# Patient Record
Sex: FEMALE | Race: BLACK OR AFRICAN AMERICAN | Employment: UNEMPLOYED | URBAN - METROPOLITAN AREA
[De-identification: names, ages, dates, MRNs, and addresses within clinical notes are randomized per-mention and may not be internally consistent; named-entity substitution may affect disease eponyms.]

---

## 2017-04-07 ENCOUNTER — APPOINTMENT (OUTPATIENT)
Dept: GENERAL RADIOLOGY | Age: 42
End: 2017-04-07
Attending: PHYSICIAN ASSISTANT
Payer: SELF-PAY

## 2017-04-07 ENCOUNTER — HOSPITAL ENCOUNTER (EMERGENCY)
Age: 42
Discharge: HOME | End: 2017-04-08
Attending: EMERGENCY MEDICINE | Admitting: EMERGENCY MEDICINE
Payer: SELF-PAY

## 2017-04-07 DIAGNOSIS — J20.9 ACUTE BRONCHITIS, UNSPECIFIED ORGANISM: ICD-10-CM

## 2017-04-07 DIAGNOSIS — R73.9 HYPERGLYCEMIA: Primary | ICD-10-CM

## 2017-04-07 LAB
ADMINISTERED INITIALS, ADMINIT: NORMAL
ALBUMIN SERPL BCP-MCNC: 3.4 G/DL (ref 3.4–5)
ALBUMIN/GLOB SERPL: 0.8 {RATIO} (ref 0.8–1.7)
ALP SERPL-CCNC: 179 U/L (ref 45–117)
ALT SERPL-CCNC: 24 U/L (ref 13–56)
ANION GAP BLD CALC-SCNC: 11 MMOL/L (ref 3–18)
APPEARANCE UR: CLEAR
AST SERPL W P-5'-P-CCNC: 18 U/L (ref 15–37)
BASOPHILS # BLD AUTO: 0 K/UL (ref 0–0.1)
BASOPHILS # BLD: 0 % (ref 0–2)
BILIRUB SERPL-MCNC: 0.4 MG/DL (ref 0.2–1)
BILIRUB UR QL: NEGATIVE
BUN SERPL-MCNC: 5 MG/DL (ref 7–18)
BUN/CREAT SERPL: 5 (ref 12–20)
CALCIUM SERPL-MCNC: 8.9 MG/DL (ref 8.5–10.1)
CHLORIDE SERPL-SCNC: 99 MMOL/L (ref 100–108)
CK MB CFR SERPL CALC: NORMAL % (ref 0–4)
CK MB CFR SERPL CALC: NORMAL % (ref 0–4)
CK MB SERPL-MCNC: <1 NG/ML (ref 5–25)
CK MB SERPL-MCNC: <1 NG/ML (ref 5–25)
CK SERPL-CCNC: 61 U/L (ref 26–192)
CK SERPL-CCNC: 87 U/L (ref 26–192)
CO2 SERPL-SCNC: 24 MMOL/L (ref 21–32)
COLOR UR: YELLOW
CREAT SERPL-MCNC: 0.98 MG/DL (ref 0.6–1.3)
D50 ADMINISTERED, D50ADM: 0 ML
D50 ORDER, D50ORD: 0 ML
DIFFERENTIAL METHOD BLD: ABNORMAL
EOSINOPHIL # BLD: 0.1 K/UL (ref 0–0.4)
EOSINOPHIL NFR BLD: 1 % (ref 0–5)
ERYTHROCYTE [DISTWIDTH] IN BLOOD BY AUTOMATED COUNT: 14.5 % (ref 11.6–14.5)
GLOBULIN SER CALC-MCNC: 4.5 G/DL (ref 2–4)
GLUCOSE BLD STRIP.AUTO-MCNC: 228 MG/DL (ref 70–110)
GLUCOSE BLD STRIP.AUTO-MCNC: 241 MG/DL (ref 70–110)
GLUCOSE SERPL-MCNC: 449 MG/DL (ref 74–99)
GLUCOSE UR STRIP.AUTO-MCNC: >1000 MG/DL
GLUCOSE, GLC: 228 MG/DL
GLUCOSE, GLC: 241 MG/DL
GLUCOSE, GLC: 449 MG/DL
HCG UR QL: NEGATIVE
HCT VFR BLD AUTO: 40.7 % (ref 35–45)
HGB BLD-MCNC: 13 G/DL (ref 12–16)
HGB UR QL STRIP: NEGATIVE
HIGH TARGET, HITG: 180 MG/DL
INSULIN ADMINSTERED, INSADM: 1.8 UNITS/HOUR
INSULIN ADMINSTERED, INSADM: 3.4 UNITS/HOUR
INSULIN ADMINSTERED, INSADM: 7.8 UNITS/HOUR
INSULIN ORDER, INSORD: 1.8 UNITS/HOUR
INSULIN ORDER, INSORD: 3.4 UNITS/HOUR
INSULIN ORDER, INSORD: 7.8 UNITS/HOUR
KETONES UR QL STRIP.AUTO: NEGATIVE MG/DL
LEUKOCYTE ESTERASE UR QL STRIP.AUTO: NEGATIVE
LOW TARGET, LOT: 140 MG/DL
LYMPHOCYTES # BLD AUTO: 33 % (ref 21–52)
LYMPHOCYTES # BLD: 2.3 K/UL (ref 0.9–3.6)
MAGNESIUM SERPL-MCNC: 1.8 MG/DL (ref 1.6–2.6)
MCH RBC QN AUTO: 22.3 PG (ref 24–34)
MCHC RBC AUTO-ENTMCNC: 31.9 G/DL (ref 31–37)
MCV RBC AUTO: 69.8 FL (ref 74–97)
MINUTES UNTIL NEXT BG, NBG: 60 MIN
MONOCYTES # BLD: 0.4 K/UL (ref 0.05–1.2)
MONOCYTES NFR BLD AUTO: 6 % (ref 3–10)
MULTIPLIER, MUL: 0.01
MULTIPLIER, MUL: 0.02
MULTIPLIER, MUL: 0.02
NEUTS SEG # BLD: 4.1 K/UL (ref 1.8–8)
NEUTS SEG NFR BLD AUTO: 60 % (ref 40–73)
NITRITE UR QL STRIP.AUTO: NEGATIVE
ORDER INITIALS, ORDINIT: NORMAL
PH UR STRIP: 7.5 [PH] (ref 5–8)
PLATELET # BLD AUTO: 232 K/UL (ref 135–420)
PMV BLD AUTO: 11.1 FL (ref 9.2–11.8)
POTASSIUM SERPL-SCNC: 4 MMOL/L (ref 3.5–5.5)
PROT SERPL-MCNC: 7.9 G/DL (ref 6.4–8.2)
PROT UR STRIP-MCNC: NEGATIVE MG/DL
RBC # BLD AUTO: 5.83 M/UL (ref 4.2–5.3)
SODIUM SERPL-SCNC: 134 MMOL/L (ref 136–145)
SP GR UR REFRACTOMETRY: >1.03 (ref 1–1.03)
TROPONIN I SERPL-MCNC: <0.02 NG/ML (ref 0–0.04)
TROPONIN I SERPL-MCNC: <0.02 NG/ML (ref 0–0.04)
UROBILINOGEN UR QL STRIP.AUTO: 0.2 EU/DL (ref 0.2–1)
WBC # BLD AUTO: 6.9 K/UL (ref 4.6–13.2)

## 2017-04-07 PROCEDURE — 85025 COMPLETE CBC W/AUTO DIFF WBC: CPT | Performed by: PHYSICIAN ASSISTANT

## 2017-04-07 PROCEDURE — 82962 GLUCOSE BLOOD TEST: CPT

## 2017-04-07 PROCEDURE — 74011000258 HC RX REV CODE- 258: Performed by: EMERGENCY MEDICINE

## 2017-04-07 PROCEDURE — 81025 URINE PREGNANCY TEST: CPT | Performed by: PHYSICIAN ASSISTANT

## 2017-04-07 PROCEDURE — 81003 URINALYSIS AUTO W/O SCOPE: CPT | Performed by: PHYSICIAN ASSISTANT

## 2017-04-07 PROCEDURE — 82550 ASSAY OF CK (CPK): CPT | Performed by: PHYSICIAN ASSISTANT

## 2017-04-07 PROCEDURE — 80053 COMPREHEN METABOLIC PANEL: CPT | Performed by: PHYSICIAN ASSISTANT

## 2017-04-07 PROCEDURE — 71020 XR CHEST PA LAT: CPT

## 2017-04-07 PROCEDURE — 74011636637 HC RX REV CODE- 636/637: Performed by: EMERGENCY MEDICINE

## 2017-04-07 PROCEDURE — 96365 THER/PROPH/DIAG IV INF INIT: CPT

## 2017-04-07 PROCEDURE — 93005 ELECTROCARDIOGRAM TRACING: CPT

## 2017-04-07 PROCEDURE — 99284 EMERGENCY DEPT VISIT MOD MDM: CPT

## 2017-04-07 PROCEDURE — 96366 THER/PROPH/DIAG IV INF ADDON: CPT

## 2017-04-07 PROCEDURE — 83735 ASSAY OF MAGNESIUM: CPT | Performed by: PHYSICIAN ASSISTANT

## 2017-04-07 PROCEDURE — 74011250636 HC RX REV CODE- 250/636: Performed by: EMERGENCY MEDICINE

## 2017-04-07 RX ORDER — METFORMIN HYDROCHLORIDE 500 MG/1
500 TABLET ORAL 2 TIMES DAILY WITH MEALS
Qty: 30 TAB | Refills: 0 | Status: SHIPPED | OUTPATIENT
Start: 2017-04-07 | End: 2018-11-25

## 2017-04-07 RX ORDER — AMLODIPINE BESYLATE 10 MG/1
10 TABLET ORAL DAILY
Qty: 30 TAB | Refills: 0 | Status: SHIPPED | OUTPATIENT
Start: 2017-04-07 | End: 2019-01-02

## 2017-04-07 RX ORDER — DOXYCYCLINE HYCLATE 100 MG
100 TABLET ORAL 2 TIMES DAILY
Qty: 14 TAB | Refills: 0 | Status: SHIPPED | OUTPATIENT
Start: 2017-04-07 | End: 2017-04-14

## 2017-04-07 RX ADMIN — SODIUM CHLORIDE 1000 ML: 900 INJECTION, SOLUTION INTRAVENOUS at 22:14

## 2017-04-07 RX ADMIN — SODIUM CHLORIDE 1000 ML: 900 INJECTION, SOLUTION INTRAVENOUS at 20:43

## 2017-04-07 RX ADMIN — SODIUM CHLORIDE 7.8 UNITS/HR: 900 INJECTION, SOLUTION INTRAVENOUS at 20:59

## 2017-04-07 NOTE — ED TRIAGE NOTES
Pt,  C/o cough, for over  A month,  Chest  Pain  With shortness of breath started  About 3;30 this PM

## 2017-04-07 NOTE — ED PROVIDER NOTES
HPI Comments: 7:10 PM Susannah Gamez is a 40 y.o. female with a hx of DM and HTN who presents to the ED c/o cough X 1 month. She also c/o chills, SOB, wheezing, and NVD. She denies fever. Pt also mention that she needs a medication refill (Metformin). No other associated symptoms or modifying factors at this time. The history is provided by the patient. Past Medical History:   Diagnosis Date    Diabetes ([address]     HTN (hypertension)        Past Surgical History:   Procedure Laterality Date    HX GYN               No family history on file. Social History     Social History    Marital status:      Spouse name: N/A    Number of children: N/A    Years of education: N/A     Occupational History    Not on file. Social History Main Topics    Smoking status: Never Smoker    Smokeless tobacco: Not on file    Alcohol use No    Drug use: No    Sexual activity: Yes     Partners: Male     Other Topics Concern    Not on file     Social History Narrative         ALLERGIES: Pcn [penicillins]    Review of Systems   Constitutional: Positive for chills. Negative for fever. HENT: Negative for congestion and rhinorrhea. Respiratory: Positive for cough, shortness of breath and wheezing. Cardiovascular: Negative for chest pain and leg swelling. Gastrointestinal: Positive for diarrhea, nausea and vomiting. Negative for abdominal pain. Genitourinary: Negative for dysuria and hematuria. Musculoskeletal: Negative for arthralgias and myalgias. Skin: Negative for rash and wound. Neurological: Negative for light-headedness and headaches. Psychiatric/Behavioral: Negative for confusion and hallucinations. All other systems reviewed and are negative.       Vitals:    17 1745 17 2300   BP: (!) 178/119 139/90   Pulse: (!) 103 90   Resp: 18    Temp: 98.3 °F (36.8 °C)    SpO2: 100% 100%   Weight: 89.4 kg (197 lb)    Height: 5' 8\" (1.727 m)             Physical Exam Constitutional: She is oriented to person, place, and time. No distress. HENT:   Head: Atraumatic. Eyes: Conjunctivae are normal.   Neck: Normal range of motion. Neck supple. Cardiovascular: Normal rate, regular rhythm and normal heart sounds. Pulmonary/Chest: Effort normal and breath sounds normal. She exhibits no tenderness. Abdominal: Soft. Bowel sounds are normal. She exhibits no distension. There is no tenderness. There is no rebound and no guarding. Musculoskeletal: Normal range of motion. She exhibits no edema or tenderness. Neurological: She is alert and oriented to person, place, and time. No cranial nerve deficit. Skin: Skin is warm and dry. Psychiatric: She has a normal mood and affect. Nursing note and vitals reviewed.        [facility]  ED Course       Procedures  Vitals:  Patient Vitals for the past 12 hrs:   Temp Pulse Resp BP SpO2   04/07/17 2300 - 90 - 139/90 100 %   04/07/17 1745 98.3 °F (36.8 °C) (!) 103 18 (!) 178/119 100 %     Medications ordered:   Medications   insulin regular (NOVOLIN R, HUMULIN R) 100 Units in 0.9% sodium chloride 100 mL infusion (3.4 Units/hr IntraVENous Rate Change 4/7/17 9685)   sodium chloride 0.9 % bolus infusion 1,000 mL (1,000 mL IntraVENous New Bag 4/7/17 1243)   sodium chloride 0.9 % bolus infusion 1,000 mL (0 mL IntraVENous IV Completed 4/7/17 3132)       Lab findings:  Recent Results (from the past 12 hour(s))   CARDIAC PANEL,(CK, CKMB & TROPONIN)    Collection Time: 04/07/17  6:00 PM   Result Value Ref Range    CK 87 26 - 192 U/L    CK - MB <1.0 <3.6 ng/ml    CK-MB Index Cannot be calulated 0.0 - 4.0 %    Troponin-I, Qt. <0.02 0.0 - 0.045 NG/ML   CBC WITH AUTOMATED DIFF    Collection Time: 04/07/17  6:00 PM   Result Value Ref Range    WBC 6.9 4.6 - 13.2 K/uL    RBC 5.83 (H) 4.20 - 5.30 M/uL    HGB 13.0 12.0 - 16.0 g/dL    HCT 40.7 35.0 - 45.0 %    MCV 69.8 (L) 74.0 - 97.0 FL    MCH 22.3 (L) 24.0 - 34.0 PG    MCHC 31.9 31.0 - 37.0 g/dL    RDW 14.5 11.6 - 14.5 %    PLATELET 681 263 - 664 K/uL    MPV 11.1 9.2 - 11.8 FL    NEUTROPHILS 60 40 - 73 %    LYMPHOCYTES 33 21 - 52 %    MONOCYTES 6 3 - 10 %    EOSINOPHILS 1 0 - 5 %    BASOPHILS 0 0 - 2 %    ABS. NEUTROPHILS 4.1 1.8 - 8.0 K/UL    ABS. LYMPHOCYTES 2.3 0.9 - 3.6 K/UL    ABS. MONOCYTES 0.4 0.05 - 1.2 K/UL    ABS. EOSINOPHILS 0.1 0.0 - 0.4 K/UL    ABS. BASOPHILS 0.0 0.0 - 0.1 K/UL    DF AUTOMATED     METABOLIC PANEL, COMPREHENSIVE    Collection Time: 04/07/17  6:00 PM   Result Value Ref Range    Sodium 134 (L) 136 - 145 mmol/L    Potassium 4.0 3.5 - 5.5 mmol/L    Chloride 99 (L) 100 - 108 mmol/L    CO2 24 21 - 32 mmol/L    Anion gap 11 3.0 - 18 mmol/L    Glucose 449 (HH) 74 - 99 mg/dL    BUN 5 (L) 7.0 - 18 MG/DL    Creatinine 0.98 0.6 - 1.3 MG/DL    BUN/Creatinine ratio 5 (L) 12 - 20      GFR est AA >60 >60 ml/min/1.73m2    GFR est non-AA >60 >60 ml/min/1.73m2    Calcium 8.9 8.5 - 10.1 MG/DL    Bilirubin, total 0.4 0.2 - 1.0 MG/DL    ALT (SGPT) 24 13 - 56 U/L    AST (SGOT) 18 15 - 37 U/L    Alk.  phosphatase 179 (H) 45 - 117 U/L    Protein, total 7.9 6.4 - 8.2 g/dL    Albumin 3.4 3.4 - 5.0 g/dL    Globulin 4.5 (H) 2.0 - 4.0 g/dL    A-G Ratio 0.8 0.8 - 1.7     MAGNESIUM    Collection Time: 04/07/17  6:00 PM   Result Value Ref Range    Magnesium 1.8 1.6 - 2.6 mg/dL   GLUCOSTABILIZER    Collection Time: 04/07/17  9:02 PM   Result Value Ref Range    Glucose 449 mg/dL    Insulin order 7.8 units/hour    Insulin adminstered 7.8 units/hour    Multiplier 0.020     Low target 140 mg/dL    High target 180 mg/dL    D50 order 0.0 ml    D50 administered 0.00 ml    Minutes until next BG 60 min    Order initials csa     Administered initials dhs    GLUCOSE, POC    Collection Time: 04/07/17 10:04 PM   Result Value Ref Range    Glucose (POC) 241 (H) 70 - 110 mg/dL   GLUCOSTABILIZER    Collection Time: 04/07/17 10:11 PM   Result Value Ref Range    Glucose 241 mg/dL    Insulin order 1.8 units/hour    Insulin adminstered 1.8 units/hour    Multiplier 0.010     Low target 140 mg/dL    High target 180 mg/dL    D50 order 0.0 ml    D50 administered 0.00 ml    Minutes until next BG 60 min    Order initials jsa     Administered initials [facility]    URINALYSIS W/ RFLX MICROSCOPIC    Collection Time: 04/07/17 10:20 PM   Result Value Ref Range    Color YELLOW      Appearance CLEAR      Specific gravity >1.030 (H) 1.005 - 1.030    pH (UA) 7.5 5.0 - 8.0      Protein NEGATIVE  NEG mg/dL    Glucose >1000 (A) NEG mg/dL    Ketone NEGATIVE  NEG mg/dL    Bilirubin NEGATIVE  NEG      Blood NEGATIVE  NEG      Urobilinogen 0.2 0.2 - 1.0 EU/dL    Nitrites NEGATIVE  NEG      Leukocyte Esterase NEGATIVE  NEG     HCG URINE, QL    Collection Time: 04/07/17 10:20 PM   Result Value Ref Range    HCG urine, Ql. NEGATIVE  NEG     CARDIAC PANEL,(CK, CKMB & TROPONIN)    Collection Time: 04/07/17 10:20 PM   Result Value Ref Range    CK 61 26 - 192 U/L    CK - MB <1.0 <3.6 ng/ml    CK-MB Index Cannot be calulated 0.0 - 4.0 %    Troponin-I, Qt. <0.02 0.0 - 0.045 NG/ML   GLUCOSE, POC    Collection Time: 04/07/17 11:01 PM   Result Value Ref Range    Glucose (POC) 228 (H) 70 - 110 mg/dL   GLUCOSTABILIZER    Collection Time: 04/07/17 11:19 PM   Result Value Ref Range    Glucose 228 mg/dL    Insulin order 3.4 units/hour    Insulin adminstered 3.4 units/hour    Multiplier 0.020     Low target 140 mg/dL    High target 180 mg/dL    D50 order 0.0 ml    D50 administered 0.00 ml    Minutes until next BG 60 min    Order initials dhs     Administered initials dhs        X-Ray, CT or other radiology findings or impressions:  XR CHEST PA LAT    IMPRESSION:    No acute process. Per Mily Ruth DO 7:29 PM       Reevaluation of patient:   9:02 PM I have reassessed the patient and discussed their results and diagnosis. Pt will be discharged in stable condition. Patient is to return to emergency department if any new or worsening condition.  Patient understands and verbalizes agreement with plan. Disposition:  Diagnosis:   1. Hyperglycemia    2. Acute bronchitis, unspecified organism        Disposition: Discharged    Follow-up Information     Follow up With Details Comments Emilia Rush Schedule an appointment as soon as possible for a visit follow up [address] [facility] EMERGENCY DEPT Go to As needed, If symptoms worsen [address] [facility] [id number]  [phone]            Patient's Medications   Start Taking    DOXYCYCLINE (VIBRA-TABS) 100 MG TABLET    Take 1 Tab by mouth two (2) times a day for 7 days. Continue Taking    GENTAMICIN (GARAMYCIN) 0.3 % OPHTHALMIC SOLUTION    Administer 1 Drop to right eye every four (4) hours. These Medications have changed    Modified Medication Previous Medication    AMLODIPINE (NORVASC) 10 MG TABLET amLODIPine (NORVASC) 10 mg tablet       Take 1 Tab by mouth daily. Take 10 mg by mouth daily. METFORMIN (GLUCOPHAGE) 500 MG TABLET metFORMIN (GLUCOPHAGE) 500 mg tablet       Take 1 Tab by mouth two (2) times daily (with meals). Take 1 Tab by mouth two (2) times daily (with meals). Stop Taking    No medications on file     SCRIBE ATTESTATION STATEMENT  Documented by: Zena Kussmaul for, and in the presence of, Carrie Mitchell DO 7:12 PM    Signed by: Sussy Smith, 04/07/17 7:12 PM    PROVIDER ATTESTATION STATEMENT  I personally performed the services described in the documentation, reviewed the documentation, as recorded by the scribe in my presence, and it accurately and completely records my words and actions. Carrie Mitchell DO      Note:  Assuming care of patient   from leaving provider    9:21 PM  Justine Thompson MD, assumed care of patient from another provider who is ending their shift in the emergency department .     I introduced myself to the patient, explained that I was the physician who would be followed the remaining emergency department course. I subsequently reaffirmed the history by the preceding provider, Dr. Diogo España, and reexamined the patient. Current Facility-Administered Medications   Medication Dose Route Frequency    insulin regular (NOVOLIN R, HUMULIN R) 100 Units in 0.9% sodium chloride 100 mL infusion  1-10 Units/hr IntraVENous TITRATE     Current Outpatient Prescriptions   Medication Sig    metFORMIN (GLUCOPHAGE) 500 mg tablet Take 1 Tab by mouth two (2) times daily (with meals).  doxycycline (VIBRA-TABS) 100 mg tablet Take 1 Tab by mouth two (2) times a day for 7 days.  amLODIPine (NORVASC) 10 mg tablet Take 1 Tab by mouth daily.  gentamicin (GARAMYCIN) 0.3 % ophthalmic solution Administer 1 Drop to right eye every four (4) hours. Past Medical History:   Diagnosis Date    Diabetes ([address]     HTN (hypertension)        Past Surgical History:   Procedure Laterality Date    HX GYN             No family history on file. Social History     Social History    Marital status:      Spouse name: N/A    Number of children: N/A    Years of education: N/A     Occupational History    Not on file.      Social History Main Topics    Smoking status: Never Smoker    Smokeless tobacco: Not on file    Alcohol use No    Drug use: No    Sexual activity: Yes     Partners: Male     Other Topics Concern    Not on file     Social History Narrative       Allergies   Allergen Reactions    Pcn [Penicillins] Hives       Patient's primary care provider (as noted in EPIC):  None    Abnormal lab results from this emergency department encounter:  Labs Reviewed   CBC WITH AUTOMATED DIFF - Abnormal; Notable for the following:        Result Value    RBC 5.83 (*)     MCV 69.8 (*)     MCH 22.3 (*)     All other components within normal limits   METABOLIC PANEL, COMPREHENSIVE - Abnormal; Notable for the following:     Sodium 134 (*)     Chloride 99 (*)     Glucose 449 (*)     BUN 5 (*) BUN/Creatinine ratio 5 (*)     Alk. phosphatase 179 (*)     Globulin 4.5 (*)     All other components within normal limits   URINALYSIS W/ RFLX MICROSCOPIC - Abnormal; Notable for the following:     Specific gravity >1.030 (*)     Glucose >1000 (*)     All other components within normal limits   GLUCOSE, POC - Abnormal; Notable for the following:     Glucose (POC) 241 (*)     All other components within normal limits   GLUCOSE, POC - Abnormal; Notable for the following:     Glucose (POC) 228 (*)     All other components within normal limits   CARDIAC PANEL,(CK, CKMB & TROPONIN)   HCG URINE, QL   MAGNESIUM   GLUCOSTABILIZER   CARDIAC PANEL,(CK, CKMB & TROPONIN)   GLUCOSTABILIZER   GLUCOSTABILIZER   CARDIAC PANEL,(CK, CKMB & TROPONIN)       Lab values for this patient within approximately the last 12 hours:  Recent Results (from the past 12 hour(s))   CARDIAC PANEL,(CK, CKMB & TROPONIN)    Collection Time: 04/07/17  6:00 PM   Result Value Ref Range    CK 87 26 - 192 U/L    CK - MB <1.0 <3.6 ng/ml    CK-MB Index Cannot be calulated 0.0 - 4.0 %    Troponin-I, Qt. <0.02 0.0 - 0.045 NG/ML   CBC WITH AUTOMATED DIFF    Collection Time: 04/07/17  6:00 PM   Result Value Ref Range    WBC 6.9 4.6 - 13.2 K/uL    RBC 5.83 (H) 4.20 - 5.30 M/uL    HGB 13.0 12.0 - 16.0 g/dL    HCT 40.7 35.0 - 45.0 %    MCV 69.8 (L) 74.0 - 97.0 FL    MCH 22.3 (L) 24.0 - 34.0 PG    MCHC 31.9 31.0 - 37.0 g/dL    RDW 14.5 11.6 - 14.5 %    PLATELET 809 998 - 451 K/uL    MPV 11.1 9.2 - 11.8 FL    NEUTROPHILS 60 40 - 73 %    LYMPHOCYTES 33 21 - 52 %    MONOCYTES 6 3 - 10 %    EOSINOPHILS 1 0 - 5 %    BASOPHILS 0 0 - 2 %    ABS. NEUTROPHILS 4.1 1.8 - 8.0 K/UL    ABS. LYMPHOCYTES 2.3 0.9 - 3.6 K/UL    ABS. MONOCYTES 0.4 0.05 - 1.2 K/UL    ABS. EOSINOPHILS 0.1 0.0 - 0.4 K/UL    ABS.  BASOPHILS 0.0 0.0 - 0.1 K/UL    DF AUTOMATED     METABOLIC PANEL, COMPREHENSIVE    Collection Time: 04/07/17  6:00 PM   Result Value Ref Range    Sodium 134 (L) 136 - 145 mmol/L Potassium 4.0 3.5 - 5.5 mmol/L    Chloride 99 (L) 100 - 108 mmol/L    CO2 24 21 - 32 mmol/L    Anion gap 11 3.0 - 18 mmol/L    Glucose 449 (HH) 74 - 99 mg/dL    BUN 5 (L) 7.0 - 18 MG/DL    Creatinine 0.98 0.6 - 1.3 MG/DL    BUN/Creatinine ratio 5 (L) 12 - 20      GFR est AA >60 >60 ml/min/1.73m2    GFR est non-AA >60 >60 ml/min/1.73m2    Calcium 8.9 8.5 - 10.1 MG/DL    Bilirubin, total 0.4 0.2 - 1.0 MG/DL    ALT (SGPT) 24 13 - 56 U/L    AST (SGOT) 18 15 - 37 U/L    Alk.  phosphatase 179 (H) 45 - 117 U/L    Protein, total 7.9 6.4 - 8.2 g/dL    Albumin 3.4 3.4 - 5.0 g/dL    Globulin 4.5 (H) 2.0 - 4.0 g/dL    A-G Ratio 0.8 0.8 - 1.7     MAGNESIUM    Collection Time: 04/07/17  6:00 PM   Result Value Ref Range    Magnesium 1.8 1.6 - 2.6 mg/dL   GLUCOSTABILIZER    Collection Time: 04/07/17  9:02 PM   Result Value Ref Range    Glucose 449 mg/dL    Insulin order 7.8 units/hour    Insulin adminstered 7.8 units/hour    Multiplier 0.020     Low target 140 mg/dL    High target 180 mg/dL    D50 order 0.0 ml    D50 administered 0.00 ml    Minutes until next BG 60 min    Order initials csa     Administered initials dhs    GLUCOSE, POC    Collection Time: 04/07/17 10:04 PM   Result Value Ref Range    Glucose (POC) 241 (H) 70 - 110 mg/dL   GLUCOSTABILIZER    Collection Time: 04/07/17 10:11 PM   Result Value Ref Range    Glucose 241 mg/dL    Insulin order 1.8 units/hour    Insulin adminstered 1.8 units/hour    Multiplier 0.010     Low target 140 mg/dL    High target 180 mg/dL    D50 order 0.0 ml    D50 administered 0.00 ml    Minutes until next BG 60 min    Order initials jsa     Administered initials [facility]    URINALYSIS W/ RFLX MICROSCOPIC    Collection Time: 04/07/17 10:20 PM   Result Value Ref Range    Color YELLOW      Appearance CLEAR      Specific gravity >1.030 (H) 1.005 - 1.030    pH (UA) 7.5 5.0 - 8.0      Protein NEGATIVE  NEG mg/dL    Glucose >1000 (A) NEG mg/dL    Ketone NEGATIVE  NEG mg/dL    Bilirubin NEGATIVE  NEG Blood NEGATIVE  NEG      Urobilinogen 0.2 0.2 - 1.0 EU/dL    Nitrites NEGATIVE  NEG      Leukocyte Esterase NEGATIVE  NEG     HCG URINE, QL    Collection Time: 04/07/17 10:20 PM   Result Value Ref Range    HCG urine, Ql. NEGATIVE  NEG     CARDIAC PANEL,(CK, CKMB & TROPONIN)    Collection Time: 04/07/17 10:20 PM   Result Value Ref Range    CK 61 26 - 192 U/L    CK - MB <1.0 <3.6 ng/ml    CK-MB Index Cannot be calulated 0.0 - 4.0 %    Troponin-I, Qt. <0.02 0.0 - 0.045 NG/ML   GLUCOSE, POC    Collection Time: 04/07/17 11:01 PM   Result Value Ref Range    Glucose (POC) 228 (H) 70 - 110 mg/dL   GLUCOSTABILIZER    Collection Time: 04/07/17 11:19 PM   Result Value Ref Range    Glucose 228 mg/dL    Insulin order 3.4 units/hour    Insulin adminstered 3.4 units/hour    Multiplier 0.020     Low target 140 mg/dL    High target 180 mg/dL    D50 order 0.0 ml    D50 administered 0.00 ml    Minutes until next BG 60 min    Order initials dhs     Administered initials dhs        Radiologist and cardiologist interpretations if available at time of this note:  XR CHEST PA LAT    (Results Pending)       Medication(s) ordered for patient during this emergency visit encounter:  Medications   insulin regular (NOVOLIN R, HUMULIN R) 100 Units in 0.9% sodium chloride 100 mL infusion (3.4 Units/hr IntraVENous Rate Change 4/7/17 2753)   sodium chloride 0.9 % bolus infusion 1,000 mL (1,000 mL IntraVENous New Bag 4/7/17 5624)   sodium chloride 0.9 % bolus infusion 1,000 mL (0 mL IntraVENous IV Completed 4/7/17 5295)       Improvement of FBS < 250 and 2nd trop at 2300 hours are pending at time of assuming care. Diagnoses:  1. Hyperglycemia in diabetic  2. Noncompliant with medication (metformin). SPECIFIC PATIENT INSTRUCTIONS FROM THE PHYSICIAN WHO TREATED YOU IN THE ER TODAY:  1. Return if worse. 2. Take your diabetes medications as prescribed and do not miss doses  3. You indicated that you currently do not have a primary physician. Based on what city you live in, you were given contact information about a free clinic for residents of that city/county. It is important that you make an appointment with them. It is important to have the same provider(s)/office care for you so that can follow trends in your health, notice changes, and do outpatient work ups that are not available in the emergency department. Vianca Chavarria M.D.

## 2017-04-08 VITALS
SYSTOLIC BLOOD PRESSURE: 161 MMHG | TEMPERATURE: 98.3 F | BODY MASS INDEX: 29.86 KG/M2 | HEIGHT: 68 IN | DIASTOLIC BLOOD PRESSURE: 99 MMHG | OXYGEN SATURATION: 100 % | HEART RATE: 86 BPM | WEIGHT: 197 LBS | RESPIRATION RATE: 20 BRPM

## 2017-04-08 LAB
ATRIAL RATE: 96 BPM
CALCULATED P AXIS, ECG09: 58 DEGREES
CALCULATED R AXIS, ECG10: 50 DEGREES
CALCULATED T AXIS, ECG11: 46 DEGREES
DIAGNOSIS, 93000: NORMAL
GLUCOSE BLD STRIP.AUTO-MCNC: 184 MG/DL (ref 70–110)
P-R INTERVAL, ECG05: 128 MS
Q-T INTERVAL, ECG07: 342 MS
QRS DURATION, ECG06: 80 MS
QTC CALCULATION (BEZET), ECG08: 432 MS
VENTRICULAR RATE, ECG03: 96 BPM

## 2017-04-08 PROCEDURE — 82962 GLUCOSE BLOOD TEST: CPT

## 2017-04-08 NOTE — DISCHARGE INSTRUCTIONS
Learning About High Blood Sugar  What is high blood sugar? Your body turns the food you eat into glucose (sugar), which it uses for energy. But if your body isn't able to use the sugar right away, it can build up in your blood and lead to high blood sugar. When the amount of sugar in your blood stays too high for too much of the time, you may have diabetes. Diabetes is a disease that can cause serious health problems. The good news is that lifestyle changes may help you get your blood sugar back to normal and avoid or delay diabetes. What causes high blood sugar? Sugar (glucose) can build up in your blood if you:  · Are overweight. · Have a family history of diabetes. · Take certain medicines, such as steroids. What are the symptoms? Having high blood sugar may not cause any symptoms at all. Or it may make you feel very thirsty or very hungry. You may also urinate more often than usual, have blurry vision, or lose weight without trying. How is high blood sugar treated? You can take steps to lower your blood sugar level if you understand what makes it get higher. Your doctor may want you to learn how to test your blood sugar level at home. Then you can see how illness, stress, or different kinds of food or medicine raise or lower your blood sugar level. Other tests may be needed to see if you have diabetes. How can you prevent high blood sugar? · Watch your weight. If you're overweight, losing just a small amount of weight may help. Reducing fat around your waist is most important. · Limit the amount of calories, sweets, and unhealthy fat you eat. Ask your doctor if a dietitian can help you. A registered dietitian can help you create meal plans that fit your lifestyle. · Get at least 30 minutes of exercise on most days of the week. Exercise helps control your blood sugar. It also helps you maintain a healthy weight. Walking is a good choice.  You also may want to do other activities, such as running, swimming, cycling, or playing tennis or team sports. · If your doctor prescribed medicines, take them exactly as prescribed. Call your doctor if you think you are having a problem with your medicine. You will get more details on the specific medicines your doctor prescribes. Follow-up care is a key part of your treatment and safety. Be sure to make and go to all appointments, and call your doctor if you are having problems. It's also a good idea to know your test results and keep a list of the medicines you take. Where can you learn more? Go to http://taye-triny.info/. Enter O108 in the search box to learn more about \"Learning About High Blood Sugar. \"  Current as of: May 23, 2016  Content Version: 11.2  © 20066556-5276 [employer]. Care instructions adapted under license by [employer] (which disclaims liability or warranty for this information). If you have questions about a medical condition or this instruction, always ask your healthcare professional. [address] any warranty or liability for your use of this information. Bronchitis: Care Instructions  Your Care Instructions    Bronchitis is inflammation of the bronchial tubes, which carry air to the lungs. The tubes swell and produce mucus, or phlegm. The mucus and inflamed bronchial tubes make you cough. You may have trouble breathing. Most cases of bronchitis are caused by viruses like those that cause colds. Antibiotics usually do not help and they may be harmful. Bronchitis usually develops rapidly and lasts about 2 to 3 weeks in otherwise healthy people. Follow-up care is a key part of your treatment and safety. Be sure to make and go to all appointments, and call your doctor if you are having problems. It's also a good idea to know your test results and keep a list of the medicines you take. How can you care for yourself at home?   · Take all medicines exactly as prescribed. Call your doctor if you think you are having a problem with your medicine. · Get some extra rest.  · Take an over-the-counter pain medicine, such as acetaminophen (Tylenol), ibuprofen (Advil, Motrin), or naproxen (Aleve) to reduce fever and relieve body aches. Read and follow all instructions on the label. · Do not take two or more pain medicines at the same time unless the doctor told you to. Many pain medicines have acetaminophen, which is Tylenol. Too much acetaminophen (Tylenol) can be harmful. · Take an over-the-counter cough medicine that contains dextromethorphan to help quiet a dry, hacking cough so that you can sleep. Avoid cough medicines that have more than one active ingredient. Read and follow all instructions on the label. · Breathe moist air from a humidifier, hot shower, or sink filled with hot water. The heat and moisture will thin mucus so you can cough it out. · Do not smoke. Smoking can make bronchitis worse. If you need help quitting, talk to your doctor about stop-smoking programs and medicines. These can increase your chances of quitting for good. When should you call for help? Call 911 anytime you think you may need emergency care. For example, call if:  · You have severe trouble breathing. Call your doctor now or seek immediate medical care if:  · You have new or worse trouble breathing. · You cough up dark brown or bloody mucus (sputum). · You have a new or higher fever. · You have a new rash. Watch closely for changes in your health, and be sure to contact your doctor if:  · You cough more deeply or more often, especially if you notice more mucus or a change in the color of your mucus. · You are not getting better as expected. Where can you learn more? Go to http://taye-triny.info/. Enter H333 in the search box to learn more about \"Bronchitis: Care Instructions. \"  Current as of: May 23, 2016  Content Version: 11.2  © 2228-1212 [employer]. Care instructions adapted under license by [employer] (which disclaims liability or warranty for this information). If you have questions about a medical condition or this instruction, always ask your healthcare professional. [address] any warranty or liability for your use of this information.

## 2017-04-08 NOTE — ED NOTES
I have reviewed discharge instructions with the patient. The patient verbalized understanding. Patient is BAUER x 4 with her  at the bedside. Patient reports feeling \"much better\" and walks with a steady gait. Patient demonstrated knowledge of how to prevent hyperglycemia in the future.

## 2017-04-08 NOTE — ED NOTES
Pt resting    [ x ] On stretcher with side rails up and bed in locked position, call bell within reach  [ ] Sitting in chair with casters locked, call bell within reach     Toileting  [ ] pt denies need to use bathroom  [ ] pt assisted to bathroom  [ ] pt assisted with bedpan  [ x ] pt independent to bathroom as needed     Ongoing Plan of Care  Plan of care and expected time for test and results reviewed with pt.     Pain Management / Comfort  [ x ] dimmed lights  [ ] warm blanket provided  [ ] pain assessed  [ ] monitor alarms reviewed

## 2017-04-17 NOTE — ED NOTES
Patient will be mailed information to contact [facility]. Patient need to have proof of income,proof of address, and picture ID. Patient will also, be mailed a schedule [facility]. Patient will be given information to contact [employer] to see if she qualifies for the [facility], [facility], [facility], or [employer]. Patient will be contacted in 1-2 weeks to ensure that she has establish care with a primary doctor.

## 2017-06-28 ENCOUNTER — HOSPITAL ENCOUNTER (EMERGENCY)
Age: 42
Discharge: HOME | End: 2017-06-29
Attending: EMERGENCY MEDICINE
Payer: SELF-PAY

## 2017-06-28 ENCOUNTER — APPOINTMENT (OUTPATIENT)
Dept: CT IMAGING | Age: 42
End: 2017-06-28
Attending: PHYSICIAN ASSISTANT
Payer: SELF-PAY

## 2017-06-28 VITALS
BODY MASS INDEX: 27.17 KG/M2 | HEART RATE: 92 BPM | WEIGHT: 178.7 LBS | SYSTOLIC BLOOD PRESSURE: 170 MMHG | RESPIRATION RATE: 17 BRPM | OXYGEN SATURATION: 100 % | DIASTOLIC BLOOD PRESSURE: 107 MMHG | TEMPERATURE: 98.8 F

## 2017-06-28 DIAGNOSIS — R51.9 ACUTE NONINTRACTABLE HEADACHE, UNSPECIFIED HEADACHE TYPE: Primary | ICD-10-CM

## 2017-06-28 DIAGNOSIS — R03.0 ELEVATED BLOOD PRESSURE READING: ICD-10-CM

## 2017-06-28 LAB
ANION GAP BLD CALC-SCNC: 7 MMOL/L (ref 3–18)
BASOPHILS # BLD AUTO: 0 K/UL (ref 0–0.1)
BASOPHILS # BLD: 0 % (ref 0–2)
BUN SERPL-MCNC: 3 MG/DL (ref 7–18)
BUN/CREAT SERPL: 5 (ref 12–20)
CALCIUM SERPL-MCNC: 8.2 MG/DL (ref 8.5–10.1)
CHLORIDE SERPL-SCNC: 104 MMOL/L (ref 100–108)
CO2 SERPL-SCNC: 26 MMOL/L (ref 21–32)
CREAT SERPL-MCNC: 0.63 MG/DL (ref 0.6–1.3)
DIFFERENTIAL METHOD BLD: ABNORMAL
EOSINOPHIL # BLD: 0.1 K/UL (ref 0–0.4)
EOSINOPHIL NFR BLD: 1 % (ref 0–5)
ERYTHROCYTE [DISTWIDTH] IN BLOOD BY AUTOMATED COUNT: 14.1 % (ref 11.6–14.5)
GLUCOSE SERPL-MCNC: 269 MG/DL (ref 74–99)
HCT VFR BLD AUTO: 35.5 % (ref 35–45)
HGB BLD-MCNC: 11.5 G/DL (ref 12–16)
LYMPHOCYTES # BLD AUTO: 45 % (ref 21–52)
LYMPHOCYTES # BLD: 2.8 K/UL (ref 0.9–3.6)
MCH RBC QN AUTO: 22.2 PG (ref 24–34)
MCHC RBC AUTO-ENTMCNC: 32.4 G/DL (ref 31–37)
MCV RBC AUTO: 68.5 FL (ref 74–97)
MONOCYTES # BLD: 0.4 K/UL (ref 0.05–1.2)
MONOCYTES NFR BLD AUTO: 6 % (ref 3–10)
NEUTS SEG # BLD: 3 K/UL (ref 1.8–8)
NEUTS SEG NFR BLD AUTO: 48 % (ref 40–73)
PLATELET # BLD AUTO: 258 K/UL (ref 135–420)
PMV BLD AUTO: 10.8 FL (ref 9.2–11.8)
POTASSIUM SERPL-SCNC: 3.3 MMOL/L (ref 3.5–5.5)
RBC # BLD AUTO: 5.18 M/UL (ref 4.2–5.3)
SODIUM SERPL-SCNC: 137 MMOL/L (ref 136–145)
WBC # BLD AUTO: 6.2 K/UL (ref 4.6–13.2)

## 2017-06-28 PROCEDURE — 96374 THER/PROPH/DIAG INJ IV PUSH: CPT

## 2017-06-28 PROCEDURE — 99283 EMERGENCY DEPT VISIT LOW MDM: CPT

## 2017-06-28 PROCEDURE — 96375 TX/PRO/DX INJ NEW DRUG ADDON: CPT

## 2017-06-28 PROCEDURE — 80048 BASIC METABOLIC PNL TOTAL CA: CPT | Performed by: PHYSICIAN ASSISTANT

## 2017-06-28 PROCEDURE — 85025 COMPLETE CBC W/AUTO DIFF WBC: CPT | Performed by: PHYSICIAN ASSISTANT

## 2017-06-28 PROCEDURE — 74011250636 HC RX REV CODE- 250/636: Performed by: PHYSICIAN ASSISTANT

## 2017-06-28 PROCEDURE — 99282 EMERGENCY DEPT VISIT SF MDM: CPT

## 2017-06-28 PROCEDURE — 70450 CT HEAD/BRAIN W/O DYE: CPT

## 2017-06-28 RX ORDER — DIPHENHYDRAMINE HYDROCHLORIDE 50 MG/ML
25 INJECTION, SOLUTION INTRAMUSCULAR; INTRAVENOUS ONCE
Status: COMPLETED | OUTPATIENT
Start: 2017-06-28 | End: 2017-06-28

## 2017-06-28 RX ORDER — METOCLOPRAMIDE HYDROCHLORIDE 5 MG/ML
10 INJECTION INTRAMUSCULAR; INTRAVENOUS
Status: COMPLETED | OUTPATIENT
Start: 2017-06-28 | End: 2017-06-28

## 2017-06-28 RX ADMIN — METOCLOPRAMIDE 10 MG: 5 INJECTION, SOLUTION INTRAMUSCULAR; INTRAVENOUS at 22:47

## 2017-06-28 RX ADMIN — DIPHENHYDRAMINE HYDROCHLORIDE 25 MG: 50 INJECTION, SOLUTION INTRAMUSCULAR; INTRAVENOUS at 22:47

## 2017-06-29 RX ORDER — BUTALBITAL, ACETAMINOPHEN AND CAFFEINE 300; 40; 50 MG/1; MG/1; MG/1
1 CAPSULE ORAL
Qty: 12 CAP | Refills: 0 | Status: SHIPPED | OUTPATIENT
Start: 2017-06-29 | End: 2018-11-25

## 2017-06-29 NOTE — DISCHARGE INSTRUCTIONS

## 2017-06-29 NOTE — ED PROVIDER NOTES
HPI Comments: 39 yo F c/o headache which started yesterday afternoon. Pain is located in posterior head. States she has had migraine HAs in the past but pain today is different. Tried OTC meds without improvement in sx. Denies fever, photophobia, n/v. No other complaints. Patient is a 40 y.o. female presenting with headaches. Headache   Associated symptoms include headaches. Past Medical History:   Diagnosis Date    Diabetes ([address]     HTN (hypertension)        Past Surgical History:   Procedure Laterality Date    HX GYN               No family history on file. Social History     Social History    Marital status:      Spouse name: N/A    Number of children: N/A    Years of education: N/A     Occupational History    Not on file. Social History Main Topics    Smoking status: Never Smoker    Smokeless tobacco: Not on file    Alcohol use No    Drug use: No    Sexual activity: Yes     Partners: Male     Other Topics Concern    Not on file     Social History Narrative         ALLERGIES: Pcn [penicillins]    Review of Systems   Eyes: Negative for photophobia and visual disturbance. Gastrointestinal: Negative for nausea and vomiting. Neurological: Positive for headaches. All other systems reviewed and are negative. Vitals:    17   BP: (!) 170/107   Pulse: 92   Resp: 17   Temp: 98.8 °F (37.1 °C)   SpO2: 100%   Weight: 81.1 kg (178 lb 11.2 oz)            Physical Exam   Constitutional: She is oriented to person, place, and time. She appears well-developed and well-nourished. No distress. HENT:   Head: Normocephalic and atraumatic. Eyes: Conjunctivae and EOM are normal. Pupils are equal, round, and reactive to light. Neck: Normal range of motion. Neck supple. Cardiovascular: Normal rate, regular rhythm and normal heart sounds. Pulmonary/Chest: Effort normal and breath sounds normal. No respiratory distress. She has no wheezes. She has no rales. Musculoskeletal: Normal range of motion. Neurological: She is alert and oriented to person, place, and time. No cranial nerve deficit. She exhibits normal muscle tone. Coordination normal.   Skin: Skin is warm and dry. Psychiatric: She has a normal mood and affect. Her behavior is normal. Judgment and thought content normal.   Nursing note and vitals reviewed. MDM  Number of Diagnoses or Management Options  Acute nonintractable headache, unspecified headache type:   Elevated blood pressure reading:     ED Course       Procedures    -------------------------------------------------------------------------------------------------------------------     EKG INTERPRETATIONS:      RADIOLOGY RESULTS:   CT HEAD WO CONT   Final Result          LABORATORY RESULTS:  Recent Results (from the past 12 hour(s))   CBC WITH AUTOMATED DIFF    Collection Time: 06/28/17  9:09 PM   Result Value Ref Range    WBC 6.2 4.6 - 13.2 K/uL    RBC 5.18 4.20 - 5.30 M/uL    HGB 11.5 (L) 12.0 - 16.0 g/dL    HCT 35.5 35.0 - 45.0 %    MCV 68.5 (L) 74.0 - 97.0 FL    MCH 22.2 (L) 24.0 - 34.0 PG    MCHC 32.4 31.0 - 37.0 g/dL    RDW 14.1 11.6 - 14.5 %    PLATELET 164 161 - 911 K/uL    MPV 10.8 9.2 - 11.8 FL    NEUTROPHILS 48 40 - 73 %    LYMPHOCYTES 45 21 - 52 %    MONOCYTES 6 3 - 10 %    EOSINOPHILS 1 0 - 5 %    BASOPHILS 0 0 - 2 %    ABS. NEUTROPHILS 3.0 1.8 - 8.0 K/UL    ABS. LYMPHOCYTES 2.8 0.9 - 3.6 K/UL    ABS. MONOCYTES 0.4 0.05 - 1.2 K/UL    ABS. EOSINOPHILS 0.1 0.0 - 0.4 K/UL    ABS.  BASOPHILS 0.0 0.0 - 0.1 K/UL    DF AUTOMATED     METABOLIC PANEL, BASIC    Collection Time: 06/28/17  9:09 PM   Result Value Ref Range    Sodium 137 136 - 145 mmol/L    Potassium 3.3 (L) 3.5 - 5.5 mmol/L    Chloride 104 100 - 108 mmol/L    CO2 26 21 - 32 mmol/L    Anion gap 7 3.0 - 18 mmol/L    Glucose 269 (H) 74 - 99 mg/dL    BUN 3 (L) 7.0 - 18 MG/DL    Creatinine 0.63 0.6 - 1.3 MG/DL    BUN/Creatinine ratio 5 (L) 12 - 20      GFR est AA >60 >60 ml/min/1.73m2    GFR est non-AA >60 >60 ml/min/1.73m2    Calcium 8.2 (L) 8.5 - 10.1 MG/DL           CONSULTATIONS:        PROGRESS NOTES:    12:26 AM Pt well appearing and in NAD. Feeling better with meds here. No neuro deficits. Labs unremarkable. CT negative for acute process. Will d/h to f/u with PCP for further eval.     Lengthy D/W pt regarding possible worsening of pt's condition, need for follow up and strict ED return instructions for any worsening symptoms. DISPOSITION:  ED DIAGNOSIS & DISPOSITION INFORMATION  Diagnosis:   1. Acute nonintractable headache, unspecified headache type    2. Elevated blood pressure reading          Disposition: home    Follow-up Information     Follow up With Details Comments [address] [facility] Call To make a follow up appointment Matt Quevedo 3  [address] [facility] EMERGENCY DEPT  Immediately if symptoms worsen [address] [facility] [id number]  [phone]          Patient's Medications   Start Taking    BUTALBITAL-ACETAMINOPHEN-CAFF (FIORICET) -40 MG PER CAPSULE    Take 1 Cap by mouth every four (4) hours as needed for Pain. Max Daily Amount: 6 Caps. Continue Taking    AMLODIPINE (NORVASC) 10 MG TABLET    Take 1 Tab by mouth daily. METFORMIN (GLUCOPHAGE) 500 MG TABLET    Take 1 Tab by mouth two (2) times daily (with meals). These Medications have changed    No medications on file   Stop Taking    GENTAMICIN (GARAMYCIN) 0.3 % OPHTHALMIC SOLUTION    Administer 1 Drop to right eye every four (4) hours.

## 2018-06-06 ENCOUNTER — HOSPITAL ENCOUNTER (EMERGENCY)
Age: 43
Discharge: HOME | End: 2018-06-06
Attending: EMERGENCY MEDICINE
Payer: SELF-PAY

## 2018-06-06 VITALS
TEMPERATURE: 98.4 F | HEART RATE: 104 BPM | RESPIRATION RATE: 18 BRPM | SYSTOLIC BLOOD PRESSURE: 155 MMHG | DIASTOLIC BLOOD PRESSURE: 104 MMHG | OXYGEN SATURATION: 100 %

## 2018-06-06 DIAGNOSIS — J06.9 UPPER RESPIRATORY TRACT INFECTION, UNSPECIFIED TYPE: Primary | ICD-10-CM

## 2018-06-06 LAB
ATRIAL RATE: 103 BPM
CALCULATED P AXIS, ECG09: 63 DEGREES
CALCULATED R AXIS, ECG10: 57 DEGREES
CALCULATED T AXIS, ECG11: 48 DEGREES
DIAGNOSIS, 93000: NORMAL
P-R INTERVAL, ECG05: 126 MS
Q-T INTERVAL, ECG07: 332 MS
QRS DURATION, ECG06: 80 MS
QTC CALCULATION (BEZET), ECG08: 434 MS
VENTRICULAR RATE, ECG03: 103 BPM

## 2018-06-06 PROCEDURE — 99283 EMERGENCY DEPT VISIT LOW MDM: CPT

## 2018-06-06 PROCEDURE — 93005 ELECTROCARDIOGRAM TRACING: CPT

## 2018-06-06 NOTE — ED PROVIDER NOTES
EMERGENCY DEPARTMENT HISTORY AND PHYSICAL EXAM    11:26 AM      Date: 2018  Patient Name: Brissa Farooq    History of Presenting Illness     Chief Complaint   Patient presents with    Chest Pain    Cough         History Provided By: Patient    Chief Complaint: Cough   Duration: 3 Days  Timing:  Constant and Worsening  Location: Chest   Quality: Aching  Severity: Mild  Modifying Factors: None   Associated Symptoms: CP, SOB, headache, generalized body aches and nasal congestion      Additional History (Context): Brissa Farooq is a 44 y.o. female with hx of HTN and DM presenting to the ED with c/o constant, worsening cough that began 3 days ago. Pt reports her daughter was sick with similar sx last week. Denies any fever, chills, abdominal pain, nausea, vomiting, diarrhea or urinary sx. Associated sx include CP, SOB, headache, generalized body aches and nasal congestion. Severity is mild. No other sx or complaints given at this time. PCP: None    Current Outpatient Prescriptions   Medication Sig Dispense Refill    butalbital-acetaminophen-caff (FIORICET) -40 mg per capsule Take 1 Cap by mouth every four (4) hours as needed for Pain. Max Daily Amount: 6 Caps. 12 Cap 0    metFORMIN (GLUCOPHAGE) 500 mg tablet Take 1 Tab by mouth two (2) times daily (with meals). 30 Tab 0    amLODIPine (NORVASC) 10 mg tablet Take 1 Tab by mouth daily. 30 Tab 0       Past History     Past Medical History:  Past Medical History:   Diagnosis Date    Diabetes ([address]     HTN (hypertension)        Past Surgical History:  Past Surgical History:   Procedure Laterality Date    HX GYN             Family History:  History reviewed. No pertinent family history. Social History:  Social History   Substance Use Topics    Smoking status: Never Smoker    Smokeless tobacco: None    Alcohol use No       Allergies:   Allergies   Allergen Reactions    Pcn [Penicillins] Hives         Review of Systems       Review of Systems   Constitutional: Negative for activity change and appetite change. Generalized body aches   HENT: Positive for congestion (Nasal congestion). Eyes: Negative for visual disturbance. Respiratory: Positive for cough and shortness of breath. Cardiovascular: Positive for chest pain. Gastrointestinal: Negative for abdominal pain, diarrhea, nausea and vomiting. Genitourinary: Negative for dysuria. Musculoskeletal: Negative for arthralgias and myalgias. Skin: Negative for rash. Neurological: Positive for headaches. Negative for weakness and numbness. All other systems reviewed and are negative. Physical Exam     Visit Vitals    BP (!) 155/104    Pulse (!) 104    Temp 98.4 °F (36.9 °C)    Resp 18    SpO2 100%         Physical Exam   Constitutional: She is oriented to person, place, and time. She appears well-developed and well-nourished. HENT:   Head: Normocephalic and atraumatic. Nose: Rhinorrhea present. Mouth/Throat: Oropharynx is clear and moist.   Eyes: Conjunctivae are normal.   Neck: Normal range of motion. Neck supple. No JVD present. Cardiovascular: Normal rate, regular rhythm, normal heart sounds and intact distal pulses. No murmur heard. Pulmonary/Chest: Effort normal and breath sounds normal.   Abdominal: Soft. Bowel sounds are normal. She exhibits no distension. There is no tenderness. Musculoskeletal: Normal range of motion. She exhibits no deformity. Lymphadenopathy:     She has no cervical adenopathy. Neurological: She is alert and oriented to person, place, and time. Coordination normal.   Skin: Skin is warm and dry. No rash noted. Psychiatric: She has a normal mood and affect. Nursing note and vitals reviewed.         Diagnostic Study Results     Labs -  Recent Results (from the past 12 hour(s))   EKG, 12 LEAD, INITIAL    Collection Time: 06/06/18 10:48 AM   Result Value Ref Range    Ventricular Rate 103 BPM    Atrial Rate 103 BPM P-R Interval 126 ms    QRS Duration 80 ms    Q-T Interval 332 ms    QTC Calculation (Bezet) 434 ms    Calculated P Axis 63 degrees    Calculated R Axis 57 degrees    Calculated T Axis 48 degrees    Diagnosis       Sinus tachycardia  Possible Left atrial enlargement  Borderline ECG  When compared with ECG of 07-APR-2017 17:55,  No significant change was found         Radiologic Studies -   No orders to display         Medical Decision Making   I am the first provider for this patient. I reviewed the vital signs, available nursing notes, past medical history, past surgical history, family history and social history. Hugh Murdock is a 44 y.o. female with hx of HTN and DM presenting to the ED with c/o constant, worsening cough that began 3 days ago. Pt reports her daughter was sick with similar sx last week. Denies any fever, chills, abdominal pain, nausea, vomiting, diarrhea or urinary sx. Associated sx include CP, SOB, headache, generalized body aches and nasal congestion. She is mildly tachycardic but well appearing. Differential Diagnosis: symptoms consistent with URI. Do not suspect bacterial pneumonia. Testing: none indicated  Treatments: conservative management at home. Given return precautions. Vital Signs-Reviewed the patient's vital signs. Pulse Oximetry Analysis -  100% on room air, stable     Cardiac Monitor:  Rate: 104  Rhythm:  Sinus Tachycardia     EKG: Interpreted by the EP. Time Interpreted: 1049   Rate: 103   Rhythm: Sinus Tachycardia    Interpretation: QTc duration of 434 ms. No STEMI. Records Reviewed: Nursing Notes and Old Medical Records (Time of Review: 11:26 AM)    ED Course: Progress Notes, Reevaluation, and Consults: The patient will be discharged home. Findings were discussed at length and questions were answered. Information on all newly prescribed medications was given.  the patient was instructed to follow-up with her PCP, or return to the Emergency Department with any worsened symptoms or concerns. Return precautions were given. Diagnosis     Clinical Impression:   1. Upper respiratory tract infection, unspecified type        Disposition: Discharge     Follow-up Information     Follow up With Details Comments Contact Info    [facility] EMERGENCY DEPT  If symptoms worsen [address] [address]  [phone]           Patient's Medications   Start Taking    No medications on file   Continue Taking    AMLODIPINE (NORVASC) 10 MG TABLET    Take 1 Tab by mouth daily. BUTALBITAL-ACETAMINOPHEN-CAFF (FIORICET) -40 MG PER CAPSULE    Take 1 Cap by mouth every four (4) hours as needed for Pain. Max Daily Amount: 6 Caps. METFORMIN (GLUCOPHAGE) 500 MG TABLET    Take 1 Tab by mouth two (2) times daily (with meals). These Medications have changed    No medications on file   Stop Taking    No medications on file     _______________________________    Attestations:  Scribe Attestation     Pj Harrell acting as a scribe for and in the presence of Maryam Taylor MD      June 06, 2018 at 11:26 AM       Provider Attestation:      I personally performed the services described in the documentation, reviewed the documentation, as recorded by the scribe in my presence, and it accurately and completely records my words and actions.  June 06, 2018 at 11:26 AM - Maryam Taylor MD    _______________________________

## 2018-06-06 NOTE — DISCHARGE INSTRUCTIONS
Upper Respiratory Infection (Cold): Care Instructions  Your Care Instructions    An upper respiratory infection, or URI, is an infection of the nose, sinuses, or throat. URIs are spread by coughs, sneezes, and direct contact. The common cold is the most frequent kind of URI. The flu and sinus infections are other kinds of URIs. Almost all URIs are caused by viruses. Antibiotics won't cure them. But you can treat most infections with home care. This may include drinking lots of fluids and taking over-the-counter pain medicine. You will probably feel better in 4 to 10 days. The doctor has checked you carefully, but problems can develop later. If you notice any problems or new symptoms, get medical treatment right away. Follow-up care is a key part of your treatment and safety. Be sure to make and go to all appointments, and call your doctor if you are having problems. It's also a good idea to know your test results and keep a list of the medicines you take. How can you care for yourself at home? · To prevent dehydration, drink plenty of fluids, enough so that your urine is light yellow or clear like water. Choose water and other caffeine-free clear liquids until you feel better. If you have kidney, heart, or liver disease and have to limit fluids, talk with your doctor before you increase the amount of fluids you drink. · Take an over-the-counter pain medicine, such as acetaminophen (Tylenol), ibuprofen (Advil, Motrin), or naproxen (Aleve). Read and follow all instructions on the label. · Before you use cough and cold medicines, check the label. These medicines may not be safe for young children or for people with certain health problems. · Be careful when taking over-the-counter cold or flu medicines and Tylenol at the same time. Many of these medicines have acetaminophen, which is Tylenol. Read the labels to make sure that you are not taking more than the recommended dose.  Too much acetaminophen (Tylenol) can be harmful. · Get plenty of rest.  · Do not smoke or allow others to smoke around you. If you need help quitting, talk to your doctor about stop-smoking programs and medicines. These can increase your chances of quitting for good. When should you call for help? Call 911 anytime you think you may need emergency care. For example, call if:  ? · You have severe trouble breathing. ?Call your doctor now or seek immediate medical care if:  ? · You seem to be getting much sicker. ? · You have new or worse trouble breathing. ? · You have a new or higher fever. ? · You have a new rash. ? Watch closely for changes in your health, and be sure to contact your doctor if:  ? · You have a new symptom, such as a sore throat, an earache, or sinus pain. ? · You cough more deeply or more often, especially if you notice more mucus or a change in the color of your mucus. ? · You do not get better as expected. Where can you learn more? Go to http://taye-triny.info/. Enter Z454 in the search box to learn more about \"Upper Respiratory Infection (Cold): Care Instructions. \"  Current as of: May 12, 2017  Content Version: 11.4  © 4681-7332 [employer]. Care instructions adapted under license by [employer] (which disclaims liability or warranty for this information). If you have questions about a medical condition or this instruction, always ask your healthcare professional. [place] [address] any warranty or liability for your use of this information.

## 2018-06-13 ENCOUNTER — APPOINTMENT (OUTPATIENT)
Dept: GENERAL RADIOLOGY | Age: 43
End: 2018-06-13
Attending: EMERGENCY MEDICINE
Payer: SELF-PAY

## 2018-06-13 ENCOUNTER — HOSPITAL ENCOUNTER (EMERGENCY)
Age: 43
Discharge: HOME | End: 2018-06-13
Attending: EMERGENCY MEDICINE
Payer: SELF-PAY

## 2018-06-13 VITALS
DIASTOLIC BLOOD PRESSURE: 92 MMHG | TEMPERATURE: 99 F | WEIGHT: 209 LBS | SYSTOLIC BLOOD PRESSURE: 154 MMHG | HEART RATE: 98 BPM | OXYGEN SATURATION: 100 % | RESPIRATION RATE: 20 BRPM | BODY MASS INDEX: 31.78 KG/M2

## 2018-06-13 DIAGNOSIS — I10 ESSENTIAL HYPERTENSION: ICD-10-CM

## 2018-06-13 DIAGNOSIS — J06.9 ACUTE URI: Primary | ICD-10-CM

## 2018-06-13 PROCEDURE — 71046 X-RAY EXAM CHEST 2 VIEWS: CPT

## 2018-06-13 PROCEDURE — 99282 EMERGENCY DEPT VISIT SF MDM: CPT

## 2018-06-13 RX ORDER — HYDROCODONE POLISTIREX AND CHLORPHENIRAMINE POLISTIREX 10; 8 MG/5ML; MG/5ML
5 SUSPENSION, EXTENDED RELEASE ORAL
Qty: 60 ML | Refills: 0 | Status: SHIPPED | OUTPATIENT
Start: 2018-06-13 | End: 2018-11-25

## 2018-06-13 NOTE — ED PROVIDER NOTES
EMERGENCY DEPARTMENT HISTORY AND PHYSICAL EXAM    Date: 2018  Patient Name: Kirk Billings    History of Presenting Illness     Chief Complaint   Patient presents with    Cough    Nasal Congestion         History Provided By: Patient and Patient's     Chief Complaint: cough and nasal congestion  Duration: 6 Days  Timing:  Acute and Worsening  Location: chest  Quality: Aching  Severity: Moderate  Modifying Factors: none  Associated Symptoms: denies any other associated signs or symptoms      Additional History (Context): Kirk Billings is a [address] y.o. female with diabetes and hypertension who presents with cough, chest and nasal congestion x 6d. Denies fever. +post-tussive emesis once. LNMP end of May. PCP: None    Current Outpatient Prescriptions   Medication Sig Dispense Refill    dextromethorphan-guaiFENesin (CORICIDIN HBP)  mg cap Take 2 Tabs by mouth two (2) times daily as needed. 20 Cap 0    chlorpheniramine-HYDROcodone (TUSSIONEX PENNKINETIC ER) 10-8 mg/5 mL suspension Take 5 mL by mouth every twelve (12) hours as needed for Cough. Max Daily Amount: 10 mL. 60 mL 0    dextromethorphan-guaiFENesin (ROBITUSSIN-DM)  mg/5 mL syrup Take 10 mL by mouth every six (6) hours as needed for Cough. 240 mL 0    metFORMIN (GLUCOPHAGE) 500 mg tablet Take 1 Tab by mouth two (2) times daily (with meals). 30 Tab 0    amLODIPine (NORVASC) 10 mg tablet Take 1 Tab by mouth daily. 30 Tab 0    butalbital-acetaminophen-caff (FIORICET) -40 mg per capsule Take 1 Cap by mouth every four (4) hours as needed for Pain. Max Daily Amount: 6 Caps. 12 Cap 0       Past History     Past Medical History:  Past Medical History:   Diagnosis Date    Diabetes ([address]     HTN (hypertension)        Past Surgical History:  Past Surgical History:   Procedure Laterality Date    HX GYN             Family History:  History reviewed. No pertinent family history.     Social History:  Social History Substance Use Topics    Smoking status: Never Smoker    Smokeless tobacco: Never Used    Alcohol use No       Allergies: Allergies   Allergen Reactions    Pcn [Penicillins] Hives         Review of Systems   Review of Systems   Constitutional: Negative for fever. Respiratory: Positive for cough. Negative for shortness of breath and wheezing. Cardiovascular: Negative for chest pain. All other systems reviewed and are negative. All Other Systems Negative  Physical Exam     Vitals:    06/13/18 0203   BP: (!) 154/92   Pulse: 98   Resp: 20   Temp: 99 °F (37.2 °C)   SpO2: 100%   Weight: 94.8 kg (209 lb)     Physical Exam   Constitutional: She is oriented to person, place, and time. She appears well-developed and well-nourished. No distress. HENT:   Head: Normocephalic and atraumatic. Eyes: Pupils are equal, round, and reactive to light. Neck: No JVD present. No tracheal deviation present. No thyromegaly present. Cardiovascular: Normal rate, regular rhythm and normal heart sounds. Exam reveals no gallop and no friction rub. No murmur heard. Pulmonary/Chest: Effort normal and breath sounds normal. No stridor. No respiratory distress. She has no wheezes. She has no rales. She exhibits no tenderness. Abdominal: Soft. She exhibits no distension and no mass. There is no tenderness. There is no rebound and no guarding. Musculoskeletal: She exhibits no edema or tenderness. Lymphadenopathy:     She has no cervical adenopathy. Neurological: She is alert and oriented to person, place, and time. Skin: Skin is warm and dry. No rash noted. She is not diaphoretic. No erythema. No pallor. Psychiatric: She has a normal mood and affect. Her behavior is normal. Thought content normal.   Nursing note and vitals reviewed. Diagnostic Study Results     Labs -   No results found for this or any previous visit (from the past 12 hour(s)).     Radiologic Studies -   XR CHEST PA LAT    (Results Pending)     CT Results  (Last 48 hours)    None        CXR Results  (Last 48 hours)    None            Medical Decision Making   I am the first provider for this patient. I reviewed the vital signs, available nursing notes, past medical history, past surgical history, family history and social history. Vital Signs-Reviewed the patient's vital signs. Records Reviewed: Nursing Notes    Procedures:  Procedures    Provider Notes (Medical Decision Making): URI; bronchitis; PNA; effusion    Nothing acute on CXR. MED RECONCILIATION:  No current facility-administered medications for this encounter. Current Outpatient Prescriptions   Medication Sig    dextromethorphan-guaiFENesin (CORICIDIN HBP)  mg cap Take 2 Tabs by mouth two (2) times daily as needed.  chlorpheniramine-HYDROcodone (TUSSIONEX PENNKINETIC ER) 10-8 mg/5 mL suspension Take 5 mL by mouth every twelve (12) hours as needed for Cough. Max Daily Amount: 10 mL.  dextromethorphan-guaiFENesin (ROBITUSSIN-DM)  mg/5 mL syrup Take 10 mL by mouth every six (6) hours as needed for Cough.  metFORMIN (GLUCOPHAGE) 500 mg tablet Take 1 Tab by mouth two (2) times daily (with meals).  amLODIPine (NORVASC) 10 mg tablet Take 1 Tab by mouth daily.  butalbital-acetaminophen-caff (FIORICET) -40 mg per capsule Take 1 Cap by mouth every four (4) hours as needed for Pain. Max Daily Amount: 6 Caps. Disposition:  home    DISCHARGE NOTE:   3:41 AM    Pt has been reexamined. Patient has no new complaints, changes, or physical findings. Care plan outlined and precautions discussed. Results of CXR were reviewed with the patient. All medications were reviewed with the patient; will d/c home with robitussin, tussionex, decongestant. All of pt's questions and concerns were addressed. Patient was instructed and agrees to follow up with PCP, as well as to return to the ED upon further deterioration.  Patient is ready to go home.    Follow-up Information     Follow up With Details Comments Karen Majano Schedule an appointment as soon as possible for a visit in 1 day  [place] [facility] EMERGENCY DEPT  If symptoms worsen return immediately [address]  [phone]          Current Discharge Medication List      START taking these medications    Details   dextromethorphan-guaiFENesin (CORICIDIN HBP)  mg cap Take 2 Tabs by mouth two (2) times daily as needed. Qty: 20 Cap, Refills: 0      chlorpheniramine-HYDROcodone (TUSSIONEX PENNKINETIC ER) 10-8 mg/5 mL suspension Take 5 mL by mouth every twelve (12) hours as needed for Cough. Max Daily Amount: 10 mL. Qty: 60 mL, Refills: 0    Associated Diagnoses: Acute URI      dextromethorphan-guaiFENesin (ROBITUSSIN-DM)  mg/5 mL syrup Take 10 mL by mouth every six (6) hours as needed for Cough. Qty: 240 mL, Refills: 0               Diagnosis     Clinical Impression:   1. Acute URI    2.  Essential hypertension

## 2018-06-13 NOTE — DISCHARGE INSTRUCTIONS
Learning About High Blood Pressure  What is high blood pressure? Blood pressure is a measure of how hard the blood pushes against the walls of your arteries. It's normal for blood pressure to go up and down throughout the day, but if it stays up, you have high blood pressure. Another name for high blood pressure is hypertension. Two numbers tell you your blood pressure. The first number is the systolic pressure. It shows how hard the blood pushes when your heart is pumping. The second number is the diastolic pressure. It shows how hard the blood pushes between heartbeats, when your heart is relaxed and filling with blood. A blood pressure of less than 120/80 (say \"120 over 80\") is ideal for an adult. High blood pressure is 140/90 or higher. You have high blood pressure if your top number is 140 or higher or your bottom number is 90 or higher, or both. Many people fall into the category in between, called prehypertension. People with prehypertension need to make lifestyle changes to bring their blood pressure down and help prevent or delay high blood pressure. What happens when you have high blood pressure? · Blood flows through your arteries with too much force. Over time, this damages the walls of your arteries. But you can't feel it. High blood pressure usually doesn't cause symptoms. · Fat and calcium start to build up in your arteries. This buildup is called plaque. Plaque makes your arteries narrower and stiffer. Blood can't flow through them as easily. · This lack of good blood flow starts to damage some of the organs in your body. This can lead to problems such as coronary artery disease and heart attack, heart failure, stroke, kidney failure, and eye damage. How can you prevent high blood pressure? · Stay at a healthy weight. · Try to limit how much sodium you eat to less than 2,300 milligrams (mg) a day.  If you limit your sodium to 1,500 mg a day, you can lower your blood pressure even more.  ¨ Buy foods that are labeled \"unsalted,\" \"sodium-free,\" or \"low-sodium. \" Foods labeled \"reduced-sodium\" and \"light sodium\" may still have too much sodium. ¨ Flavor your food with garlic, lemon juice, onion, vinegar, herbs, and spices instead of salt. Do not use soy sauce, steak sauce, onion salt, garlic salt, mustard, or ketchup on your food. ¨ Use less salt (or none) when recipes call for it. You can often use half the salt a recipe calls for without losing flavor. · Be physically active. Get at least 30 minutes of exercise on most days of the week. Walking is a good choice. You also may want to do other activities, such as running, swimming, cycling, or playing tennis or team sports. · Limit alcohol to 2 drinks a day for men and 1 drink a day for women. · Eat plenty of fruits, vegetables, and low-fat dairy products. Eat less saturated and total fats. How is high blood pressure treated? · Your doctor will suggest making lifestyle changes. For example, your doctor may ask you to eat healthy foods, quit smoking, lose extra weight, and be more active. · If lifestyle changes don't help enough or your blood pressure is very high, you will have to take medicine every day. Follow-up care is a key part of your treatment and safety. Be sure to make and go to all appointments, and call your doctor if you are having problems. It's also a good idea to know your test results and keep a list of the medicines you take. Where can you learn more? Go to http://taye-triny.info/. Enter P501 in the search box to learn more about \"Learning About High Blood Pressure. \"  Current as of: September 21, 2016  Content Version: 11.4  © 2318-9584 [employer]. Care instructions adapted under license by [employer] (which disclaims liability or warranty for this information).  If you have questions about a medical condition or this instruction, always ask your healthcare professional. [employer], [facility] disclaims any warranty or liability for your use of this information. Upper Respiratory Infection (Cold): Care Instructions  Your Care Instructions    An upper respiratory infection, or URI, is an infection of the nose, sinuses, or throat. URIs are spread by coughs, sneezes, and direct contact. The common cold is the most frequent kind of URI. The flu and sinus infections are other kinds of URIs. Almost all URIs are caused by viruses. Antibiotics won't cure them. But you can treat most infections with home care. This may include drinking lots of fluids and taking over-the-counter pain medicine. You will probably feel better in 4 to 10 days. The doctor has checked you carefully, but problems can develop later. If you notice any problems or new symptoms, get medical treatment right away. Follow-up care is a key part of your treatment and safety. Be sure to make and go to all appointments, and call your doctor if you are having problems. It's also a good idea to know your test results and keep a list of the medicines you take. How can you care for yourself at home? · To prevent dehydration, drink plenty of fluids, enough so that your urine is light yellow or clear like water. Choose water and other caffeine-free clear liquids until you feel better. If you have kidney, heart, or liver disease and have to limit fluids, talk with your doctor before you increase the amount of fluids you drink. · Take an over-the-counter pain medicine, such as acetaminophen (Tylenol), ibuprofen (Advil, Motrin), or naproxen (Aleve). Read and follow all instructions on the label. · Before you use cough and cold medicines, check the label. These medicines may not be safe for young children or for people with certain health problems. · Be careful when taking over-the-counter cold or flu medicines and Tylenol at the same time. Many of these medicines have acetaminophen, which is Tylenol.  Read the labels to make sure that you are not taking more than the recommended dose. Too much acetaminophen (Tylenol) can be harmful. · Get plenty of rest.  · Do not smoke or allow others to smoke around you. If you need help quitting, talk to your doctor about stop-smoking programs and medicines. These can increase your chances of quitting for good. When should you call for help? Call 911 anytime you think you may need emergency care. For example, call if:  ? · You have severe trouble breathing. ?Call your doctor now or seek immediate medical care if:  ? · You seem to be getting much sicker. ? · You have new or worse trouble breathing. ? · You have a new or higher fever. ? · You have a new rash. ? Watch closely for changes in your health, and be sure to contact your doctor if:  ? · You have a new symptom, such as a sore throat, an earache, or sinus pain. ? · You cough more deeply or more often, especially if you notice more mucus or a change in the color of your mucus. ? · You do not get better as expected. Where can you learn more? Go to http://taye-triny.info/. Enter V463 in the search box to learn more about \"Upper Respiratory Infection (Cold): Care Instructions. \"  Current as of: May 12, 2017  Content Version: 11.4  © 2456-9632 [employer]. Care instructions adapted under license by [employer] (which disclaims liability or warranty for this information). If you have questions about a medical condition or this instruction, always ask your healthcare professional. [place] [address] any warranty or liability for your use of this information.

## 2018-06-13 NOTE — ED TRIAGE NOTES
Pt states she has been having a cough since last week and was dx with an URI. Pt states she coughs periodically during the day and the cough gets worst when she lays down. Pt also c/o chest tightness and SOB from coughing. Pt A & O X 3, follows commands, no distress.

## 2018-11-24 PROCEDURE — 99283 EMERGENCY DEPT VISIT LOW MDM: CPT

## 2018-11-25 ENCOUNTER — APPOINTMENT (OUTPATIENT)
Dept: GENERAL RADIOLOGY | Age: 43
End: 2018-11-25
Attending: EMERGENCY MEDICINE
Payer: SELF-PAY

## 2018-11-25 ENCOUNTER — HOSPITAL ENCOUNTER (EMERGENCY)
Age: 43
Discharge: HOME | End: 2018-11-25
Attending: EMERGENCY MEDICINE
Payer: SELF-PAY

## 2018-11-25 VITALS
WEIGHT: 198 LBS | BODY MASS INDEX: 30.11 KG/M2 | RESPIRATION RATE: 18 BRPM | HEART RATE: 102 BPM | OXYGEN SATURATION: 100 % | DIASTOLIC BLOOD PRESSURE: 104 MMHG | SYSTOLIC BLOOD PRESSURE: 174 MMHG | TEMPERATURE: 97.9 F

## 2018-11-25 DIAGNOSIS — R51.9 ACUTE NONINTRACTABLE HEADACHE, UNSPECIFIED HEADACHE TYPE: Primary | ICD-10-CM

## 2018-11-25 DIAGNOSIS — R03.0 ELEVATED BLOOD PRESSURE READING: ICD-10-CM

## 2018-11-25 DIAGNOSIS — R73.9 HYPERGLYCEMIA: ICD-10-CM

## 2018-11-25 LAB
ALBUMIN SERPL-MCNC: 3 G/DL (ref 3.4–5)
ALBUMIN/GLOB SERPL: 0.7 {RATIO} (ref 0.8–1.7)
ALP SERPL-CCNC: 183 U/L (ref 45–117)
ALT SERPL-CCNC: 31 U/L (ref 13–56)
ANION GAP SERPL CALC-SCNC: 8 MMOL/L (ref 3–18)
AST SERPL-CCNC: 18 U/L (ref 15–37)
ATRIAL RATE: 98 BPM
BASOPHILS # BLD: 0 K/UL (ref 0–0.1)
BASOPHILS NFR BLD: 0 % (ref 0–2)
BILIRUB SERPL-MCNC: 0.2 MG/DL (ref 0.2–1)
BUN SERPL-MCNC: 9 MG/DL (ref 7–18)
BUN/CREAT SERPL: 9 (ref 12–20)
CALCIUM SERPL-MCNC: 9 MG/DL (ref 8.5–10.1)
CALCULATED P AXIS, ECG09: 69 DEGREES
CALCULATED R AXIS, ECG10: 56 DEGREES
CALCULATED T AXIS, ECG11: 53 DEGREES
CHLORIDE SERPL-SCNC: 104 MMOL/L (ref 100–108)
CK MB CFR SERPL CALC: NORMAL % (ref 0–4)
CK MB SERPL-MCNC: <1 NG/ML (ref 5–25)
CK SERPL-CCNC: 91 U/L (ref 26–192)
CO2 SERPL-SCNC: 25 MMOL/L (ref 21–32)
CREAT SERPL-MCNC: 0.96 MG/DL (ref 0.6–1.3)
DIAGNOSIS, 93000: NORMAL
DIFFERENTIAL METHOD BLD: ABNORMAL
EOSINOPHIL # BLD: 0.1 K/UL (ref 0–0.4)
EOSINOPHIL NFR BLD: 1 % (ref 0–5)
ERYTHROCYTE [DISTWIDTH] IN BLOOD BY AUTOMATED COUNT: 14.4 % (ref 11.6–14.5)
GLOBULIN SER CALC-MCNC: 4.1 G/DL (ref 2–4)
GLUCOSE SERPL-MCNC: 361 MG/DL (ref 74–99)
HCT VFR BLD AUTO: 35 % (ref 35–45)
HGB BLD-MCNC: 11.2 G/DL (ref 12–16)
LYMPHOCYTES # BLD: 3.2 K/UL (ref 0.9–3.6)
LYMPHOCYTES NFR BLD: 38 % (ref 21–52)
MCH RBC QN AUTO: 21.7 PG (ref 24–34)
MCHC RBC AUTO-ENTMCNC: 32 G/DL (ref 31–37)
MCV RBC AUTO: 67.7 FL (ref 74–97)
MONOCYTES # BLD: 0.5 K/UL (ref 0.05–1.2)
MONOCYTES NFR BLD: 6 % (ref 3–10)
NEUTS SEG # BLD: 4.5 K/UL (ref 1.8–8)
NEUTS SEG NFR BLD: 55 % (ref 40–73)
P-R INTERVAL, ECG05: 150 MS
PLATELET # BLD AUTO: 220 K/UL (ref 135–420)
PMV BLD AUTO: 9.7 FL (ref 9.2–11.8)
POTASSIUM SERPL-SCNC: 3.3 MMOL/L (ref 3.5–5.5)
PROT SERPL-MCNC: 7.1 G/DL (ref 6.4–8.2)
Q-T INTERVAL, ECG07: 350 MS
QRS DURATION, ECG06: 86 MS
QTC CALCULATION (BEZET), ECG08: 446 MS
RBC # BLD AUTO: 5.17 M/UL (ref 4.2–5.3)
SODIUM SERPL-SCNC: 137 MMOL/L (ref 136–145)
TROPONIN I SERPL-MCNC: <0.02 NG/ML (ref 0–0.04)
VENTRICULAR RATE, ECG03: 98 BPM
WBC # BLD AUTO: 8.4 K/UL (ref 4.6–13.2)

## 2018-11-25 PROCEDURE — 80053 COMPREHEN METABOLIC PANEL: CPT

## 2018-11-25 PROCEDURE — 82553 CREATINE MB FRACTION: CPT

## 2018-11-25 PROCEDURE — 71045 X-RAY EXAM CHEST 1 VIEW: CPT

## 2018-11-25 PROCEDURE — 85025 COMPLETE CBC W/AUTO DIFF WBC: CPT

## 2018-11-25 PROCEDURE — 93005 ELECTROCARDIOGRAM TRACING: CPT

## 2018-11-25 RX ORDER — METFORMIN HYDROCHLORIDE 500 MG/1
500 TABLET ORAL 2 TIMES DAILY WITH MEALS
Qty: 60 TAB | Refills: 0 | Status: ON HOLD | OUTPATIENT
Start: 2018-11-25 | End: 2019-01-02 | Stop reason: SDUPTHER

## 2018-11-25 RX ORDER — LISINOPRIL 20 MG/1
20 TABLET ORAL DAILY
Qty: 30 TAB | Refills: 0 | Status: SHIPPED | OUTPATIENT
Start: 2018-11-25 | End: 2018-12-28

## 2018-11-25 RX ORDER — BUTALBITAL, ACETAMINOPHEN AND CAFFEINE 300; 40; 50 MG/1; MG/1; MG/1
1 CAPSULE ORAL
Qty: 12 CAP | Refills: 0 | Status: SHIPPED | OUTPATIENT
Start: 2018-11-25 | End: 2019-01-02

## 2018-11-25 NOTE — ED PROVIDER NOTES
EMERGENCY DEPARTMENT HISTORY AND PHYSICAL EXAM    1:02 AM      Date: 2018  Patient Name: Saman Rangel    History of Presenting Illness     Chief Complaint   Patient presents with    Headache         History Provided By: Patient    Chief Complaint: HA   Duration:  Days  Timing:  Acute and Constant  Location: head  Quality: Aching  Severity: Moderate  Modifying Factors: none   Associated Symptoms: dizziness and blurry vision      Additional History (Context): Saman Rangel is a 44 y.o. female with h/o HTN and DM who presents with family c/o constant HA since yesterday. Pt states her pain is more so in the back of her head. Also notes she feels mildly dizzy and has some blurry vision. Reports it feels like migraines she has had in the past. No recent falls, injuries, or trauma. Nothing seems to make her sx better or worse. Has not taken anything for pain at home. Hx of HTN; says she is out of her BP medications and has not taken the medication on a long time. Denies CP, SOB, fever, chills, n/v, back pain, neck pain, neck stiffness, weakness, numbness, or any other associated sx. No other complaints or concerns. PCP: None    Current Outpatient Medications   Medication Sig Dispense Refill    butalbital-acetaminophen-caff (FIORICET) -40 mg per capsule Take 1 Cap by mouth every four (4) hours as needed for Pain. 12 Cap 0    metFORMIN (GLUCOPHAGE) 500 mg tablet Take 1 Tab by mouth two (2) times daily (with meals). 60 Tab 0    lisinopril (PRINIVIL, ZESTRIL) 20 mg tablet Take 1 Tab by mouth daily. 30 Tab 0    amLODIPine (NORVASC) 10 mg tablet Take 1 Tab by mouth daily. 30 Tab 0       Past History     Past Medical History:  Past Medical History:   Diagnosis Date    Diabetes ([address]     HTN (hypertension)        Past Surgical History:  Past Surgical History:   Procedure Laterality Date    HX GYN             Family History:  No family history on file.     Social History:  Social History Tobacco Use    Smoking status: Never Smoker    Smokeless tobacco: Never Used   Substance Use Topics    Alcohol use: No    Drug use: No       Allergies: Allergies   Allergen Reactions    Pcn [Penicillins] Hives         Review of Systems     Review of Systems   Constitutional: Negative for chills and fever. Eyes: Positive for visual disturbance (blurry vision'). Respiratory: Negative for shortness of breath. Cardiovascular: Negative for chest pain. Gastrointestinal: Negative for abdominal pain, nausea and vomiting. Musculoskeletal: Negative for back pain, neck pain and neck stiffness. Neurological: Positive for dizziness and headaches. Negative for speech difficulty, weakness, light-headedness and numbness. All other systems reviewed and are negative. Visit Vitals  BP (!) 174/104 (BP Patient Position: At rest)   Pulse (!) 102   Temp 97.9 °F (36.6 °C)   Resp 18   Wt 89.8 kg (198 lb)   SpO2 100%   BMI 30.11 kg/m²           Physical Exam / Medical Decision Making   I am the first provider for this patient. I reviewed the vital signs, available nursing notes, past medical history, past surgical history, family history and social history. Vital Signs-Reviewed the patient's vital signs. Physical exam:  General:  Well-developed, well-nourished, no apparent distress. Head:  Normocephalic atraumatic. Eyes:  Pupils midrange extraocular movements intact. No pallor or conjunctival injection. Nose:  No rhinorrhea, inspection grossly normal.    Ears:  Grossly normal to inspection, no discharge. Mouth:  Mucous membranes moist, no appreciable intraoral lesion. Neck/Back:  Trachea midline, no asymmetry. No pain on palpation down cervical, thoracic, or lumbar spine step-off or deformity. Chest:  Grossly normal inspection, symmetric chest rise. Pulmonary:  Clear to auscultation bilaterally no wheezes rhonchi or rales. Cardiovascular:  S1-S2 no murmurs rubs or gallops. Abdomen: Soft, nontender, nondistended no guarding rebound or peritoneal signs. Extremities:  Grossly normal to inspection, peripheral pulses intact    Neurologic:  Alert and oriented smile symmetric, arms and legs move equally  Skin:  Warm and dry  Psychiatric:  Grossly normal mood and affect  Nursing note reviewed, vital signs reviewed. ED course:  Patient presents with headache, typical of her normal migraine headache, elevated blood pressure noncompliant with blood pressure medication. She was offered intravenous therapy and declined would rather be discharged with oral medication return if no relief    Neurologically intact, headache is the same as prior, no need for CT at this time, labs are sent and significant for hyperglycemia, she does have metformin questionable compliance we will refill her medications, discharged with Fioricet. Patient's presentation, history, physical exam and laboratory evaluations were reviewed. At this time patient was felt to be stable for outpatient management and follow with primary care/specialist.  Patient was instructed to return to the emergency department with any concerns. Disposition:    Discharged home      Portions of this chart were created with Dragon medical speech to text program.   Unrecognized errors may be present. Diagnostic Study Results     Labs -  Recent Results (from the past 12 hour(s))   CBC WITH AUTOMATED DIFF    Collection Time: 11/25/18 12:37 AM   Result Value Ref Range    WBC 8.4 4.6 - 13.2 K/uL    RBC 5.17 4.20 - 5.30 M/uL    HGB 11.2 (L) 12.0 - 16.0 g/dL    HCT 35.0 35.0 - 45.0 %    MCV 67.7 (L) 74.0 - 97.0 FL    MCH 21.7 (L) 24.0 - 34.0 PG    MCHC 32.0 31.0 - 37.0 g/dL    RDW 14.4 11.6 - 14.5 %    PLATELET 021 978 - 028 K/uL    MPV 9.7 9.2 - 11.8 FL    NEUTROPHILS 55 40 - 73 %    LYMPHOCYTES 38 21 - 52 %    MONOCYTES 6 3 - 10 %    EOSINOPHILS 1 0 - 5 %    BASOPHILS 0 0 - 2 %    ABS.  NEUTROPHILS 4.5 1.8 - 8.0 K/UL ABS. LYMPHOCYTES 3.2 0.9 - 3.6 K/UL    ABS. MONOCYTES 0.5 0.05 - 1.2 K/UL    ABS. EOSINOPHILS 0.1 0.0 - 0.4 K/UL    ABS. BASOPHILS 0.0 0.0 - 0.1 K/UL    DF AUTOMATED     CARDIAC PANEL,(CK, CKMB & TROPONIN)    Collection Time: 11/25/18 12:37 AM   Result Value Ref Range    CK 91 26 - 192 U/L    CK - MB <1.0 <3.6 ng/ml    CK-MB Index  0.0 - 4.0 %     CALCULATION NOT PERFORMED WHEN RESULT IS BELOW LINEAR LIMIT    Troponin-I, Qt. <0.02 0.0 - 1.530 NG/ML   METABOLIC PANEL, COMPREHENSIVE    Collection Time: 11/25/18 12:37 AM   Result Value Ref Range    Sodium 137 136 - 145 mmol/L    Potassium 3.3 (L) 3.5 - 5.5 mmol/L    Chloride 104 100 - 108 mmol/L    CO2 25 21 - 32 mmol/L    Anion gap 8 3.0 - 18 mmol/L    Glucose 361 (H) 74 - 99 mg/dL    BUN 9 7.0 - 18 MG/DL    Creatinine 0.96 0.6 - 1.3 MG/DL    BUN/Creatinine ratio 9 (L) 12 - 20      GFR est AA >60 >60 ml/min/1.73m2    GFR est non-AA >60 >60 ml/min/1.73m2    Calcium 9.0 8.5 - 10.1 MG/DL    Bilirubin, total 0.2 0.2 - 1.0 MG/DL    ALT (SGPT) 31 13 - 56 U/L    AST (SGOT) 18 15 - 37 U/L    Alk. phosphatase 183 (H) 45 - 117 U/L    Protein, total 7.1 6.4 - 8.2 g/dL    Albumin 3.0 (L) 3.4 - 5.0 g/dL    Globulin 4.1 (H) 2.0 - 4.0 g/dL    A-G Ratio 0.7 (L) 0.8 - 1.7     EKG, 12 LEAD, INITIAL    Collection Time: 11/25/18 12:37 AM   Result Value Ref Range    Ventricular Rate 98 BPM    Atrial Rate 98 BPM    P-R Interval 150 ms    QRS Duration 86 ms    Q-T Interval 350 ms    QTC Calculation (Bezet) 446 ms    Calculated P Axis 69 degrees    Calculated R Axis 56 degrees    Calculated T Axis 53 degrees    Diagnosis       Normal sinus rhythm  Normal ECG  When compared with ECG of 06-JUN-2018 10:48,  No significant change was found         Radiologic Studies -   XR CHEST PORT    (Results Pending)       Diagnosis     Clinical Impression:   1. Acute nonintractable headache, unspecified headache type    2. Hyperglycemia    3.  Elevated blood pressure reading            Follow-up Information Follow up With Specialties Details Why [address] [facility] Call in 2 days  [address] [facility] [address]  [facility] Call in 2 days  [address] [facility] [address] [facility] EMERGENCY DEPT Emergency Medicine  As needed, If symptoms worsen [address] [id number]  [phone]              Medication List      START taking these medications    lisinopril 20 mg tablet  Commonly known as:  PRINIVIL, ZESTRIL  Take 1 Tab by mouth daily. CONTINUE taking these medications    butalbital-acetaminophen-caff -40 mg per capsule  Commonly known as:  FIORICET  Take 1 Cap by mouth every four (4) hours as needed for Pain.     metFORMIN 500 mg tablet  Commonly known as:  GLUCOPHAGE  Take 1 Tab by mouth two (2) times daily (with meals). STOP taking these medications    chlorpheniramine-HYDROcodone 10-8 mg/5 mL suspension  Commonly known as:  TUSSIONEX PENNKINETIC ER     dextromethorphan-guaiFENesin  mg Cap  Commonly known as:  CORICIDIN HBP     dextromethorphan-guaiFENesin  mg/5 mL syrup  Commonly known as:  ROBITUSSIN-DM        ASK your doctor about these medications    amLODIPine 10 mg tablet  Commonly known as:  NORVASC  Take 1 Tab by mouth daily.            Where to Get Your Medications      Information about where to get these medications is not yet available    Ask your nurse or doctor about these medications  · butalbital-acetaminophen-caff -40 mg per capsule  · lisinopril 20 mg tablet  · metFORMIN 500 mg tablet       _______________________________    Attestations:  Sussy Cano acting as a scribe for and in the presence of Brianda De La Rosa MD      November 25, 2018 at 1:02 AM       Provider Attestation:      I personally performed the services described in the documentation, reviewed the documentation, as recorded by the scribe in my presence, and it accurately and completely records my words and actions.  November 25, 2018 at 1:02 AM - Brianda De La Rosa MD    _______________________________

## 2018-11-25 NOTE — ED TRIAGE NOTES
Pt presents to ED with complaint of headache and dizziness x 1 day. PT states she has HTN and has been out of meds x 3 days. Pt is alert and ambulatory upon arrival to triage.

## 2018-11-25 NOTE — DISCHARGE INSTRUCTIONS
Headache: Care Instructions  Your Care Instructions    Headaches have many possible causes. Most headaches aren't a sign of a more serious problem, and they will get better on their own. Home treatment may help you feel better faster. The doctor has checked you carefully, but problems can develop later. If you notice any problems or new symptoms, get medical treatment right away. Follow-up care is a key part of your treatment and safety. Be sure to make and go to all appointments, and call your doctor if you are having problems. It's also a good idea to know your test results and keep a list of the medicines you take. How can you care for yourself at home? · Do not drive if you have taken a prescription pain medicine. · Rest in a quiet, dark room until your headache is gone. Close your eyes and try to relax or go to sleep. Don't watch TV or read. · Put a cold, moist cloth or cold pack on the painful area for 10 to 20 minutes at a time. Put a thin cloth between the cold pack and your skin. · Use a warm, moist towel or a heating pad set on low to relax tight shoulder and neck muscles. · Have someone gently massage your neck and shoulders. · Take pain medicines exactly as directed. ? If the doctor gave you a prescription medicine for pain, take it as prescribed. ? If you are not taking a prescription pain medicine, ask your doctor if you can take an over-the-counter medicine. · Be careful not to take pain medicine more often than the instructions allow, because you may get worse or more frequent headaches when the medicine wears off. · Do not ignore new symptoms that occur with a headache, such as a fever, weakness or numbness, vision changes, or confusion. These may be signs of a more serious problem. To prevent headaches  · Keep a headache diary so you can figure out what triggers your headaches. Avoiding triggers may help you prevent headaches.  Record when each headache began, how long it lasted, and what the pain was like (throbbing, aching, stabbing, or dull). Write down any other symptoms you had with the headache, such as nausea, flashing lights or dark spots, or sensitivity to bright light or loud noise. Note if the headache occurred near your period. List anything that might have triggered the headache, such as certain foods (chocolate, cheese, wine) or odors, smoke, bright light, stress, or lack of sleep. · Find healthy ways to deal with stress. Headaches are most common during or right after stressful times. Take time to relax before and after you do something that has caused a headache in the past.  · Try to keep your muscles relaxed by keeping good posture. Check your jaw, face, neck, and shoulder muscles for tension, and try relaxing them. When sitting at a desk, change positions often, and stretch for 30 seconds each hour. · Get plenty of sleep and exercise. · Eat regularly and well. Long periods without food can trigger a headache. · Treat yourself to a massage. Some people find that regular massages are very helpful in relieving tension. · Limit caffeine by not drinking too much coffee, tea, or soda. But don't quit caffeine suddenly, because that can also give you headaches. · Reduce eyestrain from computers by blinking frequently and looking away from the computer screen every so often. Make sure you have proper eyewear and that your monitor is set up properly, about an arm's length away. · Seek help if you have depression or anxiety. Your headaches may be linked to these conditions. Treatment can both prevent headaches and help with symptoms of anxiety or depression. When should you call for help? Call 911 anytime you think you may need emergency care. For example, call if:    · You have signs of a stroke. These may include:  ? Sudden numbness, paralysis, or weakness in your face, arm, or leg, especially on only one side of your body. ? Sudden vision changes.   ? Sudden trouble speaking. ? Sudden confusion or trouble understanding simple statements. ? Sudden problems with walking or balance. ? A sudden, severe headache that is different from past headaches.    Call your doctor now or seek immediate medical care if:    · You have a new or worse headache.     · Your headache gets much worse. Where can you learn more? Go to http://taye-triny.info/. Enter M271 in the search box to learn more about \"Headache: Care Instructions. \"  Current as of: June 4, 2018  Content Version: 11.8  © 2150-2963 [employer]. Care instructions adapted under license by [employer] (which disclaims liability or warranty for this information). If you have questions about a medical condition or this instruction, always ask your healthcare professional. [address] any warranty or liability for your use of this information. Elevated Blood Pressure: Care Instructions  Your Care Instructions    Blood pressure is a measure of how hard the blood pushes against the walls of your arteries. It's normal for blood pressure to go up and down throughout the day. But if it stays up over time, you have high blood pressure. Two numbers tell you your blood pressure. The first number is the systolic pressure. It shows how hard the blood pushes when your heart is pumping. The second number is the diastolic pressure. It shows how hard the blood pushes between heartbeats, when your heart is relaxed and filling with blood. An ideal blood pressure in adults is less than 120/80 (say \"120 over 80\"). High blood pressure is 140/90 or higher. You have high blood pressure if your top number is 140 or higher or your bottom number is 90 or higher, or both. The main test for high blood pressure is simple, fast, and painless. To diagnose high blood pressure, your doctor will test your blood pressure at different times.  After testing your blood pressure, your doctor may ask you to test it again when you are home. If you are diagnosed with high blood pressure, you can work with your doctor to make a long-term plan to manage it. Follow-up care is a key part of your treatment and safety. Be sure to make and go to all appointments, and call your doctor if you are having problems. It's also a good idea to know your test results and keep a list of the medicines you take. How can you care for yourself at home? · Do not smoke. Smoking increases your risk for heart attack and stroke. If you need help quitting, talk to your doctor about stop-smoking programs and medicines. These can increase your chances of quitting for good. · Stay at a healthy weight. · Try to limit how much sodium you eat to less than 2,300 milligrams (mg) a day. Your doctor may ask you to try to eat less than 1,500 mg a day. · Be physically active. Get at least 30 minutes of exercise on most days of the week. Walking is a good choice. You also may want to do other activities, such as running, swimming, cycling, or playing tennis or team sports. · Avoid or limit alcohol. Talk to your doctor about whether you can drink any alcohol. · Eat plenty of fruits, vegetables, and low-fat dairy products. Eat less saturated and total fats. · Learn how to check your blood pressure at home. When should you call for help? Call your doctor now or seek immediate medical care if:  ? · Your blood pressure is much higher than normal (such as 180/110 or higher). ? · You think high blood pressure is causing symptoms such as:  ¨ Severe headache. ¨ Blurry vision. ? Watch closely for changes in your health, and be sure to contact your doctor if:  ? · You do not get better as expected. Where can you learn more? Go to http://taye-triny.info/. Enter D358 in the search box to learn more about \"Elevated Blood Pressure: Care Instructions. \"  Current as of: September 21, 2016  Content Version: 11.4  © 7209-4029 [employer]. Care instructions adapted under license by "[employer]" (which disclaims liability or warranty for this information). If you have questions about a medical condition or this instruction, always ask your healthcare professional. [address] any warranty or liability for your use of this information. Learning About High Blood Sugar  What is high blood sugar? Your body turns the food you eat into glucose (sugar), which it uses for energy. But if your body isn't able to use the sugar right away, it can build up in your blood and lead to high blood sugar. When the amount of sugar in your blood stays too high for too much of the time, you may have diabetes. Diabetes is a disease that can cause serious health problems. The good news is that lifestyle changes may help you get your blood sugar back to normal and avoid or delay diabetes. What causes high blood sugar? Sugar (glucose) can build up in your blood if you:  · Are overweight. · Have a family history of diabetes. · Take certain medicines, such as steroids. What are the symptoms? Having high blood sugar may not cause any symptoms at all. Or it may make you feel very thirsty or very hungry. You may also urinate more often than usual, have blurry vision, or lose weight without trying. How is high blood sugar treated? You can take steps to lower your blood sugar level if you understand what makes it get higher. Your doctor may want you to learn how to test your blood sugar level at home. Then you can see how illness, stress, or different kinds of food or medicine raise or lower your blood sugar level. Other tests may be needed to see if you have diabetes. How can you prevent high blood sugar? · Watch your weight. If you're overweight, losing just a small amount of weight may help. Reducing fat around your waist is most important.   · Limit the amount of calories, sweets, and unhealthy fat you eat. Ask your doctor if a dietitian can help you. A registered dietitian can help you create meal plans that fit your lifestyle. · Get at least 30 minutes of exercise on most days of the week. Exercise helps control your blood sugar. It also helps you maintain a healthy weight. Walking is a good choice. You also may want to do other activities, such as running, swimming, cycling, or playing tennis or team sports. · If your doctor prescribed medicines, take them exactly as prescribed. Call your doctor if you think you are having a problem with your medicine. You will get more details on the specific medicines your doctor prescribes. Follow-up care is a key part of your treatment and safety. Be sure to make and go to all appointments, and call your doctor if you are having problems. It's also a good idea to know your test results and keep a list of the medicines you take. Where can you learn more? Go to http://taye-triny.info/. Enter O108 in the search box to learn more about \"Learning About High Blood Sugar. \"  Current as of: December 7, 2017  Content Version: 11.8  © 5975-9787 [employer]wise[employer]. Care instructions adapted under license by [employer] (which disclaims liability or warranty for this information). If you have questions about a medical condition or this instruction, always ask your healthcare professional. [address] any warranty or liability for your use of this information.

## 2018-12-27 ENCOUNTER — HOSPITAL ENCOUNTER (EMERGENCY)
Age: 43
Discharge: HOME | End: 2018-12-28
Attending: EMERGENCY MEDICINE | Admitting: EMERGENCY MEDICINE
Payer: SELF-PAY

## 2018-12-27 ENCOUNTER — APPOINTMENT (OUTPATIENT)
Dept: GENERAL RADIOLOGY | Age: 43
End: 2018-12-27
Attending: EMERGENCY MEDICINE
Payer: SELF-PAY

## 2018-12-27 VITALS
WEIGHT: 195 LBS | HEART RATE: 103 BPM | SYSTOLIC BLOOD PRESSURE: 161 MMHG | BODY MASS INDEX: 30.61 KG/M2 | HEIGHT: 67 IN | DIASTOLIC BLOOD PRESSURE: 101 MMHG | RESPIRATION RATE: 18 BRPM | TEMPERATURE: 99.3 F | OXYGEN SATURATION: 100 %

## 2018-12-27 DIAGNOSIS — R73.9 HYPERGLYCEMIA: ICD-10-CM

## 2018-12-27 DIAGNOSIS — R03.0 ELEVATED BLOOD PRESSURE READING: ICD-10-CM

## 2018-12-27 DIAGNOSIS — B37.41 YEAST CYSTITIS: ICD-10-CM

## 2018-12-27 DIAGNOSIS — L03.116 CELLULITIS OF LEFT LOWER EXTREMITY: Primary | ICD-10-CM

## 2018-12-27 LAB
ANION GAP SERPL CALC-SCNC: 7 MMOL/L (ref 3–18)
BASOPHILS # BLD: 0 K/UL (ref 0–0.1)
BASOPHILS NFR BLD: 0 % (ref 0–2)
BUN SERPL-MCNC: 9 MG/DL (ref 7–18)
BUN/CREAT SERPL: 10 (ref 12–20)
CALCIUM SERPL-MCNC: 9.1 MG/DL (ref 8.5–10.1)
CHLORIDE SERPL-SCNC: 104 MMOL/L (ref 100–108)
CO2 SERPL-SCNC: 26 MMOL/L (ref 21–32)
CREAT SERPL-MCNC: 0.87 MG/DL (ref 0.6–1.3)
DIFFERENTIAL METHOD BLD: ABNORMAL
EOSINOPHIL # BLD: 0.1 K/UL (ref 0–0.4)
EOSINOPHIL NFR BLD: 1 % (ref 0–5)
ERYTHROCYTE [DISTWIDTH] IN BLOOD BY AUTOMATED COUNT: 14.6 % (ref 11.6–14.5)
GLUCOSE BLD STRIP.AUTO-MCNC: 371 MG/DL (ref 70–110)
GLUCOSE SERPL-MCNC: 414 MG/DL (ref 74–99)
HCT VFR BLD AUTO: 36.4 % (ref 35–45)
HGB BLD-MCNC: 11.9 G/DL (ref 12–16)
LACTATE BLD-SCNC: 1.01 MMOL/L (ref 0.4–2)
LYMPHOCYTES # BLD: 2.1 K/UL (ref 0.9–3.6)
LYMPHOCYTES NFR BLD: 19 % (ref 21–52)
MCH RBC QN AUTO: 22.3 PG (ref 24–34)
MCHC RBC AUTO-ENTMCNC: 32.7 G/DL (ref 31–37)
MCV RBC AUTO: 68.2 FL (ref 74–97)
MONOCYTES # BLD: 0.8 K/UL (ref 0.05–1.2)
MONOCYTES NFR BLD: 7 % (ref 3–10)
NEUTS SEG # BLD: 8.1 K/UL (ref 1.8–8)
NEUTS SEG NFR BLD: 73 % (ref 40–73)
PLATELET # BLD AUTO: 265 K/UL (ref 135–420)
PMV BLD AUTO: 10.5 FL (ref 9.2–11.8)
POTASSIUM SERPL-SCNC: 3.6 MMOL/L (ref 3.5–5.5)
RBC # BLD AUTO: 5.34 M/UL (ref 4.2–5.3)
SODIUM SERPL-SCNC: 137 MMOL/L (ref 136–145)
WBC # BLD AUTO: 11 K/UL (ref 4.6–13.2)

## 2018-12-27 PROCEDURE — 87186 SC STD MICRODIL/AGAR DIL: CPT

## 2018-12-27 PROCEDURE — 96375 TX/PRO/DX INJ NEW DRUG ADDON: CPT

## 2018-12-27 PROCEDURE — 74011000250 HC RX REV CODE- 250: Performed by: PHYSICIAN ASSISTANT

## 2018-12-27 PROCEDURE — 74011000258 HC RX REV CODE- 258: Performed by: PHYSICIAN ASSISTANT

## 2018-12-27 PROCEDURE — 87077 CULTURE AEROBIC IDENTIFY: CPT

## 2018-12-27 PROCEDURE — 81001 URINALYSIS AUTO W/SCOPE: CPT

## 2018-12-27 PROCEDURE — 87205 SMEAR GRAM STAIN: CPT

## 2018-12-27 PROCEDURE — 74011636637 HC RX REV CODE- 636/637: Performed by: PHYSICIAN ASSISTANT

## 2018-12-27 PROCEDURE — 83605 ASSAY OF LACTIC ACID: CPT

## 2018-12-27 PROCEDURE — 87106 FUNGI IDENTIFICATION YEAST: CPT

## 2018-12-27 PROCEDURE — 85025 COMPLETE CBC W/AUTO DIFF WBC: CPT

## 2018-12-27 PROCEDURE — 74011250636 HC RX REV CODE- 250/636: Performed by: PHYSICIAN ASSISTANT

## 2018-12-27 PROCEDURE — 96365 THER/PROPH/DIAG IV INF INIT: CPT

## 2018-12-27 PROCEDURE — 87086 URINE CULTURE/COLONY COUNT: CPT

## 2018-12-27 PROCEDURE — 80048 BASIC METABOLIC PNL TOTAL CA: CPT

## 2018-12-27 PROCEDURE — 99285 EMERGENCY DEPT VISIT HI MDM: CPT

## 2018-12-27 PROCEDURE — 96361 HYDRATE IV INFUSION ADD-ON: CPT

## 2018-12-27 PROCEDURE — 82962 GLUCOSE BLOOD TEST: CPT

## 2018-12-27 PROCEDURE — 73610 X-RAY EXAM OF ANKLE: CPT

## 2018-12-27 RX ADMIN — HUMAN INSULIN 5 UNITS: 100 INJECTION, SOLUTION SUBCUTANEOUS at 22:37

## 2018-12-27 RX ADMIN — SODIUM CHLORIDE 1000 ML: 900 INJECTION, SOLUTION INTRAVENOUS at 23:08

## 2018-12-27 RX ADMIN — SODIUM CHLORIDE 600 MG: 900 INJECTION, SOLUTION INTRAVENOUS at 22:16

## 2018-12-27 RX ADMIN — SODIUM CHLORIDE 1000 ML: 900 INJECTION, SOLUTION INTRAVENOUS at 22:17

## 2018-12-27 NOTE — LETTER
[address] [place] [address] [address] [facility] EMERGENCY DEPT 
[address] [facility] [id number] [phone] Work/School Note Date: 12/27/2018 To Whom It May concern: 
 
Toney Pennington was seen and treated today in the emergency room by the following provider(s): 
Attending Provider: Max Woods MD 
Physician Assistant: Vannesa Gage Toney Pennington may return to work on 12/30/18. Sincerely, 
 
 
 
 
[place]

## 2018-12-28 LAB
APPEARANCE UR: CLEAR
BACTERIA URNS QL MICRO: ABNORMAL /HPF
BILIRUB UR QL: NEGATIVE
COLOR UR: YELLOW
EPITH CASTS URNS QL MICRO: ABNORMAL /LPF (ref 0–5)
GLUCOSE BLD STRIP.AUTO-MCNC: 303 MG/DL (ref 70–110)
GLUCOSE UR STRIP.AUTO-MCNC: >1000 MG/DL
HGB UR QL STRIP: ABNORMAL
KETONES UR QL STRIP.AUTO: ABNORMAL MG/DL
LEUKOCYTE ESTERASE UR QL STRIP.AUTO: NEGATIVE
NITRITE UR QL STRIP.AUTO: NEGATIVE
PH UR STRIP: 6 [PH] (ref 5–8)
PROT UR STRIP-MCNC: 30 MG/DL
RBC #/AREA URNS HPF: ABNORMAL /HPF (ref 0–5)
SP GR UR REFRACTOMETRY: >1.03 (ref 1–1.03)
UROBILINOGEN UR QL STRIP.AUTO: 1 EU/DL (ref 0.2–1)
WBC URNS QL MICRO: ABNORMAL /HPF (ref 0–5)
YEAST URNS QL MICRO: ABNORMAL

## 2018-12-28 PROCEDURE — 82962 GLUCOSE BLOOD TEST: CPT

## 2018-12-28 RX ORDER — FLUCONAZOLE 150 MG/1
150 TABLET ORAL
Qty: 1 TAB | Refills: 0 | Status: SHIPPED | OUTPATIENT
Start: 2018-12-28 | End: 2018-12-28

## 2018-12-28 RX ORDER — LISINOPRIL 20 MG/1
20 TABLET ORAL DAILY
Qty: 30 TAB | Refills: 0 | Status: SHIPPED | OUTPATIENT
Start: 2018-12-28 | End: 2019-01-02

## 2018-12-28 RX ORDER — CLINDAMYCIN HYDROCHLORIDE 300 MG/1
300 CAPSULE ORAL 4 TIMES DAILY
Qty: 40 CAP | Refills: 0 | Status: SHIPPED | OUTPATIENT
Start: 2018-12-28 | End: 2019-01-02

## 2018-12-28 NOTE — ED TRIAGE NOTES
Pt. States \"My left foot is swollen to my toes; it feels like it got a heartbeat\". Reports symptoms started 3 days ago.

## 2018-12-28 NOTE — DISCHARGE INSTRUCTIONS
Take medication as prescribed. Follow-up with your primary care physician in 2 days for reassessment or follow-up in the ED for a wound check. Bring the results from this visit with your for their review. Return to the ED for any new, worsening, or persistent symptoms, including fever, increased swelling, drainage, or any other medical concerns. Learning About High Blood Sugar  What is high blood sugar? Your body turns the food you eat into glucose (sugar), which it uses for energy. But if your body isn't able to use the sugar right away, it can build up in your blood and lead to high blood sugar. When the amount of sugar in your blood stays too high for too much of the time, you may have diabetes. Diabetes is a disease that can cause serious health problems. The good news is that lifestyle changes may help you get your blood sugar back to normal and avoid or delay diabetes. What causes high blood sugar? Sugar (glucose) can build up in your blood if you:  · Are overweight. · Have a family history of diabetes. · Take certain medicines, such as steroids. What are the symptoms? Having high blood sugar may not cause any symptoms at all. Or it may make you feel very thirsty or very hungry. You may also urinate more often than usual, have blurry vision, or lose weight without trying. How is high blood sugar treated? You can take steps to lower your blood sugar level if you understand what makes it get higher. Your doctor may want you to learn how to test your blood sugar level at home. Then you can see how illness, stress, or different kinds of food or medicine raise or lower your blood sugar level. Other tests may be needed to see if you have diabetes. How can you prevent high blood sugar? · Watch your weight. If you're overweight, losing just a small amount of weight may help. Reducing fat around your waist is most important. · Limit the amount of calories, sweets, and unhealthy fat you eat.  Ask your doctor if a dietitian can help you. A registered dietitian can help you create meal plans that fit your lifestyle. · Get at least 30 minutes of exercise on most days of the week. Exercise helps control your blood sugar. It also helps you maintain a healthy weight. Walking is a good choice. You also may want to do other activities, such as running, swimming, cycling, or playing tennis or team sports. · If your doctor prescribed medicines, take them exactly as prescribed. Call your doctor if you think you are having a problem with your medicine. You will get more details on the specific medicines your doctor prescribes. Follow-up care is a key part of your treatment and safety. Be sure to make and go to all appointments, and call your doctor if you are having problems. It's also a good idea to know your test results and keep a list of the medicines you take. Where can you learn more? Go to http://taye-triny.info/. Enter O108 in the search box to learn more about \"Learning About High Blood Sugar. \"  Current as of: December 7, 2017  Content Version: 11.8  © 6267-2680 [employer] ([place]). Care instructions adapted under license by [employer] (which disclaims liability or warranty for this information). If you have questions about a medical condition or this instruction, always ask your healthcare professional. [place] [address] any warranty or liability for your use of this information. Elevated Blood Pressure: Care Instructions  Your Care Instructions    Blood pressure is a measure of how hard the blood pushes against the walls of your arteries. It's normal for blood pressure to go up and down throughout the day. But if it stays up over time, you have high blood pressure. Two numbers tell you your blood pressure. The first number is the systolic pressure. It shows how hard the blood pushes when your heart is pumping.  The second number is the diastolic pressure. It shows how hard the blood pushes between heartbeats, when your heart is relaxed and filling with blood. An ideal blood pressure in adults is less than 120/80 (say \"120 over 80\"). High blood pressure is 140/90 or higher. You have high blood pressure if your top number is 140 or higher or your bottom number is 90 or higher, or both. The main test for high blood pressure is simple, fast, and painless. To diagnose high blood pressure, your doctor will test your blood pressure at different times. After testing your blood pressure, your doctor may ask you to test it again when you are home. If you are diagnosed with high blood pressure, you can work with your doctor to make a long-term plan to manage it. Follow-up care is a key part of your treatment and safety. Be sure to make and go to all appointments, and call your doctor if you are having problems. It's also a good idea to know your test results and keep a list of the medicines you take. How can you care for yourself at home? · Do not smoke. Smoking increases your risk for heart attack and stroke. If you need help quitting, talk to your doctor about stop-smoking programs and medicines. These can increase your chances of quitting for good. · Stay at a healthy weight. · Try to limit how much sodium you eat to less than 2,300 milligrams (mg) a day. Your doctor may ask you to try to eat less than 1,500 mg a day. · Be physically active. Get at least 30 minutes of exercise on most days of the week. Walking is a good choice. You also may want to do other activities, such as running, swimming, cycling, or playing tennis or team sports. · Avoid or limit alcohol. Talk to your doctor about whether you can drink any alcohol. · Eat plenty of fruits, vegetables, and low-fat dairy products. Eat less saturated and total fats. · Learn how to check your blood pressure at home. When should you call for help?   Call your doctor now or seek immediate medical care if:  ? · Your blood pressure is much higher than normal (such as 180/110 or higher). ? · You think high blood pressure is causing symptoms such as:  ¨ Severe headache. ¨ Blurry vision. ? Watch closely for changes in your health, and be sure to contact your doctor if:  ? · You do not get better as expected. Where can you learn more? Go to http://taye-triny.info/. Enter K829 in the search box to learn more about \"Elevated Blood Pressure: Care Instructions. \"  Current as of: September 21, 2016  Content Version: 11.4  © 7922-9717 [employer]. Care instructions adapted under license by [employer] (which disclaims liability or warranty for this information). If you have questions about a medical condition or this instruction, always ask your healthcare professional. [address] any warranty or liability for your use of this information. Cellulitis: Care Instructions  Your Care Instructions    Cellulitis is a skin infection caused by bacteria, most often strep or staph. It often occurs after a break in the skin from a scrape, cut, bite, or puncture, or after a rash. Cellulitis may be treated without doing tests to find out what caused it. But your doctor may do tests, if needed, to look for a specific bacteria, like methicillin-resistant Staphylococcus aureus (MRSA). The doctor has checked you carefully, but problems can develop later. If you notice any problems or new symptoms, get medical treatment right away. Follow-up care is a key part of your treatment and safety. Be sure to make and go to all appointments, and call your doctor if you are having problems. It's also a good idea to know your test results and keep a list of the medicines you take. How can you care for yourself at home? · Take your antibiotics as directed. Do not stop taking them just because you feel better.  You need to take the full course of antibiotics. · Prop up the infected area on pillows to reduce pain and swelling. Try to keep the area above the level of your heart as often as you can. · If your doctor told you how to care for your wound, follow your doctor's instructions. If you did not get instructions, follow this general advice:  ? Wash the wound with clean water 2 times a day. Don't use hydrogen peroxide or alcohol, which can slow healing. ? You may cover the wound with a thin layer of petroleum jelly, such as Vaseline, and a nonstick bandage. ? Apply more petroleum jelly and replace the bandage as needed. · Be safe with medicines. Take pain medicines exactly as directed. ? If the doctor gave you a prescription medicine for pain, take it as prescribed. ? If you are not taking a prescription pain medicine, ask your doctor if you can take an over-the-counter medicine. To prevent cellulitis in the future  · Try to prevent cuts, scrapes, or other injuries to your skin. Cellulitis most often occurs where there is a break in the skin. · If you get a scrape, cut, mild burn, or bite, wash the wound with clean water as soon as you can to help avoid infection. Don't use hydrogen peroxide or alcohol, which can slow healing. · If you have swelling in your legs (edema), support stockings and good skin care may help prevent leg sores and cellulitis. · Take care of your feet, especially if you have diabetes or other conditions that increase the risk of infection. Wear shoes and socks. Do not go barefoot. If you have athlete's foot or other skin problems on your feet, talk to your doctor about how to treat them. When should you call for help? Call your doctor now or seek immediate medical care if:    · You have signs that your infection is getting worse, such as:  ? Increased pain, swelling, warmth, or redness. ? Red streaks leading from the area. ? Pus draining from the area.   ? A fever.     · You get a rash.    Watch closely for changes in your health, and be sure to contact your doctor if:    · You do not get better as expected. Where can you learn more? Go to http://taye-triny.info/. Kyler Calero in the search box to learn more about \"Cellulitis: Care Instructions. \"  Current as of: April 18, 2018  Content Version: 11.8  © 9193-5068 [employer]. Care instructions adapted under license by [employer] (which disclaims liability or warranty for this information). If you have questions about a medical condition or this instruction, always ask your healthcare professional. [place] [address] any warranty or liability for your use of this information.

## 2018-12-28 NOTE — ED PROVIDER NOTES
EMERGENCY DEPARTMENT HISTORY AND PHYSICAL EXAM    9:47 PM      Date: 2018  Patient Name: Mindi Smith    History of Presenting Illness     Chief Complaint   Patient presents with    Ankle Pain     left         History Provided By: Patient    Chief Complaint: L foot pain, redness, swelling  Duration:  Days  Timing:  Progressive  Location: L foot  Quality: Aching  Severity: Moderate  Modifying Factors: none  Associated Symptoms: denies any other associated signs or symptoms      Additional History (Context): Mindi Smith is a 44 y.o. female with hx of DM, HTN who presents with c/o L foot pain, swelling, and redness x 3 days. Denies fever/chills, drainage, numbness/tingling, injury/trauma. Denies hx of gout or arthritis. PCP: None    Current Outpatient Medications   Medication Sig Dispense Refill    clindamycin (CLEOCIN) 300 mg capsule Take 1 Cap by mouth four (4) times daily for 10 days. 40 Cap 0    lisinopril (PRINIVIL, ZESTRIL) 20 mg tablet Take 1 Tab by mouth daily. 30 Tab 0    butalbital-acetaminophen-caff (FIORICET) -40 mg per capsule Take 1 Cap by mouth every four (4) hours as needed for Pain. 12 Cap 0    metFORMIN (GLUCOPHAGE) 500 mg tablet Take 1 Tab by mouth two (2) times daily (with meals). 60 Tab 0    amLODIPine (NORVASC) 10 mg tablet Take 1 Tab by mouth daily. 30 Tab 0       Past History     Past Medical History:  Past Medical History:   Diagnosis Date    Diabetes ([address]     HTN (hypertension)        Past Surgical History:  Past Surgical History:   Procedure Laterality Date    HX GYN             Family History:  No family history on file. Social History:  Social History     Tobacco Use    Smoking status: Never Smoker    Smokeless tobacco: Never Used   Substance Use Topics    Alcohol use: No    Drug use: No       Allergies:   Allergies   Allergen Reactions    Pcn [Penicillins] Hives         Review of Systems       Review of Systems   Constitutional: Negative for chills and fever. Respiratory: Negative for shortness of breath. Cardiovascular: Negative for chest pain. Gastrointestinal: Negative for abdominal pain, nausea and vomiting. Musculoskeletal: Positive for myalgias. Skin: Positive for color change and rash. Neurological: Negative for weakness. All other systems reviewed and are negative. Physical Exam     Visit Vitals  BP (!) 161/101   Pulse (!) 103   Temp 99.3 °F (37.4 °C)   Resp 18   Ht 5' 7\" (1.702 m)   Wt 88.5 kg (195 lb)   LMP 12/09/2018   SpO2 100%   BMI 30.54 kg/m²         Physical Exam   Constitutional: She appears well-developed and well-nourished. No distress. HENT:   Head: Normocephalic and atraumatic. Neck: Normal range of motion. Neck supple. Cardiovascular: Normal rate, regular rhythm, normal heart sounds and intact distal pulses. Exam reveals no gallop and no friction rub. No murmur heard. Pulmonary/Chest: Effort normal and breath sounds normal. No respiratory distress. She has no wheezes. She has no rales. Musculoskeletal: Normal range of motion. Left ankle: Normal.        Feet:    Dorsalis pedis 2+    Neurological: She is alert. Skin: Skin is warm. No rash noted. She is not diaphoretic. Nursing note and vitals reviewed. Diagnostic Study Results     Labs -  No results found for this or any previous visit (from the past 12 hour(s)). Radiologic Studies -   XR ANKLE LT MIN 3 V   Final Result   IMPRESSION:   1. Chronic Achilles tendon tear. 2.  Mild tibiotalar osteoarthritis. No evidence of acute fracture or dislocation. Medical Decision Making   I am the first provider for this patient. I reviewed the vital signs, available nursing notes, past medical history, past surgical history, family history and social history. Vital Signs-Reviewed the patient's vital signs.   Records Reviewed: Nursing Notes and Old Medical Records (Time of Review: 9:47 PM)    ED Course: Progress Notes, Reevaluation, and Consults:  10:30: Respiratory rates of 23 and 40 presumed to be in error or during movement. During observation period patient with consistent respiratory rates less than 20. Tachycardia likely 2/2 pain and dehydration improved from 112 to 103 with IVF. 12:45 AM Reviewed results with patient. Discussed need for close outpatient follow-up within 2 days for wound check, discussed returning to the ED as it will be over the weekend. Discussed strict return precautions, including fever, increased swelling or any other medical concerns. Pt and family member in agreement with plan. One or more blood pressure readings were noted elevated during the patient's presentation in the emergency department today. This abnormal reading has been cited in the patients' diagnosis, and they have been encouraged to follow up with their primary care physician, or referred to a consultant for further evaluation and treatment. Will refill prescription for Lisinopril. Provider Notes (Medical Decision Making): 45 yo F who presents due to cellulitis of LLE. Afebrile, no leukocytosis, lactic WNL. Hyperglycemia without evidence of DKA. Wound culture sent. No significant streaking into extremity. Pt is non-toxic appearing. Will d/c with abx and close outpatient follow-up in 2 days for wound check. Stable for d/c      Diagnosis     Clinical Impression:   1. Cellulitis of left lower extremity    2. Elevated blood pressure reading    3. Hyperglycemia    4.  Yeast cystitis        Disposition: home     Follow-up Information     Follow up With Specialties Details Why Contact Info    [facility] EMERGENCY DEPT Emergency Medicine In 2 days for wound check [phone]  [address]  Schedule an appointment as soon as possible for a visit  Matt Vergara [address]              Medication List      START taking these medications    clindamycin 300 mg capsule  Commonly known as:  CLEOCIN  Take 1 Cap by mouth four (4) times daily for 10 days. CONTINUE taking these medications    lisinopril 20 mg tablet  Commonly known as:  PRINIVIL, ZESTRIL  Take 1 Tab by mouth daily. ASK your doctor about these medications    amLODIPine 10 mg tablet  Commonly known as:  NORVASC  Take 1 Tab by mouth daily. butalbital-acetaminophen-caff -40 mg per capsule  Commonly known as:  FIORICET  Take 1 Cap by mouth every four (4) hours as needed for Pain. fluconazole 150 mg tablet  Commonly known as:  DIFLUCAN  Take 1 Tab by mouth now for 1 dose. FDA advises cautious prescribing of oral fluconazole in pregnancy. Ask about: Should I take this medication?     metFORMIN 500 mg tablet  Commonly known as:  GLUCOPHAGE  Take 1 Tab by mouth two (2) times daily (with meals).            Where to Get Your Medications      Information about where to get these medications is not yet available    Ask your nurse or doctor about these medications  · clindamycin 300 mg capsule  · fluconazole 150 mg tablet  · lisinopril 20 mg tablet

## 2018-12-30 ENCOUNTER — APPOINTMENT (OUTPATIENT)
Dept: GENERAL RADIOLOGY | Age: 43
DRG: 639 | End: 2018-12-30
Attending: PHYSICIAN ASSISTANT
Payer: SELF-PAY

## 2018-12-30 ENCOUNTER — HOSPITAL ENCOUNTER (INPATIENT)
Age: 43
LOS: 3 days | Discharge: HOME | DRG: 639 | End: 2019-01-02
Attending: EMERGENCY MEDICINE | Admitting: INTERNAL MEDICINE
Payer: SELF-PAY

## 2018-12-30 DIAGNOSIS — M79.671 ACUTE FOOT PAIN, RIGHT: ICD-10-CM

## 2018-12-30 DIAGNOSIS — L08.9 RIGHT FOOT INFECTION: ICD-10-CM

## 2018-12-30 DIAGNOSIS — R73.9 HYPERGLYCEMIA: ICD-10-CM

## 2018-12-30 DIAGNOSIS — L08.9 DIABETIC FOOT INFECTION (HCC): Primary | ICD-10-CM

## 2018-12-30 DIAGNOSIS — E11.628 DIABETIC FOOT INFECTION (HCC): Primary | ICD-10-CM

## 2018-12-30 LAB
ANION GAP SERPL CALC-SCNC: 5 MMOL/L (ref 3–18)
BACTERIA SPEC CULT: ABNORMAL
BASOPHILS # BLD: 0 K/UL (ref 0–0.1)
BASOPHILS NFR BLD: 0 % (ref 0–2)
BUN SERPL-MCNC: 7 MG/DL (ref 7–18)
BUN/CREAT SERPL: 8 (ref 12–20)
CALCIUM SERPL-MCNC: 8.5 MG/DL (ref 8.5–10.1)
CHLORIDE SERPL-SCNC: 105 MMOL/L (ref 100–108)
CO2 SERPL-SCNC: 25 MMOL/L (ref 21–32)
CREAT SERPL-MCNC: 0.85 MG/DL (ref 0.6–1.3)
DIFFERENTIAL METHOD BLD: ABNORMAL
EOSINOPHIL # BLD: 0.1 K/UL (ref 0–0.4)
EOSINOPHIL NFR BLD: 1 % (ref 0–5)
ERYTHROCYTE [DISTWIDTH] IN BLOOD BY AUTOMATED COUNT: 14.4 % (ref 11.6–14.5)
GLUCOSE BLD STRIP.AUTO-MCNC: 276 MG/DL (ref 70–110)
GLUCOSE BLD STRIP.AUTO-MCNC: 425 MG/DL (ref 70–110)
GLUCOSE SERPL-MCNC: 380 MG/DL (ref 74–99)
GRAM STN SPEC: ABNORMAL
GRAM STN SPEC: ABNORMAL
HCT VFR BLD AUTO: 34.5 % (ref 35–45)
HGB BLD-MCNC: 11.3 G/DL (ref 12–16)
LYMPHOCYTES # BLD: 2 K/UL (ref 0.9–3.6)
LYMPHOCYTES NFR BLD: 19 % (ref 21–52)
MCH RBC QN AUTO: 22.1 PG (ref 24–34)
MCHC RBC AUTO-ENTMCNC: 32.8 G/DL (ref 31–37)
MCV RBC AUTO: 67.4 FL (ref 74–97)
MONOCYTES # BLD: 0.7 K/UL (ref 0.05–1.2)
MONOCYTES NFR BLD: 7 % (ref 3–10)
NEUTS SEG # BLD: 7.8 K/UL (ref 1.8–8)
NEUTS SEG NFR BLD: 73 % (ref 40–73)
PLATELET # BLD AUTO: 233 K/UL (ref 135–420)
PMV BLD AUTO: 9.8 FL (ref 9.2–11.8)
POTASSIUM SERPL-SCNC: 3.6 MMOL/L (ref 3.5–5.5)
RBC # BLD AUTO: 5.12 M/UL (ref 4.2–5.3)
SERVICE CMNT-IMP: ABNORMAL
SODIUM SERPL-SCNC: 135 MMOL/L (ref 136–145)
WBC # BLD AUTO: 10.5 K/UL (ref 4.6–13.2)

## 2018-12-30 PROCEDURE — 96361 HYDRATE IV INFUSION ADD-ON: CPT

## 2018-12-30 PROCEDURE — 74011636637 HC RX REV CODE- 636/637: Performed by: EMERGENCY MEDICINE

## 2018-12-30 PROCEDURE — 87040 BLOOD CULTURE FOR BACTERIA: CPT

## 2018-12-30 PROCEDURE — 82962 GLUCOSE BLOOD TEST: CPT

## 2018-12-30 PROCEDURE — 74011250637 HC RX REV CODE- 250/637: Performed by: EMERGENCY MEDICINE

## 2018-12-30 PROCEDURE — 74011250636 HC RX REV CODE- 250/636: Performed by: INTERNAL MEDICINE

## 2018-12-30 PROCEDURE — 73630 X-RAY EXAM OF FOOT: CPT

## 2018-12-30 PROCEDURE — 74011250636 HC RX REV CODE- 250/636: Performed by: EMERGENCY MEDICINE

## 2018-12-30 PROCEDURE — 74011636637 HC RX REV CODE- 636/637: Performed by: INTERNAL MEDICINE

## 2018-12-30 PROCEDURE — 99283 EMERGENCY DEPT VISIT LOW MDM: CPT

## 2018-12-30 PROCEDURE — 74011000250 HC RX REV CODE- 250: Performed by: INTERNAL MEDICINE

## 2018-12-30 PROCEDURE — 96374 THER/PROPH/DIAG INJ IV PUSH: CPT

## 2018-12-30 PROCEDURE — 74011250636 HC RX REV CODE- 250/636: Performed by: PHYSICIAN ASSISTANT

## 2018-12-30 PROCEDURE — 80048 BASIC METABOLIC PNL TOTAL CA: CPT

## 2018-12-30 PROCEDURE — 96375 TX/PRO/DX INJ NEW DRUG ADDON: CPT

## 2018-12-30 PROCEDURE — 85025 COMPLETE CBC W/AUTO DIFF WBC: CPT

## 2018-12-30 PROCEDURE — 65270000029 HC RM PRIVATE

## 2018-12-30 RX ORDER — INSULIN GLARGINE 100 [IU]/ML
20 INJECTION, SOLUTION SUBCUTANEOUS
Status: DISCONTINUED | OUTPATIENT
Start: 2018-12-30 | End: 2018-12-30

## 2018-12-30 RX ORDER — VANCOMYCIN/0.9 % SOD CHLORIDE 1 G/100 ML
1000 PLASTIC BAG, INJECTION (ML) INTRAVENOUS EVERY 8 HOURS
Status: DISCONTINUED | OUTPATIENT
Start: 2018-12-31 | End: 2018-12-31

## 2018-12-30 RX ORDER — MAGNESIUM SULFATE 100 %
16 CRYSTALS MISCELLANEOUS AS NEEDED
Status: DISCONTINUED | OUTPATIENT
Start: 2018-12-30 | End: 2019-01-02 | Stop reason: HOSPADM

## 2018-12-30 RX ORDER — OXYCODONE AND ACETAMINOPHEN 7.5; 325 MG/1; MG/1
1 TABLET ORAL
Status: DISCONTINUED | OUTPATIENT
Start: 2018-12-30 | End: 2019-01-02 | Stop reason: HOSPADM

## 2018-12-30 RX ORDER — DEXTROSE 50 % IN WATER (D50W) INTRAVENOUS SYRINGE
25-50 AS NEEDED
Status: DISCONTINUED | OUTPATIENT
Start: 2018-12-30 | End: 2019-01-02 | Stop reason: HOSPADM

## 2018-12-30 RX ORDER — VANCOMYCIN/0.9 % SOD CHLORIDE 1 G/100 ML
1000 PLASTIC BAG, INJECTION (ML) INTRAVENOUS
Status: DISCONTINUED | OUTPATIENT
Start: 2018-12-30 | End: 2018-12-30

## 2018-12-30 RX ORDER — AMLODIPINE BESYLATE 10 MG/1
10 TABLET ORAL DAILY
Status: DISCONTINUED | OUTPATIENT
Start: 2018-12-31 | End: 2019-01-02 | Stop reason: HOSPADM

## 2018-12-30 RX ORDER — INSULIN GLARGINE 100 [IU]/ML
20 INJECTION, SOLUTION SUBCUTANEOUS
Status: DISCONTINUED | OUTPATIENT
Start: 2018-12-30 | End: 2019-01-01

## 2018-12-30 RX ORDER — BISACODYL 5 MG
10 TABLET, DELAYED RELEASE (ENTERIC COATED) ORAL DAILY PRN
Status: DISCONTINUED | OUTPATIENT
Start: 2018-12-30 | End: 2019-01-02 | Stop reason: HOSPADM

## 2018-12-30 RX ORDER — INSULIN LISPRO 100 [IU]/ML
INJECTION, SOLUTION INTRAVENOUS; SUBCUTANEOUS
Status: DISCONTINUED | OUTPATIENT
Start: 2018-12-31 | End: 2019-01-02 | Stop reason: HOSPADM

## 2018-12-30 RX ORDER — HYDROCODONE BITARTRATE AND ACETAMINOPHEN 5; 325 MG/1; MG/1
2 TABLET ORAL ONCE
Status: COMPLETED | OUTPATIENT
Start: 2018-12-30 | End: 2018-12-30

## 2018-12-30 RX ORDER — VANCOMYCIN 1.75 GRAM/500 ML IN 0.9 % SODIUM CHLORIDE INTRAVENOUS
1750
Status: COMPLETED | OUTPATIENT
Start: 2018-12-30 | End: 2018-12-30

## 2018-12-30 RX ORDER — SODIUM CHLORIDE 9 MG/ML
75 INJECTION, SOLUTION INTRAVENOUS CONTINUOUS
Status: DISCONTINUED | OUTPATIENT
Start: 2018-12-30 | End: 2019-01-01

## 2018-12-30 RX ORDER — HEPARIN SODIUM 5000 [USP'U]/ML
5000 INJECTION, SOLUTION INTRAVENOUS; SUBCUTANEOUS EVERY 8 HOURS
Status: DISCONTINUED | OUTPATIENT
Start: 2018-12-30 | End: 2019-01-02 | Stop reason: HOSPADM

## 2018-12-30 RX ORDER — LISINOPRIL 20 MG/1
20 TABLET ORAL DAILY
Status: DISCONTINUED | OUTPATIENT
Start: 2018-12-31 | End: 2019-01-02 | Stop reason: HOSPADM

## 2018-12-30 RX ADMIN — SODIUM CHLORIDE 1000 ML: 900 INJECTION, SOLUTION INTRAVENOUS at 18:29

## 2018-12-30 RX ADMIN — VANCOMYCIN HYDROCHLORIDE 1750 MG: 10 INJECTION, POWDER, LYOPHILIZED, FOR SOLUTION INTRAVENOUS at 18:28

## 2018-12-30 RX ADMIN — CEFTRIAXONE SODIUM 2 G: 2 INJECTION, POWDER, FOR SOLUTION INTRAMUSCULAR; INTRAVENOUS at 19:50

## 2018-12-30 RX ADMIN — INSULIN HUMAN 10 UNITS: 100 INJECTION, SOLUTION PARENTERAL at 18:28

## 2018-12-30 RX ADMIN — HEPARIN SODIUM 5000 UNITS: 5000 INJECTION INTRAVENOUS; SUBCUTANEOUS at 19:51

## 2018-12-30 RX ADMIN — SODIUM CHLORIDE 75 ML/HR: 900 INJECTION, SOLUTION INTRAVENOUS at 19:55

## 2018-12-30 RX ADMIN — INSULIN GLARGINE 20 UNITS: 100 INJECTION, SOLUTION SUBCUTANEOUS at 19:53

## 2018-12-30 RX ADMIN — HYDROCODONE BITARTRATE AND ACETAMINOPHEN 2 TABLET: 5; 325 TABLET ORAL at 18:28

## 2018-12-30 NOTE — ED PROVIDER NOTES
EMERGENCY DEPARTMENT HISTORY AND PHYSICAL EXAM    5:42 PM      Date: 2018  Patient Name: Anne Marie Manriquez    History of Presenting Illness     Chief Complaint   Patient presents with    Wound Check         History Provided By: Patient, Patient's Significant Other    Chief Complaint: Toe Pain  Duration: 3 Days  Timing:  Acute, worsening  Location: 2nd toe of left foot  Quality: throbbing  Severity: 8/10  Modifying Factors: Has been taking Clindamycin from last ED visit  Associated Symptoms: Toe wound      Additional History (Context): Anne Marie Manriquez is a 44 y.o. female with PMHx neuropathy and DM who presents c/o acute, 8/10 2nd left toe pain onset of 3 days. Pt reports toe is swollen and with  Purulent drainage, and describes toe wound as \"throbbing\". Per significant other, infection on the toe is worsening. Was previously seen in the ED 3 days ago and dx for cellulitis; discharged with Clindamycin. However, pt claims she has not taken any OTC medications for pain. Denies fever, vomiting, and any other associated sx. Allergic to Penicillin. Did not follow up with PCP after last ED visit. No other concerns at this time. PCP: None      Past History     Past Medical History:  Past Medical History:   Diagnosis Date    Diabetes ([address]     HTN (hypertension)        Past Surgical History:  Past Surgical History:   Procedure Laterality Date    HX GYN             Family History:  None. Social History:  Social History     Tobacco Use    Smoking status: Never Smoker    Smokeless tobacco: Never Used   Substance Use Topics    Alcohol use: No    Drug use: No       Allergies: Allergies   Allergen Reactions    Pcn [Penicillins] Hives         Review of Systems       Review of Systems   Constitutional: Negative for chills and fever. HENT: Negative for congestion, rhinorrhea, sore throat and trouble swallowing. Eyes: Negative for visual disturbance.    Respiratory: Negative for cough, shortness of breath and wheezing. Cardiovascular: Negative for chest pain. Gastrointestinal: Negative for abdominal pain, nausea and vomiting. Endocrine: Negative for polyuria. Genitourinary: Negative for dysuria. Musculoskeletal: Negative for arthralgias and neck stiffness. Positive for toe pain   Skin: Positive for wound. Negative for pallor and rash. Neurological: Negative for dizziness, weakness, numbness and headaches. Hematological: Does not bruise/bleed easily. Psychiatric/Behavioral: Negative for confusion and dysphoric mood. All other systems reviewed and are negative. Physical Exam     Visit Vitals  BP (!) 153/101   Pulse (!) 110   Temp 98.4 °F (36.9 °C)   Resp 16   LMP 12/09/2018   SpO2 100%         Physical Exam   Constitutional: She is oriented to person, place, and time. She appears well-developed and well-nourished. No distress. HENT:   Head: Normocephalic and atraumatic. Mouth/Throat: Oropharynx is clear and moist.   Eyes: Conjunctivae are normal. Pupils are equal, round, and reactive to light. No scleral icterus. Neck: Normal range of motion. Neck supple. Cardiovascular: Normal rate and intact distal pulses. Pulses:       Dorsalis pedis pulses are 2+ on the right side, and 2+ on the left side. Capillary refill < 3 seconds  Symmetric DP pulses   Pulmonary/Chest: Effort normal and breath sounds normal. No respiratory distress. She has no wheezes. Abdominal: Soft. Bowel sounds are normal. She exhibits no distension. There is no tenderness. Musculoskeletal: Normal range of motion. She exhibits edema (of left foot) and tenderness (at left foot). Lymphadenopathy:     She has no cervical adenopathy. Neurological: She is alert and oriented to person, place, and time. No cranial nerve deficit. Decreased sensation of 2nd toe of left foot, likely due to neuropathy. Skin: Skin is warm. She is not diaphoretic. There is erythema (of dorsum of left foot). Left foot warm to touch compared to right foot. Pus drainage at 2nd toe at plantar surface area. Discoloration in 2nd toe of left foot     Nursing note and vitals reviewed. Diagnostic Study Results     Labs -  Recent Results (from the past 12 hour(s))   GLUCOSE, POC    Collection Time: 12/30/18  3:20 PM   Result Value Ref Range    Glucose (POC) 425 (HH) 70 - 110 mg/dL   CBC WITH AUTOMATED DIFF    Collection Time: 12/30/18  4:26 PM   Result Value Ref Range    WBC 10.5 4.6 - 13.2 K/uL    RBC 5.12 4.20 - 5.30 M/uL    HGB 11.3 (L) 12.0 - 16.0 g/dL    HCT 34.5 (L) 35.0 - 45.0 %    MCV 67.4 (L) 74.0 - 97.0 FL    MCH 22.1 (L) 24.0 - 34.0 PG    MCHC 32.8 31.0 - 37.0 g/dL    RDW 14.4 11.6 - 14.5 %    PLATELET 529 445 - 052 K/uL    MPV 9.8 9.2 - 11.8 FL    NEUTROPHILS 73 40 - 73 %    LYMPHOCYTES 19 (L) 21 - 52 %    MONOCYTES 7 3 - 10 %    EOSINOPHILS 1 0 - 5 %    BASOPHILS 0 0 - 2 %    ABS. NEUTROPHILS 7.8 1.8 - 8.0 K/UL    ABS. LYMPHOCYTES 2.0 0.9 - 3.6 K/UL    ABS. MONOCYTES 0.7 0.05 - 1.2 K/UL    ABS. EOSINOPHILS 0.1 0.0 - 0.4 K/UL    ABS. BASOPHILS 0.0 0.0 - 0.1 K/UL    DF AUTOMATED     METABOLIC PANEL, BASIC    Collection Time: 12/30/18  4:26 PM   Result Value Ref Range    Sodium 135 (L) 136 - 145 mmol/L    Potassium 3.6 3.5 - 5.5 mmol/L    Chloride 105 100 - 108 mmol/L    CO2 25 21 - 32 mmol/L    Anion gap 5 3.0 - 18 mmol/L    Glucose 380 (H) 74 - 99 mg/dL    BUN 7 7.0 - 18 MG/DL    Creatinine 0.85 0.6 - 1.3 MG/DL    BUN/Creatinine ratio 8 (L) 12 - 20      GFR est AA >60 >60 ml/min/1.73m2    GFR est non-AA >60 >60 ml/min/1.73m2    Calcium 8.5 8.5 - 10.1 MG/DL       Radiologic Studies -   XR FOOT LT MIN 3 V   Final Result   IMPRESSION: Soft tissue swelling dorsal and plantar aspect of the forefoot,   suspect small ulceration as well along the plantar aspect at the level of the   first MTP. I see no evidence for soft tissue gas or osteomyelitis.                      Medical Decision Making   I am the first provider for this patient. I reviewed the vital signs, available nursing notes, past medical history, past surgical history, family history and social history. Vital Signs-Reviewed the patient's vital signs. Pulse Oximetry Analysis -  100% on room air Stable. Cardiac Monitor:  Rate: 110 bpm  Rhythm:  Sinus Tachycardia       Records Reviewed: Nursing Notes and Old Medical Records (Time of Review: 5:42 PM)    Provider Notes (Medical Decision Making): ddx worsening DM foot infection, failed outpt therapy; metabolic; osteomyelitis    Check labs, foot xray, give pain med, IVF    MDM    Medications   sodium chloride 0.9 % bolus infusion 1,000 mL (1,000 mL IntraVENous New Bag 12/30/18 1829)   vancomycin (VANCOCIN) 1750 mg in  ml infusion (1,750 mg IntraVENous New Bag 12/30/18 1828)   lisinopril (PRINIVIL, ZESTRIL) tablet 20 mg (not administered)   amLODIPine (NORVASC) tablet 10 mg (not administered)   HYDROcodone-acetaminophen (NORCO) 5-325 mg per tablet 2 Tab (2 Tabs Oral Given 12/30/18 1828)   insulin regular (NOVOLIN R, HUMULIN R) injection 10 Units (10 Units IntraVENous Given 12/30/18 1828)         ED Course: Progress Notes, Reevaluation, and Consults:  WBC wnl  Cr wnl      Gave insulin, getting IVF    Pt has no pcp, poor follow up. Foot infection worsening per . Has failed outpt treatment. Is on Clindamycin but culture shows resistent. I started vancomycin. Pt is not septic in ED. Consult:  Discussed care with Dr. Ashley Quintana, Specialty: Podiatrist.  Standard discussion; including history of patients chief complaint, available diagnostic results, and treatment course. Accepts pt consult. Agrees with Vancomycin. Will see pt in the morning. 6:25 PM, 12/30/2018     Consult:  Discussed care with Dr. Anthony Bangura, Specialty: hospitalist  Standard discussion; including history of patients chief complaint, available diagnostic results, and treatment course. Accepts pt admission.   6:30 PM, 12/30/2018     I discussed dx's, and plan, pt and  agree. For Hospitalized Patients:    1. Hospitalization Decision Time:  The decision to hospitalize the patient was made by Dr. Vandana Serna at 6:25 PM on 12/30/2018      Diagnosis     Clinical Impression:   1. Diabetic foot infection ([address]    2. Right foot infection    3. Acute foot pain, right    4. Hyperglycemia        Disposition: Admitted. Follow-up Information    None             Medication List      ASK your doctor about these medications    amLODIPine 10 mg tablet  Commonly known as:  NORVASC  Take 1 Tab by mouth daily. butalbital-acetaminophen-caff -40 mg per capsule  Commonly known as:  FIORICET  Take 1 Cap by mouth every four (4) hours as needed for Pain. clindamycin 300 mg capsule  Commonly known as:  CLEOCIN  Take 1 Cap by mouth four (4) times daily for 10 days. lisinopril 20 mg tablet  Commonly known as:  PRINIVIL, ZESTRIL  Take 1 Tab by mouth daily. metFORMIN 500 mg tablet  Commonly known as:  GLUCOPHAGE  Take 1 Tab by mouth two (2) times daily (with meals). _______________________________    Attestations:  Scribe Attestation     Madelyn Peres acting as a scribe for and in the presence of Odilia Blancas DO      December 30, 2018 at 6:35 PM       Provider Attestation:      I personally performed the services described in the documentation, reviewed the documentation, as recorded by the scribe in my presence, and it accurately and completely records my words and actions.  December 30, 2018 at 6:35 PM - Odilia Blancas DO    _______________________________

## 2018-12-30 NOTE — ED NOTES
I performed a brief evaluation, including history and physical, of the patient here in triage and I have determined that pt will need further treatment and evaluation from the main side ER physician. I have placed initial orders to help in expediting patients care.      December 30, 2018 at 3:22 PM - Phil Bustillo PA-C        Visit Vitals  BP (!) 153/101   Pulse (!) 110   Temp 98.4 °F (36.9 °C)   Resp 16   SpO2 100%

## 2018-12-30 NOTE — H&P
History & Physical    Patient: Alberto Carr MRN: [id number]  CSN: [id number]    YOB: 1975  Age: 44 y.o. Sex: female      DOA: 2018    Chief Complaint:   Chief Complaint   Patient presents with    Wound Check          HPI:     Alberto Carr is a 44 y.o.  female  With PMH of HTN, DM, peripheral Neuropathy came tot he ER complaining of Right foot pain and swelling as well as wound over the Right second toe. She was here in the ER 2 days ago for the same complaint. She was given oral clindamycin and was discharged home. Despite taking the antibiotics the wound got worse and pain was intensifying she decided to come to the ER today. Patient also stated she feels she has fever and chills at home   She has been dialectic and has poor control   She has no insurance and No PCP      Past Medical History:   Diagnosis Date    Diabetes ([address]     HTN (hypertension)        Past Surgical History:   Procedure Laterality Date    HX GYN             No family history on file. Social History     Socioeconomic History    Marital status:      Spouse name: Not on file    Number of children: Not on file    Years of education: Not on file    Highest education level: Not on file   Tobacco Use    Smoking status: Never Smoker    Smokeless tobacco: Never Used   Substance and Sexual Activity    Alcohol use: No    Drug use: No    Sexual activity: Yes     Partners: Male       Prior to Admission medications    Medication Sig Start Date End Date Taking? Authorizing Provider   clindamycin (CLEOCIN) 300 mg capsule Take 1 Cap by mouth four (4) times daily for 10 days. 18 Yes Berna Wakefield   lisinopril (PRINIVIL, ZESTRIL) 20 mg tablet Take 1 Tab by mouth daily. 18  Yes Berna Wakefield   butalbital-acetaminophen-caff (FIORICET) -40 mg per capsule Take 1 Cap by mouth every four (4) hours as needed for Pain.  18  Yes Jody Lopez MD AFTAB   metFORMIN (GLUCOPHAGE) 500 mg tablet Take 1 Tab by mouth two (2) times daily (with meals). 18  Yes Fam Grullon MD   amLODIPine (NORVASC) 10 mg tablet Take 1 Tab by mouth daily. 17  Yes Patel UGALDE, DO       Allergies   Allergen Reactions    Pcn [Penicillins] Hives         Review of Systems  GENERAL: Patient alert, awake and oriented times 3, able to communicate full sentences and not in distress. HEENT: No change in vision, no earache, tinnitus, sore throat or sinus congestion. NECK: No pain or stiffness. PULMONARY: No shortness of breath, cough or wheeze. Cardiovascular: no pnd or orthopnea, no CP  GASTROINTESTINAL: No abdominal pain, nausea, vomiting or diarrhea, melena or bright red blood per rectum. GENITOURINARY: No urinary frequency, urgency, hesitancy or dysuria. MUSCULOSKELETAL:Leftr second toe has discharging wound with surrounding warmth and swellin of the rleft foot  DERMATOLOGIC: No rash, no itching, no lesions. ENDOCRINE: No polyuria, polydipsia, no heat or cold intolerance. No recent change in weight. HEMATOLOGICAL: No anemia or easy bruising or bleeding. NEUROLOGIC: No headache, seizures, numbness, tingling or weakness. Physical Exam:     Physical Exam:  Visit Vitals  BP (!) 153/101   Pulse (!) 110   Temp 98.4 °F (36.9 °C)   Resp 16   LMP 2018   SpO2 100%      O2 Device: Room air    Temp (24hrs), Av.4 °F (36.9 °C), Min:98.4 °F (36.9 °C), Max:98.4 °F (36.9 °C)    No intake/output data recorded. No intake/output data recorded. General:  Alert, cooperative, no distress, appears stated age. Head: Normocephalic, without obvious abnormality, atraumatic. Eyes:  Conjunctivae/corneas clear. PERRL, EOMs intact. Nose: Nares normal. No drainage or sinus tenderness. Neck: Supple, symmetrical, trachea midline, no adenopathy, thyroid: no enlargement, no carotid bruit and no JVD. Lungs:   Clear to auscultation bilaterally. Heart:  Regular rate and rhythm, S1, S2 normal.     Abdomen: Soft, non-tender. Bowel sounds normal.    Extremities: right foot is swollen, warm, Right second toe has a discharging wound    Pulses: 2+ and symmetric all extremities. Skin:  No rashes or lesions   Neurologic: AAOx3, No focal motor or sensory deficit.        Labs Reviewed:    BMP:   Lab Results   Component Value Date/Time     (L) 12/30/2018 04:26 PM    K 3.6 12/30/2018 04:26 PM     12/30/2018 04:26 PM    CO2 25 12/30/2018 04:26 PM    AGAP 5 12/30/2018 04:26 PM     (H) 12/30/2018 04:26 PM    BUN 7 12/30/2018 04:26 PM    CREA 0.85 12/30/2018 04:26 PM    GFRAA >60 12/30/2018 04:26 PM    GFRNA >60 12/30/2018 04:26 PM     CMP:   Lab Results   Component Value Date/Time     (L) 12/30/2018 04:26 PM    K 3.6 12/30/2018 04:26 PM     12/30/2018 04:26 PM    CO2 25 12/30/2018 04:26 PM    AGAP 5 12/30/2018 04:26 PM     (H) 12/30/2018 04:26 PM    BUN 7 12/30/2018 04:26 PM    CREA 0.85 12/30/2018 04:26 PM    GFRAA >60 12/30/2018 04:26 PM    GFRNA >60 12/30/2018 04:26 PM    CA 8.5 12/30/2018 04:26 PM     CBC:   Lab Results   Component Value Date/Time    WBC 10.5 12/30/2018 04:26 PM    HGB 11.3 (L) 12/30/2018 04:26 PM    HCT 34.5 (L) 12/30/2018 04:26 PM     12/30/2018 04:26 PM         Procedures/imaging: see electronic medical records for all procedures/Xrays and details which were not copied into this note but were reviewed prior to creation of Plan      Assessment/Plan     Active Problems:    Hyperglycemi    Diabetic foot infection     Right foot infection    Acute pain of right foot    SIRS due to Diabetic foot infection      Admit patient to floor   Send blood culture   Right foor Xray   POdiatry called by ED   Continue vancomycin   Start Rocephin, Penicillin allergic   lantus Insulin 20 units  Sliding scale   Accucheck   HGB A1c         DVT/GI Prophylaxis: Hep SQ   Diet: Diabetic   Code; Full Code    Discussed with patient and  at bedside about hospital admission and my plan care, both understood and agree with my plan care.     Diogo Gordon MD  12/30/2018 6:56 PM

## 2018-12-31 LAB
ALBUMIN SERPL-MCNC: 2.3 G/DL (ref 3.4–5)
ALBUMIN/GLOB SERPL: 0.5 {RATIO} (ref 0.8–1.7)
ALP SERPL-CCNC: 188 U/L (ref 45–117)
ALT SERPL-CCNC: 18 U/L (ref 13–56)
ANION GAP SERPL CALC-SCNC: 8 MMOL/L (ref 3–18)
AST SERPL-CCNC: 12 U/L (ref 15–37)
BASOPHILS # BLD: 0 K/UL (ref 0–0.1)
BASOPHILS NFR BLD: 0 % (ref 0–2)
BILIRUB SERPL-MCNC: 0.3 MG/DL (ref 0.2–1)
BUN SERPL-MCNC: 6 MG/DL (ref 7–18)
BUN/CREAT SERPL: 11 (ref 12–20)
CALCIUM SERPL-MCNC: 7.8 MG/DL (ref 8.5–10.1)
CHLORIDE SERPL-SCNC: 111 MMOL/L (ref 100–108)
CO2 SERPL-SCNC: 20 MMOL/L (ref 21–32)
CREAT SERPL-MCNC: 0.57 MG/DL (ref 0.6–1.3)
DIFFERENTIAL METHOD BLD: ABNORMAL
EOSINOPHIL # BLD: 0.1 K/UL (ref 0–0.4)
EOSINOPHIL NFR BLD: 1 % (ref 0–5)
ERYTHROCYTE [DISTWIDTH] IN BLOOD BY AUTOMATED COUNT: 14.5 % (ref 11.6–14.5)
EST. AVERAGE GLUCOSE BLD GHB EST-MCNC: 341 MG/DL
GLOBULIN SER CALC-MCNC: 4.3 G/DL (ref 2–4)
GLUCOSE BLD STRIP.AUTO-MCNC: 114 MG/DL (ref 70–110)
GLUCOSE BLD STRIP.AUTO-MCNC: 151 MG/DL (ref 70–110)
GLUCOSE BLD STRIP.AUTO-MCNC: 183 MG/DL (ref 70–110)
GLUCOSE BLD STRIP.AUTO-MCNC: 241 MG/DL (ref 70–110)
GLUCOSE SERPL-MCNC: 193 MG/DL (ref 74–99)
HBA1C MFR BLD: 13.5 % (ref 4.2–5.6)
HCT VFR BLD AUTO: 30 % (ref 35–45)
HGB BLD-MCNC: 9.7 G/DL (ref 12–16)
LYMPHOCYTES # BLD: 2.5 K/UL (ref 0.9–3.6)
LYMPHOCYTES NFR BLD: 22 % (ref 21–52)
MCH RBC QN AUTO: 21.9 PG (ref 24–34)
MCHC RBC AUTO-ENTMCNC: 32.3 G/DL (ref 31–37)
MCV RBC AUTO: 67.7 FL (ref 74–97)
MONOCYTES # BLD: 0.5 K/UL (ref 0.05–1.2)
MONOCYTES NFR BLD: 5 % (ref 3–10)
NEUTS SEG # BLD: 8.1 K/UL (ref 1.8–8)
NEUTS SEG NFR BLD: 72 % (ref 40–73)
PLATELET # BLD AUTO: 231 K/UL (ref 135–420)
PMV BLD AUTO: 10 FL (ref 9.2–11.8)
POTASSIUM SERPL-SCNC: 3.4 MMOL/L (ref 3.5–5.5)
PROT SERPL-MCNC: 6.6 G/DL (ref 6.4–8.2)
RBC # BLD AUTO: 4.43 M/UL (ref 4.2–5.3)
SODIUM SERPL-SCNC: 139 MMOL/L (ref 136–145)
WBC # BLD AUTO: 11.1 K/UL (ref 4.6–13.2)

## 2018-12-31 PROCEDURE — 36415 COLL VENOUS BLD VENIPUNCTURE: CPT

## 2018-12-31 PROCEDURE — 65270000029 HC RM PRIVATE

## 2018-12-31 PROCEDURE — 82962 GLUCOSE BLOOD TEST: CPT

## 2018-12-31 PROCEDURE — 74011636637 HC RX REV CODE- 636/637: Performed by: INTERNAL MEDICINE

## 2018-12-31 PROCEDURE — 80053 COMPREHEN METABOLIC PANEL: CPT

## 2018-12-31 PROCEDURE — 74011636637 HC RX REV CODE- 636/637: Performed by: HOSPITALIST

## 2018-12-31 PROCEDURE — 74011000250 HC RX REV CODE- 250: Performed by: INTERNAL MEDICINE

## 2018-12-31 PROCEDURE — 85025 COMPLETE CBC W/AUTO DIFF WBC: CPT

## 2018-12-31 PROCEDURE — 74011250636 HC RX REV CODE- 250/636: Performed by: INTERNAL MEDICINE

## 2018-12-31 PROCEDURE — 74011250637 HC RX REV CODE- 250/637: Performed by: INTERNAL MEDICINE

## 2018-12-31 PROCEDURE — 74011250637 HC RX REV CODE- 250/637: Performed by: HOSPITALIST

## 2018-12-31 PROCEDURE — 83036 HEMOGLOBIN GLYCOSYLATED A1C: CPT

## 2018-12-31 RX ORDER — INSULIN LISPRO 100 [IU]/ML
4 INJECTION, SOLUTION INTRAVENOUS; SUBCUTANEOUS
Status: DISCONTINUED | OUTPATIENT
Start: 2018-12-31 | End: 2019-01-02 | Stop reason: HOSPADM

## 2018-12-31 RX ORDER — POTASSIUM CHLORIDE 20 MEQ/1
20 TABLET, EXTENDED RELEASE ORAL
Status: COMPLETED | OUTPATIENT
Start: 2018-12-31 | End: 2018-12-31

## 2018-12-31 RX ORDER — ACETAMINOPHEN 325 MG/1
650 TABLET ORAL
Status: DISCONTINUED | OUTPATIENT
Start: 2018-12-31 | End: 2019-01-02 | Stop reason: HOSPADM

## 2018-12-31 RX ORDER — VANCOMYCIN HYDROCHLORIDE
1250 EVERY 8 HOURS
Status: DISCONTINUED | OUTPATIENT
Start: 2018-12-31 | End: 2019-01-02 | Stop reason: HOSPADM

## 2018-12-31 RX ORDER — HYDRALAZINE HYDROCHLORIDE 20 MG/ML
10 INJECTION INTRAMUSCULAR; INTRAVENOUS
Status: DISCONTINUED | OUTPATIENT
Start: 2018-12-31 | End: 2019-01-01

## 2018-12-31 RX ADMIN — VANCOMYCIN HYDROCHLORIDE 1250 MG: 10 INJECTION, POWDER, LYOPHILIZED, FOR SOLUTION INTRAVENOUS at 11:46

## 2018-12-31 RX ADMIN — HEPARIN SODIUM 5000 UNITS: 5000 INJECTION INTRAVENOUS; SUBCUTANEOUS at 11:49

## 2018-12-31 RX ADMIN — LISINOPRIL 20 MG: 20 TABLET ORAL at 08:41

## 2018-12-31 RX ADMIN — AMLODIPINE BESYLATE 10 MG: 10 TABLET ORAL at 08:41

## 2018-12-31 RX ADMIN — POTASSIUM CHLORIDE 20 MEQ: 20 TABLET, EXTENDED RELEASE ORAL at 11:47

## 2018-12-31 RX ADMIN — VANCOMYCIN HYDROCHLORIDE 1000 MG: 10 INJECTION, POWDER, LYOPHILIZED, FOR SOLUTION INTRAVENOUS at 03:47

## 2018-12-31 RX ADMIN — INSULIN LISPRO 4 UNITS: 100 INJECTION, SOLUTION INTRAVENOUS; SUBCUTANEOUS at 17:46

## 2018-12-31 RX ADMIN — VANCOMYCIN HYDROCHLORIDE 1250 MG: 10 INJECTION, POWDER, LYOPHILIZED, FOR SOLUTION INTRAVENOUS at 20:23

## 2018-12-31 RX ADMIN — INSULIN LISPRO 3 UNITS: 100 INJECTION, SOLUTION INTRAVENOUS; SUBCUTANEOUS at 08:41

## 2018-12-31 RX ADMIN — HEPARIN SODIUM 5000 UNITS: 5000 INJECTION INTRAVENOUS; SUBCUTANEOUS at 20:24

## 2018-12-31 RX ADMIN — INSULIN LISPRO 4 UNITS: 100 INJECTION, SOLUTION INTRAVENOUS; SUBCUTANEOUS at 11:48

## 2018-12-31 RX ADMIN — CEFTRIAXONE SODIUM 2 G: 2 INJECTION, POWDER, FOR SOLUTION INTRAMUSCULAR; INTRAVENOUS at 17:46

## 2018-12-31 RX ADMIN — INSULIN LISPRO 6 UNITS: 100 INJECTION, SOLUTION INTRAVENOUS; SUBCUTANEOUS at 11:48

## 2018-12-31 RX ADMIN — ACETAMINOPHEN 650 MG: 325 TABLET ORAL at 20:23

## 2018-12-31 RX ADMIN — INSULIN GLARGINE 20 UNITS: 100 INJECTION, SOLUTION SUBCUTANEOUS at 21:53

## 2018-12-31 RX ADMIN — HEPARIN SODIUM 5000 UNITS: 5000 INJECTION INTRAVENOUS; SUBCUTANEOUS at 03:47

## 2018-12-31 NOTE — PROGRESS NOTES
conducted an initial consultation and Spiritual Assessment for Brissa Farooq, who is a 44 y.o.,female. Patients Primary Language is: [place]. According to the patients EMR Hinduism Affiliation is: [facility].     The reason the Patient came to the hospital is:   Patient Active Problem List    Diagnosis Date Noted    Diabetic foot infection ([address] 12/30/2018    Right foot infection 12/30/2018    Acute pain of right foot 12/30/2018    Acute bronchitis 04/07/2017    Hyperglycemia 04/07/2017        The  provided the following Interventions:  Initiated a relationship of care and support. Patient said she is having some difficulty being in the hospital on [address]. Her children and  are coming to visit today. Explored issues of george, spirituality and/or Methodist needs while hospitalized. Listened empathically. She is a [place] and her george is important to her daily life. Provided chaplaincy education. Provided information about Spiritual Care Services. Offered prayer on patient's behalf. Chart reviewed. The following outcomes were achieved:  Patient shared some information about their medical narrative and spiritual journey/beliefs. Patient processed feeling about current hospitalization. Patient expressed gratitude for the 's visit. Assessment:  Patient did not indicate any spiritual or Methodist issues which require Spiritual Care Services interventions at this time. Patient does not have any Methodist/cultural needs that will affect patients preferences in health care. Plan:  Chaplains will continue to follow and will provide pastoral care on an as needed or requested basis.  recommends patient page  on duty if patient shows signs of acute spiritual or emotional distress. Maycol Coronel MDiv.   Board Certified [employer] [phone]

## 2018-12-31 NOTE — PROGRESS NOTES
[facility] Hospitalist Group  Progress Note    Patient: Alisha Hoover Age: 44 y.o. : 1975 MR#: [id number] SSN: [id number]  Date/Time: 2018     Subjective: pt feels ok, ambulating in room. Pt not taking any insulin for some time boz of no insurance      Assessment/Plan:     1. Diabetic foot infection  2. SIRS due to Diabetic foot infection   3. DM type 2 with hyperglycemia   4. Acute pain of right foot   5. HTN     Plan:  blood culture pending    Right foor Xray noted   POdiatry called by ED, pending eval   Continue vancomycin and Rocephin, add Levaquin, Penicillin allergic  May need ID eval based on podiatry in put    Cont lantus and SSI, adjust accordingly  D/w pt in detail        I spent 40 minutes with the patient in face-to-face consultation, of which greater than 50% was spent in counseling and coordination of care as described above. Case discussed with:  [x]Patient  []Family  [x]Nursing  []Case Management  DVT Prophylaxis:  []Lovenox  [x]Hep SQ  []SCDs  []Coumadin   []On Heparin gtt    Objective:   VS:   Visit Vitals  /79   Pulse 89   Temp 98.3 °F (36.8 °C)   Resp 19   Wt 82.2 kg (181 lb 3.2 oz)   LMP 2018   SpO2 100%   BMI 28.38 kg/m²      Tmax/24hrs: Temp (24hrs), Av °F (36.7 °C), Min:97.3 °F (36.3 °C), Max:98.4 °F (36.9 °C)  IOBRIEF    Intake/Output Summary (Last 24 hours) at 2018 1104  Last data filed at 2018 1048  Gross per 24 hour   Intake 1360 ml   Output 250 ml   Net 1110 ml       General:  Alert, cooperative, no acute distress    Pulmonary:  CTA Bilaterally. No Wheezing/Rhonchi/Rales. Cardiovascular: Regular rate and Rhythm. GI:  Soft, Non distended, Non tender. + Bowel sounds. Extremities: L foot swelling with warmth and redness on dorsum, 2 nd toe, swelling, edematous with foul smelling discharge, No edema. No calf tenderness. Psych: Good insight. Not anxious or agitated. Neurologic: Alert and oriented X 3.  No acute neuro deficits. Additional:    Medications:   Current Facility-Administered Medications   Medication Dose Route Frequency    vancomycin (VANCOCIN) 1250 mg in  ml infusion  1,250 mg IntraVENous Q8H    [START ON 1/1/2019] Vancomycin trough level  1 Each Other Rx Dosing/Monitoring    insulin lispro (HUMALOG) injection 4 Units  4 Units SubCUTAneous TIDAC    lisinopril (PRINIVIL, ZESTRIL) tablet 20 mg  20 mg Oral DAILY    amLODIPine (NORVASC) tablet 10 mg  10 mg Oral DAILY    0.9% sodium chloride infusion  75 mL/hr IntraVENous CONTINUOUS    oxyCODONE-acetaminophen (PERCOCET 7.5) 7.5-325 mg per tablet 1 Tab  1 Tab Oral Q4H PRN    heparin (porcine) injection 5,000 Units  5,000 Units SubCUTAneous Q8H    bisacodyl (DULCOLAX) tablet 10 mg  10 mg Oral DAILY PRN    cefTRIAXone (ROCEPHIN) 2 g in sterile water (preservative free) 20 mL IV syringe  2 g IntraVENous Q24H    insulin lispro (HUMALOG) injection   SubCUTAneous TIDAC    glucose chewable tablet 16 g  16 g Oral PRN    glucagon (GLUCAGEN) injection 1 mg  1 mg IntraMUSCular PRN    dextrose (D50W) injection syrg 12.5-25 g  25-50 mL IntraVENous PRN    insulin glargine (LANTUS) injection 20 Units  20 Units SubCUTAneous QHS       Labs:    Recent Results (from the past 24 hour(s))   GLUCOSE, POC    Collection Time: 12/30/18  3:20 PM   Result Value Ref Range    Glucose (POC) 425 (HH) 70 - 110 mg/dL   CBC WITH AUTOMATED DIFF    Collection Time: 12/30/18  4:26 PM   Result Value Ref Range    WBC 10.5 4.6 - 13.2 K/uL    RBC 5.12 4.20 - 5.30 M/uL    HGB 11.3 (L) 12.0 - 16.0 g/dL    HCT 34.5 (L) 35.0 - 45.0 %    MCV 67.4 (L) 74.0 - 97.0 FL    MCH 22.1 (L) 24.0 - 34.0 PG    MCHC 32.8 31.0 - 37.0 g/dL    RDW 14.4 11.6 - 14.5 %    PLATELET 696 692 - 931 K/uL    MPV 9.8 9.2 - 11.8 FL    NEUTROPHILS 73 40 - 73 %    LYMPHOCYTES 19 (L) 21 - 52 %    MONOCYTES 7 3 - 10 %    EOSINOPHILS 1 0 - 5 %    BASOPHILS 0 0 - 2 %    ABS. NEUTROPHILS 7.8 1.8 - 8.0 K/UL    ABS. LYMPHOCYTES 2.0 0.9 - 3.6 K/UL    ABS. MONOCYTES 0.7 0.05 - 1.2 K/UL    ABS. EOSINOPHILS 0.1 0.0 - 0.4 K/UL    ABS. BASOPHILS 0.0 0.0 - 0.1 K/UL    DF AUTOMATED     METABOLIC PANEL, BASIC    Collection Time: 12/30/18  4:26 PM   Result Value Ref Range    Sodium 135 (L) 136 - 145 mmol/L    Potassium 3.6 3.5 - 5.5 mmol/L    Chloride 105 100 - 108 mmol/L    CO2 25 21 - 32 mmol/L    Anion gap 5 3.0 - 18 mmol/L    Glucose 380 (H) 74 - 99 mg/dL    BUN 7 7.0 - 18 MG/DL    Creatinine 0.85 0.6 - 1.3 MG/DL    BUN/Creatinine ratio 8 (L) 12 - 20      GFR est AA >60 >60 ml/min/1.73m2    GFR est non-AA >60 >60 ml/min/1.73m2    Calcium 8.5 8.5 - 10.1 MG/DL   CULTURE, BLOOD    Collection Time: 12/30/18  5:48 PM   Result Value Ref Range    Special Requests: NO SPECIAL REQUESTS      Culture result: NO GROWTH AFTER 12 HOURS     CULTURE, BLOOD    Collection Time: 12/30/18  6:00 PM   Result Value Ref Range    Special Requests: NO SPECIAL REQUESTS      Culture result: NO GROWTH AFTER 12 HOURS     GLUCOSE, POC    Collection Time: 12/30/18  8:02 PM   Result Value Ref Range    Glucose (POC) 276 (H) 70 - 353 mg/dL   METABOLIC PANEL, COMPREHENSIVE    Collection Time: 12/31/18  5:15 AM   Result Value Ref Range    Sodium 139 136 - 145 mmol/L    Potassium 3.4 (L) 3.5 - 5.5 mmol/L    Chloride 111 (H) 100 - 108 mmol/L    CO2 20 (L) 21 - 32 mmol/L    Anion gap 8 3.0 - 18 mmol/L    Glucose 193 (H) 74 - 99 mg/dL    BUN 6 (L) 7.0 - 18 MG/DL    Creatinine 0.57 (L) 0.6 - 1.3 MG/DL    BUN/Creatinine ratio 11 (L) 12 - 20      GFR est AA >60 >60 ml/min/1.73m2    GFR est non-AA >60 >60 ml/min/1.73m2    Calcium 7.8 (L) 8.5 - 10.1 MG/DL    Bilirubin, total 0.3 0.2 - 1.0 MG/DL    ALT (SGPT) 18 13 - 56 U/L    AST (SGOT) 12 (L) 15 - 37 U/L    Alk.  phosphatase 188 (H) 45 - 117 U/L    Protein, total 6.6 6.4 - 8.2 g/dL    Albumin 2.3 (L) 3.4 - 5.0 g/dL    Globulin 4.3 (H) 2.0 - 4.0 g/dL    A-G Ratio 0.5 (L) 0.8 - 1.7     HEMOGLOBIN A1C WITH EAG    Collection Time: 12/31/18  5:15 AM   Result Value Ref Range    Hemoglobin A1c 13.5 (H) 4.2 - 5.6 %    Est. average glucose 341 mg/dL   CBC WITH AUTOMATED DIFF    Collection Time: 12/31/18  5:15 AM   Result Value Ref Range    WBC 11.1 4.6 - 13.2 K/uL    RBC 4.43 4.20 - 5.30 M/uL    HGB 9.7 (L) 12.0 - 16.0 g/dL    HCT 30.0 (L) 35.0 - 45.0 %    MCV 67.7 (L) 74.0 - 97.0 FL    MCH 21.9 (L) 24.0 - 34.0 PG    MCHC 32.3 31.0 - 37.0 g/dL    RDW 14.5 11.6 - 14.5 %    PLATELET 726 755 - 147 K/uL    MPV 10.0 9.2 - 11.8 FL    NEUTROPHILS 72 40 - 73 %    LYMPHOCYTES 22 21 - 52 %    MONOCYTES 5 3 - 10 %    EOSINOPHILS 1 0 - 5 %    BASOPHILS 0 0 - 2 %    ABS. NEUTROPHILS 8.1 (H) 1.8 - 8.0 K/UL    ABS. LYMPHOCYTES 2.5 0.9 - 3.6 K/UL    ABS. MONOCYTES 0.5 0.05 - 1.2 K/UL    ABS. EOSINOPHILS 0.1 0.0 - 0.4 K/UL    ABS.  BASOPHILS 0.0 0.0 - 0.1 K/UL    DF AUTOMATED     GLUCOSE, POC    Collection Time: 12/31/18  7:55 AM   Result Value Ref Range    Glucose (POC) 183 (H) 70 - 110 mg/dL       Signed By: Young Lopez MD     December 31, 2018

## 2018-12-31 NOTE — PROGRESS NOTES
Problem: Falls - Risk of  Goal: *Absence of Falls  Document Davon Fall Risk and appropriate interventions in the flowsheet.   Outcome: Progressing Towards Goal  Fall Risk Interventions:

## 2018-12-31 NOTE — CDMP QUERY
The medical record reflects the following: 
 
Risk:   diabetes;    diabetic foot infection; severe pain Clinical Indicators:  K+ on admit  (  12/30 )    135; 
was 137 on 12/27 Treatment:   K jose daniel - con    SR  20 meq po   x 1 dose Please clarify if this patient is being treated/managed for: 
 
=>   hypokalemia in setting of  K+ level of 3.4  with K+   replacement ordered 
=>Other Explanation of clinical findings =>Unable to Determine (no explanation of clinical findings) K+ level on 12/31-    139 Please clarify and document your clinical opinion in the progress notes and discharge summary including the definitive and/or presumptive diagnosis, (suspected or probable), related to the above clinical findings. Please include clinical findings supporting your diagnosis. If you DECLINE this query or would like to communicate with [facility], please utilize the \"[employer] message box\" at the TOP of the Progress Note on the right. Thank you,   Chi Lucero RN   CCDS  x [phone]

## 2018-12-31 NOTE — ROUTINE PROCESS
TRANSFER - IN REPORT:    Verbal report received from [place]) on Karolina Yan  being received from ED(unit) for routine progression of care      Report consisted of patients Situation, Background, Assessment and   Recommendations(SBAR). Information from the following report(s) SBAR, Kardex and ED Summary was reviewed with the receiving nurse. Opportunity for questions and clarification was provided. Assessment completed upon patients arrival to unit and care assumed. 7:39 AM  Bedside shift change report given to [address] Plastio (oncoming nurse) by  Natalia ybarra. Report included the following information SBAR and Kardex.

## 2018-12-31 NOTE — ED NOTES
Received bedside report on patient,pt resting comfortably on stretcher, VSS, pt awaiting bed assignment, pt states her foot has been red and swollen for \"a few days\" pt still able to ambulate.   Otherwise physical exam is normal

## 2018-12-31 NOTE — CONSULTS
Consult    Patient: Artemio Acuña MRN: [id number]  SSN: [id number]    YOB: 1975  Age: 44 y.o. Sex: female      Subjective:      Artemio Acuña is a 44 y.o. female who is being seen for asked to evaluate and treat infection left second toe and forefoot. Javier Sake no signs lytic bone, swelling forefoot and second toe. Past Medical History:   Diagnosis Date    Diabetes ([address]     HTN (hypertension)      Past Surgical History:   Procedure Laterality Date    HX GYN            No family history on file.   Social History     Tobacco Use    Smoking status: Never Smoker    Smokeless tobacco: Never Used   Substance Use Topics    Alcohol use: No      Current Facility-Administered Medications   Medication Dose Route Frequency Provider Last Rate Last Dose    vancomycin (VANCOCIN) 1250 mg in  ml infusion  1,250 mg IntraVENous Q8H Anne Carson .7 mL/hr at 18 1146 1,250 mg at 18 1146    [START ON 2019] Vancomycin trough level  1 Each Other Rx Dosing/Monitoring Anne Carson MD        insulin lispro (HUMALOG) injection 4 Units  4 Units SubCUTAneous TIDAC Jose Zuniga MD   4 Units at 18 1148    hydrALAZINE (APRESOLINE) 20 mg/mL injection 10 mg  10 mg IntraVENous Q6H PRN Rhona Bennett MD        lisinopril (PRINIVIL, ZESTRIL) tablet 20 mg  20 mg Oral DAILY Anne Carson MD   20 mg at 18 0841    amLODIPine (NORVASC) tablet 10 mg  10 mg Oral DAILY Anne Carson MD   10 mg at 18 4961    0.9% sodium chloride infusion  75 mL/hr IntraVENous CONTINUOUS Anne Carson MD 75 mL/hr at 18 1955 75 mL/hr at 18 195    oxyCODONE-acetaminophen (PERCOCET 7.5) 7.5-325 mg per tablet 1 Tab  1 Tab Oral Q4H PRN Anne Carson MD        heparin (porcine) injection 5,000 Units  5,000 Units SubCUTAneous Q8H Anne Carson MD   5,000 Units at 18 1149    bisacodyl (DULCOLAX) tablet 10 mg  10 mg Oral DAILY PRN Favio Nicole MD        cefTRIAXone (ROCEPHIN) 2 g in sterile water (preservative free) 20 mL IV syringe  2 g IntraVENous Q24H Favio Nicole MD   2 g at 12/30/18 1950    insulin lispro (HUMALOG) injection   SubCUTAneous TIDAC Favio Nicole MD   6 Units at 12/31/18 1148    glucose chewable tablet 16 g  16 g Oral PRN Favio Nicole MD        glucagon (GLUCAGEN) injection 1 mg  1 mg IntraMUSCular PRN Favio Nicole MD        dextrose (D50W) injection syrg 12.5-25 g  25-50 mL IntraVENous PRN Favio Nicole MD        insulin glargine (LANTUS) injection 20 Units  20 Units SubCUTAneous QHS Favio Nicole MD   20 Units at 12/30/18 1953        Allergies   Allergen Reactions    Pcn [Penicillins] Hives       Review of Systems:  A comprehensive review of systems was negative except for that written in the History of Present Illness. Objective:     Vitals:    12/31/18 0418 12/31/18 0744 12/31/18 0844 12/31/18 1114   BP: (!) 147/96 (!) 172/108 149/79 (!) 140/91   Pulse: 89 90 89 99   Resp: 18 22 19 19   Temp: 98.2 °F (36.8 °C) 97.8 °F (36.6 °C) 98.3 °F (36.8 °C) 97.6 °F (36.4 °C)   SpO2: 100% 100% 100% 100%   Weight: 82.2 kg (181 lb 3.2 oz)           Physical Exam:  Seen at bedside awake and alert. Left foot warm, swelling of foot. Difficult to palpate pedal pulses. Second toe swollen, ulcer tip of toe, clear drainage. No necrosis. Penetrates to bone. X-ray no lytic process.   Assessment:     Hospital Problems  Date Reviewed: 12/31/2018          Codes Class Noted POA    Diabetic foot infection ([facility] ICD-10-CM: E11.628, L08.9  ICD-9-CM: 250.80, 686.9  12/30/2018 Unknown        Right foot infection ICD-10-CM: L08.9  ICD-9-CM: 686.9  12/30/2018 Unknown        Acute pain of right foot ICD-10-CM: M79.671  ICD-9-CM: 729.5  12/30/2018 Unknown        Hyperglycemia ICD-10-CM: R73.9  ICD-9-CM: 790.29  4/7/2017 Unknown              Plan:     Advise wound care and antibiotics to treat partial limb threatening   Infection. MRI to evaluate for osteitis. Risk of toe amputation if poor response to treatment.   Re access in AM.    Signed By: Souleymane Garcia DPM     December 31, 2018

## 2018-12-31 NOTE — DIABETES MGMT
Diabetes Patient/Family Education Record  Factors That  May Influence Patients Ability  to Learn or  Comply with Recommendations   []   Language barrier    []   Cultural needs   []   Motivation    []   Cognitive limitation    []   Physical   []   Education    []   Physiological factors   []   Hearing/vision/speaking impairment   []   Zoroastrianism beliefs    []   Financial factors   []  Other:   [x]  No factors identified at this time. Person Instructed:   [x]   Patient   []   Family   []  Other     Preference for Learning:   [x]   Verbal   [x]   Written   []  Demonstration     Level of Comprehension & Competence:    [x]  Good                                      [] Fair                                     []  Poor                             []  Needs Reinforcement   [x]  Teachback completed    Education Component:   [x]  Medication management, including how to administer insulin (if appropriate) and potential medication interactions Pt only taking Metformin. Reports she used to take insulin in the past and is willing to start taking insulin again to get her diabetes under better control. Reports she will have health insurance starting tomorrow. [x]  Nutritional management -obtain usual meal pattern pt reports she has not been following any particular diet at home but wants to start eating healthier. Reviewed healthy eating for diabetes.     []  Exercise   []  Signs, symptoms, and treatment of hyperglycemia and hypoglycemia   [x] Prevention, recognition and treatment of hyperglycemia and hypoglycemia   [x]  Importance of blood glucose monitoring and how to obtain a blood glucose meter    []  Instruction on use of the blood glucose meter   [x]  Discuss the importance of HbA1C monitoring    []  Sick day guidelines   []  Proper use and disposal of lancets, needles, syringes or insulin pens (if appropriate)   []  Potential long-term complications (retinopathy, kidney disease, neuropathy, foot care)   [x] Information about whom to contact in case of emergency or for more information    [x]  Goal:  Patient/family will demonstrate understanding of Diabetes Self Management Skills by: 01/07/19  Plan for post-discharge education or self-management support:    [x] Outpatient class schedule provided            [] Patient Declined    [] Scheduled for outpatient classes (date) _______  Verify:  Does patient understand how diabetes medications work? Yes  Does patient know what their most recent A1c is? Yes  Does patient have a glucometer, testing supplies or difficulty obtaining diabetes medications? Does not currently have a glucometer, reports she will have insurance starting tomorrow.  Will provide [employer] Radha, [address] [place] [address], CDE  pgr [phone]

## 2018-12-31 NOTE — ED NOTES
TRANSFER - OUT REPORT:    Verbal report given to Amos Pace RN (name) on Teresa Velasco  being transferred to (unit) for routine progression of care       Report consisted of patients Situation, Background, Assessment and   Recommendations(SBAR). Information from the following report(s) SBAR, ED Summary, Intake/Output and MAR was reviewed with the receiving nurse. Lines:   Peripheral IV 12/30/18 Left Antecubital (Active)   Site Assessment Clean, dry, & intact 12/30/2018  4:26 PM   Phlebitis Assessment 0 12/30/2018  4:26 PM   Infiltration Assessment 0 12/30/2018  4:26 PM   Dressing Status Clean, dry, & intact 12/30/2018  4:26 PM   Dressing Type Tape;Transparent 12/30/2018  4:26 PM   Hub Color/Line Status Pink;Flushed;Patent 12/30/2018  4:26 PM   Action Taken Blood drawn 12/30/2018  4:26 PM        Opportunity for questions and clarification was provided.       Patient transported with:   "[employer]"

## 2018-12-31 NOTE — PROGRESS NOTES
Reason for Admission:  Diabetic foot infection ([facility]  Right foot infection  Hyperglycemia  Acute pain of right foot                 RRAT Score:  7           Plan for utilizing home health:    No home health orders in place at this time. This writer will continue to closely monitor for potential home health needs. Likelihood of Readmission:   LOW                         Transition of Care Plan:   Patient plans to return home with help from her family. Family will transport home at the time of discharge. Initial assessment completed with patient. Face sheet information confirmed:  YES. The patient requests we communicate with her , in reference to medical care. This patient lives in a house with her . Patient is able to navigate steps as needed. Prior to hospitalization, patient was considered to be independent : YES. If not independent,  patient needs assist with :  N/A. Cognitive status of patient: Alert and oriented. Patient has a current ACP document on file: No    The patient's family will be available to transport patient home upon discharge. The patient has no DME available in the home. Patient is not currently active with home health. If active, agency name is N/A. Patient has never stayed in a skilled nursing facility or rehab. Currently, the discharge plan is for patient to return home with help from her family. Patient's family will assist with care, as needed, post discharge. Patient's family will transport home at the time of discharge. Patient's  is ( [address], U#[phone]). Patient's mother is Nelda Oar, ID#[phone]. Patient has no PCP. She would like to be set up with a PCP, prior to her discharging from [facility]. Patient is listed as self pay; however, she reports that her Medicaid will become active 1/1/2019 (tomorrow).  This writer will continue to closely monitor for discharge planning to ensure a safe discharge home from [place]. Care Management Interventions  PCP Verified by CM: No(Patient wants to be set up with a PCP.)  Palliative Care Criteria Met (RRAT>21 & CHF Dx)?: No  Mode of Transport at Discharge:  Other (see comment)(Family will transport home at the time of discharge.)  Transition of Care Consult (CM Consult): Discharge Planning  Current Support Network: Lives with Spouse, Family Lives Nearby  Confirm Follow Up Transport: Family  Plan discussed with Pt/Family/Caregiver: Yes  Discharge Location  Discharge Placement: Home with family assistance        Pramod Shaw [facility] Manager  [phone]

## 2018-12-31 NOTE — DIABETES MGMT
NUTRITIONAL ASSESSMENT GLYCEMIC CONTROL/ PLAN OF CARE     Brissa Farooq           44 y.o.           12/30/2018                 1. Diabetic foot infection ([address]    2. Right foot infection    3. Acute foot pain, right    4. Hyperglycemia       INTERVENTIONS/PLAN:   Diabetes education   Continue inpatient monitoring and intervention     ASSESSMENT:   Pt is a 44year old female with a past medical history significant for hypertension, diabetes, and peripheral neuropathy. Blood glucose elevated upon admission, now improving with addition of Lantus insulin. Pt does not have insurance but reports she will have insurance starting tomorrow. Pt willing to take insulin after discharge and has taken insulin in the past. Pt seems motivated to better manage her diabetes. Reviewed healthy eating for diabetes. Will provide patient with a [employer] Glucometer. Encouraged pt to attend free outpatient diabetes education classes.      Diabetes Management:   Recent blood glucose:     12/30/2018 15:20 12/30/2018 20:02 12/31/2018 07:55   425 (HH) 276 (H) 183 (H)   Within target range (non-ICU: <140; ICU<180): [] Yes   [x]  No    Current Insulin regimen:   Lantus insulin 20 units every bedtime  Correctional Lispro insulin TID before meals (very resistant scale)  Home medication/insulin regimen: Metformin   HbA1c: 13.5% (estimated average glucose of 341 mg/dL)  Adequate glycemic control PTA:  [] Yes  [x] No     SUBJECTIVE/OBJECTIVE:   Information obtained from: patient, chart review     Diet: Diabetic consistent carbohydrate    Patient Vitals for the past 100 hrs:   % Diet Eaten   12/31/18 0841 75 %     Medications: [x] Reviewed     Most Recent POC Glucose:   Recent Labs     12/31/18  0515 12/30/18  1626   * 380*      Labs:   Lab Results   Component Value Date/Time    Hemoglobin A1c 13.5 (H) 12/31/2018 05:15 AM     Lab Results   Component Value Date/Time    Sodium 139 12/31/2018 05:15 AM    Potassium 3.4 (L) 12/31/2018 05:15 AM    Chloride 111 (H) 12/31/2018 05:15 AM    CO2 20 (L) 12/31/2018 05:15 AM    Anion gap 8 12/31/2018 05:15 AM    Glucose 193 (H) 12/31/2018 05:15 AM    BUN 6 (L) 12/31/2018 05:15 AM    Creatinine 0.57 (L) 12/31/2018 05:15 AM    Calcium 7.8 (L) 12/31/2018 05:15 AM    Magnesium 1.8 04/07/2017 06:00 PM    Albumin 2.3 (L) 12/31/2018 05:15 AM     Anthropometrics: Wt Readings from Last 1 Encounters:   12/31/18 82.2 kg (181 lb 3.2 oz)      Ht Readings from Last 1 Encounters:   12/27/18 5' 7\" (1.702 m)     Estimated Nutrition Needs:   3049-0892 Kcal/day, 66-82 grams protein/day    Based on:   [] Actual BW    [] IBW   []  Adjusted BW      Nutrition Diagnoses:    Altered nutrition related lab value related to diabetes as evidenced by Hemoglobin A1c of 13.5%  Nutrition Interventions: diabetes education, assessment of home management   Goal: Blood glucose will be within target range of  mg/dL by 1/02/19      Nutrition Monitoring and Evaluation    []     Monitor po intake on meal rounds  [x]     Continue inpatient monitoring and intervention  []     Other:    Ofelia Templeton RD, CDE  pgr [phone]

## 2019-01-01 ENCOUNTER — APPOINTMENT (OUTPATIENT)
Dept: MRI IMAGING | Age: 44
DRG: 639 | End: 2019-01-01
Attending: INTERNAL MEDICINE
Payer: SELF-PAY

## 2019-01-01 LAB
ANION GAP SERPL CALC-SCNC: 8 MMOL/L (ref 3–18)
BACTERIA SPEC CULT: ABNORMAL
BASOPHILS # BLD: 0 K/UL (ref 0–0.1)
BASOPHILS NFR BLD: 0 % (ref 0–2)
BUN SERPL-MCNC: 7 MG/DL (ref 7–18)
BUN/CREAT SERPL: 11 (ref 12–20)
CALCIUM SERPL-MCNC: 8.7 MG/DL (ref 8.5–10.1)
CHLORIDE SERPL-SCNC: 110 MMOL/L (ref 100–108)
CO2 SERPL-SCNC: 21 MMOL/L (ref 21–32)
CREAT SERPL-MCNC: 0.62 MG/DL (ref 0.6–1.3)
DATE LAST DOSE: NORMAL
DIFFERENTIAL METHOD BLD: ABNORMAL
EOSINOPHIL # BLD: 0.1 K/UL (ref 0–0.4)
EOSINOPHIL NFR BLD: 1 % (ref 0–5)
ERYTHROCYTE [DISTWIDTH] IN BLOOD BY AUTOMATED COUNT: 14.8 % (ref 11.6–14.5)
GLUCOSE BLD STRIP.AUTO-MCNC: 164 MG/DL (ref 70–110)
GLUCOSE BLD STRIP.AUTO-MCNC: 178 MG/DL (ref 70–110)
GLUCOSE BLD STRIP.AUTO-MCNC: 274 MG/DL (ref 70–110)
GLUCOSE BLD STRIP.AUTO-MCNC: 95 MG/DL (ref 70–110)
GLUCOSE SERPL-MCNC: 200 MG/DL (ref 74–99)
HCT VFR BLD AUTO: 31 % (ref 35–45)
HGB BLD-MCNC: 9.9 G/DL (ref 12–16)
LYMPHOCYTES # BLD: 2.8 K/UL (ref 0.9–3.6)
LYMPHOCYTES NFR BLD: 27 % (ref 21–52)
MCH RBC QN AUTO: 21.6 PG (ref 24–34)
MCHC RBC AUTO-ENTMCNC: 31.9 G/DL (ref 31–37)
MCV RBC AUTO: 67.5 FL (ref 74–97)
MONOCYTES # BLD: 0.6 K/UL (ref 0.05–1.2)
MONOCYTES NFR BLD: 6 % (ref 3–10)
NEUTS SEG # BLD: 6.8 K/UL (ref 1.8–8)
NEUTS SEG NFR BLD: 66 % (ref 40–73)
PLATELET # BLD AUTO: 275 K/UL (ref 135–420)
PLATELET COMMENTS,PCOM: ABNORMAL
PMV BLD AUTO: 10.3 FL (ref 9.2–11.8)
POTASSIUM SERPL-SCNC: 3.5 MMOL/L (ref 3.5–5.5)
RBC # BLD AUTO: 4.59 M/UL (ref 4.2–5.3)
RBC MORPH BLD: ABNORMAL
REPORTED DOSE,DOSE: NORMAL UNITS
REPORTED DOSE/TIME,TMG: 300
SERVICE CMNT-IMP: ABNORMAL
SODIUM SERPL-SCNC: 139 MMOL/L (ref 136–145)
VANCOMYCIN TROUGH SERPL-MCNC: 15.2 UG/ML (ref 10–20)
WBC # BLD AUTO: 10.3 K/UL (ref 4.6–13.2)

## 2019-01-01 PROCEDURE — 36415 COLL VENOUS BLD VENIPUNCTURE: CPT

## 2019-01-01 PROCEDURE — 80202 ASSAY OF VANCOMYCIN: CPT

## 2019-01-01 PROCEDURE — 74011636637 HC RX REV CODE- 636/637: Performed by: INTERNAL MEDICINE

## 2019-01-01 PROCEDURE — 74011250637 HC RX REV CODE- 250/637: Performed by: INTERNAL MEDICINE

## 2019-01-01 PROCEDURE — 74011250636 HC RX REV CODE- 250/636: Performed by: INTERNAL MEDICINE

## 2019-01-01 PROCEDURE — 73718 MRI LOWER EXTREMITY W/O DYE: CPT

## 2019-01-01 PROCEDURE — 80048 BASIC METABOLIC PNL TOTAL CA: CPT

## 2019-01-01 PROCEDURE — 65270000029 HC RM PRIVATE

## 2019-01-01 PROCEDURE — 74011250637 HC RX REV CODE- 250/637: Performed by: HOSPITALIST

## 2019-01-01 PROCEDURE — 82962 GLUCOSE BLOOD TEST: CPT

## 2019-01-01 PROCEDURE — 85025 COMPLETE CBC W/AUTO DIFF WBC: CPT

## 2019-01-01 PROCEDURE — 74011636637 HC RX REV CODE- 636/637: Performed by: HOSPITALIST

## 2019-01-01 RX ORDER — CLONIDINE HYDROCHLORIDE 0.1 MG/1
0.1 TABLET ORAL
Status: DISCONTINUED | OUTPATIENT
Start: 2019-01-01 | End: 2019-01-02

## 2019-01-01 RX ORDER — INSULIN GLARGINE 100 [IU]/ML
25 INJECTION, SOLUTION SUBCUTANEOUS
Status: DISCONTINUED | OUTPATIENT
Start: 2019-01-01 | End: 2019-01-02 | Stop reason: HOSPADM

## 2019-01-01 RX ADMIN — INSULIN LISPRO 3 UNITS: 100 INJECTION, SOLUTION INTRAVENOUS; SUBCUTANEOUS at 09:24

## 2019-01-01 RX ADMIN — INSULIN LISPRO 4 UNITS: 100 INJECTION, SOLUTION INTRAVENOUS; SUBCUTANEOUS at 09:23

## 2019-01-01 RX ADMIN — INSULIN LISPRO 4 UNITS: 100 INJECTION, SOLUTION INTRAVENOUS; SUBCUTANEOUS at 12:58

## 2019-01-01 RX ADMIN — HEPARIN SODIUM 5000 UNITS: 5000 INJECTION INTRAVENOUS; SUBCUTANEOUS at 03:30

## 2019-01-01 RX ADMIN — AMLODIPINE BESYLATE 10 MG: 10 TABLET ORAL at 09:26

## 2019-01-01 RX ADMIN — VANCOMYCIN HYDROCHLORIDE 1250 MG: 10 INJECTION, POWDER, LYOPHILIZED, FOR SOLUTION INTRAVENOUS at 20:17

## 2019-01-01 RX ADMIN — VANCOMYCIN HYDROCHLORIDE 1250 MG: 10 INJECTION, POWDER, LYOPHILIZED, FOR SOLUTION INTRAVENOUS at 03:30

## 2019-01-01 RX ADMIN — INSULIN LISPRO 4 UNITS: 100 INJECTION, SOLUTION INTRAVENOUS; SUBCUTANEOUS at 17:43

## 2019-01-01 RX ADMIN — HEPARIN SODIUM 5000 UNITS: 5000 INJECTION INTRAVENOUS; SUBCUTANEOUS at 20:16

## 2019-01-01 RX ADMIN — ACETAMINOPHEN 650 MG: 325 TABLET ORAL at 04:19

## 2019-01-01 RX ADMIN — VANCOMYCIN HYDROCHLORIDE 1250 MG: 10 INJECTION, POWDER, LYOPHILIZED, FOR SOLUTION INTRAVENOUS at 12:54

## 2019-01-01 RX ADMIN — INSULIN GLARGINE 25 UNITS: 100 INJECTION, SOLUTION SUBCUTANEOUS at 21:51

## 2019-01-01 RX ADMIN — LISINOPRIL 20 MG: 20 TABLET ORAL at 09:26

## 2019-01-01 RX ADMIN — INSULIN LISPRO 3 UNITS: 100 INJECTION, SOLUTION INTRAVENOUS; SUBCUTANEOUS at 17:43

## 2019-01-01 RX ADMIN — CLONIDINE HYDROCHLORIDE 0.1 MG: 0.1 TABLET ORAL at 17:44

## 2019-01-01 RX ADMIN — HEPARIN SODIUM 5000 UNITS: 5000 INJECTION INTRAVENOUS; SUBCUTANEOUS at 12:58

## 2019-01-01 RX ADMIN — SODIUM CHLORIDE 75 ML/HR: 900 INJECTION, SOLUTION INTRAVENOUS at 10:03

## 2019-01-01 NOTE — PROGRESS NOTES
Bedside shift change report given to Andrew Richards (oncoming nurse) by Apple De La Cruz (offgoing nurse). Report included the following information SBAR, Kardex and MAR. Pt laying in bed . Family at bedside. Left second toe swollen and discolored. Left foot is edematous. Pt has no c/o pain, not in any distress. Bed in lowest position and call leo in reach. Dr Lauren Jones notified of patients blood pressure. Bedside shift change report given to Dacia (oncoming nurse) by Andrew Richards (offgoing nurse). Report included the following information SBAR, Kardex and MAR.

## 2019-01-01 NOTE — PROGRESS NOTES
[facility]  Progress Note    Patient: Ceferino Escobedo Age: 44 y.o. : 1975 MR#: [id number] SSN: [id number]  Date/Time: 2019     Subjective:      Feeling better. No chest or abdominal pain. Walking in room. No nausea or vomiting. No foot pain currently. Objective:     BP (!) 147/91 (BP 1 Location: Right arm, BP Patient Position: At rest)   Pulse 90   Temp 97.8 °F (36.6 °C)   Resp 19   Wt 84.4 kg (186 lb 1.6 oz)   LMP 2018   SpO2 99%   BMI 29.15 kg/m²     General:  Alert, cooperative, no acute distress    Pulmonary:  CTA Bilaterally. No Wheezing/Rhonchi/Rales. Cardiovascular: Regular rate and Rhythm. GI:  Soft, Non distended, Non tender. + Bowel sounds. Extremities: L foot edema with warmth, 2 nd toe, swelling, edematous without any discharge currently  Neurologic: Alert and oriented X 3. No acute neuro deficits. Assessment:     1. Diabetic foot infection  2. SIRS due to Diabetic foot infection   3. DM type 2 with hyperglycemia   4. Acute pain of right foot   5. HTN  6.  Hypokalemia      Plan:    MRI ordered as per podiatrist recommendations  Increase lantuis  Cont current management  ID consulted    Case discussed with:  [x]Patient  []Family  [x]Nursing  []Case Management  DVT Prophylaxis:  []Lovenox  [x]Hep SQ  []SCDs  []Coumadin   []On Heparin gtt    Signed By: Kerri Rankin MD     2019

## 2019-01-01 NOTE — ROUTINE PROCESS
Bedside and Verbal shift change report given to Javon Borjas RN   (oncoming nurse) by Oracio Brown RN (offgoing nurse). Report included the following information SBAR, Kardex, Intake/Output, MAR and Recent Results.

## 2019-01-02 VITALS
HEART RATE: 100 BPM | BODY MASS INDEX: 29.15 KG/M2 | SYSTOLIC BLOOD PRESSURE: 158 MMHG | WEIGHT: 186.1 LBS | RESPIRATION RATE: 20 BRPM | DIASTOLIC BLOOD PRESSURE: 98 MMHG | TEMPERATURE: 97.9 F | OXYGEN SATURATION: 100 %

## 2019-01-02 LAB
ANION GAP SERPL CALC-SCNC: 7 MMOL/L (ref 3–18)
BASOPHILS # BLD: 0 K/UL (ref 0–0.1)
BASOPHILS NFR BLD: 0 % (ref 0–2)
BUN SERPL-MCNC: 7 MG/DL (ref 7–18)
BUN/CREAT SERPL: 11 (ref 12–20)
CALCIUM SERPL-MCNC: 8.5 MG/DL (ref 8.5–10.1)
CHLORIDE SERPL-SCNC: 109 MMOL/L (ref 100–108)
CO2 SERPL-SCNC: 22 MMOL/L (ref 21–32)
CREAT SERPL-MCNC: 0.65 MG/DL (ref 0.6–1.3)
DIFFERENTIAL METHOD BLD: ABNORMAL
EOSINOPHIL # BLD: 0.1 K/UL (ref 0–0.4)
EOSINOPHIL NFR BLD: 1 % (ref 0–5)
ERYTHROCYTE [DISTWIDTH] IN BLOOD BY AUTOMATED COUNT: 14.7 % (ref 11.6–14.5)
GLUCOSE BLD STRIP.AUTO-MCNC: 169 MG/DL (ref 70–110)
GLUCOSE BLD STRIP.AUTO-MCNC: 226 MG/DL (ref 70–110)
GLUCOSE SERPL-MCNC: 266 MG/DL (ref 74–99)
HCT VFR BLD AUTO: 29.2 % (ref 35–45)
HGB BLD-MCNC: 9.4 G/DL (ref 12–16)
LYMPHOCYTES # BLD: 2.7 K/UL (ref 0.9–3.6)
LYMPHOCYTES NFR BLD: 33 % (ref 21–52)
MAGNESIUM SERPL-MCNC: 2.1 MG/DL (ref 1.6–2.6)
MCH RBC QN AUTO: 21.7 PG (ref 24–34)
MCHC RBC AUTO-ENTMCNC: 32.2 G/DL (ref 31–37)
MCV RBC AUTO: 67.3 FL (ref 74–97)
MONOCYTES # BLD: 0.6 K/UL (ref 0.05–1.2)
MONOCYTES NFR BLD: 7 % (ref 3–10)
NEUTS SEG # BLD: 4.8 K/UL (ref 1.8–8)
NEUTS SEG NFR BLD: 59 % (ref 40–73)
PLATELET # BLD AUTO: 242 K/UL (ref 135–420)
PMV BLD AUTO: 9.6 FL (ref 9.2–11.8)
POTASSIUM SERPL-SCNC: 3.6 MMOL/L (ref 3.5–5.5)
RBC # BLD AUTO: 4.34 M/UL (ref 4.2–5.3)
SODIUM SERPL-SCNC: 138 MMOL/L (ref 136–145)
WBC # BLD AUTO: 8.1 K/UL (ref 4.6–13.2)

## 2019-01-02 PROCEDURE — 83735 ASSAY OF MAGNESIUM: CPT

## 2019-01-02 PROCEDURE — 74011250637 HC RX REV CODE- 250/637: Performed by: INTERNAL MEDICINE

## 2019-01-02 PROCEDURE — 74011636637 HC RX REV CODE- 636/637: Performed by: INTERNAL MEDICINE

## 2019-01-02 PROCEDURE — 80048 BASIC METABOLIC PNL TOTAL CA: CPT

## 2019-01-02 PROCEDURE — 74011250636 HC RX REV CODE- 250/636: Performed by: INTERNAL MEDICINE

## 2019-01-02 PROCEDURE — 85025 COMPLETE CBC W/AUTO DIFF WBC: CPT

## 2019-01-02 PROCEDURE — 36415 COLL VENOUS BLD VENIPUNCTURE: CPT

## 2019-01-02 PROCEDURE — 82962 GLUCOSE BLOOD TEST: CPT

## 2019-01-02 PROCEDURE — 74011636637 HC RX REV CODE- 636/637: Performed by: HOSPITALIST

## 2019-01-02 RX ORDER — MUPIROCIN 20 MG/G
OINTMENT TOPICAL 3 TIMES DAILY
Status: DISCONTINUED | OUTPATIENT
Start: 2019-01-02 | End: 2019-01-02 | Stop reason: HOSPADM

## 2019-01-02 RX ORDER — AMLODIPINE BESYLATE 10 MG/1
10 TABLET ORAL DAILY
Qty: 30 TAB | Refills: 0 | Status: SHIPPED | OUTPATIENT
Start: 2019-01-03 | End: 2019-02-12 | Stop reason: SDUPTHER

## 2019-01-02 RX ORDER — CLONIDINE HYDROCHLORIDE 0.1 MG/1
0.1 TABLET ORAL 2 TIMES DAILY
Qty: 60 TAB | Refills: 0 | Status: SHIPPED | OUTPATIENT
Start: 2019-01-02 | End: 2020-10-23

## 2019-01-02 RX ORDER — CLONIDINE HYDROCHLORIDE 0.1 MG/1
0.1 TABLET ORAL 2 TIMES DAILY
Status: DISCONTINUED | OUTPATIENT
Start: 2019-01-02 | End: 2019-01-02 | Stop reason: HOSPADM

## 2019-01-02 RX ORDER — MUPIROCIN 20 MG/G
OINTMENT TOPICAL 3 TIMES DAILY
Qty: 22 G | Refills: 0 | Status: SHIPPED | OUTPATIENT
Start: 2019-01-02 | End: 2020-10-23

## 2019-01-02 RX ORDER — SYRINGE-NEEDLE,INSULIN,0.5 ML 30 GX5/16"
SYRINGE, EMPTY DISPOSABLE MISCELLANEOUS
Qty: 50 SYRINGE | Refills: 0 | Status: SHIPPED | OUTPATIENT
Start: 2019-01-02

## 2019-01-02 RX ORDER — LISINOPRIL 20 MG/1
20 TABLET ORAL DAILY
Qty: 30 TAB | Refills: 0 | Status: SHIPPED | OUTPATIENT
Start: 2019-01-03 | End: 2019-02-12 | Stop reason: SDUPTHER

## 2019-01-02 RX ORDER — LANOLIN ALCOHOL/MO/W.PET/CERES
325 CREAM (GRAM) TOPICAL
Qty: 15 TAB | Refills: 0 | Status: SHIPPED | OUTPATIENT
Start: 2019-01-02 | End: 2021-04-04

## 2019-01-02 RX ORDER — INSULIN GLARGINE 100 [IU]/ML
INJECTION, SOLUTION SUBCUTANEOUS
Qty: 1 VIAL | Refills: 0 | Status: SHIPPED | OUTPATIENT
Start: 2019-01-02 | End: 2019-01-11 | Stop reason: SDUPTHER

## 2019-01-02 RX ORDER — LINEZOLID 600 MG/1
600 TABLET, FILM COATED ORAL 2 TIMES DAILY
Qty: 24 TAB | Refills: 0 | Status: SHIPPED | OUTPATIENT
Start: 2019-01-02 | End: 2020-10-23

## 2019-01-02 RX ORDER — METFORMIN HYDROCHLORIDE 500 MG/1
500 TABLET ORAL 2 TIMES DAILY WITH MEALS
Qty: 60 TAB | Refills: 0 | Status: SHIPPED | OUTPATIENT
Start: 2019-01-02 | End: 2019-01-28 | Stop reason: ALTCHOICE

## 2019-01-02 RX ADMIN — AMLODIPINE BESYLATE 10 MG: 10 TABLET ORAL at 09:17

## 2019-01-02 RX ADMIN — LISINOPRIL 20 MG: 20 TABLET ORAL at 09:17

## 2019-01-02 RX ADMIN — VANCOMYCIN HYDROCHLORIDE 1250 MG: 10 INJECTION, POWDER, LYOPHILIZED, FOR SOLUTION INTRAVENOUS at 11:02

## 2019-01-02 RX ADMIN — INSULIN LISPRO 4 UNITS: 100 INJECTION, SOLUTION INTRAVENOUS; SUBCUTANEOUS at 09:20

## 2019-01-02 RX ADMIN — CLONIDINE HYDROCHLORIDE 0.1 MG: 0.1 TABLET ORAL at 14:06

## 2019-01-02 RX ADMIN — INSULIN LISPRO 6 UNITS: 100 INJECTION, SOLUTION INTRAVENOUS; SUBCUTANEOUS at 09:21

## 2019-01-02 RX ADMIN — HEPARIN SODIUM 5000 UNITS: 5000 INJECTION INTRAVENOUS; SUBCUTANEOUS at 04:35

## 2019-01-02 RX ADMIN — INSULIN LISPRO 4 UNITS: 100 INJECTION, SOLUTION INTRAVENOUS; SUBCUTANEOUS at 11:36

## 2019-01-02 RX ADMIN — INSULIN LISPRO 2 UNITS: 100 INJECTION, SOLUTION INTRAVENOUS; SUBCUTANEOUS at 11:37

## 2019-01-02 RX ADMIN — MUPIROCIN: 20 OINTMENT TOPICAL at 14:06

## 2019-01-02 RX ADMIN — BISACODYL 10 MG: 5 TABLET, COATED ORAL at 11:16

## 2019-01-02 RX ADMIN — HEPARIN SODIUM 5000 UNITS: 5000 INJECTION INTRAVENOUS; SUBCUTANEOUS at 11:06

## 2019-01-02 RX ADMIN — VANCOMYCIN HYDROCHLORIDE 1250 MG: 10 INJECTION, POWDER, LYOPHILIZED, FOR SOLUTION INTRAVENOUS at 04:35

## 2019-01-02 NOTE — CONSULTS
Infectious Disease Consultation Note    Requested by: dr. Abbie Sheehan     Reason: left second toe infection     Current abx Prior abx   Vancomycin since 12/30 Ceftriaxone 12/30     Lines:       Assessment :    44 y.o.  female  With PMH of HTN, uncontrolled type 2 DM, peripheral Neuropathy came tot he ER on 12/30/18 complaining of left foot pain and swelling as well as wound over the left second toe. Clinical presentation c/w left second toe, left foot cellulitis in a patient with uncontrolled type 2 DM. Exact microbial etiology of infection is not known. Rapid onset suggestive of gram positive infection such as streptococcus, staphylococcus. Looking at the h/o uncontrolled DM, close proximity of infection to bone; high risk of future complications; will recommend aggressive abx treatment. Recommendations:    1. D/c vancomycin. Start po linezolid for 12 more days  2. F/u with podiatry as outpatient  3. Needs good glycemic control - counseled patient regarding this. Thank you for consultation request. Above plan was discussed in details with patient,dr. Leah Dia and dr Adela Rubin. Please call me if any further questions or concerns. Will continue to participate in the care of this patient. HPI:    44 y.o.  female  With PMH of HTN, uncontrolled type 2 DM, peripheral Neuropathy came tot he ER on 12/30/18 complaining of left foot pain and swelling as well as wound over the left second toe. As per the patient, she was fine up until 12/25 when she noted small ulcer left second toe. In the next 2 days, she had increased swelling/pain left second toe with swelling of left foot. She was here in the ER 12/27/18 for this complaint. She was given oral clindamycin and was discharged home. Despite taking the antibiotics the wound got worse and pain was intensifying - hence came to ed on 12/30. Patient also stated she feels she has fever and chills at home.  In the ed, she had x ray left foot which didn't reveal osteomyelitis. She also had MRI left foot  which didn't reveal osteomyelitis/abscess. I have been consulted for further recommendations. Patient denies headaches, visual disturbances, sore throat, runny nose, earaches, cp, sob, chills, cough, abdominal pain, diarrhea, burning micturition, or weakness in extremities. No known h/o MRSA colonization or infection in the past. No prior foot infection/surgery. Past Medical History:   Diagnosis Date    Diabetes ([address]     HTN (hypertension)        Past Surgical History:   Procedure Laterality Date    HX GYN                Medication List      ASK your doctor about these medications    amLODIPine 10 mg tablet  Commonly known as:  NORVASC  Take 1 Tab by mouth daily. butalbital-acetaminophen-caff -40 mg per capsule  Commonly known as:  FIORICET  Take 1 Cap by mouth every four (4) hours as needed for Pain. clindamycin 300 mg capsule  Commonly known as:  CLEOCIN  Take 1 Cap by mouth four (4) times daily for 10 days. lisinopril 20 mg tablet  Commonly known as:  PRINIVIL, ZESTRIL  Take 1 Tab by mouth daily. metFORMIN 500 mg tablet  Commonly known as:  GLUCOPHAGE  Take 1 Tab by mouth two (2) times daily (with meals).             Current Facility-Administered Medications   Medication Dose Route Frequency    insulin glargine (LANTUS) injection 25 Units  25 Units SubCUTAneous QHS    cloNIDine HCl (CATAPRES) tablet 0.1 mg  0.1 mg Oral Q8H PRN    vancomycin (VANCOCIN) 1250 mg in  ml infusion  1,250 mg IntraVENous Q8H    insulin lispro (HUMALOG) injection 4 Units  4 Units SubCUTAneous TIDAC    acetaminophen (TYLENOL) tablet 650 mg  650 mg Oral Q6H PRN    lisinopril (PRINIVIL, ZESTRIL) tablet 20 mg  20 mg Oral DAILY    amLODIPine (NORVASC) tablet 10 mg  10 mg Oral DAILY    oxyCODONE-acetaminophen (PERCOCET 7.5) 7.5-325 mg per tablet 1 Tab  1 Tab Oral Q4H PRN    heparin (porcine) injection 5,000 Units  5,000 Units SubCUTAneous Q8H    bisacodyl (DULCOLAX) tablet 10 mg  10 mg Oral DAILY PRN    insulin lispro (HUMALOG) injection   SubCUTAneous TIDAC    glucose chewable tablet 16 g  16 g Oral PRN    glucagon (GLUCAGEN) injection 1 mg  1 mg IntraMUSCular PRN    dextrose (D50W) injection syrg 12.5-25 g  25-50 mL IntraVENous PRN       Allergies: Pcn [penicillins]    No family history on file. Social History     Socioeconomic History    Marital status:      Spouse name: Not on file    Number of children: Not on file    Years of education: Not on file    Highest education level: Not on file   Social Needs    Financial resource strain: Not on file    Food insecurity - worry: Not on file    Food insecurity - inability: Not on file    Transportation needs - medical: Not on file   [employer] needs - non-medical: Not on file   Occupational History    Not on file   Tobacco Use    Smoking status: Never Smoker    Smokeless tobacco: Never Used   Substance and Sexual Activity    Alcohol use: No    Drug use: No    Sexual activity: Yes     Partners: Male   Other Topics Concern    Not on file   Social History Narrative    Not on file     Social History     Tobacco Use   Smoking Status Never Smoker   Smokeless Tobacco Never Used        Temp (24hrs), Av.3 °F (36.8 °C), Min:97.8 °F (36.6 °C), Max:98.8 °F (37.1 °C)    Visit Vitals  BP (!) 158/98 (BP 1 Location: Left arm, BP Patient Position: At rest)   Pulse 100   Temp 97.9 °F (36.6 °C)   Resp 20   Wt 84.4 kg (186 lb 1.6 oz)   LMP 2018   SpO2 100%   BMI 29.15 kg/m²       ROS: 12 point ROS obtained in details. Pertinent positives as mentioned in HPI,   otherwise negative    Physical Exam:    Constitutional: She is oriented to person, place, and time. She appears well-developed and well-nourished. No distress. HENT:   Head: Normocephalic and atraumatic.    Mouth/Throat: Oropharynx is clear and moist.   Eyes: Conjunctivae are normal. Pupils are equal, round, and reactive to light. No scleral icterus. Neck: Normal range of motion. Neck supple. Cardiovascular: Normal rate and intact distal pulses. Pulmonary/Chest: Effort normal and breath sounds normal. No respiratory distress. She has no wheezes. Abdominal: Soft. Bowel sounds are normal. She exhibits no distension. There is no tenderness. Musculoskeletal: Normal range of motion. Lymphadenopathy:     She has no cervical adenopathy. Neurological: She is alert and oriented to person, place, and time. No cranial nerve deficit. Decreased sensation of 2nd toe of left foot, likely due to neuropathy. Skin: Skin is warm. She is not diaphoretic. Superficial Ulcer left second toe plantar aspect with bloody drainage.  Darkish discoloration in 2nd toe of left foot     Nursing note and vitals reviewed.           Labs: Results:   Chemistry Recent Labs     01/02/19  0542 01/01/19  0159 12/31/18  0515   * 200* 193*    139 139   K 3.6 3.5 3.4*   * 110* 111*   CO2 22 21 20*   BUN 7 7 6*   CREA 0.65 0.62 0.57*   CA 8.5 8.7 7.8*   AGAP 7 8 8   BUCR 11* 11* 11*   AP  --   --  188*   TP  --   --  6.6   ALB  --   --  2.3*   GLOB  --   --  4.3*   AGRAT  --   --  0.5*      CBC w/Diff Recent Labs     01/02/19  0542 01/01/19  0159 12/31/18  0515   WBC 8.1 10.3 11.1   RBC 4.34 4.59 4.43   HGB 9.4* 9.9* 9.7*   HCT 29.2* 31.0* 30.0*    275 231   GRANS 59 66 72   LYMPH 33 27 22   EOS 1 1 1      Microbiology Recent Labs     12/30/18  1800 12/30/18  1748   CULT NO GROWTH 3 DAYS NO GROWTH 3 DAYS          RADIOLOGY:    All available imaging studies/reports in [facility] for this admission were reviewed    Dr. Pallavi Caicedo, Infectious Disease Specialist  [phone]  January 2, 2019  12:09 PM

## 2019-01-02 NOTE — DISCHARGE INSTRUCTIONS
DISCHARGE SUMMARY from Nurse    PATIENT INSTRUCTIONS:    After general anesthesia or intravenous sedation, for 24 hours or while taking prescription Narcotics:  · Limit your activities  · Do not drive and operate hazardous machinery  · Do not make important personal or business decisions  · Do  not drink alcoholic beverages  · If you have not urinated within 8 hours after discharge, please contact your surgeon on call. Report the following to your surgeon:  · Excessive pain, swelling, redness or odor of or around the surgical area  · Temperature over 100.5  · Nausea and vomiting lasting longer than 4 hours or if unable to take medications  · Any signs of decreased circulation or nerve impairment to extremity: change in color, persistent  numbness, tingling, coldness or increase pain  · Any questions    What to do at Home:  Recommended activity: Activity as tolerated, please use fall precautions at all time. If you experience any of the following symptoms chest pain, difficulty breathing, or any abnormal symptom. Please, inform your primary care provider or [facility] 911, please follow up with all scheduled appointments as ordered. *  Please give a list of your current medications to your Primary Care Provider. *  Please update this list whenever your medications are discontinued, doses are      changed, or new medications (including over-the-counter products) are added. *  Please carry medication information at all times in case of emergency situations. These are general instructions for a healthy lifestyle:    No smoking/ No tobacco products/ Avoid exposure to second hand smoke  Surgeon General's Warning:  Quitting smoking now greatly reduces serious risk to your health.     Obesity, smoking, and sedentary lifestyle greatly increases your risk for illness    A healthy diet, regular physical exercise & weight monitoring are important for maintaining a healthy lifestyle    You may be retaining fluid if you have a history of heart failure or if you experience any of the following symptoms:  Weight gain of 3 pounds or more overnight or 5 pounds in a week, increased swelling in our hands or feet or shortness of breath while lying flat in bed. Please call your doctor as soon as you notice any of these symptoms; do not wait until your next office visit. Recognize signs and symptoms of STROKE:    F-face looks uneven    A-arms unable to move or move unevenly    S-speech slurred or non-existent    T-time-call 911 as soon as signs and symptoms begin-DO NOT go       Back to bed or wait to see if you get better-TIME IS BRAIN. Warning Signs of HEART ATTACK     Call 911 if you have these symptoms:   Chest discomfort. Most heart attacks involve discomfort in the center of the chest that lasts more than a few minutes, or that goes away and comes back. It can feel like uncomfortable pressure, squeezing, fullness, or pain.  Discomfort in other areas of the upper body. Symptoms can include pain or discomfort in one or both arms, the back, neck, jaw, or stomach.  Shortness of breath with or without chest discomfort.  Other signs may include breaking out in a cold sweat, nausea, or lightheadedness. Don't wait more than five minutes to call 911 - MINUTES MATTER! Fast action can save your life. Calling 911 is almost always the fastest way to get lifesaving treatment. Emergency Medical Services staff can begin treatment when they arrive -- up to an hour sooner than if someone gets to the hospital by car. The discharge information has been reviewed with the patient. The patient verbalized understanding. Discharge medications reviewed with the patient and appropriate educational materials and side effects teaching were provided. [employer] Activation    Thank you for requesting access to [employer]. Please follow the instructions below to securely access and download your online medical record.  [employer] allows you to send messages to your doctor, view your test results, renew your prescriptions, schedule appointments, and more. How Do I Sign Up? 1. In your internet browser, go to [email or web address]. 2. Click on the First Time User? Click Here link in the Sign In box. You will see the New Member Sign Up page. 3. Enter your Virtualmin Access Code exactly as it appears below. You will not need to use this code after youve completed the sign-up process. If you do not sign up before the expiration date, you must request a new code. Virtualmin Access Code: [email or web address]  Expires: 2019 12:11 AM (This is the date your Virtualmin access code will )    4. Enter the last four digits of your Social Security Number (xxxx) and Date of Birth (mm/dd/yyyy) as indicated and click Submit. You will be taken to the next sign-up page. 5. Create a Virtualmin ID. This will be your Virtualmin login ID and cannot be changed, so think of one that is secure and easy to remember. 6. Create a Virtualmin password. You can change your password at any time. 7. Enter your Password Reset Question and Answer. This can be used at a later time if you forget your password. 8. Enter your e-mail address. You will receive e-mail notification when new information is available in [address] 9. Click Sign Up. You can now view and download portions of your medical record. 10. Click the Download Summary menu link to download a portable copy of your medical information. Additional Information    If you have questions, please visit the Frequently Asked Questions section of the Virtualmin website at [email or web address] Remember, Virtualmin is NOT to be used for urgent needs. For medical emergencies, dial 911.     Patient armband removed and shredded  ___________________________________________________________________________________________________________________________________

## 2019-01-02 NOTE — PROGRESS NOTES
1/2/2019      RE: Enoch Osgood      To Whom it May Concern: This is to certify that Enoch Osgood may may return to work on 1/8/19. Please excuse her off work from 12/30/18. Please feel free to contact my office if you have any questions or concerns. Thank you for your assistance in this matter.     Sincerely,      Young Doan MD

## 2019-01-02 NOTE — DISCHARGE SUMMARY
[address] Rashel Fuentes  MR#: [id number]  : 1975  ACCOUNT #: [de-identified]   ADMIT DATE: 2018  DISCHARGE DATE: 2019    DISPOSITION:  Discharged to home. DISCHARGE CONDITION:  Stable. DISCHARGE DIAGNOSES:  1. Diabetic foot infection, especially left second toe infection. 2.  Uncontrolled diabetes mellitus with A1c of 13.5. 3.  Hypertension. 4.  Questionable medical noncompliance. 5.  Right foot pain, now resolved. 6.  Hypokalemia. 7.  Podiatric ulcers due to diabetic foot infection, resolved. DISCHARGE MEDICATIONS:  1. Zyvox 600 mg twice a day for 12 more days. 2.  Lantus 30 units subcu at bedtime. 3.  Metformin 500 mg twice a day. 4.  Clonidine 0.1 mg twice a day. 5.  Norvasc 10 mg daily. 6.  Lisinopril 20 mg daily. 7.  Bactroban to the affected areas, especially left second toe, 3 times a day. 8.  Ferrous sulfate 325 mg daily. IMAGING AND PROCEDURES:  1.  X-ray of the left foot was done, showed soft tissue swelling and plantar aspect of the forefoot with small ulceration. 2.  MRI of the left foot was done, no evidence of osteomyelitis, second toe soft tissue edema/cellulitis present without evidence of abscess. 3.  Blood cultures x2 remain negative. 4.  Consult on pruritus with Dr. Naomi Daily, who will follow this patient as an outpatient. Just recommended Bactroban 3 times a day. No need for any surgical intervention at this point. 5.  ID with Dr. Kenia Hammonds. Recommended Zyvox as the patient had a wound culture positive for strep and staphylococcus, which was done as an outpatient. HOSPITAL COURSE:  The patient was admitted to the hospital with the complaint of left foot pain and swelling. Please refer to hospital admission H and P for further detail. The patient was admitted here with a diabetic left foot infection and started on IV antibiotic. Blood culture remains negative.   The patient was seen by Dr. Sravan Asher and he recommended to have MRI done. MRI was done, did not reveal any osteomyelitis or abscess. Dr. Sravan Asher did not recommend any surgical intervention and just recommended local wound care and p.o. antibiotics with outpatient followup. Dr. Chuy Silver saw this patient and recommended Zyvox for 2 weeks. The patient also had uncontrolled diabetes mellitus, started on Lantus and will be continued on metformin. She will be also continue Norvasc, lisinopril and added on clonidine for better hypertension control. The patient was doing much better. She did not have any more foot pain. She was tolerating medications very well. She will be discharged home with outpatient followup as mentioned above. DISCHARGE INSTRUCTIONS:   1. Diet:  ADA, cardiac diet. 2.  Activity:  As tolerated. 3.  Follow with the primary care physician in 5 days. 4.  Follow up with Dr. Sravan Asher in 1 week. Total time greater than 35 minutes.         MD OJ Hernandez/NICOLA  D: 01/02/2019 13:15     T: 01/02/2019 14:51  JOB #: [id number]  CC: Ponce Robles DPM  CC: Bonita Holland MD

## 2019-01-02 NOTE — PROGRESS NOTES
Problem: Falls - Risk of  Goal: *Absence of Falls  Document Davon Fall Risk and appropriate interventions in the flowsheet.   Outcome: Progressing Towards Goal  Fall Risk Interventions:            Medication Interventions: Bed/chair exit alarm, Evaluate medications/consider consulting pharmacy, Patient to call before getting OOB, Teach patient to arise slowly

## 2019-01-02 NOTE — DISCHARGE SUMMARY
PATIENT DISCHARGE INSTRUCTIONS      PATIENT DISCHARGE INSTRUCTIONS    Karen Gomes / [id number] : 1975    Admitted 2018 Discharged: 2019     Dictated # [id number]    · It is important that you take the medication exactly as they are prescribed. · Keep your medication in the bottles provided by the pharmacist and keep a list of the medication names, dosages, and times to be taken in your wallet. · Do not take other medications without consulting your doctor. What to do at Home    Recommended Diet: Cardiac Diet and Diabetic Diet    Recommended Activity: Activity as tolerated    Current Discharge Medication List      START taking these medications    Details   linezolid (ZYVOX) 600 mg tablet Take 1 Tab by mouth two (2) times a day. Qty: 24 Tab, Refills: 0      insulin glargine (LANTUS) 100 unit/mL injection 30 units subcutaneously every night  Qty: 1 Vial, Refills: 0      cloNIDine HCl (CATAPRES) 0.1 mg tablet Take 1 Tab by mouth two (2) times a day. Qty: 60 Tab, Refills: 0      Insulin Syringe-Needle U-100 (INSULIN SYRINGE) 1 mL 30 gauge x 5/16 syrg As directed for lantus insulin  Qty: 50 Syringe, Refills: 0      ferrous sulfate 325 mg (65 mg iron) tablet Take 1 Tab by mouth Daily (before breakfast). Qty: 15 Tab, Refills: 0      mupirocin (BACTROBAN) 2 % ointment Apply  to affected area three (3) times daily. APPLY TO left second toe, affected site  Qty: 22 g, Refills: 0         CONTINUE these medications which have CHANGED    Details   metFORMIN (GLUCOPHAGE) 500 mg tablet Take 1 Tab by mouth two (2) times daily (with meals). Qty: 60 Tab, Refills: 0      amLODIPine (NORVASC) 10 mg tablet Take 1 Tab by mouth daily. Qty: 30 Tab, Refills: 0      lisinopril (PRINIVIL, ZESTRIL) 20 mg tablet Take 1 Tab by mouth daily.   Qty: 30 Tab, Refills: 0         STOP taking these medications       clindamycin (CLEOCIN) 300 mg capsule Comments:   Reason for Stopping:         butalbital-acetaminophen-caff (FIORICET) -40 mg per capsule Comments:   Reason for Stopping:                 Signed By: Velma Nevarez MD     January 2, 2019

## 2019-01-02 NOTE — PROGRESS NOTES
Seen at bedside awake and alert. Improved, less swelling and inflammation. No pus. MRI negative. Cleared for discharge. Follow as outpatient.

## 2019-01-02 NOTE — PROGRESS NOTES
[facility] Hospitalist Group  Progress Note    Patient: Cristal Yusuf Age: 44 y.o. : 1975 MR#: [id number] SSN: [id number]  Date/Time: 2019     Subjective:      Feeling better. No chest or abdominal pain. No nausea or vomiting. No foot pain currently. Objective:     BP (!) 158/98 (BP 1 Location: Left arm, BP Patient Position: At rest)   Pulse 100   Temp 97.9 °F (36.6 °C)   Resp 20   Wt 84.4 kg (186 lb 1.6 oz)   LMP 2018   SpO2 100%   BMI 29.15 kg/m²     General:  Alert, cooperative, no acute distress    Pulmonary:  CTA Bilaterally. No Wheezes  Cardiovascular: Regular rate and Rhythm. GI:  Soft, Non distended, Non tender. + Bowel sounds. Extremities: L foot edema with warmth, 2 nd toe - edematous without any discharge currently  Neurologic: Alert and oriented X 3. No acute neuro deficits. Assessment:     1. Diabetic foot infection  2. SIRS due to Diabetic foot infection   3. DM type 2 with hyperglycemia   4. Acute pain of right foot   5. HTN  6. Hypokalemia      Plan:    Cont current management  Discharge patient home today  D/w dr. Miesha Moreau - no surgery, bactroban TID and will follow her in office in a week  MRI is negative for OM and abscess  D/W dr. Augustin Ochoa for 12 more days     Case discussed with:  [x]Patient  []Family  [x]Nursing  [x]Case Management  DVT Prophylaxis:  []Lovenox  [x]Hep SQ  []SCDs  []Coumadin   []On Heparin gtt    Current Discharge Medication List      START taking these medications    Details   linezolid (ZYVOX) 600 mg tablet Take 1 Tab by mouth two (2) times a day. Qty: 24 Tab, Refills: 0      insulin glargine (LANTUS) 100 unit/mL injection 30 units subcutaneously every night  Qty: 1 Vial, Refills: 0      cloNIDine HCl (CATAPRES) 0.1 mg tablet Take 1 Tab by mouth two (2) times a day.   Qty: 60 Tab, Refills: 0      Insulin Syringe-Needle U-100 (INSULIN SYRINGE) 1 mL 30 gauge x 5/16 syrg As directed for lantus insulin  Qty: 50 Syringe, Refills: 0      ferrous sulfate 325 mg (65 mg iron) tablet Take 1 Tab by mouth Daily (before breakfast). Qty: 15 Tab, Refills: 0      mupirocin (BACTROBAN) 2 % ointment Apply  to affected area three (3) times daily. APPLY TO left second toe, affected site  Qty: 22 g, Refills: 0         CONTINUE these medications which have CHANGED    Details   metFORMIN (GLUCOPHAGE) 500 mg tablet Take 1 Tab by mouth two (2) times daily (with meals). Qty: 60 Tab, Refills: 0      amLODIPine (NORVASC) 10 mg tablet Take 1 Tab by mouth daily. Qty: 30 Tab, Refills: 0      lisinopril (PRINIVIL, ZESTRIL) 20 mg tablet Take 1 Tab by mouth daily.   Qty: 30 Tab, Refills: 0         STOP taking these medications       clindamycin (CLEOCIN) 300 mg capsule Comments:   Reason for Stopping:         butalbital-acetaminophen-caff (FIORICET) -40 mg per capsule Comments:   Reason for Stopping:             BMP:   Lab Results   Component Value Date/Time     01/02/2019 05:42 AM    K 3.6 01/02/2019 05:42 AM     (H) 01/02/2019 05:42 AM    CO2 22 01/02/2019 05:42 AM    AGAP 7 01/02/2019 05:42 AM     (H) 01/02/2019 05:42 AM    BUN 7 01/02/2019 05:42 AM    CREA 0.65 01/02/2019 05:42 AM    GFRAA >60 01/02/2019 05:42 AM    GFRNA >60 01/02/2019 05:42 AM     CBC:   Lab Results   Component Value Date/Time    WBC 8.1 01/02/2019 05:42 AM    HGB 9.4 (L) 01/02/2019 05:42 AM    HCT 29.2 (L) 01/02/2019 05:42 AM     01/02/2019 05:42 AM         Signed By: Lydia Majano MD     January 2, 2019

## 2019-01-02 NOTE — ROUTINE PROCESS
1929 Assumed care of patient from off going nurse. Patient resting in bed. No distress noted. Call bell within reach, siderails up x 3, bed in lowest position, and patient instructed to use call bell for assistance. Will continue to monitor. 1982 Bedside and Verbal shift change report given to Maribell Monzon RN (oncoming nurse) by Tobias Beltre RN (offgoing nurse). Report included the following information Kardex, Intake/Output, MAR and Recent Results.

## 2019-01-02 NOTE — ROUTINE PROCESS
[id number] Pt received after bedside shift report from [facility], RN. Pt resting with eyes closed. Easily arouse,  no apparent distress noted. Pt on room air. Bed locked and in low position. Call bell in reach. 1555 Pt left unit via wheelchair in no apparent distress. All belongings including medications with pt. Accompanied by family.

## 2019-01-05 LAB
BACTERIA SPEC CULT: NORMAL
BACTERIA SPEC CULT: NORMAL
SERVICE CMNT-IMP: NORMAL
SERVICE CMNT-IMP: NORMAL

## 2019-01-11 ENCOUNTER — OFFICE VISIT (OUTPATIENT)
Dept: FAMILY MEDICINE CLINIC | Facility: CLINIC | Age: 44
End: 2019-01-11

## 2019-01-11 VITALS
OXYGEN SATURATION: 99 % | RESPIRATION RATE: 16 BRPM | BODY MASS INDEX: 30.61 KG/M2 | SYSTOLIC BLOOD PRESSURE: 152 MMHG | WEIGHT: 195 LBS | HEART RATE: 99 BPM | TEMPERATURE: 96.6 F | DIASTOLIC BLOOD PRESSURE: 92 MMHG | HEIGHT: 67 IN

## 2019-01-11 DIAGNOSIS — E11.628 DIABETIC FOOT INFECTION (HCC): ICD-10-CM

## 2019-01-11 DIAGNOSIS — I10 ESSENTIAL HYPERTENSION: ICD-10-CM

## 2019-01-11 DIAGNOSIS — E11.8 TYPE 2 DIABETES MELLITUS WITH COMPLICATION, UNSPECIFIED WHETHER LONG TERM INSULIN USE: Primary | ICD-10-CM

## 2019-01-11 DIAGNOSIS — L08.9 DIABETIC FOOT INFECTION (HCC): ICD-10-CM

## 2019-01-11 PROBLEM — E11.9 DIABETES (HCC): Status: ACTIVE | Noted: 2019-01-11

## 2019-01-11 RX ORDER — INSULIN GLARGINE 100 [IU]/ML
INJECTION, SOLUTION SUBCUTANEOUS
Qty: 1 VIAL | Refills: 0 | Status: SHIPPED | OUTPATIENT
Start: 2019-01-11 | End: 2019-02-06 | Stop reason: SDUPTHER

## 2019-01-11 NOTE — PATIENT INSTRUCTIONS
High Blood Pressure: Care Instructions  Your Care Instructions    If your blood pressure is usually above 130/80, you have high blood pressure, or hypertension. That means the top number is 130 or higher or the bottom number is 80 or higher, or both. Despite what a lot of people think, high blood pressure usually doesn't cause headaches or make you feel dizzy or lightheaded. It usually has no symptoms. But it does increase your risk for heart attack, stroke, and kidney or eye damage. The higher your blood pressure, the more your risk increases. Your doctor will give you a goal for your blood pressure. Your goal will be based on your health and your age. Lifestyle changes, such as eating healthy and being active, are always important to help lower blood pressure. You might also take medicine to reach your blood pressure goal.  Follow-up care is a key part of your treatment and safety. Be sure to make and go to all appointments, and call your doctor if you are having problems. It's also a good idea to know your test results and keep a list of the medicines you take. How can you care for yourself at home? Medical treatment  · If you stop taking your medicine, your blood pressure will go back up. You may take one or more types of medicine to lower your blood pressure. Be safe with medicines. Take your medicine exactly as prescribed. Call your doctor if you think you are having a problem with your medicine. · Talk to your doctor before you start taking aspirin every day. Aspirin can help certain people lower their risk of a heart attack or stroke. But taking aspirin isn't right for everyone, because it can cause serious bleeding. · See your doctor regularly. You may need to see the doctor more often at first or until your blood pressure comes down. · If you are taking blood pressure medicine, talk to your doctor before you take decongestants or anti-inflammatory medicine, such as ibuprofen.  Some of these medicines can raise blood pressure. · Learn how to check your blood pressure at home. Lifestyle changes  · Stay at a healthy weight. This is especially important if you put on weight around the waist. Losing even 10 pounds can help you lower your blood pressure. · If your doctor recommends it, get more exercise. Walking is a good choice. Bit by bit, increase the amount you walk every day. Try for at least 30 minutes on most days of the week. You also may want to swim, bike, or do other activities. · Avoid or limit alcohol. Talk to your doctor about whether you can drink any alcohol. · Try to limit how much sodium you eat to less than 2,300 milligrams (mg) a day. Your doctor may ask you to try to eat less than 1,500 mg a day. · Eat plenty of fruits (such as bananas and oranges), vegetables, legumes, whole grains, and low-fat dairy products. · Lower the amount of saturated fat in your diet. Saturated fat is found in animal products such as milk, cheese, and meat. Limiting these foods may help you lose weight and also lower your risk for heart disease. · Do not smoke. Smoking increases your risk for heart attack and stroke. If you need help quitting, talk to your doctor about stop-smoking programs and medicines. These can increase your chances of quitting for good. When should you call for help? Call 911 anytime you think you may need emergency care. This may mean having symptoms that suggest that your blood pressure is causing a serious heart or blood vessel problem. Your blood pressure may be over 180/120.   For example, call 911 if:    · You have symptoms of a heart attack. These may include:  ? Chest pain or pressure, or a strange feeling in the chest.  ? Sweating. ? Shortness of breath. ? Nausea or vomiting. ? Pain, pressure, or a strange feeling in the back, neck, jaw, or upper belly or in one or both shoulders or arms. ? Lightheadedness or sudden weakness.   ? A fast or irregular heartbeat.     · You have symptoms of a stroke. These may include:  ? Sudden numbness, tingling, weakness, or loss of movement in your face, arm, or leg, especially on only one side of your body. ? Sudden vision changes. ? Sudden trouble speaking. ? Sudden confusion or trouble understanding simple statements. ? Sudden problems with walking or balance. ? A sudden, severe headache that is different from past headaches.     · You have severe back or belly pain.    Do not wait until your blood pressure comes down on its own. Get help right away.   Call your doctor now or seek immediate care if:    · Your blood pressure is much higher than normal (such as 180/120 or higher), but you don't have symptoms.     · You think high blood pressure is causing symptoms, such as:  ? Severe headache.  ? Blurry vision.    Watch closely for changes in your health, and be sure to contact your doctor if:    · Your blood pressure measures higher than your doctor recommends at least 2 times. That means the top number is higher or the bottom number is higher, or both.     · You think you may be having side effects from your blood pressure medicine. Where can you learn more? Go to http://taye-triny.info/. Enter S139 in the search box to learn more about \"High Blood Pressure: Care Instructions. \"  Current as of: December 6, 2017  Content Version: 11.8  © 1532-1033 [employer]. Care instructions adapted under license by [employer] (which disclaims liability or warranty for this information). If you have questions about a medical condition or this instruction, always ask your healthcare professional. [place] [address] any warranty or liability for your use of this information.

## 2019-01-11 NOTE — PROGRESS NOTES
Dayne Dent is a 44 y.o. female here to establish care      Dayne Dent is a 44 y.o. female (: 1975) presenting to address:    Chief Complaint   Patient presents with   [facility]     here to establish care       Vitals:    19 1019   Pulse: 99   Resp: 16   Temp: 96.6 °F (35.9 °C)   TempSrc: Oral   SpO2: 99%   Weight: 195 lb (88.5 kg)   Height: 5' 7\" (1.702 m)   PainSc:   0 - No pain       Hearing/Vision:   No exam data present    Learning Assessment:     Learning Assessment 2019   PRIMARY LEARNER Patient   HIGHEST LEVEL OF EDUCATION - PRIMARY LEARNER  2 YEARS OF COLLEGE   BARRIERS PRIMARY LEARNER NONE   CO-LEARNER CAREGIVER No   PRIMARY LANGUAGE ENGLISH   LEARNER PREFERENCE PRIMARY DEMONSTRATION   ANSWERED BY patient   RELATIONSHIP SELF     Depression Screening:     PHQ over the last two weeks 2019   Little interest or pleasure in doing things Not at all   Feeling down, depressed, irritable, or hopeless Not at all   Total Score PHQ 2 0     Fall Risk Assessment:   No flowsheet data found. Abuse Screening:   No flowsheet data found. Coordination of Care Questionaire:   1. Have you been to the ER, urgent care clinic since your last visit? Hospitalized since your last visit? YES [facility]    2. Have you seen or consulted any other health care providers outside of the [address] [place] [address] since your last visit? Include any pap smears or colon screening. NO    Advanced Directive:   1. Do you have an Advanced Directive? NO    2. Would you like information on Advanced Directives?  NO\

## 2019-01-11 NOTE — PROGRESS NOTES
HISTORY OF PRESENT ILLNESS  Karen Gomes is a 44 y.o. female. 01/11/19  10:37 AM  This is a 44 y.o. female Patient presents with:  here to establish care. pt declined flu shot    HPI Comments: The patient states She was discharged from the hospital where she had hyperglycemia. The patient was out of Insulin  At home the glucose was 145 . She take 30 units daily of  Lantus as well as Metformin. She has no problem with the medication. She saw the podiatrist for the left second toe ulcer. He said it is healing well, according to the patient    The blood pressure has been elevated. She wants to wait to change the medications until stable on her medications          Review of Systems   Constitutional:        The patient denies any fever, chills, night sweats or weight loss  There has been no diaphoresis malaise or weakness   Eyes: Negative for discharge and redness. There is no history of blurred vision, double vision or blindness. The patient does not admit to any foreign body sensation or eye pain. There are no halos around lights  The patient has noted no jaundice   Respiratory: Negative for sputum production. The patient denies any shortness of breath at rest  There is no dyspnea on exertion  The patient denies any cough, or wheezing   Cardiovascular: The patient denies any chest pain pressure or palpitations  There is no history of orthopnea or PND. The patient denies chest pain or dyspnea on exertion  There is no history of leg pain on walking  There is no history of pallor   Gastrointestinal: Negative for blood in stool and melena. There is no history of nausea  The patient denies vomiting  There is no history of diarrhea or constipation  The patient denies heartburn     Genitourinary: Negative for flank pain, frequency, hematuria and urgency.         The patient denies any dysuria, polyuria or nocturia   Neurological: Negative for dizziness, tingling, sensory change, speech change, focal weakness and headaches. Endo/Heme/Allergies: Negative for polydipsia. Does not bruise/bleed easily. Psychiatric/Behavioral: Negative for depression, hallucinations, memory loss, substance abuse and suicidal ideas. The patient is not nervous/anxious and does not have insomnia. Active Ambulatory Problems     Diagnosis Date Noted    Acute bronchitis 04/07/2017    Hyperglycemia 04/07/2017    Diabetic foot infection ([address] 12/30/2018    Right foot infection 12/30/2018    Acute pain of right foot 12/30/2018    Diabetes [address] [address] 01/11/2019    HTN (hypertension) 01/11/2019     Resolved Ambulatory Problems     Diagnosis Date Noted    No Resolved Ambulatory Problems     Past Medical History:   Diagnosis Date    Diabetes ([facility]     HTN (hypertension)        family history includes Lupus in her mother. reports that  has never smoked. she has never used smokeless tobacco. She reports that she does not drink alcohol or use drugs. No results found for any visits on 01/11/19. Liz Parker [place] had no medications administered during this visit. Vitals:    01/11/19 1019   BP: (!) 152/92   Pulse: 99   Resp: 16   Temp: 96.6 °F (35.9 °C)   TempSrc: Oral   SpO2: 99%   Weight: 195 lb (88.5 kg)   Height: 5' 7\" (1.702 m)                     Physical Exam   Constitutional: She appears well-developed and well-nourished. No distress. HENT:   Head: Normocephalic and atraumatic. Right Ear: External ear normal.   Left Ear: External ear normal.   Mouth/Throat: No oropharyngeal exudate. Eyes: Conjunctivae are normal. Pupils are equal, round, and reactive to light. Right eye exhibits no discharge. Left eye exhibits no discharge. No scleral icterus. Neck: Neck supple. No tracheal deviation present. No thyromegaly present. Cardiovascular: Normal rate, regular rhythm and intact distal pulses. Exam reveals no gallop and no friction rub. No murmur heard.   Pulmonary/Chest: Effort normal. No respiratory distress. She has no wheezes. She has no rales. She exhibits no tenderness. No accessory muscles used   Abdominal: Soft. Bowel sounds are normal. She exhibits no distension and no mass. There is no tenderness. There is no guarding. Musculoskeletal: She exhibits no edema, tenderness or deformity. Foot exam deferred per request; just seen and bandaged by podiatrist   Lymphadenopathy:     She has no cervical adenopathy. Neurological: No cranial nerve deficit. She exhibits normal muscle tone. Coordination normal.   Skin: Skin is warm and dry. No rash noted. No erythema. No pallor. No cyanosis is appreciated  The extremities are not pale  No ulcers are appreciated  No abnormal lesions are appreciated   Nursing note and vitals reviewed. ASSESSMENT and PLAN    ICD-10-CM ICD-9-CM    1. Type 2 diabetes mellitus with complication, unspecified whether long term insulin use (HCC) E11.8 250.90 insulin glargine (LANTUS) 100 unit/mL injection      REFERRAL TO OPHTHALMOLOGY    Improved, follow up in 2 months  Microalbumin and cholesterol check then, once more stable  Referral to opthalmology   2. Essential hypertension I10 401.9     Not controlled  Discussed with patient will adjust meds if elevated on next visit   3. Diabetic foot infection ([facility] [address] E11.628 250.80     L08.9 686.9     Handled by podiatreis     Follow-up Disposition:  Return in about 2 months (around 3/11/2019).   lab results and schedule of future lab studies reviewed with patient  reviewed diet, exercise and weight control  reviewed medications and side effects in detail

## 2019-01-26 ENCOUNTER — HOSPITAL ENCOUNTER (EMERGENCY)
Age: 44
Discharge: HOME | End: 2019-01-26
Attending: EMERGENCY MEDICINE
Payer: MEDICAID

## 2019-01-26 VITALS
WEIGHT: 192 LBS | RESPIRATION RATE: 18 BRPM | DIASTOLIC BLOOD PRESSURE: 93 MMHG | HEIGHT: 67 IN | BODY MASS INDEX: 30.13 KG/M2 | TEMPERATURE: 97.8 F | HEART RATE: 100 BPM | OXYGEN SATURATION: 100 % | SYSTOLIC BLOOD PRESSURE: 151 MMHG

## 2019-01-26 DIAGNOSIS — R73.9 HYPERGLYCEMIA: Primary | ICD-10-CM

## 2019-01-26 DIAGNOSIS — D64.9 ANEMIA, UNSPECIFIED TYPE: ICD-10-CM

## 2019-01-26 DIAGNOSIS — R03.0 ELEVATED BLOOD PRESSURE READING: ICD-10-CM

## 2019-01-26 LAB
ALBUMIN SERPL-MCNC: 3.1 G/DL (ref 3.4–5)
ALBUMIN/GLOB SERPL: 0.7 {RATIO} (ref 0.8–1.7)
ALP SERPL-CCNC: 139 U/L (ref 45–117)
ALT SERPL-CCNC: 20 U/L (ref 13–56)
ANION GAP SERPL CALC-SCNC: 6 MMOL/L (ref 3–18)
APPEARANCE UR: CLEAR
AST SERPL-CCNC: 10 U/L (ref 15–37)
BACTERIA URNS QL MICRO: ABNORMAL /HPF
BASOPHILS # BLD: 0 K/UL (ref 0–0.1)
BASOPHILS NFR BLD: 0 % (ref 0–2)
BILIRUB SERPL-MCNC: 0.4 MG/DL (ref 0.2–1)
BILIRUB UR QL: NEGATIVE
BUN SERPL-MCNC: 10 MG/DL (ref 7–18)
BUN/CREAT SERPL: 13 (ref 12–20)
CALCIUM SERPL-MCNC: 8.4 MG/DL (ref 8.5–10.1)
CHLORIDE SERPL-SCNC: 106 MMOL/L (ref 100–108)
CO2 SERPL-SCNC: 24 MMOL/L (ref 21–32)
COLOR UR: YELLOW
CREAT SERPL-MCNC: 0.8 MG/DL (ref 0.6–1.3)
DIFFERENTIAL METHOD BLD: ABNORMAL
EOSINOPHIL # BLD: 0.1 K/UL (ref 0–0.4)
EOSINOPHIL NFR BLD: 2 % (ref 0–5)
EPITH CASTS URNS QL MICRO: ABNORMAL /LPF (ref 0–5)
ERYTHROCYTE [DISTWIDTH] IN BLOOD BY AUTOMATED COUNT: 14.7 % (ref 11.6–14.5)
GLOBULIN SER CALC-MCNC: 4.4 G/DL (ref 2–4)
GLUCOSE BLD STRIP.AUTO-MCNC: 265 MG/DL (ref 70–110)
GLUCOSE SERPL-MCNC: 274 MG/DL (ref 74–99)
GLUCOSE UR STRIP.AUTO-MCNC: >1000 MG/DL
HCT VFR BLD AUTO: 34.7 % (ref 35–45)
HGB BLD-MCNC: 11.1 G/DL (ref 12–16)
HGB UR QL STRIP: ABNORMAL
KETONES UR QL STRIP.AUTO: NEGATIVE MG/DL
LEUKOCYTE ESTERASE UR QL STRIP.AUTO: NEGATIVE
LYMPHOCYTES # BLD: 2.1 K/UL (ref 0.9–3.6)
LYMPHOCYTES NFR BLD: 30 % (ref 21–52)
MCH RBC QN AUTO: 21.5 PG (ref 24–34)
MCHC RBC AUTO-ENTMCNC: 32 G/DL (ref 31–37)
MCV RBC AUTO: 67.2 FL (ref 74–97)
MONOCYTES # BLD: 0.5 K/UL (ref 0.05–1.2)
MONOCYTES NFR BLD: 7 % (ref 3–10)
NEUTS SEG # BLD: 4.1 K/UL (ref 1.8–8)
NEUTS SEG NFR BLD: 61 % (ref 40–73)
NITRITE UR QL STRIP.AUTO: NEGATIVE
PH UR STRIP: 5.5 [PH] (ref 5–8)
PLATELET # BLD AUTO: 224 K/UL (ref 135–420)
PMV BLD AUTO: 9.6 FL (ref 9.2–11.8)
POTASSIUM SERPL-SCNC: 3.7 MMOL/L (ref 3.5–5.5)
PROT SERPL-MCNC: 7.5 G/DL (ref 6.4–8.2)
PROT UR STRIP-MCNC: 100 MG/DL
RBC # BLD AUTO: 5.16 M/UL (ref 4.2–5.3)
RBC #/AREA URNS HPF: ABNORMAL /HPF (ref 0–5)
SODIUM SERPL-SCNC: 136 MMOL/L (ref 136–145)
SP GR UR REFRACTOMETRY: 1.02 (ref 1–1.03)
UROBILINOGEN UR QL STRIP.AUTO: 1 EU/DL (ref 0.2–1)
WBC # BLD AUTO: 6.8 K/UL (ref 4.6–13.2)
WBC URNS QL MICRO: ABNORMAL /HPF (ref 0–4)

## 2019-01-26 PROCEDURE — 87086 URINE CULTURE/COLONY COUNT: CPT

## 2019-01-26 PROCEDURE — 85025 COMPLETE CBC W/AUTO DIFF WBC: CPT

## 2019-01-26 PROCEDURE — 96360 HYDRATION IV INFUSION INIT: CPT

## 2019-01-26 PROCEDURE — 82962 GLUCOSE BLOOD TEST: CPT

## 2019-01-26 PROCEDURE — 99284 EMERGENCY DEPT VISIT MOD MDM: CPT

## 2019-01-26 PROCEDURE — 74011250636 HC RX REV CODE- 250/636: Performed by: PHYSICIAN ASSISTANT

## 2019-01-26 PROCEDURE — 81001 URINALYSIS AUTO W/SCOPE: CPT

## 2019-01-26 PROCEDURE — 80053 COMPREHEN METABOLIC PANEL: CPT

## 2019-01-26 RX ADMIN — SODIUM CHLORIDE 1000 ML: 900 INJECTION, SOLUTION INTRAVENOUS at 09:45

## 2019-01-26 NOTE — ED PROVIDER NOTES
EMERGENCY DEPARTMENT HISTORY AND PHYSICAL EXAM    9:11 AM      Date: 1/26/2019  Patient Name: Ada Starks    History of Presenting Illness     Chief Complaint   Patient presents with    High Blood Sugar       History Provided By: Patient    Chief Complaint: polyuria, hyperglycemia  Duration:  Days  Timing:  Intermittent  Location: n/a  Quality: n/a  Severity: Moderate  Modifying Factors: none  Associated Symptoms: \"I feel dehydrated\"      Additional History (Context): Ada Starks is a 38 y.o. female with hx of DM, HTN who presents with c/o elevated blood sugar x 1 week. Pt notes blood sugars have been between 300 and 500. Notes she takes 30U Lantus at night and 500mg Metformin BID. Notes she has been compliant with medication. Notes dry mouth, polyuria, and nausea. Notes she was evaluated by her PMD 1/11 and has a follow-up appointment next month. Notes L foot infection has resolved. No other concerns at this time. PCP: Ernestine Medrano MD    Current Outpatient Medications   Medication Sig Dispense Refill    insulin glargine (LANTUS) 100 unit/mL injection 30 units subcutaneously every night 1 Vial 0    linezolid (ZYVOX) 600 mg tablet Take 1 Tab by mouth two (2) times a day. 24 Tab 0    cloNIDine HCl (CATAPRES) 0.1 mg tablet Take 1 Tab by mouth two (2) times a day. 60 Tab 0    Insulin Syringe-Needle U-100 (INSULIN SYRINGE) 1 mL 30 gauge x 5/16 syrg As directed for lantus insulin 50 Syringe 0    metFORMIN (GLUCOPHAGE) 500 mg tablet Take 1 Tab by mouth two (2) times daily (with meals). 60 Tab 0    amLODIPine (NORVASC) 10 mg tablet Take 1 Tab by mouth daily. 30 Tab 0    lisinopril (PRINIVIL, ZESTRIL) 20 mg tablet Take 1 Tab by mouth daily. 30 Tab 0    ferrous sulfate 325 mg (65 mg iron) tablet Take 1 Tab by mouth Daily (before breakfast). 15 Tab 0    mupirocin (BACTROBAN) 2 % ointment Apply  to affected area three (3) times daily.  APPLY TO left second toe, affected site 22 g 0       Past History     Past Medical History:  Past Medical History:   Diagnosis Date    Diabetes ([address]     HTN (hypertension)        Past Surgical History:  Past Surgical History:   Procedure Laterality Date    HX GYN             Family History:  Family History   Problem Relation Age of Onset    Lupus Mother     Cancer Neg Hx     Diabetes Neg Hx     Heart Disease Neg Hx     Hypertension Neg Hx        Social History:  Social History     Tobacco Use    Smoking status: Never Smoker    Smokeless tobacco: Never Used   Substance Use Topics    Alcohol use: No    Drug use: No       Allergies: Allergies   Allergen Reactions    Pcn [Penicillins] Hives         Review of Systems       Review of Systems   Constitutional: Negative for chills and fever. Respiratory: Negative for shortness of breath. Cardiovascular: Negative for chest pain. Gastrointestinal: Positive for nausea. Negative for abdominal pain and vomiting. Genitourinary: Positive for frequency. Skin: Negative for rash. Neurological: Negative for weakness. All other systems reviewed and are negative. Physical Exam     Visit Vitals  BP (!) 151/93 (BP 1 Location: Left arm, BP Patient Position: At rest;Sitting)   Pulse 100   Temp 97.8 °F (36.6 °C)   Resp 18   Ht 5' 7\" (1.702 m)   Wt 87.1 kg (192 lb)   SpO2 100%   BMI 30.07 kg/m²         Physical Exam   Constitutional: She appears well-developed and well-nourished. No distress. HENT:   Head: Normocephalic and atraumatic. Neck: Normal range of motion. Neck supple. Cardiovascular: Normal rate, regular rhythm, normal heart sounds and intact distal pulses. Exam reveals no gallop and no friction rub. No murmur heard. Pulmonary/Chest: Effort normal and breath sounds normal. No respiratory distress. She has no wheezes. She has no rales. Abdominal: Soft. Bowel sounds are normal. She exhibits no distension and no mass. There is no tenderness. There is no rebound and no guarding. Musculoskeletal: Normal range of motion. L foot without erythema, edema, or ulceration    Neurological: She is alert. Skin: Skin is warm. No rash noted. She is not diaphoretic. Nursing note and vitals reviewed. Diagnostic Study Results     Labs -  Recent Results (from the past 12 hour(s))   GLUCOSE, POC    Collection Time: 01/26/19  9:22 AM   Result Value Ref Range    Glucose (POC) 265 (H) 70 - 110 mg/dL   URINALYSIS W/ RFLX MICROSCOPIC    Collection Time: 01/26/19  9:24 AM   Result Value Ref Range    Color YELLOW      Appearance CLEAR      Specific gravity 1.025 1.005 - 1.030      pH (UA) 5.5 5.0 - 8.0      Protein 100 (A) NEG mg/dL    Glucose >1,000 (A) NEG mg/dL    Ketone NEGATIVE  NEG mg/dL    Bilirubin NEGATIVE  NEG      Blood SMALL (A) NEG      Urobilinogen 1.0 0.2 - 1.0 EU/dL    Nitrites NEGATIVE  NEG      Leukocyte Esterase NEGATIVE  NEG     URINE MICROSCOPIC ONLY    Collection Time: 01/26/19  9:24 AM   Result Value Ref Range    WBC 0 to 3 0 - 4 /hpf    RBC 0 to 3 0 - 5 /hpf    Epithelial cells FEW 0 - 5 /lpf    Bacteria FEW (A) NEG /hpf   CBC WITH AUTOMATED DIFF    Collection Time: 01/26/19  9:37 AM   Result Value Ref Range    WBC 6.8 4.6 - 13.2 K/uL    RBC 5.16 4.20 - 5.30 M/uL    HGB 11.1 (L) 12.0 - 16.0 g/dL    HCT 34.7 (L) 35.0 - 45.0 %    MCV 67.2 (L) 74.0 - 97.0 FL    MCH 21.5 (L) 24.0 - 34.0 PG    MCHC 32.0 31.0 - 37.0 g/dL    RDW 14.7 (H) 11.6 - 14.5 %    PLATELET 266 148 - 004 K/uL    MPV 9.6 9.2 - 11.8 FL    NEUTROPHILS 61 40 - 73 %    LYMPHOCYTES 30 21 - 52 %    MONOCYTES 7 3 - 10 %    EOSINOPHILS 2 0 - 5 %    BASOPHILS 0 0 - 2 %    ABS. NEUTROPHILS 4.1 1.8 - 8.0 K/UL    ABS. LYMPHOCYTES 2.1 0.9 - 3.6 K/UL    ABS. MONOCYTES 0.5 0.05 - 1.2 K/UL    ABS. EOSINOPHILS 0.1 0.0 - 0.4 K/UL    ABS.  BASOPHILS 0.0 0.0 - 0.1 K/UL    DF AUTOMATED     METABOLIC PANEL, COMPREHENSIVE    Collection Time: 01/26/19  9:37 AM   Result Value Ref Range    Sodium 136 136 - 145 mmol/L    Potassium 3.7 3.5 - 5.5 mmol/L    Chloride 106 100 - 108 mmol/L    CO2 24 21 - 32 mmol/L    Anion gap 6 3.0 - 18 mmol/L    Glucose 274 (H) 74 - 99 mg/dL    BUN 10 7.0 - 18 MG/DL    Creatinine 0.80 0.6 - 1.3 MG/DL    BUN/Creatinine ratio 13 12 - 20      GFR est AA >60 >60 ml/min/1.73m2    GFR est non-AA >60 >60 ml/min/1.73m2    Calcium 8.4 (L) 8.5 - 10.1 MG/DL    Bilirubin, total 0.4 0.2 - 1.0 MG/DL    ALT (SGPT) 20 13 - 56 U/L    AST (SGOT) 10 (L) 15 - 37 U/L    Alk. phosphatase 139 (H) 45 - 117 U/L    Protein, total 7.5 6.4 - 8.2 g/dL    Albumin 3.1 (L) 3.4 - 5.0 g/dL    Globulin 4.4 (H) 2.0 - 4.0 g/dL    A-G Ratio 0.7 (L) 0.8 - 1.7         Radiologic Studies -   No orders to display         Medical Decision Making   I am the first provider for this patient. I reviewed the vital signs, available nursing notes, past medical history, past surgical history, family history and social history. Vital Signs-Reviewed the patient's vital signs. Records Reviewed: Nursing Notes and Old Medical Records (Time of Review: 9:11 AM)    ED Course: Progress Notes, Reevaluation, and Consults:  10:54 AM Reviewed results with patient and . Discussed need for close outpatient follow-up with PMD within 2 days for reassessment. Discussed strict return precautions, including fever, vomiting, or any other medical concerns. Pt looks well, tolerating PO. No distress    Provider Notes (Medical Decision Making): 37 yo F with hx of DM who presents due to hyperglycemia x 1 week, ~300s-500s. Afebrile, VSS, no distress. Blood glucose 274 in ED. No evidence of DKA. IVF administered during visit. Tolerating PO. No s/s of infection, L foot cellulitis from previous visits resolved. Has PMD for follow-up, discussed calling office Monday for close follow-up. Discussed strict return precautions for any new, worsening, or persistent symptoms. Diagnosis     Clinical Impression:   1. Hyperglycemia    2. Anemia, unspecified type    3.  Elevated blood pressure reading        Disposition: home     Follow-up Information     Follow up With Specialties Details Why [facility]    [facility] EMERGENCY DEPT Emergency Medicine  If symptoms worsen [address] [facility]    Rayne Feliz MD Emergency Medicine Schedule an appointment as soon as possible for a visit in 2 days  [address] [facility] [address] [phone]                Medication List      ASK your doctor about these medications    amLODIPine 10 mg tablet  Commonly known as:  NORVASC  Take 1 Tab by mouth daily. cloNIDine HCl 0.1 mg tablet  Commonly known as:  CATAPRES  Take 1 Tab by mouth two (2) times a day. ferrous sulfate 325 mg (65 mg iron) tablet  Take 1 Tab by mouth Daily (before breakfast). insulin glargine 100 unit/mL injection  Commonly known as:  LANTUS  30 units subcutaneously every night     Insulin Syringe-Needle U-100 1 mL 30 gauge x 5/16 Syrg  Commonly known as:  INSULIN SYRINGE  As directed for lantus insulin     linezolid 600 mg tablet  Commonly known as:  ZYVOX  Take 1 Tab by mouth two (2) times a day. lisinopril 20 mg tablet  Commonly known as:  PRINIVIL, ZESTRIL  Take 1 Tab by mouth daily. metFORMIN 500 mg tablet  Commonly known as:  GLUCOPHAGE  Take 1 Tab by mouth two (2) times daily (with meals). mupirocin 2 % ointment  Commonly known as:  BACTROBAN  Apply  to affected area three (3) times daily.  APPLY TO left second toe, affected site

## 2019-01-26 NOTE — ED TRIAGE NOTES
Patient reports her blood sugar was 384 at home this morning despite taking insulin SQ and metformin PO.

## 2019-01-26 NOTE — DISCHARGE INSTRUCTIONS
Elevated Blood Pressure: Care Instructions  Your Care Instructions    Blood pressure is a measure of how hard the blood pushes against the walls of your arteries. It's normal for blood pressure to go up and down throughout the day. But if it stays up over time, you have high blood pressure. Two numbers tell you your blood pressure. The first number is the systolic pressure. It shows how hard the blood pushes when your heart is pumping. The second number is the diastolic pressure. It shows how hard the blood pushes between heartbeats, when your heart is relaxed and filling with blood. An ideal blood pressure in adults is less than 120/80 (say \"120 over 80\"). High blood pressure is 140/90 or higher. You have high blood pressure if your top number is 140 or higher or your bottom number is 90 or higher, or both. The main test for high blood pressure is simple, fast, and painless. To diagnose high blood pressure, your doctor will test your blood pressure at different times. After testing your blood pressure, your doctor may ask you to test it again when you are home. If you are diagnosed with high blood pressure, you can work with your doctor to make a long-term plan to manage it. Follow-up care is a key part of your treatment and safety. Be sure to make and go to all appointments, and call your doctor if you are having problems. It's also a good idea to know your test results and keep a list of the medicines you take. How can you care for yourself at home? · Do not smoke. Smoking increases your risk for heart attack and stroke. If you need help quitting, talk to your doctor about stop-smoking programs and medicines. These can increase your chances of quitting for good. · Stay at a healthy weight. · Try to limit how much sodium you eat to less than 2,300 milligrams (mg) a day. Your doctor may ask you to try to eat less than 1,500 mg a day. · Be physically active.  Get at least 30 minutes of exercise on most days of the week. Walking is a good choice. You also may want to do other activities, such as running, swimming, cycling, or playing tennis or team sports. · Avoid or limit alcohol. Talk to your doctor about whether you can drink any alcohol. · Eat plenty of fruits, vegetables, and low-fat dairy products. Eat less saturated and total fats. · Learn how to check your blood pressure at home. When should you call for help? Call your doctor now or seek immediate medical care if:  ? · Your blood pressure is much higher than normal (such as 180/110 or higher). ? · You think high blood pressure is causing symptoms such as:  ¨ Severe headache. ¨ Blurry vision. ? Watch closely for changes in your health, and be sure to contact your doctor if:  ? · You do not get better as expected. Where can you learn more? Go to http://tayeGuanxi.metriny.info/. Enter V489 in the search box to learn more about \"Elevated Blood Pressure: Care Instructions. \"  Current as of: September 21, 2016  Content Version: 11.4  © 4227-4161 [employer]. Care instructions adapted under license by [employer] (which disclaims liability or warranty for this information). If you have questions about a medical condition or this instruction, always ask your healthcare professional. [address] any warranty or liability for your use of this information. Patient Education   Take medication as prescribed. Record your blood sugar readings for the next few days. Follow-up with your primary care physician in 2 days for reassessment. Bring the results from this visit with your for their review. Return to the ED for any new, worsening, or persistent symptoms     . Anemia: Care Instructions  Your Care Instructions    Anemia is a low level of red blood cells, which carry oxygen throughout your body. Many things can cause anemia. Lack of iron is one of the most common causes.  Your body needs iron to make hemoglobin, a substance in red blood cells that carries oxygen from the lungs to your body's cells. Without enough iron, the body produces fewer and smaller red blood cells. As a result, your body's cells do not get enough oxygen, and you feel tired and weak. And you may have trouble concentrating. Bleeding is the most common cause of a lack of iron. You may have heavy menstrual bleeding or bleeding caused by conditions such as ulcers, hemorrhoids, or cancer. Regular use of aspirin or other anti-inflammatory medicines (such as ibuprofen) also can cause bleeding in some people. A lack of iron in your diet also can cause anemia, especially at times when the body needs more iron, such as during pregnancy, infancy, and the teen years. Your doctor may have prescribed iron pills. It may take several months of treatment for your iron levels to return to normal. Your doctor also may suggest that you eat foods that are rich in iron, such as meat and beans. There are many other causes of anemia. It is not always due to a lack of iron. Finding the specific cause of your anemia will help your doctor find the right treatment for you. Follow-up care is a key part of your treatment and safety. Be sure to make and go to all appointments, and call your doctor if you are having problems. It's also a good idea to know your test results and keep a list of the medicines you take. How can you care for yourself at home? · Take your medicines exactly as prescribed. Call your doctor if you think you are having a problem with your medicine. · If your doctor recommends iron pills, take them as directed:  ? Try to take the pills on an empty stomach about 1 hour before or 2 hours after meals. But you may need to take iron with food to avoid an upset stomach. ? Do not take antacids or drink milk or caffeine drinks (such as coffee, tea, or cola) at the same time or within 2 hours of the time that you take your iron.  They can make it hard for your body to absorb the iron. ? Vitamin C (from food or supplements) helps your body absorb iron. Try taking iron pills with a glass of orange juice or some other food that is high in vitamin C, such as citrus fruits. ? Iron pills may cause stomach problems, such as heartburn, nausea, diarrhea, constipation, and cramps. Be sure to drink plenty of fluids, and include fruits, vegetables, and fiber in your diet each day. Iron pills often make your bowel movements dark or green. ? If you forget to take an iron pill, do not take a double dose of iron the next time you take a pill. ? Keep iron pills out of the reach of small children. An overdose of iron can be very dangerous. · Follow your doctor's advice about eating iron-rich foods. These include red meat, shellfish, poultry, eggs, beans, raisins, whole-grain bread, and leafy green vegetables. · Steam vegetables to help them keep their iron content. When should you call for help? Call 911 anytime you think you may need emergency care. For example, call if:    · You have symptoms of a heart attack. These may include:  ? Chest pain or pressure, or a strange feeling in the chest.  ? Sweating. ? Shortness of breath. ? Nausea or vomiting. ? Pain, pressure, or a strange feeling in the back, neck, jaw, or upper belly or in one or both shoulders or arms. ? Lightheadedness or sudden weakness. ? A fast or irregular heartbeat. After you call 911, the  may tell you to chew 1 adult-strength or 2 to 4 low-dose aspirin. Wait for an ambulance. Do not try to drive yourself.     · You passed out (lost consciousness).    Call your doctor now or seek immediate medical care if:    · You have new or increased shortness of breath.     · You are dizzy or lightheaded, or you feel like you may faint.     · Your fatigue and weakness continue or get worse.     · You have any abnormal bleeding, such as:  ? Nosebleeds.   ? Vaginal bleeding that is different (heavier, more frequent, at a different time of the month) than what you are used to.  ? Bloody or black stools, or rectal bleeding. ? Bloody or pink urine.    Watch closely for changes in your health, and be sure to contact your doctor if:    · You do not get better as expected. Where can you learn more? Go to http://taye-triny.info/. Enter R301 in the search box to learn more about \"Anemia: Care Instructions. \"  Current as of: May 6, 2018  Content Version: 11.9  © 8654-5054 [employer]. Care instructions adapted under license by [employer] (which disclaims liability or warranty for this information). If you have questions about a medical condition or this instruction, always ask your healthcare professional. [address] any warranty or liability for your use of this information. Patient Education        Learning About High Blood Sugar  What is high blood sugar? Your body turns the food you eat into glucose (sugar), which it uses for energy. But if your body isn't able to use the sugar right away, it can build up in your blood and lead to high blood sugar. When the amount of sugar in your blood stays too high for too much of the time, you may have diabetes. Diabetes is a disease that can cause serious health problems. The good news is that lifestyle changes may help you get your blood sugar back to normal and avoid or delay diabetes. What causes high blood sugar? Sugar (glucose) can build up in your blood if you:  · Are overweight. · Have a family history of diabetes. · Take certain medicines, such as steroids. What are the symptoms? Having high blood sugar may not cause any symptoms at all. Or it may make you feel very thirsty or very hungry. You may also urinate more often than usual, have blurry vision, or lose weight without trying. How is high blood sugar treated?   You can take steps to lower your blood sugar level if you understand what makes it get higher. Your doctor may want you to learn how to test your blood sugar level at home. Then you can see how illness, stress, or different kinds of food or medicine raise or lower your blood sugar level. Other tests may be needed to see if you have diabetes. How can you prevent high blood sugar? · Watch your weight. If you're overweight, losing just a small amount of weight may help. Reducing fat around your waist is most important. · Limit the amount of calories, sweets, and unhealthy fat you eat. Ask your doctor if a dietitian can help you. A registered dietitian can help you create meal plans that fit your lifestyle. · Get at least 30 minutes of exercise on most days of the week. Exercise helps control your blood sugar. It also helps you maintain a healthy weight. Walking is a good choice. You also may want to do other activities, such as running, swimming, cycling, or playing tennis or team sports. · If your doctor prescribed medicines, take them exactly as prescribed. Call your doctor if you think you are having a problem with your medicine. You will get more details on the specific medicines your doctor prescribes. Follow-up care is a key part of your treatment and safety. Be sure to make and go to all appointments, and call your doctor if you are having problems. It's also a good idea to know your test results and keep a list of the medicines you take. Where can you learn more? Go to http://taye-triny.info/. Enter O108 in the search box to learn more about \"Learning About High Blood Sugar. \"  Current as of: July 25, 2018  Content Version: 11.9  © 5694-3015 [employer]. Care instructions adapted under license by [employer] (which disclaims liability or warranty for this information).  If you have questions about a medical condition or this instruction, always ask your healthcare professional. [place] [address] any warranty or liability for your use of this information.

## 2019-01-26 NOTE — ED NOTES
I have reviewed discharge instructions with the patient. The patient verbalized understanding. Discharge medications reviewed with patient and appropriate educational materials and side effects teaching were provided. Patient was given the opportunity to ask questions and she stated that she did not have any at this time.

## 2019-01-26 NOTE — LETTER
[address] [place] [address] Dr [facility] EMERGENCY DEPT 
[address] [facility] [id number] [phone] Work/School Note Date: 1/26/2019 To Whom It May concern: 
 
Teresa Velasco was seen and treated today in the emergency room by the following provider(s): 
Attending Provider: Luba Hinds MD 
Physician Assistant: Nichole Ogden Teresa Velasco may return to work on 1/28/19. Sincerely, 
 
 
 
 
Christopher Burkett

## 2019-01-28 ENCOUNTER — OFFICE VISIT (OUTPATIENT)
Dept: FAMILY MEDICINE CLINIC | Facility: CLINIC | Age: 44
End: 2019-01-28

## 2019-01-28 VITALS
TEMPERATURE: 97.2 F | HEIGHT: 67 IN | BODY MASS INDEX: 30.39 KG/M2 | HEART RATE: 108 BPM | WEIGHT: 193.6 LBS | RESPIRATION RATE: 16 BRPM | SYSTOLIC BLOOD PRESSURE: 136 MMHG | DIASTOLIC BLOOD PRESSURE: 90 MMHG | OXYGEN SATURATION: 100 %

## 2019-01-28 DIAGNOSIS — R00.0 TACHYCARDIA: ICD-10-CM

## 2019-01-28 DIAGNOSIS — E11.8 TYPE 2 DIABETES MELLITUS WITH COMPLICATION, UNSPECIFIED WHETHER LONG TERM INSULIN USE: Primary | ICD-10-CM

## 2019-01-28 LAB
BACTERIA SPEC CULT: ABNORMAL
BACTERIA SPEC CULT: ABNORMAL
SERVICE CMNT-IMP: ABNORMAL

## 2019-01-28 RX ORDER — METFORMIN HYDROCHLORIDE 500 MG/1
TABLET ORAL
Qty: 90 TAB | Refills: 3 | Status: SHIPPED | OUTPATIENT
Start: 2019-01-28 | End: 2019-03-28 | Stop reason: DRUGHIGH

## 2019-01-28 NOTE — LETTER
NOTIFICATION RETURN TO WORK / SCHOOL 
 
1/28/2019 11:43 AM 
 
Ms. Brissa Farooq [address] [place] [id number] To Whom It May Concern: 
 
Brissa Farooq is currently under the care of [facility] Melvi Caldera. She will return to work/school on: 01/29/2019 If there are questions or concerns please have the patient contact our office. Sincerely, Evangelist Gonzalez MD

## 2019-01-28 NOTE — PROGRESS NOTES
01/28/19  11:14 AM  This is a 38 y.o. female   Chief Complaint   Patient presents with    Diabetes     pt states she was seen at [facility] for high BS. here for f/u      HISTORY OF PRESENT ILLNESS  The patient was seen in the ED for elevated blood sugar. On 1/26/19 There has been no change in medications or diet. She admits to polydipsia, no polyuria. The patient also admits to blurred vision. There is no headache, dizziness, chest pain, pressure, palpitations  Seen by ophthalmologist, no lesions noted. Patient wishes for glasses    Review of Systems   Constitutional:        The patient denies any fever, chills, night sweats or weight loss  There has been no diaphoresis malaise or weakness   Eyes: Negative for discharge and redness. There is no history of blurred vision, double vision or blindness. The patient does not admit to any foreign body sensation or eye pain. There are no halos around lights  The patient has noted no jaundice   Respiratory: Negative for sputum production. The patient denies any shortness of breath at rest  There is no dyspnea on exertion  The patient denies any cough, or wheezing   Cardiovascular: The patient denies any chest pain pressure or palpitations  There is no history of orthopnea or PND. The patient denies chest pain or dyspnea on exertion  There is no history of leg pain on walking  There is no history of pallor   Gastrointestinal: Negative for blood in stool and melena. There is no history of nausea  The patient denies vomiting  There is no history of diarrhea or constipation  The patient denies heartburn     Genitourinary: Negative for flank pain, frequency, hematuria and urgency. The patient denies any dysuria, polyuria or nocturia   Neurological: Negative for dizziness, tingling, sensory change, speech change, focal weakness and headaches. Endo/Heme/Allergies: Negative for polydipsia. Does not bruise/bleed easily.    Psychiatric/Behavioral: Negative for depression, hallucinations, memory loss, substance abuse and suicidal ideas. The patient is not nervous/anxious and does not have insomnia. Active Ambulatory Problems     Diagnosis Date Noted    Acute bronchitis 04/07/2017    Hyperglycemia 04/07/2017    Diabetic foot infection ([facility] [address] 12/30/2018    Right foot infection 12/30/2018    Acute pain of right foot 12/30/2018    Diabetes ([facility] [address] 01/11/2019    HTN (hypertension) 01/11/2019     Resolved Ambulatory Problems     Diagnosis Date Noted    No Resolved Ambulatory Problems     Past Medical History:   Diagnosis Date    Diabetes ([facility] [address]     HTN (hypertension)        family history includes Lupus in her mother. reports that  has never smoked. she has never used smokeless tobacco. She reports that she does not drink alcohol or use drugs. Results for orders placed or performed during the hospital encounter of 01/26/19   CULTURE, URINE   Result Value Ref Range    Special Requests: NO SPECIAL REQUESTS      Culture result:        [id number]  COLONIES/mL  MIXED GRAM POSITIVE EDUIN, PROBABLE SKIN/GENITAL CONTAMINATION. Culture result: <10,000 COLONIES/mL GRAM NEGATIVE RODS (A)     CBC WITH AUTOMATED DIFF   Result Value Ref Range    WBC 6.8 4.6 - 13.2 K/uL    RBC 5.16 4.20 - 5.30 M/uL    HGB 11.1 (L) 12.0 - 16.0 g/dL    HCT 34.7 (L) 35.0 - 45.0 %    MCV 67.2 (L) 74.0 - 97.0 FL    MCH 21.5 (L) 24.0 - 34.0 PG    MCHC 32.0 31.0 - 37.0 g/dL    RDW 14.7 (H) 11.6 - 14.5 %    PLATELET 550 206 - 422 K/uL    MPV 9.6 9.2 - 11.8 FL    NEUTROPHILS 61 40 - 73 %    LYMPHOCYTES 30 21 - 52 %    MONOCYTES 7 3 - 10 %    EOSINOPHILS 2 0 - 5 %    BASOPHILS 0 0 - 2 %    ABS. NEUTROPHILS 4.1 1.8 - 8.0 K/UL    ABS. LYMPHOCYTES 2.1 0.9 - 3.6 K/UL    ABS. MONOCYTES 0.5 0.05 - 1.2 K/UL    ABS. EOSINOPHILS 0.1 0.0 - 0.4 K/UL    ABS.  BASOPHILS 0.0 0.0 - 0.1 K/UL    DF AUTOMATED     METABOLIC PANEL, COMPREHENSIVE   Result Value Ref Range    Sodium 136 136 - 145 mmol/L Potassium 3.7 3.5 - 5.5 mmol/L    Chloride 106 100 - 108 mmol/L    CO2 24 21 - 32 mmol/L    Anion gap 6 3.0 - 18 mmol/L    Glucose 274 (H) 74 - 99 mg/dL    BUN 10 7.0 - 18 MG/DL    Creatinine 0.80 0.6 - 1.3 MG/DL    BUN/Creatinine ratio 13 12 - 20      GFR est AA >60 >60 ml/min/1.73m2    GFR est non-AA >60 >60 ml/min/1.73m2    Calcium 8.4 (L) 8.5 - 10.1 MG/DL    Bilirubin, total 0.4 0.2 - 1.0 MG/DL    ALT (SGPT) 20 13 - 56 U/L    AST (SGOT) 10 (L) 15 - 37 U/L    Alk. phosphatase 139 (H) 45 - 117 U/L    Protein, total 7.5 6.4 - 8.2 g/dL    Albumin 3.1 (L) 3.4 - 5.0 g/dL    Globulin 4.4 (H) 2.0 - 4.0 g/dL    A-G Ratio 0.7 (L) 0.8 - 1.7     URINALYSIS W/ RFLX MICROSCOPIC   Result Value Ref Range    Color YELLOW      Appearance CLEAR      Specific gravity 1.025 1.005 - 1.030      pH (UA) 5.5 5.0 - 8.0      Protein 100 (A) NEG mg/dL    Glucose >1,000 (A) NEG mg/dL    Ketone NEGATIVE  NEG mg/dL    Bilirubin NEGATIVE  NEG      Blood SMALL (A) NEG      Urobilinogen 1.0 0.2 - 1.0 EU/dL    Nitrites NEGATIVE  NEG      Leukocyte Esterase NEGATIVE  NEG     URINE MICROSCOPIC ONLY   Result Value Ref Range    WBC 0 to 3 0 - 4 /hpf    RBC 0 to 3 0 - 5 /hpf    Epithelial cells FEW 0 - 5 /lpf    Bacteria FEW (A) NEG /hpf   GLUCOSE, POC   Result Value Ref Range    Glucose (POC) 265 (H) 70 - 110 mg/dL     Visit Vitals  /90 (BP 1 Location: Right arm, BP Patient Position: Sitting)   Pulse (!) 108   Temp 97.2 °F (36.2 °C) (Oral)   Resp 16   Ht 5' 7\" (1.702 m)   Wt 193 lb 9.6 oz (87.8 kg)   SpO2 100%   BMI 30.32 kg/m²           Physical Exam   Constitutional: She appears well-developed and well-nourished. No distress. HENT:   Head: Normocephalic and atraumatic. Right Ear: External ear normal.   Left Ear: External ear normal.   Mouth/Throat: No oropharyngeal exudate. Eyes: Conjunctivae are normal. Pupils are equal, round, and reactive to light. Right eye exhibits no discharge. Left eye exhibits no discharge. No scleral icterus. Neck: Neck supple. No tracheal deviation present. No thyromegaly present. Cardiovascular: Normal rate, regular rhythm and intact distal pulses. Exam reveals no gallop and no friction rub. No murmur heard. Pulmonary/Chest: Effort normal. No respiratory distress. She has no wheezes. She has no rales. She exhibits no tenderness. No accessory muscles used   Abdominal: Soft. Bowel sounds are normal. She exhibits no distension and no mass. There is no tenderness. There is no guarding. Musculoskeletal: She exhibits no edema, tenderness or deformity. Foot exam deferred per request; just seen and bandaged by podiatrist   Lymphadenopathy:     She has no cervical adenopathy. Neurological: No cranial nerve deficit. She exhibits normal muscle tone. Coordination normal.   Skin: Skin is warm and dry. No rash noted. No erythema. No pallor. No cyanosis is appreciated  The extremities are not pale  No ulcers are appreciated  No abnormal lesions are appreciated   Nursing note and vitals reviewed. Diagnoses and all orders for this visit:    1. Type 2 diabetes mellitus with complication, unspecified whether long term insulin use (HCC)  Comments:  Increase the Metformin to 500mg , 2 AM one PM  Call in 48 hours with BS for further adjustment    2. Tachycardia  Comments:  Noted in ED, EKG normal, no symptoms  Follow    Other orders  -     metFORMIN (GLUCOPHAGE) 500 mg tablet;  Take 2 in the morning and one in the evening

## 2019-01-28 NOTE — PROGRESS NOTES
Ashish Castillo is a 38 y.o. female here for f.u        Ashish Castillo is a 38 y.o. female (: 1975) presenting to address:    Chief Complaint   Patient presents with    Diabetes     pt states she was seen at [facility] for high BS. here for f/u        Vitals:    19 1114   BP: 136/90   Pulse: (!) 108   Resp: 16   Temp: 97.2 °F (36.2 °C)   TempSrc: Oral   SpO2: 100%   Weight: 193 lb 9.6 oz (87.8 kg)   Height: 5' 7\" (1.702 m)   PainSc:   0 - No pain       Hearing/Vision:   No exam data present    Learning Assessment:     Learning Assessment 2019   PRIMARY LEARNER Patient   HIGHEST LEVEL OF EDUCATION - PRIMARY LEARNER  2 YEARS OF COLLEGE   BARRIERS PRIMARY LEARNER NONE   CO-LEARNER CAREGIVER No   PRIMARY LANGUAGE ENGLISH   LEARNER PREFERENCE PRIMARY DEMONSTRATION   ANSWERED BY patient   RELATIONSHIP SELF     Depression Screening:     PHQ over the last two weeks 2019   Little interest or pleasure in doing things Not at all   Feeling down, depressed, irritable, or hopeless Not at all   Total Score PHQ 2 0     Fall Risk Assessment:   No flowsheet data found. Abuse Screening:   No flowsheet data found. Coordination of Care Questionaire:   1. Have you been to the ER, urgent care clinic since your last visit? Hospitalized since your last visit? YES [facility]    2. Have you seen or consulted any other health care providers outside of the [address] [place] [address] since your last visit? Include any pap smears or colon screening. NO    Advanced Directive:   1. Do you have an Advanced Directive? NO    2. Would you like information on Advanced Directives?  NO

## 2019-01-28 NOTE — PATIENT INSTRUCTIONS
Learning About Metformin for Type 2 Diabetes  Introduction    Metformin (such as Glucophage) is a medicine used to treat type 2 diabetes. It helps keep blood sugar levels on target. You may have tried to eat a healthy diet, lose weight, and get more exercise to keep your blood sugar in your target range. If those things do not help, you may take a medicine called metformin. It helps your body use insulin. This can help you control your blood sugar. You might take it on its own or with other medicines. When taken on its own, metformin should not cause low blood sugar or weight gain. Example  · Metformin (Glucophage)  Possible side effects  Common side effects include:  · Short-term nausea. · Not feeling hungry. · Diarrhea. · Increased gas in your belly. · A metallic taste. You may have side effects or reactions not listed here. Check the information that comes with your medicine. What to know about taking this medicine  · Metformin does not usually cause low blood sugar. But you may get a low blood sugar when you take metformin and you exercise hard, drink alcohol, or you do not eat enough food. · Sometimes metformin is combined with other diabetes medicine. Some of these can cause low blood sugar. · If you need a test that uses a dye or you need to have surgery, be sure to tell all of your doctors that you take metformin. You may have to stop taking it before and after the test or surgery. · Over time, blood levels of vitamin B12 can decrease in some people who take metformin. Your body needs this B vitamin to make blood cells. It also keeps your nervous system healthy. If you have been taking metformin for more than a few years, ask your doctor if you need a B12 blood test to measure the amount of vitamin B12 in your blood. · Be safe with medicines. Take your medicines exactly as prescribed. Call your doctor if you think you are having a problem with your medicine.   · Check with your doctor or pharmacist before you use any other medicines. This includes over-the-counter medicines. Make sure your doctor knows all of the medicines, vitamins, herbal products, and supplements you take. Taking some medicines together can cause problems. Where can you learn more? Go to http://taye-triny.info/. Enter Ajit Lino in the search box to learn more about \"Learning About Metformin for Type 2 Diabetes. \"  Current as of: July 25, 2018  Content Version: 11.9  © 9027-6971 [employer]. Care instructions adapted under license by [employer] (which disclaims liability or warranty for this information). If you have questions about a medical condition or this instruction, always ask your healthcare professional. [address] any warranty or liability for your use of this information.

## 2019-01-31 DIAGNOSIS — E11.8 TYPE 2 DIABETES MELLITUS WITH COMPLICATION, UNSPECIFIED WHETHER LONG TERM INSULIN USE: ICD-10-CM

## 2019-01-31 NOTE — TELEPHONE ENCOUNTER
Pt called stating she needs Dr. Miller Hires to change her insulin. She states the metformin is working fine. Please advise.

## 2019-02-01 NOTE — TELEPHONE ENCOUNTER
I called pt and left pt a voicemail to contact our office because I received a message that pt need her insulin changed and the metformin is working well for her. Need clarification on what changes need to be made for pt.

## 2019-02-06 NOTE — TELEPHONE ENCOUNTER
I received a return call from pt and pt verified name and  Pt  States \" that the medication Metformin is working fine for her her glucose was 280 and when she checked it and then she took her medication Metformin and it went down to 220. Pt has concerns about her insulin Lantus she is taking 30 units at night but not sure if it is working for her she wants to know if it needs to be changed to a different dose.  Please advise

## 2019-02-06 NOTE — TELEPHONE ENCOUNTER
I called pt per Dr. Charisse Samayoa to let pt know that pt should go up to 35 units on pt Lantus insulin nightly and see if that can bring pt Glucose levels to the 100's. I left pt a voicemail to contact our office back if she has any questions.

## 2019-02-07 DIAGNOSIS — E11.8 TYPE 2 DIABETES MELLITUS WITH COMPLICATION, UNSPECIFIED WHETHER LONG TERM INSULIN USE: ICD-10-CM

## 2019-02-07 RX ORDER — INSULIN GLARGINE 100 [IU]/ML
INJECTION, SOLUTION SUBCUTANEOUS
Qty: 1 VIAL | Refills: 12 | Status: SHIPPED | OUTPATIENT
Start: 2019-02-07 | End: 2019-03-28 | Stop reason: SDUPTHER

## 2019-02-07 RX ORDER — INSULIN GLARGINE 100 [IU]/ML
INJECTION, SOLUTION SUBCUTANEOUS
Qty: 1 VIAL | Refills: 0 | Status: SHIPPED | OUTPATIENT
Start: 2019-02-07 | End: 2019-02-07 | Stop reason: SDUPTHER

## 2019-02-12 DIAGNOSIS — I10 ESSENTIAL HYPERTENSION: Primary | ICD-10-CM

## 2019-02-12 RX ORDER — LISINOPRIL 20 MG/1
20 TABLET ORAL DAILY
Qty: 30 TAB | Refills: 0 | Status: SHIPPED | OUTPATIENT
Start: 2019-02-12 | End: 2019-03-28 | Stop reason: ALTCHOICE

## 2019-02-12 RX ORDER — AMLODIPINE BESYLATE 10 MG/1
10 TABLET ORAL DAILY
Qty: 30 TAB | Refills: 0 | Status: SHIPPED | OUTPATIENT
Start: 2019-02-12 | End: 2019-03-28 | Stop reason: SDUPTHER

## 2019-02-12 RX ORDER — AMLODIPINE BESYLATE 10 MG/1
10 TABLET ORAL DAILY
Qty: 30 TAB | Refills: 0 | Status: SHIPPED | OUTPATIENT
Start: 2019-02-12 | End: 2019-02-12 | Stop reason: ALTCHOICE

## 2019-02-12 RX ORDER — LISINOPRIL 20 MG/1
20 TABLET ORAL DAILY
Qty: 30 TAB | Refills: 0 | Status: SHIPPED | OUTPATIENT
Start: 2019-02-12 | End: 2019-02-12 | Stop reason: ALTCHOICE

## 2019-03-04 ENCOUNTER — APPOINTMENT (OUTPATIENT)
Dept: CT IMAGING | Age: 44
End: 2019-03-04
Attending: EMERGENCY MEDICINE
Payer: MEDICAID

## 2019-03-04 ENCOUNTER — HOSPITAL ENCOUNTER (EMERGENCY)
Age: 44
Discharge: HOME | End: 2019-03-05
Attending: EMERGENCY MEDICINE
Payer: MEDICAID

## 2019-03-04 VITALS
OXYGEN SATURATION: 100 % | DIASTOLIC BLOOD PRESSURE: 100 MMHG | SYSTOLIC BLOOD PRESSURE: 154 MMHG | RESPIRATION RATE: 18 BRPM | TEMPERATURE: 98.3 F | HEART RATE: 100 BPM

## 2019-03-04 DIAGNOSIS — R20.2 PARESTHESIA: Primary | ICD-10-CM

## 2019-03-04 PROCEDURE — 84703 CHORIONIC GONADOTROPIN ASSAY: CPT

## 2019-03-04 PROCEDURE — 75635 CT ANGIO ABDOMINAL ARTERIES: CPT

## 2019-03-04 PROCEDURE — 80053 COMPREHEN METABOLIC PANEL: CPT

## 2019-03-04 PROCEDURE — 85025 COMPLETE CBC W/AUTO DIFF WBC: CPT

## 2019-03-04 PROCEDURE — 99283 EMERGENCY DEPT VISIT LOW MDM: CPT

## 2019-03-05 ENCOUNTER — APPOINTMENT (OUTPATIENT)
Dept: CT IMAGING | Age: 44
End: 2019-03-05
Attending: EMERGENCY MEDICINE
Payer: MEDICAID

## 2019-03-05 LAB
ALBUMIN SERPL-MCNC: 3.2 G/DL (ref 3.4–5)
ALBUMIN/GLOB SERPL: 0.8 {RATIO} (ref 0.8–1.7)
ALP SERPL-CCNC: 165 U/L (ref 45–117)
ALT SERPL-CCNC: 33 U/L (ref 13–56)
ANION GAP SERPL CALC-SCNC: 8 MMOL/L (ref 3–18)
AST SERPL-CCNC: 20 U/L (ref 15–37)
BASOPHILS # BLD: 0 K/UL (ref 0–0.1)
BASOPHILS NFR BLD: 0 % (ref 0–2)
BILIRUB SERPL-MCNC: 0.2 MG/DL (ref 0.2–1)
BUN SERPL-MCNC: 8 MG/DL (ref 7–18)
BUN/CREAT SERPL: 9 (ref 12–20)
CALCIUM SERPL-MCNC: 8.4 MG/DL (ref 8.5–10.1)
CHLORIDE SERPL-SCNC: 103 MMOL/L (ref 100–108)
CO2 SERPL-SCNC: 25 MMOL/L (ref 21–32)
CREAT SERPL-MCNC: 0.9 MG/DL (ref 0.6–1.3)
DIFFERENTIAL METHOD BLD: ABNORMAL
EOSINOPHIL # BLD: 0.1 K/UL (ref 0–0.4)
EOSINOPHIL NFR BLD: 1 % (ref 0–5)
ERYTHROCYTE [DISTWIDTH] IN BLOOD BY AUTOMATED COUNT: 14.4 % (ref 11.6–14.5)
GLOBULIN SER CALC-MCNC: 4.2 G/DL (ref 2–4)
GLUCOSE SERPL-MCNC: 399 MG/DL (ref 74–99)
HCG SERPL QL: NEGATIVE
HCT VFR BLD AUTO: 34.7 % (ref 35–45)
HGB BLD-MCNC: 11.3 G/DL (ref 12–16)
LYMPHOCYTES # BLD: 2.7 K/UL (ref 0.9–3.6)
LYMPHOCYTES NFR BLD: 35 % (ref 21–52)
MCH RBC QN AUTO: 21.9 PG (ref 24–34)
MCHC RBC AUTO-ENTMCNC: 32.6 G/DL (ref 31–37)
MCV RBC AUTO: 67.4 FL (ref 74–97)
MONOCYTES # BLD: 0.6 K/UL (ref 0.05–1.2)
MONOCYTES NFR BLD: 8 % (ref 3–10)
NEUTS SEG # BLD: 4.2 K/UL (ref 1.8–8)
NEUTS SEG NFR BLD: 56 % (ref 40–73)
PLATELET # BLD AUTO: 314 K/UL (ref 135–420)
PLATELET COMMENTS,PCOM: ABNORMAL
PMV BLD AUTO: 10.7 FL (ref 9.2–11.8)
POTASSIUM SERPL-SCNC: 3.5 MMOL/L (ref 3.5–5.5)
PROT SERPL-MCNC: 7.4 G/DL (ref 6.4–8.2)
RBC # BLD AUTO: 5.15 M/UL (ref 4.2–5.3)
RBC MORPH BLD: ABNORMAL
RBC MORPH BLD: ABNORMAL
SODIUM SERPL-SCNC: 136 MMOL/L (ref 136–145)
WBC # BLD AUTO: 7.6 K/UL (ref 4.6–13.2)

## 2019-03-05 PROCEDURE — 70450 CT HEAD/BRAIN W/O DYE: CPT

## 2019-03-05 PROCEDURE — 74011636320 HC RX REV CODE- 636/320: Performed by: EMERGENCY MEDICINE

## 2019-03-05 RX ADMIN — IOPAMIDOL 100 ML: 755 INJECTION, SOLUTION INTRAVENOUS at 01:16

## 2019-03-05 NOTE — ED TRIAGE NOTES
Pt presents to ED with complaint of tingling to left side. Pt states it  Feels like needles going up and down her arm and leg.

## 2019-03-05 NOTE — ED PROVIDER NOTES
EMERGENCY DEPARTMENT HISTORY AND PHYSICAL EXAM    10:57 PM      Date: 3/4/2019  Patient Name: Ada Starks    History of Presenting Illness     Chief Complaint   Patient presents with    Numbness         History Provided By: Patient      Additional History (Context): Ada Starks is a 38 y.o. female with hx DM, HTN who presents c/o acute numbness described as \"tingling\" in her left leg down to her feet. Pt reports sx began 7 hours ago. She adds that she began taking DM and HTN medications in January because she just recently obtained health insurance. Denies current pain, dysuria, difficulty urinating, back pain, trouble walking, fever, headache. No injury or trauma to leg. PCP: Ernestine Medrano MD    Chief Complaint: Numbness  Duration: 7 Hours  Timing:  Acute  Location: Left leg down to toes  Quality: \"tingling\"  Severity: not reported  Modifying Factors: None reported  Associated Symptoms: denies any other associated signs or symptoms    Current Outpatient Medications   Medication Sig Dispense Refill    lisinopril (PRINIVIL, ZESTRIL) 20 mg tablet Take 1 Tab by mouth daily. 30 Tab 0    amLODIPine (NORVASC) 10 mg tablet Take 1 Tab by mouth daily. 30 Tab 0    insulin glargine (LANTUS) 100 unit/mL injection 35 units subcutaneously every night 1 Vial 12    metFORMIN (GLUCOPHAGE) 500 mg tablet Take 2 in the morning and one in the evening 90 Tab 3    linezolid (ZYVOX) 600 mg tablet Take 1 Tab by mouth two (2) times a day. 24 Tab 0    cloNIDine HCl (CATAPRES) 0.1 mg tablet Take 1 Tab by mouth two (2) times a day. 60 Tab 0    Insulin Syringe-Needle U-100 (INSULIN SYRINGE) 1 mL 30 gauge x 5/16 syrg As directed for lantus insulin 50 Syringe 0    ferrous sulfate 325 mg (65 mg iron) tablet Take 1 Tab by mouth Daily (before breakfast). 15 Tab 0    mupirocin (BACTROBAN) 2 % ointment Apply  to affected area three (3) times daily.  APPLY TO left second toe, affected site 22 g 0       Past History     Past Medical History:  Past Medical History:   Diagnosis Date    Diabetes ([address]     HTN (hypertension)        Past Surgical History:  Past Surgical History:   Procedure Laterality Date    HX GYN             Family History:  Family History   Problem Relation Age of Onset    Lupus Mother     Cancer Neg Hx     Diabetes Neg Hx     Heart Disease Neg Hx     Hypertension Neg Hx        Social History:  Social History     Tobacco Use    Smoking status: Never Smoker    Smokeless tobacco: Never Used   Substance Use Topics    Alcohol use: No    Drug use: No       Allergies: Allergies   Allergen Reactions    Pcn [Penicillins] Hives         Review of Systems       Review of Systems   Constitutional: Negative for activity change, fatigue and fever. HENT: Negative for congestion and rhinorrhea. Eyes: Negative for visual disturbance. Respiratory: Negative for shortness of breath. Cardiovascular: Negative for chest pain and palpitations. Gastrointestinal: Negative for abdominal pain, diarrhea, nausea and vomiting. Genitourinary: Negative for dysuria and hematuria. Musculoskeletal: Negative for back pain. Skin: Negative for rash. Neurological: Positive for numbness. Negative for dizziness, weakness, light-headedness and headaches. Physical Exam     Visit Vitals  BP (!) 154/100   Pulse 100   Temp 98.3 °F (36.8 °C)   Resp 18   SpO2 100%         Physical Exam   Constitutional: She is oriented to person, place, and time. She appears well-developed and well-nourished. No distress. HENT:   Head: Normocephalic and atraumatic. Right Ear: External ear normal.   Left Ear: External ear normal.   Nose: Nose normal.   Mouth/Throat: Oropharynx is clear and moist.   Eyes: Conjunctivae and EOM are normal. Pupils are equal, round, and reactive to light. No scleral icterus. Neck: Normal range of motion. Neck supple. No tracheal deviation present.    Cardiovascular: Normal rate, regular rhythm and intact distal pulses. Pulmonary/Chest: Effort normal and breath sounds normal. She exhibits no tenderness. Abdominal: Soft. Bowel sounds are normal. She exhibits no distension. There is no tenderness. There is no rebound and no guarding. Musculoskeletal: Normal range of motion. She exhibits no tenderness. No neck or back TTP midline   Neurological: She is alert and oriented to person, place, and time. No cranial nerve deficit. Coordination normal.   Decreased SILT LLE t/o   Skin: Skin is warm and dry. Psychiatric: She has a normal mood and affect. Her behavior is normal. Judgment and thought content normal.   Nursing note and vitals reviewed. Diagnostic Study Results     Labs -  No results found for this or any previous visit (from the past 12 hour(s)). Radiologic Studies -   CTA ABD ART W RUNOFF W WO CONT   Final Result   IMPRESSION:      No evidence for an aneurysm or dissection in the aorta. No narrowing of the major branches of the abdominal aorta. Normal iliac arteries bilaterally. No evidence for peripheral arterial disease. .      CT HEAD WO CONT   Final Result   IMPRESSION: Unremarkable noncontrast head CT. Medical Decision Making     It should be noted that I, No att. providers found will be the provider of record for this patient. I reviewed the vital signs, available nursing notes, past medical history, past surgical history, family history and social history. Vital Signs-Reviewed the patient's vital signs. Pulse Oximetry Analysis -  100% on room air Wnl. Cardiac Monitor:  Rate: 100 bpm  Rhythm:  Normal Sinus Rhythm         Records Reviewed: Nursing Notes and Old Medical Records (Time of Review: 10:57 PM)    ED Course: Progress Notes, Reevaluation, and Consults:    Consult:  Discussed care with Dr. Peter Clarke, Specialty: neurology  Standard discussion; including history of patients chief complaint, available diagnostic results, and treatment course. 11:05 PM, 3/4/2019     11:15 PM: Spoke to Dr. Alejandro Almaraz. 3:55 AM: Pt states she would rather be discharged than wait for pending scan. Feels jesse carrillo resolving      Provider Notes (Medical Decision Making):   Patient with LLE paresthesias  Consulted neuro--likely nothing concerning but will do imaging  CT wnl of head and aorta/LLE  Sxs resolving and patient wants to go home. Glucose elevated. Will take insulin at home. Will call if abnl results. NAD  AVSS  Stable for discharge. Agrees with plan. Follow-up with PMD.  Return if further concerns. Diagnosis     Clinical Impression:   1. Paresthesia        Disposition: Discharge    Follow-up Information     Follow up With Specialties Details Why Contact Info    Kings Aguilar MD Emergency Medicine Go in 1 day  [address]                Medication List      ASK your doctor about these medications    amLODIPine 10 mg tablet  Commonly known as:  NORVASC  Take 1 Tab by mouth daily. cloNIDine HCl 0.1 mg tablet  Commonly known as:  CATAPRES  Take 1 Tab by mouth two (2) times a day. ferrous sulfate 325 mg (65 mg iron) tablet  Take 1 Tab by mouth Daily (before breakfast). insulin glargine 100 unit/mL injection  Commonly known as:  LANTUS  35 units subcutaneously every night     Insulin Syringe-Needle U-100 1 mL 30 gauge x 5/16 Syrg  Commonly known as:  INSULIN SYRINGE  As directed for lantus insulin     linezolid 600 mg tablet  Commonly known as:  ZYVOX  Take 1 Tab by mouth two (2) times a day. lisinopril 20 mg tablet  Commonly known as:  PRINIVIL, ZESTRIL  Take 1 Tab by mouth daily. metFORMIN 500 mg tablet  Commonly known as:  GLUCOPHAGE  Take 2 in the morning and one in the evening     mupirocin 2 % ointment  Commonly known as:  BACTROBAN  Apply  to affected area three (3) times daily.  APPLY TO left second toe, affected site _______________________________       Sussy Collins acting as a scribe for and in the presence of Clyde Israel DO      March 04, 2019 at 10:57 PM       Provider Attestation:      I personally performed the services described in the documentation, reviewed the documentation, as recorded by the scribe in my presence, and it accurately and completely records my words and actions.  March 04, 2019 at 10:57 PM - Clyde DEVLIN DO        _______________________________

## 2019-03-05 NOTE — DISCHARGE INSTRUCTIONS
Patient Education        Numbness and Tingling: Care Instructions  Your Care Instructions    Many things can cause numbness or tingling. Swelling may put pressure on a nerve. This could cause you to lose feeling or have a pins-and-needles sensation on part of your body. Nerves may be damaged from trauma, toxins, or diseases, such as diabetes or multiple sclerosis (MS). Sometimes, though, the cause is not clear. If there is no clear reason for your symptoms, and you are not having any other symptoms, your doctor may suggest watching and waiting for a while to see if the numbness or tingling goes away on its own. Your doctor may want you to have blood or nerve tests to find the cause of your symptoms. Follow-up care is a key part of your treatment and safety. Be sure to make and go to all appointments, and call your doctor if you are having problems. It's also a good idea to know your test results and keep a list of the medicines you take. How can you care for yourself at home? · If your doctor prescribes medicine, take it exactly as directed. Call your doctor if you think you are having a problem with your medicine. · If you have any swelling, put ice or a cold pack on the area for 10 to 20 minutes at a time. Put a thin cloth between the ice and your skin. When should you call for help? Call 911 anytime you think you may need emergency care. For example, call if:    · You have weakness, numbness, or tingling in both legs.     · You lose bowel or bladder control.     · You have symptoms of a stroke. These may include:  ? Sudden numbness, tingling, weakness, or loss of movement in your face, arm, or leg, especially on only one side of your body. ? Sudden vision changes. ? Sudden trouble speaking. ? Sudden confusion or trouble understanding simple statements. ? Sudden problems with walking or balance.   ? A sudden, severe headache that is different from past headaches.    Watch closely for changes in your health, and be sure to contact your doctor if you have any problems, or if:    · You do not get better as expected. Where can you learn more? Go to http://taye-triny.info/. Enter F342 in the search box to learn more about \"Numbness and Tingling: Care Instructions. \"  Current as of: Jeanette 3, 2018  Content Version: 11.9  © 0996-4627 [employer]. Care instructions adapted under license by [employer] (which disclaims liability or warranty for this information). If you have questions about a medical condition or this instruction, always ask your healthcare professional. [address] any warranty or liability for your use of this information.

## 2019-03-05 NOTE — ED NOTES
I have reviewed discharge instructions with the patient. The patient verbalized understanding. Patient decided to leave before all testing had resulted. Informed patient to follow up with  tomorrow. Informed patient she should stay until the results are processed.

## 2019-03-28 ENCOUNTER — OFFICE VISIT (OUTPATIENT)
Dept: FAMILY MEDICINE CLINIC | Facility: CLINIC | Age: 44
End: 2019-03-28

## 2019-03-28 VITALS
HEART RATE: 103 BPM | RESPIRATION RATE: 16 BRPM | BODY MASS INDEX: 31.83 KG/M2 | TEMPERATURE: 95.6 F | DIASTOLIC BLOOD PRESSURE: 98 MMHG | WEIGHT: 202.8 LBS | HEIGHT: 67 IN | OXYGEN SATURATION: 100 % | SYSTOLIC BLOOD PRESSURE: 154 MMHG

## 2019-03-28 DIAGNOSIS — R05.9 COUGH: ICD-10-CM

## 2019-03-28 DIAGNOSIS — Z12.31 SCREENING MAMMOGRAM, ENCOUNTER FOR: ICD-10-CM

## 2019-03-28 DIAGNOSIS — I10 ESSENTIAL HYPERTENSION: ICD-10-CM

## 2019-03-28 DIAGNOSIS — E11.8 TYPE 2 DIABETES MELLITUS WITH COMPLICATION, UNSPECIFIED WHETHER LONG TERM INSULIN USE: ICD-10-CM

## 2019-03-28 DIAGNOSIS — B37.31 VAGINAL CANDIDIASIS: ICD-10-CM

## 2019-03-28 DIAGNOSIS — R20.2 LEFT LEG PARESTHESIAS: ICD-10-CM

## 2019-03-28 LAB — HBA1C MFR BLD HPLC: 12.3 %

## 2019-03-28 RX ORDER — LISINOPRIL 20 MG/1
20 TABLET ORAL DAILY
Qty: 30 TAB | Refills: 0 | Status: CANCELLED | OUTPATIENT
Start: 2019-03-28

## 2019-03-28 RX ORDER — LOSARTAN POTASSIUM 50 MG/1
50 TABLET ORAL DAILY
Qty: 30 TAB | Refills: 6 | Status: SHIPPED | OUTPATIENT
Start: 2019-03-28 | End: 2019-11-03 | Stop reason: SDUPTHER

## 2019-03-28 RX ORDER — AMLODIPINE BESYLATE 10 MG/1
10 TABLET ORAL DAILY
Qty: 30 TAB | Refills: 0 | Status: SHIPPED | OUTPATIENT
Start: 2019-03-28 | End: 2019-06-10 | Stop reason: SDUPTHER

## 2019-03-28 RX ORDER — INSULIN GLARGINE 100 [IU]/ML
INJECTION, SOLUTION SUBCUTANEOUS
Qty: 1 VIAL | Refills: 12 | Status: SHIPPED | OUTPATIENT
Start: 2019-03-28 | End: 2020-03-29

## 2019-03-28 RX ORDER — FLUCONAZOLE 150 MG/1
150 TABLET ORAL
Qty: 1 TAB | Refills: 0 | Status: SHIPPED | OUTPATIENT
Start: 2019-03-28 | End: 2019-03-28

## 2019-03-28 RX ORDER — METFORMIN HYDROCHLORIDE 500 MG/1
500 TABLET ORAL 2 TIMES DAILY WITH MEALS
Qty: 60 TAB | Refills: 6 | Status: SHIPPED | OUTPATIENT
Start: 2019-03-28 | End: 2019-10-29 | Stop reason: ALTCHOICE

## 2019-03-28 NOTE — PATIENT INSTRUCTIONS
Call in 2 weeks with blood sugar readings and we with adjust the medications further  Hemoglobin A1c was 12, and we are aiming to get it down to 6-7. Noninsulin Medicines for Type 2 Diabetes: Care Instructions  Your Care Instructions    There are different types of noninsulin medicines for diabetes. Each works in a different way. But they all help you control your blood sugar. Some types help your body make insulin to lower your blood sugar. Others lower how much insulin your body needs. Some can slow how fast your body digests sugars. And some can remove extra glucose through your urine. · Alpha-glucosidase inhibitors. These keep starches from breaking down. This means that they lower the amount of glucose absorbed when you eat. They don't help your body make more insulin. So they will not cause low blood sugar unless you use them with other medicines for diabetes. They include acarbose and miglitol. · DPP-4 inhibitors. These help your body raise the level of insulin after you eat. They also help your body make less of a hormone that raises blood sugar. They include linagliptin, saxagliptin, and sitagliptin. · Incretin hormones (GLP-1 receptor agonists). Your body makes a protein that can raise your insulin level. It also can lower your blood sugar and make you less hungry. You can get shots of hormones that work the same way. They include exenatide and liraglutide. · Meglitinides. These help your body release insulin. They also help slow how your body digests sugars. So they can keep your blood sugar from rising too fast after you eat. They include nateglinide and repaglinide. · Metformin. This lowers how much glucose your liver makes. And it helps you respond better to insulin. It also lowers the amount of stored sugar that your liver releases when you are not eating. · SGLT2 inhibitors. These help to remove extra glucose through your urine. They may also help some people lose weight.  They include canagliflozin, dapagliflozin, and empagliflozin. · Sulfonylureas. These help your body release more insulin. Some work for many hours. They can cause low blood sugar if you don't eat as you planned. They include glipizide and glyburide. · Thiazolidinediones. These reduce the amount of blood glucose. They also help you respond better to insulin. They include pioglitazone and rosiglitazone. You may need to take more than one medicine for diabetes. Two or more medicines may work better to lower your blood sugar level than just one does. Follow-up care is a key part of your treatment and safety. Be sure to make and go to all appointments, and call your doctor if you are having problems. It's also a good idea to know your test results and keep a list of the medicines you take. How can you care for yourself at home? · Eat a healthy diet. Get some exercise each day. This may help you to reduce how much medicine you need. · Do not take other prescription or over-the-counter medicines, vitamins, herbal products, or supplements without talking to your doctor first. Some medicines for type 2 diabetes can cause problems with other medicines or supplements. · Tell your doctor if you plan to get pregnant. Some of these drugs are not safe for pregnant women. · Be safe with medicines. Take your medicines exactly as prescribed. Meglitinides and sulfonylureas can cause your blood sugar to drop very low. Call your doctor if you think you are having a problem with your medicine. · Check your blood sugar often. You can use a glucose monitor. Keeping track can help you know how certain foods, activities, and medicines affect your blood sugar. And it can help you keep your blood sugar from getting so low that it's not safe. When should you call for help? Call 911 anytime you think you may need emergency care.  For example, call if:    · You passed out (lost consciousness).     · You are confused or cannot think clearly.     · Your blood sugar is very high or very low.    Watch closely for changes in your health, and be sure to contact your doctor if:    · Your blood sugar stays outside the level your doctor set for you.     · You have any problems. Where can you learn more? Go to http://taye-triny.info/. Enter H153 in the search box to learn more about \"Noninsulin Medicines for Type 2 Diabetes: Care Instructions. \"  Current as of: July 25, 2018  Content Version: 11.9  © 5758-1924 [employer]. Care instructions adapted under license by [employer] (which disclaims liability or warranty for this information). If you have questions about a medical condition or this instruction, always ask your healthcare professional. [address] any warranty or liability for your use of this information.

## 2019-03-28 NOTE — PROGRESS NOTES
Nils Amaya is a 38 y.o. female here for f/u        Nils Amaya is a 38 y.o. female (: 1975) presenting to address:    Chief Complaint   Patient presents with    Diabetes     pt here for f/u       There were no vitals filed for this visit. Hearing/Vision:   No exam data present    Learning Assessment:     Learning Assessment 2019   PRIMARY LEARNER Patient   HIGHEST LEVEL OF EDUCATION - PRIMARY LEARNER  2 YEARS OF COLLEGE   BARRIERS PRIMARY LEARNER NONE   CO-LEARNER CAREGIVER No   PRIMARY LANGUAGE ENGLISH   LEARNER PREFERENCE PRIMARY DEMONSTRATION   ANSWERED BY patient   RELATIONSHIP SELF     Depression Screening:     3 most recent PHQ Screens 3/28/2019   Little interest or pleasure in doing things Not at all   Feeling down, depressed, irritable, or hopeless Not at all   Total Score PHQ 2 0     Fall Risk Assessment:   No flowsheet data found. Abuse Screening:   No flowsheet data found. Coordination of Care Questionaire:   1. Have you been to the ER, urgent care clinic since your last visit? Hospitalized since your last visit? YES [facility]    2. Have you seen or consulted any other health care providers outside of the [address] [place] [address] since your last visit? Include any pap smears or colon screening. NO    Advanced Directive:   1. Do you have an Advanced Directive? NO    2. Would you like information on Advanced Directives?  NO

## 2019-03-28 NOTE — PROGRESS NOTES
03/28/19  8:14 AM  had no chief complaint listed for this encounter. HISTORY OF PRESENT ILLNESS   This is a 38 y.o. female  with a history of hypertension and diabetes who presents to the office in follow-up. Diabetes  Blood sugar has been in the 200 range at home. She has had some polyuria and polydipsia. She's trying to adhere to her diet, however she has experienced some weight gain. Her medications are as follows;  Metformin 500 every day  lantus is 35  Unit daily  Hypertension  The patient has had a cough. The patient has no headaches, visual changes, chest pain or pressure,dyspnea, orthopnea, abdominal pain, dysuria, weakness, or paresthesias   Left side paresthesias  This has been present 2 weeks  The symptoms prompted an ED visit. CTA abd negative and her head CT was wnl  She still has symptoms, but no weakness is admitted to. No fevers, chills or night sweats  Yeast infection  Whitish discharge, mild, with some irritation, present one week, relieved with OTC monistat but returned              Current Outpatient Medications:     lisinopril (PRINIVIL, ZESTRIL) 20 mg tablet, Take 1 Tab by mouth daily. , Disp: 30 Tab, Rfl: 0    amLODIPine (NORVASC) 10 mg tablet, Take 1 Tab by mouth daily. , Disp: 30 Tab, Rfl: 0    insulin glargine (LANTUS) 100 unit/mL injection, 35 units subcutaneously every night, Disp: 1 Vial, Rfl: 12    metFORMIN (GLUCOPHAGE) 500 mg tablet, Take 2 in the morning and one in the evening, Disp: 90 Tab, Rfl: 3    linezolid (ZYVOX) 600 mg tablet, Take 1 Tab by mouth two (2) times a day., Disp: 24 Tab, Rfl: 0    cloNIDine HCl (CATAPRES) 0.1 mg tablet, Take 1 Tab by mouth two (2) times a day., Disp: 60 Tab, Rfl: 0    Insulin Syringe-Needle U-100 (INSULIN SYRINGE) 1 mL 30 gauge x 5/16 syrg, As directed for lantus insulin, Disp: 50 Syringe, Rfl: 0    ferrous sulfate 325 mg (65 mg iron) tablet, Take 1 Tab by mouth Daily (before breakfast). , Disp: 15 Tab, Rfl: 0    mupirocin (BACTROBAN) 2 % ointment, Apply  to affected area three (3) times daily. APPLY TO left second toe, affected site, Disp: 22 g, Rfl: 0  Allergies   Allergen Reactions    Pcn [Penicillins] Hives     Active Ambulatory Problems     Diagnosis Date Noted    Acute bronchitis 04/07/2017    Hyperglycemia 04/07/2017    Diabetic foot infection ([facility] 12/30/2018    Right foot infection 12/30/2018    Acute pain of right foot 12/30/2018    Diabetes ([facility] 01/11/2019    HTN (hypertension) 01/11/2019     Resolved Ambulatory Problems     Diagnosis Date Noted    No Resolved Ambulatory Problems     Past Medical History:   Diagnosis Date    Diabetes ([facility]     HTN (hypertension)        Review of Systems   Constitutional: Negative for chills, fever and malaise/fatigue. The patient denies any fever, chills, night sweats or weight loss  There has been no diaphoresis malaise or weakness   HENT: Negative for ear discharge and ear pain. Eyes: Negative for blurred vision, double vision, discharge and redness. There is no history of blurred vision, double vision or blindness. The patient does not admit to any foreign body sensation or eye pain. There are no halos around lights  The patient has noted no jaundice   Respiratory: Negative for sputum production. The patient denies any shortness of breath at rest  There is no dyspnea on exertion  The patient denies any cough, or wheezing   Cardiovascular: Negative for chest pain, palpitations and orthopnea. The patient denies any chest pain pressure or palpitations  There is no history of orthopnea or PND. The patient denies chest pain or dyspnea on exertion  There is no history of leg pain on walking  There is no history of pallor   Gastrointestinal: Negative for blood in stool, heartburn, melena and nausea.         There is no history of nausea  The patient denies vomiting  There is no history of diarrhea or constipation  The patient denies heartburn     Genitourinary: Negative for flank pain, frequency, hematuria and urgency. Whitish vaginal discharge   Musculoskeletal: Negative for back pain, myalgias and neck pain. Skin: Positive for rash. Neurological: Positive for tingling and sensory change. Negative for dizziness, speech change, focal weakness and headaches. Endo/Heme/Allergies: Negative for polydipsia. Does not bruise/bleed easily. Psychiatric/Behavioral: Negative for depression, hallucinations, memory loss, substance abuse and suicidal ideas. The patient is not nervous/anxious and does not have insomnia. Results for orders placed or performed during the hospital encounter of 03/04/19   CBC WITH AUTOMATED DIFF   Result Value Ref Range    WBC 7.6 4.6 - 13.2 K/uL    RBC 5.15 4.20 - 5.30 M/uL    HGB 11.3 (L) 12.0 - 16.0 g/dL    HCT 34.7 (L) 35.0 - 45.0 %    MCV 67.4 (L) 74.0 - 97.0 FL    MCH 21.9 (L) 24.0 - 34.0 PG    MCHC 32.6 31.0 - 37.0 g/dL    RDW 14.4 11.6 - 14.5 %    PLATELET 237 606 - 762 K/uL    MPV 10.7 9.2 - 11.8 FL    NEUTROPHILS 56 40 - 73 %    LYMPHOCYTES 35 21 - 52 %    MONOCYTES 8 3 - 10 %    EOSINOPHILS 1 0 - 5 %    BASOPHILS 0 0 - 2 %    ABS. NEUTROPHILS 4.2 1.8 - 8.0 K/UL    ABS. LYMPHOCYTES 2.7 0.9 - 3.6 K/UL    ABS. MONOCYTES 0.6 0.05 - 1.2 K/UL    ABS. EOSINOPHILS 0.1 0.0 - 0.4 K/UL    ABS.  BASOPHILS 0.0 0.0 - 0.1 K/UL    PLATELET COMMENTS LARGE PLATELETS      RBC COMMENTS OVALOCYTES  1+        RBC COMMENTS TEARDROP CELLS  1+        DF AUTOMATED     METABOLIC PANEL, COMPREHENSIVE   Result Value Ref Range    Sodium 136 136 - 145 mmol/L    Potassium 3.5 3.5 - 5.5 mmol/L    Chloride 103 100 - 108 mmol/L    CO2 25 21 - 32 mmol/L    Anion gap 8 3.0 - 18 mmol/L    Glucose 399 (H) 74 - 99 mg/dL    BUN 8 7.0 - 18 MG/DL    Creatinine 0.90 0.6 - 1.3 MG/DL    BUN/Creatinine ratio 9 (L) 12 - 20      GFR est AA >60 >60 ml/min/1.73m2    GFR est non-AA >60 >60 ml/min/1.73m2    Calcium 8.4 (L) 8.5 - 10.1 MG/DL    Bilirubin, total 0.2 0.2 - 1.0 MG/DL    ALT (SGPT) 33 13 - 56 U/L    AST (SGOT) 20 15 - 37 U/L    Alk. phosphatase 165 (H) 45 - 117 U/L    Protein, total 7.4 6.4 - 8.2 g/dL    Albumin 3.2 (L) 3.4 - 5.0 g/dL    Globulin 4.2 (H) 2.0 - 4.0 g/dL    A-G Ratio 0.8 0.8 - 1.7     HCG QL SERUM   Result Value Ref Range    HCG, Ql. NEGATIVE  NEG         There were no vitals taken for this visit. Physical Exam   Constitutional: She appears well-developed and well-nourished. No distress. HENT:   Head: Normocephalic and atraumatic. Right Ear: External ear normal.   Left Ear: External ear normal.   Mouth/Throat: No oropharyngeal exudate. Eyes: Pupils are equal, round, and reactive to light. Conjunctivae are normal. Right eye exhibits no discharge. Left eye exhibits no discharge. No scleral icterus. Neck: Neck supple. No tracheal deviation present. No thyromegaly present. Cardiovascular: Normal rate, regular rhythm and intact distal pulses. Exam reveals no gallop and no friction rub. No murmur heard. Pulmonary/Chest: Effort normal. No respiratory distress. She has no wheezes. She has no rales. She exhibits no tenderness. No accessory muscles used   Abdominal: Soft. Bowel sounds are normal. She exhibits no distension and no mass. There is no tenderness. There is no guarding. Musculoskeletal: She exhibits no edema, tenderness or deformity. Foot exam deferred per request; just seen and bandaged by podiatrist   Lymphadenopathy:     She has no cervical adenopathy. Neurological: No cranial nerve deficit. She exhibits normal muscle tone. Coordination normal.   Skin: Skin is warm and dry. No rash noted. No erythema. No pallor. No cyanosis is appreciated  The extremities are not pale  No ulcers are appreciated  No abnormal lesions are appreciated   Nursing note and vitals reviewed.     2MDM  Number of Diagnoses or Management Options  Cough:   Essential hypertension:   Left leg paresthesias:   Screening mammogram, encounter for:   Type 2 diabetes mellitus with complication, unspecified whether long term insulin use ([facility] [address]):   Vaginal candidiasis:   Diagnosis management comments: Patient has been experiencing a cough since the last visit to the office. She is on an ACE inhibitor. We will stop that medication and begin a trial of an ARB. Blood pressure is not in good control and we will follow-up on this on the next visit. The patient has had left leg and left arm paresthesias. A CAT scan was negative. We will order an MRI differential diagnosis includes peripheral neuropathy although this is not a distal neuropathy. It could also be secondary to a thalamic stroke. The patient's diabetes is not in good control and medications have been adjusted. The patient has vaginal candidiasis she deferred from the wall and ask if she wanted to examine her. We will order Diflucan and follow her response. The patient presents with left sided numbness with a differential diagnosis of thalamic CVA, peripheral neuropathy, MS, other        ASSESSMENT and PLAN    ICD-10-CM ICD-9-CM    1. Essential hypertension I10 401.9 amLODIPine (NORVASC) 10 mg tablet    Developed a cough  DC Zestril  Cozaar 50 daily   2. Type 2 diabetes mellitus with complication, unspecified whether long term insulin use (HCC) E11.8 250.90 insulin glargine (LANTUS) 100 unit/mL injection      metFORMIN (GLUCOPHAGE) 500 mg tablet    INot controlled  A1C is 12  Increase Met formin to 500 BID, call in 2 weeks for further adjustment   3. Cough R05 786.2 losartan (COZAAR) 50 mg tablet    DC lisinopril  CXR if not improving   4. Left leg paresthesias R20.2 782.0 MRI BRAIN WO CONT    leg and arm  Reviewed the ED report  Will order an MrI, findings consistent with possible Thalamic CVA   5. Vaginal candidiasis B37.3 112.1 fluconazole (DIFLUCAN) 150 mg tablet    Trial of Diflucan     Follow-up and Dispositions    · Return in about 3 months (around 6/28/2019).

## 2019-04-09 ENCOUNTER — HOSPITAL ENCOUNTER (OUTPATIENT)
Age: 44
Discharge: HOME | End: 2019-04-09
Attending: EMERGENCY MEDICINE
Payer: MEDICAID

## 2019-04-09 DIAGNOSIS — R20.2 LEFT LEG PARESTHESIAS: ICD-10-CM

## 2019-04-09 PROCEDURE — 70551 MRI BRAIN STEM W/O DYE: CPT

## 2019-04-30 ENCOUNTER — TELEPHONE (OUTPATIENT)
Dept: FAMILY MEDICINE CLINIC | Facility: CLINIC | Age: 44
End: 2019-04-30

## 2019-04-30 DIAGNOSIS — B37.31 VAGINAL CANDIDIASIS: Primary | ICD-10-CM

## 2019-04-30 RX ORDER — FLUCONAZOLE 150 MG/1
150 TABLET ORAL
Qty: 1 TAB | Refills: 0 | Status: SHIPPED | OUTPATIENT
Start: 2019-04-30 | End: 2019-06-11 | Stop reason: SDUPTHER

## 2019-04-30 NOTE — TELEPHONE ENCOUNTER
Patient would like prescription for yeast infection sent to [facility] on [address].

## 2019-05-09 ENCOUNTER — OFFICE VISIT (OUTPATIENT)
Dept: FAMILY MEDICINE CLINIC | Facility: CLINIC | Age: 44
End: 2019-05-09

## 2019-05-09 VITALS
HEIGHT: 67 IN | OXYGEN SATURATION: 99 % | SYSTOLIC BLOOD PRESSURE: 154 MMHG | WEIGHT: 202 LBS | BODY MASS INDEX: 31.71 KG/M2 | RESPIRATION RATE: 16 BRPM | DIASTOLIC BLOOD PRESSURE: 92 MMHG | TEMPERATURE: 98.6 F | HEART RATE: 101 BPM

## 2019-05-09 DIAGNOSIS — E11.8 TYPE 2 DIABETES MELLITUS WITH COMPLICATION, UNSPECIFIED WHETHER LONG TERM INSULIN USE: Primary | ICD-10-CM

## 2019-05-09 DIAGNOSIS — R60.9 EDEMA, UNSPECIFIED TYPE: ICD-10-CM

## 2019-05-09 DIAGNOSIS — I10 ESSENTIAL HYPERTENSION: ICD-10-CM

## 2019-05-09 DIAGNOSIS — E11.8 TYPE 2 DIABETES MELLITUS WITH COMPLICATION, UNSPECIFIED WHETHER LONG TERM INSULIN USE: ICD-10-CM

## 2019-05-09 RX ORDER — HYDROCHLOROTHIAZIDE 25 MG/1
12.5 TABLET ORAL DAILY
Qty: 30 TAB | Refills: 6 | Status: SHIPPED | OUTPATIENT
Start: 2019-05-09 | End: 2019-10-29 | Stop reason: SDUPTHER

## 2019-05-09 NOTE — PATIENT INSTRUCTIONS
Return one week before next appointment for labs     Learning About Diuretics for High Blood Pressure  Introduction  Diuretics help to lower blood pressure. This reduces your risk of a heart attack and stroke. It also reduces your risk of kidney disease. Diuretics cause your kidneys to remove sodium and water. They also relax the blood vessel walls. These help lower your blood pressure. Examples  · Chlorthalidone  · Hydrochlorothiazide  Possible side effects  There are some common side effects. They are:  · Too little potassium. · Feeling dizzy. · Rash. · Urinating a lot. · High blood sugar. (But this is not common.)  You may have other side effects. Check the information that comes with your medicine. What to know about taking this medicine  · You may take other medicines for blood pressure. Diuretics can help those work better. They can also prevent extra fluid in your body. · You may need to take potassium pills. Or you may have to watch how much potassium is in your food. Ask your doctor about this. · You may need blood tests to check your kidneys and your potassium level. · Take your medicines exactly as prescribed. Call your doctor if you think you are having a problem with your medicine. · Check with your doctor or pharmacist before you use any other medicines. This includes over-the-counter medicines. Make sure your doctor knows all of the medicines, vitamins, herbal products, and supplements you take. Taking some medicines together can cause problems. Where can you learn more? Go to http://taye-triny.info/. Enter T523 in the search box to learn more about \"Learning About Diuretics for High Blood Pressure. \"  Current as of: July 22, 2018  Content Version: 11.9  © 7104-6041 [employer]. Care instructions adapted under license by [employer] (which disclaims liability or warranty for this information).  If you have questions about a medical condition or this instruction, always ask your healthcare professional. [address] any warranty or liability for your use of this information. Learning About Diuretics for High Blood Pressure  Introduction  Diuretics help to lower blood pressure. This reduces your risk of a heart attack and stroke. It also reduces your risk of kidney disease. Diuretics cause your kidneys to remove sodium and water. They also relax the blood vessel walls. These help lower your blood pressure. Examples  · Chlorthalidone  · Hydrochlorothiazide  Possible side effects  There are some common side effects. They are:  · Too little potassium. · Feeling dizzy. · Rash. · Urinating a lot. · High blood sugar. (But this is not common.)  You may have other side effects. Check the information that comes with your medicine. What to know about taking this medicine  · You may take other medicines for blood pressure. Diuretics can help those work better. They can also prevent extra fluid in your body. · You may need to take potassium pills. Or you may have to watch how much potassium is in your food. Ask your doctor about this. · You may need blood tests to check your kidneys and your potassium level. · Take your medicines exactly as prescribed. Call your doctor if you think you are having a problem with your medicine. · Check with your doctor or pharmacist before you use any other medicines. This includes over-the-counter medicines. Make sure your doctor knows all of the medicines, vitamins, herbal products, and supplements you take. Taking some medicines together can cause problems. Where can you learn more? Go to http://taye-triny.info/. Enter V504 in the search box to learn more about \"Learning About Diuretics for High Blood Pressure. \"  Current as of: July 22, 2018  Content Version: 11.9  © 7113-1942 [employer].  Care instructions adapted under license by [employer] (which disclaims liability or warranty for this information). If you have questions about a medical condition or this instruction, always ask your healthcare professional. [address] any warranty or liability for your use of this information.

## 2019-05-09 NOTE — PROGRESS NOTES
05/09/19  9:34 AM  had concerns including Hypertension (here for f/u ) and Ankle swelling (ankle swelling for 4 days ). HISTORY OF PRESENT ILLNESS   This is a 38 y.o. female who presents in follow-up. She has hypertension non-insulin-dependent diabetes mellitus  Ankle swelling 5 days  This is been present for 5 days. Patient states that she has no chest pain pressure palpitations orthopnea or PND. She has no history of hepatic disease or renal problems. She is on Norvasc. There is no history of proteinuria   Diabetes mellitus type 2   improving at this time. There is no problem with medications. The patient denies any polyuria polydipsia or polyphagia. No change in sensation is noted. Last A1c 12, requests recheck next time     Current Outpatient Medications:     amLODIPine (NORVASC) 10 mg tablet, Take 1 Tab by mouth daily. , Disp: 30 Tab, Rfl: 0    insulin glargine (LANTUS) 100 unit/mL injection, 35 units subcutaneously every night, Disp: 1 Vial, Rfl: 12    metFORMIN (GLUCOPHAGE) 500 mg tablet, Take 1 Tab by mouth two (2) times daily (with meals). , Disp: 60 Tab, Rfl: 6    losartan (COZAAR) 50 mg tablet, Take 1 Tab by mouth daily. , Disp: 30 Tab, Rfl: 6    linezolid (ZYVOX) 600 mg tablet, Take 1 Tab by mouth two (2) times a day., Disp: 24 Tab, Rfl: 0    cloNIDine HCl (CATAPRES) 0.1 mg tablet, Take 1 Tab by mouth two (2) times a day., Disp: 60 Tab, Rfl: 0    Insulin Syringe-Needle U-100 (INSULIN SYRINGE) 1 mL 30 gauge x 5/16 syrg, As directed for lantus insulin, Disp: 50 Syringe, Rfl: 0    ferrous sulfate 325 mg (65 mg iron) tablet, Take 1 Tab by mouth Daily (before breakfast). , Disp: 15 Tab, Rfl: 0    mupirocin (BACTROBAN) 2 % ointment, Apply  to affected area three (3) times daily.  APPLY TO left second toe, affected site, Disp: 22 g, Rfl: 0  Allergies   Allergen Reactions    Pcn [Penicillins] Hives     Active Ambulatory Problems     Diagnosis Date Noted    Acute bronchitis 04/07/2017    Hyperglycemia 04/07/2017    Diabetic foot infection ([facility] 12/30/2018    Right foot infection 12/30/2018    Acute pain of right foot 12/30/2018    Diabetes ([facility] 01/11/2019    HTN (hypertension) 01/11/2019     Resolved Ambulatory Problems     Diagnosis Date Noted    No Resolved Ambulatory Problems     Past Medical History:   Diagnosis Date    Diabetes ([facility]     HTN (hypertension)      Review of Systems   Constitutional: Negative for chills, fever and malaise/fatigue. The patient denies any fever, chills, night sweats or weight loss  There has been no diaphoresis malaise or weakness   HENT: Negative for ear discharge and ear pain. Eyes: Negative for blurred vision, double vision, discharge and redness. There is no history of blurred vision, double vision or blindness. The patient does not admit to any foreign body sensation or eye pain. There are no halos around lights  The patient has noted no jaundice   Respiratory: Negative for sputum production. The patient denies any shortness of breath at rest  There is no dyspnea on exertion  The patient denies any cough, or wheezing   Cardiovascular: Negative for chest pain, palpitations and orthopnea. The patient denies any chest pain pressure or palpitations  There is no history of orthopnea or PND. The patient denies chest pain or dyspnea on exertion  There is no history of leg pain on walking  There is no history of pallor   Gastrointestinal: Negative for blood in stool, heartburn, melena and nausea. There is no history of nausea  The patient denies vomiting  There is no history of diarrhea or constipation  The patient denies heartburn     Genitourinary: Negative for flank pain, frequency, hematuria and urgency. Whitish vaginal discharge   Musculoskeletal: Negative for back pain, myalgias and neck pain. Skin: Positive for rash. Neurological: Positive for tingling and sensory change.  Negative for dizziness, speech change, focal weakness and headaches. Endo/Heme/Allergies: Negative for polydipsia. Does not bruise/bleed easily. Psychiatric/Behavioral: Negative for depression, hallucinations, memory loss, substance abuse and suicidal ideas. The patient is not nervous/anxious and does not have insomnia. Results for orders placed or performed in visit on 03/28/19   AMB POC HEMOGLOBIN A1C   Result Value Ref Range    Hemoglobin A1c (POC) 12.3 %     Visit Vitals  BP (!) 154/92 (BP 1 Location: Right arm, BP Patient Position: Sitting)   Pulse (!) 101   Temp 98.6 °F (37 °C) (Oral)   Resp 16   Ht 5' 7\" (1.702 m)   Wt 202 lb (91.6 kg)   SpO2 99%   BMI 31.64 kg/m²     Physical Exam   Constitutional: She appears well-developed and well-nourished. No distress. Body mass index is 31.64 kg/m². HENT:   Head: Normocephalic and atraumatic. Right Ear: External ear normal.   Left Ear: External ear normal.   Mouth/Throat: No oropharyngeal exudate. Eyes: Pupils are equal, round, and reactive to light. Conjunctivae are normal. Right eye exhibits no discharge. Left eye exhibits no discharge. No scleral icterus. Neck: Neck supple. No tracheal deviation present. No thyromegaly present. Cardiovascular: Normal rate, regular rhythm and intact distal pulses. Exam reveals no gallop and no friction rub. No murmur heard. Pulmonary/Chest: Effort normal. No respiratory distress. She has no wheezes. She has no rales. She exhibits no tenderness. No accessory muscles used   Abdominal: Soft. Bowel sounds are normal. She exhibits no distension and no mass. There is no tenderness. There is no guarding. Musculoskeletal: She exhibits no edema, tenderness or deformity. Foot exam deferred per request; just seen and bandaged by podiatrist   Lymphadenopathy:     She has no cervical adenopathy. Neurological: No cranial nerve deficit. She exhibits normal muscle tone. Coordination normal.   Skin: Skin is warm and dry. No rash noted.  No erythema. No pallor. slightly hirsute on the chins   Nursing note and vitals reviewed. MDM  Number of Diagnoses or Management Options  Edema, unspecified type:   Essential hypertension:   Type 2 diabetes mellitus with complication, unspecified whether long term insulin use [facility]):   Diagnosis management comments: The patient has essential hypertension which is not at control we will add low-dose HydroDIURIL. Patient has type 2 diabetes mellitus and we will recheck her labs on the next visit we discussed diet and exercise. The patient has edema she does not have any obvious evidence of other causes for edema so we are thinking this is either incompetent veins or the Norvasc. We will follow. ASSESSMENT and PLAN    ICD-10-CM ICD-9-CM    1. Type 2 diabetes mellitus with complication, unspecified whether long term insulin use (HCC) E11.8 250.90     Goal control is improving recheck labs next visit. The patient is doing well with her diet   2. Essential hypertension I10 401.9     Not at control we will add low-dose HydroDIURIL   3. Edema, unspecified type R60.9 782.3     Probable med effect from the Norvasc. No other symptoms, follow     Follow-up and Dispositions    · Return in about 3 months (around 8/9/2019).        lab results and schedule of future lab studies reviewed with patient

## 2019-05-09 NOTE — PROGRESS NOTES
Roderic Gottron is a 38 y.o. female here for f/u      Roderic Gottron is a 38 y.o. female (: 1975) presenting to address:    Chief Complaint   Patient presents with    Diabetes     here for f/u     Ankle swelling     ankle swelling for 4 days        Vitals:    19 0931   Pulse: (!) 101   Resp: 16   Temp: 98.6 °F (37 °C)   TempSrc: Oral   SpO2: 99%   Weight: 202 lb (91.6 kg)   Height: 5' 7\" (1.702 m)   PainSc:   0 - No pain       Hearing/Vision:   No exam data present    Learning Assessment:     Learning Assessment 2019   PRIMARY LEARNER Patient   HIGHEST LEVEL OF EDUCATION - PRIMARY LEARNER  2 YEARS OF COLLEGE   BARRIERS PRIMARY LEARNER NONE   CO-LEARNER CAREGIVER No   PRIMARY LANGUAGE ENGLISH   LEARNER PREFERENCE PRIMARY DEMONSTRATION   ANSWERED BY patient   RELATIONSHIP SELF     Depression Screening:     3 most recent PHQ Screens 2019   Little interest or pleasure in doing things Not at all   Feeling down, depressed, irritable, or hopeless Not at all   Total Score PHQ 2 0     Fall Risk Assessment:   No flowsheet data found. Abuse Screening:   No flowsheet data found. Coordination of Care Questionaire:   1. Have you been to the ER, urgent care clinic since your last visit? Hospitalized since your last visit? NO    2. Have you seen or consulted any other health care providers outside of the [address] [place] [address] since your last visit? Include any pap smears or colon screening. NO    Advanced Directive:   1. Do you have an Advanced Directive? NO    2. Would you like information on Advanced Directives?  NO

## 2019-06-10 DIAGNOSIS — I10 ESSENTIAL HYPERTENSION: ICD-10-CM

## 2019-06-11 DIAGNOSIS — B37.31 VAGINAL CANDIDIASIS: ICD-10-CM

## 2019-06-11 RX ORDER — AMLODIPINE BESYLATE 10 MG/1
10 TABLET ORAL DAILY
Qty: 30 TAB | Refills: 6 | Status: SHIPPED | OUTPATIENT
Start: 2019-06-11 | End: 2020-03-29

## 2019-06-11 NOTE — TELEPHONE ENCOUNTER
Requested Prescriptions     Pending Prescriptions Disp Refills    fluconazole (DIFLUCAN) 150 mg tablet 1 Tab 0     Sig: Take 1 Tab by mouth now for 1 dose.

## 2019-06-12 DIAGNOSIS — B37.31 VAGINAL CANDIDIASIS: Primary | ICD-10-CM

## 2019-06-12 RX ORDER — FLUCONAZOLE 150 MG/1
150 TABLET ORAL
Qty: 1 TAB | Refills: 0 | Status: SHIPPED | OUTPATIENT
Start: 2019-06-12 | End: 2019-06-12

## 2019-06-14 ENCOUNTER — TELEPHONE (OUTPATIENT)
Dept: FAMILY MEDICINE CLINIC | Facility: CLINIC | Age: 44
End: 2019-06-14

## 2019-06-17 DIAGNOSIS — B37.31 VAGINAL CANDIDIASIS: Primary | ICD-10-CM

## 2019-06-17 RX ORDER — FLUCONAZOLE 150 MG/1
150 TABLET ORAL
Qty: 1 TAB | Refills: 0 | Status: SHIPPED | OUTPATIENT
Start: 2019-06-17 | End: 2019-07-29 | Stop reason: SDUPTHER

## 2019-07-29 DIAGNOSIS — B37.31 VAGINAL CANDIDIASIS: ICD-10-CM

## 2019-07-29 RX ORDER — FLUCONAZOLE 150 MG/1
150 TABLET ORAL
Qty: 1 TAB | Refills: 0 | Status: SHIPPED | OUTPATIENT
Start: 2019-07-29 | End: 2019-11-07 | Stop reason: SDUPTHER

## 2019-10-29 ENCOUNTER — OFFICE VISIT (OUTPATIENT)
Dept: FAMILY MEDICINE CLINIC | Facility: CLINIC | Age: 44
End: 2019-10-29

## 2019-10-29 VITALS
DIASTOLIC BLOOD PRESSURE: 94 MMHG | SYSTOLIC BLOOD PRESSURE: 157 MMHG | TEMPERATURE: 97.4 F | RESPIRATION RATE: 20 BRPM | HEART RATE: 105 BPM | OXYGEN SATURATION: 96 % | HEIGHT: 67 IN | WEIGHT: 212 LBS | BODY MASS INDEX: 33.27 KG/M2

## 2019-10-29 DIAGNOSIS — R20.2 NUMBNESS AND TINGLING IN LEFT HAND: ICD-10-CM

## 2019-10-29 DIAGNOSIS — B37.31 VAGINAL CANDIDIASIS: ICD-10-CM

## 2019-10-29 DIAGNOSIS — Z12.39 SCREENING FOR BREAST CANCER: ICD-10-CM

## 2019-10-29 DIAGNOSIS — R20.0 NUMBNESS AND TINGLING IN LEFT HAND: ICD-10-CM

## 2019-10-29 DIAGNOSIS — I10 ESSENTIAL HYPERTENSION: Primary | ICD-10-CM

## 2019-10-29 DIAGNOSIS — Z13.220 SCREENING FOR CHOLESTEROL LEVEL: ICD-10-CM

## 2019-10-29 RX ORDER — METFORMIN HYDROCHLORIDE 750 MG/1
500 TABLET, EXTENDED RELEASE ORAL
Qty: 30 TAB | Refills: 1 | Status: SHIPPED | OUTPATIENT
Start: 2019-10-29 | End: 2020-07-26 | Stop reason: ALTCHOICE

## 2019-10-29 RX ORDER — HYDROCHLOROTHIAZIDE 25 MG/1
12.5 TABLET ORAL DAILY
Qty: 30 TAB | Refills: 6 | Status: SHIPPED | OUTPATIENT
Start: 2019-10-29 | End: 2020-10-23

## 2019-10-29 NOTE — PROGRESS NOTES
10/29/19  9:34 AM  had concerns including Follow Up Chronic Condition (HTN DM Room 9) and Medication Refill (Diflucan ). HISTORY OF PRESENT ILLNESS   This is a 38 y.o. female who presents in follow-up. She has hypertension non-insulin-dependent   diabetes mellitus  Cough  This has been present 3 days. No recent exposures. No fevers or chills. She sleeps on 3 pillows she states that this is because she is afraid of sleep apnea. . The patient's  says she snores. She will consider work-up in the future. Let hand numbness 3 days  This involves all the fingers with the middle ones worse. She awoke with the problem. She has no prior history of compression neuropathy. Her blood sugar is not in good control. There is no history of CVAs. She denies any median radial or ulnar neuropathy. She took nothing for relief of the symptoms but they are resolving on their own. She is had no headache and no other focal weakness. It feels better than the prior day. Nothing taken for it. This is the first episode. Hypertension  The patient has had no problem with the medication. The patient has no headaches, visual changes, chest pain or pressure,dyspnea, orthopnea, abdominal pain, dysuria, weakness, or paresthesias. BP Readings from Last 3 Encounters:   10/29/19 (!) 157/94   05/09/19 (!) 154/92   03/28/19 (!) 154/98     Lab Results   Component Value Date/Time    Sodium 136 03/04/2019 10:33 PM    Potassium 3.5 03/04/2019 10:33 PM    Chloride 103 03/04/2019 10:33 PM    CO2 25 03/04/2019 10:33 PM    Anion gap 8 03/04/2019 10:33 PM    Glucose 399 (H) 03/04/2019 10:33 PM    BUN 8 03/04/2019 10:33 PM    Creatinine 0.90 03/04/2019 10:33 PM    BUN/Creatinine ratio 9 (L) 03/04/2019 10:33 PM    GFR est AA >60 03/04/2019 10:33 PM    GFR est non-AA >60 03/04/2019 10:33 PM    Calcium 8.4 (L) 03/04/2019 10:33 PM     Key CAD CHF Meds             amLODIPine (NORVASC) 10 mg tablet (Taking) Take 1 Tab by mouth daily. hydroCHLOROthiazide (HYDRODIURIL) 25 mg tablet (Taking) Take 0.5 Tabs by mouth daily. losartan (COZAAR) 50 mg tablet (Taking) Take 1 Tab by mouth daily. cloNIDine HCl (CATAPRES) 0.1 mg tablet Take 1 Tab by mouth two (2) times a day. She has not started the hydrochlorthiazide  Diabetes mellitus type 2    The patient denies any polyuria polydipsia or polyphagia. No change in sensation is noted. Last A1c 12, requests recheck next time   She stopped taking the Metformin because of diarrhea for 2 days  Lab Results   Component Value Date/Time    Hemoglobin A1c 13.5 (H) 12/31/2018 05:15 AM    Hemoglobin A1c (POC) 12.3 03/28/2019 09:49 AM     Lab Results   Component Value Date/Time    Glucose 399 (H) 03/04/2019 10:33 PM    Glucose (POC) 265 (H) 01/26/2019 09:22 AM    Glucose,  (H) 12/27/2014 05:54 AM       Current Outpatient Medications   Medication Sig    amLODIPine (NORVASC) 10 mg tablet Take 1 Tab by mouth daily.  hydroCHLOROthiazide (HYDRODIURIL) 25 mg tablet Take 0.5 Tabs by mouth daily.  insulin glargine (LANTUS) 100 unit/mL injection 35 units subcutaneously every night    losartan (COZAAR) 50 mg tablet Take 1 Tab by mouth daily.  Insulin Syringe-Needle U-100 (INSULIN SYRINGE) 1 mL 30 gauge x 5/16 syrg As directed for lantus insulin    metFORMIN (GLUCOPHAGE) 500 mg tablet Take 1 Tab by mouth two (2) times daily (with meals).  linezolid (ZYVOX) 600 mg tablet Take 1 Tab by mouth two (2) times a day.  cloNIDine HCl (CATAPRES) 0.1 mg tablet Take 1 Tab by mouth two (2) times a day.  ferrous sulfate 325 mg (65 mg iron) tablet Take 1 Tab by mouth Daily (before breakfast).  mupirocin (BACTROBAN) 2 % ointment Apply  to affected area three (3) times daily. APPLY TO left second toe, affected site     No current facility-administered medications for this visit.       Allergies   Allergen Reactions    Pcn [Penicillins] Hives     Active Ambulatory Problems     Diagnosis Date Noted    Acute bronchitis 04/07/2017    Hyperglycemia 04/07/2017    Diabetic foot infection ([facility] 12/30/2018    Right foot infection 12/30/2018    Acute pain of right foot 12/30/2018    Diabetes ([facility] 01/11/2019    HTN (hypertension) 01/11/2019     Resolved Ambulatory Problems     Diagnosis Date Noted    No Resolved Ambulatory Problems     No Additional Past Medical History     Review of Systems   Constitutional: Negative for chills, fever and malaise/fatigue. The patient denies any fever, chills, night sweats or weight loss  There has been no diaphoresis malaise or weakness   HENT: Negative for ear discharge and ear pain. Eyes: Negative for blurred vision, double vision, discharge and redness. There is no history of blurred vision, double vision or blindness. The patient does not admit to any foreign body sensation or eye pain. There are no halos around lights  The patient has noted no jaundice   Respiratory: Negative for sputum production. The patient denies any shortness of breath at rest  There is no dyspnea on exertion  The patient denies any cough, or wheezing   Cardiovascular: Negative for chest pain, palpitations and orthopnea. The patient denies any chest pain pressure or palpitations  There is no history of orthopnea or PND. The patient denies chest pain or dyspnea on exertion  There is no history of leg pain on walking  There is no history of pallor   Gastrointestinal: Positive for diarrhea (Cause, she believes by the metformin. She stopped it 3 days ago. ). Negative for blood in stool, constipation, heartburn, melena and nausea. There is no history of nausea  The patient denies vomiting  There is no history of diarrhea or constipation  The patient denies heartburn     Genitourinary: Negative for flank pain, frequency, hematuria and urgency. Whitish vaginal discharge   Musculoskeletal: Negative for back pain, myalgias and neck pain.    Skin: Positive for rash.   Neurological: Positive for tingling and sensory change (Numbness of the left hand all fingers but the middle and ring finger more involved). Negative for dizziness, speech change, focal weakness and headaches. Endo/Heme/Allergies: Negative for polydipsia. Does not bruise/bleed easily. Psychiatric/Behavioral: Negative for depression, hallucinations, memory loss, substance abuse and suicidal ideas. The patient is not nervous/anxious and does not have insomnia. Results for orders placed or performed in visit on 03/28/19   AMB POC HEMOGLOBIN A1C   Result Value Ref Range    Hemoglobin A1c (POC) 12.3 %     Visit Vitals  BP (!) 157/94 (BP 1 Location: Right arm, BP Patient Position: Sitting)   Pulse (!) 105   Temp 97.4 °F (36.3 °C) (Oral)   Resp 20   Ht 5' 7\" (1.702 m)   Wt 212 lb (96.2 kg)   SpO2 96%   BMI 33.20 kg/m²     Physical Exam   Constitutional: She appears well-developed and well-nourished. No distress. Body mass index is 31.64 kg/m². HENT:   Head: Normocephalic and atraumatic. Right Ear: External ear normal.   Left Ear: External ear normal.   Mouth/Throat: No oropharyngeal exudate. Eyes: Pupils are equal, round, and reactive to light. Conjunctivae are normal. Right eye exhibits no discharge. Left eye exhibits no discharge. No scleral icterus. Neck: Neck supple. No tracheal deviation present. No thyromegaly present. Cardiovascular: Normal rate, regular rhythm and intact distal pulses. Exam reveals no gallop and no friction rub. No murmur heard. Pulmonary/Chest: Effort normal. No respiratory distress. She has no wheezes. She has no rales. She exhibits no tenderness. No accessory muscles used   Abdominal: Soft. Bowel sounds are normal. She exhibits no distension and no mass. There is no tenderness. There is no guarding. Genitourinary:   Genitourinary Comments: Exam deferred. The patient states that she has had vaginal discharge.    Musculoskeletal: She exhibits no edema, tenderness or deformity. Foot exam deferred per request; just seen and bandaged by podiatrist   Lymphadenopathy:     She has no cervical adenopathy. Neurological: No cranial nerve deficit. She exhibits normal muscle tone. Coordination normal.   Skin: Skin is warm and dry. No rash noted. No erythema. No pallor. slightly hirsute on the chins   Psychiatric: She has a normal mood and affect. Nursing note and vitals reviewed. Diabetic foot exam:     Left Foot:   Visual Exam: normal    Pulse DP: 2+ (normal)   Filament test: reduced sensation    Vibratory sensation: diminished      Right Foot:   Visual Exam: normal    Pulse DP: 2+ (normal)   Filament test: reduced sensation    Vibratory sensation: normal      MDM  Number of Diagnoses or Management Options  Essential hypertension:   Insulin dependent diabetes mellitus ([facility]):   Numbness and tingling in left hand:   Screening for breast cancer:   Screening for cholesterol level:   Vaginal candidiasis:     ASSESSMENT and PLAN    ICD-10-CM ICD-9-CM    1. Essential hypertensionChronic I10 401.9 hydroCHLOROthiazide (HYDRODIURIL) 25 mg tablet      CBC WITH AUTOMATED DIFF      CBC WITH AUTOMATED DIFF    Not at control we will add low-dose HydroDIURIL. She did not get it filled the last time   2. Vaginal candidiasis B37.3 112.1     One empiric dose of Diflucan, follow   3. Insulin dependent diabetes mellitus (HCC) E11.9 250.00 MICROALBUMIN, UR, RAND W/ MICROALB/CREAT RATIO    M46.5 R49.02 METABOLIC PANEL, BASIC      HEMOGLOBIN A1C WITH EAG      metFORMIN ER (GLUCOPHAGE XR) 750 mg tablet      REFERRAL TO OPHTHALMOLOGY       DIABETES FOOT EXAM      HEMOGLOBIN A1C WITH EAG      METABOLIC PANEL, BASIC      MICROALBUMIN, UR, RAND W/ MICROALB/CREAT RATIO    Ophthalmology exam referral requested. The foot exam done showing some numbness. Follow-up labs   4.  Numbness and tingling in left hand R20.0 782.0 HEMOGLOBIN A1C WITH EAG    R20.2  CT HEAD WO CONT      HEMOGLOBIN A1C WITH EAG    T of the head. Consider EMG   5. Screening for cholesterol level Z13.220 V77.91 LIPID PANEL      LIPID PANEL    Lipid panel ordered   6.  Screening for breast cancer Z12.39 V76.10 FARIDA MAMMO BI SCREENING INCL CAD    Mammogram ordered       lab results and schedule of future lab studies reviewed with patient

## 2019-10-29 NOTE — PATIENT INSTRUCTIONS
High Blood Pressure: Care Instructions  Overview    It's normal for blood pressure to go up and down throughout the day. But if it stays up, you have high blood pressure. Another name for high blood pressure is hypertension. Despite what a lot of people think, high blood pressure usually doesn't cause headaches or make you feel dizzy or lightheaded. It usually has no symptoms. But it does increase your risk of stroke, heart attack, and other problems. You and your doctor will talk about your risks of these problems based on your blood pressure. Your doctor will give you a goal for your blood pressure. Your goal will be based on your health and your age. Lifestyle changes, such as eating healthy and being active, are always important to help lower blood pressure. You might also take medicine to reach your blood pressure goal.  Follow-up care is a key part of your treatment and safety. Be sure to make and go to all appointments, and call your doctor if you are having problems. It's also a good idea to know your test results and keep a list of the medicines you take. How can you care for yourself at home? Medical treatment  · If you stop taking your medicine, your blood pressure will go back up. You may take one or more types of medicine to lower your blood pressure. Be safe with medicines. Take your medicine exactly as prescribed. Call your doctor if you think you are having a problem with your medicine. · Talk to your doctor before you start taking aspirin every day. Aspirin can help certain people lower their risk of a heart attack or stroke. But taking aspirin isn't right for everyone, because it can cause serious bleeding. · See your doctor regularly. You may need to see the doctor more often at first or until your blood pressure comes down. · If you are taking blood pressure medicine, talk to your doctor before you take decongestants or anti-inflammatory medicine, such as ibuprofen.  Some of these medicines can raise blood pressure. · Learn how to check your blood pressure at home. Lifestyle changes  · Stay at a healthy weight. This is especially important if you put on weight around the waist. Losing even 10 pounds can help you lower your blood pressure. · If your doctor recommends it, get more exercise. Walking is a good choice. Bit by bit, increase the amount you walk every day. Try for at least 30 minutes on most days of the week. You also may want to swim, bike, or do other activities. · Avoid or limit alcohol. Talk to your doctor about whether you can drink any alcohol. · Try to limit how much sodium you eat to less than 2,300 milligrams (mg) a day. Your doctor may ask you to try to eat less than 1,500 mg a day. · Eat plenty of fruits (such as bananas and oranges), vegetables, legumes, whole grains, and low-fat dairy products. · Lower the amount of saturated fat in your diet. Saturated fat is found in animal products such as milk, cheese, and meat. Limiting these foods may help you lose weight and also lower your risk for heart disease. · Do not smoke. Smoking increases your risk for heart attack and stroke. If you need help quitting, talk to your doctor about stop-smoking programs and medicines. These can increase your chances of quitting for good. When should you call for help? Call 911 anytime you think you may need emergency care. This may mean having symptoms that suggest that your blood pressure is causing a serious heart or blood vessel problem. Your blood pressure may be over 180/120. For example, call 911 if:    · You have symptoms of a heart attack. These may include:  ? Chest pain or pressure, or a strange feeling in the chest.  ? Sweating. ? Shortness of breath. ? Nausea or vomiting. ? Pain, pressure, or a strange feeling in the back, neck, jaw, or upper belly or in one or both shoulders or arms. ? Lightheadedness or sudden weakness. ? A fast or irregular heartbeat. · You have symptoms of a stroke. These may include:  ? Sudden numbness, tingling, weakness, or loss of movement in your face, arm, or leg, especially on only one side of your body. ? Sudden vision changes. ? Sudden trouble speaking. ? Sudden confusion or trouble understanding simple statements. ? Sudden problems with walking or balance. ? A sudden, severe headache that is different from past headaches. · You have severe back or belly pain. Do not wait until your blood pressure comes down on its own. Get help right away. Call your doctor now or seek immediate care if:    · Your blood pressure is much higher than normal (such as 180/120 or higher), but you don't have symptoms. · You think high blood pressure is causing symptoms, such as:  ? Severe headache.  ? Blurry vision. Watch closely for changes in your health, and be sure to contact your doctor if:    · Your blood pressure measures higher than your doctor recommends at least 2 times. That means the top number is higher or the bottom number is higher, or both. · You think you may be having side effects from your blood pressure medicine. Where can you learn more? Go to http://taye-triny.info/. Enter G082 in the search box to learn more about \"High Blood Pressure: Care Instructions. \"  Current as of: July 22, 2018  Content Version: 12.1  © 7534-8688 [employer]. Care instructions adapted under license by [employer] (which disclaims liability or warranty for this information). If you have questions about a medical condition or this instruction, always ask your healthcare professional. [place] [address] any warranty or liability for your use of this information. High Blood Pressure: Care Instructions  Overview    It's normal for blood pressure to go up and down throughout the day. But if it stays up, you have high blood pressure.  Another name for high blood pressure is hypertension. Despite what a lot of people think, high blood pressure usually doesn't cause headaches or make you feel dizzy or lightheaded. It usually has no symptoms. But it does increase your risk of stroke, heart attack, and other problems. You and your doctor will talk about your risks of these problems based on your blood pressure. Your doctor will give you a goal for your blood pressure. Your goal will be based on your health and your age. Lifestyle changes, such as eating healthy and being active, are always important to help lower blood pressure. You might also take medicine to reach your blood pressure goal.  Follow-up care is a key part of your treatment and safety. Be sure to make and go to all appointments, and call your doctor if you are having problems. It's also a good idea to know your test results and keep a list of the medicines you take. How can you care for yourself at home? Medical treatment  · If you stop taking your medicine, your blood pressure will go back up. You may take one or more types of medicine to lower your blood pressure. Be safe with medicines. Take your medicine exactly as prescribed. Call your doctor if you think you are having a problem with your medicine. · Talk to your doctor before you start taking aspirin every day. Aspirin can help certain people lower their risk of a heart attack or stroke. But taking aspirin isn't right for everyone, because it can cause serious bleeding. · See your doctor regularly. You may need to see the doctor more often at first or until your blood pressure comes down. · If you are taking blood pressure medicine, talk to your doctor before you take decongestants or anti-inflammatory medicine, such as ibuprofen. Some of these medicines can raise blood pressure. · Learn how to check your blood pressure at home. Lifestyle changes  · Stay at a healthy weight.  This is especially important if you put on weight around the waist. Losing even 10 pounds can help you lower your blood pressure. · If your doctor recommends it, get more exercise. Walking is a good choice. Bit by bit, increase the amount you walk every day. Try for at least 30 minutes on most days of the week. You also may want to swim, bike, or do other activities. · Avoid or limit alcohol. Talk to your doctor about whether you can drink any alcohol. · Try to limit how much sodium you eat to less than 2,300 milligrams (mg) a day. Your doctor may ask you to try to eat less than 1,500 mg a day. · Eat plenty of fruits (such as bananas and oranges), vegetables, legumes, whole grains, and low-fat dairy products. · Lower the amount of saturated fat in your diet. Saturated fat is found in animal products such as milk, cheese, and meat. Limiting these foods may help you lose weight and also lower your risk for heart disease. · Do not smoke. Smoking increases your risk for heart attack and stroke. If you need help quitting, talk to your doctor about stop-smoking programs and medicines. These can increase your chances of quitting for good. When should you call for help? Call 911 anytime you think you may need emergency care. This may mean having symptoms that suggest that your blood pressure is causing a serious heart or blood vessel problem. Your blood pressure may be over 180/120. For example, call 911 if:    · You have symptoms of a heart attack. These may include:  ? Chest pain or pressure, or a strange feeling in the chest.  ? Sweating. ? Shortness of breath. ? Nausea or vomiting. ? Pain, pressure, or a strange feeling in the back, neck, jaw, or upper belly or in one or both shoulders or arms. ? Lightheadedness or sudden weakness. ? A fast or irregular heartbeat. · You have symptoms of a stroke. These may include:  ? Sudden numbness, tingling, weakness, or loss of movement in your face, arm, or leg, especially on only one side of your body. ? Sudden vision changes.   ? Sudden trouble speaking. ? Sudden confusion or trouble understanding simple statements. ? Sudden problems with walking or balance. ? A sudden, severe headache that is different from past headaches. · You have severe back or belly pain. Do not wait until your blood pressure comes down on its own. Get help right away. Call your doctor now or seek immediate care if:    · Your blood pressure is much higher than normal (such as 180/120 or higher), but you don't have symptoms. · You think high blood pressure is causing symptoms, such as:  ? Severe headache.  ? Blurry vision. Watch closely for changes in your health, and be sure to contact your doctor if:    · Your blood pressure measures higher than your doctor recommends at least 2 times. That means the top number is higher or the bottom number is higher, or both. · You think you may be having side effects from your blood pressure medicine. Where can you learn more? Go to http://taye-triny.info/. Enter O873 in the search box to learn more about \"High Blood Pressure: Care Instructions. \"  Current as of: July 22, 2018  Content Version: 12.1  © 5609-0579 [employer]. Care instructions adapted under license by [employer] (which disclaims liability or warranty for this information). If you have questions about a medical condition or this instruction, always ask your healthcare professional. [address] any warranty or liability for your use of this information. High Blood Pressure: Care Instructions  Overview    It's normal for blood pressure to go up and down throughout the day. But if it stays up, you have high blood pressure. Another name for high blood pressure is hypertension. Despite what a lot of people think, high blood pressure usually doesn't cause headaches or make you feel dizzy or lightheaded. It usually has no symptoms.  But it does increase your risk of stroke, heart attack, and other problems. You and your doctor will talk about your risks of these problems based on your blood pressure. Your doctor will give you a goal for your blood pressure. Your goal will be based on your health and your age. Lifestyle changes, such as eating healthy and being active, are always important to help lower blood pressure. You might also take medicine to reach your blood pressure goal.  Follow-up care is a key part of your treatment and safety. Be sure to make and go to all appointments, and call your doctor if you are having problems. It's also a good idea to know your test results and keep a list of the medicines you take. How can you care for yourself at home? Medical treatment  · If you stop taking your medicine, your blood pressure will go back up. You may take one or more types of medicine to lower your blood pressure. Be safe with medicines. Take your medicine exactly as prescribed. Call your doctor if you think you are having a problem with your medicine. · Talk to your doctor before you start taking aspirin every day. Aspirin can help certain people lower their risk of a heart attack or stroke. But taking aspirin isn't right for everyone, because it can cause serious bleeding. · See your doctor regularly. You may need to see the doctor more often at first or until your blood pressure comes down. · If you are taking blood pressure medicine, talk to your doctor before you take decongestants or anti-inflammatory medicine, such as ibuprofen. Some of these medicines can raise blood pressure. · Learn how to check your blood pressure at home. Lifestyle changes  · Stay at a healthy weight. This is especially important if you put on weight around the waist. Losing even 10 pounds can help you lower your blood pressure. · If your doctor recommends it, get more exercise. Walking is a good choice. Bit by bit, increase the amount you walk every day.  Try for at least 30 minutes on most days of the week. You also may want to swim, bike, or do other activities. · Avoid or limit alcohol. Talk to your doctor about whether you can drink any alcohol. · Try to limit how much sodium you eat to less than 2,300 milligrams (mg) a day. Your doctor may ask you to try to eat less than 1,500 mg a day. · Eat plenty of fruits (such as bananas and oranges), vegetables, legumes, whole grains, and low-fat dairy products. · Lower the amount of saturated fat in your diet. Saturated fat is found in animal products such as milk, cheese, and meat. Limiting these foods may help you lose weight and also lower your risk for heart disease. · Do not smoke. Smoking increases your risk for heart attack and stroke. If you need help quitting, talk to your doctor about stop-smoking programs and medicines. These can increase your chances of quitting for good. When should you call for help? Call 911 anytime you think you may need emergency care. This may mean having symptoms that suggest that your blood pressure is causing a serious heart or blood vessel problem. Your blood pressure may be over 180/120. For example, call 911 if:    · You have symptoms of a heart attack. These may include:  ? Chest pain or pressure, or a strange feeling in the chest.  ? Sweating. ? Shortness of breath. ? Nausea or vomiting. ? Pain, pressure, or a strange feeling in the back, neck, jaw, or upper belly or in one or both shoulders or arms. ? Lightheadedness or sudden weakness. ? A fast or irregular heartbeat. · You have symptoms of a stroke. These may include:  ? Sudden numbness, tingling, weakness, or loss of movement in your face, arm, or leg, especially on only one side of your body. ? Sudden vision changes. ? Sudden trouble speaking. ? Sudden confusion or trouble understanding simple statements. ? Sudden problems with walking or balance. ? A sudden, severe headache that is different from past headaches.      · You have severe back or belly pain. Do not wait until your blood pressure comes down on its own. Get help right away. Call your doctor now or seek immediate care if:    · Your blood pressure is much higher than normal (such as 180/120 or higher), but you don't have symptoms. · You think high blood pressure is causing symptoms, such as:  ? Severe headache.  ? Blurry vision. Watch closely for changes in your health, and be sure to contact your doctor if:    · Your blood pressure measures higher than your doctor recommends at least 2 times. That means the top number is higher or the bottom number is higher, or both. · You think you may be having side effects from your blood pressure medicine. Where can you learn more? Go to http://taye-triny.info/. Enter A815 in the search box to learn more about \"High Blood Pressure: Care Instructions. \"  Current as of: July 22, 2018  Content Version: 12.1  © 7722-8880 [employer]. Care instructions adapted under license by [employer] (which disclaims liability or warranty for this information). If you have questions about a medical condition or this instruction, always ask your healthcare professional. [place] [address] any warranty or liability for your use of this information. Type 2 Diabetes: Care Instructions  Your Care Instructions    Type 2 diabetes is a disease that develops when the body's tissues cannot use insulin properly. Over time, the pancreas cannot make enough insulin. Insulin is a hormone that helps the body's cells use sugar (glucose) for energy. It also helps the body store extra sugar in muscle, fat, and liver cells. Without insulin, the sugar cannot get into the cells to do its work. It stays in the blood instead. This can cause high blood sugar levels. A person has diabetes when the blood sugar stays too high too much of the time.  Over time, diabetes can lead to diseases of the heart, blood vessels, nerves, kidneys, and eyes. You may be able to control your blood sugar by losing weight, eating a healthy diet, and getting daily exercise. You may also have to take insulin or other diabetes medicine. Follow-up care is a key part of your treatment and safety. Be sure to make and go to all appointments. Call your doctor if you are having problems. It's also a good idea to know your test results and keep a list of the medicines you take. How can you care for yourself at home? · Keep your blood sugar at a target level (which you set with your doctor). ? Eat a good diet that spreads carbohydrate throughout the day. Carbohydrate--the body's main source of fuel--affects blood sugar more than any other nutrient. Carbohydrate is in fruits, vegetables, milk, and yogurt. It also is in breads, cereals, vegetables such as potatoes and corn, and sugary foods such as candy and cakes. ? Aim for 30 minutes of exercise on most, preferably all, days of the week. Walking is a good choice. You also may want to do other activities, such as running, swimming, cycling, or playing tennis or team sports. If your doctor says it's okay, do muscle-strengthening exercises at least 2 times a week. ? Take your medicines exactly as prescribed. Call your doctor if you think you are having a problem with your medicine. You will get more details on the specific medicines your doctor prescribes. · Check your blood sugar as often as your doctor recommends. It is important to keep track of any symptoms you have, such as low blood sugar. Also tell your doctor if you have any changes in your activities, diet, or insulin use. · Talk to your doctor before you start taking aspirin every day. Aspirin can help certain people lower their risk of a heart attack or stroke. But taking aspirin isn't right for everyone, because it can cause serious bleeding. · Do not smoke.  If you need help quitting, talk to your doctor about stop-smoking programs and medicines. These can increase your chances of quitting for good. · Keep your cholesterol and blood pressure at normal levels. You may need to take one or more medicines to reach your goals. Take them exactly as directed. Do not stop or change a medicine without talking to your doctor first.  When should you call for help? Call 911 anytime you think you may need emergency care. For example, call if:    · You passed out (lost consciousness), or you suddenly become very sleepy or confused. (You may have very low blood sugar.)    Call your doctor now or seek immediate medical care if:    · Your blood sugar is 300 mg/dL or is higher than the level your doctor has set for you. · You have symptoms of low blood sugar, such as:  ? Sweating. ? Feeling nervous, shaky, and weak. ? Extreme hunger and slight nausea. ? Dizziness and headache.  ? Blurred vision. ? Confusion. Watch closely for changes in your health, and be sure to contact your doctor if:    · You often have problems controlling your blood sugar. · You have symptoms of long-term diabetes problems, such as:  ? New vision changes. ? New pain, numbness, or tingling in your hands or feet. ? Skin problems. Where can you learn more? Go to http://taye-triny.info/. Enter C553 in the search box to learn more about \"Type 2 Diabetes: Care Instructions. \"  Current as of: July 25, 2018  Content Version: 12.1  © 8885-7523 [employer]. Care instructions adapted under license by [employer] (which disclaims liability or warranty for this information). If you have questions about a medical condition or this instruction, always ask your healthcare professional. [place] [address] any warranty or liability for your use of this information. High Cholesterol: Care Instructions  Your Care Instructions    Cholesterol is a type of fat in your blood.  It is needed for many body functions, such as making new cells. Cholesterol is made by your body. It also comes from food you eat. High cholesterol means that you have too much of the fat in your blood. This raises your risk of a heart attack and stroke. LDL and HDL are part of your total cholesterol. LDL is the \"bad\" cholesterol. High LDL can raise your risk for heart disease, heart attack, and stroke. HDL is the \"good\" cholesterol. It helps clear bad cholesterol from the body. High HDL is linked with a lower risk of heart disease, heart attack, and stroke. Your cholesterol levels help your doctor find out your risk for having a heart attack or stroke. You and your doctor can talk about whether you need to lower your risk and what treatment is best for you. A heart-healthy lifestyle along with medicines can help lower your cholesterol and your risk. The way you choose to lower your risk will depend on how high your risk is for heart attack and stroke. It will also depend on how you feel about taking medicines. Follow-up care is a key part of your treatment and safety. Be sure to make and go to all appointments, and call your doctor if you are having problems. It's also a good idea to know your test results and keep a list of the medicines you take. How can you care for yourself at home? · Eat a variety of foods every day. Good choices include fruits, vegetables, whole grains (like oatmeal), dried beans and peas, nuts and seeds, soy products (like tofu), and fat-free or low-fat dairy products. · Replace butter, margarine, and hydrogenated or partially hydrogenated oils with olive and canola oils. (Canola oil margarine without trans fat is fine.)  · Replace red meat with fish, poultry, and soy protein (like tofu). · Limit processed and packaged foods like chips, crackers, and cookies. · Bake, broil, or steam foods. Don't vallejo them. · Be physically active. Get at least 30 minutes of exercise on most days of the week. Walking is a good choice.  You also may want to do other activities, such as running, swimming, cycling, or playing tennis or team sports. · Stay at a healthy weight or lose weight by making the changes in eating and physical activity listed above. Losing just a small amount of weight, even 5 to 10 pounds, can reduce your risk for having a heart attack or stroke. · Do not smoke. When should you call for help? Watch closely for changes in your health, and be sure to contact your doctor if:    · You need help making lifestyle changes. · You have questions about your medicine. Where can you learn more? Go to http://taye-triny.info/. Enter C158 in the search box to learn more about \"High Cholesterol: Care Instructions. \"  Current as of: July 22, 2018  Content Version: 12.1  © 8131-4691 [employer]wise, Incorporated. Care instructions adapted under license by [employer] (which disclaims liability or warranty for this information). If you have questions about a medical condition or this instruction, always ask your healthcare professional. [address] any warranty or liability for your use of this information.

## 2019-10-29 NOTE — PROGRESS NOTES
Chief Complaint   Patient presents with    Follow Up Chronic Condition     HTN DM  pt states \" that she has  stop taking the Metformin medication cause it makes her a a lot of bowel movements\"Room 9    Medication Refill     Diflucan

## 2019-10-30 LAB
ABSOLUTE LYMPHOCYTE COUNT, 10803: 2.6 K/UL (ref 1–4.8)
ANION GAP SERPL CALC-SCNC: 11 MMOL/L
AVG GLU, 10930: 324 MG/DL (ref 91–123)
BASOPHILS # BLD: 0 K/UL (ref 0–0.2)
BASOPHILS NFR BLD: 1 % (ref 0–2)
BUN SERPL-MCNC: 8 MG/DL (ref 6–22)
CALCIUM SERPL-MCNC: 9.4 MG/DL (ref 8.4–10.5)
CHLORIDE SERPL-SCNC: 103 MMOL/L (ref 98–110)
CHOLEST SERPL-MCNC: 186 MG/DL (ref 110–200)
CO2 SERPL-SCNC: 27 MMOL/L (ref 20–32)
CREAT SERPL-MCNC: 0.8 MG/DL (ref 0.5–1.2)
CREATININE, URINE: 83 MG/DL
EOSINOPHIL # BLD: 0.1 K/UL (ref 0–0.5)
EOSINOPHIL NFR BLD: 1 % (ref 0–6)
ERYTHROCYTE [DISTWIDTH] IN BLOOD BY AUTOMATED COUNT: 15.4 % (ref 10–15.5)
GFRAA, 66117: >60
GFRNA, 66118: >60
GLUCOSE SERPL-MCNC: 372 MG/DL (ref 70–99)
GRANULOCYTES,GRANS: 56 % (ref 40–75)
HBA1C MFR BLD HPLC: 12.9 % (ref 4.8–5.6)
HCT VFR BLD AUTO: 37.5 % (ref 35.1–46.5)
HDLC SERPL-MCNC: 3.8 MG/DL (ref 0–5)
HDLC SERPL-MCNC: 49 MG/DL (ref 40–59)
HGB BLD-MCNC: 11.1 G/DL (ref 11.7–15.5)
LDLC SERPL CALC-MCNC: 88 MG/DL (ref 50–99)
LYMPHOCYTES, LYMLT: 36 % (ref 20–45)
MCH RBC QN AUTO: 22 PG (ref 26–34)
MCHC RBC AUTO-ENTMCNC: 30 G/DL (ref 31–36)
MCV RBC AUTO: 73 FL (ref 80–95)
MICROALB/CREAT RATIO, 140286: 2001 MCG/MG OF CREATININE (ref 0–30)
MICROALBUMIN,URINE RANDOM 140054: 1660.8 MCG/ML (ref 0.1–17)
MONOCYTES # BLD: 0.5 K/UL (ref 0.1–1)
MONOCYTES NFR BLD: 6 % (ref 3–12)
NEUTROPHILS # BLD AUTO: 4 K/UL (ref 1.8–7.7)
PLATELET # BLD AUTO: 264 K/UL (ref 140–440)
PMV BLD AUTO: 10.4 FL (ref 9–13)
POTASSIUM SERPL-SCNC: 3.9 MMOL/L (ref 3.5–5.5)
RBC # BLD AUTO: 5.14 M/UL (ref 3.8–5.2)
SODIUM SERPL-SCNC: 141 MMOL/L (ref 133–145)
TRIGL SERPL-MCNC: 249 MG/DL (ref 40–149)
VLDLC SERPL CALC-MCNC: 50 MG/DL (ref 8–30)
WBC # BLD AUTO: 7.3 K/UL (ref 4–11)

## 2019-11-03 DIAGNOSIS — R05.9 COUGH: ICD-10-CM

## 2019-11-03 RX ORDER — LOSARTAN POTASSIUM 100 MG/1
100 TABLET ORAL DAILY
Qty: 30 TAB | Refills: 6 | Status: SHIPPED | OUTPATIENT
Start: 2019-11-03 | End: 2020-09-29 | Stop reason: SDUPTHER

## 2019-11-03 NOTE — PROGRESS NOTES
Please call. Her blood sugar is not in control. Please increase the Lantus insulin by 3 units from 35 to 38 units. She should call in 2 to 3 days with her blood sugar levels so we can increase it further if needed. In addition her kidneys are spilling protein. Going to increase her losartan to 100 mg daily.

## 2019-11-05 ENCOUNTER — TELEPHONE (OUTPATIENT)
Dept: FAMILY MEDICINE CLINIC | Facility: CLINIC | Age: 44
End: 2019-11-05

## 2019-11-05 NOTE — TELEPHONE ENCOUNTER
I returned a call to pt and pt verified name and  Pt was made aware of results and the changes in her medication for her insulin and the Losartan pt is also requesting a RX for yeast infection to be be sent to pharmacy.

## 2019-11-05 NOTE — TELEPHONE ENCOUNTER
----- Message from Iesha Simeon MD sent at 11/3/2019  2:48 PM EST -----  Please call. Her blood sugar is not in control. Please increase the Lantus insulin by 3 units from 35 to 38 units. She should call in 2 to 3 days with her blood sugar levels so we can increase it further if needed. In addition her kidneys are spilling protein. Going to increase her losartan to 100 mg daily.

## 2019-11-07 DIAGNOSIS — B37.31 VAGINAL CANDIDIASIS: ICD-10-CM

## 2019-11-08 RX ORDER — FLUCONAZOLE 150 MG/1
150 TABLET ORAL
Qty: 1 TAB | Refills: 0 | Status: SHIPPED | OUTPATIENT
Start: 2019-11-08 | End: 2019-11-08

## 2019-11-10 DIAGNOSIS — B37.31 VAGINAL CANDIDIASIS: Primary | ICD-10-CM

## 2019-11-10 RX ORDER — FLUCONAZOLE 150 MG/1
150 TABLET ORAL
Qty: 1 TAB | Refills: 0 | Status: SHIPPED | OUTPATIENT
Start: 2019-11-10 | End: 2020-03-31 | Stop reason: SDUPTHER

## 2019-11-21 ENCOUNTER — APPOINTMENT (OUTPATIENT)
Dept: GENERAL RADIOLOGY | Age: 44
End: 2019-11-21
Attending: EMERGENCY MEDICINE
Payer: MEDICAID

## 2019-11-21 ENCOUNTER — HOSPITAL ENCOUNTER (EMERGENCY)
Age: 44
Discharge: LEFT BEFORE DISCHARGE | End: 2019-11-21
Attending: EMERGENCY MEDICINE
Payer: MEDICAID

## 2019-11-21 VITALS
SYSTOLIC BLOOD PRESSURE: 140 MMHG | HEART RATE: 114 BPM | DIASTOLIC BLOOD PRESSURE: 89 MMHG | OXYGEN SATURATION: 100 % | TEMPERATURE: 99.9 F | RESPIRATION RATE: 18 BRPM

## 2019-11-21 PROCEDURE — 93005 ELECTROCARDIOGRAM TRACING: CPT

## 2019-11-21 PROCEDURE — 71045 X-RAY EXAM CHEST 1 VIEW: CPT

## 2019-11-21 PROCEDURE — 75810000275 HC EMERGENCY DEPT VISIT NO LEVEL OF CARE

## 2019-11-22 LAB
ATRIAL RATE: 114 BPM
CALCULATED P AXIS, ECG09: 57 DEGREES
CALCULATED R AXIS, ECG10: 53 DEGREES
CALCULATED T AXIS, ECG11: 37 DEGREES
DIAGNOSIS, 93000: NORMAL
P-R INTERVAL, ECG05: 138 MS
Q-T INTERVAL, ECG07: 308 MS
QRS DURATION, ECG06: 84 MS
QTC CALCULATION (BEZET), ECG08: 424 MS
VENTRICULAR RATE, ECG03: 114 BPM

## 2020-02-03 ENCOUNTER — HOSPITAL ENCOUNTER (EMERGENCY)
Age: 45
Discharge: HOME | End: 2020-02-04
Attending: EMERGENCY MEDICINE
Payer: MEDICAID

## 2020-02-03 ENCOUNTER — HOSPITAL ENCOUNTER (EMERGENCY)
Age: 45
Discharge: LEFT BEFORE DISCHARGE | End: 2020-02-03
Payer: MEDICAID

## 2020-02-03 DIAGNOSIS — R73.9 HYPERGLYCEMIA: Primary | ICD-10-CM

## 2020-02-03 LAB — GLUCOSE BLD STRIP.AUTO-MCNC: 394 MG/DL (ref 70–110)

## 2020-02-03 PROCEDURE — 85025 COMPLETE CBC W/AUTO DIFF WBC: CPT

## 2020-02-03 PROCEDURE — 99285 EMERGENCY DEPT VISIT HI MDM: CPT

## 2020-02-03 PROCEDURE — 80048 BASIC METABOLIC PNL TOTAL CA: CPT

## 2020-02-03 PROCEDURE — 96374 THER/PROPH/DIAG INJ IV PUSH: CPT

## 2020-02-03 PROCEDURE — 75810000275 HC EMERGENCY DEPT VISIT NO LEVEL OF CARE

## 2020-02-03 PROCEDURE — 82962 GLUCOSE BLOOD TEST: CPT

## 2020-02-03 RX ORDER — SODIUM CHLORIDE 9 MG/ML
1000 INJECTION, SOLUTION INTRAVENOUS ONCE
Status: COMPLETED | OUTPATIENT
Start: 2020-02-03 | End: 2020-02-04

## 2020-02-04 VITALS
WEIGHT: 202 LBS | OXYGEN SATURATION: 100 % | DIASTOLIC BLOOD PRESSURE: 95 MMHG | HEIGHT: 66 IN | BODY MASS INDEX: 32.47 KG/M2 | SYSTOLIC BLOOD PRESSURE: 156 MMHG | RESPIRATION RATE: 14 BRPM | TEMPERATURE: 98.6 F | HEART RATE: 93 BPM

## 2020-02-04 LAB
ANION GAP SERPL CALC-SCNC: 9 MMOL/L (ref 3–18)
APPEARANCE UR: CLEAR
BACTERIA URNS QL MICRO: NEGATIVE /HPF
BASOPHILS # BLD: 0 K/UL (ref 0–0.1)
BASOPHILS NFR BLD: 1 % (ref 0–2)
BILIRUB UR QL: NEGATIVE
BUN SERPL-MCNC: 14 MG/DL (ref 7–18)
BUN/CREAT SERPL: 15 (ref 12–20)
CALCIUM SERPL-MCNC: 8.6 MG/DL (ref 8.5–10.1)
CHLORIDE SERPL-SCNC: 105 MMOL/L (ref 100–111)
CO2 SERPL-SCNC: 26 MMOL/L (ref 21–32)
COLOR UR: YELLOW
CREAT SERPL-MCNC: 0.94 MG/DL (ref 0.6–1.3)
DIFFERENTIAL METHOD BLD: ABNORMAL
EOSINOPHIL # BLD: 0.1 K/UL (ref 0–0.4)
EOSINOPHIL NFR BLD: 2 % (ref 0–5)
EPITH CASTS URNS QL MICRO: NORMAL /LPF (ref 0–5)
ERYTHROCYTE [DISTWIDTH] IN BLOOD BY AUTOMATED COUNT: 14.9 % (ref 11.6–14.5)
GLUCOSE BLD STRIP.AUTO-MCNC: 209 MG/DL (ref 70–110)
GLUCOSE SERPL-MCNC: 376 MG/DL (ref 74–99)
GLUCOSE UR STRIP.AUTO-MCNC: >1000 MG/DL
HCG UR QL: NEGATIVE
HCT VFR BLD AUTO: 32.9 % (ref 35–45)
HGB BLD-MCNC: 10.4 G/DL (ref 12–16)
HGB UR QL STRIP: ABNORMAL
KETONES UR QL STRIP.AUTO: NEGATIVE MG/DL
LEUKOCYTE ESTERASE UR QL STRIP.AUTO: NEGATIVE
LYMPHOCYTES # BLD: 3 K/UL (ref 0.9–3.6)
LYMPHOCYTES NFR BLD: 38 % (ref 21–52)
MCH RBC QN AUTO: 21.9 PG (ref 24–34)
MCHC RBC AUTO-ENTMCNC: 31.6 G/DL (ref 31–37)
MCV RBC AUTO: 69.3 FL (ref 74–97)
MONOCYTES # BLD: 0.5 K/UL (ref 0.05–1.2)
MONOCYTES NFR BLD: 7 % (ref 3–10)
NEUTS SEG # BLD: 4.3 K/UL (ref 1.8–8)
NEUTS SEG NFR BLD: 52 % (ref 40–73)
NITRITE UR QL STRIP.AUTO: NEGATIVE
PH UR STRIP: 6.5 [PH] (ref 5–8)
PLATELET # BLD AUTO: 263 K/UL (ref 135–420)
PMV BLD AUTO: 11.1 FL (ref 9.2–11.8)
POTASSIUM SERPL-SCNC: 3.6 MMOL/L (ref 3.5–5.5)
PROT UR STRIP-MCNC: 100 MG/DL
RBC # BLD AUTO: 4.75 M/UL (ref 4.2–5.3)
RBC #/AREA URNS HPF: NORMAL /HPF (ref 0–5)
SODIUM SERPL-SCNC: 140 MMOL/L (ref 136–145)
SP GR UR REFRACTOMETRY: 1.02 (ref 1–1.03)
UROBILINOGEN UR QL STRIP.AUTO: 1 EU/DL (ref 0.2–1)
WBC # BLD AUTO: 8 K/UL (ref 4.6–13.2)
WBC URNS QL MICRO: NORMAL /HPF (ref 0–4)

## 2020-02-04 PROCEDURE — 74011250636 HC RX REV CODE- 250/636: Performed by: EMERGENCY MEDICINE

## 2020-02-04 PROCEDURE — 74011636637 HC RX REV CODE- 636/637: Performed by: EMERGENCY MEDICINE

## 2020-02-04 PROCEDURE — 82962 GLUCOSE BLOOD TEST: CPT

## 2020-02-04 PROCEDURE — 81025 URINE PREGNANCY TEST: CPT

## 2020-02-04 PROCEDURE — 81001 URINALYSIS AUTO W/SCOPE: CPT

## 2020-02-04 RX ADMIN — INSULIN HUMAN 5 UNITS: 100 INJECTION, SOLUTION PARENTERAL at 00:37

## 2020-02-04 RX ADMIN — SODIUM CHLORIDE 1000 ML: 900 INJECTION, SOLUTION INTRAVENOUS at 00:28

## 2020-02-04 NOTE — DISCHARGE INSTRUCTIONS
Return for pain, fever not resolving with motrin or tylenol, shortness of breath, vomiting, decreased fluid intake, weakness, numbness, dizziness, or any change or concerns. Patient Education        Learning About High Blood Sugar  What is high blood sugar? Your body turns the food you eat into glucose (sugar), which it uses for energy. But if your body isn't able to use the sugar right away, it can build up in your blood and lead to high blood sugar. When the amount of sugar in your blood stays too high for too much of the time, you may have diabetes. Diabetes is a disease that can cause serious health problems. The good news is that lifestyle changes may help you get your blood sugar back to normal and avoid or delay diabetes. What causes high blood sugar? Sugar (glucose) can build up in your blood if you:  · Are overweight. · Have a family history of diabetes. · Take certain medicines, such as steroids. What are the symptoms? Having high blood sugar may not cause any symptoms at all. Or it may make you feel very thirsty or very hungry. You may also urinate more often than usual, have blurry vision, or lose weight without trying. How is high blood sugar treated? You can take steps to lower your blood sugar level if you understand what makes it get higher. Your doctor may want you to learn how to test your blood sugar level at home. Then you can see how illness, stress, or different kinds of food or medicine raise or lower your blood sugar level. Other tests may be needed to see if you have diabetes. How can you prevent high blood sugar? · Watch your weight. If you're overweight, losing just a small amount of weight may help. Reducing fat around your waist is most important. · Limit the amount of calories, sweets, and unhealthy fat you eat. Ask your doctor if a dietitian can help you. A registered dietitian can help you create meal plans that fit your lifestyle.   · Get at least 30 minutes of exercise on most days of the week. Exercise helps control your blood sugar. It also helps you maintain a healthy weight. Walking is a good choice. You also may want to do other activities, such as running, swimming, cycling, or playing tennis or team sports. · If your doctor prescribed medicines, take them exactly as prescribed. Call your doctor if you think you are having a problem with your medicine. You will get more details on the specific medicines your doctor prescribes. Follow-up care is a key part of your treatment and safety. Be sure to make and go to all appointments, and call your doctor if you are having problems. It's also a good idea to know your test results and keep a list of the medicines you take. Where can you learn more? Go to http://taye-triny.info/. Enter O108 in the search box to learn more about \"Learning About High Blood Sugar. \"  Current as of: April 16, 2019  Content Version: 12.2  © 5379-1385 [employer]. Care instructions adapted under license by [employer] (which disclaims liability or warranty for this information). If you have questions about a medical condition or this instruction, always ask your healthcare professional. [address] any warranty or liability for your use of this information.

## 2020-02-04 NOTE — ED PROVIDER NOTES
Pt c/o freq urination and fatigued, x few days, checked gluc and was elevated. Compliant w lantus rx, not taking metformin for few months due to stomach discomfort with it. No vomit, no abd or chest pain. No fever or cough. No weakness or numbness.             Past Medical History:   Diagnosis Date    Diabetes ([facility] [address]     HTN (hypertension)        Past Surgical History:   Procedure Laterality Date    HX GYN               Family History:   Problem Relation Age of Onset    Lupus Mother     Cancer Neg Hx     Diabetes Neg Hx     Heart Disease Neg Hx     Hypertension Neg Hx        Social History     Socioeconomic History    Marital status:      Spouse name: Not on file    Number of children: Not on file    Years of education: Not on file    Highest education level: Not on file   Occupational History    Not on file   Social Needs    Financial resource strain: Not on file    Food insecurity:     Worry: Not on file     Inability: Not on file    Transportation needs:     Medical: Not on file     Non-medical: Not on file   Tobacco Use    Smoking status: Never Smoker    Smokeless tobacco: Never Used   Substance and Sexual Activity    Alcohol use: No    Drug use: No    Sexual activity: Yes     Partners: Male   Lifestyle    Physical activity:     Days per week: Not on file     Minutes per session: Not on file    Stress: Not on file   Relationships    Social connections:     Talks on phone: Not on file     Gets together: Not on file     Attends Restorationism service: Not on file     Active member of club or organization: Not on file     Attends meetings of clubs or organizations: Not on file     Relationship status: Not on file    Intimate partner violence:     Fear of current or ex partner: Not on file     Emotionally abused: Not on file     Physically abused: Not on file     Forced sexual activity: Not on file   Other Topics Concern    Not on file   Social History Narrative    Not on file ALLERGIES: Pcn [penicillins]    Review of Systems   Constitutional: Negative for fever. HENT: Negative for congestion. Respiratory: Negative for cough and shortness of breath. Cardiovascular: Negative for chest pain. Gastrointestinal: Negative for abdominal pain and vomiting. Genitourinary: Positive for frequency. Negative for dysuria. Musculoskeletal: Negative for back pain. Skin: Negative for rash. Neurological: Positive for light-headedness. All other systems reviewed and are negative. Vitals:    02/03/20 2323 02/04/20 0155   BP: (!) 158/95 (!) 156/95   Pulse: 99 93   Resp: 16 14   Temp: 98.6 °F (37 °C)    SpO2: 100% 100%   Weight: 91.6 kg (202 lb)    Height: 5' 6\" (1.676 m)             Physical Exam  Vitals signs and nursing note reviewed. Constitutional:       Appearance: She is well-developed. She is not diaphoretic. HENT:      Head: Normocephalic and atraumatic. Eyes:      Pupils: Pupils are equal, round, and reactive to light. Neck:      Musculoskeletal: Normal range of motion. Cardiovascular:      Rate and Rhythm: Normal rate and regular rhythm. Heart sounds: No murmur. Pulmonary:      Effort: Pulmonary effort is normal.      Breath sounds: No wheezing. Abdominal:      Palpations: Abdomen is soft. Tenderness: There is no abdominal tenderness. Musculoskeletal:         General: No tenderness. Skin:     General: Skin is dry. Capillary Refill: Capillary refill takes less than 2 seconds. Findings: No rash. Neurological:      Mental Status: She is alert and oriented to person, place, and time. MDM       Procedures      Vitals:  No data found.       Medications ordered:   Medications   0.9% sodium chloride infusion 1,000 mL (0 mL IntraVENous IV Completed 2/4/20 0128)   insulin regular (NOVOLIN R, HUMULIN R) injection 5 Units (5 Units IntraVENous Given 2/4/20 0037)         Lab findings:  No results found for this or any previous visit (from the past 12 hour(s)). X-Ray, CT or other radiology findings or impressions:  No orders to display       Progress notes, Consult notes or additional Procedure notes:   Gluc 209, markedly improved, no sx's, agrees w dc plan. Not c/w dka, no emc.  stablef or dc and close f/u. Also not c/w cad/bacteremia/sepsis/cva. .  Pt verb und of detailed ret inst given. Diagnosis:   1. Hyperglycemia        Disposition: home    Follow-up Information     Follow up With Specialties Details Why Contact Info    Roselyn Chin MD Internal Medicine Schedule an appointment as soon as possible for a visit in 2 days  [address] [place] [address] 15 [facility]      [address] [facility] EMERGENCY DEPT Emergency Medicine Go to  [address] [place] [address]  [phone]           Discharge Medication List as of 2/4/2020  1:49 AM      CONTINUE these medications which have NOT CHANGED    Details   losartan (COZAAR) 100 mg tablet Take 1 Tab by mouth daily. , Normal, Disp-30 Tab, R-6      hydroCHLOROthiazide (HYDRODIURIL) 25 mg tablet Take 0.5 Tabs by mouth daily. , Normal, Disp-30 Tab, R-6      metFORMIN ER (GLUCOPHAGE XR) 750 mg tablet Take 0.5 Tabs by mouth daily (with dinner). , Normal, Disp-30 Tab, R-1      amLODIPine (NORVASC) 10 mg tablet Take 1 Tab by mouth daily. , Normal, Disp-30 Tab, R-6      insulin glargine (LANTUS) 100 unit/mL injection 35 units subcutaneously every night, Normal, Disp-1 Vial, R-12      linezolid (ZYVOX) 600 mg tablet Take 1 Tab by mouth two (2) times a day., Print, Disp-24 Tab, R-0      cloNIDine HCl (CATAPRES) 0.1 mg tablet Take 1 Tab by mouth two (2) times a day., Print, Disp-60 Tab, R-0      Insulin Syringe-Needle U-100 (INSULIN SYRINGE) 1 mL 30 gauge x 5/16 syrg As directed for lantus insulin, Print, Disp-50 Syringe, R-0      ferrous sulfate 325 mg (65 mg iron) tablet Take 1 Tab by mouth Daily (before breakfast). , Print, Disp-15 Tab, R-0      mupirocin (BACTROBAN) 2 % ointment Apply  to affected area three (3) times daily.  APPLY TO left second toe, affected site, Print, Disp-22 g, R-0

## 2020-03-31 DIAGNOSIS — B37.31 VAGINAL CANDIDIASIS: ICD-10-CM

## 2020-03-31 RX ORDER — FLUCONAZOLE 150 MG/1
150 TABLET ORAL
Qty: 1 TAB | Refills: 0 | Status: SHIPPED | OUTPATIENT
Start: 2020-03-31 | End: 2020-03-31

## 2020-03-31 NOTE — TELEPHONE ENCOUNTER
Patient states she has yeast infection and request refill. Requested Prescriptions     Pending Prescriptions Disp Refills    fluconazole (Diflucan) 150 mg tablet 1 Tab 0     Sig: Take 1 Tab by mouth now for 1 dose.

## 2020-05-06 ENCOUNTER — TELEPHONE (OUTPATIENT)
Dept: FAMILY MEDICINE CLINIC | Facility: CLINIC | Age: 45
End: 2020-05-06

## 2020-05-06 RX ORDER — FLUCONAZOLE 150 MG/1
150 TABLET ORAL
Qty: 1 TAB | Refills: 0 | Status: SHIPPED | OUTPATIENT
Start: 2020-05-06 | End: 2020-05-06

## 2020-05-06 NOTE — TELEPHONE ENCOUNTER
Patient called stating she needs a Rx for yeast infection  That she gets from being diabetic.   Please call her back at [phone]

## 2020-05-08 RX ORDER — FLUCONAZOLE 150 MG/1
150 TABLET ORAL
Qty: 1 TAB | Refills: 0 | Status: SHIPPED | OUTPATIENT
Start: 2020-05-08 | End: 2020-07-23 | Stop reason: SDUPTHER

## 2020-06-03 ENCOUNTER — VIRTUAL VISIT (OUTPATIENT)
Dept: FAMILY MEDICINE CLINIC | Facility: CLINIC | Age: 45
End: 2020-06-03

## 2020-06-03 DIAGNOSIS — E11.8 TYPE 2 DIABETES MELLITUS WITH COMPLICATION (HCC): ICD-10-CM

## 2020-06-03 DIAGNOSIS — I10 ESSENTIAL HYPERTENSION: ICD-10-CM

## 2020-06-03 DIAGNOSIS — Z13.220 SCREENING FOR CHOLESTEROL LEVEL: ICD-10-CM

## 2020-06-03 DIAGNOSIS — R20.2 LEFT LEG PARESTHESIAS: ICD-10-CM

## 2020-06-03 DIAGNOSIS — R60.9 PERIPHERAL EDEMA: ICD-10-CM

## 2020-06-03 DIAGNOSIS — E11.8 TYPE 2 DIABETES MELLITUS WITH COMPLICATION (HCC): Primary | ICD-10-CM

## 2020-06-03 RX ORDER — INSULIN GLARGINE 100 [IU]/ML
INJECTION, SOLUTION SUBCUTANEOUS
Qty: 4 PEN | Refills: 5 | Status: ON HOLD | OUTPATIENT
Start: 2020-06-03 | End: 2020-10-23 | Stop reason: SDUPTHER

## 2020-06-03 NOTE — PROGRESS NOTES
Consent: Marcianne Hatchet, who was seen by synchronous (real-time) audio-video technology, and/or her healthcare decision maker, is aware that this patient-initiated, Telehealth encounter on 6/3/2020 is a billable service, with coverage as determined by her insurance carrier. She is aware that she may receive a bill and has provided verbal consent to proceed: Yes. The patient was at home and I was at the offices of the [address] [place] [address] no one else participated in the service. ASSESSMENT and PLAN    ICD-10-CM ICD-9-CM    1. Type 2 diabetes mellitus with complication (HCC) Z62.3 125.51 METABOLIC PANEL, BASIC      HEMOGLOBIN A1C WITH EAG      MICROALBUMIN, UR, RAND W/ MICROALB/CREAT RATIO      insulin glargine (LANTUS,BASAGLAR) 100 unit/mL (3 mL) inpn    Not a control. I have asked her to increase her long-acting insulin 3 units q. 3 days until the blood sugar is less than 200   2. Essential hypertension I10 401.9 CBC WITH AUTOMATED DIFF    This is a chronic problem. It is presently well controlled. We will continue the same treatment. We will obtain a true readings before adjusting meds   3. Left leg paresthesias R20.2 782.0     Left leg paresthesias presently resolved, follow. Most likely diabetic relate insulin-dependent   4. Peripheral edema R60.9 782.3 NT-PRO BNP    Work-up for cardiac hepatic renal and peripheral etiologies and proteinuric etiologies   5. Screening for cholesterol level Z13.220 V77.91 LIPID PANEL    Levels ordered this is a chronic problem. It is presently well controlled. We will continue the same treatment.           lab results and schedule of future lab studies reviewed with patient  reviewed diet, exercise and weight control  Health Maintenance Due   Topic Date Due    Pneumococcal 0-64 years (1 of 1 - PPSV23) 01/21/1982    DTaP/Tdap/Td series (1 - Tdap) 01/21/1997    PAP AKA CERVICAL CYTOLOGY  01/21/1997    A1C test (Diabetic or Prediabetic)  01/29/2020     This is a 78-year-old female who is being seen in follow-up. Peripheral edema. The patient states that the ankles have been swelling for one month. The problem is bilateral.  No travel is admitted to. She denies any chest pain or pressure no orthopnea or PND. No nocturia is admitted to. She denies any jaundice. There is no history of dysuria. She can think of no other aggravating factors. She is taken nothing for relief of the symptoms. She has had this before and it has resolved on its own. There is no history of blood clots  Hypertension  Good reading at home  130/98 today. The patient has had no problem with the medication. The patient has no headaches, visual changes, chest pain or pressure,dyspnea, orthopnea, abdominal pain, dysuria, weakness, or paresthesias. BP Readings from Last 3 Encounters:   02/04/20 (!) 156/95   11/21/19 140/89   10/29/19 (!) 157/94     Lab Results   Component Value Date/Time    Sodium 140 02/03/2020 11:34 PM    Potassium 3.6 02/03/2020 11:34 PM    Chloride 105 02/03/2020 11:34 PM    CO2 26 02/03/2020 11:34 PM    Anion gap 9 02/03/2020 11:34 PM    Glucose 376 (H) 02/03/2020 11:34 PM    BUN 14 02/03/2020 11:34 PM    Creatinine 0.94 02/03/2020 11:34 PM    BUN/Creatinine ratio 15 02/03/2020 11:34 PM    GFR est AA >60 02/03/2020 11:34 PM    GFR est non-AA >60 02/03/2020 11:34 PM    Calcium 8.6 02/03/2020 11:34 PM       Key CAD CHF Meds             amLODIPine (NORVASC) 10 mg tablet Take 1 tablet by mouth once daily    losartan (COZAAR) 100 mg tablet Take 1 Tab by mouth daily. hydroCHLOROthiazide (HYDRODIURIL) 25 mg tablet Take 0.5 Tabs by mouth daily. cloNIDine HCl (CATAPRES) 0.1 mg tablet Take 1 Tab by mouth two (2) times a day. Diabetes mellitus insulin-dependent  Blood sugar has gone to the 200 range  Taking Insulin,37 units  The patient has been in the hospital ED for uncontrolled blood sugar  The patient denies polyuria, polydipsia, or polyphagia.  There has been no problem with the medications. Wt Readings from Last 3 Encounters:   02/03/20 202 lb (91.6 kg)   10/29/19 212 lb (96.2 kg)   05/09/19 202 lb (91.6 kg)     body mass index is unknown because there is no height or weight on file. Lab Results   Component Value Date/Time    Hemoglobin A1c 12.9 (H) 10/29/2019 03:35 PM    Hemoglobin A1c (POC) 12.3 03/28/2019 09:49 AM     Lab Results   Component Value Date/Time    Glucose 376 (H) 02/03/2020 11:34 PM    Glucose (POC) 209 (H) 02/04/2020 01:43 AM    Glucose,  (H) 12/27/2014 05:54 AM     Key Antihyperglycemic Medications             insulin glargine (Lantus U-100 Insulin) 100 unit/mL injection INJECT 35 UNITS SUBCUTANEOUSLY AT NIGHT    metFORMIN ER (GLUCOPHAGE XR) 750 mg tablet Take 0.5 Tabs by mouth daily (with dinner). Current Outpatient Medications   Medication Sig    amLODIPine (NORVASC) 10 mg tablet Take 1 tablet by mouth once daily    insulin glargine (Lantus U-100 Insulin) 100 unit/mL injection INJECT 35 UNITS SUBCUTANEOUSLY AT NIGHT    losartan (COZAAR) 100 mg tablet Take 1 Tab by mouth daily.  hydroCHLOROthiazide (HYDRODIURIL) 25 mg tablet Take 0.5 Tabs by mouth daily.  metFORMIN ER (GLUCOPHAGE XR) 750 mg tablet Take 0.5 Tabs by mouth daily (with dinner).  linezolid (ZYVOX) 600 mg tablet Take 1 Tab by mouth two (2) times a day.  cloNIDine HCl (CATAPRES) 0.1 mg tablet Take 1 Tab by mouth two (2) times a day.  Insulin Syringe-Needle U-100 (INSULIN SYRINGE) 1 mL 30 gauge x 5/16 syrg As directed for lantus insulin    ferrous sulfate 325 mg (65 mg iron) tablet Take 1 Tab by mouth Daily (before breakfast).  mupirocin (BACTROBAN) 2 % ointment Apply  to affected area three (3) times daily. APPLY TO left second toe, affected site     No current facility-administered medications for this visit.       Allergies   Allergen Reactions    Pcn [Penicillins] Hives     has Acute bronchitis, Hyperglycemia, Diabetic foot infection ([address] Right foot infection, Acute pain of right foot, Insulin dependent diabetes mellitus, and HTN (hypertension) on their problem list.  Past Surgical History:   Procedure Laterality Date    HX GYN           Relationships   Social connections    Talks on phone: Not on file    Gets together: Not on file    Attends Mormon service: Not on file    Active member of club or organization: Not on file    Attends meetings of clubs or organizations: Not on file    Relationship status: Not on file     family history includes Lupus in her mother. ROS  Objective: There were no vitals taken for this visit. General: alert, cooperative, no distress   Mental  status: normal mood, behavior, speech, dress, motor activity, and thought processes, able to follow commands   HENT: NCAT. Pupils are equal.  Eyelids normal.  No obvious oral lesions are noted. Neck: no visualized mass   Musculoskeletal: Able to move the face and trunk as well as extremities without difficulty 1+ edema at the ankles is noted this is with her pressing on the ankles. Resp: no respiratory distress. Adequate excursion. Neuro: Face is symmetrical speech is normal hearing is grossly normal no ataxia   Skin: . No discoloration or lesions of concern on visible areas. Psychiatric: normal affect, consistent with stated mood, no evidence of hallucinations.      Results for orders placed or performed during the hospital encounter of 20   URINALYSIS W/ RFLX MICROSCOPIC   Result Value Ref Range    Color YELLOW      Appearance CLEAR      Specific gravity 1.017 1.005 - 1.030      pH (UA) 6.5 5.0 - 8.0      Protein 100 (A) NEG mg/dL    Glucose >1,000 (A) NEG mg/dL    Ketone NEGATIVE  NEG mg/dL    Bilirubin NEGATIVE  NEG      Blood SMALL (A) NEG      Urobilinogen 1.0 0.2 - 1.0 EU/dL    Nitrites NEGATIVE  NEG      Leukocyte Esterase NEGATIVE  NEG     HCG URINE, QL   Result Value Ref Range    HCG urine, QL NEGATIVE  NEG     CBC WITH AUTOMATED DIFF Result Value Ref Range    WBC 8.0 4.6 - 13.2 K/uL    RBC 4.75 4.20 - 5.30 M/uL    HGB 10.4 (L) 12.0 - 16.0 g/dL    HCT 32.9 (L) 35.0 - 45.0 %    MCV 69.3 (L) 74.0 - 97.0 FL    MCH 21.9 (L) 24.0 - 34.0 PG    MCHC 31.6 31.0 - 37.0 g/dL    RDW 14.9 (H) 11.6 - 14.5 %    PLATELET 407 619 - 828 K/uL    MPV 11.1 9.2 - 11.8 FL    NEUTROPHILS 52 40 - 73 %    LYMPHOCYTES 38 21 - 52 %    MONOCYTES 7 3 - 10 %    EOSINOPHILS 2 0 - 5 %    BASOPHILS 1 0 - 2 %    ABS. NEUTROPHILS 4.3 1.8 - 8.0 K/UL    ABS. LYMPHOCYTES 3.0 0.9 - 3.6 K/UL    ABS. MONOCYTES 0.5 0.05 - 1.2 K/UL    ABS. EOSINOPHILS 0.1 0.0 - 0.4 K/UL    ABS. BASOPHILS 0.0 0.0 - 0.1 K/UL    DF AUTOMATED     METABOLIC PANEL, BASIC   Result Value Ref Range    Sodium 140 136 - 145 mmol/L    Potassium 3.6 3.5 - 5.5 mmol/L    Chloride 105 100 - 111 mmol/L    CO2 26 21 - 32 mmol/L    Anion gap 9 3.0 - 18 mmol/L    Glucose 376 (H) 74 - 99 mg/dL    BUN 14 7.0 - 18 MG/DL    Creatinine 0.94 0.6 - 1.3 MG/DL    BUN/Creatinine ratio 15 12 - 20      GFR est AA >60 >60 ml/min/1.73m2    GFR est non-AA >60 >60 ml/min/1.73m2    Calcium 8.6 8.5 - 10.1 MG/DL   URINE MICROSCOPIC ONLY   Result Value Ref Range    WBC 0 to 3 0 - 4 /hpf    RBC 0 to 3 0 - 5 /hpf    Epithelial cells FEW 0 - 5 /lpf    Bacteria NEGATIVE  NEG /hpf   GLUCOSE, POC   Result Value Ref Range    Glucose (POC) 394 (H) 70 - 110 mg/dL   GLUCOSE, POC   Result Value Ref Range    Glucose (POC) 209 (H) 70 - 110 mg/dL     No results found for any visits on 06/03/20. We discussed the expected course, resolution and complications of the diagnosis(es) in detail. Medication risks, benefits, costs, interactions, and alternatives were discussed as indicated. I advised her to contact the office if her condition worsens, changes or fails to improve as anticipated. She expressed understanding with the diagnosis(es) and plan. Michael Coker is a 41 y.o. female being evaluated by a video visit encounter for concerns as above. A caregiver was present when appropriate. Due to this being a TeleHealth encounter (During YKU-93 public health emergency), evaluation of the following organ systems was limited: Vitals/Constitutional/EENT/Resp/CV/GI//MS/Neuro/Skin/Heme-Lymph-Imm. Pursuant to the emergency declaration under the [address] [place] [address] waiver authority and the [employer] Act, this Virtual  Visit was conducted, with patient's (and/or legal guardian's) consent, to reduce the patient's risk of exposure to COVID-19 and provide necessary medical care. Franc Roberts MD    This note was done with the assistance of dragon speech software.   Some inadvertent errors or omissions may be present

## 2020-06-08 ENCOUNTER — TELEPHONE (OUTPATIENT)
Dept: FAMILY MEDICINE CLINIC | Facility: CLINIC | Age: 45
End: 2020-06-08

## 2020-06-08 NOTE — TELEPHONE ENCOUNTER
Pt is requesting a RX for Compression stockings once RX is printed pt can  the RX and take to DME store to be fitted.

## 2020-06-08 NOTE — TELEPHONE ENCOUNTER
Patient states she went to [facility] on Friday, 6/5. She was told she has Venous Insufficiency. She was told to use  compression socks.

## 2020-06-09 DIAGNOSIS — I87.2 VENOUS INSUFFICIENCY: Primary | ICD-10-CM

## 2020-06-09 NOTE — TELEPHONE ENCOUNTER
TC was made to pt and pt verified name and d. o.b pt was made aware of order for Compression stockings were ready for  in our office.

## 2020-06-11 DIAGNOSIS — E11.8 TYPE 2 DIABETES MELLITUS WITH COMPLICATION (HCC): Primary | ICD-10-CM

## 2020-06-11 RX ORDER — PEN NEEDLE, DIABETIC 30 GX3/16"
NEEDLE, DISPOSABLE MISCELLANEOUS
Qty: 100 PEN NEEDLE | Refills: 11 | Status: SHIPPED | OUTPATIENT
Start: 2020-06-11 | End: 2021-08-12

## 2020-07-23 RX ORDER — FLUCONAZOLE 150 MG/1
150 TABLET ORAL
Qty: 1 TAB | Refills: 0 | Status: SHIPPED | OUTPATIENT
Start: 2020-07-23 | End: 2020-08-10 | Stop reason: SDUPTHER

## 2020-07-23 NOTE — TELEPHONE ENCOUNTER
Last seen 06/03/20  Next appt  07/29/20    Requested Prescriptions     Pending Prescriptions Disp Refills    fluconazole (Diflucan) 150 mg tablet 1 Tab 0     Sig: Take 1 Tab by mouth now for 1 dose. Patient states she has a yeast infection due to diabetes. normal...

## 2020-07-26 DIAGNOSIS — E11.8 TYPE 2 DIABETES MELLITUS WITH COMPLICATION (HCC): Primary | ICD-10-CM

## 2020-07-26 RX ORDER — METFORMIN HYDROCHLORIDE 500 MG/1
500 TABLET ORAL 2 TIMES DAILY WITH MEALS
Qty: 180 TAB | Refills: 3 | Status: SHIPPED | OUTPATIENT
Start: 2020-07-26 | End: 2020-09-29 | Stop reason: DRUGHIGH

## 2020-07-29 ENCOUNTER — VIRTUAL VISIT (OUTPATIENT)
Dept: FAMILY MEDICINE CLINIC | Facility: CLINIC | Age: 45
End: 2020-07-29

## 2020-07-29 DIAGNOSIS — I10 ESSENTIAL HYPERTENSION: Primary | ICD-10-CM

## 2020-07-29 DIAGNOSIS — L97.519 DIABETIC ULCER OF TOE OF RIGHT FOOT ASSOCIATED WITH TYPE 2 DIABETES MELLITUS, UNSPECIFIED ULCER STAGE (HCC): ICD-10-CM

## 2020-07-29 DIAGNOSIS — Z13.220 SCREENING FOR CHOLESTEROL LEVEL: ICD-10-CM

## 2020-07-29 DIAGNOSIS — E11.8 TYPE 2 DIABETES MELLITUS WITH COMPLICATION (HCC): ICD-10-CM

## 2020-07-29 DIAGNOSIS — E11.621 DIABETIC ULCER OF TOE OF RIGHT FOOT ASSOCIATED WITH TYPE 2 DIABETES MELLITUS, UNSPECIFIED ULCER STAGE (HCC): ICD-10-CM

## 2020-07-29 NOTE — PROGRESS NOTES
Consent: Ameena Ramos, who was seen by synchronous (real-time) audio-video technology, and/or her healthcare decision maker, is aware that this patient-initiated, Telehealth encounter on 7/29/2020 is a billable service, with coverage as determined by her insurance carrier. She is aware that she may receive a bill and has provided verbal consent to proceed: Yes. The patient was at home and I was at the offices of the [address] [place] [address] no one else participated in the service. ASSESSMENT and PLAN    ICD-10-CM ICD-9-CM    1. Essential hypertension  I10 401.9     This is a chronic problem. It is presently well controlled. We will continue the same treatment. 2. Type 2 diabetes mellitus with complication (HCC)  L69.3 871.56 METABOLIC PANEL, COMPREHENSIVE      HEMOGLOBIN A1C WITH EAG      MICROALBUMIN, UR, RAND W/ MICROALB/CREAT RATIO      URINALYSIS W/MICROSCOPIC    Follow-up BMP microalbumin because of the swelling in the feet, and hemoglobin A1c.   3. Screening for cholesterol level  Z13.220 V77.91     Labs ordered. 4. Diabetic ulcer of toe of right foot associated with type 2 diabetes mellitus, unspecified ulcer stage ([facility]  E11.621 250.80 REFERRAL TO PODIATRY    L97.519 707.15     Presently chest with blister formation and superficial ulcer. Instructions on ulcer care given. Referral to podiatry. Health Maintenance Due   Topic Date Due    Pneumococcal 0-64 years (1 of 1 - PPSV23) 01/21/1982    DTaP/Tdap/Td series (1 - Tdap) 01/21/1997    PAP AKA CERVICAL CYTOLOGY  01/21/1997    A1C test (Diabetic or Prediabetic)  01/29/2020         712  Subjective:   Ameena Ramos is a 41 y.o. female who was seen for follow-up.   ED visit February 2024 hyperglycemia ED visit January 2020 for carpal tunnel syndrome and edema ED visit November 2019 for cough and chest pain ED visit 2019 for essential hypertension vaginitis from Candida insulin-dependent diabetes mellitus, and numbness of the hand. Urinalysis in February did show some mild proteinuria. Foot swelling  The patient has had lower extremity swelling for least 6 months. She does have incompetent veins but has not been able to pay for the stockings. She has subsequently developed a blister on the base dorsum of the right great toe. [facility] given a cream and instructions on how to bandage it. She has not been to podiatry. There is no fevers or chills admitted to. She is a 10-year diabetic. She believes she may have some mild neuropathy. She is had no prior ulcers on the foot. Hyperlipidemia  The patient states that she is trying to adhere to a diabetic diet and low fat diet. The patient has had no problem with the medications  The patient denies muscle aches, headache, GI symptoms or difficulty with mentation. Key Antihyperlipidemia Meds     The patient is on no antihyperlipidemia meds. Lab Results   Component Value Date/Time    Cholesterol, total 186 10/29/2019 03:35 PM    HDL Cholesterol 49 10/29/2019 03:35 PM    LDL, calculated 88 10/29/2019 03:35 PM    VLDL, calculated 50 (H) 10/29/2019 03:35 PM    Triglyceride 249 (H) 10/29/2019 03:35 PM     Hypertension  She states her blood pressures been better, less than 140/90 recently. The patient has had no problem with the medication. The patient has no headaches, visual changes, chest pain or pressure,dyspnea, orthopnea, abdominal pain, dysuria, weakness, or paresthesias.   BP Readings from Last 3 Encounters:   02/04/20 (!) 156/95   11/21/19 140/89   10/29/19 (!) 157/94     Lab Results   Component Value Date/Time    Sodium 140 02/03/2020 11:34 PM    Potassium 3.6 02/03/2020 11:34 PM    Chloride 105 02/03/2020 11:34 PM    CO2 26 02/03/2020 11:34 PM    Anion gap 9 02/03/2020 11:34 PM    Glucose 376 (H) 02/03/2020 11:34 PM    BUN 14 02/03/2020 11:34 PM    Creatinine 0.94 02/03/2020 11:34 PM    BUN/Creatinine ratio 15 02/03/2020 11:34 PM GFR est AA >60 2020 11:34 PM    GFR est non-AA >60 2020 11:34 PM    Calcium 8.6 2020 11:34 PM        Key CAD CHF Meds             amLODIPine (NORVASC) 10 mg tablet Take 1 tablet by mouth once daily    losartan (COZAAR) 100 mg tablet Take 1 Tab by mouth daily. hydroCHLOROthiazide (HYDRODIURIL) 25 mg tablet Take 0.5 Tabs by mouth daily. cloNIDine HCl (CATAPRES) 0.1 mg tablet Take 1 Tab by mouth two (2) times a day. Current Outpatient Medications   Medication Sig    metFORMIN (GLUCOPHAGE) 500 mg tablet Take 1 Tab by mouth two (2) times daily (with meals).  Insulin Needles, Disposable, 31 gauge x 5/16\" ndle Use to check blood glucose BID    insulin glargine (LANTUS,BASAGLAR) 100 unit/mL (3 mL) inpn Take 37 units daily  Indications: type 2 diabetes mellitus    amLODIPine (NORVASC) 10 mg tablet Take 1 tablet by mouth once daily    losartan (COZAAR) 100 mg tablet Take 1 Tab by mouth daily.  hydroCHLOROthiazide (HYDRODIURIL) 25 mg tablet Take 0.5 Tabs by mouth daily.  linezolid (ZYVOX) 600 mg tablet Take 1 Tab by mouth two (2) times a day.  cloNIDine HCl (CATAPRES) 0.1 mg tablet Take 1 Tab by mouth two (2) times a day.  Insulin Syringe-Needle U-100 (INSULIN SYRINGE) 1 mL 30 gauge x 5/16 syrg As directed for lantus insulin    ferrous sulfate 325 mg (65 mg iron) tablet Take 1 Tab by mouth Daily (before breakfast).  mupirocin (BACTROBAN) 2 % ointment Apply  to affected area three (3) times daily. APPLY TO left second toe, affected site     No current facility-administered medications for this visit.       Allergies   Allergen Reactions    Pcn [Penicillins] Hives     has Acute bronchitis, Hyperglycemia, Diabetic foot infection ([address] Right foot infection, Acute pain of right foot, Insulin dependent diabetes mellitus, and HTN (hypertension) on their problem list.  Past Surgical History:   Procedure Laterality Date    HX GYN           Relationships   Social connections    Talks on phone: Not on file    Gets together: Not on file    Attends Yazdanism service: Not on file    Active member of club or organization: Not on file    Attends meetings of clubs or organizations: Not on file    Relationship status: Not on file     family history includes Lupus in her mother. Review of Systems   Constitutional: Negative for chills, fever and malaise/fatigue. HENT: Negative for congestion and sore throat. Eyes: Negative for blurred vision and redness. Respiratory: Negative for cough, shortness of breath and wheezing. Cardiovascular: Positive for leg swelling. Negative for chest pain. Gastrointestinal: Negative for abdominal pain, blood in stool, constipation, diarrhea and heartburn. Genitourinary: Negative for dysuria and urgency. Musculoskeletal: Negative for joint pain and myalgias. Skin:        Bolus and his blood pressure on the dorsum of the right great toe. Neurological: Negative for dizziness, sensory change, speech change and focal weakness. Endo/Heme/Allergies: Does not bruise/bleed easily. Psychiatric/Behavioral: The patient is not nervous/anxious. Physical Exam  Constitutional:       General: She is not in acute distress. HENT:      Right Ear: External ear normal.      Left Ear: External ear normal.      Mouth/Throat:      Mouth: Mucous membranes are moist.      Pharynx: Oropharynx is clear. Eyes:      General: No scleral icterus. Pupils: Pupils are equal, round, and reactive to light. Neck:      Musculoskeletal: No neck rigidity. Pulmonary:      Effort: Pulmonary effort is normal.   Musculoskeletal:         General: Swelling (Trace pitting edema both lower extremities per the patient presses on it) present. Right lower leg: No edema. Left lower leg: No edema. Skin:     Coloration: Skin is not pale. Findings: Lesion (There is a bolus on the dorsum of the right great toe that has ruptured.   Part of the bolus shows a shallow ulcer where the area has ruptured. It is about 1 cm in diameter.) present. No erythema. Neurological:      Mental Status: She is alert and oriented to person, place, and time. Gait: Gait normal.   Psychiatric:         Mood and Affect: Mood normal.         Behavior: Behavior normal.         Thought Content: Thought content normal.        Results for orders placed or performed during the hospital encounter of 02/03/20   URINALYSIS W/ RFLX MICROSCOPIC   Result Value Ref Range    Color YELLOW      Appearance CLEAR      Specific gravity 1.017 1.005 - 1.030      pH (UA) 6.5 5.0 - 8.0      Protein 100 (A) NEG mg/dL    Glucose >1,000 (A) NEG mg/dL    Ketone NEGATIVE  NEG mg/dL    Bilirubin NEGATIVE  NEG      Blood SMALL (A) NEG      Urobilinogen 1.0 0.2 - 1.0 EU/dL    Nitrites NEGATIVE  NEG      Leukocyte Esterase NEGATIVE  NEG     HCG URINE, QL   Result Value Ref Range    HCG urine, QL NEGATIVE  NEG     CBC WITH AUTOMATED DIFF   Result Value Ref Range    WBC 8.0 4.6 - 13.2 K/uL    RBC 4.75 4.20 - 5.30 M/uL    HGB 10.4 (L) 12.0 - 16.0 g/dL    HCT 32.9 (L) 35.0 - 45.0 %    MCV 69.3 (L) 74.0 - 97.0 FL    MCH 21.9 (L) 24.0 - 34.0 PG    MCHC 31.6 31.0 - 37.0 g/dL    RDW 14.9 (H) 11.6 - 14.5 %    PLATELET 031 969 - 745 K/uL    MPV 11.1 9.2 - 11.8 FL    NEUTROPHILS 52 40 - 73 %    LYMPHOCYTES 38 21 - 52 %    MONOCYTES 7 3 - 10 %    EOSINOPHILS 2 0 - 5 %    BASOPHILS 1 0 - 2 %    ABS. NEUTROPHILS 4.3 1.8 - 8.0 K/UL    ABS. LYMPHOCYTES 3.0 0.9 - 3.6 K/UL    ABS. MONOCYTES 0.5 0.05 - 1.2 K/UL    ABS. EOSINOPHILS 0.1 0.0 - 0.4 K/UL    ABS.  BASOPHILS 0.0 0.0 - 0.1 K/UL    DF AUTOMATED     METABOLIC PANEL, BASIC   Result Value Ref Range    Sodium 140 136 - 145 mmol/L    Potassium 3.6 3.5 - 5.5 mmol/L    Chloride 105 100 - 111 mmol/L    CO2 26 21 - 32 mmol/L    Anion gap 9 3.0 - 18 mmol/L    Glucose 376 (H) 74 - 99 mg/dL    BUN 14 7.0 - 18 MG/DL    Creatinine 0.94 0.6 - 1.3 MG/DL    BUN/Creatinine ratio 15 12 - 20      GFR est AA >60 >60 ml/min/1.73m2    GFR est non-AA >60 >60 ml/min/1.73m2    Calcium 8.6 8.5 - 10.1 MG/DL   URINE MICROSCOPIC ONLY   Result Value Ref Range    WBC 0 to 3 0 - 4 /hpf    RBC 0 to 3 0 - 5 /hpf    Epithelial cells FEW 0 - 5 /lpf    Bacteria NEGATIVE  NEG /hpf   GLUCOSE, POC   Result Value Ref Range    Glucose (POC) 394 (H) 70 - 110 mg/dL   GLUCOSE, POC   Result Value Ref Range    Glucose (POC) 209 (H) 70 - 110 mg/dL     No results found for any visits on 07/29/20. We discussed the expected course, resolution and complications of the diagnosis(es) in detail. Medication risks, benefits, costs, interactions, and alternatives were discussed as indicated. I advised her to contact the office if her condition worsens, changes or fails to improve as anticipated. She expressed understanding with the diagnosis(es) and plan. Miguel A Paiz is a 41 y.o. female being evaluated by a video visit encounter for concerns as above. A caregiver was present when appropriate. Due to this being a TeleHealth encounter (During [facility]ZWS-46 public [facility] emergency), evaluation of the following organ systems was limited: Vitals/Constitutional/EENT/Resp/CV/GI//MS/Neuro/Skin/Heme-Lymph-Imm. Pursuant to the emergency declaration under the [facility], 1135 waiver authority and the [employer] Act, this Virtual  Visit was conducted, with patient's (and/or legal guardian's) consent, to reduce the patient's risk of exposure to COVID-19 and provide necessary medical care. Aga Palomares MD    This note was done with the assistance of dragon speech software.   Some inadvertent errors or omissions may be present

## 2020-08-10 NOTE — TELEPHONE ENCOUNTER
Last seen 07/29/20  Next appt  None    Requested Prescriptions     Pending Prescriptions Disp Refills    fluconazole (Diflucan) 150 mg tablet 1 Tab 0     Sig: Take 1 Tab by mouth now for 1 dose.

## 2020-08-11 RX ORDER — FLUCONAZOLE 150 MG/1
150 TABLET ORAL
Qty: 1 TAB | Refills: 0 | Status: SHIPPED | OUTPATIENT
Start: 2020-08-11 | End: 2020-09-09 | Stop reason: SDUPTHER

## 2020-09-09 NOTE — TELEPHONE ENCOUNTER
Last seen 07/29/20  Next appt  09/15/20    Requested Prescriptions     Pending Prescriptions Disp Refills    fluconazole (Diflucan) 150 mg tablet 1 Tab 0     Sig: Take 1 Tab by mouth now for 1 dose.

## 2020-09-12 RX ORDER — FLUCONAZOLE 150 MG/1
150 TABLET ORAL
Qty: 1 TAB | Refills: 0 | Status: SHIPPED | OUTPATIENT
Start: 2020-09-12 | End: 2020-09-12

## 2020-09-25 DIAGNOSIS — Z13.220 SCREENING FOR CHOLESTEROL LEVEL: ICD-10-CM

## 2020-09-25 DIAGNOSIS — E11.8 TYPE 2 DIABETES MELLITUS WITH COMPLICATION (HCC): ICD-10-CM

## 2020-09-25 DIAGNOSIS — I10 ESSENTIAL HYPERTENSION: Primary | ICD-10-CM

## 2020-09-27 NOTE — PROGRESS NOTES
Consent: Antonette Irvin, who was seen by synchronous (real-time) audio-video technology, and/or her healthcare decision maker, is aware that this patient-initiated, Telehealth encounter on 9/29/2020 is a billable service, with coverage as determined by her insurance carrier. She is aware that she may receive a bill and has provided verbal consent to proceed: Yes. The patient was at home and I was at the offices of the [address] [place] [address] no one else participated in the service. Diagnoses and all orders for this visit:    1. Essential hypertension  Comments:  Previously not controlled. Follow-up accurate reading before changing meds. Orders:  -     losartan (COZAAR) 100 mg tablet; Take 1 Tab by mouth daily. 2. Type 2 diabetes mellitus with complication (HCC)  Comments:  Chronic problem, not in control. Increase Lantus to 40 units daily. Restart Metformin XR at 500 mg daily. Follow response. Recheck in 30 days. Orders:  -     metFORMIN ER (GLUCOPHAGE XR) 500 mg tablet; Take 1 Tab by mouth daily (with dinner). 3. Screening for cholesterol level    4. Diabetic ulcer of toe of right foot associated with type 2 diabetes mellitus, unspecified ulcer stage ([facility] [address]    5. Anemia, normocytic normochromic  Comments:  Chronic problem, anemia panel to be ordered on next visit and follow the hemoglobin. Orders:  -     FE+CBC/D/PLT+TIBC+BRANDI+RETIC; Future  -     TRANSFERRIN; Future  -     METHYLMALONIC ACID; Future  -     RBC, FOLATE; Future    6. Carpal tunnel syndrome of left wrist  Comments:  Chronic problem, quiescent at this time, follow. 7. Microalbuminuria  Comments:  Added back the losartan and will follow response. Orders:  -     losartan (COZAAR) 100 mg tablet; Take 1 Tab by mouth daily. 8. Difficulty sleeping  Comments:  Trial of melatonin. Refer back to sleep studies for evaluation  Orders:  -     REFERRAL TO SLEEP STUDIES  -     melatonin 5 mg cap capsule;  Take 1 Cap by mouth nightly. 9. Encounter for immunization  -     pneumococcal 23-valent (PNEUMOVAX 23) 25 mcg/0.5 mL injection; 0.5 mL by IntraMUSCular route once for 1 dose.  -     diph,Pertuss,Acell,,Tet Vac-PF (ADACEL) 2 Lf-(2.5-5-3-5 mcg)-5Lf/0.5 mL susp; 0.5 mL by IntraMUSCular route once for 1 dose. Other orders  -     pneumococcal 23-valent (PNEUMOVAX 23) 25 mcg/0.5 mL injection; 0.5 mL by IntraMUSCular route once for 1 dose. -     pneumococcal 23-valent (PNEUMOVAX 23) 25 mcg/0.5 mL injection; 0.5 mL by IntraMUSCular route once for 1 dose. Health Maintenance Due   Topic Date Due    Pneumococcal 0-64 years (1 of 1 - PPSV23) 01/21/1982    DTaP/Tdap/Td series (1 - Tdap) 01/21/1997    PAP AKA CERVICAL CYTOLOGY  01/21/1997    A1C test (Diabetic or Prediabetic)  01/29/2020    Flu Vaccine (1) 09/01/2020         712  Subjective:   Gonzalo Smith is a 41 y.o. female who was seen for follow-up. The patient was seen by us by virtual visit July 29, 2020 for hypertension, type 2 diabetes mellitus, screening for cholesterol elevation, diabetic ulcer of the great toe of the right foot for which she was referred to podiatry. She had a prior ED visit February 2020 for hyperglycemia, in ED visit January 2020 for carpal tunnel syndrome and edema, an ED visit November 2019 for cough and chest pain, ED visit 2019 for essential hypertension and vaginitis from Candida, insulin-dependent diabetes mellitus, and numbness of the hand. A urinalysis in February 2020 did show some mild proteinuria. On September 28, 2020 she had a white count of 7300 hemoglobin 9.9, MCV 92, platelet count 792,298, consistent with a normochromic normocytic anemia. Depression/insomnia  The history is provided by the patient. . This is a  new problem. The problem occurs  daily for 3 weeks. She admits to feeling down, fatigued and depressed. She also states that she has trouble sleeping at night.   She usually goes to bed at 11:00 PM.  For the last 3 weeks she has been lying in bed but not falling asleep until 530 or 6:00 in the morning and only staying asleep for 2 hours. She denies any cigarette, caffeine or chocolate in the evening prior to going to bed. She does not do anything stimulating for the hour prior to going to bed. She is somewhat despondent about her medical condition. There is no SI or HI admitted to. She also has a history of obstructive sleep apnea and was formally diagnosed in 2012. She lost her CPAP machine and has not used it for at least 5 years. . The problem has  not changed since onset. Pertinent negatives include no jumping of the legs fevers chills or night sweats. .   As far as she can tell nothing aggravates the symptoms. The patient has tried or is using nothing so far. Previous episodes were not present. .      Foot appointment October 6  With Dr.Grinke Erminio Duane to [facility] one week  On dressing and antibiotic      PE  Stage  2 left ball Oct 6              Foot swelling/diabetic ulcer  The patient has had lower extremity swelling for least 8-10 months. She does have incompetent veins but has not been able to pay for the stockings. She has subsequently developed a blister on the  of the right great toe. The primary spot now is the plantar MP joint area, ball of the foot. [facility] problem given a cream and instructions on how to bandage it. She has not been to podiatry. There is no fevers or chills admitted to. She is a 10-year diabetic. She believes she may have some mild neuropathy. She is had no prior ulcers on the foot. Hyperlipidemia  The patient states that she is trying to adhere to a diabetic diet and low fat diet. She primarily had hypertriglyceridemia  The patient is not presently taking any medications for this condition. The patient denies muscle aches, headache, GI symptoms or difficulty with mentation.     Lab Results   Component Value Date/Time    Cholesterol, total 218 (H) 09/28/2020 01:00 PM    HDL Cholesterol 49 09/28/2020 01:00 PM    LDL, calculated 121 (H) 09/28/2020 01:00 PM    VLDL, calculated 48 (H) 09/28/2020 01:00 PM    Triglyceride 241 (H) 09/28/2020 01:00 PM     Hypertension  This is a longstanding problem, present for at least 15 years. She has not checked her blood pressure in the last 4 to 5 months. The patient has had no problem with the medication. The patient has no headaches, visual changes, chest pain or pressure,dyspnea, orthopnea, abdominal pain, dysuria, weakness, or paresthesias. BP Readings from Last 3 Encounters:   02/04/20 (!) 156/95   11/21/19 140/89   10/29/19 (!) 157/94     Lab Results   Component Value Date/Time    Sodium 140 02/03/2020 11:34 PM    Potassium 3.6 02/03/2020 11:34 PM    Chloride 105 02/03/2020 11:34 PM    CO2 26 02/03/2020 11:34 PM    Anion gap 9 02/03/2020 11:34 PM    Glucose 376 (H) 02/03/2020 11:34 PM    BUN 14 02/03/2020 11:34 PM    Creatinine 0.94 02/03/2020 11:34 PM    BUN/Creatinine ratio 15 02/03/2020 11:34 PM    GFR est AA >60 02/03/2020 11:34 PM    GFR est non-AA >60 02/03/2020 11:34 PM    Calcium 8.6 02/03/2020 11:34 PM        Key CAD CHF Meds             amLODIPine (NORVASC) 10 mg tablet Take 1 tablet by mouth once daily, taking    losartan (COZAAR) 100 mg tablet Take 1 Tab by mouth daily. ,  Not taking    hydroCHLOROthiazide (HYDRODIURIL) 25 mg tablet Take 0.5 Tabs by mouth daily. ,  Taking    cloNIDine HCl (CATAPRES) 0.1 mg tablet Take 1 Tab by mouth two (2) times a day. Not taking          Diabetes mellitus insulin-dependent. The patient denies polyuria, polydipsia, or polyphagia. There has been no problem with the medications. She admits to peripheral neuropathy and has a diabetic ulcer on the ball of the foot.   Wt Readings from Last 3 Encounters:   02/03/20 202 lb (91.6 kg)   10/29/19 212 lb (96.2 kg)   05/09/19 202 lb (91.6 kg)       Lab Results   Component Value Date/Time    Hemoglobin A1c 12.9 (H) 10/29/2019 03:35 PM Hemoglobin A1c (POC) 12.3 2019 09:49 AM     Lab Results   Component Value Date/Time    Glucose 353 (H) 2020 01:00 PM    Glucose (POC) 209 (H) 2020 01:43 AM    Glucose,  (H) 2014 05:54 AM     Key Antihyperglycemic Medications             metFORMIN (GLUCOPHAGE) 500 mg tablet Take 1 Tab by mouth two (2) times daily (with meals). Not taking for least 4 months. insulin glargine (LANTUS,BASAGLAR) 100 unit/mL (3 mL) inpn Take 37 units daily  Indications: type 2 diabetes mellitus            Anemia  The patient denies bleeding or bruising. The patient has had no recent bruising. The patient admits to chronic disease including insulin-dependent diabetes mellitus, hypertension, hyperlipidemia. The diarrhea is described as none. There is no history of balance problems. There is is a history of peripheral paresthesias related to diabetic neuropathy. Lab Results   Component Value Date/Time    WBC 7.3 2020 01:00 PM    Hemoglobin, POC 14.3 2014 05:54 AM    HGB 9.9 (L) 2020 01:00 PM    Hematocrit, POC 42 2014 05:54 AM    HCT 33.7 (L) 2020 01:00 PM    PLATELET 034  01:00 PM    MCV 72 (L) 2020 01:00 PM     Past Surgical History:   Procedure Laterality Date    HX GYN                 Current Outpatient Medications   Medication Sig    metFORMIN (GLUCOPHAGE) 500 mg tablet Take 1 Tab by mouth two (2) times daily (with meals).  Insulin Needles, Disposable, 31 gauge x 5/16\" ndle Use to check blood glucose BID    insulin glargine (LANTUS,BASAGLAR) 100 unit/mL (3 mL) inpn Take 37 units daily  Indications: type 2 diabetes mellitus    amLODIPine (NORVASC) 10 mg tablet Take 1 tablet by mouth once daily    losartan (COZAAR) 100 mg tablet Take 1 Tab by mouth daily.  hydroCHLOROthiazide (HYDRODIURIL) 25 mg tablet Take 0.5 Tabs by mouth daily.  linezolid (ZYVOX) 600 mg tablet Take 1 Tab by mouth two (2) times a day.     cloNIDine HCl (CATAPRES) 0.1 mg tablet Take 1 Tab by mouth two (2) times a day.  Insulin Syringe-Needle U-100 (INSULIN SYRINGE) 1 mL 30 gauge x 5/16 syrg As directed for lantus insulin    ferrous sulfate 325 mg (65 mg iron) tablet Take 1 Tab by mouth Daily (before breakfast).  mupirocin (BACTROBAN) 2 % ointment Apply  to affected area three (3) times daily. APPLY TO left second toe, affected site     No current facility-administered medications for this visit. Allergies   Allergen Reactions    Pcn [Penicillins] Hives     has Acute bronchitis, Hyperglycemia, Diabetic foot infection ([address] Right foot infection, Acute pain of right foot, Insulin dependent diabetes mellitus, and HTN (hypertension) on their problem list.  Past Surgical History:   Procedure Laterality Date    HX GYN           Relationships   Social connections    Talks on phone: Not on file    Gets together: Not on file    Attends Christian service: Not on file    Active member of club or organization: Not on file    Attends meetings of clubs or organizations: Not on file    Relationship status: Not on file     family history includes Lupus in her mother. Review of Systems   Constitutional: Negative for chills, fever and malaise/fatigue. HENT: Negative for congestion and sore throat. Eyes: Negative for blurred vision and redness. Respiratory: Negative for cough, shortness of breath and wheezing. Cardiovascular: Positive for leg swelling. Negative for chest pain. Gastrointestinal: Negative for abdominal pain, blood in stool, constipation, diarrhea and heartburn. Genitourinary: Negative for dysuria and urgency. Musculoskeletal: Negative for joint pain and myalgias. Skin:        The blood blister is no longer present. The primary problem is a stage II ulcer on the ball of the left foot. There appears to be some surrounding 1+ edema up to the ankles and legs.    Neurological: Negative for dizziness, sensory change, speech change and focal weakness. Endo/Heme/Allergies: Does not bruise/bleed easily. Psychiatric/Behavioral: Positive for depression. Negative for suicidal ideas. The patient has insomnia (New problem for 3 weeks. Also has a history of RODNEY not treated. The patient is also depressed. ). The patient is not nervous/anxious. Physical Exam  Constitutional:       General: She is not in acute distress. HENT:      Right Ear: External ear normal.      Left Ear: External ear normal.      Mouth/Throat:      Mouth: Mucous membranes are moist.      Pharynx: Oropharynx is clear. Eyes:      General: No scleral icterus. Pupils: Pupils are equal, round, and reactive to light. Neck:      Musculoskeletal: No neck rigidity. Pulmonary:      Effort: Pulmonary effort is normal.   Musculoskeletal:         General: Swelling (Trace pitting edema both lower extremities per the patient presses on it) present. Right lower leg: No edema. Left lower leg: No edema. Skin:     Coloration: Skin is not pale. Findings: Lesion (There is a stage II ulcer on the ball of the lright foot. With no drainage there is a healed ulcer on the dorsum of the right great toe that has ruptured. Part of the boil shows a shallow ulcer where the area has ruptured. It is about 1 cm in diameter.) present. No erythema. Neurological:      Mental Status: She is alert and oriented to person, place, and time. Gait: Gait normal.   Psychiatric:         Mood and Affect: Mood normal.         Behavior: Behavior normal.         Thought Content:  Thought content normal.        Results for orders placed or performed during the hospital encounter of 02/03/20   URINALYSIS W/ RFLX MICROSCOPIC   Result Value Ref Range    Color YELLOW      Appearance CLEAR      Specific gravity 1.017 1.005 - 1.030      pH (UA) 6.5 5.0 - 8.0      Protein 100 (A) NEG mg/dL    Glucose >1,000 (A) NEG mg/dL    Ketone NEGATIVE  NEG mg/dL    Bilirubin NEGATIVE  NEG Blood SMALL (A) NEG      Urobilinogen 1.0 0.2 - 1.0 EU/dL    Nitrites NEGATIVE  NEG      Leukocyte Esterase NEGATIVE  NEG     HCG URINE, QL   Result Value Ref Range    HCG urine, QL NEGATIVE  NEG     CBC WITH AUTOMATED DIFF   Result Value Ref Range    WBC 8.0 4.6 - 13.2 K/uL    RBC 4.75 4.20 - 5.30 M/uL    HGB 10.4 (L) 12.0 - 16.0 g/dL    HCT 32.9 (L) 35.0 - 45.0 %    MCV 69.3 (L) 74.0 - 97.0 FL    MCH 21.9 (L) 24.0 - 34.0 PG    MCHC 31.6 31.0 - 37.0 g/dL    RDW 14.9 (H) 11.6 - 14.5 %    PLATELET 445 108 - 577 K/uL    MPV 11.1 9.2 - 11.8 FL    NEUTROPHILS 52 40 - 73 %    LYMPHOCYTES 38 21 - 52 %    MONOCYTES 7 3 - 10 %    EOSINOPHILS 2 0 - 5 %    BASOPHILS 1 0 - 2 %    ABS. NEUTROPHILS 4.3 1.8 - 8.0 K/UL    ABS. LYMPHOCYTES 3.0 0.9 - 3.6 K/UL    ABS. MONOCYTES 0.5 0.05 - 1.2 K/UL    ABS. EOSINOPHILS 0.1 0.0 - 0.4 K/UL    ABS. BASOPHILS 0.0 0.0 - 0.1 K/UL    DF AUTOMATED     METABOLIC PANEL, BASIC   Result Value Ref Range    Sodium 140 136 - 145 mmol/L    Potassium 3.6 3.5 - 5.5 mmol/L    Chloride 105 100 - 111 mmol/L    CO2 26 21 - 32 mmol/L    Anion gap 9 3.0 - 18 mmol/L    Glucose 376 (H) 74 - 99 mg/dL    BUN 14 7.0 - 18 MG/DL    Creatinine 0.94 0.6 - 1.3 MG/DL    BUN/Creatinine ratio 15 12 - 20      GFR est AA >60 >60 ml/min/1.73m2    GFR est non-AA >60 >60 ml/min/1.73m2    Calcium 8.6 8.5 - 10.1 MG/DL   URINE MICROSCOPIC ONLY   Result Value Ref Range    WBC 0 to 3 0 - 4 /hpf    RBC 0 to 3 0 - 5 /hpf    Epithelial cells FEW 0 - 5 /lpf    Bacteria NEGATIVE  NEG /hpf   GLUCOSE, POC   Result Value Ref Range    Glucose (POC) 394 (H) 70 - 110 mg/dL   GLUCOSE, POC   Result Value Ref Range    Glucose (POC) 209 (H) 70 - 110 mg/dL       We discussed the expected course, resolution and complications of the diagnosis(es) in detail. Medication risks, benefits, costs, interactions, and alternatives were discussed as indicated.   I advised her to contact the office if her condition worsens, changes or fails to improve as anticipated. She expressed understanding with the diagnosis(es) and plan. Audrey Mckeon is a 41 y.o. female being evaluated by a video visit encounter for concerns as above. A caregiver was present when appropriate. Due to this being a TeleHealth encounter (During EASD-69 public health emergency), evaluation of the following organ systems was limited: Vitals/Constitutional/EENT/Resp/CV/GI//MS/Neuro/Skin/Heme-Lymph-Imm. Pursuant to the emergency declaration under the [facility] waiver authority and the [employer] Act, this Virtual  Visit was conducted, with patient's (and/or legal guardian's) consent, to reduce the patient's risk of exposure to COVID-19 and provide necessary medical care. Aileen Culver MD    This note was done with the assistance of dragon speech software.   Some inadvertent errors or omissions may be present

## 2020-09-28 ENCOUNTER — HOSPITAL ENCOUNTER (OUTPATIENT)
Dept: LAB | Age: 45
Discharge: HOME | End: 2020-09-28

## 2020-09-28 LAB — SENTARA SPECIMEN COL,SENBCF: NORMAL

## 2020-09-28 PROCEDURE — 99001 SPECIMEN HANDLING PT-LAB: CPT

## 2020-09-29 ENCOUNTER — VIRTUAL VISIT (OUTPATIENT)
Dept: FAMILY MEDICINE CLINIC | Age: 45
End: 2020-09-29
Payer: MEDICAID

## 2020-09-29 DIAGNOSIS — R80.9 MICROALBUMINURIA: ICD-10-CM

## 2020-09-29 DIAGNOSIS — L97.519 DIABETIC ULCER OF TOE OF RIGHT FOOT ASSOCIATED WITH TYPE 2 DIABETES MELLITUS, UNSPECIFIED ULCER STAGE (HCC): ICD-10-CM

## 2020-09-29 DIAGNOSIS — Z23 ENCOUNTER FOR IMMUNIZATION: ICD-10-CM

## 2020-09-29 DIAGNOSIS — G47.9 DIFFICULTY SLEEPING: ICD-10-CM

## 2020-09-29 DIAGNOSIS — D64.9 ANEMIA, NORMOCYTIC NORMOCHROMIC: ICD-10-CM

## 2020-09-29 DIAGNOSIS — G56.02 CARPAL TUNNEL SYNDROME OF LEFT WRIST: ICD-10-CM

## 2020-09-29 DIAGNOSIS — Z13.220 SCREENING FOR CHOLESTEROL LEVEL: ICD-10-CM

## 2020-09-29 DIAGNOSIS — E11.8 TYPE 2 DIABETES MELLITUS WITH COMPLICATION (HCC): ICD-10-CM

## 2020-09-29 DIAGNOSIS — E11.621 DIABETIC ULCER OF TOE OF RIGHT FOOT ASSOCIATED WITH TYPE 2 DIABETES MELLITUS, UNSPECIFIED ULCER STAGE (HCC): ICD-10-CM

## 2020-09-29 DIAGNOSIS — I10 ESSENTIAL HYPERTENSION: Primary | ICD-10-CM

## 2020-09-29 LAB
ABSOLUTE LYMPHOCYTE COUNT, 10803: 2.6 K/UL (ref 1–4.8)
ANION GAP SERPL CALC-SCNC: 13 MMOL/L (ref 3–15)
AVG GLU, 10930: 388 MG/DL (ref 91–123)
BASOPHILS # BLD: 0.1 K/UL (ref 0–0.2)
BASOPHILS NFR BLD: 1 % (ref 0–2)
BUN SERPL-MCNC: 12 MG/DL (ref 6–22)
CALCIUM SERPL-MCNC: 8.6 MG/DL (ref 8.4–10.5)
CHLORIDE SERPL-SCNC: 101 MMOL/L (ref 98–110)
CHOLEST SERPL-MCNC: 218 MG/DL (ref 110–200)
CO2 SERPL-SCNC: 25 MMOL/L (ref 20–32)
CREAT SERPL-MCNC: 0.9 MG/DL (ref 0.5–1.2)
CREATININE, URINE: 82 MG/DL
EOSINOPHIL # BLD: 0.1 K/UL (ref 0–0.5)
EOSINOPHIL NFR BLD: 1 % (ref 0–6)
ERYTHROCYTE [DISTWIDTH] IN BLOOD BY AUTOMATED COUNT: 15 % (ref 10–15.5)
GFRAA, 66117: >60
GFRNA, 66118: >60
GLUCOSE SERPL-MCNC: 353 MG/DL (ref 70–99)
GRANULOCYTES,GRANS: 54 % (ref 40–75)
HBA1C MFR BLD HPLC: 15.1 % (ref 4.8–5.6)
HCT VFR BLD AUTO: 33.7 % (ref 35.1–46.5)
HDLC SERPL-MCNC: 4.4 MG/DL (ref 0–5)
HDLC SERPL-MCNC: 49 MG/DL
HGB BLD-MCNC: 9.9 G/DL (ref 11.7–15.5)
LDL/HDL RATIO,LDHD: 2.5
LDLC SERPL CALC-MCNC: 121 MG/DL (ref 50–99)
LYMPHOCYTES, LYMLT: 36 % (ref 20–45)
MCH RBC QN AUTO: 21 PG (ref 26–34)
MCHC RBC AUTO-ENTMCNC: 29 G/DL (ref 31–36)
MCV RBC AUTO: 72 FL (ref 81–99)
MICROALB/CREAT RATIO, 140286: 4204.9 (ref 0–30)
MICROALBUMIN,URINE RANDOM 140054: 3448 MG/L (ref 0.1–17)
MONOCYTES # BLD: 0.6 K/UL (ref 0.1–1)
MONOCYTES NFR BLD: 8 % (ref 3–12)
NEUTROPHILS # BLD AUTO: 3.9 K/UL (ref 1.8–7.7)
NON-HDL CHOLESTEROL, 011976: 169 MG/DL
PLATELET # BLD AUTO: 293 K/UL (ref 140–440)
PMV BLD AUTO: 11.2 FL (ref 9–13)
POTASSIUM SERPL-SCNC: 4.3 MMOL/L (ref 3.5–5.5)
RBC # BLD AUTO: 4.71 M/UL (ref 3.8–5.2)
SODIUM SERPL-SCNC: 139 MMOL/L (ref 133–145)
TRIGL SERPL-MCNC: 241 MG/DL (ref 40–149)
VLDLC SERPL CALC-MCNC: 48 MG/DL (ref 8–30)
WBC # BLD AUTO: 7.3 K/UL (ref 4–11)

## 2020-09-29 PROCEDURE — 99214 OFFICE O/P EST MOD 30 MIN: CPT | Performed by: EMERGENCY MEDICINE

## 2020-09-29 RX ORDER — LOSARTAN POTASSIUM 100 MG/1
100 TABLET ORAL DAILY
Qty: 30 TAB | Refills: 6 | Status: SHIPPED | OUTPATIENT
Start: 2020-09-29 | End: 2020-10-23

## 2020-09-29 RX ORDER — MELATONIN 5 MG
5 CAPSULE ORAL
Qty: 30 CAP | Refills: 1 | Status: SHIPPED | OUTPATIENT
Start: 2020-09-29 | End: 2021-10-13 | Stop reason: SDUPTHER

## 2020-09-29 RX ORDER — METFORMIN HYDROCHLORIDE 500 MG/1
500 TABLET, EXTENDED RELEASE ORAL
Qty: 30 TAB | Refills: 3 | Status: SHIPPED | OUTPATIENT
Start: 2020-09-29 | End: 2021-04-12 | Stop reason: SDUPTHER

## 2020-10-03 DIAGNOSIS — E78.49 OTHER HYPERLIPIDEMIA: Primary | ICD-10-CM

## 2020-10-03 DIAGNOSIS — R80.9 MICROALBUMINURIA: ICD-10-CM

## 2020-10-03 RX ORDER — ATORVASTATIN CALCIUM 20 MG/1
20 TABLET, FILM COATED ORAL DAILY
Qty: 30 TAB | Refills: 5 | Status: SHIPPED | OUTPATIENT
Start: 2020-10-03 | End: 2020-10-23

## 2020-10-03 NOTE — PROGRESS NOTES
Please call. The hemoglobin A1c was 15.1. Our target is between 6 and 7. So she is really high. We just started her metformin back and have increased her Lantus to 40. She should keep an eye on her blood sugars if she is able to. If she is consistently above 200 she should call us and we will adjust the medication ahead of her 30-day follow-up  She is also spilling quite a bit of protein in her urine which can eventually damage the kidneys. We will refer her to nephrology to follow her along with us. The cholesterol is elevated and we started her on Lipitor.

## 2020-10-07 NOTE — PROGRESS NOTES
TC was made to pt and pt verified name and d. o.b pt was made aware of results pt also states \" that she did get a call from Nephrology for an appointment. \"

## 2020-10-17 ENCOUNTER — HOSPITAL ENCOUNTER (EMERGENCY)
Age: 45
Discharge: HOME | End: 2020-10-18
Attending: EMERGENCY MEDICINE
Payer: MEDICAID

## 2020-10-17 ENCOUNTER — APPOINTMENT (OUTPATIENT)
Dept: GENERAL RADIOLOGY | Age: 45
End: 2020-10-17
Attending: EMERGENCY MEDICINE
Payer: MEDICAID

## 2020-10-17 VITALS
SYSTOLIC BLOOD PRESSURE: 163 MMHG | TEMPERATURE: 98.9 F | RESPIRATION RATE: 22 BRPM | BODY MASS INDEX: 32.28 KG/M2 | WEIGHT: 200 LBS | OXYGEN SATURATION: 100 % | DIASTOLIC BLOOD PRESSURE: 107 MMHG | HEART RATE: 110 BPM

## 2020-10-17 DIAGNOSIS — K56.41 FECAL IMPACTION (HCC): Primary | ICD-10-CM

## 2020-10-17 PROCEDURE — 99284 EMERGENCY DEPT VISIT MOD MDM: CPT

## 2020-10-17 PROCEDURE — 84703 CHORIONIC GONADOTROPIN ASSAY: CPT

## 2020-10-17 PROCEDURE — 80053 COMPREHEN METABOLIC PANEL: CPT

## 2020-10-17 PROCEDURE — 85025 COMPLETE CBC W/AUTO DIFF WBC: CPT

## 2020-10-17 PROCEDURE — 83690 ASSAY OF LIPASE: CPT

## 2020-10-17 PROCEDURE — 84443 ASSAY THYROID STIM HORMONE: CPT

## 2020-10-17 PROCEDURE — 74019 RADEX ABDOMEN 2 VIEWS: CPT

## 2020-10-17 RX ORDER — MAGNESIUM CITRATE
SOLUTION, ORAL ORAL
Qty: 1 BOTTLE | Refills: 0 | Status: SHIPPED | OUTPATIENT
Start: 2020-10-17 | End: 2020-10-23

## 2020-10-17 RX ORDER — POLYETHYLENE GLYCOL 3350 17 G/17G
17 POWDER, FOR SOLUTION ORAL DAILY
Qty: 850 G | Refills: 0 | Status: SHIPPED | OUTPATIENT
Start: 2020-10-17 | End: 2020-11-16

## 2020-10-18 LAB
ALBUMIN SERPL-MCNC: 2.6 G/DL (ref 3.4–5)
ALBUMIN/GLOB SERPL: 0.6 {RATIO} (ref 0.8–1.7)
ALP SERPL-CCNC: 218 U/L (ref 45–117)
ALT SERPL-CCNC: 32 U/L (ref 13–56)
ANION GAP SERPL CALC-SCNC: 8 MMOL/L (ref 3–18)
AST SERPL-CCNC: 27 U/L (ref 10–38)
BASOPHILS # BLD: 0 K/UL (ref 0–0.1)
BASOPHILS NFR BLD: 0 % (ref 0–2)
BILIRUB SERPL-MCNC: 0.2 MG/DL (ref 0.2–1)
BUN SERPL-MCNC: 17 MG/DL (ref 7–18)
BUN/CREAT SERPL: 14 (ref 12–20)
CALCIUM SERPL-MCNC: 8.5 MG/DL (ref 8.5–10.1)
CHLORIDE SERPL-SCNC: 102 MMOL/L (ref 100–111)
CO2 SERPL-SCNC: 26 MMOL/L (ref 21–32)
CREAT SERPL-MCNC: 1.24 MG/DL (ref 0.6–1.3)
DIFFERENTIAL METHOD BLD: ABNORMAL
EOSINOPHIL # BLD: 0 K/UL (ref 0–0.4)
EOSINOPHIL NFR BLD: 0 % (ref 0–5)
ERYTHROCYTE [DISTWIDTH] IN BLOOD BY AUTOMATED COUNT: 14.3 % (ref 11.6–14.5)
GLOBULIN SER CALC-MCNC: 4.5 G/DL (ref 2–4)
GLUCOSE SERPL-MCNC: 357 MG/DL (ref 74–99)
HCG SERPL QL: NEGATIVE
HCT VFR BLD AUTO: 34 % (ref 35–45)
HGB BLD-MCNC: 10.8 G/DL (ref 12–16)
LIPASE SERPL-CCNC: 81 U/L (ref 73–393)
LYMPHOCYTES # BLD: 1.1 K/UL (ref 0.9–3.6)
LYMPHOCYTES NFR BLD: 16 % (ref 21–52)
MCH RBC QN AUTO: 21.8 PG (ref 24–34)
MCHC RBC AUTO-ENTMCNC: 31.8 G/DL (ref 31–37)
MCV RBC AUTO: 68.5 FL (ref 74–97)
MONOCYTES # BLD: 0.6 K/UL (ref 0.05–1.2)
MONOCYTES NFR BLD: 9 % (ref 3–10)
NEUTS SEG # BLD: 5.4 K/UL (ref 1.8–8)
NEUTS SEG NFR BLD: 75 % (ref 40–73)
PLATELET # BLD AUTO: 256 K/UL (ref 135–420)
PMV BLD AUTO: 9.6 FL (ref 9.2–11.8)
POTASSIUM SERPL-SCNC: 3.5 MMOL/L (ref 3.5–5.5)
PROT SERPL-MCNC: 7.1 G/DL (ref 6.4–8.2)
RBC # BLD AUTO: 4.96 M/UL (ref 4.2–5.3)
RBC MORPH BLD: ABNORMAL
RBC MORPH BLD: ABNORMAL
SODIUM SERPL-SCNC: 136 MMOL/L (ref 136–145)
TSH SERPL DL<=0.05 MIU/L-ACNC: 2.33 UIU/ML (ref 0.36–3.74)
WBC # BLD AUTO: 7.1 K/UL (ref 4.6–13.2)

## 2020-10-18 NOTE — ED PROVIDER NOTES
Lacey Heaton is a 41 y.o. female with a past medical history of hypertension and diabetes coming in complaining of constipation. Patient states she has had constipation for last 3 days. She states she has passed very little stool and is having severe, constant pain in her lower abdomen and rectum. She states she has a constant urge to defecate, but cannot pass any stool. She denies any bloody stool. She denies any nausea or vomiting. States she has had very little appetite over the last 3 days. States that she tried a fleets enema without success. Is not taking any other stool softeners or laxatives. Denies any urinary symptoms. Denies fevers or chills. No other complaints at this time.            Past Medical History:   Diagnosis Date    Diabetes ([address]     HTN (hypertension)        Past Surgical History:   Procedure Laterality Date    HX GYN               Family History:   Problem Relation Age of Onset    Lupus Mother     Cancer Neg Hx     Diabetes Neg Hx     Heart Disease Neg Hx     Hypertension Neg Hx        Social History     Socioeconomic History    Marital status:      Spouse name: Not on file    Number of children: Not on file    Years of education: Not on file    Highest education level: Not on file   Occupational History    Not on file   Social Needs    Financial resource strain: Not on file    Food insecurity     Worry: Not on file     Inability: Not on file    Transportation needs     Medical: Not on file     Non-medical: Not on file   Tobacco Use    Smoking status: Never Smoker    Smokeless tobacco: Never Used   Substance and Sexual Activity    Alcohol use: No    Drug use: No    Sexual activity: Yes     Partners: Male   Lifestyle    Physical activity     Days per week: Not on file     Minutes per session: Not on file    Stress: Not on file   Relationships    Social connections     Talks on phone: Not on file     Gets together: Not on file Attends Denominational service: Not on file     Active member of club or organization: Not on file     Attends meetings of clubs or organizations: Not on file     Relationship status: Not on file    Intimate partner violence     Fear of current or ex partner: Not on file     Emotionally abused: Not on file     Physically abused: Not on file     Forced sexual activity: Not on file   Other Topics Concern    Not on file   Social History Narrative    Not on file         ALLERGIES: Pcn [penicillins]    Review of Systems   Constitutional: Negative. Negative for chills and fever. Respiratory: Negative. Negative for shortness of breath. Cardiovascular: Negative. Negative for chest pain. Gastrointestinal: Positive for abdominal pain and constipation. Negative for blood in stool, nausea and vomiting. Genitourinary: Negative. Negative for dysuria. Musculoskeletal: Negative. Negative for myalgias. Skin: Negative. Negative for rash. Neurological: Negative. Negative for dizziness, weakness and light-headedness. All other systems reviewed and are negative. Vitals:    10/17/20 2236 10/17/20 2347   BP: (!) 163/107    Pulse: (!) 120 (!) 110   Resp: 22    Temp: 98.9 °F (37.2 °C)    SpO2: 100%    Weight: 90.7 kg (200 lb)             Physical Exam  Vitals signs reviewed. Constitutional:       Appearance: Normal appearance. Comments: Laying on her side, appears uncomfortable secondary to pain   HENT:      Head: Normocephalic and atraumatic. Mouth/Throat:      Mouth: Mucous membranes are moist.   Eyes:      Extraocular Movements: Extraocular movements intact. Pupils: Pupils are equal, round, and reactive to light. Neck:      Musculoskeletal: Normal range of motion. Cardiovascular:      Rate and Rhythm: Regular rhythm. Tachycardia present. Pulmonary:      Effort: Pulmonary effort is normal. No respiratory distress. Abdominal:      General: There is no distension.       Palpations: Abdomen is soft. Tenderness: There is no abdominal tenderness. There is no guarding or rebound. Musculoskeletal: Normal range of motion. Skin:     General: Skin is warm. Neurological:      General: No focal deficit present. Mental Status: She is alert and oriented to person, place, and time. MDM  Number of Diagnoses or Management Options  Fecal impaction [facility]):   Diagnosis management comments: Kenyatta Nelson is a 41 y.o. female coming in with a complaint of 3 days of constipation. Also complaining of lower abdominal and rectal pain. Rectal exam showed fecal impaction she was manually disimpacted. She is symptomatically feeling much better and was given a soapsuds enema with more stool passed. Labs are reassuring. Patient's plain upright abdominal films show a nonobstructive bowel gas pattern, but significant stool load. Patient will be sent home on MiraLAX and magnesium citrate. She was counseled on return precautions and follow-up with her PCP. Patient verbalizes understanding and agrees with this plan. Procedures      Vitals:  Patient Vitals for the past 12 hrs:   Temp Pulse Resp BP SpO2   10/17/20 2347 -- (!) 110 -- -- --   10/17/20 2236 98.9 °F (37.2 °C) (!) 120 22 (!) 163/107 100 %       Medications ordered:   Medications - No data to display      Lab findings:  Recent Results (from the past 12 hour(s))   CBC WITH AUTOMATED DIFF    Collection Time: 10/17/20 11:41 PM   Result Value Ref Range    WBC 7.1 4.6 - 13.2 K/uL    RBC 4.96 4.20 - 5.30 M/uL    HGB 10.8 (L) 12.0 - 16.0 g/dL    HCT 34.0 (L) 35.0 - 45.0 %    MCV 68.5 (L) 74.0 - 97.0 FL    MCH 21.8 (L) 24.0 - 34.0 PG    MCHC 31.8 31.0 - 37.0 g/dL    RDW 14.3 11.6 - 14.5 %    PLATELET 324 373 - 583 K/uL    MPV 9.6 9.2 - 11.8 FL    NEUTROPHILS PENDING %    LYMPHOCYTES PENDING %    MONOCYTES PENDING %    EOSINOPHILS PENDING %    BASOPHILS PENDING %    ABS. NEUTROPHILS PENDING K/UL    ABS. LYMPHOCYTES PENDING K/UL    ABS. MONOCYTES PENDING K/UL    ABS. EOSINOPHILS PENDING K/UL    ABS. BASOPHILS PENDING K/UL    DF PENDING    METABOLIC PANEL, COMPREHENSIVE    Collection Time: 10/17/20 11:41 PM   Result Value Ref Range    Sodium 136 136 - 145 mmol/L    Potassium 3.5 3.5 - 5.5 mmol/L    Chloride 102 100 - 111 mmol/L    CO2 26 21 - 32 mmol/L    Anion gap 8 3.0 - 18 mmol/L    Glucose 357 (H) 74 - 99 mg/dL    BUN 17 7.0 - 18 MG/DL    Creatinine 1.24 0.6 - 1.3 MG/DL    BUN/Creatinine ratio 14 12 - 20      GFR est AA 57 (L) >60 ml/min/1.73m2    GFR est non-AA 47 (L) >60 ml/min/1.73m2    Calcium 8.5 8.5 - 10.1 MG/DL    Bilirubin, total 0.2 0.2 - 1.0 MG/DL    ALT (SGPT) 32 13 - 56 U/L    AST (SGOT) 27 10 - 38 U/L    Alk. phosphatase 218 (H) 45 - 117 U/L    Protein, total 7.1 6.4 - 8.2 g/dL    Albumin 2.6 (L) 3.4 - 5.0 g/dL    Globulin 4.5 (H) 2.0 - 4.0 g/dL    A-G Ratio 0.6 (L) 0.8 - 1.7     LIPASE    Collection Time: 10/17/20 11:41 PM   Result Value Ref Range    Lipase 81 73 - 393 U/L   TSH 3RD GENERATION    Collection Time: 10/17/20 11:41 PM   Result Value Ref Range    TSH 2.33 0.36 - 3.74 uIU/mL   HCG QL SERUM    Collection Time: 10/17/20 11:41 PM   Result Value Ref Range    HCG, Ql. Negative NEG         Disposition:  Diagnosis:   1. Fecal impaction [facility])        Disposition: Discharge    Follow-up Information     Follow up With Specialties Details Why Contact Info    Keyonna Segovia MD Internal Medicine Schedule an appointment as soon as possible for a visit for office follow up [address] [place] [address] [phone]      [facility] EMERGENCY DEPT Emergency Medicine  As needed, If symptoms worsen [address] [facility]  [phone]           Patient's Medications   Start Taking    MAGNESIUM CITRATE SOLUTION    Take 1/3 of bottle every 8 hours until finished or until you have a bowel movement. POLYETHYLENE GLYCOL (MIRALAX) 17 GRAM/DOSE POWDER    Take 17 g by mouth daily for 30 days.  1 tablespoon with 8 oz of water daily   Continue Taking    AMLODIPINE (NORVASC) 10 MG TABLET    Take 1 tablet by mouth once daily    ATORVASTATIN (LIPITOR) 20 MG TABLET    Take 1 Tab by mouth daily. CLONIDINE HCL (CATAPRES) 0.1 MG TABLET    Take 1 Tab by mouth two (2) times a day. FERROUS SULFATE 325 MG (65 MG IRON) TABLET    Take 1 Tab by mouth Daily (before breakfast). HYDROCHLOROTHIAZIDE (HYDRODIURIL) 25 MG TABLET    Take 0.5 Tabs by mouth daily. INSULIN GLARGINE (LANTUS,BASAGLAR) 100 UNIT/ML (3 ML) INPN    Take 37 units daily  Indications: type 2 diabetes mellitus    INSULIN NEEDLES, DISPOSABLE, 31 GAUGE X 5/16\" NDLE    Use to check blood glucose BID    INSULIN SYRINGE-NEEDLE U-100 (INSULIN SYRINGE) 1 ML 30 GAUGE X 5/16 SYRG    As directed for lantus insulin    LINEZOLID (ZYVOX) 600 MG TABLET    Take 1 Tab by mouth two (2) times a day. LOSARTAN (COZAAR) 100 MG TABLET    Take 1 Tab by mouth daily. MELATONIN 5 MG CAP CAPSULE    Take 1 Cap by mouth nightly. METFORMIN ER (GLUCOPHAGE XR) 500 MG TABLET    Take 1 Tab by mouth daily (with dinner). MUPIROCIN (BACTROBAN) 2 % OINTMENT    Apply  to affected area three (3) times daily.  APPLY TO left second toe, affected site   These Medications have changed    No medications on file   Stop Taking    No medications on file

## 2020-10-18 NOTE — ED TRIAGE NOTES
Pt reports to ER from home for abdominal pain and constipation for three days. Pt reports taking metformin which is new for her and is now having constipation, abdominal and rectal pain. Pt reports small stool production but has constant urge to have a BM after using Fleets Enema. Additonal pain from hemorrhoids.

## 2020-10-19 ENCOUNTER — HOSPITAL ENCOUNTER (INPATIENT)
Age: 45
LOS: 3 days | Discharge: HOME | DRG: 174 | End: 2020-10-23
Attending: EMERGENCY MEDICINE | Admitting: INTERNAL MEDICINE
Payer: MEDICAID

## 2020-10-19 ENCOUNTER — APPOINTMENT (OUTPATIENT)
Dept: CT IMAGING | Age: 45
DRG: 174 | End: 2020-10-19
Attending: EMERGENCY MEDICINE
Payer: MEDICAID

## 2020-10-19 ENCOUNTER — APPOINTMENT (OUTPATIENT)
Dept: GENERAL RADIOLOGY | Age: 45
DRG: 174 | End: 2020-10-19
Attending: EMERGENCY MEDICINE
Payer: MEDICAID

## 2020-10-19 DIAGNOSIS — I21.4 NSTEMI (NON-ST ELEVATED MYOCARDIAL INFARCTION) (HCC): Primary | ICD-10-CM

## 2020-10-19 DIAGNOSIS — E11.8 TYPE 2 DIABETES MELLITUS WITH COMPLICATION (HCC): ICD-10-CM

## 2020-10-19 DIAGNOSIS — R50.9 FEVER, UNSPECIFIED FEVER CAUSE: ICD-10-CM

## 2020-10-19 LAB
ANION GAP SERPL CALC-SCNC: 6 MMOL/L (ref 3–18)
APTT PPP: 25.6 SEC (ref 23–36.4)
BASOPHILS # BLD: 0 K/UL (ref 0–0.1)
BASOPHILS NFR BLD: 0 % (ref 0–2)
BUN SERPL-MCNC: 11 MG/DL (ref 7–18)
BUN/CREAT SERPL: 9 (ref 12–20)
CALCIUM SERPL-MCNC: 8.1 MG/DL (ref 8.5–10.1)
CHLORIDE SERPL-SCNC: 106 MMOL/L (ref 100–111)
CK MB CFR SERPL CALC: 0.6 % (ref 0–4)
CK MB SERPL-MCNC: 1.1 NG/ML (ref 5–25)
CK SERPL-CCNC: 183 U/L (ref 26–192)
CO2 SERPL-SCNC: 26 MMOL/L (ref 21–32)
CREAT SERPL-MCNC: 1.19 MG/DL (ref 0.6–1.3)
D DIMER PPP FEU-MCNC: 1.11 UG/ML(FEU)
DIFFERENTIAL METHOD BLD: ABNORMAL
EOSINOPHIL # BLD: 0 K/UL (ref 0–0.4)
EOSINOPHIL NFR BLD: 0 % (ref 0–5)
ERYTHROCYTE [DISTWIDTH] IN BLOOD BY AUTOMATED COUNT: 14.1 % (ref 11.6–14.5)
FERRITIN SERPL-MCNC: 38 NG/ML (ref 8–388)
GLUCOSE SERPL-MCNC: 379 MG/DL (ref 74–99)
HCT VFR BLD AUTO: 33.9 % (ref 35–45)
HGB BLD-MCNC: 11 G/DL (ref 12–16)
LACTATE BLD-SCNC: 1.26 MMOL/L (ref 0.4–2)
LDH SERPL L TO P-CCNC: 269 U/L (ref 81–234)
LYMPHOCYTES # BLD: 1.6 K/UL (ref 0.9–3.6)
LYMPHOCYTES NFR BLD: 22 % (ref 21–52)
MCH RBC QN AUTO: 22 PG (ref 24–34)
MCHC RBC AUTO-ENTMCNC: 32.4 G/DL (ref 31–37)
MCV RBC AUTO: 67.8 FL (ref 74–97)
MONOCYTES # BLD: 0.7 K/UL (ref 0.05–1.2)
MONOCYTES NFR BLD: 10 % (ref 3–10)
NEUTS SEG # BLD: 4.9 K/UL (ref 1.8–8)
NEUTS SEG NFR BLD: 68 % (ref 40–73)
PLATELET # BLD AUTO: 228 K/UL (ref 135–420)
PMV BLD AUTO: 10.1 FL (ref 9.2–11.8)
POTASSIUM SERPL-SCNC: 3.1 MMOL/L (ref 3.5–5.5)
RBC # BLD AUTO: 5 M/UL (ref 4.2–5.3)
SODIUM SERPL-SCNC: 138 MMOL/L (ref 136–145)
TROPONIN I SERPL-MCNC: 0.25 NG/ML (ref 0–0.04)
WBC # BLD AUTO: 7.3 K/UL (ref 4.6–13.2)

## 2020-10-19 PROCEDURE — 93005 ELECTROCARDIOGRAM TRACING: CPT

## 2020-10-19 PROCEDURE — 83615 LACTATE (LD) (LDH) ENZYME: CPT

## 2020-10-19 PROCEDURE — 71275 CT ANGIOGRAPHY CHEST: CPT

## 2020-10-19 PROCEDURE — 87040 BLOOD CULTURE FOR BACTERIA: CPT

## 2020-10-19 PROCEDURE — 83605 ASSAY OF LACTIC ACID: CPT

## 2020-10-19 PROCEDURE — 85379 FIBRIN DEGRADATION QUANT: CPT

## 2020-10-19 PROCEDURE — 74011250636 HC RX REV CODE- 250/636: Performed by: EMERGENCY MEDICINE

## 2020-10-19 PROCEDURE — 74011000636 HC RX REV CODE- 636: Performed by: EMERGENCY MEDICINE

## 2020-10-19 PROCEDURE — 71045 X-RAY EXAM CHEST 1 VIEW: CPT

## 2020-10-19 PROCEDURE — 85025 COMPLETE CBC W/AUTO DIFF WBC: CPT

## 2020-10-19 PROCEDURE — 82728 ASSAY OF FERRITIN: CPT

## 2020-10-19 PROCEDURE — 84439 ASSAY OF FREE THYROXINE: CPT

## 2020-10-19 PROCEDURE — 82550 ASSAY OF CK (CPK): CPT

## 2020-10-19 PROCEDURE — 96375 TX/PRO/DX INJ NEW DRUG ADDON: CPT

## 2020-10-19 PROCEDURE — 96366 THER/PROPH/DIAG IV INF ADDON: CPT

## 2020-10-19 PROCEDURE — 99285 EMERGENCY DEPT VISIT HI MDM: CPT

## 2020-10-19 PROCEDURE — 96365 THER/PROPH/DIAG IV INF INIT: CPT

## 2020-10-19 PROCEDURE — 74011250637 HC RX REV CODE- 250/637: Performed by: EMERGENCY MEDICINE

## 2020-10-19 PROCEDURE — 84443 ASSAY THYROID STIM HORMONE: CPT

## 2020-10-19 PROCEDURE — 80048 BASIC METABOLIC PNL TOTAL CA: CPT

## 2020-10-19 PROCEDURE — 85730 THROMBOPLASTIN TIME PARTIAL: CPT

## 2020-10-19 RX ORDER — NITROGLYCERIN 0.4 MG/1
0.4 TABLET SUBLINGUAL
Status: COMPLETED | OUTPATIENT
Start: 2020-10-19 | End: 2020-10-19

## 2020-10-19 RX ORDER — ACETAMINOPHEN 325 MG/1
650 TABLET ORAL
Status: COMPLETED | OUTPATIENT
Start: 2020-10-19 | End: 2020-10-19

## 2020-10-19 RX ORDER — HEPARIN SODIUM 1000 [USP'U]/ML
4000 INJECTION, SOLUTION INTRAVENOUS; SUBCUTANEOUS ONCE
Status: COMPLETED | OUTPATIENT
Start: 2020-10-19 | End: 2020-10-19

## 2020-10-19 RX ORDER — HEPARIN SODIUM 10000 [USP'U]/100ML
12-25 INJECTION, SOLUTION INTRAVENOUS
Status: DISCONTINUED | OUTPATIENT
Start: 2020-10-19 | End: 2020-10-21

## 2020-10-19 RX ADMIN — IOPAMIDOL 67 ML: 755 INJECTION, SOLUTION INTRAVENOUS at 21:23

## 2020-10-19 RX ADMIN — NITROGLYCERIN 0.4 MG: 0.4 TABLET SUBLINGUAL at 20:31

## 2020-10-19 RX ADMIN — HEPARIN SODIUM 12 UNITS/KG/HR: 10000 INJECTION, SOLUTION INTRAVENOUS at 21:51

## 2020-10-19 RX ADMIN — SODIUM CHLORIDE 1000 ML: 900 INJECTION, SOLUTION INTRAVENOUS at 20:32

## 2020-10-19 RX ADMIN — HEPARIN SODIUM 4000 UNITS: 1000 INJECTION INTRAVENOUS; SUBCUTANEOUS at 21:51

## 2020-10-19 RX ADMIN — ACETAMINOPHEN 650 MG: 325 TABLET ORAL at 20:30

## 2020-10-19 NOTE — Clinical Note
Lesion located in the Mid RCA. Balloon inserted. Balloon inflated using multiple inflations inflation technique. Lesion #1: Pressure = 12 dmitry; Duration = 10 sec. Inflation 2: Pressure = 12 dmitry; Duration = 8 sec.

## 2020-10-19 NOTE — Clinical Note
TRANSFER - IN REPORT:     Verbal report received from: [id number] [facility]. Report consisted of patient's Situation, Background, Assessment and   Recommendations(SBAR). Opportunity for questions and clarification was provided. Assessment completed upon patient's arrival to unit and care assumed. Patient transported with a Cardiac Cath Tech / Patient Care Tech.

## 2020-10-19 NOTE — Clinical Note
TRANSFER - OUT REPORT:     Verbal report given to: Heike JAMES. Report consisted of patient's Situation, Background, Assessment and   Recommendations(SBAR). Opportunity for questions and clarification was provided. Patient transported with a Cardiac Cath Tech / Patient Care Tech. Patient transported to: [address] [place] [address].

## 2020-10-19 NOTE — Clinical Note
Temporary pacemaker inserted. Inserted through the right groin. Temporary Pacer pacing in the right ventricle. Rate = 40 bpm.   Sensitivity setting = 8.   5 mA.

## 2020-10-19 NOTE — ED TRIAGE NOTES
The patient presents for evaluation of chest pain that began approximately one hour prior to arrival to the ED.

## 2020-10-19 NOTE — Clinical Note
Contrast Dose Calculator:   Patient's age: 41.   Patient's sex: Female. Patient weight (kg) = 96.5. Creatinine level (mg/dL) = 1.42. Creatinine clearance (mL/min): 77.   Contrast concentration (mg/mL) = 300. MACD = 300 mL. Max Contrast dose per Creatinine Cl calculator = 173.25 mL.

## 2020-10-19 NOTE — ED PROVIDER NOTES
EMERGENCY DEPARTMENT HISTORY AND PHYSICAL EXAM    7:08 PM      Date: 10/19/2020  Patient Name: Tamia Spears    History of Presenting Illness     Chief Complaint   Patient presents with    Chest Pain         History Provided By: Patient    Chief Complaint: chest tightness  Duration:  Hours  Timing:  Constant  Location: central chest  Quality: Pressure  Severity: Moderate  Modifying Factors: none  Associated Symptoms: denies any other associated signs or symptoms      Additional History (Context): Tamia Spears is a 41 y.o. female with diabetes and hypertension who presents with anterior chest pressure. Reports been ever since she woke up this morning. Also reports yesterday some shortness of breath with exertion. Reports also some intermittent left hand numbness and tingling. Some lower back pain. No sharp pain. No nausea. No vomiting. No diaphoresis. Does report just feelings of generalized fatigue and weakness. No known sick contacts or Covid exposures. No history of CAD. No other complaints. Did get ASA from EMS. PCP: Annie Mark MD    Current Facility-Administered Medications   Medication Dose Route Frequency Provider Last Rate Last Dose    sodium chloride 0.9 % bolus infusion 1,000 mL  1,000 mL IntraVENous ONCE Levonne Hawks M, DO 1,000 mL/hr at 10/19/20 2032 1,000 mL at 10/19/20 2032    heparin (porcine) 1,000 unit/mL injection 4,000 Units  4,000 Units IntraVENous ONCE Levonne Hawks M, DO        heparin (porcine) 25,000 units in 0.45% saline 250 ml infusion  12-25 Units/kg/hr IntraVENous TITRATE Aye Leak, DO         Current Outpatient Medications   Medication Sig Dispense Refill    polyethylene glycol (Miralax) 17 gram/dose powder Take 17 g by mouth daily for 30 days. 1 tablespoon with 8 oz of water daily 850 g 0    magnesium citrate solution Take 1/3 of bottle every 8 hours until finished or until you have a bowel movement.  1 Bottle 0    atorvastatin (LIPITOR) 20 mg tablet Take 1 Tab by mouth daily. 30 Tab 5    metFORMIN ER (GLUCOPHAGE XR) 500 mg tablet Take 1 Tab by mouth daily (with dinner). 30 Tab 3    losartan (COZAAR) 100 mg tablet Take 1 Tab by mouth daily. 30 Tab 6    melatonin 5 mg cap capsule Take 1 Cap by mouth nightly. 30 Cap 1    Insulin Needles, Disposable, 31 gauge x 5/16\" ndle Use to check blood glucose  Pen Needle 11    insulin glargine (LANTUS,BASAGLAR) 100 unit/mL (3 mL) inpn Take 37 units daily  Indications: type 2 diabetes mellitus 4 Pen 5    amLODIPine (NORVASC) 10 mg tablet Take 1 tablet by mouth once daily 90 Tab 3    hydroCHLOROthiazide (HYDRODIURIL) 25 mg tablet Take 0.5 Tabs by mouth daily. 30 Tab 6    linezolid (ZYVOX) 600 mg tablet Take 1 Tab by mouth two (2) times a day. 24 Tab 0    cloNIDine HCl (CATAPRES) 0.1 mg tablet Take 1 Tab by mouth two (2) times a day. 60 Tab 0    Insulin Syringe-Needle U-100 (INSULIN SYRINGE) 1 mL 30 gauge x 5/16 syrg As directed for lantus insulin 50 Syringe 0    ferrous sulfate 325 mg (65 mg iron) tablet Take 1 Tab by mouth Daily (before breakfast). 15 Tab 0    mupirocin (BACTROBAN) 2 % ointment Apply  to affected area three (3) times daily. APPLY TO left second toe, affected site 22 g 0       Past History     Past Medical History:  Past Medical History:   Diagnosis Date    Diabetes ([address]     HTN (hypertension)        Past Surgical History:  Past Surgical History:   Procedure Laterality Date    HX GYN             Family History:  Family History   Problem Relation Age of Onset    Lupus Mother     Cancer Neg Hx     Diabetes Neg Hx     Heart Disease Neg Hx     Hypertension Neg Hx        Social History:  Social History     Tobacco Use    Smoking status: Never Smoker    Smokeless tobacco: Never Used   Substance Use Topics    Alcohol use: No    Drug use: No       Allergies:   Allergies   Allergen Reactions    Pcn [Penicillins] Hives         Review of Systems       Review of Systems   Constitutional: Positive for fatigue. Negative for chills and fever. Respiratory: Positive for shortness of breath. Negative for cough. Cardiovascular: Positive for chest pain. Negative for leg swelling. Gastrointestinal: Negative for abdominal pain, nausea and vomiting. Genitourinary: Negative for dysuria. All other systems reviewed and are negative. Physical Exam     Visit Vitals  /83   Pulse (!) 116   Temp (!) 100.9 °F (38.3 °C)   Resp 19   Ht 5' 6\" (1.676 m)   Wt 90.7 kg (200 lb)   SpO2 100%   BMI 32.28 kg/m²         Physical Exam  Constitutional:       Appearance: She is well-developed. HENT:      Head: Normocephalic and atraumatic. Neck:      Musculoskeletal: Neck supple. Vascular: No JVD. Cardiovascular:      Rate and Rhythm: Regular rhythm. Tachycardia present. Pulmonary:      Effort: Pulmonary effort is normal. No respiratory distress. Breath sounds: Normal breath sounds. Abdominal:      General: There is no distension. Palpations: Abdomen is soft. Tenderness: There is no abdominal tenderness. There is no guarding or rebound. Musculoskeletal:      Right lower leg: No edema. Left lower leg: No edema. Comments: No joint tenderness  Left hand: Slight decrease sensation per patient, strength intact equal, forearm sensation intact equal bilaterally, no pronator drift. Cap refill 1 second. Skin:     General: Skin is warm and dry. Findings: No erythema. Neurological:      Mental Status: She is alert and oriented to person, place, and time.    Psychiatric:         Judgment: Judgment normal.           Diagnostic Study Results     Labs -  Recent Results (from the past 12 hour(s))   EKG, 12 LEAD, INITIAL    Collection Time: 10/19/20  6:25 PM   Result Value Ref Range    Ventricular Rate 120 BPM    Atrial Rate 0 BPM    QRS Duration 58 ms    Q-T Interval 386 ms    QTC Calculation (Bezet) 545 ms    Calculated R Axis 48 degrees Calculated T Axis 80 degrees    Diagnosis       Accelerated Junctional rhythm with retrograde conduction  ST elevation, consider early repolarization, pericarditis, or injury  Nonspecific ST and T wave abnormality  Prolonged QT  Abnormal ECG  When compared with ECG of 21-NOV-2019 21:03,  Significant changes have occurred     CBC WITH AUTOMATED DIFF    Collection Time: 10/19/20  6:30 PM   Result Value Ref Range    WBC 7.3 4.6 - 13.2 K/uL    RBC 5.00 4.20 - 5.30 M/uL    HGB 11.0 (L) 12.0 - 16.0 g/dL    HCT 33.9 (L) 35.0 - 45.0 %    MCV 67.8 (L) 74.0 - 97.0 FL    MCH 22.0 (L) 24.0 - 34.0 PG    MCHC 32.4 31.0 - 37.0 g/dL    RDW 14.1 11.6 - 14.5 %    PLATELET 569 789 - 522 K/uL    MPV 10.1 9.2 - 11.8 FL    NEUTROPHILS 68 40 - 73 %    LYMPHOCYTES 22 21 - 52 %    MONOCYTES 10 3 - 10 %    EOSINOPHILS 0 0 - 5 %    BASOPHILS 0 0 - 2 %    ABS. NEUTROPHILS 4.9 1.8 - 8.0 K/UL    ABS. LYMPHOCYTES 1.6 0.9 - 3.6 K/UL    ABS. MONOCYTES 0.7 0.05 - 1.2 K/UL    ABS. EOSINOPHILS 0.0 0.0 - 0.4 K/UL    ABS.  BASOPHILS 0.0 0.0 - 0.1 K/UL    DF AUTOMATED     METABOLIC PANEL, BASIC    Collection Time: 10/19/20  6:30 PM   Result Value Ref Range    Sodium 138 136 - 145 mmol/L    Potassium 3.1 (L) 3.5 - 5.5 mmol/L    Chloride 106 100 - 111 mmol/L    CO2 26 21 - 32 mmol/L    Anion gap 6 3.0 - 18 mmol/L    Glucose 379 (H) 74 - 99 mg/dL    BUN 11 7.0 - 18 MG/DL    Creatinine 1.19 0.6 - 1.3 MG/DL    BUN/Creatinine ratio 9 (L) 12 - 20      GFR est AA 60 (L) >60 ml/min/1.73m2    GFR est non-AA 49 (L) >60 ml/min/1.73m2    Calcium 8.1 (L) 8.5 - 10.1 MG/DL   CARDIAC PANEL,(CK, CKMB & TROPONIN)    Collection Time: 10/19/20  6:30 PM   Result Value Ref Range    CK - MB 1.1 <3.6 ng/ml    CK-MB Index 0.6 0.0 - 4.0 %     26 - 192 U/L    Troponin-I, QT 0.25 (H) 0.0 - 0.045 NG/ML   LD    Collection Time: 10/19/20  6:30 PM   Result Value Ref Range     (H) 81 - 234 U/L   FERRITIN    Collection Time: 10/19/20  6:30 PM   Result Value Ref Range Ferritin 38 8 - 388 NG/ML   D DIMER    Collection Time: 10/19/20  6:30 PM   Result Value Ref Range    D DIMER 1.11 (H) <0.46 ug/ml(FEU)   PTT    Collection Time: 10/19/20  6:30 PM   Result Value Ref Range    aPTT 25.6 23.0 - 36.4 SEC   POC LACTIC ACID    Collection Time: 10/19/20  6:38 PM   Result Value Ref Range    Lactic Acid (POC) 1.26 0.40 - 2.00 mmol/L   EKG, 12 LEAD, SUBSEQUENT    Collection Time: 10/19/20  8:16 PM   Result Value Ref Range    Ventricular Rate 114 BPM    Atrial Rate 114 BPM    P-R Interval 214 ms    QRS Duration 76 ms    Q-T Interval 302 ms    QTC Calculation (Bezet) 416 ms    Calculated P Axis 53 degrees    Calculated R Axis 40 degrees    Calculated T Axis 99 degrees    Diagnosis       Sinus tachycardia with 1st degree AV block  Nonspecific ST and T wave abnormality  Abnormal ECG  When compared with ECG of 19-OCT-2020 18:25,  Sinus rhythm has replaced Junctional rhythm  ST no longer elevated in Lateral leads  Nonspecific T wave abnormality now evident in Inferior leads  T wave inversion more evident in Lateral leads         Radiologic Studies -   XR CHEST PORT    (Results Pending)   CTA CHEST W OR W WO CONT    (Results Pending)     No acute process per me    Medical Decision Making   I am the first provider for this patient. I reviewed the vital signs, available nursing notes, past medical history, past surgical history, family history and social history. Vital Signs-Reviewed the patient's vital signs. Pulse Oximetry Analysis -  100 on room air (Interpretation)nl     Cardiac Monitor:  Rate: 113  Rhythm:  Sinus Tachycardia     EKG: Interpreted by the EP.    Time Interpreted: 1828   Rate: 120   Rhythm: Sinus Tachycardia    Interpretation: Slight ST elevations leads II 3, poor baseline, no obvious depression, , normal axis   Comparison: 11/21/19, no ST changes, normal intervals    Repeat EKG: Rate 114, normal axis, sinus tach, normal intervals, first-degree AV block, no ST changes, prior elevations seem to have improved. Records Reviewed: Nursing Notes, Old Medical Records and Previous electrocardiograms (Time of Review: 7:08 PM)    ED Course: Progress Notes, Reevaluation, and Consults:      Provider Notes (Medical Decision Making): 56-year-old female presenting with anterior chest pain started this morning. No sharp pain into the back that seems consistent with dissection. No PE risk factors although is noted to be tachycardic but no signs of DVT on exam.  Screen for ACS with serial EKG and troponin. After my evaluation noted patient had a elevated temp lactate blood cultures were ordered. Note patient stating she is having some decreased sensation in her left hand, seems most consistent with carpal tunnel, prior ED visit for similar. No sensory deficits proximal to the hand and no weakness. Not consistent with CVA.    7:28 PM  Patient was noted to have an elevated troponin. Wonder she may have myocarditis possible Covid so moved to a private room. Chest x-ray seems overall unremarkable for me so added on LDH D-dimer ferritin to screen for Covid. We will also check UA. Blood cultures ordered. 8:32 PM      Critical Care Time:  The services I provided to this patient were to treat and/or prevent clinically significant deterioration that could result in the failure of one or more body systems and/or organ systems due to cardiovascular    Services included the following:  -reviewing nursing notes and old charts  -vital sign assessments  -direct patient care  -medication orders and management  -interpreting and reviewing diagnostic studies/labs  -re-evaluations  -documentation time    Aggregate critical care time was 32 minutes, which includes only time during which I was engaged in work directly related to the patient's care as described above, whether I was at bedside or elsewhere in the Emergency Department.  It did not include time spent performing other reported procedures or the services of residents, students, nurses, or advance practice providers. Genia Amanda, DO    8:03 PM      9 PM : Pt care transferred to Dr. Favio García  ,ED provider. History of patient complaint(s), available diagnostic reports and current treatment plan has been discussed thoroughly. Bedside rounding on patient occured : yes . Intended disposition of patient : ADMIT  Pending diagnostics reports and/or labs (please list):     Dr. Evaristo Lal assistance in completion of this plan is greatly appreciated but it should be noted that I will be the provider of record for this patient. For Hospitalized Patients:      2. Aspirin: Aspirin was given by EMS    Diagnosis     Clinical Impression:   1. NSTEMI (non-ST elevated myocardial infarction) ([facility] [address] [address]    2.  Fever, unspecified fever cause        Disposition: admit

## 2020-10-19 NOTE — Clinical Note
Lesion: Located in the Mid RCA. Stent inserted. Multiple inflations used. First inflation pressure = 15 dmitry; inflation time: 8 sec. Second inflation pressure: 17 dmitry; inflation time: 10 sec.

## 2020-10-19 NOTE — Clinical Note
Lesion: Located in the Mid RCA. Stent inserted. First inflation pressure = 14 dmitry; inflation time: 14 sec.

## 2020-10-19 NOTE — Clinical Note
Lesion located in the Mid RCA. Balloon inserted. Balloon inflated using multiple inflations inflation technique. Lesion #1: Pressure = 18 dmitry; Duration = 12 sec. Inflation 2: Pressure = 18 dmitry; Duration = 10 sec. Inflation 3: Pressure = 20 dmitry; Duration = 9 sec.

## 2020-10-20 ENCOUNTER — APPOINTMENT (OUTPATIENT)
Dept: NON INVASIVE DIAGNOSTICS | Age: 45
DRG: 174 | End: 2020-10-20
Attending: INTERNAL MEDICINE
Payer: MEDICAID

## 2020-10-20 PROBLEM — I21.4 NSTEMI (NON-ST ELEVATED MYOCARDIAL INFARCTION) (HCC): Status: ACTIVE | Noted: 2020-10-20

## 2020-10-20 PROBLEM — R50.9 FEVER: Status: ACTIVE | Noted: 2020-10-20

## 2020-10-20 LAB
ANION GAP SERPL CALC-SCNC: 8 MMOL/L (ref 3–18)
APPEARANCE UR: ABNORMAL
APTT PPP: 53.9 SEC (ref 23–36.4)
APTT PPP: 66.8 SEC (ref 23–36.4)
APTT PPP: 68.7 SEC (ref 23–36.4)
ATRIAL RATE: 0 BPM
ATRIAL RATE: 114 BPM
ATRIAL RATE: 90 BPM
BACTERIA URNS QL MICRO: ABNORMAL /HPF
BASOPHILS # BLD: 0 K/UL (ref 0–0.1)
BASOPHILS NFR BLD: 0 % (ref 0–2)
BILIRUB UR QL: NEGATIVE
BUN SERPL-MCNC: 15 MG/DL (ref 7–18)
BUN/CREAT SERPL: 9 (ref 12–20)
CALCIUM SERPL-MCNC: 7.8 MG/DL (ref 8.5–10.1)
CALCULATED P AXIS, ECG09: 53 DEGREES
CALCULATED P AXIS, ECG09: 53 DEGREES
CALCULATED R AXIS, ECG10: 39 DEGREES
CALCULATED R AXIS, ECG10: 40 DEGREES
CALCULATED R AXIS, ECG10: 48 DEGREES
CALCULATED T AXIS, ECG11: 64 DEGREES
CALCULATED T AXIS, ECG11: 80 DEGREES
CALCULATED T AXIS, ECG11: 99 DEGREES
CHLORIDE SERPL-SCNC: 105 MMOL/L (ref 100–111)
CK MB CFR SERPL CALC: 0.7 % (ref 0–4)
CK MB CFR SERPL CALC: 1 % (ref 0–4)
CK MB SERPL-MCNC: 1.2 NG/ML (ref 5–25)
CK MB SERPL-MCNC: 1.6 NG/ML (ref 5–25)
CK SERPL-CCNC: 168 U/L (ref 26–192)
CK SERPL-CCNC: 177 U/L (ref 26–192)
CO2 SERPL-SCNC: 23 MMOL/L (ref 21–32)
COLOR UR: YELLOW
CREAT SERPL-MCNC: 1.58 MG/DL (ref 0.6–1.3)
DIAGNOSIS, 93000: NORMAL
DIFFERENTIAL METHOD BLD: ABNORMAL
ECHO LV INTERNAL DIMENSION DIASTOLIC: 4.2 CM (ref 3.9–5.3)
ECHO LV INTERNAL DIMENSION SYSTOLIC: 2.93 CM
ECHO LV IVSD: 1.12 CM (ref 0.6–0.9)
ECHO LV MASS 2D: 135.3 G (ref 67–162)
ECHO LV MASS INDEX 2D: 67.7 G/M2 (ref 43–95)
ECHO LV POSTERIOR WALL DIASTOLIC: 0.86 CM (ref 0.6–0.9)
ECHO RV TAPSE: 1.97 CM (ref 1.5–2)
ECHO TV REGURGITANT MAX VELOCITY: 177.98 CM/S
ECHO TV REGURGITANT PEAK GRADIENT: 12.67 MMHG
EOSINOPHIL # BLD: 0.1 K/UL (ref 0–0.4)
EOSINOPHIL NFR BLD: 1 % (ref 0–5)
EPITH CASTS URNS QL MICRO: ABNORMAL /LPF (ref 0–5)
ERYTHROCYTE [DISTWIDTH] IN BLOOD BY AUTOMATED COUNT: 14.5 % (ref 11.6–14.5)
EST. AVERAGE GLUCOSE BLD GHB EST-MCNC: ABNORMAL MG/DL
GLUCOSE BLD STRIP.AUTO-MCNC: 267 MG/DL (ref 70–110)
GLUCOSE BLD STRIP.AUTO-MCNC: 472 MG/DL (ref 70–110)
GLUCOSE BLD STRIP.AUTO-MCNC: 483 MG/DL (ref 70–110)
GLUCOSE SERPL-MCNC: 453 MG/DL (ref 74–99)
GLUCOSE UR STRIP.AUTO-MCNC: >1000 MG/DL
GRAN CASTS URNS QL MICRO: ABNORMAL /LPF
HBA1C MFR BLD: >14 % (ref 4.2–5.6)
HCT VFR BLD AUTO: 32.1 % (ref 35–45)
HGB BLD-MCNC: 10.3 G/DL (ref 12–16)
HGB UR QL STRIP: ABNORMAL
KETONES UR QL STRIP.AUTO: NEGATIVE MG/DL
LEUKOCYTE ESTERASE UR QL STRIP.AUTO: ABNORMAL
LYMPHOCYTES # BLD: 1.9 K/UL (ref 0.9–3.6)
LYMPHOCYTES NFR BLD: 24 % (ref 21–52)
MCH RBC QN AUTO: 22 PG (ref 24–34)
MCHC RBC AUTO-ENTMCNC: 32.1 G/DL (ref 31–37)
MCV RBC AUTO: 68.4 FL (ref 74–97)
MONOCYTES # BLD: 1 K/UL (ref 0.05–1.2)
MONOCYTES NFR BLD: 13 % (ref 3–10)
NEUTS SEG # BLD: 4.8 K/UL (ref 1.8–8)
NEUTS SEG NFR BLD: 62 % (ref 40–73)
NITRITE UR QL STRIP.AUTO: NEGATIVE
P-R INTERVAL, ECG05: 214 MS
P-R INTERVAL, ECG05: 232 MS
PH UR STRIP: 5 [PH] (ref 5–8)
PLATELET # BLD AUTO: 225 K/UL (ref 135–420)
PLATELET COMMENTS,PCOM: ABNORMAL
PMV BLD AUTO: 9.7 FL (ref 9.2–11.8)
POTASSIUM SERPL-SCNC: 4.2 MMOL/L (ref 3.5–5.5)
PROT UR STRIP-MCNC: >1000 MG/DL
Q-T INTERVAL, ECG07: 302 MS
Q-T INTERVAL, ECG07: 380 MS
Q-T INTERVAL, ECG07: 386 MS
QRS DURATION, ECG06: 58 MS
QRS DURATION, ECG06: 76 MS
QRS DURATION, ECG06: 88 MS
QTC CALCULATION (BEZET), ECG08: 416 MS
QTC CALCULATION (BEZET), ECG08: 464 MS
QTC CALCULATION (BEZET), ECG08: 545 MS
RBC # BLD AUTO: 4.69 M/UL (ref 4.2–5.3)
RBC #/AREA URNS HPF: ABNORMAL /HPF (ref 0–5)
RBC MORPH BLD: ABNORMAL
RBC MORPH BLD: ABNORMAL
SODIUM SERPL-SCNC: 136 MMOL/L (ref 136–145)
SP GR UR REFRACTOMETRY: >1.03 (ref 1–1.03)
T4 FREE SERPL-MCNC: 0.9 NG/DL (ref 0.7–1.5)
TROPONIN I SERPL-MCNC: 0.42 NG/ML (ref 0–0.04)
TROPONIN I SERPL-MCNC: 0.65 NG/ML (ref 0–0.04)
TSH SERPL DL<=0.05 MIU/L-ACNC: 1.47 UIU/ML (ref 0.36–3.74)
UROBILINOGEN UR QL STRIP.AUTO: 0.2 EU/DL (ref 0.2–1)
VENTRICULAR RATE, ECG03: 114 BPM
VENTRICULAR RATE, ECG03: 120 BPM
VENTRICULAR RATE, ECG03: 90 BPM
WBC # BLD AUTO: 7.8 K/UL (ref 4.6–13.2)
WBC URNS QL MICRO: ABNORMAL /HPF (ref 0–4)

## 2020-10-20 PROCEDURE — 87086 URINE CULTURE/COLONY COUNT: CPT

## 2020-10-20 PROCEDURE — 74011636637 HC RX REV CODE- 636/637: Performed by: NURSE PRACTITIONER

## 2020-10-20 PROCEDURE — 93308 TTE F-UP OR LMTD: CPT

## 2020-10-20 PROCEDURE — 82962 GLUCOSE BLOOD TEST: CPT

## 2020-10-20 PROCEDURE — 87186 SC STD MICRODIL/AGAR DIL: CPT

## 2020-10-20 PROCEDURE — 99252 IP/OBS CONSLTJ NEW/EST SF 35: CPT | Performed by: INTERNAL MEDICINE

## 2020-10-20 PROCEDURE — 77030013076 HC PCH OST BAG COLO -A

## 2020-10-20 PROCEDURE — 87635 SARS-COV-2 COVID-19 AMP PRB: CPT

## 2020-10-20 PROCEDURE — 83036 HEMOGLOBIN GLYCOSYLATED A1C: CPT

## 2020-10-20 PROCEDURE — 74011636637 HC RX REV CODE- 636/637: Performed by: INTERNAL MEDICINE

## 2020-10-20 PROCEDURE — 65660000000 HC RM CCU STEPDOWN

## 2020-10-20 PROCEDURE — 74011250636 HC RX REV CODE- 250/636: Performed by: INTERNAL MEDICINE

## 2020-10-20 PROCEDURE — 93005 ELECTROCARDIOGRAM TRACING: CPT

## 2020-10-20 PROCEDURE — 77030018795 HC PASTE OST COLO -B

## 2020-10-20 PROCEDURE — 87040 BLOOD CULTURE FOR BACTERIA: CPT

## 2020-10-20 PROCEDURE — 2709999900 HC NON-CHARGEABLE SUPPLY

## 2020-10-20 PROCEDURE — 85730 THROMBOPLASTIN TIME PARTIAL: CPT

## 2020-10-20 PROCEDURE — 85025 COMPLETE CBC W/AUTO DIFF WBC: CPT

## 2020-10-20 PROCEDURE — 74011250637 HC RX REV CODE- 250/637: Performed by: INTERNAL MEDICINE

## 2020-10-20 PROCEDURE — 80048 BASIC METABOLIC PNL TOTAL CA: CPT

## 2020-10-20 PROCEDURE — 99223 1ST HOSP IP/OBS HIGH 75: CPT | Performed by: INTERNAL MEDICINE

## 2020-10-20 PROCEDURE — 74011250636 HC RX REV CODE- 250/636: Performed by: NURSE PRACTITIONER

## 2020-10-20 PROCEDURE — 74011250636 HC RX REV CODE- 250/636: Performed by: EMERGENCY MEDICINE

## 2020-10-20 PROCEDURE — 87077 CULTURE AEROBIC IDENTIFY: CPT

## 2020-10-20 PROCEDURE — 81001 URINALYSIS AUTO W/SCOPE: CPT

## 2020-10-20 PROCEDURE — 96366 THER/PROPH/DIAG IV INF ADDON: CPT

## 2020-10-20 PROCEDURE — 82550 ASSAY OF CK (CPK): CPT

## 2020-10-20 PROCEDURE — 36415 COLL VENOUS BLD VENIPUNCTURE: CPT

## 2020-10-20 PROCEDURE — 74011250637 HC RX REV CODE- 250/637: Performed by: NURSE PRACTITIONER

## 2020-10-20 RX ORDER — CLONIDINE HYDROCHLORIDE 0.1 MG/1
0.1 TABLET ORAL 2 TIMES DAILY
Status: DISCONTINUED | OUTPATIENT
Start: 2020-10-20 | End: 2020-10-23

## 2020-10-20 RX ORDER — INSULIN GLARGINE 100 [IU]/ML
37 INJECTION, SOLUTION SUBCUTANEOUS DAILY
Status: DISCONTINUED | OUTPATIENT
Start: 2020-10-20 | End: 2020-10-21

## 2020-10-20 RX ORDER — CARVEDILOL 6.25 MG/1
6.25 TABLET ORAL 2 TIMES DAILY WITH MEALS
Status: DISCONTINUED | OUTPATIENT
Start: 2020-10-20 | End: 2020-10-23

## 2020-10-20 RX ORDER — HYDROCHLOROTHIAZIDE 25 MG/1
12.5 TABLET ORAL DAILY
Status: DISCONTINUED | OUTPATIENT
Start: 2020-10-20 | End: 2020-10-23 | Stop reason: HOSPADM

## 2020-10-20 RX ORDER — INSULIN LISPRO 100 [IU]/ML
INJECTION, SOLUTION INTRAVENOUS; SUBCUTANEOUS
Status: DISCONTINUED | OUTPATIENT
Start: 2020-10-20 | End: 2020-10-20 | Stop reason: ALTCHOICE

## 2020-10-20 RX ORDER — INSULIN LISPRO 100 [IU]/ML
INJECTION, SOLUTION INTRAVENOUS; SUBCUTANEOUS
Status: DISCONTINUED | OUTPATIENT
Start: 2020-10-20 | End: 2020-10-23 | Stop reason: HOSPADM

## 2020-10-20 RX ORDER — POLYETHYLENE GLYCOL 3350 17 G/17G
17 POWDER, FOR SOLUTION ORAL DAILY PRN
Status: DISCONTINUED | OUTPATIENT
Start: 2020-10-20 | End: 2020-10-20 | Stop reason: SDUPTHER

## 2020-10-20 RX ORDER — MAGNESIUM SULFATE 100 %
4 CRYSTALS MISCELLANEOUS AS NEEDED
Status: DISCONTINUED | OUTPATIENT
Start: 2020-10-20 | End: 2020-10-23 | Stop reason: HOSPADM

## 2020-10-20 RX ORDER — SODIUM CHLORIDE 0.9 % (FLUSH) 0.9 %
5-40 SYRINGE (ML) INJECTION EVERY 8 HOURS
Status: DISCONTINUED | OUTPATIENT
Start: 2020-10-20 | End: 2020-10-23 | Stop reason: HOSPADM

## 2020-10-20 RX ORDER — LOSARTAN POTASSIUM 50 MG/1
100 TABLET ORAL DAILY
Status: DISCONTINUED | OUTPATIENT
Start: 2020-10-20 | End: 2020-10-23

## 2020-10-20 RX ORDER — METFORMIN HYDROCHLORIDE 500 MG/1
500 TABLET, EXTENDED RELEASE ORAL
Status: DISCONTINUED | OUTPATIENT
Start: 2020-10-20 | End: 2020-10-20

## 2020-10-20 RX ORDER — POTASSIUM CHLORIDE 20 MEQ/1
40 TABLET, EXTENDED RELEASE ORAL EVERY 4 HOURS
Status: COMPLETED | OUTPATIENT
Start: 2020-10-20 | End: 2020-10-20

## 2020-10-20 RX ORDER — CALCIUM CARB/MAGNESIUM CARB 311-232MG
5 TABLET ORAL
Status: DISCONTINUED | OUTPATIENT
Start: 2020-10-20 | End: 2020-10-23 | Stop reason: HOSPADM

## 2020-10-20 RX ORDER — ONDANSETRON 2 MG/ML
4 INJECTION INTRAMUSCULAR; INTRAVENOUS
Status: DISCONTINUED | OUTPATIENT
Start: 2020-10-20 | End: 2020-10-23 | Stop reason: HOSPADM

## 2020-10-20 RX ORDER — LANOLIN ALCOHOL/MO/W.PET/CERES
325 CREAM (GRAM) TOPICAL
Status: DISCONTINUED | OUTPATIENT
Start: 2020-10-20 | End: 2020-10-23 | Stop reason: HOSPADM

## 2020-10-20 RX ORDER — DEXTROSE 50 % IN WATER (D50W) INTRAVENOUS SYRINGE
25-50 AS NEEDED
Status: DISCONTINUED | OUTPATIENT
Start: 2020-10-20 | End: 2020-10-23 | Stop reason: HOSPADM

## 2020-10-20 RX ORDER — SODIUM CHLORIDE 0.9 % (FLUSH) 0.9 %
5-40 SYRINGE (ML) INJECTION AS NEEDED
Status: DISCONTINUED | OUTPATIENT
Start: 2020-10-20 | End: 2020-10-23 | Stop reason: HOSPADM

## 2020-10-20 RX ORDER — ACETAMINOPHEN 650 MG/1
650 SUPPOSITORY RECTAL
Status: DISCONTINUED | OUTPATIENT
Start: 2020-10-20 | End: 2020-10-23 | Stop reason: HOSPADM

## 2020-10-20 RX ORDER — ACETAMINOPHEN 325 MG/1
650 TABLET ORAL
Status: DISCONTINUED | OUTPATIENT
Start: 2020-10-20 | End: 2020-10-23 | Stop reason: HOSPADM

## 2020-10-20 RX ORDER — CHOLECALCIFEROL (VITAMIN D3) 125 MCG
5 CAPSULE ORAL
Status: DISCONTINUED | OUTPATIENT
Start: 2020-10-20 | End: 2020-10-20 | Stop reason: CLARIF

## 2020-10-20 RX ORDER — AMLODIPINE BESYLATE 10 MG/1
10 TABLET ORAL DAILY
Status: DISCONTINUED | OUTPATIENT
Start: 2020-10-20 | End: 2020-10-23

## 2020-10-20 RX ORDER — PROMETHAZINE HYDROCHLORIDE 12.5 MG/1
12.5 TABLET ORAL
Status: DISCONTINUED | OUTPATIENT
Start: 2020-10-20 | End: 2020-10-23 | Stop reason: HOSPADM

## 2020-10-20 RX ORDER — ATORVASTATIN CALCIUM 20 MG/1
20 TABLET, FILM COATED ORAL DAILY
Status: DISCONTINUED | OUTPATIENT
Start: 2020-10-20 | End: 2020-10-22

## 2020-10-20 RX ORDER — HEPARIN SODIUM 1000 [USP'U]/ML
3000 INJECTION, SOLUTION INTRAVENOUS; SUBCUTANEOUS ONCE
Status: COMPLETED | OUTPATIENT
Start: 2020-10-20 | End: 2020-10-20

## 2020-10-20 RX ORDER — POLYETHYLENE GLYCOL 3350 17 G/17G
17 POWDER, FOR SOLUTION ORAL DAILY PRN
Status: DISCONTINUED | OUTPATIENT
Start: 2020-10-20 | End: 2020-10-23 | Stop reason: HOSPADM

## 2020-10-20 RX ORDER — CIPROFLOXACIN 2 MG/ML
400 INJECTION, SOLUTION INTRAVENOUS EVERY 12 HOURS
Status: DISCONTINUED | OUTPATIENT
Start: 2020-10-20 | End: 2020-10-20

## 2020-10-20 RX ORDER — ASPIRIN 325 MG
325 TABLET ORAL DAILY
Status: DISCONTINUED | OUTPATIENT
Start: 2020-10-20 | End: 2020-10-22

## 2020-10-20 RX ADMIN — Medication 325 MG: at 09:43

## 2020-10-20 RX ADMIN — ATORVASTATIN CALCIUM 20 MG: 20 TABLET, FILM COATED ORAL at 09:43

## 2020-10-20 RX ADMIN — INSULIN LISPRO 15 UNITS: 100 INJECTION, SOLUTION INTRAVENOUS; SUBCUTANEOUS at 17:00

## 2020-10-20 RX ADMIN — AZITHROMYCIN MONOHYDRATE 500 MG: 500 INJECTION, POWDER, LYOPHILIZED, FOR SOLUTION INTRAVENOUS at 16:24

## 2020-10-20 RX ADMIN — Medication 10 ML: at 15:00

## 2020-10-20 RX ADMIN — CIPROFLOXACIN 400 MG: 2 INJECTION, SOLUTION INTRAVENOUS at 14:58

## 2020-10-20 RX ADMIN — CARVEDILOL 6.25 MG: 6.25 TABLET, FILM COATED ORAL at 14:57

## 2020-10-20 RX ADMIN — HEPARIN SODIUM 1488 UNITS/HR: 10000 INJECTION, SOLUTION INTRAVENOUS at 18:32

## 2020-10-20 RX ADMIN — Medication 10 ML: at 08:15

## 2020-10-20 RX ADMIN — AMLODIPINE BESYLATE 10 MG: 10 TABLET ORAL at 09:43

## 2020-10-20 RX ADMIN — ASPIRIN 325 MG ORAL TABLET 325 MG: 325 PILL ORAL at 09:43

## 2020-10-20 RX ADMIN — HEPARIN SODIUM 3000 UNITS: 1000 INJECTION INTRAVENOUS; SUBCUTANEOUS at 04:34

## 2020-10-20 RX ADMIN — HYDROCHLOROTHIAZIDE 12.5 MG: 25 TABLET ORAL at 09:43

## 2020-10-20 RX ADMIN — INSULIN LISPRO 9 UNITS: 100 INJECTION, SOLUTION INTRAVENOUS; SUBCUTANEOUS at 20:51

## 2020-10-20 RX ADMIN — POTASSIUM CHLORIDE 40 MEQ: 1500 TABLET, EXTENDED RELEASE ORAL at 11:00

## 2020-10-20 RX ADMIN — POTASSIUM CHLORIDE 40 MEQ: 1500 TABLET, EXTENDED RELEASE ORAL at 14:59

## 2020-10-20 RX ADMIN — LOSARTAN POTASSIUM 100 MG: 50 TABLET, FILM COATED ORAL at 09:43

## 2020-10-20 RX ADMIN — INSULIN LISPRO 10 UNITS: 100 INJECTION, SOLUTION INTRAVENOUS; SUBCUTANEOUS at 10:39

## 2020-10-20 RX ADMIN — INSULIN GLARGINE 37 UNITS: 100 INJECTION, SOLUTION SUBCUTANEOUS at 10:38

## 2020-10-20 RX ADMIN — Medication 10 ML: at 20:53

## 2020-10-20 RX ADMIN — CLONIDINE HYDROCHLORIDE 0.1 MG: 0.1 TABLET ORAL at 09:43

## 2020-10-20 NOTE — PROGRESS NOTES
Chart review. Due to suspicion of COVID-19 infection, this provider did not go into the room to decrease potential exposure. New admission from early this morning with assessment and cardiology consult/assessment. Cardiology consult and recommendations for possible NSTEMI/chest pain/SOB with troponin trending up, continue heparin gtt, ASA, and statin. Low dose BB initiated. Echo   Inflammatory markers added to daily labs. STAT metabolic panel this afternoon. Added IV abx coverage per PUI protocol. Azithromycin. Allergy to PCN/hives so cipro IV added for abnormal UTI. Urine culture added for coverage. Diabetes management consult with recommendations continue home dose Lantus 37 units daily with SSI. Incidental finding on CT 3 mm RML pulmonary nodule. Pulmonology consult  Possible thyroid nodule. US thyroid soft tissue ordered. Complete prior to discharge.

## 2020-10-20 NOTE — PROGRESS NOTES
Patient is not available to be assessed at this time.       [address] [facility]   [phone]

## 2020-10-20 NOTE — H&P
Admission History and Physical    Veronica Senior is a 41 y.o. female who is being admitted. Chief Complaint   Patient presents with    Chest Pain       Impression/Plan     41years old presented with chest pain, shortness of breath and fever  Noted to have elevated troponin of 0.25   Differential includes pericarditis/myocarditis, ACS or COVID-19 infection/other viral infection  She is admitted to stepdown  Monitor serial cardiac enzymes  Defer decision regarding anticoagulation to cardiology  Echocardiogram  Covid 19 swab and isolation precautions    Diabetes mellitus  Continue Lantus  Sliding scale insulin    Hypertension  Continue home meds  Monitor blood pressure    -Other medical problems as below  Continue home medications and outpatient follow-up    Admission plan was discussed    SCDs for DVT prophylaxis    HPI:   41years old with diabetes mellitus and hypertension who presented to me department with chief complaint of having chest pain associated with shortness of breath for the last 2 days. She denies having cough or subjective fever however had recorded fever of 101 in ED. No nausea, vomiting, diarrhea or urinary symptoms. She denies history of known CAD, or history of exertional chest pain. In the ED her troponin was noted at 0.25 and CTA chest was negative for PE. No sick contacts or exposure to COVID-19. Patient was started on IV heparin in ED. Discussed with ED to consult cardiology and defer decision regarding anticoagulation to cardiology as pericarditis is in the differential, despite lack of the typical EKG findings. Patient has no other concerns. Past Medical History:   Diagnosis Date    Diabetes ([address]     HTN (hypertension)      Past Surgical History:   Procedure Laterality Date    HX GYN           Social, denies recreational drugs.   Family History   Problem Relation Age of Onset    Lupus Mother     Cancer Neg Hx     Diabetes Neg Hx     Heart Disease Neg Hx     Hypertension Neg Hx      Allergies   Allergen Reactions    Pcn [Penicillins] Hives       Home Medications:     No current facility-administered medications on file prior to encounter. Current Outpatient Medications on File Prior to Encounter   Medication Sig Dispense Refill    polyethylene glycol (Miralax) 17 gram/dose powder Take 17 g by mouth daily for 30 days. 1 tablespoon with 8 oz of water daily 850 g 0    magnesium citrate solution Take 1/3 of bottle every 8 hours until finished or until you have a bowel movement. 1 Bottle 0    atorvastatin (LIPITOR) 20 mg tablet Take 1 Tab by mouth daily. 30 Tab 5    metFORMIN ER (GLUCOPHAGE XR) 500 mg tablet Take 1 Tab by mouth daily (with dinner). 30 Tab 3    losartan (COZAAR) 100 mg tablet Take 1 Tab by mouth daily. 30 Tab 6    melatonin 5 mg cap capsule Take 1 Cap by mouth nightly. 30 Cap 1    Insulin Needles, Disposable, 31 gauge x 5/16\" ndle Use to check blood glucose  Pen Needle 11    insulin glargine (LANTUS,BASAGLAR) 100 unit/mL (3 mL) inpn Take 37 units daily  Indications: type 2 diabetes mellitus 4 Pen 5    amLODIPine (NORVASC) 10 mg tablet Take 1 tablet by mouth once daily 90 Tab 3    hydroCHLOROthiazide (HYDRODIURIL) 25 mg tablet Take 0.5 Tabs by mouth daily. 30 Tab 6    linezolid (ZYVOX) 600 mg tablet Take 1 Tab by mouth two (2) times a day. 24 Tab 0    cloNIDine HCl (CATAPRES) 0.1 mg tablet Take 1 Tab by mouth two (2) times a day. 60 Tab 0    Insulin Syringe-Needle U-100 (INSULIN SYRINGE) 1 mL 30 gauge x 5/16 syrg As directed for lantus insulin 50 Syringe 0    ferrous sulfate 325 mg (65 mg iron) tablet Take 1 Tab by mouth Daily (before breakfast). 15 Tab 0    mupirocin (BACTROBAN) 2 % ointment Apply  to affected area three (3) times daily. APPLY TO left second toe, affected site 22 g 0       Review of Systems:   GEN -fever   CV -see HPI.   PULM - No cough, No hemoptysis  GI - no abd pain, no melena, no nausea, no vomiting    - no dysuria, no flank pain   M/S- no myalgias, no joint pain, no back pain, no neck pain   SKIN - no rashes, no bruising   ENDO - no temp intolerance, no polyuria, no polydypsia   NEURO - no focal weakness, no dizziness, no sensory changes, no speech changes   PSYCH - no depression, no anxiety, no hallucinations   HEENT - no headache, no ear pain, no sore throat, no blurry vision, no double vision, no neck pain     Physical Assessment:     Visit Vitals  /71   Pulse 96   Temp (!) 101 °F (38.3 °C)   Resp 18   Ht 5' 6\" (1.676 m)   Wt 90.7 kg (200 lb)   SpO2 100%   BMI 32.28 kg/m²     Constitutional: The patient is oriented to person, place, and time. No distress. Eyes: No scleral icterus. Neck: No JVD present. Cardiovascular: Regular rhythm. Exam reveals no gallop and no friction rub. Pulmonary/Chest: No stridor. No respiratory distress. has no wheezes. has no rales. Abdominal: Soft. Bowel sounds are normal. exhibits no distension. No tenderness. No rebound and no guarding. Musculoskeletal:Normal strength. exhibits no tenderness. Neurological:alert and oriented to person, place, and time. Skin: Skin is warm and dry.    Psychiatric: Memory, affect and judgment normal    Recent Results (from the past 24 hour(s))   EKG, 12 LEAD, INITIAL    Collection Time: 10/19/20  6:25 PM   Result Value Ref Range    Ventricular Rate 120 BPM    Atrial Rate 0 BPM    QRS Duration 58 ms    Q-T Interval 386 ms    QTC Calculation (Bezet) 545 ms    Calculated R Axis 48 degrees    Calculated T Axis 80 degrees    Diagnosis       Accelerated Junctional rhythm with retrograde conduction  ST elevation, consider early repolarization, pericarditis, or injury  Nonspecific ST and T wave abnormality  Prolonged QT  Abnormal ECG  When compared with ECG of 21-NOV-2019 21:03,  Significant changes have occurred     CBC WITH AUTOMATED DIFF    Collection Time: 10/19/20  6:30 PM   Result Value Ref Range    WBC 7.3 4.6 - 13.2 K/uL    RBC 5. 00 4.20 - 5.30 M/uL    HGB 11.0 (L) 12.0 - 16.0 g/dL    HCT 33.9 (L) 35.0 - 45.0 %    MCV 67.8 (L) 74.0 - 97.0 FL    MCH 22.0 (L) 24.0 - 34.0 PG    MCHC 32.4 31.0 - 37.0 g/dL    RDW 14.1 11.6 - 14.5 %    PLATELET 287 323 - 322 K/uL    MPV 10.1 9.2 - 11.8 FL    NEUTROPHILS 68 40 - 73 %    LYMPHOCYTES 22 21 - 52 %    MONOCYTES 10 3 - 10 %    EOSINOPHILS 0 0 - 5 %    BASOPHILS 0 0 - 2 %    ABS. NEUTROPHILS 4.9 1.8 - 8.0 K/UL    ABS. LYMPHOCYTES 1.6 0.9 - 3.6 K/UL    ABS. MONOCYTES 0.7 0.05 - 1.2 K/UL    ABS. EOSINOPHILS 0.0 0.0 - 0.4 K/UL    ABS.  BASOPHILS 0.0 0.0 - 0.1 K/UL    DF AUTOMATED     METABOLIC PANEL, BASIC    Collection Time: 10/19/20  6:30 PM   Result Value Ref Range    Sodium 138 136 - 145 mmol/L    Potassium 3.1 (L) 3.5 - 5.5 mmol/L    Chloride 106 100 - 111 mmol/L    CO2 26 21 - 32 mmol/L    Anion gap 6 3.0 - 18 mmol/L    Glucose 379 (H) 74 - 99 mg/dL    BUN 11 7.0 - 18 MG/DL    Creatinine 1.19 0.6 - 1.3 MG/DL    BUN/Creatinine ratio 9 (L) 12 - 20      GFR est AA 60 (L) >60 ml/min/1.73m2    GFR est non-AA 49 (L) >60 ml/min/1.73m2    Calcium 8.1 (L) 8.5 - 10.1 MG/DL   CARDIAC PANEL,(CK, CKMB & TROPONIN)    Collection Time: 10/19/20  6:30 PM   Result Value Ref Range    CK - MB 1.1 <3.6 ng/ml    CK-MB Index 0.6 0.0 - 4.0 %     26 - 192 U/L    Troponin-I, QT 0.25 (H) 0.0 - 0.045 NG/ML   LD    Collection Time: 10/19/20  6:30 PM   Result Value Ref Range     (H) 81 - 234 U/L   FERRITIN    Collection Time: 10/19/20  6:30 PM   Result Value Ref Range    Ferritin 38 8 - 388 NG/ML   D DIMER    Collection Time: 10/19/20  6:30 PM   Result Value Ref Range    D DIMER 1.11 (H) <0.46 ug/ml(FEU)   PTT    Collection Time: 10/19/20  6:30 PM   Result Value Ref Range    aPTT 25.6 23.0 - 36.4 SEC   TSH 3RD GENERATION    Collection Time: 10/19/20  6:30 PM   Result Value Ref Range    TSH 1.47 0.36 - 3.74 uIU/mL   T4, FREE    Collection Time: 10/19/20  6:30 PM   Result Value Ref Range    T4, Free 0.9 0.7 - 1.5 NG/DL POC LACTIC ACID    Collection Time: 10/19/20  6:38 PM   Result Value Ref Range    Lactic Acid (POC) 1.26 0.40 - 2.00 mmol/L   EKG, 12 LEAD, SUBSEQUENT    Collection Time: 10/19/20  8:16 PM   Result Value Ref Range    Ventricular Rate 114 BPM    Atrial Rate 114 BPM    P-R Interval 214 ms    QRS Duration 76 ms    Q-T Interval 302 ms    QTC Calculation (Bezet) 416 ms    Calculated P Axis 53 degrees    Calculated R Axis 40 degrees    Calculated T Axis 99 degrees    Diagnosis       Sinus tachycardia with 1st degree AV block  Nonspecific ST and T wave abnormality  Abnormal ECG  When compared with ECG of 19-OCT-2020 18:25,  Sinus rhythm has replaced Junctional rhythm  ST no longer elevated in Lateral leads  Nonspecific T wave abnormality now evident in Inferior leads  T wave inversion more evident in Lateral leads           Diagnostic Studies:  CTA Chest:  IMPRESSION:  There is no pulmonary embolism.   Incidental note is made of 3 mm right middle lobe pulmonary nodule. If the  patient has a significant smoking history or is otherwise at high risk for  development of lung cancer, an optional follow-up chest CT may be obtained in  one year. If there are no significant risk factors for the development of lung  cancer, no specific follow-up is necessary.   Possible 1.8 send a left thyroid nodule. Consider follow-up thyroid ultrasound,  Nonemergent.     EKG:  , no ST changes

## 2020-10-20 NOTE — CONSULTS
[facility]  Pulmonary, Critical Care, and Sleep Medicine    Initial Patient Consult    Name: Adam Fernandez MRN: [id number]   : 1975 Hospital: [facility]   Date: 10/20/2020        IMPRESSION:   · Incidental finding of 3 mm pulmonary nodule-low risk non-smoker patient. Likely represents nonspecific findings. · Shortness of breath likely related to ongoing NSTEMI and fever  · Fever-UTI versus possible Covid-work-up in progress  · DM type II-poorly controlled high hemoglobin A1c  · Hypertension  · Obesity      RECOMMENDATIONS:   · Will await further work-up results-shortness of breath likely related to febrile illness, cardiac ischemia. If fails to improve after stabilization will pursue further work-up for etiology of shortness of breath  · Await COVID-19 testing results  · Will follow-up lung nodule per nodule guidelines. 3 mm in a patient who is low risk does not need further follow-up. · Will monitor for any development of pulmonary infiltrates  · Defer glycemic control blood pressure control to primary team  · Continue heparin and aspirin per cardiology  · Check echocardiogram  · DVT prophylaxis  · We will follow for pulmonary issues     Subjective: This patient has been seen and evaluated at the request of Nohemi Majano NP for Pulmonary Nodule. Patient is a 41 y.o. female is admitted for NSTEMI (non-ST elevated myocardial infarction) ([facility] [address] [I21.4] and Fevers. Patient with PMHx of hypertension, diabetes and obesity follows with PCP- Dr. Donnie Hunt. The patient presented to the ED with C/o EUGENE associated with Chest pain and lower back pain. Patient reports midsternum chest pain level 6/10 exacerbates with SOB. Patient's  CP lasted approx 20 to 30 minutes. Symptoms onset yesterday afternoon. In the ED she was found to have temp 101. She also c/o dizziness.   She denies history of known CAD, or CHF.   In the ED her troponin was noted at 0.25 and CTA chest was negative for PE.  Incidental finding of 3 mm RML lung nodule- hence Pulmonary consult placed. No sick contacts or exposure to COVID-19.    Patient was started on IV heparin in ED. On my exam patient resting in bed comfortable no active chest pain or chest pressure SOB improved. No leg swelling no cough no recent syncopal episodes. I have reviewed all of the available data including the patient's previous history external records and radiological imaging available for review. In addition applicable cardiology and other lab data were also reviewed. Past Medical History:   Diagnosis Date    Diabetes ([address]     HTN (hypertension)       Past Surgical History:   Procedure Laterality Date    HX GYN            Prior to Admission medications    Medication Sig Start Date End Date Taking? Authorizing Provider   polyethylene glycol (Miralax) 17 gram/dose powder Take 17 g by mouth daily for 30 days. 1 tablespoon with 8 oz of water daily 10/17/20 11/16/20  Jenny Stovall MD   magnesium citrate solution Take 1/3 of bottle every 8 hours until finished or until you have a bowel movement. 10/17/20   Jenny Stovall MD   atorvastatin (LIPITOR) 20 mg tablet Take 1 Tab by mouth daily. 10/3/20   Carlee Willett MD   metFORMIN ER (GLUCOPHAGE XR) 500 mg tablet Take 1 Tab by mouth daily (with dinner). 20   Carlee Willett MD   losartan (COZAAR) 100 mg tablet Take 1 Tab by mouth daily. 20   Carlee Willett MD   melatonin 5 mg cap capsule Take 1 Cap by mouth nightly.  20   Carlee Willett MD   Insulin Needles, Disposable, 31 gauge x 16\" ndle Use to check blood glucose BID 20   Anglin, Calhoun Libman, MD   insulin glargine (LANTUS,BASAGLAR) 100 unit/mL (3 mL) inpn Take 37 units daily  Indications: type 2 diabetes mellitus 6/3/20   Carlee Willett MD   amLODIPine (NORVASC) 10 mg tablet Take 1 tablet by mouth once daily 3/29/20   Carlee Willett MD   hydroCHLOROthiazide (HYDRODIURIL) 25 mg tablet Take 0.5 Tabs by mouth daily. 10/29/19   Triny Donald MD   linezolid (ZYVOX) 600 mg tablet Take 1 Tab by mouth two (2) times a day. 1/2/19   Monroe Lawton MD   cloNIDine HCl (CATAPRES) 0.1 mg tablet Take 1 Tab by mouth two (2) times a day. 1/2/19   Monroe Lawton MD   Insulin Syringe-Needle U-100 (INSULIN SYRINGE) 1 mL 30 gauge x 5/16 syrg As directed for lantus insulin 1/2/19   Monroe Lawton MD   ferrous sulfate 325 mg (65 mg iron) tablet Take 1 Tab by mouth Daily (before breakfast). 1/2/19   Monroe Lawton MD   mupirocin (BACTROBAN) 2 % ointment Apply  to affected area three (3) times daily. APPLY TO left second toe, affected site 1/2/19   Monroe Lawton MD     Allergies   Allergen Reactions    Pcn [Penicillins] Hives      Social History     Tobacco Use    Smoking status: Never Smoker    Smokeless tobacco: Never Used   Substance Use Topics    Alcohol use: No      Family History   Problem Relation Age of Onset    Lupus Mother     Cancer Neg Hx     Diabetes Neg Hx     Heart Disease Neg Hx     Hypertension Neg Hx         Immunization status: up to date and documented.   Current Facility-Administered Medications   Medication Dose Route Frequency    cloNIDine HCL (CATAPRES) tablet 0.1 mg  0.1 mg Oral BID    ferrous sulfate tablet 325 mg  325 mg Oral ACB    [Held by provider] hydroCHLOROthiazide (HYDRODIURIL) tablet 12.5 mg  12.5 mg Oral DAILY    amLODIPine (NORVASC) tablet 10 mg  10 mg Oral DAILY    losartan (COZAAR) tablet 100 mg  100 mg Oral DAILY    atorvastatin (LIPITOR) tablet 20 mg  20 mg Oral DAILY    insulin glargine (LANTUS) injection 37 Units  37 Units SubCUTAneous DAILY    sodium chloride (NS) flush 5-40 mL  5-40 mL IntraVENous Q8H    insulin lispro (HUMALOG) injection   SubCUTAneous AC&HS    aspirin tablet 325 mg  325 mg Oral DAILY    carvediloL (COREG) tablet 6.25 mg  6.25 mg Oral BID WITH MEALS    azithromycin (ZITHROMAX) 500 mg in NS 250 mL  500 mg IntraVENous Q24H    heparin (porcine) 25,000 units in 0.45% saline 250 ml infusion  12-25 Units/kg/hr IntraVENous TITRATE       Review of Systems:  A comprehensive review of systems was negative except for that written in the HPI. Objective:   Vital Signs:    Visit Vitals  BP (!) 148/92   Pulse 100   Temp 98 °F (36.7 °C)   Resp 18   Ht 5' 6\" (1.676 m)   Wt 90.7 kg (200 lb)   SpO2 100%   BMI 32.28 kg/m²       O2 Device: Room air       Temp (24hrs), Av.4 °F (37.4 °C), Min:98 °F (36.7 °C), Max:101 °F (38.3 °C)       Intake/Output:   Last shift:      No intake/output data recorded. Last 3 shifts: 10/18 1901 - 10/20 0700  In: 104.6 [I.V.:104.6]  Out: -     Intake/Output Summary (Last 24 hours) at 10/20/2020 1513  Last data filed at 10/20/2020 0700  Gross per 24 hour   Intake 104.6 ml   Output --   Net 104.6 ml      Physical Exam:   General:  Alert, cooperative, no distress, appears stated age. Head:  Normocephalic, without obvious abnormality, atraumatic. Eyes:  Conjunctivae/corneas clear. PERRL, EOMs intact. Nose: Nares normal. Septum midline. Mucosa normal. No drainage or sinus tenderness. Throat: Lips, mucosa, and tongue normal. Teeth and gums normal.   Neck: Supple, symmetrical, trachea midline, no adenopathy, thyroid: no enlargment/tenderness/nodules, no carotid bruit and no JVD. Back:   Symmetric, no curvature. ROM normal.   Lungs:   Clear to auscultation bilaterally. Chest wall:  No tenderness or deformity. Heart:  Regular rate and rhythm, S1, S2 normal, no murmur, click, rub or gallop. Abdomen:   Soft, non-tender. Bowel sounds normal. No masses,  No organomegaly. Extremities: Extremities normal, atraumatic, no cyanosis or edema. Pulses: 2+ and symmetric all extremities.    Skin: Skin color, texture, turgor normal. No rashes or lesions   Lymph nodes: Cervical, supraclavicular, and axillary nodes normal.   Neurologic: Grossly nonfocal     Data review:     Recent Results (from the past 24 hour(s))   EKG, 12 LEAD, INITIAL    Collection Time: 10/19/20  6:25 PM   Result Value Ref Range    Ventricular Rate 120 BPM    Atrial Rate 0 BPM    QRS Duration 58 ms    Q-T Interval 386 ms    QTC Calculation (Bezet) 545 ms    Calculated R Axis 48 degrees    Calculated T Axis 80 degrees    Diagnosis       Sinus tachycardia  Early repolarization  Nonspecific ST and T wave abnormality  Prolonged QT  Abnormal ECG  When compared with ECG of 21-NOV-2019 21:03,  Significant changes have occurred  Confirmed by Jessica Obando [id number]) on 10/20/2020 8:44:32 AM     CBC WITH AUTOMATED DIFF    Collection Time: 10/19/20  6:30 PM   Result Value Ref Range    WBC 7.3 4.6 - 13.2 K/uL    RBC 5.00 4.20 - 5.30 M/uL    HGB 11.0 (L) 12.0 - 16.0 g/dL    HCT 33.9 (L) 35.0 - 45.0 %    MCV 67.8 (L) 74.0 - 97.0 FL    MCH 22.0 (L) 24.0 - 34.0 PG    MCHC 32.4 31.0 - 37.0 g/dL    RDW 14.1 11.6 - 14.5 %    PLATELET 155 686 - 190 K/uL    MPV 10.1 9.2 - 11.8 FL    NEUTROPHILS 68 40 - 73 %    LYMPHOCYTES 22 21 - 52 %    MONOCYTES 10 3 - 10 %    EOSINOPHILS 0 0 - 5 %    BASOPHILS 0 0 - 2 %    ABS. NEUTROPHILS 4.9 1.8 - 8.0 K/UL    ABS. LYMPHOCYTES 1.6 0.9 - 3.6 K/UL    ABS. MONOCYTES 0.7 0.05 - 1.2 K/UL    ABS. EOSINOPHILS 0.0 0.0 - 0.4 K/UL    ABS.  BASOPHILS 0.0 0.0 - 0.1 K/UL    DF AUTOMATED     METABOLIC PANEL, BASIC    Collection Time: 10/19/20  6:30 PM   Result Value Ref Range    Sodium 138 136 - 145 mmol/L    Potassium 3.1 (L) 3.5 - 5.5 mmol/L    Chloride 106 100 - 111 mmol/L    CO2 26 21 - 32 mmol/L    Anion gap 6 3.0 - 18 mmol/L    Glucose 379 (H) 74 - 99 mg/dL    BUN 11 7.0 - 18 MG/DL    Creatinine 1.19 0.6 - 1.3 MG/DL    BUN/Creatinine ratio 9 (L) 12 - 20      GFR est AA 60 (L) >60 ml/min/1.73m2    GFR est non-AA 49 (L) >60 ml/min/1.73m2    Calcium 8.1 (L) 8.5 - 10.1 MG/DL   CARDIAC PANEL,(CK, CKMB & TROPONIN)    Collection Time: 10/19/20  6:30 PM   Result Value Ref Range    CK - MB 1.1 <3.6 ng/ml    CK-MB Index 0.6 0.0 - 4.0 %     26 - 192 U/L    Troponin-I, QT 0.25 (H) 0.0 - 0.045 NG/ML   LD    Collection Time: 10/19/20  6:30 PM   Result Value Ref Range     (H) 81 - 234 U/L   FERRITIN    Collection Time: 10/19/20  6:30 PM   Result Value Ref Range    Ferritin 38 8 - 388 NG/ML   D DIMER    Collection Time: 10/19/20  6:30 PM   Result Value Ref Range    D DIMER 1.11 (H) <0.46 ug/ml(FEU)   PTT    Collection Time: 10/19/20  6:30 PM   Result Value Ref Range    aPTT 25.6 23.0 - 36.4 SEC   TSH 3RD GENERATION    Collection Time: 10/19/20  6:30 PM   Result Value Ref Range    TSH 1.47 0.36 - 3.74 uIU/mL   T4, FREE    Collection Time: 10/19/20  6:30 PM   Result Value Ref Range    T4, Free 0.9 0.7 - 1.5 NG/DL   POC LACTIC ACID    Collection Time: 10/19/20  6:38 PM   Result Value Ref Range    Lactic Acid (POC) 1.26 0.40 - 2.00 mmol/L   EKG, 12 LEAD, SUBSEQUENT    Collection Time: 10/19/20  8:16 PM   Result Value Ref Range    Ventricular Rate 114 BPM    Atrial Rate 114 BPM    P-R Interval 214 ms    QRS Duration 76 ms    Q-T Interval 302 ms    QTC Calculation (Bezet) 416 ms    Calculated P Axis 53 degrees    Calculated R Axis 40 degrees    Calculated T Axis 99 degrees    Diagnosis       Sinus tachycardia with 1st degree AV block  Nonspecific ST and T wave abnormality  Abnormal ECG  When compared with ECG of 19-OCT-2020 18:25,  No significant change was found    ST no longer elevated in Lateral leads  Nonspecific T wave abnormality now evident in Inferior leads  T wave inversion more evident in Lateral leads  Confirmed by Lena Pedroza [id number]) on 10/20/2020 8:43:47 AM     CULTURE, BLOOD    Collection Time: 10/19/20  9:41 PM    Specimen: Blood   Result Value Ref Range    Special Requests: NO SPECIAL REQUESTS      Culture result: NO GROWTH AFTER 8 HOURS     CULTURE, BLOOD    Collection Time: 10/19/20  9:56 PM    Specimen: Blood   Result Value Ref Range    Special Requests: NO SPECIAL REQUESTS      Culture result: NO GROWTH AFTER 8 HOURS     PTT Collection Time: 10/20/20  3:54 AM   Result Value Ref Range    aPTT 53.9 (H) 23.0 - 36.4 SEC   URINALYSIS W/ RFLX MICROSCOPIC    Collection Time: 10/20/20  6:50 AM   Result Value Ref Range    Color YELLOW      Appearance CLOUDY      Specific gravity >1.030 (H) 1.005 - 1.030    pH (UA) 5.0 5.0 - 8.0      Protein >1,000 (A) NEG mg/dL    Glucose >1,000 (A) NEG mg/dL    Ketone Negative NEG mg/dL    Bilirubin Negative NEG      Blood LARGE (A) NEG      Urobilinogen 0.2 0.2 - 1.0 EU/dL    Nitrites Negative NEG      Leukocyte Esterase TRACE (A) NEG     URINE MICROSCOPIC ONLY    Collection Time: 10/20/20  6:50 AM   Result Value Ref Range    WBC 4 to 6 0 - 4 /hpf    RBC 12 to 15 0 - 5 /hpf    Epithelial cells 2+ 0 - 5 /lpf    Bacteria 2+ (A) NEG /hpf    Granular cast 0 to 2 NEG /lpf   CBC WITH AUTOMATED DIFF    Collection Time: 10/20/20 10:15 AM   Result Value Ref Range    WBC 7.8 4.6 - 13.2 K/uL    RBC 4.69 4.20 - 5.30 M/uL    HGB 10.3 (L) 12.0 - 16.0 g/dL    HCT 32.1 (L) 35.0 - 45.0 %    MCV 68.4 (L) 74.0 - 97.0 FL    MCH 22.0 (L) 24.0 - 34.0 PG    MCHC 32.1 31.0 - 37.0 g/dL    RDW 14.5 11.6 - 14.5 %    PLATELET 898 575 - 935 K/uL    MPV 9.7 9.2 - 11.8 FL    NEUTROPHILS 62 40 - 73 %    LYMPHOCYTES 24 21 - 52 %    MONOCYTES 13 (H) 3 - 10 %    EOSINOPHILS 1 0 - 5 %    BASOPHILS 0 0 - 2 %    ABS. NEUTROPHILS 4.8 1.8 - 8.0 K/UL    ABS. LYMPHOCYTES 1.9 0.9 - 3.6 K/UL    ABS. MONOCYTES 1.0 0.05 - 1.2 K/UL    ABS. EOSINOPHILS 0.1 0.0 - 0.4 K/UL    ABS.  BASOPHILS 0.0 0.0 - 0.1 K/UL    DF SMEAR SCANNED      PLATELET COMMENTS ADEQUATE PLATELETS      RBC COMMENTS ANISOCYTOSIS  1+        RBC COMMENTS POLYCHROMASIA  1+       HEMOGLOBIN A1C WITH EAG    Collection Time: 10/20/20 10:15 AM   Result Value Ref Range    Hemoglobin A1c >14.0 (H) 4.2 - 5.6 %    Est. average glucose Cannot be calculated mg/dL   CARDIAC PANEL,(CK, CKMB & TROPONIN)    Collection Time: 10/20/20 10:15 AM   Result Value Ref Range    CK - MB 1.6 <3.6 ng/ml    CK-MB Index 1.0 0.0 - 4.0 %     26 - 192 U/L    Troponin-I, QT 0.65 (H) 0.0 - 0.045 NG/ML   PTT    Collection Time: 10/20/20 10:30 AM   Result Value Ref Range    aPTT 68.7 (H) 23.0 - 36.4 SEC   GLUCOSE, POC    Collection Time: 10/20/20 10:30 AM   Result Value Ref Range    Glucose (POC) 483 (HH) 70 - 110 mg/dL   EKG, 12 LEAD, SUBSEQUENT    Collection Time: 10/20/20 11:25 AM   Result Value Ref Range    Ventricular Rate 90 BPM    Atrial Rate 90 BPM    P-R Interval 232 ms    QRS Duration 88 ms    Q-T Interval 380 ms    QTC Calculation (Bezet) 464 ms    Calculated P Axis 53 degrees    Calculated R Axis 39 degrees    Calculated T Axis 64 degrees    Diagnosis       Sinus rhythm with 1st degree AV block  Nonspecific T wave abnormality  Prolonged QT  Abnormal ECG  When compared with ECG of 19-OCT-2020 20:16,  No significant change was found  Confirmed by Lukas Nichole ([id number]) on 10/20/2020 12:01:38 PM     ECHO ADULT FOLLOW-UP OR LIMITED    Collection Time: 10/20/20  2:33 PM   Result Value Ref Range    IVSd 1.12 (A) 0.6 - 0.9 cm    LVIDd 4.20 3.9 - 5.3 cm    LVIDs 2.93 cm    LVPWd 0.86 0.6 - 0.9 cm    Tapse 1.97 1.5 - 2.0 cm    Triscuspid Valve Regurgitation Peak Gradient 12.67 mmHg    Triscuspid Valve Regurgitation Peak Gradient 12.67 mmHg    TR Max Velocity 177.98 cm/s    TR Max Velocity 177.98 cm/s       Imaging:  I have personally reviewed the patients radiographs and have reviewed the reports:  XR Results (most recent):  Results from Hospital Encounter encounter on 10/19/20   XR CHEST PORT    Narrative Examination: Portable AP chest     History: Chest pain    Comparison: November 21, 2019    Findings: Mild pulmonary vascular congestion. No pleural effusion. No  pneumothorax. Heart size is top normal but stable. No acute osseous abnormality. Impression Impression:  1. Mild pulmonary vascular congestion.          CT Results (most recent):  Results from [place] encounter on 10/19/20   CTA CHEST W OR W WO CONT    Narrative EXAM: CT PULMONARY ARTERIOGRAM     CLINICAL INDICATION/HISTORY: Chest pain, elevated d-dimer. TECHNIQUE:  Utilizing a helical acquisition, CT pulmonary arteriography was  performed after the uneventful intravenous administration of 67 cc of Isovue . Axial images are reviewed in 2.5 and 5.66 mm slice thickness sections. Coronally and sagittally reformatted images are also submitted for review. All CT scans at this facility are performed using dose optimization technique as  appropriate to a performed exam, to include automated exposure control,  adjustment of the MA and/or kV according to patient size (including appropriate  matching for site-specific examinations) or use of  iterative reconstruction  technique. COMPARISON:  Chest radiograph same day    FINDINGS:     Technical Quality:  There is satisfactory contrast opacification of the  pulmonary arterial system. Pulmonary Arteries: The pulmonary arterial system is well opacified and patent. There is no pulmonary arterial filling defect. The main pulmonary artery is  normal in caliber. The right ventricle is normal in size and morphology. Other Cardiovascular: The heart is globally normal in size. The thoracic aorta  is normal in course and caliber. There is no pericardial effusion or  pericardial thickening. Mediastinum/Whit:  There are no pathologically enlarged mediastinal or hilar  lymph nodes. Pleura: The pleural surfaces are normal bilaterally. There is no pleural  effusion. There is no pneumothorax. Lungs:  3 mm right middle lobe nodule (series 3 image 28). Airways: The central airways are patent. The peripheral airways are normal.   There is no bronchiectasis. Upper Abdomen: The incompletely imaged upper abdomen is unremarkable. Musculoskeletal:  There is no aggressive osseous lesion. The structures of the  chest wall, including the bones, are normal for age.  Possible 1.8 cm left  thyroid nodule. Impression IMPRESSION:  There is no pulmonary embolism. Incidental note is made of 3 mm right middle lobe pulmonary nodule. If the  patient has a significant smoking history or is otherwise at high risk for  development of lung cancer, an optional follow-up chest CT may be obtained in  one year. If there are no significant risk factors for the development of lung  cancer, no specific follow-up is necessary. Possible 1.8 send a left thyroid nodule. Consider follow-up thyroid ultrasound,  nonemergent. Moderate Complex decision making was made in the evaluation and management plans during this consultation. More than 50% of time was spent in counseling and coordination of care including review of data and discussion with other team members.          Prem Saab MD  Pulmonary & Critical Care

## 2020-10-20 NOTE — PROGRESS NOTES
Problem: Diabetes Self-Management  Goal: *Disease process and treatment process  Description: Define diabetes and identify own type of diabetes; list 3 options for treating diabetes. Outcome: Progressing Towards Goal  Goal: *Monitoring blood glucose, interpreting and using results  Description: Identify recommended blood glucose targets  and personal targets.   Outcome: Progressing Towards Goal

## 2020-10-20 NOTE — PROGRESS NOTES
Reason for Admission:  NSTEMI (non-ST elevated myocardial infarction) ([facility] [address] [I21.4]  Fever [R50.9]                 RUR Score:    14           Plan for utilizing home health: To be determined                      Likelihood of Readmission:   LOW                         Transition of Care Plan:              Initial assessment completed with patient. Cognitive status of patient: oriented to time, place, person and situation. Face sheet information confirmed:  yes. The patient designates her  Rei Mckay [id number] to participate in her discharge plan and to receive any needed information. This patient lives in a home with  and children. Patient is able to navigate steps as needed. Prior to hospitalization, patient was considered to be independent with ADL's/IADLS : yes . Patient has a current ACP document on file: no  The  will be available to transport patient home upon discharge. The patient already has none reported medical equipment available in the home. Patient is not currently active with home health. Patient has not stayed in a skilled nursing facility or rehab. Was  stay within last 60 days : no. This patient is on dialysis :no     List of available Home Health agencies were provided and reviewed with the patient prior to discharge. Freedom of choice signed: yes, for [address] if needed . Currently, the discharge plan is Home. The patient states that she can obtain her medications from the pharmacy, and take her medications as directed. Patient's current insurance is [facility]      Care Management Interventions  PCP Verified by CM: Yes  Palliative Care Criteria Met (RRAT>21 & CHF Dx)?: No  Mode of Transport at Discharge:  Other (see comment)(family)  Transition of Care Consult (CM Consult): Discharge [facility]: Yes  Current Support Network: Lives with Spouse  Confirm Follow Up Transport: Family  The Patient and/or Patient Representative was Provided with a Choice of Provider and Agrees with the Discharge Plan?: Yes  Freedom of Choice List was Provided with Basic Dialogue that Supports the Patient's Individualized Plan of Care/Goals, Treatment Preferences and Shares the Quality Data Associated with the Providers?: Yes  Discharge Location  Discharge Placement: Home(vs home health)        ROLAND Infante RN  Care Management  Pager: [phone]

## 2020-10-20 NOTE — DIABETES MGMT
GLYCEMIC CONTROL PLAN OF CARE     Assessment/Recommendations:  Latest lab glucose last night 379 mg/dl  No glucose results yet today so far. Noted basal and corrective insulin ordered for pt. Will continue inpatient monitoring and follow-up with pt for education. Most recent blood glucose values:      Results for Nelda Escalante (MRN [id number]) as of 10/20/2020 09:25   Ref. Range 10/17/2020 23:41 10/19/2020 18:30   Glucose Latest Ref Range: 74 - 99 mg/dL 357 (H) 379 (H)     Current A1C of 15.1 % is equivalent to average blood glucose of 387 mg/dl over the past 2-3 months. Current hospital diabetes medications:   Lantus 37 units daily  lispor corrective insulin coverage AC&HS  Home diabetes medications:  Per pta med list  Metformin 500 mg 1 every evening  Lantus insulin 37 units daily  Diet:    DIET DIABETIC CONSISTENT CARB Regular; 2 GM NA (House Low NA)  Education:  ____Refer to Diabetes Education Record             ____Education not indicated at this time  Covid-19 (Rule Out)  Attempted to call pts room to verify home meds, provide education. No answer.     Jessica Beth

## 2020-10-20 NOTE — CONSULTS
[facility] - Consult Note    Date of  Admission: 10/19/2020  6:16 PM     Primary Care Physician:  Rolanda Rick MD     Plan:         1. Possible NSTEMI- chest pain and SOB resolved. Troponin up trending. Continue heparin drip,asa,statin. Added low dose bb. Will continue to monitor for ischemia symptoms. Follow ekg. Obtain echo to assess lvf, wma     2. Suspected COV-19  3. SOB- multifactorial with possible COV-19 and ACS  4. Hypertension- stable continue current medications   5. Fevers ? ? Afebrile today-  W/u per medical team   6. Hyperlipidemia- LDL elevated   7. Morbid obesity- weight loss advised   8. Diabetes- medications per medical team  9. UTI - medications per medical team         Given chest pain, shortness of breath and diarrhea, this is possibly a viral syndrome with mild myocarditis but coronary work-up will need to be done once Covid is ruled out. This may involve direct catheterization if there is a significant rise in enzymes or a stress test if the enzymes are trending downwards. Patient will be treated like acute coronary syndrome. XR Results (most recent):  Results from Hospital Encounter encounter on 10/19/20   XR CHEST PORT    Narrative Examination: Portable AP chest     History: Chest pain    Comparison: November 21, 2019    Findings: Mild pulmonary vascular congestion. No pleural effusion. No  pneumothorax. Heart size is top normal but stable. No acute osseous abnormality. Impression Impression:  1. Mild pulmonary vascular congestion. Assessment:     Hospital Problems  Date Reviewed: 5/9/2019          Codes Class Noted POA    Fever ICD-10-CM: R50.9  ICD-9-CM: 780.60  10/20/2020 Unknown        NSTEMI (non-ST elevated myocardial infarction) [facility]) ICD-10-CM: I21.4  ICD-9-CM: 410.70  10/20/2020 Unknown                   History of Present Illness: This is a 41 y.o. female admitted for NSTEMI (non-ST elevated myocardial infarction) ([facility] [address] Lora Roblero. 4]Fevers. patient with PMHx of hypertension, diabetes and obesity. The patient presented to the ED with C/o [facility] associates with Chest pain and lower back pain. Symptoms onset yesterday afternoon. In the ED she was found to have temp 101. Patient reports  midsternum chest pain level 6/10 exacerbates with SOB. Patient's  CP lasted approx 20 to 30 minutes. She also c/o dizziness. She denies history of known CAD, or CHF. In the ED her troponin was noted at 0.25 and CTA chest was negative for PE. No sick contacts or exposure to COVID-19. Patient was started on IV heparin in ED. On my exam patient resting in bed comfortable no active chest pain or chest pressure SOB improved. No leg swelling no cough no recent syncopal episodes. Past Medical History:     Past Medical History:   Diagnosis Date    Diabetes ([address]     HTN (hypertension)          Social History:     Social History     Socioeconomic History    Marital status:      Spouse name: Not on file    Number of children: Not on file    Years of education: Not on file    Highest education level: Not on file   Tobacco Use    Smoking status: Never Smoker    Smokeless tobacco: Never Used   Substance and Sexual Activity    Alcohol use: No    Drug use: No    Sexual activity: Yes     Partners: Male        Family History:     Family History   Problem Relation Age of Onset    Lupus Mother     Cancer Neg Hx     Diabetes Neg Hx     Heart Disease Neg Hx     Hypertension Neg Hx         Medications:      Allergies   Allergen Reactions    Pcn [Penicillins] Hives        Current Facility-Administered Medications   Medication Dose Route Frequency    cloNIDine HCL (CATAPRES) tablet 0.1 mg  0.1 mg Oral BID    ferrous sulfate tablet 325 mg  325 mg Oral ACB    [Held by provider] hydroCHLOROthiazide (HYDRODIURIL) tablet 12.5 mg  12.5 mg Oral DAILY    amLODIPine (NORVASC) tablet 10 mg  10 mg Oral DAILY    losartan (COZAAR) tablet 100 mg  100 mg Oral DAILY    atorvastatin (LIPITOR) tablet 20 mg  20 mg Oral DAILY    polyethylene glycol (MIRALAX) packet 17 g  17 g Oral DAILY PRN    insulin glargine (LANTUS) injection 37 Units  37 Units SubCUTAneous DAILY    sodium chloride (NS) flush 5-40 mL  5-40 mL IntraVENous Q8H    sodium chloride (NS) flush 5-40 mL  5-40 mL IntraVENous PRN    acetaminophen (TYLENOL) tablet 650 mg  650 mg Oral Q6H PRN    Or    acetaminophen (TYLENOL) suppository 650 mg  650 mg Rectal Q6H PRN    promethazine (PHENERGAN) tablet 12.5 mg  12.5 mg Oral Q6H PRN    Or    ondansetron (ZOFRAN) injection 4 mg  4 mg IntraVENous Q6H PRN    insulin lispro (HUMALOG) injection   SubCUTAneous AC&HS    glucose chewable tablet 16 g  4 Tab Oral PRN    glucagon (GLUCAGEN) injection 1 mg  1 mg IntraMUSCular PRN    dextrose (D50W) injection syrg 12.5-25 g  25-50 mL IntraVENous PRN    melatonin (rapid dissolve) tablet 5 mg  5 mg Oral QHS PRN    aspirin tablet 325 mg  325 mg Oral DAILY    potassium chloride (K-DUR, KLOR-CON) SR tablet 40 mEq  40 mEq Oral Q4H    heparin (porcine) 25,000 units in 0.45% saline 250 ml infusion  12-25 Units/kg/hr IntraVENous TITRATE        Review Of Systems:           Constitutional: No fever, no chills, no weight loss, no night sweats   HEENT: No epistaxis, no nasal drainage, no difficulty in swallowing, no redness in eyes  Respiratory: dyspnea   Cardiovascular:  chest pain, no chest pressure, no dyspnea, dizziness  Gastrointestinal: no abd pain, no vomiting, no diarrhea, no bleeding symptoms  Genitourinary: No urinary symptoms or hematuria  Integument/breast: No ulcers or rashes  Musculoskeletal: no muscle pain, no weakness  Neurological: No focal weakness, no seizures, no headaches  Behvioral/Psych: No anxiety, no depression         Physical Exam:     Visit Vitals  BP (!) 148/92 (BP 1 Location: Right arm, BP Patient Position: At rest)   Pulse 98   Temp 98.4 °F (36.9 °C)   Resp 16   Ht 5' 6\" (1.676 m)   Wt 90.7 kg (200 lb)   SpO2 100%   BMI 32.28 kg/m²     BP Readings from Last 3 Encounters:   10/20/20 (!) 148/92   10/17/20 (!) 163/107   02/04/20 (!) 156/95     Pulse Readings from Last 3 Encounters:   10/20/20 98   10/17/20 (!) 110   02/04/20 93     Wt Readings from Last 3 Encounters:   10/19/20 90.7 kg (200 lb)   10/17/20 90.7 kg (200 lb)   02/03/20 91.6 kg (202 lb)       General:  alert, cooperative, no distress, appears stated age, moderately obese  Skin: Warm and dry, acyanotic, normal color. Head: Normocephalic, atraumatic. Eyes: Sclerae anicteric, conjunctivae without injection. Neck:  nontender, no nuchal rigidity, no masses, no stridor, no carotid bruit, no JVD  Lungs:  clear to auscultation bilaterally  Heart:  regular rate and rhythm, S1, S2 normal, no murmur, click, rub or gallop  Abdomen:  abdomen is soft without significant tenderness, masses, organomegaly or guarding  Extremities:  extremities normal, atraumatic, no cyanosis or edema. Peripheral pulses present   Neurological: grossly intact. No focal abnormalities, moves all extremities well. Psychiatric Affect: The patient is awake, alert and oriented x3. Amanda Dowdy is interactive and appropriate.    Data Review:     Recent Results (from the past 48 hour(s))   EKG, 12 LEAD, INITIAL    Collection Time: 10/19/20  6:25 PM   Result Value Ref Range    Ventricular Rate 120 BPM    Atrial Rate 0 BPM    QRS Duration 58 ms    Q-T Interval 386 ms    QTC Calculation (Bezet) 545 ms    Calculated R Axis 48 degrees    Calculated T Axis 80 degrees    Diagnosis       Sinus tachycardia  Early repolarization  Nonspecific ST and T wave abnormality  Prolonged QT  Abnormal ECG  When compared with ECG of 21-NOV-2019 21:03,  Significant changes have occurred  Confirmed by Nolia Sandhoff [id number]) on 10/20/2020 8:44:32 AM     CBC WITH AUTOMATED DIFF    Collection Time: 10/19/20  6:30 PM   Result Value Ref Range    WBC 7.3 4.6 - 13.2 K/uL    RBC 5.00 4.20 - 5.30 M/uL    HGB 11.0 (L) 12.0 - 16.0 g/dL HCT 33.9 (L) 35.0 - 45.0 %    MCV 67.8 (L) 74.0 - 97.0 FL    MCH 22.0 (L) 24.0 - 34.0 PG    MCHC 32.4 31.0 - 37.0 g/dL    RDW 14.1 11.6 - 14.5 %    PLATELET 056 958 - 676 K/uL    MPV 10.1 9.2 - 11.8 FL    NEUTROPHILS 68 40 - 73 %    LYMPHOCYTES 22 21 - 52 %    MONOCYTES 10 3 - 10 %    EOSINOPHILS 0 0 - 5 %    BASOPHILS 0 0 - 2 %    ABS. NEUTROPHILS 4.9 1.8 - 8.0 K/UL    ABS. LYMPHOCYTES 1.6 0.9 - 3.6 K/UL    ABS. MONOCYTES 0.7 0.05 - 1.2 K/UL    ABS. EOSINOPHILS 0.0 0.0 - 0.4 K/UL    ABS.  BASOPHILS 0.0 0.0 - 0.1 K/UL    DF AUTOMATED     METABOLIC PANEL, BASIC    Collection Time: 10/19/20  6:30 PM   Result Value Ref Range    Sodium 138 136 - 145 mmol/L    Potassium 3.1 (L) 3.5 - 5.5 mmol/L    Chloride 106 100 - 111 mmol/L    CO2 26 21 - 32 mmol/L    Anion gap 6 3.0 - 18 mmol/L    Glucose 379 (H) 74 - 99 mg/dL    BUN 11 7.0 - 18 MG/DL    Creatinine 1.19 0.6 - 1.3 MG/DL    BUN/Creatinine ratio 9 (L) 12 - 20      GFR est AA 60 (L) >60 ml/min/1.73m2    GFR est non-AA 49 (L) >60 ml/min/1.73m2    Calcium 8.1 (L) 8.5 - 10.1 MG/DL   CARDIAC PANEL,(CK, CKMB & TROPONIN)    Collection Time: 10/19/20  6:30 PM   Result Value Ref Range    CK - MB 1.1 <3.6 ng/ml    CK-MB Index 0.6 0.0 - 4.0 %     26 - 192 U/L    Troponin-I, QT 0.25 (H) 0.0 - 0.045 NG/ML   LD    Collection Time: 10/19/20  6:30 PM   Result Value Ref Range     (H) 81 - 234 U/L   FERRITIN    Collection Time: 10/19/20  6:30 PM   Result Value Ref Range    Ferritin 38 8 - 388 NG/ML   D DIMER    Collection Time: 10/19/20  6:30 PM   Result Value Ref Range    D DIMER 1.11 (H) <0.46 ug/ml(FEU)   PTT    Collection Time: 10/19/20  6:30 PM   Result Value Ref Range    aPTT 25.6 23.0 - 36.4 SEC   TSH 3RD GENERATION    Collection Time: 10/19/20  6:30 PM   Result Value Ref Range    TSH 1.47 0.36 - 3.74 uIU/mL   T4, FREE    Collection Time: 10/19/20  6:30 PM   Result Value Ref Range    T4, Free 0.9 0.7 - 1.5 NG/DL   POC LACTIC ACID    Collection Time: 10/19/20  6:38 PM Result Value Ref Range    Lactic Acid (POC) 1.26 0.40 - 2.00 mmol/L   EKG, 12 LEAD, SUBSEQUENT    Collection Time: 10/19/20  8:16 PM   Result Value Ref Range    Ventricular Rate 114 BPM    Atrial Rate 114 BPM    P-R Interval 214 ms    QRS Duration 76 ms    Q-T Interval 302 ms    QTC Calculation (Bezet) 416 ms    Calculated P Axis 53 degrees    Calculated R Axis 40 degrees    Calculated T Axis 99 degrees    Diagnosis       Sinus tachycardia with 1st degree AV block  Nonspecific ST and T wave abnormality  Abnormal ECG  When compared with ECG of 19-OCT-2020 18:25,  No significant change was found    ST no longer elevated in Lateral leads  Nonspecific T wave abnormality now evident in Inferior leads  T wave inversion more evident in Lateral leads  Confirmed by Luis Alberto Boateng ([id number]) on 10/20/2020 8:43:47 AM     CULTURE, BLOOD    Collection Time: 10/19/20  9:41 PM    Specimen: Blood   Result Value Ref Range    Special Requests: NO SPECIAL REQUESTS      Culture result: NO GROWTH AFTER 8 HOURS     CULTURE, BLOOD    Collection Time: 10/19/20  9:56 PM    Specimen: Blood   Result Value Ref Range    Special Requests: NO SPECIAL REQUESTS      Culture result: NO GROWTH AFTER 8 HOURS     PTT    Collection Time: 10/20/20  3:54 AM   Result Value Ref Range    aPTT 53.9 (H) 23.0 - 36.4 SEC   URINALYSIS W/ RFLX MICROSCOPIC    Collection Time: 10/20/20  6:50 AM   Result Value Ref Range    Color YELLOW      Appearance CLOUDY      Specific gravity >1.030 (H) 1.005 - 1.030    pH (UA) 5.0 5.0 - 8.0      Protein >1,000 (A) NEG mg/dL    Glucose >1,000 (A) NEG mg/dL    Ketone Negative NEG mg/dL    Bilirubin Negative NEG      Blood LARGE (A) NEG      Urobilinogen 0.2 0.2 - 1.0 EU/dL    Nitrites Negative NEG      Leukocyte Esterase TRACE (A) NEG     URINE MICROSCOPIC ONLY    Collection Time: 10/20/20  6:50 AM   Result Value Ref Range    WBC 4 to 6 0 - 4 /hpf    RBC 12 to 15 0 - 5 /hpf    Epithelial cells 2+ 0 - 5 /lpf    Bacteria 2+ (A) NEG /hpf    Granular cast 0 to 2 NEG /lpf   CBC WITH AUTOMATED DIFF    Collection Time: 10/20/20 10:15 AM   Result Value Ref Range    WBC 7.8 4.6 - 13.2 K/uL    RBC 4.69 4.20 - 5.30 M/uL    HGB 10.3 (L) 12.0 - 16.0 g/dL    HCT 32.1 (L) 35.0 - 45.0 %    MCV 68.4 (L) 74.0 - 97.0 FL    MCH 22.0 (L) 24.0 - 34.0 PG    MCHC 32.1 31.0 - 37.0 g/dL    RDW 14.5 11.6 - 14.5 %    PLATELET 649 362 - 802 K/uL    MPV 9.7 9.2 - 11.8 FL    NEUTROPHILS PENDING %    LYMPHOCYTES PENDING %    MONOCYTES PENDING %    EOSINOPHILS PENDING %    BASOPHILS PENDING %    ABS. NEUTROPHILS PENDING K/UL    ABS. LYMPHOCYTES PENDING K/UL    ABS. MONOCYTES PENDING K/UL    ABS. EOSINOPHILS PENDING K/UL    ABS.  BASOPHILS PENDING K/UL    DF PENDING    PTT    Collection Time: 10/20/20 10:30 AM   Result Value Ref Range    aPTT 68.7 (H) 23.0 - 36.4 SEC   GLUCOSE, POC    Collection Time: 10/20/20 10:30 AM   Result Value Ref Range    Glucose (POC) 483 (HH) 70 - 110 mg/dL   EKG, 12 LEAD, SUBSEQUENT    Collection Time: 10/20/20 11:25 AM   Result Value Ref Range    Ventricular Rate 90 BPM    Atrial Rate 90 BPM    P-R Interval 232 ms    QRS Duration 88 ms    Q-T Interval 380 ms    QTC Calculation (Bezet) 464 ms    Calculated P Axis 53 degrees    Calculated R Axis 39 degrees    Calculated T Axis 64 degrees    Diagnosis       Sinus rhythm with 1st degree AV block  Nonspecific T wave abnormality  Prolonged QT  Abnormal ECG  When compared with ECG of 19-OCT-2020 20:16,  No significant change was found           Intake/Output Summary (Last 24 hours) at 10/20/2020 1133  Last data filed at 10/20/2020 0700  Gross per 24 hour   Intake 104.6 ml   Output --   Net 104.6 ml       Cardiographics:       EKG Results     Procedure 720 Value Units Date/Time    EKG, 12 LEAD, SUBSEQUENT [[id number]] Collected:  10/20/20 1125    Order Status:  Completed Updated:  10/20/20 1128     Ventricular Rate 90 BPM      Atrial Rate 90 BPM      P-R Interval 232 ms      QRS Duration 88 ms      Q-T Interval 380 ms      QTC Calculation (Bezet) 464 ms      Calculated P Axis 53 degrees      Calculated R Axis 39 degrees      Calculated T Axis 64 degrees      Diagnosis --     Sinus rhythm with 1st degree AV block  Nonspecific T wave abnormality  Prolonged QT  Abnormal ECG  When compared with ECG of 19-OCT-2020 20:16,  No significant change was found      EKG, 12 LEAD, INITIAL [[id number]] Collected:  10/19/20 1825    Order Status:  Completed Updated:  10/20/20 0844     Ventricular Rate 120 BPM      Atrial Rate 0 BPM      QRS Duration 58 ms      Q-T Interval 386 ms      QTC Calculation (Bezet) 545 ms      Calculated R Axis 48 degrees      Calculated T Axis 80 degrees      Diagnosis --     Sinus tachycardia  Early repolarization  Nonspecific ST and T wave abnormality  Prolonged QT  Abnormal ECG  When compared with ECG of 21-NOV-2019 21:03,  Significant changes have occurred  Confirmed by Ariana Roque ([id number]) on 10/20/2020 8:44:32 AM      EKG, 12 LEAD, REPEAT [[id number]] Collected:  10/19/20 2016    Order Status:  Completed Updated:  10/20/20 0843     Ventricular Rate 114 BPM      Atrial Rate 114 BPM      P-R Interval 214 ms      QRS Duration 76 ms      Q-T Interval 302 ms      QTC Calculation (Bezet) 416 ms      Calculated P Axis 53 degrees      Calculated R Axis 40 degrees      Calculated T Axis 99 degrees      Diagnosis --     Sinus tachycardia with 1st degree AV block  Nonspecific ST and T wave abnormality  Abnormal ECG  When compared with ECG of 19-OCT-2020 18:25,  No significant change was found    ST no longer elevated in Lateral leads  Nonspecific T wave abnormality now evident in Inferior leads  T wave inversion more evident in Lateral leads  Confirmed by Ariana Roque ([phone]) on 10/20/2020 8:43:47 AM                 Signed By: Gill HAQUE Phone [phone]    October 20, 2020      I have independently evaluated and examined the patient.   This is a late entry but I did evaluate the patient yesterday and planned the treatment. All relevant labs and testing data are reviewed. Care plan discussed and updated after review.   Tamara Nur MD

## 2020-10-20 NOTE — ROUTINE PROCESS
Verbal shift change report given to Bob Cerrato RN (oncoming nurse) by Kenny Nichole (offgoing nurse). Report included the following information SBAR, ED Summary, Intake/Output, Recent Results and Med Rec Status.

## 2020-10-20 NOTE — ED NOTES
TRANSFER - OUT REPORT:    Verbal report given to JACOB Campo (name) on Christine Nearing  being transferred to [employer] (unit) for routine progression of care       Report consisted of patients Situation, Background, Assessment and   Recommendations(SBAR). Information from the following report(s) SBAR, Kardex, ED Summary, [facility] and Recent Results was reviewed with the receiving nurse. Lines:   Peripheral IV 10/19/20 Left Antecubital (Active)   Site Assessment Clean, dry, & intact 10/19/20 1830   Phlebitis Assessment 0 10/19/20 1830   Infiltration Assessment 0 10/19/20 1830   Dressing Type Transparent 10/19/20 1830   Hub Color/Line Status Pink;Flushed;Patent 10/19/20 1830   Action Taken Blood drawn 10/19/20 1830       Peripheral IV 10/19/20 Left;Posterior Forearm (Active)   Site Assessment Clean, dry, & intact 10/19/20 2145   Phlebitis Assessment 0 10/19/20 2145   Infiltration Assessment 0 10/19/20 2145   Dressing Status Clean, dry, & intact 10/19/20 2145   Dressing Type Transparent 10/19/20 2145   Hub Color/Line Status Pink;Flushed;Patent 10/19/20 2145        Opportunity for questions and clarification was provided.       Patient transported with:   Monitor  Registered Nurse

## 2020-10-21 LAB
ALBUMIN SERPL-MCNC: 2 G/DL (ref 3.4–5)
ALBUMIN/GLOB SERPL: 0.5 {RATIO} (ref 0.8–1.7)
ALP SERPL-CCNC: 151 U/L (ref 45–117)
ALT SERPL-CCNC: 24 U/L (ref 13–56)
ANION GAP SERPL CALC-SCNC: 7 MMOL/L (ref 3–18)
APTT PPP: 132.6 SEC (ref 23–36.4)
APTT PPP: 78.7 SEC (ref 23–36.4)
AST SERPL-CCNC: 19 U/L (ref 10–38)
BASOPHILS # BLD: 0 K/UL (ref 0–0.1)
BASOPHILS # BLD: 0 K/UL (ref 0–0.1)
BASOPHILS NFR BLD: 0 % (ref 0–2)
BASOPHILS NFR BLD: 0 % (ref 0–2)
BILIRUB SERPL-MCNC: 0.1 MG/DL (ref 0.2–1)
BUN SERPL-MCNC: 19 MG/DL (ref 7–18)
BUN/CREAT SERPL: 13 (ref 12–20)
CALCIUM SERPL-MCNC: 8.2 MG/DL (ref 8.5–10.1)
CHLORIDE SERPL-SCNC: 107 MMOL/L (ref 100–111)
CO2 SERPL-SCNC: 24 MMOL/L (ref 21–32)
CREAT SERPL-MCNC: 1.42 MG/DL (ref 0.6–1.3)
CRP SERPL-MCNC: 7.2 MG/DL (ref 0–0.3)
D DIMER PPP FEU-MCNC: 0.44 UG/ML(FEU)
DIFFERENTIAL METHOD BLD: ABNORMAL
DIFFERENTIAL METHOD BLD: ABNORMAL
EOSINOPHIL # BLD: 0.1 K/UL (ref 0–0.4)
EOSINOPHIL # BLD: 0.1 K/UL (ref 0–0.4)
EOSINOPHIL NFR BLD: 1 % (ref 0–5)
EOSINOPHIL NFR BLD: 2 % (ref 0–5)
ERYTHROCYTE [DISTWIDTH] IN BLOOD BY AUTOMATED COUNT: 14.5 % (ref 11.6–14.5)
ERYTHROCYTE [DISTWIDTH] IN BLOOD BY AUTOMATED COUNT: 14.7 % (ref 11.6–14.5)
FERRITIN SERPL-MCNC: 53 NG/ML (ref 8–388)
GLOBULIN SER CALC-MCNC: 4 G/DL (ref 2–4)
GLUCOSE BLD STRIP.AUTO-MCNC: 206 MG/DL (ref 70–110)
GLUCOSE BLD STRIP.AUTO-MCNC: 265 MG/DL (ref 70–110)
GLUCOSE BLD STRIP.AUTO-MCNC: 301 MG/DL (ref 70–110)
GLUCOSE BLD STRIP.AUTO-MCNC: 316 MG/DL (ref 70–110)
GLUCOSE BLD STRIP.AUTO-MCNC: 342 MG/DL (ref 70–110)
GLUCOSE SERPL-MCNC: 218 MG/DL (ref 74–99)
HCT VFR BLD AUTO: 29.5 % (ref 35–45)
HCT VFR BLD AUTO: 29.8 % (ref 35–45)
HGB BLD-MCNC: 9.3 G/DL (ref 12–16)
HGB BLD-MCNC: 9.4 G/DL (ref 12–16)
LDH SERPL L TO P-CCNC: 196 U/L (ref 81–234)
LYMPHOCYTES # BLD: 2.7 K/UL (ref 0.9–3.6)
LYMPHOCYTES # BLD: 2.9 K/UL (ref 0.9–3.6)
LYMPHOCYTES NFR BLD: 40 % (ref 21–52)
LYMPHOCYTES NFR BLD: 41 % (ref 21–52)
MAGNESIUM SERPL-MCNC: 2 MG/DL (ref 1.6–2.6)
MCH RBC QN AUTO: 21.6 PG (ref 24–34)
MCH RBC QN AUTO: 21.9 PG (ref 24–34)
MCHC RBC AUTO-ENTMCNC: 31.2 G/DL (ref 31–37)
MCHC RBC AUTO-ENTMCNC: 31.9 G/DL (ref 31–37)
MCV RBC AUTO: 68.6 FL (ref 74–97)
MCV RBC AUTO: 69.1 FL (ref 74–97)
MONOCYTES # BLD: 0.6 K/UL (ref 0.05–1.2)
MONOCYTES # BLD: 0.9 K/UL (ref 0.05–1.2)
MONOCYTES NFR BLD: 12 % (ref 3–10)
MONOCYTES NFR BLD: 9 % (ref 3–10)
NEUTS SEG # BLD: 3.2 K/UL (ref 1.8–8)
NEUTS SEG # BLD: 3.4 K/UL (ref 1.8–8)
NEUTS SEG NFR BLD: 47 % (ref 40–73)
NEUTS SEG NFR BLD: 48 % (ref 40–73)
PLATELET # BLD AUTO: 208 K/UL (ref 135–420)
PLATELET # BLD AUTO: 228 K/UL (ref 135–420)
PLATELET COMMENTS,PCOM: ABNORMAL
PMV BLD AUTO: 10.5 FL (ref 9.2–11.8)
PMV BLD AUTO: 9.8 FL (ref 9.2–11.8)
POTASSIUM SERPL-SCNC: 3.7 MMOL/L (ref 3.5–5.5)
PROT SERPL-MCNC: 6 G/DL (ref 6.4–8.2)
RBC # BLD AUTO: 4.3 M/UL (ref 4.2–5.3)
RBC # BLD AUTO: 4.31 M/UL (ref 4.2–5.3)
RBC MORPH BLD: ABNORMAL
SARS-COV-2, COV2NT: NOT DETECTED
SODIUM SERPL-SCNC: 138 MMOL/L (ref 136–145)
SOURCE, COVRS: NORMAL
SPECIMEN TYPE, XMCV1T: NORMAL
WBC # BLD AUTO: 6.6 K/UL (ref 4.6–13.2)
WBC # BLD AUTO: 7.3 K/UL (ref 4.6–13.2)

## 2020-10-21 PROCEDURE — 65660000000 HC RM CCU STEPDOWN

## 2020-10-21 PROCEDURE — 85730 THROMBOPLASTIN TIME PARTIAL: CPT

## 2020-10-21 PROCEDURE — 74011250637 HC RX REV CODE- 250/637: Performed by: NURSE PRACTITIONER

## 2020-10-21 PROCEDURE — 93005 ELECTROCARDIOGRAM TRACING: CPT

## 2020-10-21 PROCEDURE — 82728 ASSAY OF FERRITIN: CPT

## 2020-10-21 PROCEDURE — 36415 COLL VENOUS BLD VENIPUNCTURE: CPT

## 2020-10-21 PROCEDURE — 86140 C-REACTIVE PROTEIN: CPT

## 2020-10-21 PROCEDURE — 74011636637 HC RX REV CODE- 636/637: Performed by: NURSE PRACTITIONER

## 2020-10-21 PROCEDURE — 82962 GLUCOSE BLOOD TEST: CPT

## 2020-10-21 PROCEDURE — 80053 COMPREHEN METABOLIC PANEL: CPT

## 2020-10-21 PROCEDURE — 74011250636 HC RX REV CODE- 250/636: Performed by: NURSE PRACTITIONER

## 2020-10-21 PROCEDURE — 83615 LACTATE (LD) (LDH) ENZYME: CPT

## 2020-10-21 PROCEDURE — 74011636637 HC RX REV CODE- 636/637: Performed by: INTERNAL MEDICINE

## 2020-10-21 PROCEDURE — 2709999900 HC NON-CHARGEABLE SUPPLY

## 2020-10-21 PROCEDURE — 74011250637 HC RX REV CODE- 250/637: Performed by: INTERNAL MEDICINE

## 2020-10-21 PROCEDURE — 85379 FIBRIN DEGRADATION QUANT: CPT

## 2020-10-21 PROCEDURE — 85025 COMPLETE CBC W/AUTO DIFF WBC: CPT

## 2020-10-21 PROCEDURE — 74011250636 HC RX REV CODE- 250/636: Performed by: INTERNAL MEDICINE

## 2020-10-21 PROCEDURE — 99232 SBSQ HOSP IP/OBS MODERATE 35: CPT | Performed by: NURSE PRACTITIONER

## 2020-10-21 PROCEDURE — 99232 SBSQ HOSP IP/OBS MODERATE 35: CPT | Performed by: INTERNAL MEDICINE

## 2020-10-21 PROCEDURE — 83735 ASSAY OF MAGNESIUM: CPT

## 2020-10-21 PROCEDURE — 77030038269 HC DRN EXT URIN PURWCK BARD -A

## 2020-10-21 PROCEDURE — 74011250636 HC RX REV CODE- 250/636: Performed by: FAMILY MEDICINE

## 2020-10-21 RX ORDER — SODIUM CHLORIDE 9 MG/ML
1000 INJECTION, SOLUTION INTRAVENOUS ONCE
Status: COMPLETED | OUTPATIENT
Start: 2020-10-21 | End: 2020-10-22

## 2020-10-21 RX ORDER — INSULIN GLARGINE 100 [IU]/ML
8 INJECTION, SOLUTION SUBCUTANEOUS
Status: COMPLETED | OUTPATIENT
Start: 2020-10-21 | End: 2020-10-21

## 2020-10-21 RX ORDER — SODIUM CHLORIDE 9 MG/ML
100 INJECTION, SOLUTION INTRAVENOUS CONTINUOUS
Status: DISCONTINUED | OUTPATIENT
Start: 2020-10-21 | End: 2020-10-23

## 2020-10-21 RX ORDER — INSULIN GLARGINE 100 [IU]/ML
45 INJECTION, SOLUTION SUBCUTANEOUS DAILY
Status: DISCONTINUED | OUTPATIENT
Start: 2020-10-22 | End: 2020-10-23 | Stop reason: HOSPADM

## 2020-10-21 RX ADMIN — ASPIRIN 325 MG ORAL TABLET 325 MG: 325 PILL ORAL at 10:26

## 2020-10-21 RX ADMIN — SODIUM CHLORIDE 1000 ML/HR: 900 INJECTION, SOLUTION INTRAVENOUS at 19:00

## 2020-10-21 RX ADMIN — INSULIN LISPRO 9 UNITS: 100 INJECTION, SOLUTION INTRAVENOUS; SUBCUTANEOUS at 21:40

## 2020-10-21 RX ADMIN — INSULIN GLARGINE 8 UNITS: 100 INJECTION, SOLUTION SUBCUTANEOUS at 12:40

## 2020-10-21 RX ADMIN — AMLODIPINE BESYLATE 10 MG: 10 TABLET ORAL at 10:26

## 2020-10-21 RX ADMIN — Medication 325 MG: at 10:26

## 2020-10-21 RX ADMIN — CARVEDILOL 6.25 MG: 6.25 TABLET, FILM COATED ORAL at 10:26

## 2020-10-21 RX ADMIN — SODIUM CHLORIDE 100 ML/HR: 900 INJECTION, SOLUTION INTRAVENOUS at 22:00

## 2020-10-21 RX ADMIN — ATORVASTATIN CALCIUM 20 MG: 20 TABLET, FILM COATED ORAL at 10:26

## 2020-10-21 RX ADMIN — Medication 10 ML: at 21:40

## 2020-10-21 RX ADMIN — INSULIN LISPRO 6 UNITS: 100 INJECTION, SOLUTION INTRAVENOUS; SUBCUTANEOUS at 10:27

## 2020-10-21 RX ADMIN — CLONIDINE HYDROCHLORIDE 0.1 MG: 0.1 TABLET ORAL at 10:26

## 2020-10-21 RX ADMIN — CARVEDILOL 6.25 MG: 6.25 TABLET, FILM COATED ORAL at 16:44

## 2020-10-21 RX ADMIN — INSULIN GLARGINE 37 UNITS: 100 INJECTION, SOLUTION SUBCUTANEOUS at 10:27

## 2020-10-21 RX ADMIN — INSULIN LISPRO 12 UNITS: 100 INJECTION, SOLUTION INTRAVENOUS; SUBCUTANEOUS at 16:46

## 2020-10-21 RX ADMIN — AZITHROMYCIN MONOHYDRATE 500 MG: 500 INJECTION, POWDER, LYOPHILIZED, FOR SOLUTION INTRAVENOUS at 16:43

## 2020-10-21 RX ADMIN — LOSARTAN POTASSIUM 100 MG: 50 TABLET, FILM COATED ORAL at 10:26

## 2020-10-21 RX ADMIN — INSULIN LISPRO 12 UNITS: 100 INJECTION, SOLUTION INTRAVENOUS; SUBCUTANEOUS at 13:46

## 2020-10-21 RX ADMIN — Medication 10 ML: at 05:07

## 2020-10-21 NOTE — PROGRESS NOTES
Rapid Response Note  [facility]    Patient: Meg Cavazos 41 y.o. female  [id number]  1975      Admit Date: 10/19/2020   Admission Diagnosis: NSTEMI (non-ST elevated myocardial infarction) ([facility] [I21.4]  Fever [R50.9]    RAPID RESPONSE     Rapid response called for bradycardia. Patient was given IV azithromycin, and started to feel acutely unwell. The patient had felt well with a dose of the same medication prior, but this event was significantly worse. Telemetry called nursing, informing them that the patient was in 2nd/3rd degree heart block. The rapid was called and team arrived within a few minutes of call. On arrival, the patient was bradycardic and acutely symptomatic. She was poorly responsive, but awake and could answer questions with nods and shakes of her head. She was diaphoretic, looked unwell, and had thready pulses. Her heart sounds were quiet, and her pulse was at the mid 40s, with an undetectable blood pressure. EKG was obtained, and read by the machine as heart block with concern for ischemia. Labs were drawn, and a fluid bolus was started. The patient started to feel somewhat better after a few minutes, more responsive, able to answer questions with full words, and opening her eyes. Her vitals showed an improving pulse, at 60-70, and a BP at 76/64, with a stronger pulse and louder heart sounds. She continued to improve over the course of several minutes, where she eventually had stable blood pressures in the 100s, and pulses in the 80. She felt significantly better, able to speak in full sentences. An EKG was repeated at that time, showing resolution of the ischemic seeming changes, and 1st degree heart. The attending physician was called at this time, and rapid response was ended with the patient is stable condition.     OBJECTIVE     Visit Vitals  /70 (BP 1 Location: Right arm, BP Patient Position: At rest)   Pulse 88   Temp 98.6 °F (37 °C)   Resp 17   Ht 5' 6\" (1.676 m)   Wt 96.8 kg (213 lb 6.4 oz)   SpO2 100%   BMI 34.44 kg/m²       Physical Exam: on arrival  General: pale and in moderate distress  Cardiovascular: quiet heart sounds, bradycardic  Respiratory: CTAB w/o rales, rhonchi, wheezes, no increased work of breathing  Abdomen: Soft, +BS, non-TTP, Non-Distended  Extremities: no edema in lower extremities bilaterally, no radial pulses intact   Neuro: Cranial nerves grossly intact, grossly moving upper and lower extremities  Skin: Negative for lesions, ulcers, rashes    Physical Exam: at end of rapid  General: significantly improved, no distress. Cardiovascular: RRR, no M/R/G  Respiratory: CTAB w/o rales, rhonchi, wheezes, no increased work of breathing  Abdomen: Soft, +BS, non-TTP, Non-Distended  Extremities: no edema in lower extremities bilaterally, +2 radial pulses intact   Neuro: Cranial nerves grossly intact, grossly moving upper and lower extremities  Skin: Negative for lesions, ulcers, rashes    ASSESSMENT, PLAN & DISPOSITION   Vania Thomas is a 41y.o. year old female admitted for NSTEMI (non-ST elevated myocardial infarction) ([facility] [address] [I21.4]  Fever [R50.9]. Rapid response called for symptomatic bradycardia and 2/3rd degree heart block. Patient condition currently: Stable. Medications administered: NS bolus    EKst degree heart block, regular rate    Labs: -    Disposition: Stable to stay at current location/treatment level    Attending Dr. Devota Essex notified of rapid response. In agreement with plan. Primary team resuming care.      Tenzin Henriquez MD, PGY-1   [address] Medicine   Intern Pager: [phone]   2020, 7:35 PM

## 2020-10-21 NOTE — DIABETES MGMT
GLYCEMIC CONTROL PLAN OF CARE     Assessment/Recommendations:  Blood glucose remains above target range  Recommend increasing Lantus dose to 45 units daily  Continue corrective insulin coverage as needed  Already receiving very insulin resistant dosing. Will continue inpatient monitoring. Most recent blood glucose values:        Results for Yvonne Carey (MRN [id number]) as of 10/21/2020 12:10   Ref. Range 10/20/2020 10:30 10/20/2020 15:30 10/20/2020 20:42 10/21/2020 08:54 10/21/2020 11:46   GLUCOSE,FAST - POC Latest Ref Range: 70 - 110 mg/dL 483 (HH) 472 (HH) 267 (H) 206 (H) 316 (H)     Current A1C of 15.1 % is equivalent to average blood glucose of 387 mg/dl over the past 2-3 months.     Current hospital diabetes medications:   Lantus 37 units daily  lispor corrective insulin coverage AC&HS  Previous day's insulin requirements:  Lantus 37 units  Lispro 34 units corrective insulin coverage  Home diabetes medications:  Per pta med list  Metformin 500 mg 1 every evening  Lantus insulin 37 units daily  Diet:    DIET DIABETIC CONSISTENT CARB Regular; 2 GM NA (House Low NA)  Education:  _x__Refer to Diabetes Education Record             ____Education not indicated at this time  Covid-19 (Rule Out)    Mikayla Quintana, [facility] CDE  Ext [phone]

## 2020-10-21 NOTE — PROGRESS NOTES
Comprehensive Nutrition Assessment    Type and Reason for Visit: Initial, Positive nutrition screen(MST)    Nutrition Recommendations/Plan:   - Change supplements to Gelatein BID. Nutrition Assessment:  Fair appetite, variable meal intake and dislikes Glucerna Shake supplements. Denies loss of taste or smell. Formed stool this morning. Malnutrition Assessment:  Malnutrition Status:  No malnutrition      Nutrition History and Allergies: Hx of diabetes mellitus and hypertension who presented to me department with chief complaint of having chest pain associated with shortness of breath for the last 2 days. NKFA. Weight history: 193 lb (1/28/19), 202 lb (2/3/20). Reports good appetite/meal intake PTA, stable weight history with usual weight of 222 lb. COVID-19 PUI. Estimated Daily Nutrient Needs:  Energy (kcal):  6620-9689  Protein (g):         Fluid (ml/day):  8439-9076    Nutrition Related Findings:  BM 10/20      Wounds:  None       Current Nutrition Therapies:  DIET DIABETIC CONSISTENT CARB Regular; 2 GM NA (House Low NA)  DIET NUTRITIONAL SUPPLEMENTS Breakfast, Lunch, Dinner; Glucerna Shake  DIET NPO    Anthropometric Measures:  · Height:  5' 6\" (167.6 cm)  · Current Body Wt:  96.8 kg (213 lb 6.5 oz)   · Admission Body Wt:  199 lb 15.3 oz    · Usual Body Wt:  222 lb     · Ideal Body Wt:  130 lbs:  164.2 %   · BMI Category:  Obese class 1 (BMI 30.0-34. 9)       Nutrition Diagnosis:   · Inadequate oral intake related to early satiety as evidenced by intake 26-50%      Nutrition Interventions:   Food and/or Nutrient Delivery: Continue current diet, Modify oral nutrition supplement  Nutrition Education and Counseling: Education not indicated  Coordination of Nutrition Care: Continued inpatient monitoring    Goals:  PO nutrition intake will meet >75% of patient estimated nutritional needs within the next 7 days.        Nutrition Monitoring and Evaluation:   Food/Nutrient Intake Outcomes: Food and nutrient intake, Supplement intake  Physical Signs/Symptoms Outcomes: GI status, Fluid status or edema, Nutrition focused physical findings    Discharge Planning:    Continue current diet     Electronically signed by Gemini Pickett RD, [address] [place]  on 10/21/2020 at 2:43 PM    Contact: [phone]

## 2020-10-21 NOTE — PROGRESS NOTES
RRT called for symptomatic 2nd degree-3rd degree heart block with HR in the 30s-40s. Pt used call bell to call nurse to room. Found Pt lethargic, diaphoretic, with complaints of L hand swelling and feeling \"the worst she has every felt in her life\" during azithromycin infusion. Pt states that this happened to her last night around the same time during the same infusion. Pt states that yesterday the nurse told her that her BP had gotten low. Pt states that she then fell asleep and felt better when she woke up sometime during the middle of the night. Unable to obtain BP reading from vital sign machine. 250 mL bolus given and attending MD paged. RRT called per MD suggestion.

## 2020-10-21 NOTE — PROGRESS NOTES
Problem: Diabetes Self-Management  Goal: *Disease process and treatment process  Description: Define diabetes and identify own type of diabetes; list 3 options for treating diabetes. Outcome: Progressing Towards Goal  Goal: *Incorporating nutritional management into lifestyle  Description: Describe effect of type, amount and timing of food on blood glucose; list 3 methods for planning meals. Outcome: Progressing Towards Goal  Goal: *Incorporating physical activity into lifestyle  Description: State effect of exercise on blood glucose levels. Outcome: Progressing Towards Goal  Goal: *Developing strategies to promote health/change behavior  Description: Define the ABC's of diabetes; identify appropriate screenings, schedule and personal plan for screenings. Outcome: Progressing Towards Goal  Goal: *Using medications safely  Description: State effect of diabetes medications on diabetes; name diabetes medication taking, action and side effects. Outcome: Progressing Towards Goal  Goal: *Monitoring blood glucose, interpreting and using results  Description: Identify recommended blood glucose targets  and personal targets. Outcome: Progressing Towards Goal  Goal: *Prevention, detection, treatment of acute complications  Description: List symptoms of hyper- and hypoglycemia; describe how to treat low blood sugar and actions for lowering  high blood glucose level. Outcome: Progressing Towards Goal  Goal: *Prevention, detection and treatment of chronic complications  Description: Define the natural course of diabetes and describe the relationship of blood glucose levels to long term complications of diabetes.   Outcome: Progressing Towards Goal  Goal: *Developing strategies to address psychosocial issues  Description: Describe feelings about living with diabetes; identify support needed and support network  Outcome: Progressing Towards Goal     Problem: Patient Education: Go to Patient Education Activity  Goal: Patient/Family Education  Outcome: Progressing Towards Goal     Problem: Pain  Goal: *Control of Pain  Outcome: Progressing Towards Goal     Problem: Falls - Risk of  Goal: *Absence of Falls  Description: Document Davon Fall Risk and appropriate interventions in the flowsheet.   Outcome: Progressing Towards Goal  Note: Fall Risk Interventions:            Medication Interventions: Evaluate medications/consider consulting pharmacy, Patient to call before getting OOB, Teach patient to arise slowly    Elimination Interventions: Call light in reach, Elevated toilet seat, Patient to call for help with toileting needs, Stay With Me (per policy), Toileting schedule/hourly rounds, Toilet paper/wipes in reach              Problem: Unstable angina/NSTEMI: Day of Admission/Day 1  Goal: Off Pathway (Use only if patient is Off Pathway)  Outcome: Not Progressing Towards Goal  Goal: Activity/Safety  Outcome: Not Progressing Towards Goal  Goal: Consults, if ordered  Outcome: Not Progressing Towards Goal  Goal: Diagnostic Test/Procedures  Outcome: Not Progressing Towards Goal  Goal: Nutrition/Diet  Outcome: Not Progressing Towards Goal  Goal: Discharge Planning  Outcome: Not Progressing Towards Goal  Goal: Medications  Outcome: Not Progressing Towards Goal  Goal: Respiratory  Outcome: Not Progressing Towards Goal  Goal: Treatments/Interventions/Procedures  Outcome: Not Progressing Towards Goal  Goal: Psychosocial  Outcome: Not Progressing Towards Goal  Goal: *Hemodynamically stable  Outcome: Not Progressing Towards Goal  Goal: *Optimal pain control at patient's stated goal  Outcome: Not Progressing Towards Goal  Goal: *Lungs clear or at baseline  Outcome: Not Progressing Towards Goal     Problem: Urinary Tract Infection - Adult  Goal: *Absence of infection signs and symptoms  Outcome: Progressing Towards Goal     Problem: Patient Education: Go to Patient Education Activity  Goal: Patient/Family Education  Outcome: Progressing Towards Goal

## 2020-10-21 NOTE — PROGRESS NOTES
[facility]  Progress Note    Patient: Jose Huynh Age: 41 y.o. : 1975 MR#: [id number] SSN: [id number]  Date: 10/21/2020     Subjective:   Alert and oriented x4. Denies SOB, chest pain/discomfort, diaphoresis, fever or chills. Assessment/Plan:   1. Suspect COVID-19 virus infection  2. Possible NSTEMI  3. Chest pain. Resolved  4. SOB. Resolved  5. Fevers. Resolved  6. Abnormal urinalysis with no urinary symptoms  7. HTN  8. HLD  9. DMT2, uncontrolled. A1c greater then 14.0  10. Obesity  11. Incidental findings of pulmonary nodule and thyroid nodule    Plan  1. Cardiology recommendations discontinue heparin infusion. Continue ASA, statin, BP medications. hctz on hold. Plan for cardiac stress test in am. NPO after midnight. 2. Continue IV abx until COVID test results. Monitor inflammatory markers   3. Pulmonary recommendations for incidental findings of 3 mm pulmonary nodule-low risk and non-smoker no further work up is needed just to follow up with PCP for screenings. 4. Diabetes management consult. POC glucose, SSI, Lantus  5. Thyroid US ordered. Inpatient vs OP. Normal thyroid hormones  6. Follow urine cultures. No antibiotics ordered for abnormal urinalysis with no urinary symptoms  7. Patient to update spouse on medical condition. Asked if wanted medical updates to be given to spouse and patient refused at this time.    Additional Notes:      Case discussed with:  []Patient  []Family  []Nursing  []Case Management  DVT Prophylaxis:  []Lovenox  []Hep SQ  []SCDs  []Coumadin   []On Heparin gtt    Objective:   VS:   Visit Vitals  BP 97/67 (BP 1 Location: Right arm, BP Patient Position: At rest)   Pulse 88   Temp 98.6 °F (37 °C)   Resp 17   Ht 5' 6\" (1.676 m)   Wt 96.8 kg (213 lb 6.4 oz)   SpO2 98%   BMI 34.44 kg/m²      Tmax/24hrs: Temp (24hrs), Av.3 °F (36.8 °C), Min:97.9 °F (36.6 °C), Max:98.6 °F (37 °C)      Intake/Output Summary (Last 24 hours) at 10/21/2020 1254  Last data filed at 10/21/2020 3588  Gross per 24 hour   Intake 1616.65 ml   Output 600 ml   Net 1016.65 ml       General:  Alert, NAD  Cardiovascular:  RRR  Pulmonary:  LSC throughout; respiratory effort WNL  GI:  +BS in all four quadrants, soft, non-tender  Extremities:  No edema; 2+ dorsalis pedis pulses bilaterally  Neuro: alert and oriented         Labs:    Recent Results (from the past 24 hour(s))   ECHO ADULT FOLLOW-UP OR LIMITED    Collection Time: 10/20/20  2:33 PM   Result Value Ref Range    IVSd 1.12 (A) 0.6 - 0.9 cm    LVIDd 4.20 3.9 - 5.3 cm    LVIDs 2.93 cm    LVPWd 0.86 0.6 - 0.9 cm    Tapse 1.97 1.5 - 2.0 cm    Triscuspid Valve Regurgitation Peak Gradient 12.67 mmHg    TR Max Velocity 177.98 cm/s    LV Mass .3 67 - 162 g    LV Mass AL Index 67.7 43 - 95 g/m2   GLUCOSE, POC    Collection Time: 10/20/20  3:30 PM   Result Value Ref Range    Glucose (POC) 472 (HH) 70 - 110 mg/dL   CARDIAC PANEL,(CK, CKMB & TROPONIN)    Collection Time: 10/20/20  4:35 PM   Result Value Ref Range    CK - MB 1.2 <3.6 ng/ml    CK-MB Index 0.7 0.0 - 4.0 %     26 - 192 U/L    Troponin-I, QT 0.42 (H) 0.0 - 0.045 NG/ML   PTT    Collection Time: 10/20/20  4:35 PM   Result Value Ref Range    aPTT 66.8 (H) 23.0 - 39.9 SEC   METABOLIC PANEL, BASIC    Collection Time: 10/20/20  4:35 PM   Result Value Ref Range    Sodium 136 136 - 145 mmol/L    Potassium 4.2 3.5 - 5.5 mmol/L    Chloride 105 100 - 111 mmol/L    CO2 23 21 - 32 mmol/L    Anion gap 8 3.0 - 18 mmol/L    Glucose 453 (HH) 74 - 99 mg/dL    BUN 15 7.0 - 18 MG/DL    Creatinine 1.58 (H) 0.6 - 1.3 MG/DL    BUN/Creatinine ratio 9 (L) 12 - 20      GFR est AA 43 (L) >60 ml/min/1.73m2    GFR est non-AA 36 (L) >60 ml/min/1.73m2    Calcium 7.8 (L) 8.5 - 10.1 MG/DL   SARS-COV-2    Collection Time: 10/20/20  4:45 PM   Result Value Ref Range    SARS-CoV-2 PENDING     Specimen source Nasopharyngeal      Specimen type NP Swab     GLUCOSE, POC    Collection Time: 10/20/20  8:42 PM   Result Value Ref Range    Glucose (POC) 267 (H) 70 - 110 mg/dL   CBC WITH AUTOMATED DIFF    Collection Time: 10/21/20  3:09 AM   Result Value Ref Range    WBC 7.3 4.6 - 13.2 K/uL    RBC 4.30 4.20 - 5.30 M/uL    HGB 9.4 (L) 12.0 - 16.0 g/dL    HCT 29.5 (L) 35.0 - 45.0 %    MCV 68.6 (L) 74.0 - 97.0 FL    MCH 21.9 (L) 24.0 - 34.0 PG    MCHC 31.9 31.0 - 37.0 g/dL    RDW 14.5 11.6 - 14.5 %    PLATELET 914 720 - 930 K/uL    MPV 9.8 9.2 - 11.8 FL    NEUTROPHILS 47 40 - 73 %    LYMPHOCYTES 40 21 - 52 %    MONOCYTES 12 (H) 3 - 10 %    EOSINOPHILS 1 0 - 5 %    BASOPHILS 0 0 - 2 %    ABS. NEUTROPHILS 3.4 1.8 - 8.0 K/UL    ABS. LYMPHOCYTES 2.9 0.9 - 3.6 K/UL    ABS. MONOCYTES 0.9 0.05 - 1.2 K/UL    ABS. EOSINOPHILS 0.1 0.0 - 0.4 K/UL    ABS. BASOPHILS 0.0 0.0 - 0.1 K/UL    DF AUTOMATED      PLATELET COMMENTS ADEQUATE PLATELETS      RBC COMMENTS ANISOCYTOSIS  1+        RBC COMMENTS MICROCYTOSIS  1+        RBC COMMENTS POLYCHROMASIA  1+        RBC COMMENTS HYPOCHROMIA  1+       METABOLIC PANEL, COMPREHENSIVE    Collection Time: 10/21/20  3:09 AM   Result Value Ref Range    Sodium 138 136 - 145 mmol/L    Potassium 3.7 3.5 - 5.5 mmol/L    Chloride 107 100 - 111 mmol/L    CO2 24 21 - 32 mmol/L    Anion gap 7 3.0 - 18 mmol/L    Glucose 218 (H) 74 - 99 mg/dL    BUN 19 (H) 7.0 - 18 MG/DL    Creatinine 1.42 (H) 0.6 - 1.3 MG/DL    BUN/Creatinine ratio 13 12 - 20      GFR est AA 49 (L) >60 ml/min/1.73m2    GFR est non-AA 40 (L) >60 ml/min/1.73m2    Calcium 8.2 (L) 8.5 - 10.1 MG/DL    Bilirubin, total 0.1 (L) 0.2 - 1.0 MG/DL    ALT (SGPT) 24 13 - 56 U/L    AST (SGOT) 19 10 - 38 U/L    Alk.  phosphatase 151 (H) 45 - 117 U/L    Protein, total 6.0 (L) 6.4 - 8.2 g/dL    Albumin 2.0 (L) 3.4 - 5.0 g/dL    Globulin 4.0 2.0 - 4.0 g/dL    A-G Ratio 0.5 (L) 0.8 - 1.7     MAGNESIUM    Collection Time: 10/21/20  3:09 AM   Result Value Ref Range    Magnesium 2.0 1.6 - 2.6 mg/dL   C REACTIVE PROTEIN, QT    Collection Time: 10/21/20  3:09 AM   Result Value Ref Range    C-Reactive protein 7.2 (H) 0 - 0.3 mg/dL   FERRITIN    Collection Time: 10/21/20  3:09 AM   Result Value Ref Range    Ferritin 53 8 - 388 NG/ML   LD    Collection Time: 10/21/20  3:09 AM   Result Value Ref Range     81 - 234 U/L   D DIMER    Collection Time: 10/21/20  3:09 AM   Result Value Ref Range    D DIMER 0.44 <0.46 ug/ml(FEU)   PTT    Collection Time: 10/21/20  3:09 AM   Result Value Ref Range    aPTT 132.6 (H) 23.0 - 36.4 SEC   GLUCOSE, POC    Collection Time: 10/21/20  8:54 AM   Result Value Ref Range    Glucose (POC) 206 (H) 70 - 110 mg/dL   PTT    Collection Time: 10/21/20 11:19 AM   Result Value Ref Range    aPTT 78.7 (H) 23.0 - 36.4 SEC   GLUCOSE, POC    Collection Time: 10/21/20 11:46 AM   Result Value Ref Range    Glucose (POC) 316 (H) 70 - 110 mg/dL       Signed By: Rajiv Hernandez NP     October 21, 2020

## 2020-10-21 NOTE — PROGRESS NOTES
[facility]      CARDIOLOGY PROGRESS NOTE  RECS:        1. Possible NSTEMI- chest pain and SOB resolved. Troponin down trending. Discontinue heparin. Continue other medications. Echo shows normal wall motion and ejection fraction. 2. Suspected COV-19  3. SOB- multifactorial with possible COV-19 and ACS  4. Hypertension- stable continue current medications   5. Fevers ? ? Afebrile today-  W/u per medical team   6. Hyperlipidemia- LDL elevated   7. Morbid obesity- eight loss advised   8. Diabetes- medications per medical team  9. UTI - medications per medical team     Tentative stress test tomorrow hoping that Covid will be negative.     EKG Results     Procedure 720 Value Units Date/Time    EKG, 12 LEAD, SUBSEQUENT [[id number]] Collected:  10/20/20 1125    Order Status:  Completed Updated:  10/20/20 1201     Ventricular Rate 90 BPM      Atrial Rate 90 BPM      P-R Interval 232 ms      QRS Duration 88 ms      Q-T Interval 380 ms      QTC Calculation (Bezet) 464 ms      Calculated P Axis 53 degrees      Calculated R Axis 39 degrees      Calculated T Axis 64 degrees      Diagnosis --     Sinus rhythm with 1st degree AV block  Nonspecific T wave abnormality  Prolonged QT  Abnormal ECG  When compared with ECG of 19-OCT-2020 20:16,  No significant change was found  Confirmed by Yolanda Velasco ([id number]) on 10/20/2020 12:01:38 PM      EKG, 12 LEAD, INITIAL [[id number]] Collected:  10/19/20 1825    Order Status:  Completed Updated:  10/20/20 0844     Ventricular Rate 120 BPM      Atrial Rate 0 BPM      QRS Duration 58 ms      Q-T Interval 386 ms      QTC Calculation (Bezet) 545 ms      Calculated R Axis 48 degrees      Calculated T Axis 80 degrees      Diagnosis --     Sinus tachycardia  Early repolarization  Nonspecific ST and T wave abnormality  Prolonged QT  Abnormal ECG  When compared with ECG of 21-NOV-2019 21:03,  Significant changes have occurred  Confirmed by Yolanda Velasco ([id number]) on 10/20/2020 8:44:32 AM      EKG, 12 LEAD, REPEAT [[id number]] Collected:  10/19/20 2016    Order Status:  Completed Updated:  10/20/20 0843     Ventricular Rate 114 BPM      Atrial Rate 114 BPM      P-R Interval 214 ms      QRS Duration 76 ms      Q-T Interval 302 ms      QTC Calculation (Bezet) 416 ms      Calculated P Axis 53 degrees      Calculated R Axis 40 degrees      Calculated T Axis 99 degrees      Diagnosis --     Sinus tachycardia with 1st degree AV block  Nonspecific ST and T wave abnormality  Abnormal ECG  When compared with ECG of 19-OCT-2020 18:25,  No significant change was found    ST no longer elevated in Lateral leads  Nonspecific T wave abnormality now evident in Inferior leads  T wave inversion more evident in Lateral leads  Confirmed by Brandin Mensah [phone]) on 10/20/2020 8:43:47 AM          XR Results (most recent):  Results from Hospital Encounter encounter on 10/19/20   XR CHEST PORT    Narrative Examination: Portable AP chest     History: Chest pain    Comparison: November 21, 2019    Findings: Mild pulmonary vascular congestion. No pleural effusion. No  pneumothorax. Heart size is top normal but stable. No acute osseous abnormality. Impression Impression:  1. Mild pulmonary vascular congestion. 10/19/20   ECHO ADULT FOLLOW-UP OR LIMITED 10/20/2020 10/20/2020    Narrative · LV: Estimated LVEF is 55 - 60%. Visually measured ejection fraction. Normal cavity size, wall thickness and systolic function (ejection   fraction normal).  Wall motion: normal.  · COVID r/o        Signed by: Feliberto Jasso MD                 ASSESSMENT:  Hospital Problems  Date Reviewed: 5/9/2019          Codes Class Noted POA    Fever ICD-10-CM: R50.9  ICD-9-CM: 780.60  10/20/2020 Unknown        NSTEMI (non-ST elevated myocardial infarction) [facility]) ICD-10-CM: I21.4  ICD-9-CM: 410.70  10/20/2020 Unknown                SUBJECTIVE:  No chest pain  No shortness of breath  No diarrhea today    OBJECTIVE:    VS: Visit Vitals  BP 97/67 (BP 1 Location: Right arm, BP Patient Position: At rest)   Pulse 88   Temp 98.6 °F (37 °C)   Resp 17   Ht 5' 6\" (1.676 m)   Wt 96.8 kg (213 lb 6.4 oz)   SpO2 98%   BMI 34.44 kg/m²         Intake/Output Summary (Last 24 hours) at 10/21/2020 1212  Last data filed at 10/21/2020 2508  Gross per 24 hour   Intake 1616.65 ml   Output 600 ml   Net 1016.65 ml     TELE: normal sinus rhythm  Physical exam not done by me due to rule out Covid status to prevent to preserve PPE. Talk to patient on phone and reviewed the chart. General: alert and in no apparent distress  HENT: Normocephalic, atraumatic. Normal external eye. Cardiac: Denies chest pain  Chest/Lungs: Denies shortness of breath  Abdomen: Denies abdominal pain  Extremities: Denies edema,      Labs: Results:       Chemistry Recent Labs     10/21/20  0309 10/20/20  1635 10/19/20  1830   * 453* 379*    136 138   K 3.7 4.2 3.1*    105 106   CO2 24 23 26   BUN 19* 15 11   CREA 1.42* 1.58* 1.19   CA 8.2* 7.8* 8.1*   MG 2.0  --   --    AGAP 7 8 6   BUCR 13 9* 9*   *  --   --    TP 6.0*  --   --    ALB 2.0*  --   --    GLOB 4.0  --   --    AGRAT 0.5*  --   --       CBC w/Diff Recent Labs     10/21/20  0309 10/20/20  1015 10/19/20  1830   WBC 7.3 7.8 7.3   RBC 4.30 4.69 5.00   HGB 9.4* 10.3* 11.0*   HCT 29.5* 32.1* 33.9*    225 228   GRANS 47 62 68   LYMPH 40 24 22   EOS 1 1 0      Cardiac Enzymes Recent Labs     10/20/20  1635 10/20/20  1015    168   CKND1 0.7 1.0      Coagulation Recent Labs     10/21/20  0309 10/20/20  1635   APTT 132.6* 66.8*       Lipid Panel Lab Results   Component Value Date/Time    Cholesterol, total 218 (H) 09/28/2020 01:00 PM    HDL Cholesterol 49 09/28/2020 01:00 PM    LDL, calculated 121 (H) 09/28/2020 01:00 PM    VLDL, calculated 48 (H) 09/28/2020 01:00 PM    Triglyceride 241 (H) 09/28/2020 01:00 PM      BNP No results for input(s): BNPP in the last 72 hours.    Liver Enzymes Recent Labs     10/21/20  0309   TP 6.0*   ALB 2.0*   *      Digoxin    Thyroid Studies Lab Results   Component Value Date/Time    TSH 1.47 10/19/2020 06:30 PM              Foster Davis MD     Contact numbers:   5 PM to 9 AM: Answering service at [phone]   9 AM to 5 PM: Pager [phone]; if no response, please call through answering service or office.   Office number is [phone] or [phone]

## 2020-10-21 NOTE — ROUTINE PROCESS
0000 PTT due at this time. Patient will not phlebotomist stick her in the hand. Misti, phlebotomist, attempted to draw labs but was unsuccessful. Phlebotomist stated she will send someone else up.    0300 Am labs drawn.    0502 .6. Heparin gtt per ACS protocol decreased by 100 units/hr. Now infusing at 1388 units/hr. Next PTT due at 1100.    0712 Verbal shift change report given to Basia Goodson RN (oncoming nurse) by Cody Jones RN  (offgoing nurse).  Report included the following information SBAR, Intake/Output, MAR, Recent Results and Cardiac Rhythm SR.

## 2020-10-22 ENCOUNTER — APPOINTMENT (OUTPATIENT)
Dept: NON INVASIVE DIAGNOSTICS | Age: 45
DRG: 174 | End: 2020-10-22
Attending: INTERNAL MEDICINE
Payer: MEDICAID

## 2020-10-22 ENCOUNTER — APPOINTMENT (OUTPATIENT)
Dept: GENERAL RADIOLOGY | Age: 45
DRG: 174 | End: 2020-10-22
Attending: NURSE PRACTITIONER
Payer: MEDICAID

## 2020-10-22 ENCOUNTER — HOSPITAL ENCOUNTER (OUTPATIENT)
Dept: ULTRASOUND IMAGING | Age: 45
Discharge: HOME | DRG: 174 | End: 2020-10-22
Attending: NURSE PRACTITIONER
Payer: MEDICAID

## 2020-10-22 LAB
ACT BLD: 147 SECS (ref 79–138)
ACT BLD: 175 SECS (ref 79–138)
ACT BLD: 186 SECS (ref 79–138)
ACT BLD: 191 SECS (ref 79–138)
ALBUMIN SERPL-MCNC: 2.1 G/DL (ref 3.4–5)
ALBUMIN/GLOB SERPL: 0.6 {RATIO} (ref 0.8–1.7)
ALP SERPL-CCNC: 210 U/L (ref 45–117)
ALT SERPL-CCNC: 43 U/L (ref 13–56)
ANION GAP SERPL CALC-SCNC: 8 MMOL/L (ref 3–18)
APTT PPP: 26.4 SEC (ref 23–36.4)
AST SERPL-CCNC: 64 U/L (ref 10–38)
ATRIAL RATE: 51 BPM
ATRIAL RATE: 75 BPM
ATRIAL RATE: 77 BPM
ATRIAL RATE: 88 BPM
BASOPHILS # BLD: 0 K/UL (ref 0–0.1)
BASOPHILS NFR BLD: 0 % (ref 0–2)
BILIRUB SERPL-MCNC: 0.2 MG/DL (ref 0.2–1)
BNP SERPL-MCNC: 901 PG/ML (ref 0–450)
BUN SERPL-MCNC: 25 MG/DL (ref 7–18)
BUN/CREAT SERPL: 17 (ref 12–20)
CALCIUM SERPL-MCNC: 8.3 MG/DL (ref 8.5–10.1)
CALCULATED P AXIS, ECG09: 48 DEGREES
CALCULATED P AXIS, ECG09: 54 DEGREES
CALCULATED P AXIS, ECG09: 66 DEGREES
CALCULATED P AXIS, ECG09: 80 DEGREES
CALCULATED R AXIS, ECG10: 47 DEGREES
CALCULATED R AXIS, ECG10: 62 DEGREES
CALCULATED R AXIS, ECG10: 64 DEGREES
CALCULATED R AXIS, ECG10: 65 DEGREES
CALCULATED T AXIS, ECG11: 85 DEGREES
CALCULATED T AXIS, ECG11: 87 DEGREES
CALCULATED T AXIS, ECG11: 90 DEGREES
CALCULATED T AXIS, ECG11: 91 DEGREES
CHLORIDE SERPL-SCNC: 111 MMOL/L (ref 100–111)
CK MB CFR SERPL CALC: 4.1 % (ref 0–4)
CK MB SERPL-MCNC: 9.6 NG/ML (ref 5–25)
CK SERPL-CCNC: 237 U/L (ref 26–192)
CO2 SERPL-SCNC: 21 MMOL/L (ref 21–32)
CREAT SERPL-MCNC: 1.48 MG/DL (ref 0.6–1.3)
CRP SERPL-MCNC: 2.8 MG/DL (ref 0–0.3)
D DIMER PPP FEU-MCNC: 3.63 UG/ML(FEU)
DIAGNOSIS, 93000: NORMAL
DIFFERENTIAL METHOD BLD: ABNORMAL
EOSINOPHIL # BLD: 0 K/UL (ref 0–0.4)
EOSINOPHIL NFR BLD: 0 % (ref 0–5)
ERYTHROCYTE [DISTWIDTH] IN BLOOD BY AUTOMATED COUNT: 14.8 % (ref 11.6–14.5)
FERRITIN SERPL-MCNC: 60 NG/ML (ref 8–388)
GLOBULIN SER CALC-MCNC: 3.5 G/DL (ref 2–4)
GLUCOSE BLD STRIP.AUTO-MCNC: 225 MG/DL (ref 70–110)
GLUCOSE BLD STRIP.AUTO-MCNC: 247 MG/DL (ref 70–110)
GLUCOSE BLD STRIP.AUTO-MCNC: 271 MG/DL (ref 70–110)
GLUCOSE SERPL-MCNC: 224 MG/DL (ref 74–99)
HCT VFR BLD AUTO: 28.8 % (ref 35–45)
HGB BLD-MCNC: 9.2 G/DL (ref 12–16)
LDH SERPL L TO P-CCNC: 264 U/L (ref 81–234)
LYMPHOCYTES # BLD: 2.9 K/UL (ref 0.9–3.6)
LYMPHOCYTES NFR BLD: 36 % (ref 21–52)
MAGNESIUM SERPL-MCNC: 2.2 MG/DL (ref 1.6–2.6)
MCH RBC QN AUTO: 21.6 PG (ref 24–34)
MCHC RBC AUTO-ENTMCNC: 31.9 G/DL (ref 31–37)
MCV RBC AUTO: 67.8 FL (ref 74–97)
MONOCYTES # BLD: 0.8 K/UL (ref 0.05–1.2)
MONOCYTES NFR BLD: 9 % (ref 3–10)
NEUTS SEG # BLD: 4.5 K/UL (ref 1.8–8)
NEUTS SEG NFR BLD: 55 % (ref 40–73)
P-R INTERVAL, ECG05: 294 MS
P-R INTERVAL, ECG05: 408 MS
PLATELET # BLD AUTO: 227 K/UL (ref 135–420)
PMV BLD AUTO: 9.9 FL (ref 9.2–11.8)
POTASSIUM SERPL-SCNC: 4.4 MMOL/L (ref 3.5–5.5)
PROT SERPL-MCNC: 5.6 G/DL (ref 6.4–8.2)
Q-T INTERVAL, ECG07: 356 MS
Q-T INTERVAL, ECG07: 460 MS
Q-T INTERVAL, ECG07: 460 MS
Q-T INTERVAL, ECG07: 464 MS
QRS DURATION, ECG06: 86 MS
QRS DURATION, ECG06: 86 MS
QRS DURATION, ECG06: 92 MS
QRS DURATION, ECG06: 94 MS
QTC CALCULATION (BEZET), ECG08: 407 MS
QTC CALCULATION (BEZET), ECG08: 410 MS
QTC CALCULATION (BEZET), ECG08: 423 MS
QTC CALCULATION (BEZET), ECG08: 430 MS
RBC # BLD AUTO: 4.25 M/UL (ref 4.2–5.3)
SODIUM SERPL-SCNC: 140 MMOL/L (ref 136–145)
TROPONIN I SERPL-MCNC: 13.6 NG/ML (ref 0–0.04)
TROPONIN I SERPL-MCNC: 15.9 NG/ML (ref 0–0.04)
VENTRICULAR RATE, ECG03: 47 BPM
VENTRICULAR RATE, ECG03: 47 BPM
VENTRICULAR RATE, ECG03: 51 BPM
VENTRICULAR RATE, ECG03: 88 BPM
WBC # BLD AUTO: 8.1 K/UL (ref 4.6–13.2)

## 2020-10-22 PROCEDURE — C1894 INTRO/SHEATH, NON-LASER: HCPCS | Performed by: INTERNAL MEDICINE

## 2020-10-22 PROCEDURE — 85379 FIBRIN DEGRADATION QUANT: CPT

## 2020-10-22 PROCEDURE — 77030038269 HC DRN EXT URIN PURWCK BARD -A

## 2020-10-22 PROCEDURE — 36415 COLL VENOUS BLD VENIPUNCTURE: CPT

## 2020-10-22 PROCEDURE — 99232 SBSQ HOSP IP/OBS MODERATE 35: CPT | Performed by: NURSE PRACTITIONER

## 2020-10-22 PROCEDURE — 77030013797 HC KT TRNSDUC PRSSR EDWD -A: Performed by: INTERNAL MEDICINE

## 2020-10-22 PROCEDURE — 71045 X-RAY EXAM CHEST 1 VIEW: CPT

## 2020-10-22 PROCEDURE — 74011250637 HC RX REV CODE- 250/637: Performed by: INTERNAL MEDICINE

## 2020-10-22 PROCEDURE — 33210 INSERT ELECTRD/PM CATH SNGL: CPT | Performed by: INTERNAL MEDICINE

## 2020-10-22 PROCEDURE — B2151ZZ FLUOROSCOPY OF LEFT HEART USING LOW OSMOLAR CONTRAST: ICD-10-PCS | Performed by: INTERNAL MEDICINE

## 2020-10-22 PROCEDURE — C1769 GUIDE WIRE: HCPCS | Performed by: INTERNAL MEDICINE

## 2020-10-22 PROCEDURE — 77030028790 HC ACC ST CTRL VLV COPLT ABBT -B: Performed by: INTERNAL MEDICINE

## 2020-10-22 PROCEDURE — C1887 CATHETER, GUIDING: HCPCS | Performed by: INTERNAL MEDICINE

## 2020-10-22 PROCEDURE — 86140 C-REACTIVE PROTEIN: CPT

## 2020-10-22 PROCEDURE — 82728 ASSAY OF FERRITIN: CPT

## 2020-10-22 PROCEDURE — C1874 STENT, COATED/COV W/DEL SYS: HCPCS | Performed by: INTERNAL MEDICINE

## 2020-10-22 PROCEDURE — 80053 COMPREHEN METABOLIC PANEL: CPT

## 2020-10-22 PROCEDURE — 51798 US URINE CAPACITY MEASURE: CPT

## 2020-10-22 PROCEDURE — 93458 L HRT ARTERY/VENTRICLE ANGIO: CPT | Performed by: INTERNAL MEDICINE

## 2020-10-22 PROCEDURE — C1757 CATH, THROMBECTOMY/EMBOLECT: HCPCS | Performed by: INTERNAL MEDICINE

## 2020-10-22 PROCEDURE — 77030015766: Performed by: INTERNAL MEDICINE

## 2020-10-22 PROCEDURE — 92928 PRQ TCAT PLMT NTRAC ST 1 LES: CPT | Performed by: INTERNAL MEDICINE

## 2020-10-22 PROCEDURE — 82962 GLUCOSE BLOOD TEST: CPT

## 2020-10-22 PROCEDURE — 77030019569 HC BND COMPR RAD TERU -B: Performed by: INTERNAL MEDICINE

## 2020-10-22 PROCEDURE — 85025 COMPLETE CBC W/AUTO DIFF WBC: CPT

## 2020-10-22 PROCEDURE — 4A023N7 MEASUREMENT OF CARDIAC SAMPLING AND PRESSURE, LEFT HEART, PERCUTANEOUS APPROACH: ICD-10-PCS | Performed by: INTERNAL MEDICINE

## 2020-10-22 PROCEDURE — 74011250637 HC RX REV CODE- 250/637: Performed by: NURSE PRACTITIONER

## 2020-10-22 PROCEDURE — 82550 ASSAY OF CK (CPK): CPT

## 2020-10-22 PROCEDURE — 74011250636 HC RX REV CODE- 250/636: Performed by: INTERNAL MEDICINE

## 2020-10-22 PROCEDURE — 99153 MOD SED SAME PHYS/QHP EA: CPT | Performed by: INTERNAL MEDICINE

## 2020-10-22 PROCEDURE — 93005 ELECTROCARDIOGRAM TRACING: CPT

## 2020-10-22 PROCEDURE — 83735 ASSAY OF MAGNESIUM: CPT

## 2020-10-22 PROCEDURE — 99233 SBSQ HOSP IP/OBS HIGH 50: CPT | Performed by: INTERNAL MEDICINE

## 2020-10-22 PROCEDURE — C1725 CATH, TRANSLUMIN NON-LASER: HCPCS | Performed by: INTERNAL MEDICINE

## 2020-10-22 PROCEDURE — 85347 COAGULATION TIME ACTIVATED: CPT

## 2020-10-22 PROCEDURE — 85730 THROMBOPLASTIN TIME PARTIAL: CPT

## 2020-10-22 PROCEDURE — C1751 CATH, INF, PER/CENT/MIDLINE: HCPCS | Performed by: INTERNAL MEDICINE

## 2020-10-22 PROCEDURE — 02HK3JZ INSERTION OF PACEMAKER LEAD INTO RIGHT VENTRICLE, PERCUTANEOUS APPROACH: ICD-10-PCS | Performed by: INTERNAL MEDICINE

## 2020-10-22 PROCEDURE — 2709999900 HC NON-CHARGEABLE SUPPLY

## 2020-10-22 PROCEDURE — 99152 MOD SED SAME PHYS/QHP 5/>YRS: CPT | Performed by: INTERNAL MEDICINE

## 2020-10-22 PROCEDURE — 92973 PRQ TRLUML C MCHN ASP THRMBC: CPT | Performed by: INTERNAL MEDICINE

## 2020-10-22 PROCEDURE — 65660000004 HC RM CVT STEPDOWN

## 2020-10-22 PROCEDURE — 76536 US EXAM OF HEAD AND NECK: CPT

## 2020-10-22 PROCEDURE — 74011000250 HC RX REV CODE- 250: Performed by: INTERNAL MEDICINE

## 2020-10-22 PROCEDURE — 74011636637 HC RX REV CODE- 636/637: Performed by: NURSE PRACTITIONER

## 2020-10-22 PROCEDURE — B2111ZZ FLUOROSCOPY OF MULTIPLE CORONARY ARTERIES USING LOW OSMOLAR CONTRAST: ICD-10-PCS | Performed by: INTERNAL MEDICINE

## 2020-10-22 PROCEDURE — 5A1223Z PERFORMANCE OF CARDIAC PACING, CONTINUOUS: ICD-10-PCS | Performed by: INTERNAL MEDICINE

## 2020-10-22 PROCEDURE — 83880 ASSAY OF NATRIURETIC PEPTIDE: CPT

## 2020-10-22 PROCEDURE — 02C03ZZ EXTIRPATION OF MATTER FROM CORONARY ARTERY, ONE ARTERY, PERCUTANEOUS APPROACH: ICD-10-PCS | Performed by: INTERNAL MEDICINE

## 2020-10-22 PROCEDURE — 74011000636 HC RX REV CODE- 636: Performed by: INTERNAL MEDICINE

## 2020-10-22 PROCEDURE — 93454 CORONARY ARTERY ANGIO S&I: CPT | Performed by: INTERNAL MEDICINE

## 2020-10-22 PROCEDURE — 77030016699 HC CATH ANGI DX INFN1 CARD -A: Performed by: INTERNAL MEDICINE

## 2020-10-22 PROCEDURE — 83615 LACTATE (LD) (LDH) ENZYME: CPT

## 2020-10-22 PROCEDURE — 027035Z DILATION OF CORONARY ARTERY, ONE ARTERY WITH TWO DRUG-ELUTING INTRALUMINAL DEVICES, PERCUTANEOUS APPROACH: ICD-10-PCS | Performed by: INTERNAL MEDICINE

## 2020-10-22 DEVICE — XIENCE SIERRA™ EVEROLIMUS ELUTING CORONARY STENT SYSTEM 2.75 MM X 23 MM / RAPID-EXCHANGE
Type: IMPLANTABLE DEVICE | Status: FUNCTIONAL
Brand: XIENCE SIERRA™

## 2020-10-22 DEVICE — XIENCE SIERRA™ EVEROLIMUS ELUTING CORONARY STENT SYSTEM 3.00 MM X 28 MM / RAPID-EXCHANGE
Type: IMPLANTABLE DEVICE | Status: FUNCTIONAL
Brand: XIENCE SIERRA™

## 2020-10-22 RX ORDER — HEPARIN SODIUM 200 [USP'U]/100ML
INJECTION, SOLUTION INTRAVENOUS
Status: DISCONTINUED | OUTPATIENT
Start: 2020-10-22 | End: 2020-10-22 | Stop reason: HOSPADM

## 2020-10-22 RX ORDER — ATORVASTATIN CALCIUM 40 MG/1
40 TABLET, FILM COATED ORAL DAILY
Status: DISCONTINUED | OUTPATIENT
Start: 2020-10-23 | End: 2020-10-23 | Stop reason: HOSPADM

## 2020-10-22 RX ORDER — HEPARIN SODIUM 1000 [USP'U]/ML
INJECTION, SOLUTION INTRAVENOUS; SUBCUTANEOUS AS NEEDED
Status: DISCONTINUED | OUTPATIENT
Start: 2020-10-22 | End: 2020-10-22 | Stop reason: HOSPADM

## 2020-10-22 RX ORDER — SODIUM CHLORIDE 9 MG/ML
INJECTION, SOLUTION INTRAVENOUS
Status: COMPLETED | OUTPATIENT
Start: 2020-10-22 | End: 2020-10-22

## 2020-10-22 RX ORDER — GUAIFENESIN 100 MG/5ML
81 LIQUID (ML) ORAL DAILY
Status: DISCONTINUED | OUTPATIENT
Start: 2020-10-23 | End: 2020-10-23 | Stop reason: HOSPADM

## 2020-10-22 RX ORDER — LIDOCAINE HYDROCHLORIDE 10 MG/ML
INJECTION, SOLUTION EPIDURAL; INFILTRATION; INTRACAUDAL; PERINEURAL AS NEEDED
Status: DISCONTINUED | OUTPATIENT
Start: 2020-10-22 | End: 2020-10-22 | Stop reason: HOSPADM

## 2020-10-22 RX ORDER — SODIUM CHLORIDE 0.9 % (FLUSH) 0.9 %
5-40 SYRINGE (ML) INJECTION AS NEEDED
Status: CANCELLED | OUTPATIENT
Start: 2020-10-22

## 2020-10-22 RX ORDER — EPTIFIBATIDE 2 MG/ML
INJECTION, SOLUTION INTRAVENOUS AS NEEDED
Status: DISCONTINUED | OUTPATIENT
Start: 2020-10-22 | End: 2020-10-22 | Stop reason: HOSPADM

## 2020-10-22 RX ORDER — FENTANYL CITRATE 50 UG/ML
INJECTION, SOLUTION INTRAMUSCULAR; INTRAVENOUS AS NEEDED
Status: DISCONTINUED | OUTPATIENT
Start: 2020-10-22 | End: 2020-10-22 | Stop reason: HOSPADM

## 2020-10-22 RX ORDER — SODIUM CHLORIDE 0.9 % (FLUSH) 0.9 %
5-40 SYRINGE (ML) INJECTION EVERY 8 HOURS
Status: CANCELLED | OUTPATIENT
Start: 2020-10-22

## 2020-10-22 RX ORDER — MIDAZOLAM HYDROCHLORIDE 1 MG/ML
INJECTION, SOLUTION INTRAMUSCULAR; INTRAVENOUS AS NEEDED
Status: DISCONTINUED | OUTPATIENT
Start: 2020-10-22 | End: 2020-10-22 | Stop reason: HOSPADM

## 2020-10-22 RX ORDER — CEFPODOXIME PROXETIL 200 MG/1
200 TABLET, FILM COATED ORAL EVERY 12 HOURS
Status: DISCONTINUED | OUTPATIENT
Start: 2020-10-22 | End: 2020-10-23 | Stop reason: HOSPADM

## 2020-10-22 RX ADMIN — ASPIRIN 325 MG ORAL TABLET 325 MG: 325 PILL ORAL at 11:47

## 2020-10-22 RX ADMIN — SODIUM CHLORIDE 100 ML/HR: 900 INJECTION, SOLUTION INTRAVENOUS at 04:48

## 2020-10-22 RX ADMIN — SODIUM CHLORIDE 100 ML/HR: 900 INJECTION, SOLUTION INTRAVENOUS at 21:49

## 2020-10-22 RX ADMIN — ONDANSETRON 4 MG: 2 INJECTION INTRAMUSCULAR; INTRAVENOUS at 04:48

## 2020-10-22 RX ADMIN — CEFPODOXIME PROXETIL 200 MG: 200 TABLET, FILM COATED ORAL at 21:37

## 2020-10-22 RX ADMIN — Medication 5 MG: at 21:46

## 2020-10-22 RX ADMIN — INSULIN LISPRO 6 UNITS: 100 INJECTION, SOLUTION INTRAVENOUS; SUBCUTANEOUS at 21:38

## 2020-10-22 RX ADMIN — Medication 10 ML: at 21:40

## 2020-10-22 RX ADMIN — Medication 10 ML: at 06:37

## 2020-10-22 NOTE — PROGRESS NOTES
TRANSFER - OUT REPORT:    Verbal report given to LEATHA Christian(name) on Geremias [place]ocks  being transferred to Cath lab(unit) for ordered procedure       Report consisted of patients Situation, Background, Assessment and   Recommendations(SBAR). Information from the following report(s) SBAR, Intake/Output, MAR, Recent Results, Med Rec Status, Pre Procedure Checklist, Procedure Verification, Quality Measures and Dual Neuro Assessment was reviewed with the receiving nurse. Lines:   Peripheral IV 10/19/20 Left Antecubital (Active)   Site Assessment Clean, dry, & intact 10/22/20 0420   Phlebitis Assessment 0 10/22/20 0420   Infiltration Assessment 0 10/22/20 0420   Dressing Status Clean, dry, & intact 10/22/20 0420   Dressing Type Transparent 10/22/20 0420   Hub Color/Line Status Pink; Infusing 10/22/20 0420   Action Taken Open ports on tubing capped 10/22/20 0420   Alcohol Cap Used Yes 10/22/20 0420       Peripheral IV 10/19/20 Left;Posterior Forearm (Active)   Site Assessment Clean, dry, & intact 10/22/20 0420   Phlebitis Assessment 0 10/22/20 0420   Infiltration Assessment 0 10/22/20 0420   Dressing Status Clean, dry, & intact 10/22/20 0420   Dressing Type Transparent 10/22/20 0420   Hub Color/Line Status Pink;Flushed 10/22/20 0420   Action Taken Open ports on tubing capped 10/22/20 0420   Alcohol Cap Used Yes 10/22/20 0420        Opportunity for questions and clarification was provided.       Patient transported with:   [employer]

## 2020-10-22 NOTE — DIABETES MGMT
GLYCEMIC CONTROL PLAN OF CARE     Assessment/Recommendations:  Blood glucose remains above target range  Recommend increasing Lantus dose to 50 units daily and add mealtime lispro 3 units 3 times daily with meals. Continue corrective insulin coverage as needed  Already receiving very insulin resistant dosing. Will continue inpatient monitoring. Most recent blood glucose values:        Results for Chris Rubi (MRN [id number]) as of 10/22/2020 12:01   Ref. Range 10/21/2020 11:46 10/21/2020 13:46 10/21/2020 15:39 10/21/2020 20:13 10/22/2020 07:46   GLUCOSE,FAST - POC Latest Ref Range: 70 - 110 mg/dL 316 (H) 342 (H) 301 (H) 265 (H) 271 (H)     Current A1C of 15.1 % is equivalent to average blood glucose of 387 mg/dl over the past 2-3 months. Current hospital diabetes medications:   Lantus 45 units daily  lispor corrective insulin coverage AC&HS  Previous day's insulin requirements:  Lantus 45 units  Lispro 39 units corrective insulin coverage  Home diabetes medications:  Per pta med list  Metformin 500 mg 1 every evening  Lantus insulin 40 units daily  Diet:    DIET DIABETIC CONSISTENT CARB Regular; 2 GM NA (House Low NA)  Education:  _x__Refer to Diabetes Education Record             ____Education not indicated at this time  Pt currently in cath lab.     Jessica Silva

## 2020-10-22 NOTE — PROGRESS NOTES
[facility]  Progress Note    Patient: Kenyatta Nelson Age: 41 y.o. : 1975 MR#: [id number] SSN: [id number]  Date: 10/22/2020     Subjective:   Seen post cath. No complaints at this time. Assessment/Plan:   1. Possible NSTEMI  2. Possible high degree AV block  3. SOB-multifactorial   4. UTI. Urine culture GNR 10/20  5. HTN  6. HLD  7. DMT2, uncontrolled. A1c greater than 14.0  8. Obesity  9. Incidental findings of pulmonary nodule and thyroid nodule  10. COVID-19 test negative    Plan  1. RRT called last evening with HR 30-40's, symptomatic, diaphoretic, and hypotensive. Responded well to IVF. Preserved EF%. BP medications on hold. Cardiac cath today. Stent placed during cath and kaya be transferred to CVT stepdown. Recommendations depending on results. 2. Negative COVID test.   3. GNR urine culture. PO Vantin initiated. 4. Blood culture NGTD  5. US thyroid today. Results pending. Normal thyroid levels   6. Pulmonary recommendations for incidental findings of 3 mm pulmonary nodule-low risk and non-smoker no further work up is needed just to follow up with PCP for screenings. 7. POC glucose, SSI.  Lantus    Additional Notes:      Case discussed with:  [x]Patient  []Family  [x]Nursing  []Case Management  DVT Prophylaxis:  []Lovenox  []Hep SQ  []SCDs  []Coumadin   []On Heparin gtt    Objective:   VS:   Visit Vitals  /79 (BP 1 Location: Right arm, BP Patient Position: At rest)   Pulse (!) 46   Temp 98.4 °F (36.9 °C)   Resp 18   Ht 5' 6\" (1.676 m)   Wt 96.5 kg (212 lb 12.8 oz)   SpO2 99%   BMI 34.35 kg/m²      Tmax/24hrs: Temp (24hrs), Av.2 °F (36.8 °C), Min:97.7 °F (36.5 °C), Max:98.6 °F (37 °C)      Intake/Output Summary (Last 24 hours) at 10/22/2020 1311  Last data filed at 10/22/2020 0659  Gross per 24 hour   Intake 1378.33 ml   Output 200 ml   Net 1178.33 ml       General:  Alert, NAD  Cardiovascular:  RRR  Pulmonary:  LSC throughout; respiratory effort WNL  GI:  +BS in all four quadrants, soft, non-tender  Extremities:  No edema; 2+ dorsalis pedis pulses bilaterally  Neuro: alert and oriented         Labs:    Recent Results (from the past 24 hour(s))   GLUCOSE, POC    Collection Time: 10/21/20  1:46 PM   Result Value Ref Range    Glucose (POC) 342 (H) 70 - 110 mg/dL   CBC WITH AUTOMATED DIFF    Collection Time: 10/21/20  2:54 PM   Result Value Ref Range    WBC 6.6 4.6 - 13.2 K/uL    RBC 4.31 4.20 - 5.30 M/uL    HGB 9.3 (L) 12.0 - 16.0 g/dL    HCT 29.8 (L) 35.0 - 45.0 %    MCV 69.1 (L) 74.0 - 97.0 FL    MCH 21.6 (L) 24.0 - 34.0 PG    MCHC 31.2 31.0 - 37.0 g/dL    RDW 14.7 (H) 11.6 - 14.5 %    PLATELET 376 465 - 311 K/uL    MPV 10.5 9.2 - 11.8 FL    NEUTROPHILS 48 40 - 73 %    LYMPHOCYTES 41 21 - 52 %    MONOCYTES 9 3 - 10 %    EOSINOPHILS 2 0 - 5 %    BASOPHILS 0 0 - 2 %    ABS. NEUTROPHILS 3.2 1.8 - 8.0 K/UL    ABS. LYMPHOCYTES 2.7 0.9 - 3.6 K/UL    ABS. MONOCYTES 0.6 0.05 - 1.2 K/UL    ABS. EOSINOPHILS 0.1 0.0 - 0.4 K/UL    ABS.  BASOPHILS 0.0 0.0 - 0.1 K/UL    DF AUTOMATED     GLUCOSE, POC    Collection Time: 10/21/20  3:39 PM   Result Value Ref Range    Glucose (POC) 301 (H) 70 - 110 mg/dL   EKG, 12 LEAD, INITIAL    Collection Time: 10/21/20  6:26 PM   Result Value Ref Range    Ventricular Rate 47 BPM    Atrial Rate 77 BPM    QRS Duration 86 ms    Q-T Interval 464 ms    QTC Calculation (Bezet) 410 ms    Calculated P Axis 66 degrees    Calculated R Axis 65 degrees    Calculated T Axis 90 degrees    Diagnosis       Age and gender specific ECG analysis  Sinus rhythm with 2nd degree AV block (Mobitz I) with 2:1 AV conduction  ST elevation, consider inferior injury or acute infarct  ACUTE MI  Consider right ventricular involvement in acute inferior infarct  Abnormal ECG     EKG, 12 LEAD, SUBSEQUENT    Collection Time: 10/21/20  6:28 PM   Result Value Ref Range    Ventricular Rate 47 BPM    Atrial Rate 75 BPM    QRS Duration 94 ms    Q-T Interval 460 ms    QTC Calculation (Bezet) 407 ms    Calculated P Axis 54 degrees    Calculated R Axis 62 degrees    Calculated T Axis 91 degrees    Diagnosis       Age and gender specific ECG analysis  Sinus rhythm with 2nd degree AV block (Mobitz I)  ST elevation, consider inferior injury or acute infarct  ACUTE MI  Consider right ventricular involvement in acute inferior infarct  Abnormal ECG     EKG, 12 LEAD, SUBSEQUENT    Collection Time: 10/21/20  6:45 PM   Result Value Ref Range    Ventricular Rate 88 BPM    Atrial Rate 88 BPM    P-R Interval 294 ms    QRS Duration 86 ms    Q-T Interval 356 ms    QTC Calculation (Bezet) 430 ms    Calculated P Axis 48 degrees    Calculated R Axis 47 degrees    Calculated T Axis 87 degrees    Diagnosis       Sinus rhythm with 1st degree AV block  Nonspecific T wave abnormality  Abnormal ECG  When compared with ECG of 21-OCT-2020 18:28,  Sinus rhythm is no longer with 2nd degree AV block (Mobitz I)  Vent. rate has increased BY  41 BPM  Non-specific change in ST segment in Lateral leads  Nonspecific T wave abnormality now evident in Inferior leads  Nonspecific T wave abnormality now evident in Anterior leads  Nonspecific T wave abnormality has replaced inverted T waves in Lateral leads     GLUCOSE, POC    Collection Time: 10/21/20  8:13 PM   Result Value Ref Range    Glucose (POC) 265 (H) 70 - 110 mg/dL   EKG, 12 LEAD, SUBSEQUENT    Collection Time: 10/21/20  8:39 PM   Result Value Ref Range    Ventricular Rate 51 BPM    Atrial Rate 51 BPM    P-R Interval 408 ms    QRS Duration 92 ms    Q-T Interval 460 ms    QTC Calculation (Bezet) 423 ms    Calculated P Axis 80 degrees    Calculated R Axis 64 degrees    Calculated T Axis 85 degrees    Diagnosis       Sinus bradycardia with 1st degree AV block with premature supraventricular   complexes  Otherwise normal ECG  When compared with ECG of 21-OCT-2020 18:45,  premature supraventricular complexes are now present  Vent.  rate has decreased BY 37 BPM  Nonspecific T wave abnormality no longer evident in Inferior leads  Nonspecific T wave abnormality, improved in Anterolateral leads     GLUCOSE, POC    Collection Time: 10/22/20  7:46 AM   Result Value Ref Range    Glucose (POC) 271 (H) 70 - 110 mg/dL       Signed By: Rina Aguilar NP     October 22, 2020

## 2020-10-22 NOTE — PROGRESS NOTES
TRANSFER - IN REPORT:    Verbal report received from Aldo Lancaster RN(name) on Jose Christianson  being received from Cath holding(unit) for routine progression of care      Report consisted of patients Situation, Background, Assessment and   Recommendations(SBAR). Information from the following report(s) SBAR, Kardex and Cardiac Rhythm junctional was reviewed with the receiving nurse. Opportunity for questions and clarification was provided. 1757:Verbal report given to [address] [place] [address]. Report included the following information SBAR and Kardex. Patient being transferred to cvt sdu from  for routine progression of care s/p cardiac cath with one stent to the RCA which was 100% occluded. 1800:Patient on unit.

## 2020-10-22 NOTE — PROGRESS NOTES
TRANSFER - IN REPORT:    Verbal report received from JACOB Cowart(name) on Chyna Rivera  being received from [address] [place] [address](unit) for ordered procedure      Report consisted of patients Situation, Background, Assessment and   Recommendations(SBAR). Information from the following report(s) SBAR, ED Summary, Intake/Output, MAR, Recent Results, Pre Procedure Checklist, Procedure Verification, Quality Measures and Dual Neuro Assessment was reviewed with the receiving nurse. Opportunity for questions and clarification was provided. Assessment completed upon patients arrival to unit and care assumed.

## 2020-10-22 NOTE — PROGRESS NOTES
TRANSFER - IN REPORT:    Verbal report received from LEATHA Meza(name) on Leonardo Pino  being received from Cath Lab(unit) for routine post - op      Report consisted of patients Situation, Background, Assessment and   Recommendations(SBAR). Information from the following report(s) SBAR, Procedure Summary, MAR and Recent Results was reviewed with the receiving nurse. Opportunity for questions and clarification was provided. Assessment completed upon patients arrival to unit and care assumed.      R Radial TR band 11cc  5fr sheath to RFV-- hold off on taking out until EKG is completed and ACT WNL per Bon Medellin MD

## 2020-10-22 NOTE — ROUTINE PROCESS
1914 Received patient after report from off going nurse, [facility], RN. Patient resting in bed. No distress noted. Patient only complains of feeling weak at this time. SR 1AVB on monitor. HR 84. Call bell within reach, siderails up x 3, bed in lowest position, and patient instructed to use call bell for assistance. Will continue to monitor. Bed alarm on.    2005 Telemetry called. Patient's HR 40s 2nd degree AVB noted on monitor. Assessed patient. Patient c/o feeling \"bad\", skin diaphoretic, cool, and clammy. VS taken. EKG performed. Will notify MD.    2039 Dr. Rockwell Left notified. No new orders besides to notify cardiology. 2055 Dr. Alin Gale (cardiologist) made aware. Received new order for maintenance fluids. 2200 Due to patient not feeling well purewick was set up. Pure wick external catheter placed snugly against perineum, between labia and gluteus muscles. Connected  to suction at 60 mm Hg. Will continue to monitor for urine output. 2304 Patient resting in bed. No distress noted. 0000 NPO after MN.    9673 Patient c/o nausea. PRN zofran given. 0500 Bladder scan residuals 43 ml.    0500 Nausea relieved. 0530 Complete bed bath given and bed linens changed. 0715 Bedside and Verbal shift change report given to [address] [place] [address] (oncoming nurse) by Samara Kocher, RN (offgoing nurse). Report included the following information SBAR, Intake/Output, MAR, Recent Results and Cardiac Rhythm SR 1AVB.

## 2020-10-22 NOTE — PROGRESS NOTES
SHEATH PULL NOTE:    Patient informed of procedure with questions answered with review. Sheath site 5 fr sheath in RFV pulled by Will Piedra. Manual compression to site. Handhold for 15 minutes. Hemostatic dressing applied to site. No bleeding, no hematoma, no pain/discomfort at site. Groin instructions provided with review. Continue to monitor procedure site and patient status. *Advised patient to keep head down and lie extremity flat to decrease risk of bleeding. *Instructed patient on risk for/prevention of bleeding. Patient verbalized understanding of instructions with review.

## 2020-10-22 NOTE — PROGRESS NOTES
TRANSFER - OUT REPORT:    Verbal report given to Aruna(name) on Ashanti Mendoza  being transferred to cvt SD(unit) for routine progression of care       Report consisted of patients Situation, Background, Assessment and   Recommendations(SBAR). Information from the following report(s) SBAR was reviewed with the receiving nurse. Lines:   Peripheral IV 10/19/20 Left Antecubital (Active)   Site Assessment Clean, dry, & intact 10/22/20 0800   Phlebitis Assessment 0 10/22/20 0800   Infiltration Assessment 0 10/22/20 0800   Dressing Status Clean, dry, & intact 10/22/20 0800   Dressing Type Transparent 10/22/20 0800   Hub Color/Line Status Pink 10/22/20 0800   Action Taken Open ports on tubing capped 10/22/20 0420   Alcohol Cap Used Yes 10/22/20 0800       Peripheral IV 10/19/20 Left;Posterior Forearm (Active)   Site Assessment Clean, dry, & intact 10/22/20 0800   Phlebitis Assessment 0 10/22/20 0800   Infiltration Assessment 0 10/22/20 0800   Dressing Status Clean, dry, & intact 10/22/20 0800   Dressing Type Transparent 10/22/20 0800   Hub Color/Line Status Pink 10/22/20 0800   Action Taken Open ports on tubing capped 10/22/20 0420   Alcohol Cap Used Yes 10/22/20 0800        Opportunity for questions and clarification was provided.       Patient transported with:   Monitor

## 2020-10-22 NOTE — PROGRESS NOTES
[facility]      CARDIOLOGY PROGRESS NOTE  RECS:        1. Possible NSTEMI- intermittent chest pain chest pressure and SOB- follow STAT troponin and ekg. Canceled NST. Will proceed with Cardiac catheterization. Recent Echo shows normal wall motion and ejection fraction. 2. Possible high degree AV block - transient - with HR 30'-40's RRT called last night patient became symptomatic diaphoretic and hypotensive. Patient responded to ivf.   3. SOB- multifactorial. Echo shows preserved EF%   4. Hypertension- stable BP this am   Hold BP medications for now    5. Fevers ? ? resolved. Blood cultures negative    6. Hyperlipidemia- LDL elevated continue statin   7. Morbid obesity- weight loss advised   8. Diabetes- medications per medical team. Poorly controlled with A1C >14  9. UTI - being managed by medical team     Given elevated troponin/ chest pain and intermittent high degree AV block, will proceed with [facility]      EKG Results     Procedure 720 Value Units Date/Time    EKG, 12 LEAD, SUBSEQUENT [[id number]] Collected:  10/21/20 2039    Order Status:  Completed Updated:  10/22/20 0605     Ventricular Rate 51 BPM      Atrial Rate 51 BPM      P-R Interval 408 ms      QRS Duration 92 ms      Q-T Interval 460 ms      QTC Calculation (Bezet) 423 ms      Calculated P Axis 80 degrees      Calculated R Axis 64 degrees      Calculated T Axis 85 degrees      Diagnosis --     Sinus bradycardia with 1st degree AV block with premature supraventricular   complexes  Otherwise normal ECG  When compared with ECG of 21-OCT-2020 18:45,  premature supraventricular complexes are now present  Vent.  rate has decreased BY  37 BPM  Nonspecific T wave abnormality no longer evident in Inferior leads  Nonspecific T wave abnormality, improved in Anterolateral leads      EKG, 12 LEAD, INITIAL [[id number]] Collected:  10/21/20 1826    Order Status:  Completed Updated:  10/22/20 0605     Ventricular Rate 47 BPM      Atrial Rate 77 BPM      QRS Duration 86 ms      Q-T Interval 464 ms      QTC Calculation (Bezet) 410 ms      Calculated P Axis 66 degrees      Calculated R Axis 65 degrees      Calculated T Axis 90 degrees      Diagnosis --     Age and gender specific ECG analysis  Sinus rhythm with 2nd degree AV block (Mobitz I) with 2:1 AV conduction  ST elevation, consider inferior injury or acute infarct  ACUTE MI  Consider right ventricular involvement in acute inferior infarct  Abnormal ECG      EKG, 12 LEAD, SUBSEQUENT [[id number]] Collected:  10/21/20 1845    Order Status:  Completed Updated:  10/22/20 0605     Ventricular Rate 88 BPM      Atrial Rate 88 BPM      P-R Interval 294 ms      QRS Duration 86 ms      Q-T Interval 356 ms      QTC Calculation (Bezet) 430 ms      Calculated P Axis 48 degrees      Calculated R Axis 47 degrees      Calculated T Axis 87 degrees      Diagnosis --     Sinus rhythm with 1st degree AV block  Nonspecific T wave abnormality  Abnormal ECG  When compared with ECG of 21-OCT-2020 18:28,  Sinus rhythm is no longer with 2nd degree AV block (Mobitz I)  Vent.  rate has increased BY  41 BPM  Non-specific change in ST segment in Lateral leads  Nonspecific T wave abnormality now evident in Inferior leads  Nonspecific T wave abnormality now evident in Anterior leads  Nonspecific T wave abnormality has replaced inverted T waves in Lateral leads      EKG, 12 LEAD, SUBSEQUENT [[id number]] Collected:  10/21/20 1828    Order Status:  Completed Updated:  10/22/20 0605     Ventricular Rate 47 BPM      Atrial Rate 75 BPM      QRS Duration 94 ms      Q-T Interval 460 ms      QTC Calculation (Bezet) 407 ms      Calculated P Axis 54 degrees      Calculated R Axis 62 degrees      Calculated T Axis 91 degrees      Diagnosis --     Age and gender specific ECG analysis  Sinus rhythm with 2nd degree AV block (Mobitz I)  ST elevation, consider inferior injury or acute infarct  ACUTE MI  Consider right ventricular involvement in acute inferior infarct  Abnormal ECG      EKG, 12 LEAD, SUBSEQUENT [[id number]] Collected:  10/20/20 1125    Order Status:  Completed Updated:  10/20/20 1201     Ventricular Rate 90 BPM      Atrial Rate 90 BPM      P-R Interval 232 ms      QRS Duration 88 ms      Q-T Interval 380 ms      QTC Calculation (Bezet) 464 ms      Calculated P Axis 53 degrees      Calculated R Axis 39 degrees      Calculated T Axis 64 degrees      Diagnosis --     Sinus rhythm with 1st degree AV block  Nonspecific T wave abnormality  Prolonged QT  Abnormal ECG  When compared with ECG of 19-OCT-2020 20:16,  No significant change was found  Confirmed by Lena Pedroza ([id number]) on 10/20/2020 12:01:38 PM      EKG, 12 LEAD, INITIAL [[id number]] Collected:  10/19/20 1825    Order Status:  Completed Updated:  10/20/20 0844     Ventricular Rate 120 BPM      Atrial Rate 0 BPM      QRS Duration 58 ms      Q-T Interval 386 ms      QTC Calculation (Bezet) 545 ms      Calculated R Axis 48 degrees      Calculated T Axis 80 degrees      Diagnosis --     Sinus tachycardia  Early repolarization  Nonspecific ST and T wave abnormality  Prolonged QT  Abnormal ECG  When compared with ECG of 21-NOV-2019 21:03,  Significant changes have occurred  Confirmed by Lena Pedroza [id number]) on 10/20/2020 8:44:32 AM      EKG, 12 LEAD, REPEAT [[id number]] Collected:  10/19/20 2016    Order Status:  Completed Updated:  10/20/20 0843     Ventricular Rate 114 BPM      Atrial Rate 114 BPM      P-R Interval 214 ms      QRS Duration 76 ms      Q-T Interval 302 ms      QTC Calculation (Bezet) 416 ms      Calculated P Axis 53 degrees      Calculated R Axis 40 degrees      Calculated T Axis 99 degrees      Diagnosis --     Sinus tachycardia with 1st degree AV block  Nonspecific ST and T wave abnormality  Abnormal ECG  When compared with ECG of 19-OCT-2020 18:25,  No significant change was found    ST no longer elevated in Lateral leads  Nonspecific T wave abnormality now evident in Inferior leads  T wave inversion more evident in Lateral leads  Confirmed by Olesya Barahona ([id number] [phone]) on 10/20/2020 8:43:47 AM          XR Results (most recent):  Results from Hospital Encounter encounter on 10/19/20   XR CHEST PORT    Narrative Examination: Portable AP chest     History: Chest pain    Comparison: November 21, 2019    Findings: Mild pulmonary vascular congestion. No pleural effusion. No  pneumothorax. Heart size is top normal but stable. No acute osseous abnormality. Impression Impression:  1. Mild pulmonary vascular congestion. 10/19/20   ECHO ADULT FOLLOW-UP OR LIMITED 10/20/2020 10/20/2020    Narrative · LV: Estimated LVEF is 55 - 60%. Visually measured ejection fraction. Normal cavity size, wall thickness and systolic function (ejection   fraction normal). Wall motion: normal.  · COVID r/o        Signed by: Enid Tadeo MD                 ASSESSMENT:  Hospital Problems  Date Reviewed: 5/9/2019          Codes Class Noted POA    Fever ICD-10-CM: R50.9  ICD-9-CM: 780.60  10/20/2020 Unknown        NSTEMI (non-ST elevated myocardial infarction) [facility]) ICD-10-CM: I21.4  ICD-9-CM: 410.70  10/20/2020 Unknown                SUBJECTIVE:  C/o intermittent chest pain and SOB  Patient reports not feeling well. OBJECTIVE:    VS:   Visit Vitals  /69 (BP 1 Location: Right arm, BP Patient Position: At rest)   Pulse (!) 48   Temp 98.4 °F (36.9 °C)   Resp 16   Ht 5' 6\" (1.676 m)   Wt 96.5 kg (212 lb 12.8 oz)   SpO2 98%   BMI 34.35 kg/m²         Intake/Output Summary (Last 24 hours) at 10/22/2020 0956  Last data filed at 10/22/2020 0659  Gross per 24 hour   Intake 1378.33 ml   Output 200 ml   Net 1178.33 ml     TELE: normal sinus rhythm/ sinus bradycardia intermittent 2nd degree AV block       General: alert mild distress noted   HENT: Normocephalic, atraumatic. Normal external eye.   Cardiac: Denies chest pain  Chest/Lungs: diminished to ausculatation  Abdomen:  no masses no tenderness Extremities: no edema BLE. Labs: Results:       Chemistry Recent Labs     10/21/20  0309 10/20/20  1635 10/19/20  1830   * 453* 379*    136 138   K 3.7 4.2 3.1*    105 106   CO2 24 23 26   BUN 19* 15 11   CREA 1.42* 1.58* 1.19   CA 8.2* 7.8* 8.1*   MG 2.0  --   --    AGAP 7 8 6   BUCR 13 9* 9*   *  --   --    TP 6.0*  --   --    ALB 2.0*  --   --    GLOB 4.0  --   --    AGRAT 0.5*  --   --       CBC w/Diff Recent Labs     10/21/20  1454 10/21/20  0309 10/20/20  1015   WBC 6.6 7.3 7.8   RBC 4.31 4.30 4.69   HGB 9.3* 9.4* 10.3*   HCT 29.8* 29.5* 32.1*    208 225   GRANS 48 47 62   LYMPH 41 40 24   EOS 2 1 1      Cardiac Enzymes Recent Labs     10/20/20  1635 10/20/20  1015    168   CKND1 0.7 1.0      Coagulation Recent Labs     10/21/20  1119 10/21/20  0309   APTT 78.7* 132.6*       Lipid Panel Lab Results   Component Value Date/Time    Cholesterol, total 218 (H) 09/28/2020 01:00 PM    HDL Cholesterol 49 09/28/2020 01:00 PM    LDL, calculated 121 (H) 09/28/2020 01:00 PM    VLDL, calculated 48 (H) 09/28/2020 01:00 PM    Triglyceride 241 (H) 09/28/2020 01:00 PM      BNP No results for input(s): BNPP in the last 72 hours. Liver Enzymes Recent Labs     10/21/20  0309   TP 6.0*   ALB 2.0*   *      Digoxin    Thyroid Studies Lab Results   Component Value Date/Time    TSH 1.47 10/19/2020 06:30 PM              SHABNAM Benson   Supervised    I have independently evaluated and examined the patient. All relevant labs and testing data's are reviewed. Care plan discussed and updated after review.     Shalini Arriaza MD

## 2020-10-22 NOTE — PROGRESS NOTES
Problem: Diabetes Self-Management  Goal: *Disease process and treatment process  Description: Define diabetes and identify own type of diabetes; list 3 options for treating diabetes. Outcome: Progressing Towards Goal  Goal: *Incorporating nutritional management into lifestyle  Description: Describe effect of type, amount and timing of food on blood glucose; list 3 methods for planning meals. Outcome: Progressing Towards Goal  Goal: *Incorporating physical activity into lifestyle  Description: State effect of exercise on blood glucose levels. Outcome: Progressing Towards Goal  Goal: *Developing strategies to promote health/change behavior  Description: Define the ABC's of diabetes; identify appropriate screenings, schedule and personal plan for screenings. Outcome: Progressing Towards Goal  Goal: *Using medications safely  Description: State effect of diabetes medications on diabetes; name diabetes medication taking, action and side effects. Outcome: Progressing Towards Goal  Goal: *Monitoring blood glucose, interpreting and using results  Description: Identify recommended blood glucose targets  and personal targets. Outcome: Progressing Towards Goal  Goal: *Prevention, detection, treatment of acute complications  Description: List symptoms of hyper- and hypoglycemia; describe how to treat low blood sugar and actions for lowering  high blood glucose level. Outcome: Progressing Towards Goal  Goal: *Prevention, detection and treatment of chronic complications  Description: Define the natural course of diabetes and describe the relationship of blood glucose levels to long term complications of diabetes.   Outcome: Progressing Towards Goal  Goal: *Developing strategies to address psychosocial issues  Description: Describe feelings about living with diabetes; identify support needed and support network  Outcome: Progressing Towards Goal  Goal: *Insulin pump training  Outcome: Progressing Towards Goal     Problem: Pain  Goal: *Control of Pain  Outcome: Progressing Towards Goal  Goal: *PALLIATIVE CARE:  Alleviation of Pain  Outcome: Progressing Towards Goal     Problem: Falls - Risk of  Goal: *Absence of Falls  Description: Document Davon Fall Risk and appropriate interventions in the flowsheet.   Outcome: Progressing Towards Goal  Note: Fall Risk Interventions:  Mobility Interventions: Assess mobility with egress test, Bed/chair exit alarm, Communicate number of staff needed for ambulation/transfer, Patient to call before getting OOB         Medication Interventions: Bed/chair exit alarm, Evaluate medications/consider consulting pharmacy, Patient to call before getting OOB, Teach patient to arise slowly    Elimination Interventions: Bed/chair exit alarm, Call light in reach, Patient to call for help with toileting needs, Stay With Me (per policy), Toilet paper/wipes in reach, Toileting schedule/hourly rounds              Problem: Patient Education: Go to Patient Education Activity  Goal: Patient/Family Education  Outcome: Progressing Towards Goal     Problem: Unstable angina/NSTEMI: Day of Admission/Day 1  Goal: Off Pathway (Use only if patient is Off Pathway)  Outcome: Resolved/Met  Goal: Activity/Safety  Outcome: Resolved/Met  Goal: Consults, if ordered  Outcome: Resolved/Met  Goal: Diagnostic Test/Procedures  Outcome: Resolved/Met  Goal: Nutrition/Diet  Outcome: Resolved/Met  Goal: Discharge Planning  Outcome: Resolved/Met  Goal: Medications  Outcome: Resolved/Met  Goal: Respiratory  Outcome: Resolved/Met  Goal: Treatments/Interventions/Procedures  Outcome: Resolved/Met  Goal: Psychosocial  Outcome: Resolved/Met  Goal: *Hemodynamically stable  Outcome: Resolved/Met  Goal: *Optimal pain control at patient's stated goal  Outcome: Resolved/Met  Goal: *Lungs clear or at baseline  Outcome: Resolved/Met     Problem: Unstable angina/NSTEMI: Day 2  Goal: Off Pathway (Use only if patient is Off Pathway)  Outcome: Resolved/Met  Goal: Activity/Safety  Outcome: Resolved/Met  Goal: Consults, if ordered  Outcome: Resolved/Met  Goal: Diagnostic Test/Procedures  Outcome: Resolved/Met  Goal: Nutrition/Diet  Outcome: Resolved/Met  Goal: Discharge Planning  Outcome: Resolved/Met  Goal: Medications  Outcome: Resolved/Met  Goal: Respiratory  Outcome: Resolved/Met  Goal: Treatments/Interventions/Procedures  Outcome: Resolved/Met  Goal: Psychosocial  Outcome: Resolved/Met  Goal: *Hemodynamically stable  Outcome: Resolved/Met  Goal: *Optimal pain control at patient's stated goal  Outcome: Resolved/Met  Goal: *Lungs clear or at baseline  Outcome: Resolved/Met     Problem: Urinary Tract Infection - Adult  Goal: *Absence of infection signs and symptoms  Outcome: Progressing Towards Goal     Problem: Nutrition Deficit  Goal: *Optimize nutritional status  Outcome: Progressing Towards Goal

## 2020-10-22 NOTE — PROGRESS NOTES
EKG completed as per Dr. Chinmay Roman. EKG states \"accelerated junctional rhythm\". No complaints from patient at this time. States she feels a lot better. Levar FARRAR reviewed discharge instructions with the patient. The patient verbalized understanding. All questions and concerns were addressed. The patient declined a wheelchair and is discharged ambulatory in the care of family members with instructions and prescriptions in hand. Pt is alert and oriented x 4. Respirations are clear and unlabored.

## 2020-10-22 NOTE — PROGRESS NOTES
ACT drawn from 5fr in [address] [place] [address]. ACT- 186. Will redraw, and continue to monitor.

## 2020-10-22 NOTE — PROGRESS NOTES
Bedside shift change report given to 2000 Ubaldo Velasco RN (oncoming nurse) by Reginaldo Butler RN (offgoing nurse). Report included the following information SBAR, Procedure Summary, MAR and Recent Results.

## 2020-10-23 VITALS
BODY MASS INDEX: 36.92 KG/M2 | DIASTOLIC BLOOD PRESSURE: 115 MMHG | RESPIRATION RATE: 19 BRPM | OXYGEN SATURATION: 100 % | HEART RATE: 68 BPM | SYSTOLIC BLOOD PRESSURE: 135 MMHG | WEIGHT: 229.7 LBS | HEIGHT: 66 IN | TEMPERATURE: 97.9 F

## 2020-10-23 LAB
ALBUMIN SERPL-MCNC: 2.3 G/DL (ref 3.4–5)
ALBUMIN/GLOB SERPL: 0.6 {RATIO} (ref 0.8–1.7)
ALP SERPL-CCNC: 206 U/L (ref 45–117)
ALT SERPL-CCNC: 49 U/L (ref 13–56)
ANION GAP SERPL CALC-SCNC: 9 MMOL/L (ref 3–18)
AST SERPL-CCNC: 72 U/L (ref 10–38)
ATRIAL RATE: 107 BPM
ATRIAL RATE: 49 BPM
ATRIAL RATE: 98 BPM
BACTERIA SPEC CULT: ABNORMAL
BASOPHILS # BLD: 0 K/UL (ref 0–0.1)
BASOPHILS NFR BLD: 0 % (ref 0–2)
BILIRUB SERPL-MCNC: 0.5 MG/DL (ref 0.2–1)
BUN SERPL-MCNC: 18 MG/DL (ref 7–18)
BUN/CREAT SERPL: 16 (ref 12–20)
CALCIUM SERPL-MCNC: 8.4 MG/DL (ref 8.5–10.1)
CALCULATED P AXIS, ECG09: 64 DEGREES
CALCULATED P AXIS, ECG09: 67 DEGREES
CALCULATED R AXIS, ECG10: 52 DEGREES
CALCULATED R AXIS, ECG10: 59 DEGREES
CALCULATED R AXIS, ECG10: 66 DEGREES
CALCULATED T AXIS, ECG11: 69 DEGREES
CALCULATED T AXIS, ECG11: 92 DEGREES
CALCULATED T AXIS, ECG11: 96 DEGREES
CC UR VC: ABNORMAL
CHLORIDE SERPL-SCNC: 112 MMOL/L (ref 100–111)
CO2 SERPL-SCNC: 21 MMOL/L (ref 21–32)
CREAT SERPL-MCNC: 1.15 MG/DL (ref 0.6–1.3)
DIAGNOSIS, 93000: NORMAL
DIFFERENTIAL METHOD BLD: ABNORMAL
EOSINOPHIL # BLD: 0 K/UL (ref 0–0.4)
EOSINOPHIL NFR BLD: 0 % (ref 0–5)
ERYTHROCYTE [DISTWIDTH] IN BLOOD BY AUTOMATED COUNT: 14.8 % (ref 11.6–14.5)
GLOBULIN SER CALC-MCNC: 3.6 G/DL (ref 2–4)
GLUCOSE BLD STRIP.AUTO-MCNC: 160 MG/DL (ref 70–110)
GLUCOSE BLD STRIP.AUTO-MCNC: 204 MG/DL (ref 70–110)
GLUCOSE SERPL-MCNC: 146 MG/DL (ref 74–99)
HCT VFR BLD AUTO: 29.2 % (ref 35–45)
HGB BLD-MCNC: 9.3 G/DL (ref 12–16)
LYMPHOCYTES # BLD: 1.4 K/UL (ref 0.9–3.6)
LYMPHOCYTES NFR BLD: 17 % (ref 21–52)
MAGNESIUM SERPL-MCNC: 2.3 MG/DL (ref 1.6–2.6)
MCH RBC QN AUTO: 21.8 PG (ref 24–34)
MCHC RBC AUTO-ENTMCNC: 31.8 G/DL (ref 31–37)
MCV RBC AUTO: 68.4 FL (ref 74–97)
MONOCYTES # BLD: 0.9 K/UL (ref 0.05–1.2)
MONOCYTES NFR BLD: 10 % (ref 3–10)
NEUTS SEG # BLD: 6.2 K/UL (ref 1.8–8)
NEUTS SEG NFR BLD: 73 % (ref 40–73)
P-R INTERVAL, ECG05: 362 MS
PLATELET # BLD AUTO: 213 K/UL (ref 135–420)
PLATELET COMMENTS,PCOM: ABNORMAL
PMV BLD AUTO: 9.6 FL (ref 9.2–11.8)
POTASSIUM SERPL-SCNC: 4 MMOL/L (ref 3.5–5.5)
PROT SERPL-MCNC: 5.9 G/DL (ref 6.4–8.2)
Q-T INTERVAL, ECG07: 342 MS
Q-T INTERVAL, ECG07: 450 MS
Q-T INTERVAL, ECG07: 468 MS
QRS DURATION, ECG06: 84 MS
QRS DURATION, ECG06: 84 MS
QRS DURATION, ECG06: 92 MS
QTC CALCULATION (BEZET), ECG08: 422 MS
QTC CALCULATION (BEZET), ECG08: 434 MS
QTC CALCULATION (BEZET), ECG08: 471 MS
RBC # BLD AUTO: 4.27 M/UL (ref 4.2–5.3)
RBC MORPH BLD: ABNORMAL
SERVICE CMNT-IMP: ABNORMAL
SODIUM SERPL-SCNC: 142 MMOL/L (ref 136–145)
TROPONIN I SERPL-MCNC: 9.23 NG/ML (ref 0–0.04)
VENTRICULAR RATE, ECG03: 49 BPM
VENTRICULAR RATE, ECG03: 66 BPM
VENTRICULAR RATE, ECG03: 97 BPM
WBC # BLD AUTO: 8.5 K/UL (ref 4.6–13.2)

## 2020-10-23 PROCEDURE — 74011636637 HC RX REV CODE- 636/637: Performed by: INTERNAL MEDICINE

## 2020-10-23 PROCEDURE — 74011250637 HC RX REV CODE- 250/637: Performed by: NURSE PRACTITIONER

## 2020-10-23 PROCEDURE — 82962 GLUCOSE BLOOD TEST: CPT

## 2020-10-23 PROCEDURE — 36415 COLL VENOUS BLD VENIPUNCTURE: CPT

## 2020-10-23 PROCEDURE — 85025 COMPLETE CBC W/AUTO DIFF WBC: CPT

## 2020-10-23 PROCEDURE — 99239 HOSP IP/OBS DSCHRG MGMT >30: CPT | Performed by: NURSE PRACTITIONER

## 2020-10-23 PROCEDURE — 74011636637 HC RX REV CODE- 636/637: Performed by: NURSE PRACTITIONER

## 2020-10-23 PROCEDURE — 83735 ASSAY OF MAGNESIUM: CPT

## 2020-10-23 PROCEDURE — 74011250637 HC RX REV CODE- 250/637: Performed by: INTERNAL MEDICINE

## 2020-10-23 PROCEDURE — 94762 N-INVAS EAR/PLS OXIMTRY CONT: CPT

## 2020-10-23 PROCEDURE — 99232 SBSQ HOSP IP/OBS MODERATE 35: CPT | Performed by: INTERNAL MEDICINE

## 2020-10-23 PROCEDURE — 93005 ELECTROCARDIOGRAM TRACING: CPT

## 2020-10-23 PROCEDURE — 80053 COMPREHEN METABOLIC PANEL: CPT

## 2020-10-23 PROCEDURE — 84484 ASSAY OF TROPONIN QUANT: CPT

## 2020-10-23 RX ORDER — INSULIN GLARGINE 100 [IU]/ML
INJECTION, SOLUTION SUBCUTANEOUS
Qty: 4 PEN | Refills: 5 | Status: SHIPPED
Start: 2020-10-23 | End: 2021-07-15

## 2020-10-23 RX ORDER — AMLODIPINE BESYLATE 2.5 MG/1
2.5 TABLET ORAL DAILY
Status: DISCONTINUED | OUTPATIENT
Start: 2020-10-23 | End: 2020-10-23 | Stop reason: HOSPADM

## 2020-10-23 RX ORDER — CEFPODOXIME PROXETIL 200 MG/1
200 TABLET, FILM COATED ORAL EVERY 12 HOURS
Qty: 8 TAB | Refills: 0 | Status: SHIPPED | OUTPATIENT
Start: 2020-10-23 | End: 2020-10-27

## 2020-10-23 RX ORDER — AMLODIPINE BESYLATE 2.5 MG/1
2.5 TABLET ORAL DAILY
Status: DISCONTINUED | OUTPATIENT
Start: 2020-10-23 | End: 2020-10-23

## 2020-10-23 RX ORDER — LOSARTAN POTASSIUM 25 MG/1
25 TABLET ORAL DAILY
Status: DISCONTINUED | OUTPATIENT
Start: 2020-10-23 | End: 2020-10-23 | Stop reason: HOSPADM

## 2020-10-23 RX ORDER — GUAIFENESIN 100 MG/5ML
81 LIQUID (ML) ORAL DAILY
Qty: 30 TAB | Refills: 0 | Status: SHIPPED | OUTPATIENT
Start: 2020-10-24 | End: 2021-06-30

## 2020-10-23 RX ORDER — LOSARTAN POTASSIUM 25 MG/1
25 TABLET ORAL DAILY
Qty: 30 TAB | Refills: 0 | Status: SHIPPED | OUTPATIENT
Start: 2020-10-24 | End: 2020-11-05

## 2020-10-23 RX ORDER — ATORVASTATIN CALCIUM 40 MG/1
40 TABLET, FILM COATED ORAL DAILY
Qty: 30 TAB | Refills: 0 | Status: SHIPPED | OUTPATIENT
Start: 2020-10-24 | End: 2021-02-01 | Stop reason: SDUPTHER

## 2020-10-23 RX ADMIN — INSULIN GLARGINE 45 UNITS: 100 INJECTION, SOLUTION SUBCUTANEOUS at 08:31

## 2020-10-23 RX ADMIN — Medication 10 ML: at 05:03

## 2020-10-23 RX ADMIN — INSULIN LISPRO 3 UNITS: 100 INJECTION, SOLUTION INTRAVENOUS; SUBCUTANEOUS at 08:31

## 2020-10-23 RX ADMIN — ATORVASTATIN CALCIUM 40 MG: 40 TABLET, FILM COATED ORAL at 08:30

## 2020-10-23 RX ADMIN — ASPIRIN 81 MG CHEWABLE TABLET 81 MG: 81 TABLET CHEWABLE at 08:30

## 2020-10-23 RX ADMIN — INSULIN LISPRO 6 UNITS: 100 INJECTION, SOLUTION INTRAVENOUS; SUBCUTANEOUS at 11:59

## 2020-10-23 RX ADMIN — TICAGRELOR 90 MG: 90 TABLET ORAL at 03:09

## 2020-10-23 RX ADMIN — CEFPODOXIME PROXETIL 200 MG: 200 TABLET, FILM COATED ORAL at 08:30

## 2020-10-23 RX ADMIN — LOSARTAN POTASSIUM 25 MG: 25 TABLET, FILM COATED ORAL at 09:21

## 2020-10-23 RX ADMIN — Medication 325 MG: at 08:30

## 2020-10-23 NOTE — PROGRESS NOTES
conducted an initial consultation and Spiritual Assessment for Tamia Spears, who is a 41 y.o.,female. Patient's Primary Language is: [place]. According to the patient's EMR Yarsani Affiliation is: [facility].     The reason the Patient came to the hospital is:   Patient Active Problem List    Diagnosis Date Noted    Fever 10/20/2020    NSTEMI (non-ST elevated myocardial infarction) ([facility] [address] 10/20/2020    Insulin dependent diabetes mellitus 01/11/2019    HTN (hypertension) 01/11/2019    Diabetic foot infection ([facility] [address] 12/30/2018    Right foot infection 12/30/2018    Acute pain of right foot 12/30/2018    Acute bronchitis 04/07/2017    Hyperglycemia 04/07/2017        The  provided the following Interventions:  Initiated a relationship of care and support. Explored issues of george, while hospitalized. Listened empathically. The following outcomes where achieved:  Patient shared limited information about both their medical narrative and spiritual journey/beliefs.  confirmed Patient's Yarsani Affiliation. Patient expressed gratitude for 's visit. Assessment:  Patient does not have any Protestant/cultural needs that will affect patient's preferences in health care. There are no spiritual or Protestant issues which require intervention at this time. Plan:  Chaplains will provide pastoral care on an as needed/requested basis.       [address] [facility]   [phone]

## 2020-10-23 NOTE — PROGRESS NOTES
DC orders received. Reviewed AVS with pt and reviewed follow up appointments and discharge medications. Received filled Brilinita prescriptions from outpt pharmacy for pt to DC home with. All other meds filled out at pt's home pharmacy. All questions answered. IVs and tele removed. Plan for pt to return home to care of family via private vehicle. Stable at time of DC, no additional needs identified.

## 2020-10-23 NOTE — PROGRESS NOTES
Problem: Diabetes Self-Management  Goal: *Disease process and treatment process  Description: Define diabetes and identify own type of diabetes; list 3 options for treating diabetes. Outcome: Resolved/Met  Goal: *Incorporating nutritional management into lifestyle  Description: Describe effect of type, amount and timing of food on blood glucose; list 3 methods for planning meals. Outcome: Resolved/Met  Goal: *Incorporating physical activity into lifestyle  Description: State effect of exercise on blood glucose levels. Outcome: Resolved/Met  Goal: *Developing strategies to promote health/change behavior  Description: Define the ABC's of diabetes; identify appropriate screenings, schedule and personal plan for screenings. Outcome: Resolved/Met  Goal: *Using medications safely  Description: State effect of diabetes medications on diabetes; name diabetes medication taking, action and side effects. Outcome: Resolved/Met  Goal: *Monitoring blood glucose, interpreting and using results  Description: Identify recommended blood glucose targets  and personal targets. Outcome: Resolved/Met  Goal: *Prevention, detection, treatment of acute complications  Description: List symptoms of hyper- and hypoglycemia; describe how to treat low blood sugar and actions for lowering  high blood glucose level. Outcome: Resolved/Met  Goal: *Prevention, detection and treatment of chronic complications  Description: Define the natural course of diabetes and describe the relationship of blood glucose levels to long term complications of diabetes.   Outcome: Resolved/Met  Goal: *Developing strategies to address psychosocial issues  Description: Describe feelings about living with diabetes; identify support needed and support network  Outcome: Resolved/Met  Goal: *Insulin pump training  Outcome: Resolved/Met  Goal: *Sick day guidelines  Outcome: Resolved/Met  Goal: *Patient Specific Goal (EDIT GOAL, INSERT TEXT)  Outcome: Resolved/Met Problem: Patient Education: Go to Patient Education Activity  Goal: Patient/Family Education  Outcome: Resolved/Met     Problem: Pain  Goal: *Control of Pain  Outcome: Resolved/Met  Goal: *PALLIATIVE CARE:  Alleviation of Pain  Outcome: Resolved/Met     Problem: Patient Education: Go to Patient Education Activity  Goal: Patient/Family Education  Outcome: Resolved/Met     Problem: Falls - Risk of  Goal: *Absence of Falls  Description: Document Davon Fall Risk and appropriate interventions in the flowsheet.   Outcome: Resolved/Met  Note: Fall Risk Interventions:  Mobility Interventions: Bed/chair exit alarm, Communicate number of staff needed for ambulation/transfer         Medication Interventions: Evaluate medications/consider consulting pharmacy, Patient to call before getting OOB, Teach patient to arise slowly    Elimination Interventions: Call light in reach, Toileting schedule/hourly rounds              Problem: Patient Education: Go to Patient Education Activity  Goal: Patient/Family Education  Outcome: Resolved/Met     Problem: Patient Education: Go to Patient Education Activity  Goal: Patient/Family Education  Outcome: Resolved/Met     Problem: Unstable Angina/NSTEMI: Discharge Outcomes  Goal: *Hemodynamically stable  Outcome: Resolved/Met  Goal: *Stable cardiac rhythm  Outcome: Resolved/Met  Goal: *Lungs clear or at baseline  Outcome: Resolved/Met  Goal: *Optimal pain control at patient's stated goal  Outcome: Resolved/Met  Goal: *Identifies cardiac risk factors  Outcome: Resolved/Met  Goal: *Verbalizes home exercise program, activity guidelines, cardiac precautions  Outcome: Resolved/Met  Goal: *Verbalizes understanding and describes prescribed diet  Outcome: Resolved/Met  Goal: *Verbalizes name, dosage, time, side effects, and number of days to continue medications  Outcome: Resolved/Met  Goal: *Anxiety reduced or absent  Outcome: Resolved/Met  Goal: *Understands and describes signs and symptoms to report to providers(Stroke Metric)  Outcome: Resolved/Met  Goal: *Describes follow-up/return visits to physicians  Outcome: Resolved/Met  Goal: *Describes available resources and support systems  Outcome: Resolved/Met  Goal: *Influenza immunization  Outcome: Resolved/Met  Goal: *Pneumococcal immunization  Outcome: Resolved/Met  Goal: *Describes smoking cessation resources  Outcome: Resolved/Met     Problem: Urinary Tract Infection - Adult  Goal: *Absence of infection signs and symptoms  Outcome: Resolved/Met     Problem: Patient Education: Go to Patient Education Activity  Goal: Patient/Family Education  Outcome: Resolved/Met     Problem: Nutrition Deficit  Goal: *Optimize nutritional status  Outcome: Resolved/Met

## 2020-10-23 NOTE — DIABETES MGMT
GLYCEMIC CONTROL PLAN OF CARE     T2DM with A1c of 14.0% (10/20/2020)  See assessment of home diabetes management and education notes entered on 10/20/2020. Home diabetes medications:  Verified with patient on 10/23  Metformin 500 mg 1 every evening  Lantus insulin 40 units daily    Seen patient this morning prior to disch to home. Patient will resume home diabetes meds and follow-up with her endocrinologist because of plan to start her on insulin pump. Patient stated that she has diabetes meds at home. Glycemic assessment:    POC BG 10/22: 271, 225, 247. Did not receive daily lantus insulin 45 units. POC BG 10/23: 160, 204. Received schedule lantus insulin. Current A1C of 14.0% (10/20/2020)  is equivalent to estimated average blood glucose of 360 mg/dl over the past 2-3 months. Current hospital diabetes medications:   Lantus 45 units daily  lispor corrective insulin coverage AC&HS.  Very resistant dose    Previous day's insulin requirements: 10/22:  Lantus: Not given  Lispro:  6 units corrective insulin coverage    Diet:  DIET DIABETIC CONSISTENT CARB Regular; 2 GM NA (House Low NA)    Education:  _x__Refer to Diabetes Education Record: 10/20             ____Education not indicated at this time      Franc Mckinley RN [facility]  Pager: [phone]

## 2020-10-23 NOTE — PROGRESS NOTES
Brilinta prescription and discount card faxed to the outpatient pharmacy.     Ezio Marcial RN BSN  Care Manager  [phone]

## 2020-10-23 NOTE — PROGRESS NOTES
1900 - Bedside and Verbal shift change report given to Dale Gaspar RN (oncoming nurse) by Demar Phan RN (offgoing nurse). Report included the following information SBAR, Kardex, Intake/Output, MAR, Recent Results and Cardiac Rhythm ST.     2200 - Call received from [facility] (lab) to inform of critical troponin value for this patient of 15.9. Hospitalist paged to inform of critical value. 2210 - Return call received from Dr Darrick Macias MD informed of critical lab value for this patient. MD requested that cardiology be paged regarding this critical value for orders. 2212 - Call made to cardiologist on call to inform of critical lab value for this patient;  Jesus Schuster connected this RN with DEXTER Nunez who requested this patient have a repeat troponin, CBC, and BMP in AM. Orders verified with read back and entered as ordered. 0700 - Bedside and Verbal shift change report given to Lucius Rubin RN (oncoming nurse) by Dale Gaspar RN (offgoing nurse).  Report included the following information SBAR, Kardex, Intake/Output, MAR, Recent Results and Cardiac Rhythm SR.

## 2020-10-23 NOTE — PROGRESS NOTES
Discharge order noted for today. Orders reviewed. Brilinta prescription and discount card were faxed to the outpatient pharmacy this morning. No additional needs identified at this time Pt to be transported home by her family.  remains available if needed.      Atif Judd RN BSN  Care Manager  [phone]

## 2020-10-23 NOTE — DISCHARGE SUMMARY
[facility]  Discharge Summary       Patient: Geremias Powell Age: 41 y.o. : 1975 MR#: [id number] SSN: [id number]  PCP on record: Andres Mccracken MD  Admit date: 10/19/2020  Discharge date: 10/23/2020    Disposition:    [x]Home   []Home with Home Health   []SNF/NH   []Rehab   []Home with family   []Alternate Facility:____________________    Discharge Diagnoses:                             NSTEMI/ACS s/p cardiac cath 10/22/2020. 100% mid RCA occlusion with thrombus s/p aspiration thrombectomy followed by ptca/stent (2DES stents placed overlapping extending from mid RCA to distal RCA) per cardiology reports. Mild LAD stenosis  Temporary Pacemaker placed due to complete heart block pre cardiac cath. Post cardiac cath developed junctional rhythm   Second degree AV block-Mobitz I-Intermittent  Possible high degree AV block-transient-no recurrence  UTI  HTN  HLD  DMT2. A1c greater 14  Incidental findings of pulmonary nodule and thyroid nodule     Discharge Medications:     Current Discharge Medication List      START taking these medications    Details   ticagrelor (BRILINTA) 90 mg tablet Take 1 Tab by mouth every twelve (12) hours every twelve (12) hours. Qty: 60 Tab, Refills: 0      aspirin 81 mg chewable tablet Take 1 Tab by mouth daily. Qty: 30 Tab, Refills: 0      cefpodoxime (VANTIN) 200 mg tablet Take 1 Tab by mouth every twelve (12) hours for 4 days. Qty: 8 Tab, Refills: 0         CONTINUE these medications which have CHANGED    Details   atorvastatin (LIPITOR) 40 mg tablet Take 1 Tab by mouth daily. Qty: 30 Tab, Refills: 0      insulin glargine (LANTUS,BASAGLAR) 100 unit/mL (3 mL) inpn Take 45 units daily  Indications: type 2 diabetes mellitus  Qty: 4 Pen, Refills: 5    Associated Diagnoses: Type 2 diabetes mellitus with complication (HCC)      losartan (COZAAR) 25 mg tablet Take 1 Tab by mouth daily.   Qty: 30 Tab, Refills: 0         CONTINUE these medications which have NOT CHANGED    Details   metFORMIN ER (GLUCOPHAGE XR) 500 mg tablet Take 1 Tab by mouth daily (with dinner). Qty: 30 Tab, Refills: 3    Associated Diagnoses: Type 2 diabetes mellitus with complication (HCC)      Insulin Syringe-Needle U-100 (INSULIN SYRINGE) 1 mL 30 gauge x 5/16 syrg As directed for lantus insulin  Qty: 50 Syringe, Refills: 0      polyethylene glycol (Miralax) 17 gram/dose powder Take 17 g by mouth daily for 30 days. 1 tablespoon with 8 oz of water daily  Qty: 850 g, Refills: 0      melatonin 5 mg cap capsule Take 1 Cap by mouth nightly. Qty: 30 Cap, Refills: 1    Associated Diagnoses: Difficulty sleeping      Insulin Needles, Disposable, 31 gauge x 5/16\" ndle Use to check blood glucose BID  Qty: 100 Pen Needle, Refills: 11    Associated Diagnoses: Type 2 diabetes mellitus with complication (HCC)      ferrous sulfate 325 mg (65 mg iron) tablet Take 1 Tab by mouth Daily (before breakfast). Qty: 15 Tab, Refills: 0         STOP taking these medications       cloNIDine HCl (CATAPRES) 0.1 mg tablet Comments:   Reason for Stopping:         magnesium citrate solution Comments:   Reason for Stopping:         amLODIPine (NORVASC) 10 mg tablet Comments:   Reason for Stopping:         hydroCHLOROthiazide (HYDRODIURIL) 25 mg tablet Comments:   Reason for Stopping:         linezolid (ZYVOX) 600 mg tablet Comments:   Reason for Stopping:         mupirocin (BACTROBAN) 2 % ointment Comments:   Reason for Stopping:               Consults:    Cardiology   Procedures:  -   Cardiac cath s/p stent placement   Significant Diagnostic Studies:   US Results (most recent):  Results from [place] encounter on 10/19/20   US THYROID/PARATHYROID/SOFT TISS    Narrative Ultrasound thyroid gland    INDICATION: Left thyroid nodule seen on CT chest    Right lobe measures 5 x 1.5 x 1.7 cm. Multiple small anechoic nodules seen, most  compatible with benign cysts. Largest is about 5 mm.  There is a small mixed  nodule measuring 0.4 x 0.2 x 0.4 cm, with benign features and below size  criterial for further intervention. Left lobe measures 4.9 x 1.6 x 2.4 cm. Anechoic nodule of 0.7 cm seen in the  lower pole. Most compatible with a benign cyst. No additional nodules. Isthmus measures 0.6 cm in AP dimension, slightly prominent but no focal mass. Impression IMPRESSION:  1. Borderline thyromegaly. 2. Benign cysts in both lobes, with small benign nodule in the right lobe. XR Results (most recent):  Results from Hospital Encounter encounter on 10/19/20   XR CHEST PORT    Narrative Portable CXR:    HISTORY: Shortness of breath. Comparison October 19 2020    Cardiac silhouette is top normal in size. No significant vascular congestion. Lungs and costophrenic angles are clear. Impression IMPRESSION: No acute abnormalities     CT Results (most recent):  Results from Hospital Encounter encounter on 10/19/20   CTA CHEST W OR W WO CONT    Narrative EXAM: CT PULMONARY ARTERIOGRAM     CLINICAL INDICATION/HISTORY: Chest pain, elevated d-dimer. TECHNIQUE:  Utilizing a helical acquisition, CT pulmonary arteriography was  performed after the uneventful intravenous administration of 67 cc of Isovue . Axial images are reviewed in 2.5 and 7.08 mm slice thickness sections. Coronally and sagittally reformatted images are also submitted for review. All CT scans at this facility are performed using dose optimization technique as  appropriate to a performed exam, to include automated exposure control,  adjustment of the MA and/or kV according to patient size (including appropriate  matching for site-specific examinations) or use of  iterative reconstruction  technique. COMPARISON:  Chest radiograph same day    FINDINGS:     Technical Quality:  There is satisfactory contrast opacification of the  pulmonary arterial system. Pulmonary Arteries:   The pulmonary arterial system is well opacified and patent. There is no pulmonary arterial filling defect. The main pulmonary artery is  normal in caliber. The right ventricle is normal in size and morphology. Other Cardiovascular: The heart is globally normal in size. The thoracic aorta  is normal in course and caliber. There is no pericardial effusion or  pericardial thickening. Mediastinum/Whit:  There are no pathologically enlarged mediastinal or hilar  lymph nodes. Pleura: The pleural surfaces are normal bilaterally. There is no pleural  effusion. There is no pneumothorax. Lungs:  3 mm right middle lobe nodule (series 3 image 28). Airways: The central airways are patent. The peripheral airways are normal.   There is no bronchiectasis. Upper Abdomen: The incompletely imaged upper abdomen is unremarkable. Musculoskeletal:  There is no aggressive osseous lesion. The structures of the  chest wall, including the bones, are normal for age. Possible 1.8 cm left  thyroid nodule. Impression IMPRESSION:  There is no pulmonary embolism. Incidental note is made of 3 mm right middle lobe pulmonary nodule. If the  patient has a significant smoking history or is otherwise at high risk for  development of lung cancer, an optional follow-up chest CT may be obtained in  one year. If there are no significant risk factors for the development of lung  cancer, no specific follow-up is necessary. Possible 1.8 send a left thyroid nodule. Consider follow-up thyroid ultrasound,  nonemergent. Hospital Course by Problem   Per H&P 820. 41years old presented with chest pain, shortness of breath and fever.  Noted to have elevated troponin of 0.25   Differential includes pericarditis/myocarditis, ACS or COVID-19 infection/other viral infection  She is admitted to stepdown  Monitor serial cardiac enzymes  Defer decision regarding anticoagulation to cardiology  Echocardiogram  Covid 19 swab and isolation precautions  COVID 19 test negative. Summary of hospital course. IV antibiotics for UTI with transfer to oral antibiotics to continue upon discharge. Cardiology consulted for elevated troponin. RRT called for bradycardia during IV infusion of azithromycin and started to feel acutely unwell. Telemetry notified that the patient was in 2nd/3rd degree heart block. pilse 40's. Poorly responsive at first with more alertness with IVF bolus. EKG ordered. Cardiology next day cardiac cath. 100% mid RCA occlusion with thrombus s/p aspiration thrombectomy followed by ptca/stent (2DES stents placed overlapping extending from mid RCA to distal RCA). Temporary pacemaker placed for heart block and removed upon discharge. Junctional rhythm noted post stent placement. Started on Brillinta and ASA for DAPT, high dose statin. Cardiology to monitor as OP and may consider PPM if needed. Recommend intense risk factor modification. Diabetes management consulted for education and medication recommendation. Adjustments with Lantus dose. Upon discharge continue Lantus and Metformin. Instructed to hold metformin due to dye used during cardiac cath. Restart metformin on Monday. Incidental findings of pulmonary nodule noted. Pulmonary consulted with recommendations to follow up with PCP. Low risk and non smoker. No further workup is needed. Incidental finding of thyroid nodule. Thyroid levels normal. US thyroid benign cysts in both lobes with small benign nodule in the right lobe. Today's examination of the patient revealed:     Subjective:    All 10 systems reviewed and negative   Objective:   VS:   Visit Vitals  /67 (BP 1 Location: Left arm, BP Patient Position: At rest)   Pulse 65   Temp 98.5 °F (36.9 °C)   Resp 12   Ht 5' 6\" (1.676 m)   Wt 104.2 kg (229 lb 11.2 oz)   SpO2 98%   BMI 37.07 kg/m²      Tmax/24hrs: Temp (24hrs), Av.7 °F (37.1 °C), Min:98.3 °F (36.8 °C), Max:99.3 °F (37.4 °C)     Input/Output:     Intake/Output Summary (Last 24 hours) at 10/23/2020 1157  Last data filed at 10/23/2020 5269  Gross per 24 hour   Intake 1216 ml   Output --   Net 1216 ml       General:  Alert, NAD  Cardiovascular:  RRR  Pulmonary:  LSC throughout  GI:  +BS in all four quadrants, soft, non-tender  Extremities:  No edema; 2+ dorsalis pedis pulses bilaterally  Neurology: Patient A&O x3     Labs:    Recent Results (from the past 24 hour(s))   POC ACTIVATED CLOTTING TIME    Collection Time: 10/22/20 12:50 PM   Result Value Ref Range    Activated Clotting Time (POC) 191 (H) 79 - 138 SECS   EKG, 12 LEAD, SUBSEQUENT    Collection Time: 10/22/20  1:27 PM   Result Value Ref Range    Ventricular Rate 97 BPM    Atrial Rate 98 BPM    QRS Duration 92 ms    Q-T Interval 342 ms    QTC Calculation (Bezet) 434 ms    Calculated R Axis 66 degrees    Calculated T Axis 96 degrees    Diagnosis       Sinus rhythm with 1st degree AV block  Nonspecific ST and T wave abnormality  Abnormal ECG  When compared with ECG of 22-OCT-2020 08:43,    Vent.  rate has increased BY  48 BPM  Nonspecific T wave abnormality, worse in Anterolateral leads  Confirmed by Tee Costello ([id number]) on 10/23/2020 7:49:11 AM     POC ACTIVATED CLOTTING TIME    Collection Time: 10/22/20  1:57 PM   Result Value Ref Range    Activated Clotting Time (POC) 186 (H) 79 - 138 SECS   POC ACTIVATED CLOTTING TIME    Collection Time: 10/22/20  2:48 PM   Result Value Ref Range    Activated Clotting Time (POC) 175 (H) 79 - 138 SECS   POC ACTIVATED CLOTTING TIME    Collection Time: 10/22/20  3:32 PM   Result Value Ref Range    Activated Clotting Time (POC) 147 (H) 79 - 138 SECS   GLUCOSE, POC    Collection Time: 10/22/20  6:53 PM   Result Value Ref Range    Glucose (POC) 225 (H) 70 - 110 mg/dL   TROPONIN I    Collection Time: 10/22/20  9:00 PM   Result Value Ref Range    Troponin-I, QT 15.90 (HH) 0.0 - 0.045 NG/ML   GLUCOSE, POC    Collection Time: 10/22/20  9:34 PM   Result Value Ref Range    Glucose (POC) 247 (H) 70 - 110 mg/dL METABOLIC PANEL, COMPREHENSIVE    Collection Time: 10/23/20  6:03 AM   Result Value Ref Range    Sodium 142 136 - 145 mmol/L    Potassium 4.0 3.5 - 5.5 mmol/L    Chloride 112 (H) 100 - 111 mmol/L    CO2 21 21 - 32 mmol/L    Anion gap 9 3.0 - 18 mmol/L    Glucose 146 (H) 74 - 99 mg/dL    BUN 18 7.0 - 18 MG/DL    Creatinine 1.15 0.6 - 1.3 MG/DL    BUN/Creatinine ratio 16 12 - 20      GFR est AA >60 >60 ml/min/1.73m2    GFR est non-AA 51 (L) >60 ml/min/1.73m2    Calcium 8.4 (L) 8.5 - 10.1 MG/DL    Bilirubin, total 0.5 0.2 - 1.0 MG/DL    ALT (SGPT) 49 13 - 56 U/L    AST (SGOT) 72 (H) 10 - 38 U/L    Alk.  phosphatase 206 (H) 45 - 117 U/L    Protein, total 5.9 (L) 6.4 - 8.2 g/dL    Albumin 2.3 (L) 3.4 - 5.0 g/dL    Globulin 3.6 2.0 - 4.0 g/dL    A-G Ratio 0.6 (L) 0.8 - 1.7     MAGNESIUM    Collection Time: 10/23/20  6:03 AM   Result Value Ref Range    Magnesium 2.3 1.6 - 2.6 mg/dL   TROPONIN I    Collection Time: 10/23/20  6:03 AM   Result Value Ref Range    Troponin-I, QT 9.23 (HH) 0.0 - 0.045 NG/ML   EKG, 12 LEAD, SUBSEQUENT    Collection Time: 10/23/20  7:13 AM   Result Value Ref Range    Ventricular Rate 66 BPM    Atrial Rate 107 BPM    QRS Duration 84 ms    Q-T Interval 450 ms    QTC Calculation (Bezet) 471 ms    Calculated P Axis 64 degrees    Calculated R Axis 52 degrees    Calculated T Axis 92 degrees    Diagnosis       Sinus tachycardia with 2nd degree AV block (Mobitz I)  Nonspecific ST and T wave abnormality  Prolonged QT  Abnormal ECG  When compared with ECG of 22-OCT-2020 13:27,  mobitz 1 block is present  Nonspecific T wave abnormality, improved in Anterolateral leads  Confirmed by Vik Cabrera [id number]) on 10/23/2020 7:48:03 AM     GLUCOSE, POC    Collection Time: 10/23/20  7:33 AM   Result Value Ref Range    Glucose (POC) 160 (H) 70 - 110 mg/dL   CBC WITH AUTOMATED DIFF    Collection Time: 10/23/20  8:18 AM   Result Value Ref Range    WBC 8.5 4.6 - 13.2 K/uL    RBC 4.27 4.20 - 5.30 M/uL    HGB 9.3 (L) 12.0 - 16.0 g/dL    HCT 29.2 (L) 35.0 - 45.0 %    MCV 68.4 (L) 74.0 - 97.0 FL    MCH 21.8 (L) 24.0 - 34.0 PG    MCHC 31.8 31.0 - 37.0 g/dL    RDW 14.8 (H) 11.6 - 14.5 %    PLATELET 208 599 - 729 K/uL    MPV 9.6 9.2 - 11.8 FL    NEUTROPHILS 73 40 - 73 %    LYMPHOCYTES 17 (L) 21 - 52 %    MONOCYTES 10 3 - 10 %    EOSINOPHILS 0 0 - 5 %    BASOPHILS 0 0 - 2 %    ABS. NEUTROPHILS 6.2 1.8 - 8.0 K/UL    ABS. LYMPHOCYTES 1.4 0.9 - 3.6 K/UL    ABS. MONOCYTES 0.9 0.05 - 1.2 K/UL    ABS. EOSINOPHILS 0.0 0.0 - 0.4 K/UL    ABS. BASOPHILS 0.0 0.0 - 0.1 K/UL    DF AUTOMATED      PLATELET COMMENTS ADEQUATE PLATELETS      RBC COMMENTS ANISOCYTOSIS  1+        RBC COMMENTS HYPOCHROMIA  1+        RBC COMMENTS MICROCYTOSIS  1+       GLUCOSE, POC    Collection Time: 10/23/20 11:55 AM   Result Value Ref Range    Glucose (POC) 204 (H) 70 - 110 mg/dL     Additional Data Reviewed:     Condition: stable   Follow-up Appointments:   1. Your PCP: Jaci Camara MD, within 5-7 days  2. Follow up with cardiology in 2-3 weeks  3.  Hold Metformin until Monday 10/26 due to dye given for cardiac cath            >30 minutes spent coordinating this discharge (review instructions/follow-up, prescriptions, preparing report for sign off)    Signed:  Yoly Harry NP  10/23/2020  11:57 AM

## 2020-10-23 NOTE — PROGRESS NOTES
[facility]      CARDIOLOGY PROGRESS NOTE  RECS:        1. NSTEMI/ACS - s/p Cardiac cath 10/22/20 100% mid RCA occlusion with thrombus. S/p aspiration thrombectomy followed by ptca/stent ( 2 BLANCA stents placed overlapping extending from mid RCA to distal RCA). Mild mid LAD stenosis. TPM inserted via right femoral vein due to complete heart block. Post PCI developed juctional tachycardia. Will discontinue TPM.  Will continue to Monitor and consider PPM if needed- Continue asa brilinta. Recent Echo shows normal wall motion and ejection fraction. 2. Hypertension low normal BP- Discontinue clonidine and Coreg. Will resume low dose Losartan and  Norvasc   3. Second degree AV block - mobitz I- intermittent- will monitor avoid BB, clonidine, digoxin  4. Possible high degree AV block - transient - no recurrence   5. SOB- resolved. 6. Fevers ? ? resolved. Blood cultures negative     7. Hyperlipidemia- LDL elevated continue statin   8. Morbid obesity- weight loss advised   9. Diabetes- medications per medical team. Poorly controlled with A1C >14  10. UTI - being managed by medical team     Patient with pci to RCA - stable- has mobitz type 1 av block. Will obtain cardiac monitor as outpatient. Cardiac cath 10/22/20  100% mid RCA occlusion with thrombus. S/p aspiration thrombectomy followed by ptca/stent ( 2 BLANCA stents placed overlapping extending from mid RCA to distal RCA). Mild mid LAD stenosis. Normal LV function by echo. TPM inserted via right femoral vein due to complete heart block.   Plan:  Continue DAPT for 1 yr/ high dose statin. Post PCI developed juctional tachycardia. Will discontinue TPM. Monitor and consider PPM if needed. Recommend intense risk factor modification.         EKG Results     Procedure 720 Value Units Date/Time    EKG, 12 LEAD, SUBSEQUENT [[id number]] Collected:  10/22/20 1327    Order Status:  Completed Updated:  10/23/20 6433     Ventricular Rate 97 BPM Atrial Rate 98 BPM      QRS Duration 92 ms      Q-T Interval 342 ms      QTC Calculation (Bezet) 434 ms      Calculated R Axis 66 degrees      Calculated T Axis 96 degrees      Diagnosis --     Sinus rhythm with 1st degree AV block  Nonspecific ST and T wave abnormality  Abnormal ECG  When compared with ECG of 22-OCT-2020 08:43,    Vent.  rate has increased BY  48 BPM  Nonspecific T wave abnormality, worse in Anterolateral leads  Confirmed by Lisandro Vegas ([id number]) on 10/23/2020 7:49:11 AM      EKG, 12 LEAD, SUBSEQUENT [[id number]] Collected:  10/22/20 0843    Order Status:  Completed Updated:  10/23/20 0748     Ventricular Rate 49 BPM      Atrial Rate 49 BPM      P-R Interval 362 ms      QRS Duration 84 ms      Q-T Interval 468 ms      QTC Calculation (Bezet) 422 ms      Calculated P Axis 67 degrees      Calculated R Axis 59 degrees      Calculated T Axis 69 degrees      Diagnosis --     Sinus rhythm with 1st degree AV block and 2:1 block  Possible Left atrial enlargement  Nonspecific ST abnormality  Abnormal ECG  When compared with ECG of 21-OCT-2020 20:39,  No significant change was found    Confirmed by Lisandro Vegas [id number]) on 10/23/2020 7:48:50 AM      EKG, 12 LEAD, SUBSEQUENT [[id number]] Collected:  10/23/20 0713    Order Status:  Completed Updated:  10/23/20 0748     Ventricular Rate 66 BPM      Atrial Rate 107 BPM      QRS Duration 84 ms      Q-T Interval 450 ms      QTC Calculation (Bezet) 471 ms      Calculated P Axis 64 degrees      Calculated R Axis 52 degrees      Calculated T Axis 92 degrees      Diagnosis --     Sinus tachycardia with 2nd degree AV block (Mobitz I)  Nonspecific ST and T wave abnormality  Prolonged QT  Abnormal ECG  When compared with ECG of 22-OCT-2020 13:27,  mobitz 1 block is present  Nonspecific T wave abnormality, improved in Anterolateral leads  Confirmed by Lisandro Vegas [id number]) on 10/23/2020 7:48:03 AM      EKG, 12 LEAD, INITIAL [[id number]] Collected:  10/21/20 1826    Order Status:  Completed Updated:  10/22/20 1547     Ventricular Rate 47 BPM      Atrial Rate 77 BPM      QRS Duration 86 ms      Q-T Interval 464 ms      QTC Calculation (Bezet) 410 ms      Calculated P Axis 66 degrees      Calculated R Axis 65 degrees      Calculated T Axis 90 degrees      Diagnosis --     Sinus rhythm with 2nd degree AV block (Mobitz I)  ST elevation, consider inferior injury or acute infarct  ACUTE MI  Consider right ventricular involvement in acute inferior infarct  Abnormal ECG    Confirmed by Alida Cain MD, ----- [id number]) on 10/22/2020 3:47:38 PM      EKG, 12 LEAD, SUBSEQUENT [[id number]] Collected:  10/21/20 1845    Order Status:  Completed Updated:  10/22/20 1547     Ventricular Rate 88 BPM      Atrial Rate 88 BPM      P-R Interval 294 ms      QRS Duration 86 ms      Q-T Interval 356 ms      QTC Calculation (Bezet) 430 ms      Calculated P Axis 48 degrees      Calculated R Axis 47 degrees      Calculated T Axis 87 degrees      Diagnosis --     Sinus rhythm with 1st degree AV block  Nonspecific T wave abnormality  Abnormal ECG  When compared with ECG of 21-OCT-2020 18:28,  Sinus rhythm is no longer with 2nd degree AV block (Mobitz I)  Vent.  rate has increased BY  41 BPM  Non-specific change in ST segment in Lateral leads  Nonspecific T wave abnormality now evident in Inferior leads  Nonspecific T wave abnormality now evident in Anterior leads  Nonspecific T wave abnormality has replaced inverted T waves in Lateral leads  Confirmed by Alida Cain MD, ----- [id number]) on 10/22/2020 3:47:11 PM      EKG, 12 LEAD, SUBSEQUENT [[id number]] Collected:  10/21/20 2039    Order Status:  Completed Updated:  10/22/20 1546     Ventricular Rate 51 BPM      Atrial Rate 51 BPM      P-R Interval 408 ms      QRS Duration 92 ms      Q-T Interval 460 ms      QTC Calculation (Bezet) 423 ms      Calculated P Axis 80 degrees      Calculated R Axis 64 degrees      Calculated T Axis 85 degrees      Diagnosis -- Sinus bradycardia with 1st degree AV block with 2nd degree AV block (Mobitz   I)  Early repolarization  When compared with ECG of 21-OCT-2020 18:45,  premature supraventricular complexes are now present  Vent.  rate has decreased BY  37 BPM  Nonspecific T wave abnormality no longer evident in Inferior leads  Nonspecific T wave abnormality, improved in Anterolateral leads  Confirmed by Otoniel Muro MD, ----- [id number]) on 10/22/2020 3:46:08 PM      EKG, 12 LEAD, SUBSEQUENT [[id number]] Collected:  10/21/20 1828    Order Status:  Completed Updated:  10/22/20 1545     Ventricular Rate 47 BPM      Atrial Rate 75 BPM      QRS Duration 94 ms      Q-T Interval 460 ms      QTC Calculation (Bezet) 407 ms      Calculated P Axis 54 degrees      Calculated R Axis 62 degrees      Calculated T Axis 91 degrees      Diagnosis --     Sinus rhythm with 2nd degree AV block (Mobitz I)  ST elevation, consider inferior injury or acute infarct  ACUTE MI  Consider right ventricular involvement in acute inferior infarct  Abnormal ECG    Confirmed by Otoniel Muro MD, ----- [id number]) on 10/22/2020 3:45:11 PM      EKG, 12 LEAD, SUBSEQUENT [[id number]] Collected:  10/20/20 1125    Order Status:  Completed Updated:  10/20/20 1201     Ventricular Rate 90 BPM      Atrial Rate 90 BPM      P-R Interval 232 ms      QRS Duration 88 ms      Q-T Interval 380 ms      QTC Calculation (Bezet) 464 ms      Calculated P Axis 53 degrees      Calculated R Axis 39 degrees      Calculated T Axis 64 degrees      Diagnosis --     Sinus rhythm with 1st degree AV block  Nonspecific T wave abnormality  Prolonged QT  Abnormal ECG  When compared with ECG of 19-OCT-2020 20:16,  No significant change was found  Confirmed by Fredy London ([id number]) on 10/20/2020 12:01:38 PM      EKG, 12 LEAD, INITIAL [[id number]] Collected:  10/19/20 1825    Order Status:  Completed Updated:  10/20/20 0844     Ventricular Rate 120 BPM      Atrial Rate 0 BPM      QRS Duration 58 ms      Q-T Interval 386 ms      QTC Calculation (Bezet) 545 ms      Calculated R Axis 48 degrees      Calculated T Axis 80 degrees      Diagnosis --     Sinus tachycardia  Early repolarization  Nonspecific ST and T wave abnormality  Prolonged QT  Abnormal ECG  When compared with ECG of 21-NOV-2019 21:03,  Significant changes have occurred  Confirmed by Mathew Child ([id number]) on 10/20/2020 8:44:32 AM      EKG, 12 LEAD, REPEAT [[id number]] Collected:  10/19/20 2016    Order Status:  Completed Updated:  10/20/20 0843     Ventricular Rate 114 BPM      Atrial Rate 114 BPM      P-R Interval 214 ms      QRS Duration 76 ms      Q-T Interval 302 ms      QTC Calculation (Bezet) 416 ms      Calculated P Axis 53 degrees      Calculated R Axis 40 degrees      Calculated T Axis 99 degrees      Diagnosis --     Sinus tachycardia with 1st degree AV block  Nonspecific ST and T wave abnormality  Abnormal ECG  When compared with ECG of 19-OCT-2020 18:25,  No significant change was found    ST no longer elevated in Lateral leads  Nonspecific T wave abnormality now evident in Inferior leads  T wave inversion more evident in Lateral leads  Confirmed by Mathew Child ([id number] ) on 10/20/2020 8:43:47 AM          XR Results (most recent):  Results from Hospital Encounter encounter on 10/19/20   XR CHEST PORT    Narrative Portable CXR:    HISTORY: Shortness of breath. Comparison October 19 2020    Cardiac silhouette is top normal in size. No significant vascular congestion. Lungs and costophrenic angles are clear. Impression IMPRESSION: No acute abnormalities       10/19/20   ECHO ADULT FOLLOW-UP OR LIMITED 10/20/2020 10/20/2020    Narrative · LV: Estimated LVEF is 55 - 60%. Visually measured ejection fraction. Normal cavity size, wall thickness and systolic function (ejection   fraction normal).  Wall motion: normal.  · COVID r/o        Signed by: Elizabeth Valerio MD                 ASSESSMENT:  Hospital Problems  Date Reviewed: 5/9/2019 Codes Class Noted POA    Fever ICD-10-CM: R50.9  ICD-9-CM: 780.60  10/20/2020 Unknown        NSTEMI (non-ST elevated myocardial infarction) [facility]) ICD-10-CM: I21.4  ICD-9-CM: 410.70  10/20/2020 Unknown                SUBJECTIVE:  No chest pain no SOB     OBJECTIVE:    VS:   Visit Vitals  /67 (BP 1 Location: Left arm, BP Patient Position: At rest)   Pulse 65   Temp 98.5 °F (36.9 °C)   Resp 12   Ht 5' 6\" (1.676 m)   Wt 104.2 kg (229 lb 11.2 oz)   SpO2 98%   BMI 37.07 kg/m²         Intake/Output Summary (Last 24 hours) at 10/23/2020 0840  Last data filed at 10/23/2020 0735  Gross per 24 hour   Intake 976 ml   Output --   Net 976 ml     TELE: normal sinus rhythm/ intermittent 2nd degree AV block mobitz 1      General: alert mild distress noted   HENT: Normocephalic, atraumatic. Normal external eye. Cardiac: Denies chest pain  Chest/Lungs: diminished to ausculatation  Abdomen:  no masses no tenderness   Extremities: no edema BLE. Right wrist insertion site no hematoma.        Labs: Results:       Chemistry Recent Labs     10/23/20  0603 10/22/20  1137 10/21/20  0309   * 224* 218*    140 138   K 4.0 4.4 3.7   * 111 107   CO2 21 21 24   BUN 18 25* 19*   CREA 1.15 1.48* 1.42*   CA 8.4* 8.3* 8.2*   MG 2.3 2.2 2.0   AGAP 9 8 7   BUCR 16 17 13   * 210* 151*   TP 5.9* 5.6* 6.0*   ALB 2.3* 2.1* 2.0*   GLOB 3.6 3.5 4.0   AGRAT 0.6* 0.6* 0.5*      CBC w/Diff Recent Labs     10/23/20  0818 10/22/20  1137 10/21/20  1454   WBC 8.5 8.1 6.6   RBC 4.27 4.25 4.31   HGB 9.3* 9.2* 9.3*   HCT 29.2* 28.8* 29.8*    227 228   GRANS PENDING 55 48   LYMPH PENDING 36 41   EOS PENDING 0 2      Cardiac Enzymes Recent Labs     10/22/20  1137 10/20/20  1635   * 177   CKND1 4.1* 0.7      Coagulation Recent Labs     10/22/20  1137 10/21/20  1119   APTT 26.4 78.7*       Lipid Panel Lab Results   Component Value Date/Time    Cholesterol, total 218 (H) 09/28/2020 01:00 PM    HDL Cholesterol 49 09/28/2020 01:00 PM    LDL, calculated 121 (H) 09/28/2020 01:00 PM    VLDL, calculated 48 (H) 09/28/2020 01:00 PM    Triglyceride 241 (H) 09/28/2020 01:00 PM      BNP No results for input(s): BNPP in the last 72 hours. Liver Enzymes Recent Labs     10/23/20  0603   TP 5.9*   ALB 2.3*   *      Digoxin    Thyroid Studies Lab Results   Component Value Date/Time    TSH 1.47 10/19/2020 06:30 PM              SHABNAM Benson  Supervised    I have independently evaluated and examined the patient. All relevant labs and testing data's are reviewed. Care plan discussed and updated after review.     Mordecai Lefort MD

## 2020-10-26 ENCOUNTER — PATIENT OUTREACH (OUTPATIENT)
Dept: CASE MANAGEMENT | Age: 45
End: 2020-10-26

## 2020-10-26 NOTE — PROGRESS NOTES
Patient contacted regarding recent discharge and COVID-19 risk. Discussed COVID-19 related testing which was available at this time. Test results were negative. Patient informed of results, if available? yes    Care Transition Nurse/ Ambulatory Care Manager/ LPN Care Coordinator contacted the patient by telephone to perform post discharge assessment. Verified name and  with patient as identifiers. Patient has following risk factors of: diabetes. CTN/ACM/LPN reviewed discharge instructions, medical action plan and red flags related to discharge diagnosis. Reviewed and educated them on any new and changed medications related to discharge diagnosis. Advised obtaining a 90-day supply of all daily and as-needed medications. Advance Care Planning:   Does patient have an Advance Directive: health care decision makers on file    Education provided regarding infection prevention, and signs and symptoms of COVID-19 and when to seek medical attention with patient who verbalized understanding. Discussed exposure protocols and quarantine from [address] you at higher risk for severe illness  and given an opportunity for questions and concerns. The patient agrees to contact the COVID-19 hotline [phone] or PCP office for questions related to their healthcare. CTN/ACM/LPN provided contact information for future reference. From CDC: Are you at higher risk for severe illness?  Wash your hands often.  Avoid close contact (6 feet, which is about two arm lengths) with people who are sick.  Put distance between yourself and other people if COVID-19 is spreading in your community.  Clean and disinfect frequently touched surfaces.  Avoid all cruise travel and non-essential air travel.  Call your healthcare professional if you have concerns about COVID-19 and your underlying condition or if you are sick.     For more information on steps you can take to protect yourself, see CDC's How to Protect Yourself      Patient/family/caregiver given information for [facility] Loop and agrees to enroll no  Patient's preferred e-mail:  n/a  Patient's preferred phone number: n/a  Based on Loop alert triggers, patient will be contacted by nurse care manager for worsening symptoms. Plan for follow-up call in 7-14 days based on severity of symptoms and risk factors.

## 2020-10-27 ENCOUNTER — TELEPHONE (OUTPATIENT)
Dept: CARDIAC REHAB | Age: 45
End: 2020-10-27

## 2020-10-27 NOTE — TELEPHONE ENCOUNTER
[facility] called patient, but was unable to reach or leave a message at the number listed. Additional attempts at contact will be made.     Thank you,  Karyna Woodson

## 2020-11-02 ENCOUNTER — TELEPHONE (OUTPATIENT)
Dept: FAMILY MEDICINE CLINIC | Age: 45
End: 2020-11-02

## 2020-11-05 ENCOUNTER — OFFICE VISIT (OUTPATIENT)
Dept: CARDIOLOGY CLINIC | Age: 45
End: 2020-11-05
Payer: MEDICAID

## 2020-11-05 VITALS
OXYGEN SATURATION: 100 % | HEART RATE: 97 BPM | BODY MASS INDEX: 35.62 KG/M2 | DIASTOLIC BLOOD PRESSURE: 95 MMHG | HEIGHT: 66 IN | TEMPERATURE: 97.7 F | SYSTOLIC BLOOD PRESSURE: 167 MMHG | WEIGHT: 221.6 LBS

## 2020-11-05 DIAGNOSIS — I25.118 ATHEROSCLEROSIS OF NATIVE CORONARY ARTERY OF NATIVE HEART WITH STABLE ANGINA PECTORIS (HCC): Primary | ICD-10-CM

## 2020-11-05 DIAGNOSIS — I50.32 CHRONIC DIASTOLIC CONGESTIVE HEART FAILURE (HCC): ICD-10-CM

## 2020-11-05 DIAGNOSIS — I10 ESSENTIAL HYPERTENSION: ICD-10-CM

## 2020-11-05 DIAGNOSIS — E78.00 HYPERCHOLESTEREMIA: ICD-10-CM

## 2020-11-05 DIAGNOSIS — I44.1 MOBITZ (TYPE) I (WENCKEBACH'S) ATRIOVENTRICULAR BLOCK: ICD-10-CM

## 2020-11-05 DIAGNOSIS — E66.01 SEVERE OBESITY (HCC): ICD-10-CM

## 2020-11-05 DIAGNOSIS — Z95.5 HISTORY OF CORONARY ARTERY STENT PLACEMENT: ICD-10-CM

## 2020-11-05 PROCEDURE — 99214 OFFICE O/P EST MOD 30 MIN: CPT | Performed by: INTERNAL MEDICINE

## 2020-11-05 RX ORDER — LOSARTAN POTASSIUM 50 MG/1
50 TABLET ORAL DAILY
Qty: 90 TAB | Refills: 1 | Status: SHIPPED | OUTPATIENT
Start: 2020-11-05 | End: 2021-02-01 | Stop reason: SDUPTHER

## 2020-11-05 NOTE — PATIENT INSTRUCTIONS
Medications Discontinued During This Encounter   Medication Reason    losartan (COZAAR) 25 mg tablet      After the recommended changes have been made in blood pressure medicines, patient advised to keep BP/HR(pulse rate) chart twice daily and bring us results in next 4 to 5 days. Patient may send the results via \"My Chart\" if desired. Please rest for 5-10 minutes before checking blood pressure. Sit on a comfortable chair without crossing the legs and put your arm on a table. We recommend that you use an upper arm cuff. Check the blood pressure 3 times each time you check the blood pressure and record the lowest reading. If you check the blood pressure in both arms, use the higher reading. A Healthy Heart: Care Instructions  Your Care Instructions     Coronary artery disease, also called heart disease, occurs when a substance called plaque builds up in the vessels that supply oxygen-rich blood to your heart muscle. This can narrow the blood vessels and reduce blood flow. A heart attack happens when blood flow is completely blocked. A high-fat diet, smoking, and other factors increase the risk of heart disease. Your doctor has found that you have a chance of having heart disease. You can do lots of things to keep your heart healthy. It may not be easy, but you can change your diet, exercise more, and quit smoking. These steps really work to lower your chance of heart disease. Follow-up care is a key part of your treatment and safety. Be sure to make and go to all appointments, and call your doctor if you are having problems. It's also a good idea to know your test results and keep a list of the medicines you take. How can you care for yourself at home? Diet    · Use less salt when you cook and eat. This helps lower your blood pressure. Taste food before salting. Add only a little salt when you think you need it.  With time, your taste buds will adjust to less salt.     · Eat fewer snack items, fast foods, canned soups, and other high-salt, high-fat, processed foods.     · Read food labels and try to avoid saturated and trans fats. They increase your risk of heart disease by raising cholesterol levels.     · Limit the amount of solid fat-butter, margarine, and shortening-you eat. Use olive, peanut, or canola oil when you cook. Bake, broil, and steam foods instead of frying them.     · Eat a variety of fruit and vegetables every day. Dark green, deep orange, red, or yellow fruits and vegetables are especially good for you. Examples include spinach, carrots, peaches, and berries.     · Foods high in fiber can reduce your cholesterol and provide important vitamins and minerals. High-fiber foods include whole-grain cereals and breads, oatmeal, beans, brown rice, citrus fruits, and apples.     · Eat lean proteins. Heart-healthy proteins include seafood, lean meats and poultry, eggs, beans, peas, nuts, seeds, and soy products.     · Limit drinks and foods with added sugar. These include candy, desserts, and soda pop. Lifestyle changes    · If your doctor recommends it, get more exercise. Walking is a good choice. Bit by bit, increase the amount you walk every day. Try for at least 30 minutes on most days of the week. You also may want to swim, bike, or do other activities.     · Do not smoke. If you need help quitting, talk to your doctor about stop-smoking programs and medicines. These can increase your chances of quitting for good. Quitting smoking may be the most important step you can take to protect your heart. It is never too late to quit.     · Limit alcohol to 2 drinks a day for men and 1 drink a day for women. Too much alcohol can cause health problems.     · Manage other health problems such as diabetes, high blood pressure, and high cholesterol. If you think you may have a problem with alcohol or drug use, talk to your doctor. Medicines    · Take your medicines exactly as prescribed.  Call your doctor if you think you are having a problem with your medicine.     · If your doctor recommends aspirin, take the amount directed each day. Make sure you take aspirin and not another kind of pain reliever, such as acetaminophen (Tylenol). When should you call for help? Call 911 if you have symptoms of a heart attack. These may include:    · Chest pain or pressure, or a strange feeling in the chest.     · Sweating.     · Shortness of breath.     · Pain, pressure, or a strange feeling in the back, neck, jaw, or upper belly or in one or both shoulders or arms.     · Lightheadedness or sudden weakness.     · A fast or irregular heartbeat. After you call 911, the  may tell you to chew 1 adult-strength or 2 to 4 low-dose aspirin. Wait for an ambulance. Do not try to drive yourself. Watch closely for changes in your health, and be sure to contact your doctor if you have any problems. Where can you learn more? Go to http://www.[employer].com/  Enter F075 in the search box to learn more about \"A Healthy Heart: Care Instructions. \"  Current as of: December 16, 2019               Content Version: 12.6  © 1678-7734 [employer]. Care instructions adapted under license by [employer] (which disclaims liability or warranty for this information). If you have questions about a medical condition or this instruction, always ask your healthcare professional. [place] [address] any warranty or liability for your use of this information.

## 2020-11-05 NOTE — PROGRESS NOTES
HISTORY OF PRESENT ILLNESS  Bailey Heath is a 41 y.o. female. 11/20 follow-up of non-STEMI status post PCI with BLANCA in RCA, hypertension, hyperlipidemia, obesity, diabetes        Shortness of Breath   The history is provided by the patient. This is a new problem. The problem occurs intermittently. The current episode started more than 1 week ago (Early 2020). Pertinent negatives include no fever, no headaches, no cough, no wheezing, no PND, no orthopnea, no chest pain, no vomiting, no rash, no leg swelling and no claudication. The problem's precipitants include exercise (100 ft). Leg Swelling   The history is provided by the patient. This is a new problem. The current episode started more than 1 week ago (2/20). The problem occurs every several days. The problem has been resolved (Since hospital discharge in 10/20). Associated symptoms include shortness of breath. Pertinent negatives include no chest pain and no headaches. Allergies   Allergen Reactions    Azithromycin Other (comments)     Rapid response was called for bradycardia and hypotension     Pcn [Penicillins] Hives       Past Medical History:   Diagnosis Date    Diabetes ([facility] [address]     HTN (hypertension)     Hyperlipidemia        Family History   Problem Relation Age of Onset    Lupus Mother     Cancer Neg Hx     Diabetes Neg Hx     Heart Disease Neg Hx     Hypertension Neg Hx     Heart Attack Neg Hx     Stroke Neg Hx        Social History     Tobacco Use    Smoking status: Never Smoker    Smokeless tobacco: Never Used   Substance Use Topics    Alcohol use: No    Drug use: No        Current Outpatient Medications   Medication Sig    ticagrelor (BRILINTA) 90 mg tablet Take 1 Tab by mouth every twelve (12) hours every twelve (12) hours.  atorvastatin (LIPITOR) 40 mg tablet Take 1 Tab by mouth daily.     insulin glargine (LANTUS,BASAGLAR) 100 unit/mL (3 mL) inpn Take 45 units daily  Indications: type 2 diabetes mellitus    losartan (COZAAR) 25 mg tablet Take 1 Tab by mouth daily.  aspirin 81 mg chewable tablet Take 1 Tab by mouth daily. (Patient taking differently: Take 325 mg by mouth daily.)    polyethylene glycol (Miralax) 17 gram/dose powder Take 17 g by mouth daily for 30 days. 1 tablespoon with 8 oz of water daily    metFORMIN ER (GLUCOPHAGE XR) 500 mg tablet Take 1 Tab by mouth daily (with dinner). (Patient taking differently: Take 750 mg by mouth daily (with dinner). 0.5 tab daily)    melatonin 5 mg cap capsule Take 1 Cap by mouth nightly.  Insulin Needles, Disposable, 31 gauge x 5/16\" ndle Use to check blood glucose BID    Insulin Syringe-Needle U-100 (INSULIN SYRINGE) 1 mL 30 gauge x 5/16 syrg As directed for lantus insulin    ferrous sulfate 325 mg (65 mg iron) tablet Take 1 Tab by mouth Daily (before breakfast). No current facility-administered medications for this visit. Past Surgical History:   Procedure Laterality Date    HX CORONARY STENT PLACEMENT      HX GYN             Visit Vitals  BP (!) 167/95 (BP 1 Location: Left arm, BP Patient Position: Sitting)   Pulse 97   Temp 97.7 °F (36.5 °C) (Temporal)   Ht 5' 6\" (1.676 m)   Wt 100.5 kg (221 lb 9.6 oz)   SpO2 100%   BMI 35.77 kg/m²       Diagnostic Studies:  I have reviewed the relevant tests done on the patient and show as follows  EKG tracings reviewed by me today. EKG Results     None        XR Results (most recent):  Results from Hospital Encounter encounter on 10/19/20   XR CHEST PORT    Narrative Portable CXR:    HISTORY: Shortness of breath. Comparison 2020    Cardiac silhouette is top normal in size. No significant vascular congestion. Lungs and costophrenic angles are clear. Impression IMPRESSION: No acute abnormalities       10/19/20   ECHO ADULT FOLLOW-UP OR LIMITED 10/20/2020 10/20/2020    Narrative · LV: Estimated LVEF is 55 - 60%. Visually measured ejection fraction.    Normal cavity size, wall thickness and systolic function (ejection   fraction normal). Wall motion: normal.  · COVID r/o        Signed by: Rosie Robledo MD          10/19/20   CARDIAC PROCEDURE 10/23/2020 10/23/2020    Narrative 100% mid RCA occlusion with thrombus. S/p aspiration thrombectomy followed by ptca/stent ( 2 BLANCA stents placed   overlapping extending from mid RCA to distal RCA). Mild mid LAD stenosis. Normal LV function by echo. TPM inserted via right femoral vein due to complete heart block. Plan:  Continue DAPT for 1 yr/ high dose statin. Post PCI developed juctional tachycardia. Will discontinue TPM. Monitor   and consider PPM if needed. Recommend intense risk factor modification. Signed by: Henry Lomax MD           Ms. Meg Bocanegra has a reminder for a \"due or due soon\" health maintenance. I have asked that she contact her primary care provider for follow-up on this health maintenance. Review of Systems   Constitutional: Negative for chills, fever, malaise/fatigue and weight loss. HENT: Negative for nosebleeds. Eyes: Negative for discharge. Respiratory: Positive for shortness of breath. Negative for cough and wheezing. Cardiovascular: Negative for chest pain, palpitations, orthopnea, claudication, leg swelling and PND. Gastrointestinal: Negative for diarrhea, nausea and vomiting. Genitourinary: Negative for dysuria and hematuria. Musculoskeletal: Negative for joint pain. Skin: Negative for rash. Neurological: Negative for dizziness, seizures, loss of consciousness and headaches. Endo/Heme/Allergies: Negative for polydipsia. Does not bruise/bleed easily. Psychiatric/Behavioral: Negative for depression and substance abuse. The patient does not have insomnia. 10/19/20   ECHO ADULT FOLLOW-UP OR LIMITED 10/20/2020 10/20/2020    Narrative · LV: Estimated LVEF is 55 - 60%. Visually measured ejection fraction.    Normal cavity size, wall thickness and systolic function (ejection fraction normal). Wall motion: normal.  · COVID r/o        Signed by: Mari Garcia MD     10/19/20   CARDIAC PROCEDURE 10/23/2020 10/23/2020    Narrative 100% mid RCA occlusion with thrombus. S/p aspiration thrombectomy followed by ptca/stent ( 2 BLANCA stents placed   overlapping extending from mid RCA to distal RCA). Mild mid LAD stenosis. Normal LV function by echo. TPM inserted via right femoral vein due to complete heart block. Plan:  Continue DAPT for 1 yr/ high dose statin. Post PCI developed juctional tachycardia. Will discontinue TPM. Monitor   and consider PPM if needed. Recommend intense risk factor modification. Signed by: Raymond Waterman MD     Physical Exam   Constitutional: She is oriented to person, place, and time. She appears well-developed and well-nourished. No distress. sev obese   HENT:   Head: Normocephalic and atraumatic. Mouth/Throat: Normal dentition. Eyes: Right eye exhibits no discharge. Left eye exhibits no discharge. No scleral icterus. Neck: Neck supple. No JVD present. Carotid bruit is not present. No thyromegaly present. Cardiovascular: Normal rate, regular rhythm, S1 normal, S2 normal, normal heart sounds and intact distal pulses. Exam reveals no gallop and no friction rub. No murmur heard. Pulmonary/Chest: Effort normal and breath sounds normal. She has no wheezes. She has no rales. Abdominal: Soft. She exhibits no mass. There is no abdominal tenderness. Musculoskeletal:         General: No edema. Lymphadenopathy:        Right cervical: No superficial cervical adenopathy present. Left cervical: No superficial cervical adenopathy present. Neurological: She is alert and oriented to person, place, and time. Skin: Skin is warm and dry. No rash noted. Psychiatric: She has a normal mood and affect. Her behavior is normal.       ASSESSMENT and PLAN    CAD stable. Patient seems to have chronic diastolic CHF.   No evidence of fluid overload. Increase losartan and follow home BP chart. Check a Holter monitor before starting beta-blockers. Refer to cardiac rehab  Diet weight and exercise discussed in detail. Diagnoses and all orders for this visit:    1. Atherosclerosis of native coronary artery of native heart with stable angina pectoris ([facility] [address]  -     REFERRAL TO CARDIAC REHAB; Future    2. Essential hypertension  -     losartan (COZAAR) 50 mg tablet; Take 1 Tab by mouth daily.  -     METABOLIC PANEL, BASIC; Future  -     HEPATIC FUNCTION PANEL; Future  -     LIPID PANEL; Future    3. Hypercholesteremia  -     LIPID PANEL; Future    4. History of coronary artery stent placement  -     REFERRAL TO CARDIAC REHAB; Future    5. Chronic diastolic congestive heart failure ([facility] [address]    6. Severe obesity ([facility] [address]    7. Mobitz (type) I (Wenckebach's) atrioventricular block  Comments:  10/20 at the time of acute MI  Orders:  -     [address]; Future        Pertinent laboratory and test data reviewed and discussed with patient. See patient instructions also for other medical advice given    Medications Discontinued During This Encounter   Medication Reason    losartan (COZAAR) 25 mg tablet        Follow-up and Dispositions    · Return in about 3 months (around 2/5/2021), or if symptoms worsen or fail to improve.

## 2020-11-09 ENCOUNTER — PATIENT OUTREACH (OUTPATIENT)
Dept: CASE MANAGEMENT | Age: 45
End: 2020-11-09

## 2020-11-09 NOTE — PROGRESS NOTES
Date/Time:  11/9/2020 2:27 PM   Call within 2 business days of discharge: Yes   Attempted to reach patient by telephone. Left HIPPA compliant message requesting a return call. This episode is resolved.

## 2020-11-10 ENCOUNTER — HOSPITAL ENCOUNTER (OUTPATIENT)
Dept: LAB | Age: 45
Discharge: HOME | End: 2020-11-10

## 2020-11-10 ENCOUNTER — HOSPITAL ENCOUNTER (OUTPATIENT)
Dept: CARDIAC REHAB | Age: 45
Discharge: HOME | End: 2020-11-10
Payer: MEDICAID

## 2020-11-10 VITALS — BODY MASS INDEX: 35.35 KG/M2 | WEIGHT: 219 LBS

## 2020-11-10 LAB — SENTARA SPECIMEN COL,SENBCF: NORMAL

## 2020-11-10 PROCEDURE — 93798 PHYS/QHP OP CAR RHAB W/ECG: CPT

## 2020-11-10 PROCEDURE — 99001 SPECIMEN HANDLING PT-LAB: CPT

## 2020-11-10 NOTE — PROGRESS NOTES
INITIAL CARDIAC REHAB ITP FOR REVIEW AND SIGNATURE    Cardiac Rehab Initial Treatment Note/Plan    Delfina Preston 41 y.o. presented to cardiac rehab for an intake and a six minute walk test today with a primary diagnosis of NSTEMI/BLANCA x2.  Patient's EF is 55-60%. Delfina Preston has a history of Hypertension, Hyperlipidemia, Diabetes Mellitus, Coronary artery disease, Obesity and status post coronary artery stenting. Cardiac risk factors include dyslipidemia, diabetes mellitus, obesity, hypertension and these were reviewed with patient. Delfina Preston is  and lives with spouse. PHQ-9, depression score, is 13 and this is considered to be high. The result was discussed with patient who confirms score to be accurate. Patient denied wanting to harm herself or anybody else during the intake. Patient denied chest pain or SOB during 6 minute walk and the cardiac rhythm was in Sinus Tachycardia. Delfina Preston will attend exercise and educational sessions 2 days a week in cardiac rehab for 36 sessions. Goals for Rehab:    Patient name: Delfina Preston : 1975         Goals Comments   1. Lower HA1C on next lab draw   [x] initial  [] met                  [] not met  [] progressing Lower from 15.1 to 7.0 by next lab draw    2.  Lower total cholesterol   [x] initial  [] met                  [] not met  [] progressing Lower total cholesterol below 200 by next lab draw     Yazmin Duran RN 11/10/2020 3:08 PM    Cardiac ITP      CR INTAKE from 11/10/2020 in [facility] 0793        Treatment Diagnosis    Treatment Diagnosis 1  NSTEMI  --  --  --    NSTEMI Date  10/22/20  --  --  --    Treatment Diagnosis 2  --  [BLANCA x2]  --  --  --    Referral Date  20  --  --  --    Co-morbidities  Diabetes  --  --  --    Individual Treatment Plan    ITP Visit Type  Initial Assessment  --  --  --    1st Date of Exercise   11/10/20  --  --  --    ITP Next Review Date  12/10/20  --  --  -- Visit #/Total Visits  1/36  --  --  --    EF %  55 %  --  --  --    Risk Stratification  Moderate  --  --  --    ITP  Exercise, Psychosocial, Tobacco, Nutrition, Education  --  --  --    Exercise     Stages of Change  Preparation  --  --  --    DASI Total Score  24.2  --  --  --    Assisted Devices  None  --  --  --    Total Score  0       Test  Six minute walk test  --  --  --    Exercise Prescription    Mode  Treadmill, Bike, Stepper, Ergometer  --  --  --    Frequency per week  2-3  --  --  --    Duration per session  35-55  --  --  --    Intensity  METS        1.8-3.0  --  --  --    RPE  11-13  --  --  --    Target Heart Rate  106-123  --  --  --    Resistance Training  Yes  --  --  --    Exercise Blood Pressures    Resting BP  158/95  --  --  --    Peak BP  158/95  --  --  --    Is BP WDL?   No  --  --  --    Exercise Activity at Home    Type  none  --  --  --    Exercise Education    Education  Signs/Symptoms to report, Exercise safety, RPE scale  --  --  --    Exercise Target Goal    Target Goal(s)  Individual exercise RX, BP < 140/90 or < 130/80, if DM or CKD, Aerobic activity 30 + minutes/day  5 days/week  --  --  --    Psychosocial    Stages of Change  Preparation  --  --  --    [facility] Total Score  30  --  --  --    PHQ 9 Score  13  --  --  --    Psychosocial Intervention    Interventions  Cardiologist notified, PCP notified  --  --  --    Consults    --  --  --    Currently Taking Psychotropic Meds  No  --  --  --    Medication Changes  No  --  --  --    Psychosocial Education    Education  Impact self care behaviors on health  --  --  --    Psychosocial Target Goals    Target Goal(s)  Engages in self-care behaviors  --  --  --    Uses Stress Mgmt Techniques  Yes  --  --  --    Nutrition    Stages of Change  Preparation  --  --  --    Diabetes  Yes  --  --  --    HbA1C  15.1  --  --  --    HbA1C Date  09/28/20  --  --  --    BG at Home  Yes  --  --  --    BG Frequency  daily  --  --  -- Diabetes Med(s)  Insulin, Metformin  --  --  --    Diabetes Med(s) Change  No  --  --  --    Lipids    Date Lipids Drawn  09/28/20  --  --  --    Total  218  --  --  --    HDL  49  --  --  --    LDL  121  --  --  --    Triglycerides  241  --  --  --    Lipid Med(s)  Lipitor  --  --  --    Lipid Med Change(s)  No  --  --  --    Weight Management    Weight   99.3 kg (219 lb)  --  --  --    Height   5' 6\" (1.676 m)  --  --  --    BMI  35.42  --  --  --    Waist Circumference   46  --  --  --    Alcohol  None  --  --  --    Rate Your Plate Total Score  45  --  --  --    Nutrition Intervention    Dietitian Consult  Yes  --  --  --    Nurse/Patient Discussion  Yes  --  --  --    Nutrition Class  Yes  --  --  --    Diabetes Education Referral  No  --  --  --    Lipid Clinic Referral  No  --  --  --    Weight Management Referral  No  --  --  --    Nutrition Education    Education  Low fat & cholesterol diet, DM & CAD relationship, Carb-controlled diet, Low sodium diet, Healthy eating  --  --  --    Nutrition Target Goals    Target Goals  BMI less than 25, Weight loss of 5-10%, HbA1C less than 7 percent, LDL-C less than 100  --  --  --    Education    Learning Barrier  Ready to learn  --  --  --    Education Intervention    Education Schedule Given  Yes  --  --  --    Patient Education     Education  CAD, Risk factors, Med Compliance, Signs/Symptoms of Angina  --  --  --    Hypertension  Yes  --  --  --    Hypertension Controlled  No  --  --  --    Is BP WDL?   No  --  --  --    Med(s) Change  No  --  --  --    BP Meds  Losartan  --  --  --    Education Target Goals    Target Goals  Understand target guidelines for lipids, Understand target guidelines for B/P, Medication compliance, Risk factors  --  --  --    Physician Response    Exercise      CR INTAKE from 11/10/2020 in [facility]    Any problems changes since your last visit  Other (comment)  [initial visit]    Any symptoms while exercising  denies Psychosocial/Stress Level  0    Patient Reported Glucose  208    Resting EKG rhythm  SR/ST    Tobacco Use  None    ITP Next Review Date  12/10/20    Visit Number/Total Visits  1/36    What is plan for next session  start exercise Rx    On Call Medical Director Immediately Available  [place]    Target Heart Rate(Range)  106-123    Resting HR  98    Resting BP  158/95    Recovery HR  96    Recovery BP  151/97    Weight  99.3 kg (219 lb)    Exercise EKG Rhythm  SR/ST    Exercise Duration  6    Peak HR  116    Peak BP  158/95    Peak RPE  9    Peak Mets  1.8    SOB  0    Asymptomatic  yes    Total Minutes  6

## 2020-11-10 NOTE — PROGRESS NOTES
Outpatient Nutritional Counseling Order    Veronica Senior is a 41y.o. year old female with Atherosclerotic heart disease of native coronary artery with other forms of angina pectoris [I25.118]  Presence of coronary angioplasty implant and graft [Z95.5]. She is enrolled in cardiac rehabilitation and the treating clinician believes the patient would benefit from nutritional counseling services due to her diagnosis of Type 2 Diabetes. Please co-sign this note if you would like your patient to be evaluated by a Registered Dietician in our clinic. Thank you.

## 2020-11-18 ENCOUNTER — APPOINTMENT (OUTPATIENT)
Dept: CARDIAC REHAB | Age: 45
End: 2020-11-18
Payer: MEDICAID

## 2020-11-19 ENCOUNTER — HOSPITAL ENCOUNTER (OUTPATIENT)
Dept: CARDIAC REHAB | Age: 45
Discharge: HOME | End: 2020-11-19
Payer: MEDICAID

## 2020-11-19 VITALS — WEIGHT: 221 LBS | BODY MASS INDEX: 35.67 KG/M2

## 2020-11-19 PROCEDURE — 93798 PHYS/QHP OP CAR RHAB W/ECG: CPT

## 2020-11-20 ENCOUNTER — APPOINTMENT (OUTPATIENT)
Dept: CARDIAC REHAB | Age: 45
End: 2020-11-20
Payer: MEDICAID

## 2020-11-23 ENCOUNTER — APPOINTMENT (OUTPATIENT)
Dept: CARDIAC REHAB | Age: 45
End: 2020-11-23
Payer: MEDICAID

## 2020-11-24 ENCOUNTER — HOSPITAL ENCOUNTER (OUTPATIENT)
Dept: CARDIAC REHAB | Age: 45
Discharge: HOME | End: 2020-11-24
Payer: MEDICAID

## 2020-11-24 VITALS — WEIGHT: 221 LBS | BODY MASS INDEX: 35.67 KG/M2

## 2020-11-24 PROCEDURE — 93798 PHYS/QHP OP CAR RHAB W/ECG: CPT

## 2020-11-24 PROCEDURE — 93797 PHYS/QHP OP CAR RHAB WO ECG: CPT

## 2020-11-24 NOTE — PROGRESS NOTES
Pt completed third session of cardiac rehab today. Since starting the program, patient's resting HR and BP have been elevated. Patient is not on a beta blocker. Faxed patient's cardiac rehab ECG strips and vital signs to patient's cardiology office for review.

## 2020-11-25 ENCOUNTER — APPOINTMENT (OUTPATIENT)
Dept: CARDIAC REHAB | Age: 45
End: 2020-11-25
Payer: MEDICAID

## 2020-11-27 ENCOUNTER — APPOINTMENT (OUTPATIENT)
Dept: CARDIAC REHAB | Age: 45
End: 2020-11-27
Payer: MEDICAID

## 2020-11-30 ENCOUNTER — APPOINTMENT (OUTPATIENT)
Dept: CARDIAC REHAB | Age: 45
End: 2020-11-30
Payer: MEDICAID

## 2020-11-30 DIAGNOSIS — E78.00 HYPERCHOLESTEREMIA: ICD-10-CM

## 2020-11-30 DIAGNOSIS — I10 ESSENTIAL HYPERTENSION: ICD-10-CM

## 2020-12-01 ENCOUNTER — HOSPITAL ENCOUNTER (OUTPATIENT)
Dept: CARDIAC REHAB | Age: 45
Discharge: HOME | End: 2020-12-01
Payer: MEDICAID

## 2020-12-01 VITALS — BODY MASS INDEX: 35.83 KG/M2 | WEIGHT: 222 LBS

## 2020-12-01 PROCEDURE — 93798 PHYS/QHP OP CAR RHAB W/ECG: CPT

## 2020-12-01 PROCEDURE — 93797 PHYS/QHP OP CAR RHAB WO ECG: CPT

## 2020-12-02 ENCOUNTER — APPOINTMENT (OUTPATIENT)
Dept: CARDIAC REHAB | Age: 45
End: 2020-12-02
Payer: MEDICAID

## 2020-12-03 RX ORDER — CARVEDILOL 6.25 MG/1
TABLET ORAL 2 TIMES DAILY WITH MEALS
COMMUNITY
End: 2020-12-03 | Stop reason: SDUPTHER

## 2020-12-03 RX ORDER — CARVEDILOL 6.25 MG/1
6.25 TABLET ORAL 2 TIMES DAILY WITH MEALS
Qty: 60 TAB | Refills: 5 | Status: SHIPPED | OUTPATIENT
Start: 2020-12-03 | End: 2021-04-12 | Stop reason: SDUPTHER

## 2020-12-04 ENCOUNTER — APPOINTMENT (OUTPATIENT)
Dept: CARDIAC REHAB | Age: 45
End: 2020-12-04
Payer: MEDICAID

## 2020-12-07 ENCOUNTER — APPOINTMENT (OUTPATIENT)
Dept: CARDIAC REHAB | Age: 45
End: 2020-12-07
Payer: MEDICAID

## 2020-12-08 ENCOUNTER — HOSPITAL ENCOUNTER (OUTPATIENT)
Dept: CARDIAC REHAB | Age: 45
Discharge: HOME | End: 2020-12-08
Payer: MEDICAID

## 2020-12-08 VITALS — BODY MASS INDEX: 36.15 KG/M2 | WEIGHT: 224 LBS

## 2020-12-08 PROCEDURE — 93798 PHYS/QHP OP CAR RHAB W/ECG: CPT

## 2020-12-09 ENCOUNTER — APPOINTMENT (OUTPATIENT)
Dept: CARDIAC REHAB | Age: 45
End: 2020-12-09
Payer: MEDICAID

## 2020-12-09 NOTE — PROGRESS NOTES
CARDIAC ITP REASSESSMENT FOR REVIEW AND SIGNATURE       Patient name: Meg Cavazos : 1975     Visits from Start of Care: 7      Reporting Period: 11/10/2020 to 2020    Subjective Reports: BP running high, no symptoms     Goals Comments   1. Lower HA1C on next lab draw   [] met                  [] not met  [x] progressing Lowered HA1C from 15.1 to 12.9 on last lab draw. Lower HA1C below 10 by next lab draw    2.  Lower total cholesterol   [] met                  [] not met  [x] progressing Lower total cholesterol below 200 by next lab draw     Key functional changes: Increased peak treadmill METS from 1.8 to 2.2 during this recert period      Problems/ barriers to goal attainment: BP high, Coreg 6.25mg BID was added to regimen     Assessment / Recommendations:Continue w/ rehab    Lina Perez RN 2020 3:42 PM    Cardiac ITP      CR PHASE II from 2020 in [facility]34       Treatment Diagnosis    Treatment Diagnosis 1  NSTEMI  --  --  --    NSTEMI Date  10/22/20  --  --  --    Treatment Diagnosis 2  --  [BLANCA x2]  --  --  --    Referral Date  20  --  --  --    Co-morbidities  Diabetes  --  --  --    Individual Treatment Plan    ITP Visit Type  Re-Assessment  --  --  --    1st Date of Exercise   11/10/20  --  --  --    ITP Next Review Date  21       Visit #/Total Visits    --  --  --    EF %  55 %  --  --  --    Risk Stratification  Moderate  --  --  --    ITP  Exercise, Psychosocial, Tobacco, Nutrition, Education  --  --  --    Exercise     Stages of Change  Maintenance  --  --  --    Assisted Devices  None  --  --  --    Exercise Prescription    Mode  Treadmill, Bike, Stepper, Ergometer  --  --  --    Frequency per week  2-3  --  --  --    Duration per session  35-55  --  --  --    Intensity  METS        1.8-3.0  --  --  --    RPE  11-13  --  --  --    Target Heart Rate  106-123  --  --  --    Resistance Training  Yes  --  --  --    Exercise Blood Pressures    Resting BP 154/86  --  --  --    Peak BP  154/86  --  --  --    Is BP WDL?   No  --  --  --    Exercise Activity at Home    Type  none  --  --  --    Exercise Education    Education  Signs/Symptoms to report, Exercise safety, RPE scale  --  --  --    Exercise Target Goal    Target Goal(s)  Individual exercise RX, BP < 140/90 or < 130/80, if DM or CKD, Aerobic activity 30 + minutes/day  5 days/week  --  --  --    Psychosocial    Stages of Change  Maintenance  --  --  --    Psychosocial Intervention    Interventions  Cardiologist notified, PCP notified  --  --  --    Currently Taking Psychotropic Meds  No  --  --  --    Medication Changes  No  --  --  --    Psychosocial Education    Education  Impact self care behaviors on health  --  --  --    Psychosocial Target Goals    Target Goal(s)  Engages in self-care behaviors  --  --  --    Uses Stress Mgmt Techniques  Yes  --  --  --    Nutrition    Stages of Change  Maintenance  --  --  --    Diabetes  Yes  --  --  --    HbA1C  12.9  --  --  --    HbA1C Date  10/29/20  --  --  --    BG at Home  Yes  --  --  --    BG Frequency  daily  --  --  --    Diabetes Med(s)  Insulin, Metformin  --  --  --    Diabetes Med(s) Change  No  --  --  --    Lipids    Date Lipids Drawn  09/28/20  --  --  --    Total  218  --  --  --    HDL  49  --  --  --    LDL  121  --  --  --    Triglycerides  241  --  --  --    Lipid Med(s)  Lipitor  --  --  --    Lipid Med Change(s)  No  --  --  --    Weight Management    Weight   101.6 kg (224 lb)  --  --  --    Height   5' 6\" (1.676 m)  --  --  --    BMI  36.23  --  --  --    Waist Circumference   46  --  --  --    Alcohol  None  --  --  --    Nutrition Intervention    Dietitian Consult  Yes  --  --  --    Nurse/Patient Discussion  Yes  --  --  --    Nutrition Class  Yes  --  --  --    Diabetes Education Referral  No  --  --  --    Lipid Clinic Referral  No  --  --  --    Weight Management Referral  No  --  --  --    Nutrition Education    Education  DM & CAD relationship, Low fat & cholesterol diet, Carb-controlled diet, Low sodium diet, Healthy eating  --  --  --    Nutrition Target Goals    Target Goals  BMI less than 25, Weight loss of 5-10%, HbA1C less than 7 percent, LDL-C less than 100  --  --  --    Education    Learning Barrier  Ready to learn  --  --  --    Education Intervention    Education Schedule Given  Yes  --  --  --    Patient Education     Education  CAD, Risk factors, Med Compliance, Signs/Symptoms of Angina  --  --  --    Hypertension  Yes  --  --  --    Hypertension Controlled  No  --  --  --    Is BP WDL?   No  --  --  --    Med(s) Change  Yes  --  --  --    BP Meds  Losartan, Coreg  --  --  --    Education Target Goals    Target Goals  Understand target guidelines for lipids, Understand target guidelines for B/P, Medication compliance, Risk factors  --  --  --    Physician Response    Exercise      CR PHASE II from 12/8/2020 in [facility] 274    Any problems changes since your last visit  Denies    Any symptoms while exercising  denies    Psychosocial/Stress Level  0    Patient Reported Glucose  209    Resting EKG rhythm  SR/ST    Tobacco Use  None    ITP Next Review Date  01/08/21    Visit Number/Total Visits  7/36    On Call Medical Director Immediately Available  Leodan    Target Heart Rate(Range)  106-123    Resting HR  102    Resting BP  154/86    Recovery HR  103    Recovery BP  152/87    Weight  101.6 kg (224 lb)    Exercise EKG Rhythm  ST    Exercise Duration  45    Peak HR  126    Peak RPE  16    Peak Mets  2.7    Asymptomatic  yes    Total Minutes  55

## 2020-12-10 ENCOUNTER — TELEPHONE (OUTPATIENT)
Dept: CARDIAC REHAB | Age: 45
End: 2020-12-10

## 2020-12-10 NOTE — TELEPHONE ENCOUNTER
Cardiac Rehab called patient and left a message about her absences from her visits on 12/8/2020 and 12/10/2020. Additional attempts at contact will be made.     Thank you,  Taty Dodge

## 2020-12-11 ENCOUNTER — APPOINTMENT (OUTPATIENT)
Dept: CARDIAC REHAB | Age: 45
End: 2020-12-11
Payer: MEDICAID

## 2020-12-13 ENCOUNTER — HOSPITAL ENCOUNTER (EMERGENCY)
Age: 45
Discharge: LEFT BEFORE DISCHARGE | End: 2020-12-13
Attending: EMERGENCY MEDICINE
Payer: MEDICAID

## 2020-12-13 ENCOUNTER — APPOINTMENT (OUTPATIENT)
Dept: GENERAL RADIOLOGY | Age: 45
End: 2020-12-13
Attending: EMERGENCY MEDICINE
Payer: MEDICAID

## 2020-12-13 VITALS
DIASTOLIC BLOOD PRESSURE: 94 MMHG | HEIGHT: 67 IN | OXYGEN SATURATION: 100 % | TEMPERATURE: 98.2 F | WEIGHT: 222 LBS | SYSTOLIC BLOOD PRESSURE: 155 MMHG | HEART RATE: 93 BPM | BODY MASS INDEX: 34.84 KG/M2 | RESPIRATION RATE: 14 BRPM

## 2020-12-13 DIAGNOSIS — R60.0 LEG EDEMA, LEFT: Primary | ICD-10-CM

## 2020-12-13 LAB
ALBUMIN SERPL-MCNC: 1.9 G/DL (ref 3.4–5)
ALBUMIN/GLOB SERPL: 0.5 {RATIO} (ref 0.8–1.7)
ALP SERPL-CCNC: 197 U/L (ref 45–117)
ALT SERPL-CCNC: 49 U/L (ref 13–56)
ANION GAP SERPL CALC-SCNC: 6 MMOL/L (ref 3–18)
AST SERPL-CCNC: 42 U/L (ref 10–38)
ATRIAL RATE: 95 BPM
BASOPHILS # BLD: 0 K/UL (ref 0–0.1)
BASOPHILS NFR BLD: 0 % (ref 0–2)
BILIRUB SERPL-MCNC: 0.2 MG/DL (ref 0.2–1)
BNP SERPL-MCNC: 488 PG/ML (ref 0–450)
BUN SERPL-MCNC: 14 MG/DL (ref 7–18)
BUN/CREAT SERPL: 11 (ref 12–20)
CALCIUM SERPL-MCNC: 7.8 MG/DL (ref 8.5–10.1)
CALCULATED P AXIS, ECG09: 59 DEGREES
CALCULATED R AXIS, ECG10: 51 DEGREES
CALCULATED T AXIS, ECG11: 19 DEGREES
CHLORIDE SERPL-SCNC: 110 MMOL/L (ref 100–111)
CK MB CFR SERPL CALC: 0.6 % (ref 0–4)
CK MB SERPL-MCNC: 1.8 NG/ML (ref 5–25)
CK SERPL-CCNC: 289 U/L (ref 26–192)
CO2 SERPL-SCNC: 23 MMOL/L (ref 21–32)
CREAT SERPL-MCNC: 1.24 MG/DL (ref 0.6–1.3)
D DIMER PPP FEU-MCNC: 0.58 UG/ML(FEU)
DIAGNOSIS, 93000: NORMAL
DIFFERENTIAL METHOD BLD: ABNORMAL
EOSINOPHIL # BLD: 0.2 K/UL (ref 0–0.4)
EOSINOPHIL NFR BLD: 2 % (ref 0–5)
ERYTHROCYTE [DISTWIDTH] IN BLOOD BY AUTOMATED COUNT: 15.4 % (ref 11.6–14.5)
GLOBULIN SER CALC-MCNC: 4 G/DL (ref 2–4)
GLUCOSE BLD STRIP.AUTO-MCNC: 338 MG/DL (ref 70–110)
GLUCOSE SERPL-MCNC: 314 MG/DL (ref 74–99)
HCT VFR BLD AUTO: 26.5 % (ref 35–45)
HGB BLD-MCNC: 8.3 G/DL (ref 12–16)
LYMPHOCYTES # BLD: 2.7 K/UL (ref 0.9–3.6)
LYMPHOCYTES NFR BLD: 28 % (ref 21–52)
MCH RBC QN AUTO: 22.1 PG (ref 24–34)
MCHC RBC AUTO-ENTMCNC: 31.3 G/DL (ref 31–37)
MCV RBC AUTO: 70.5 FL (ref 74–97)
MONOCYTES # BLD: 0.7 K/UL (ref 0.05–1.2)
MONOCYTES NFR BLD: 8 % (ref 3–10)
NEUTS SEG # BLD: 5.9 K/UL (ref 1.8–8)
NEUTS SEG NFR BLD: 62 % (ref 40–73)
P-R INTERVAL, ECG05: 138 MS
PLATELET # BLD AUTO: 241 K/UL (ref 135–420)
PMV BLD AUTO: 10.5 FL (ref 9.2–11.8)
POTASSIUM SERPL-SCNC: 4 MMOL/L (ref 3.5–5.5)
PROT SERPL-MCNC: 5.9 G/DL (ref 6.4–8.2)
Q-T INTERVAL, ECG07: 362 MS
QRS DURATION, ECG06: 80 MS
QTC CALCULATION (BEZET), ECG08: 454 MS
RBC # BLD AUTO: 3.76 M/UL (ref 4.2–5.3)
SODIUM SERPL-SCNC: 139 MMOL/L (ref 136–145)
TROPONIN I SERPL-MCNC: 0.02 NG/ML (ref 0–0.04)
VENTRICULAR RATE, ECG03: 95 BPM
WBC # BLD AUTO: 9.4 K/UL (ref 4.6–13.2)

## 2020-12-13 PROCEDURE — 82550 ASSAY OF CK (CPK): CPT

## 2020-12-13 PROCEDURE — 85379 FIBRIN DEGRADATION QUANT: CPT

## 2020-12-13 PROCEDURE — 85025 COMPLETE CBC W/AUTO DIFF WBC: CPT

## 2020-12-13 PROCEDURE — 93005 ELECTROCARDIOGRAM TRACING: CPT

## 2020-12-13 PROCEDURE — 94762 N-INVAS EAR/PLS OXIMTRY CONT: CPT

## 2020-12-13 PROCEDURE — 80053 COMPREHEN METABOLIC PANEL: CPT

## 2020-12-13 PROCEDURE — 71045 X-RAY EXAM CHEST 1 VIEW: CPT

## 2020-12-13 PROCEDURE — 99284 EMERGENCY DEPT VISIT MOD MDM: CPT

## 2020-12-13 PROCEDURE — 83880 ASSAY OF NATRIURETIC PEPTIDE: CPT

## 2020-12-13 PROCEDURE — 82962 GLUCOSE BLOOD TEST: CPT

## 2020-12-13 NOTE — ED NOTES
Pt. States she is ready to go home and doesn't want any further treatment, MD notified and he will Bäch discharge her.

## 2020-12-13 NOTE — ED NOTES
2:33 AM    Patient was informed by emergency physician that she needed further workup for adequate evaluation for her Foot wound, leg swelling, +d-dimer, and that by refusing the above, she is at risk for myocardial infarction, arrhythmia, sudden death, paralysis, further deterioration, coma. She is awake, alert, and she understands her condition and the risks involved in leaving. She is clinically aware of her surroundings and able to ask appropriate questions, the patients Self, neighbor and the nurse present confirms she is  able to make medical decisions. She verbalized knowing the risks and understood it was recommended that she stay and could also return at any time. her self was present for the discussion and also verbalized that they understood both diagnosis, risks and could return at any time. The patient was provided with warnings regarding worsening of her condition and were provided instructions to follow up with Luly Guerin MD tomorrow or return to the Emergency Room as soon as possible. This information was discussed by the Emergency Room Physician Dr. Maxim Arevalo and witnessed by Ramírez Marley.

## 2020-12-13 NOTE — ED PROVIDER NOTES
HPI patient is a 69-year-old female with diabetes. Presents to the ER with complaint of having left lower leg edema x 3 days. No fever, chills, SOB, trauma to her leg.     Past Medical History:   Diagnosis Date    Diabetes ([address]     HTN (hypertension)     Hyperlipidemia        Past Surgical History:   Procedure Laterality Date    HX CORONARY STENT PLACEMENT      HX GYN               Family History:   Problem Relation Age of Onset    Lupus Mother     Cancer Neg Hx     Diabetes Neg Hx     Heart Disease Neg Hx     Hypertension Neg Hx     Heart Attack Neg Hx     Stroke Neg Hx        Social History     Socioeconomic History    Marital status:      Spouse name: Not on file    Number of children: Not on file    Years of education: Not on file    Highest education level: Not on file   Occupational History    Not on file   Social Needs    Financial resource strain: Not on file    Food insecurity     Worry: Not on file     Inability: Not on file    Transportation needs     Medical: Not on file     Non-medical: Not on file   Tobacco Use    Smoking status: Never Smoker    Smokeless tobacco: Never Used   Substance and Sexual Activity    Alcohol use: No    Drug use: No    Sexual activity: Yes     Partners: Male   Lifestyle    Physical activity     Days per week: Not on file     Minutes per session: Not on file    Stress: Not on file   Relationships    Social connections     Talks on phone: Not on file     Gets together: Not on file     Attends Sikh service: Not on file     Active member of club or organization: Not on file     Attends meetings of clubs or organizations: Not on file     Relationship status: Not on file    Intimate partner violence     Fear of current or ex partner: Not on file     Emotionally abused: Not on file     Physically abused: Not on file     Forced sexual activity: Not on file   Other Topics Concern    Not on file   Social History Narrative    Not on file         ALLERGIES: Azithromycin and Pcn [penicillins]    Review of Systems   Constitutional: Negative. HENT: Negative. Eyes: Negative. Respiratory: Negative. Cardiovascular: Negative. Gastrointestinal: Negative. Endocrine: Negative. Genitourinary: Negative. Musculoskeletal: Negative. Left lower leg edema   Skin: Negative. Allergic/Immunologic: Negative. Neurological: Negative. Hematological: Negative. Psychiatric/Behavioral: Negative. All other systems reviewed and are negative. Vitals:    12/13/20 0050 12/13/20 0057 12/13/20 0100 12/13/20 0200   BP:  (!) 162/92 (!) 168/89 (!) 155/94   Pulse: (!) 110  97 93   Resp: 18  16 14   Temp: 98.2 °F (36.8 °C)      SpO2: 100% 100% 100% 100%   Weight: 100.7 kg (222 lb)      Height: 5' 7\" (1.702 m)               Physical Exam  Vitals signs and nursing note reviewed. Constitutional:       General: She is not in acute distress. Appearance: She is well-developed. She is not diaphoretic. HENT:      Head: Normocephalic. Right Ear: External ear normal.      Left Ear: External ear normal.      Mouth/Throat:      Pharynx: No oropharyngeal exudate. Eyes:      General: No scleral icterus. Right eye: No discharge. Left eye: No discharge. Conjunctiva/sclera: Conjunctivae normal.      Pupils: Pupils are equal, round, and reactive to light. Neck:      Musculoskeletal: Normal range of motion and neck supple. Thyroid: No thyromegaly. Vascular: No JVD. Trachea: No tracheal deviation. Cardiovascular:      Rate and Rhythm: Normal rate and regular rhythm. Heart sounds: Normal heart sounds. No murmur. No friction rub. No gallop. Pulmonary:      Effort: Pulmonary effort is normal. No respiratory distress. Breath sounds: Normal breath sounds. No stridor. No wheezing or rales. Chest:      Chest wall: No tenderness.    Abdominal:      General: Bowel sounds are normal. There is no distension. Palpations: Abdomen is soft. There is no mass. Tenderness: There is no abdominal tenderness. There is no guarding or rebound. Musculoskeletal: Normal range of motion. General: No tenderness. Comments: LEFT LOWER LEG: (+) edema with minimal tenderness, no palpable cord. Normal pulses and sensory. Lymphadenopathy:      Cervical: No cervical adenopathy. Skin:     General: Skin is warm and dry. Coloration: Skin is not pale. Findings: No erythema or rash. Neurological:      Mental Status: She is alert and oriented to person, place, and time. Cranial Nerves: No cranial nerve deficit. Motor: No abnormal muscle tone.       Coordination: Coordination normal.      Deep Tendon Reflexes: Reflexes normal.          MDM       Procedures    Dx: lower leg edema    Disp: patient eloped,

## 2020-12-14 ENCOUNTER — APPOINTMENT (OUTPATIENT)
Dept: CARDIAC REHAB | Age: 45
End: 2020-12-14
Payer: MEDICAID

## 2020-12-14 DIAGNOSIS — I44.1 MOBITZ (TYPE) I (WENCKEBACH'S) ATRIOVENTRICULAR BLOCK: ICD-10-CM

## 2020-12-16 ENCOUNTER — APPOINTMENT (OUTPATIENT)
Dept: CARDIAC REHAB | Age: 45
End: 2020-12-16
Payer: MEDICAID

## 2020-12-18 ENCOUNTER — APPOINTMENT (OUTPATIENT)
Dept: CARDIAC REHAB | Age: 45
End: 2020-12-18
Payer: MEDICAID

## 2020-12-18 ENCOUNTER — TELEPHONE (OUTPATIENT)
Dept: CARDIAC REHAB | Age: 45
End: 2020-12-18

## 2020-12-18 NOTE — TELEPHONE ENCOUNTER
12/18/2020 Cardiac Rehab: Called Ms. Chastity Mattson to discuss making a telehealth visit with Bubba Hu, dietician after receiving Ms. Alaniz's information from [facility]. Left voicemail message for patient to call us back.   Nam Perez

## 2020-12-21 ENCOUNTER — APPOINTMENT (OUTPATIENT)
Dept: CARDIAC REHAB | Age: 45
End: 2020-12-21
Payer: MEDICAID

## 2020-12-22 ENCOUNTER — TELEPHONE (OUTPATIENT)
Dept: CARDIAC REHAB | Age: 45
End: 2020-12-22

## 2020-12-22 NOTE — TELEPHONE ENCOUNTER
Cardiac Rehab called patient and spoke to her about her absences. She stated that she will return next Tuesday after the holiday.     Thank you,  Bubba Talley

## 2020-12-23 ENCOUNTER — APPOINTMENT (OUTPATIENT)
Dept: CARDIAC REHAB | Age: 45
End: 2020-12-23
Payer: MEDICAID

## 2020-12-24 ENCOUNTER — APPOINTMENT (OUTPATIENT)
Dept: CARDIAC REHAB | Age: 45
End: 2020-12-24
Payer: MEDICAID

## 2020-12-29 ENCOUNTER — TELEPHONE (OUTPATIENT)
Dept: CARDIAC REHAB | Age: 45
End: 2020-12-29

## 2021-01-05 ENCOUNTER — HOSPITAL ENCOUNTER (OUTPATIENT)
Dept: CARDIAC REHAB | Age: 46
Discharge: HOME | End: 2021-01-05
Payer: MEDICAID

## 2021-01-05 ENCOUNTER — TELEPHONE (OUTPATIENT)
Dept: CARDIAC REHAB | Age: 46
End: 2021-01-05

## 2021-01-05 PROCEDURE — 97802 MEDICAL NUTRITION INDIV IN: CPT | Performed by: DIETITIAN, REGISTERED

## 2021-01-05 NOTE — PROGRESS NOTES
Humberto Leon was informed of the inherent limitations of a virtual visit,  and has consented to a virtual therapy visit on 2021. Information regarding emergency contact information for this patient during this visit is to contact: n/a at n/a in addition to calling 911. The patient was informed that at any time during the virtual visit, they can decide to stop the virtual visit. The patient verified that they are physically located in the [facility] for this virtual visit. [address] NOTE  DATE: 2021      REFERRING PHYSICIAN: Crissy Daily DO    NAME: Humberto Leon : 1975 AGE: 41 y.o. GENDER: female    REASON FOR VISIT: diabetes    PAST MEDICAL HISTORY: CAD with MI and stent in , hypertension, hyperlipidemia    LABS:   Lab Results   Component Value Date/Time    Cholesterol, total 218 (H) 2020 01:00 PM    HDL Cholesterol 49 2020 01:00 PM    LDL, calculated 121 (H) 2020 01:00 PM    VLDL, calculated 48 (H) 2020 01:00 PM    Triglyceride 241 (H) 2020 01:00 PM     Lab Results   Component Value Date/Time    Hemoglobin A1c >14.0 (H) 10/20/2020 10:15 AM    Hemoglobin A1c (POC) 12.3 2019 09:49 AM     SMBG about twice a day inconsistently, stating her fingers are so numb sometimes she takes a break from checking. Last checked 2 days ago. MEDICATIONS/SUPPLEMENTS:   [unfilled]  Prior to Admission medications    Medication Sig Start Date End Date Taking? Authorizing Provider   carvediloL (Coreg) 6.25 mg tablet Take 1 Tab by mouth two (2) times daily (with meals). 12/3/20   Jose Matos MD   ticagrelor (BRILINTA) 90 mg tablet Take 1 Tab by mouth every twelve (12) hours every twelve (12) hours. 20   Rosalio Henderson NP   losartan (COZAAR) 50 mg tablet Take 1 Tab by mouth daily. 20   Jose Matos MD   atorvastatin (LIPITOR) 40 mg tablet Take 1 Tab by mouth daily.  10/24/20   Hardy Hernandez NP insulin glargine (LANTUS,BASAGLAR) 100 unit/mL (3 mL) inpn Take 45 units daily  Indications: type 2 diabetes mellitus 10/23/20   Jos Shaffer NP   aspirin 81 mg chewable tablet Take 1 Tab by mouth daily. Patient taking differently: Take 325 mg by mouth daily. 10/24/20   Jos Shaffer NP   metFORMIN ER (GLUCOPHAGE XR) 500 mg tablet Take 1 Tab by mouth daily (with dinner). Patient taking differently: Take 750 mg by mouth daily (with dinner). 0.5 tab daily 9/29/20   Vanita Fragoso MD   melatonin 5 mg cap capsule Take 1 Cap by mouth nightly. 9/29/20   Vanita Fragoso MD   Insulin Needles, Disposable, 31 gauge x 5/16\" ndle Use to check blood glucose BID 6/11/20   Ashley Calles MD   Insulin Syringe-Needle U-100 (INSULIN SYRINGE) 1 mL 30 gauge x 5/16 syrg As directed for lantus insulin 1/2/19   Herminia Barnett MD   ferrous sulfate 325 mg (65 mg iron) tablet Take 1 Tab by mouth Daily (before breakfast).  1/2/19   Herminia Barnett MD       EXERCISE/PHYSICAL ACTIVITY: attending cardiac rehab twice a week, she has a puppy she walks but states the puppy stops to sniff everything    ALCOHOL / TOBACCO USE: rare / occasional glass of wine, no tobacco use    SOCIAL HISTORY: , lives with her  and 2 grown children; she grocery shops but they all share in the preparation of meals    REPORTED WEIGHT HISTORY: fluctuates from 180 to 230 lbs, she states her weight changes might be tied into appetite changes related to her mood - when she is stressed she doesn't feel like eating        ANTHROPOMETRICS:    Ht Readings from Last 1 Encounters:   12/13/20 5' 7\" (1.702 m)      Wt Readings from Last 10 Encounters:   12/13/20 100.7 kg (222 lb)   12/08/20 101.6 kg (224 lb)   12/01/20 100.7 kg (222 lb)   11/24/20 100.2 kg (221 lb)   11/19/20 100.2 kg (221 lb)   11/10/20 99.3 kg (219 lb)   11/05/20 100.5 kg (221 lb 9.6 oz)   10/23/20 104.2 kg (229 lb 11.2 oz)   10/17/20 90.7 kg (200 lb) 02/03/20 91.6 kg (202 lb)      IBW:135 # +/- 10%  %IBW: 164 % +/- 10%    BMI: 34.8 kg/M2  Category: obese          NUTRITION ASSESSMENT:    FOOD ALLERGIES/INTOLERANCES: no known food allergies     24 Hour Diet Recall  Breakfast     Snack     Lunch  12 pm [facility] fried chicken sandwich, a bite of macaroni and cheese, [employer]eps[employer]   Snack     Dinner  6 pm 2 slices thin crust pepperoni pizza (frozen pizza)   Snack   Half of a dill pickle     Felipe Tracy states yesterday was not very typical.  Although she often just eats 2 meals she states she tries to cook more at home. Also the soda was highly unusual, she typically drinks water with maybe coffee or tea in the morning. A typical day might be grits or cereal with 2% milk for breakfast, sandwich for lunch, and a full course meal for dinner such as a hamburger christy, mashed potatoes and green beans. She does like fruit. Sometimes she snacks. Since her heart event she has been trying to eat less meat and more beans and vegetables. She also has been eating dinner earlier. She wants to learn how to balance her food choices to improve her A1c, lower her blood pressure, and lose weight. NUTRITION DIAGNOSIS:  Food and nutrition related knowledge deficit related to lack of nutrition education as evidenced by pt request for RD education and counseling. NUTRITION INTERVENTION:  Nutrition 60 minute one-on-one education & goal setting with [address] [facility] with Felipe Tracy relevant labs compared to ideals. Reviewed weight history and patient's verbalized weight goal as well as any real or perceived barriers to obtaining the goal. Collaborated with patient to set a specific short and long term weight goal.     Reviewed Rate Your Plate and conducted a verbal diet recall.   Assessed for environmental, financial, psychosocial, physical and comorbidities that may impact the food and eating patterns / behaviors of [id number] [place] [address] with patient to set specific nutrient goals as well as specific food / behavior changes that will help patient meet the overall goal of: improved A1c and weight loss    NUTRITION EDUCATION / HANDOUTS PROVIDED:  - Diabetes Plate  - Food and blood glucose log      PATIENT GOALS:    Weight Goals:    Short Term Weight Goal: 200 lbs  Long Term Weight Goal: 180 lbs    Nutrition Goals:   Walk, walk, walk (or dance, dance, dance)! Moving your body is a great way to lower blood sugars. Every day is best but at least do Tuesday (cardiac rehab), Thursday (cardiac rehab), and Saturday or Sunday (the same length and intensity as what you do at rehab - ask them if you need more information on how to do that at home without all the same equipment). On days you dont exercise still stay as active as you can. Sitting for long periods of time is not good for blood sugars.  Use the diabetes plate method to plan and plate your lunches & dinners.  Keep a log of when you eat, what you eat, and how much you eat. On that same piece of paper include your blood sugars (include at least the morning fasting level and bedtime level please). Ive attached a log you can use. In the notes section you might include information that might impact your eating or your blood sugars such as exercise, sleep and stress. Specific tips and techniques to facilitate compliance with above recommendations were provided and discussed. Terell Garnett verbalized understanding. The patient was encouraged to contact me as needed. Follow-up: recommended; benefits to be verified before scheduling              Sarita Collins RD, CDE  Terell Garnett is a 41 y.o. female being evaluated by a Virtual Visit (video visit) encounter to address concerns as mentioned above. A caregiver was present when appropriate.  Due to this being a TeleHealth encounter ([facility]), evaluation of the following areas was limited: Nutrition Focused Physical Exam. Pursuant to the emergency declaration under the [facility] [address] and the Coronavirus Preparedness and [employer] Act, this Virtual Visit was conducted with patient's (and/or legal guardian's) consent, to reduce the risk of exposure to COVID-19 and provide necessary medical care. Services were provided through a video synchronous discussion virtually to substitute for in-person encounter. --Adriel Mckenzie RD on 1/5/2021 at 10:30 AM    An electronic signature was used to authenticate this note.

## 2021-01-05 NOTE — TELEPHONE ENCOUNTER
Spoke with patient yesterday and scheduled a virtual visit for nutrition consultation for today, 1/5 at 9 a.m. Link to virtual visit was emailed to patient's verified email address and phone number provided to call back if email not received or she had difficulty with the link. When patient was a no show by 9:10 am for virtual visit I called patient's phone number and it went to voicemail. I left a message requesting patient call back to keep, reschedule, or cancel the appointment.

## 2021-01-08 ENCOUNTER — TELEPHONE (OUTPATIENT)
Dept: CARDIAC REHAB | Age: 46
End: 2021-01-08

## 2021-01-08 NOTE — TELEPHONE ENCOUNTER
1/8/2021 Cardiac Rehab: Called Ms. Maira Yusuf to schedule a follow up nutrition appointment with Carlos Palomo and discuss her benefits.  Mervin Thompson

## 2021-01-12 ENCOUNTER — HOSPITAL ENCOUNTER (OUTPATIENT)
Dept: CARDIAC REHAB | Age: 46
Discharge: HOME | End: 2021-01-12

## 2021-01-12 ENCOUNTER — TELEPHONE (OUTPATIENT)
Dept: CARDIAC REHAB | Age: 46
End: 2021-01-12

## 2021-01-12 NOTE — TELEPHONE ENCOUNTER
Cardiac Rehab called patient and left a message about her absences. I stated that due to several no call/no shows for appointments, she will be discharged at this time.     Thank you,  Scarlet Genao

## 2021-01-12 NOTE — PROGRESS NOTES
INCOMPLETE CARDIAC [facility] DISCHARGE FOR REVIEW AND SIGNATURE    Bryanna Schneiderms  41 y.o. With diagnosis of NSTEMI attended phase II cardiac rehab for 7 sessions. Patient is being discharged from the program due to lack of attendance.     Glynn Marin RN  1/12/2021

## 2021-01-25 ENCOUNTER — TELEPHONE (OUTPATIENT)
Dept: CARDIAC REHAB | Age: 46
End: 2021-01-25

## 2021-01-25 NOTE — TELEPHONE ENCOUNTER
1/25/2021 Cardiac Rehab: Called Ms. Alvin Michael to remind of telehealth nutrition appointment on Tuesday, 1/26/21. Left voicemail message and asked her to sign on at least 10 minutes prior to appointment to make sure link worked.   Cate Jean

## 2021-01-27 ENCOUNTER — DOCUMENTATION ONLY (OUTPATIENT)
Dept: PULMONOLOGY | Age: 46
End: 2021-01-27

## 2021-01-31 NOTE — PROGRESS NOTES
Consent: Marti Mclain, who was seen by synchronous (real-time) audio-video technology, and/or her healthcare decision maker, is aware that this patient-initiated, Telehealth encounter on 2/1/2021 is a billable service, with coverage as determined by her insurance carrier. She is aware that she may receive a bill and has provided verbal consent to proceed: Yes. The patient was at home and I was at the offices of the [address] [place] [address] no one else participated in the service. We will continue to follow along with endocrinology. Consider pharmacology follow-up also. ICD-10-CM ICD-9-CM    1. Screening for cholesterol level  Z13.220 V77.91 atorvastatin (LIPITOR) 80 mg tablet   2. Type 2 diabetes mellitus with complication (HCC)  Q72.2 250.90 HEMOGLOBIN A1C WITH EAG      MICROALBUMIN, UR, RAND W/ MICROALB/CREAT RATIO      insulin lispro (HUMALOG) 100 unit/mL injection    Poor control on last visit. Setting up for insulin pump. Follow-up labs ordered. 3. Diabetic ulcer of left midfoot associated with type 2 diabetes mellitus, with fat layer exposed ([facility] [address]  E11.621 250.80     L97.422 707.14    4. Encounter for immunization  Z23 V03.89 pneumococcal 23-valent (PNEUMOVAX 23) 25 mcg/0.5 mL injection      diph,Pertuss,Acell,,Tet Vac-PF (ADACEL) 2 Lf-(2.5-5-3-5 mcg)-5Lf/0.5 mL susp    We discussed pros and cons of immunizations and she agrees. 5. Microalbuminuria  R80.9 791.0     Follow-up labs ordered. Chronic problem. 6. Vaginal candidiasis  B37.3 112.1 fluconazole (Diflucan) 150 mg tablet    Present for 2 days. Will order Diflucan. 7. Essential hypertension  I10 401.9 losartan (COZAAR) 100 mg tablet    This is a chronic problem that is not controlled. The plan is to increase the losartan to 100 mg daily.   Follow         lab results and schedule of future lab studies reviewed with patient  Diagnostic and radiologic results and the schedule of future studies were reviewed with the patient  reviewed diet, exercise and weight control       Health Maintenance Due   Topic Date Due    Pneumococcal 0-64 years (1 of 1 - PPSV23) 01/21/1982    COVID-19 Vaccine (1 of 2) 01/21/1992    DTaP/Tdap/Td series (1 - Tdap) 01/21/1997    PAP AKA CERVICAL CYTOLOGY  01/21/1997    Flu Vaccine (1) 09/01/2020    Foot Exam Q1  10/29/2020    A1C test (Diabetic or Prediabetic)  01/20/2021         Subjective:   Winsome Ahmadi is a 40 y.o. female who is being seen in follow-up. .The patient has Acute bronchitis, Hyperglycemia, Diabetic foot infection ([address] Right foot infection, Acute pain of right foot, Insulin dependent diabetes mellitus, HTN (hypertension), Fever, NSTEMI (non-ST elevated myocardial infarction) ([address] and Severe obesity ([address] on their problem list.. She was seen in September 2020 for essential hypertension and Cozaar 100 mg daily was prescribed as she was not in control. The patient had type 2 diabetes mellitus with complication that was not in control. We restarted the Metformin  mg daily in addition to the Lantus 40 units daily. The patient had microalbuminuria and was hoped that the Cozaar would help. She had the complication of right toe ulcer. She was screened for cholesterol. Her carpal tunnel problem was quiesced sent at that time. She had trouble sleeping and for this reason melatonin was started. Immunizations were ordered. Ulcer left foot  The patient developed a new ulcer on the ball of the left foot over the plantar MTP area. She is followed by podiatry. Its been present for 1 month. At this point time no pressure is allowed on the foot. Is not bandaged at this time. She also has some discoloration of the left fifth toe. She states that she is seeing a new podiatrist.  She does not remember his exact name but thinks She will have them send the reports here. She is due for follow-up again on February 12. She was also sent for special orthotic shoes.   Diabetes mellitus type II  The patient denies polyuria, polydipsia, or polyphagia. There has been no problem with the medications. She is seeing a specialist and has been set up for an insulin pump. She will be taught how to use it on February 10, 2021. She is due for labs today. She has a complication of a left diabetic foot ulcer. body mass index is unknown because there is no height or weight on file. Lab Results   Component Value Date/Time    Hemoglobin A1c >14.0 (H) 10/20/2020 10:15 AM    Hemoglobin A1c (POC) 12.3 03/28/2019 09:49 AM     Key Antihyperglycemic Medications             insulin glargine (LANTUS,BASAGLAR) 100 unit/mL (3 mL) inpn Take 45 units daily  Indications: type 2 diabetes mellitus    metFORMIN ER (GLUCOPHAGE XR) 500 mg tablet Take 1 Tab by mouth daily (with dinner). Hypertension  The patient has had no problem with the medication. The patient has no headaches, visual changes, chest pain or pressure,dyspnea, orthopnea, abdominal pain, dysuria, weakness, or paresthesias. BP Readings from Last 3 Encounters:   12/13/20 (!) 155/94   11/05/20 (!) 167/95   10/23/20 (!) 135/115     EF %: 55 % (12/31/2020  1:04 PM)    10/19/20   ECHO ADULT FOLLOW-UP OR LIMITED 10/20/2020 10/20/2020    Narrative · LV: Estimated LVEF is 55 - 60%. Visually measured ejection fraction. Normal cavity size, wall thickness and systolic function (ejection   fraction normal). Wall motion: normal.  · COVID r/o        Signed by: Michael Fernández MD     [facility] CAD CHF Meds             carvediloL (Coreg) 6.25 mg tablet Take 1 Tab by mouth two (2) times daily (with meals). ticagrelor (BRILINTA) 90 mg tablet Take 1 Tab by mouth every twelve (12) hours every twelve (12) hours. losartan (COZAAR) 50 mg tablet Take 1 Tab by mouth daily. atorvastatin (LIPITOR) 40 mg tablet Take 1 Tab by mouth daily. aspirin 81 mg chewable tablet Take 1 Tab by mouth daily.         S/p cardiac stent  The patient presently does not admit to any chest pain pressure palpitations nausea or diaphoresis. No new swelling of the lower extremities is a submitted to. She is due to follow-up with cardiology on February 8, 2021. The patient is s/p cardiac cath October 23, 2020. Results are as follows \"  100% mid RCA occlusion with thrombus. S/p aspiration thrombectomy followed by ptca/stent ( 2 BLANCA stents placed   overlapping extending from mid RCA to distal RCA). Mild mid LAD stenosis. Normal LV function by echo. TPM inserted via right femoral vein due to complete heart block. Plan:  Continue DAPT for 1 yr/ high dose statin. Post PCI developed juctional tachycardia. Will discontinue TPM. Monitor   and consider PPM if needed. Recommend intense risk factor modification. \"  Chronic kidney disease  The patient has chronic kidney disease stage 3A. There is no polyuria polydipsia or hematuria admitted to. She is followed by specialist.  Lab Results   Component Value Date/Time    Creatinine, POC 0.7 12/27/2014 05:54 AM    Creatinine 1.24 12/13/2020 01:12 AM     Lab Results   Component Value Date/Time    BUN 14 12/13/2020 01:12 AM    BUN, POC 6 (L) 12/27/2014 05:54 AM       Hyperlipidemia  The patient has had no problem with the medications  The patient denies muscle aches, headache, GI symptoms or difficulty with mentation. Key Antihyperlipidemia Meds             atorvastatin (LIPITOR) 40 mg tablet Take 1 Tab by mouth daily. Lab Results   Component Value Date/Time    Cholesterol, total 218 (H) 09/28/2020 01:00 PM    HDL Cholesterol 49 09/28/2020 01:00 PM    LDL, calculated 121 (H) 09/28/2020 01:00 PM    VLDL, calculated 48 (H) 09/28/2020 01:00 PM    Triglyceride 241 (H) 09/28/2020 01:00 PM       Current Outpatient Medications   Medication Sig    carvediloL (Coreg) 6.25 mg tablet Take 1 Tab by mouth two (2) times daily (with meals).  ticagrelor (BRILINTA) 90 mg tablet Take 1 Tab by mouth every twelve (12) hours every twelve (12) hours.     losartan (COZAAR) 50 mg tablet Take 1 Tab by mouth daily.  atorvastatin (LIPITOR) 40 mg tablet Take 1 Tab by mouth daily.  insulin glargine (LANTUS,BASAGLAR) 100 unit/mL (3 mL) inpn Take 45 units daily  Indications: type 2 diabetes mellitus    aspirin 81 mg chewable tablet Take 1 Tab by mouth daily. (Patient taking differently: Take 325 mg by mouth daily.)    metFORMIN ER (GLUCOPHAGE XR) 500 mg tablet Take 1 Tab by mouth daily (with dinner). (Patient taking differently: Take 750 mg by mouth daily (with dinner). 0.5 tab daily)    melatonin 5 mg cap capsule Take 1 Cap by mouth nightly.  Insulin Needles, Disposable, 31 gauge x 5/16\" ndle Use to check blood glucose BID    Insulin Syringe-Needle U-100 (INSULIN SYRINGE) 1 mL 30 gauge x 5/16 syrg As directed for lantus insulin    ferrous sulfate 325 mg (65 mg iron) tablet Take 1 Tab by mouth Daily (before breakfast). No current facility-administered medications for this visit. Allergies   Allergen Reactions    Azithromycin Other (comments)     Rapid response was called for bradycardia and hypotension     Pcn [Penicillins] Hives     has Acute bronchitis, Hyperglycemia, Diabetic foot infection ([address] Right foot infection, Acute pain of right foot, Insulin dependent diabetes mellitus, HTN (hypertension), Fever, NSTEMI (non-ST elevated myocardial infarction) ([address] and Severe obesity ([address] on their problem list.  Past Surgical History:   Procedure Laterality Date    HX CORONARY STENT PLACEMENT      HX GYN           Relationships   Social connections    Talks on phone: Not on file    Gets together: Not on file    Attends Jewish service: Not on file    Active member of club or organization: Not on file    Attends meetings of clubs or organizations: Not on file    Relationship status: Not on file     family history includes Lupus in her mother. Review of Systems   Constitutional: Negative for chills, fever and malaise/fatigue.    HENT: Negative for congestion and sore throat. Eyes: Negative for blurred vision and redness. Respiratory: Negative for cough, shortness of breath and wheezing. Cardiovascular: Positive for leg swelling. Negative for chest pain. Gastrointestinal: Negative for abdominal pain, blood in stool, constipation, diarrhea and heartburn. Genitourinary: Negative for dysuria and urgency. Musculoskeletal: Negative for joint pain and myalgias. Skin:        The blood blister is no longer present. The primary problem is a stage II ulcer on the ball of the left foot. There appears to be some surrounding 1+ edema up to the ankles and legs. Neurological: Negative for dizziness, sensory change, speech change and focal weakness. Endo/Heme/Allergies: Does not bruise/bleed easily. Psychiatric/Behavioral: Positive for depression. Negative for suicidal ideas. The patient has insomnia (New problem for 3 weeks. Also has a history of RODNEY not treated. The patient is also depressed. ). The patient is not nervous/anxious. Physical Exam  Constitutional:       General: She is not in acute distress. HENT:      Right Ear: External ear normal.      Left Ear: External ear normal.      Mouth/Throat:      Mouth: Mucous membranes are moist.      Pharynx: Oropharynx is clear. Eyes:      General: No scleral icterus. Pupils: Pupils are equal, round, and reactive to light. Neck:      Musculoskeletal: No neck rigidity. Pulmonary:      Effort: Pulmonary effort is normal.   Musculoskeletal:         General: Swelling (Trace pitting edema both lower extremities per the patient presses on it) present. Right lower leg: No edema. Left lower leg: No edema. Skin:     Coloration: Skin is not pale. Findings: No erythema or lesion (There is a stage II ulcer on the ball of the  foot. With no drainage there is a healed ulcer on the dorsum of the right great toe, Stage one left fithe toe skin lesion. ).    Neurological:      Mental Status: She is alert and oriented to person, place, and time. Gait: Gait normal.   Psychiatric:         Mood and Affect: Mood normal.         Behavior: Behavior normal.         Thought Content: Thought content normal.        Results for orders placed or performed during the hospital encounter of 12/13/20   CBC WITH AUTOMATED DIFF   Result Value Ref Range    WBC 9.4 4.6 - 13.2 K/uL    RBC 3.76 (L) 4.20 - 5.30 M/uL    HGB 8.3 (L) 12.0 - 16.0 g/dL    HCT 26.5 (L) 35.0 - 45.0 %    MCV 70.5 (L) 74.0 - 97.0 FL    MCH 22.1 (L) 24.0 - 34.0 PG    MCHC 31.3 31.0 - 37.0 g/dL    RDW 15.4 (H) 11.6 - 14.5 %    PLATELET 918 537 - 917 K/uL    MPV 10.5 9.2 - 11.8 FL    NEUTROPHILS 62 40 - 73 %    LYMPHOCYTES 28 21 - 52 %    MONOCYTES 8 3 - 10 %    EOSINOPHILS 2 0 - 5 %    BASOPHILS 0 0 - 2 %    ABS. NEUTROPHILS 5.9 1.8 - 8.0 K/UL    ABS. LYMPHOCYTES 2.7 0.9 - 3.6 K/UL    ABS. MONOCYTES 0.7 0.05 - 1.2 K/UL    ABS. EOSINOPHILS 0.2 0.0 - 0.4 K/UL    ABS. BASOPHILS 0.0 0.0 - 0.1 K/UL    DF AUTOMATED     METABOLIC PANEL, COMPREHENSIVE   Result Value Ref Range    Sodium 139 136 - 145 mmol/L    Potassium 4.0 3.5 - 5.5 mmol/L    Chloride 110 100 - 111 mmol/L    CO2 23 21 - 32 mmol/L    Anion gap 6 3.0 - 18 mmol/L    Glucose 314 (H) 74 - 99 mg/dL    BUN 14 7.0 - 18 MG/DL    Creatinine 1.24 0.6 - 1.3 MG/DL    BUN/Creatinine ratio 11 (L) 12 - 20      GFR est AA 57 (L) >60 ml/min/1.73m2    GFR est non-AA 47 (L) >60 ml/min/1.73m2    Calcium 7.8 (L) 8.5 - 10.1 MG/DL    Bilirubin, total 0.2 0.2 - 1.0 MG/DL    ALT (SGPT) 49 13 - 56 U/L    AST (SGOT) 42 (H) 10 - 38 U/L    Alk.  phosphatase 197 (H) 45 - 117 U/L    Protein, total 5.9 (L) 6.4 - 8.2 g/dL    Albumin 1.9 (L) 3.4 - 5.0 g/dL    Globulin 4.0 2.0 - 4.0 g/dL    A-G Ratio 0.5 (L) 0.8 - 1.7     NT-PRO BNP   Result Value Ref Range    NT pro- (H) 0 - 450 PG/ML   CARDIAC PANEL,(CK, CKMB & TROPONIN)   Result Value Ref Range    CK - MB 1.8 <3.6 ng/ml    CK-MB Index 0.6 0.0 - 4.0 %    CK 289 (H) 26 - 192 U/L    Troponin-I, QT 0.02 0.0 - 0.045 NG/ML   D DIMER   Result Value Ref Range    D DIMER 0.58 (H) <0.46 ug/ml(FEU)   GLUCOSE, POC   Result Value Ref Range    Glucose (POC) 338 (H) 70 - 110 mg/dL   EKG, 12 LEAD, INITIAL   Result Value Ref Range    Ventricular Rate 95 BPM    Atrial Rate 95 BPM    P-R Interval 138 ms    QRS Duration 80 ms    Q-T Interval 362 ms    QTC Calculation (Bezet) 454 ms    Calculated P Axis 59 degrees    Calculated R Axis 51 degrees    Calculated T Axis 19 degrees    Diagnosis       Normal sinus rhythm  Possible Left atrial enlargement  Borderline ECG  When compared with ECG of 23-OCT-2020 07:13,  Sinus rhythm is no longer with 2nd degree AV block (Mobitz I)  Non-specific change in ST segment in Lateral leads  Nonspecific T wave abnormality no longer evident in Lateral leads  Confirmed by Marjan Perez ([id number]) on 12/13/2020 8:44:40 PM               Results from Hospital Encounter encounter on 10/19/20   CTA CHEST W OR W WO CONT    Impression IMPRESSION:  There is no pulmonary embolism. Incidental note is made of 3 mm right middle lobe pulmonary nodule. If the  patient has a significant smoking history or is otherwise at high risk for  development of lung cancer, an optional follow-up chest CT may be obtained in  one year. If there are no significant risk factors for the development of lung  cancer, no specific follow-up is necessary. Possible 1.8 send a left thyroid nodule. Consider follow-up thyroid ultrasound,  nonemergent. Results from [place] encounter on 03/04/19   CT HEAD WO CONT    Impression IMPRESSION: Unremarkable noncontrast head CT. CTA ABD ART W RUNOFF W WO CONT    Impression IMPRESSION:    No evidence for an aneurysm or dissection in the aorta. No narrowing of the major branches of the abdominal aorta. Normal iliac arteries bilaterally. No evidence for peripheral arterial disease. Lanienn Magdenise            Results for orders placed or performed during the hospital encounter of 12/13/20   EKG, 12 LEAD, INITIAL   Result Value Ref Range    Ventricular Rate 95 BPM    Atrial Rate 95 BPM    P-R Interval 138 ms    QRS Duration 80 ms    Q-T Interval 362 ms    QTC Calculation (Bezet) 454 ms    Calculated P Axis 59 degrees    Calculated R Axis 51 degrees    Calculated T Axis 19 degrees    Diagnosis       Normal sinus rhythm  Possible Left atrial enlargement  Borderline ECG  When compared with ECG of 23-OCT-2020 07:13,  Sinus rhythm is no longer with 2nd degree AV block (Mobitz I)  Non-specific change in ST segment in Lateral leads  Nonspecific T wave abnormality no longer evident in Lateral leads  Confirmed by [facility] ([id number]) on 12/13/2020 8:44:40 PM             We discussed the expected course, resolution and complications of the diagnosis(es) in detail. Medication risks, benefits, costs, interactions, and alternatives were discussed as indicated. I advised her to contact the office if her condition worsens, changes or fails to improve as anticipated. She expressed understanding with the diagnosis(es) and plan. William Chavis is a 40 y.o. female being evaluated by a video visit encounter for concerns as above. A caregiver was present when appropriate. Due to this being a TeleHealth encounter (During [facility]- public [facility] emergency), evaluation of the following organ systems was limited: Vitals/Constitutional/EENT/Resp/CV/GI//MS/Neuro/Skin/Heme-Lymph-Imm. Pursuant to the emergency declaration under the [facility], [facility] waiver authority and the [employer] and [employer] General Act, this Virtual  Visit was conducted, with patient's (and/or legal guardian's) consent, to reduce the patient's risk of exposure to COVID-19 and provide necessary medical care. This note was done with the assistance of dragon speech software.   Some inadvertent errors or omissions may be present

## 2021-02-01 ENCOUNTER — TELEPHONE (OUTPATIENT)
Dept: FAMILY MEDICINE CLINIC | Age: 46
End: 2021-02-01

## 2021-02-01 ENCOUNTER — VIRTUAL VISIT (OUTPATIENT)
Dept: FAMILY MEDICINE CLINIC | Age: 46
End: 2021-02-01
Payer: MEDICAID

## 2021-02-01 ENCOUNTER — HOSPITAL ENCOUNTER (OUTPATIENT)
Dept: LAB | Age: 46
Discharge: HOME | End: 2021-02-01

## 2021-02-01 DIAGNOSIS — Z13.220 SCREENING FOR CHOLESTEROL LEVEL: Primary | ICD-10-CM

## 2021-02-01 DIAGNOSIS — Z23 ENCOUNTER FOR IMMUNIZATION: ICD-10-CM

## 2021-02-01 DIAGNOSIS — L97.422 DIABETIC ULCER OF LEFT MIDFOOT ASSOCIATED WITH TYPE 2 DIABETES MELLITUS, WITH FAT LAYER EXPOSED (HCC): ICD-10-CM

## 2021-02-01 DIAGNOSIS — E11.8 TYPE 2 DIABETES MELLITUS WITH COMPLICATION (HCC): ICD-10-CM

## 2021-02-01 DIAGNOSIS — E11.621 DIABETIC ULCER OF LEFT MIDFOOT ASSOCIATED WITH TYPE 2 DIABETES MELLITUS, WITH FAT LAYER EXPOSED (HCC): ICD-10-CM

## 2021-02-01 DIAGNOSIS — I10 ESSENTIAL HYPERTENSION: ICD-10-CM

## 2021-02-01 DIAGNOSIS — R80.9 MICROALBUMINURIA: ICD-10-CM

## 2021-02-01 DIAGNOSIS — B37.31 VAGINAL CANDIDIASIS: ICD-10-CM

## 2021-02-01 LAB — SENTARA SPECIMEN COL,SENBCF: NORMAL

## 2021-02-01 PROCEDURE — 99001 SPECIMEN HANDLING PT-LAB: CPT

## 2021-02-01 PROCEDURE — 99215 OFFICE O/P EST HI 40 MIN: CPT | Performed by: EMERGENCY MEDICINE

## 2021-02-01 RX ORDER — LOSARTAN POTASSIUM 100 MG/1
100 TABLET ORAL DAILY
Qty: 90 TAB | Refills: 3 | Status: SHIPPED | OUTPATIENT
Start: 2021-02-01 | End: 2021-05-21

## 2021-02-01 RX ORDER — FLUCONAZOLE 150 MG/1
150 TABLET ORAL
Qty: 1 TAB | Refills: 0 | Status: SHIPPED | OUTPATIENT
Start: 2021-02-01 | End: 2021-02-01

## 2021-02-01 RX ORDER — ATORVASTATIN CALCIUM 80 MG/1
80 TABLET, FILM COATED ORAL DAILY
Qty: 90 TAB | Refills: 3 | Status: SHIPPED | OUTPATIENT
Start: 2021-02-01 | End: 2021-04-12 | Stop reason: ALTCHOICE

## 2021-02-01 RX ORDER — INSULIN LISPRO 100 [IU]/ML
INJECTION, SOLUTION INTRAVENOUS; SUBCUTANEOUS
Qty: 1 VIAL | Refills: 11
Start: 2021-02-01 | End: 2021-04-12 | Stop reason: SDUPTHER

## 2021-02-08 ENCOUNTER — OFFICE VISIT (OUTPATIENT)
Dept: CARDIOLOGY CLINIC | Age: 46
End: 2021-02-08
Payer: MEDICAID

## 2021-02-08 VITALS
TEMPERATURE: 97.7 F | HEIGHT: 66 IN | BODY MASS INDEX: 34.39 KG/M2 | WEIGHT: 214 LBS | SYSTOLIC BLOOD PRESSURE: 132 MMHG | DIASTOLIC BLOOD PRESSURE: 82 MMHG | OXYGEN SATURATION: 100 % | HEART RATE: 105 BPM

## 2021-02-08 DIAGNOSIS — I25.10 ATHEROSCLEROSIS OF NATIVE CORONARY ARTERY OF NATIVE HEART WITHOUT ANGINA PECTORIS: Primary | ICD-10-CM

## 2021-02-08 DIAGNOSIS — Z95.5 HISTORY OF CORONARY ARTERY STENT PLACEMENT: ICD-10-CM

## 2021-02-08 DIAGNOSIS — I44.1 MOBITZ (TYPE) I (WENCKEBACH'S) ATRIOVENTRICULAR BLOCK: ICD-10-CM

## 2021-02-08 DIAGNOSIS — E78.00 HYPERCHOLESTEREMIA: ICD-10-CM

## 2021-02-08 DIAGNOSIS — I10 ESSENTIAL HYPERTENSION: ICD-10-CM

## 2021-02-08 DIAGNOSIS — E66.01 SEVERE OBESITY (HCC): ICD-10-CM

## 2021-02-08 DIAGNOSIS — I50.33 ACUTE ON CHRONIC DIASTOLIC CONGESTIVE HEART FAILURE (HCC): ICD-10-CM

## 2021-02-08 PROCEDURE — 99215 OFFICE O/P EST HI 40 MIN: CPT | Performed by: INTERNAL MEDICINE

## 2021-02-08 RX ORDER — FUROSEMIDE 20 MG/1
20 TABLET ORAL
Qty: 30 TAB | Refills: 1 | Status: SHIPPED | OUTPATIENT
Start: 2021-02-08 | End: 2021-10-13

## 2021-02-08 NOTE — PATIENT INSTRUCTIONS
There are no discontinued medications. Learning About the [address] [facility] [employer] Diet  What is the Mediterranean diet? The Mediterranean diet is a style of eating rather than a diet plan. It features foods eaten in [place], [place], [place] and [place], and other countries along the [facility]. It emphasizes eating foods like fish, fruits, vegetables, beans, high-fiber breads and whole grains, nuts, and olive oil. This style of eating includes limited red meat, cheese, and sweets. Why choose the Mediterranean diet? A Mediterranean-style diet may improve heart health. It contains more fat than other heart-healthy diets. But the fats are mainly from nuts, unsaturated oils (such as fish oils and olive oil), and certain nut or seed oils (such as canola, soybean, or flaxseed oil). These fats may help protect the heart and blood vessels. How can you get started on the Mediterranean diet? Here are some things you can do to switch to a more Mediterranean way of eating. What to eat  · Eat a variety of fruits and vegetables each day, such as grapes, blueberries, tomatoes, broccoli, peppers, figs, olives, spinach, eggplant, beans, lentils, and chickpeas. · Eat a variety of whole-grain foods each day, such as oats, brown rice, and whole wheat bread, pasta, and couscous. · Eat fish at least 2 times a week. Try tuna, salmon, mackerel, lake trout, herring, or sardines. · Eat moderate amounts of low-fat dairy products, such as milk, cheese, or yogurt. · Eat moderate amounts of poultry and eggs. · Choose healthy (unsaturated) fats, such as nuts, olive oil, and certain nut or seed oils like canola, soybean, and flaxseed. · Limit unhealthy (saturated) fats, such as butter, palm oil, and coconut oil. And limit fats found in animal products, such as meat and dairy products made with whole milk. Try to eat red meat only a few times a month in very small amounts.   · Limit sweets and desserts to only a few times a week. This includes sugar-sweetened drinks like soda. The Mediterranean diet may also include red wine with your meal--1 glass each day for women and up to 2 glasses a day for men. Tips for eating at home  · Use herbs, spices, garlic, lemon zest, and citrus juice instead of salt to add flavor to foods. · Add avocado slices to your sandwich instead of ward. · Have fish for lunch or dinner instead of red meat. Brush the fish with olive oil, and broil or grill it. · Sprinkle your salad with seeds or nuts instead of cheese. · Cook with olive or canola oil instead of butter or oils that are high in saturated fat. · Switch from 2% milk or whole milk to 1% or fat-free milk. · Dip raw vegetables in a vinaigrette dressing or hummus instead of dips made from mayonnaise or sour cream.  · Have a piece of fruit for dessert instead of a piece of cake. Try baked apples, or have some dried fruit. Tips for eating out  · Try broiled, grilled, baked, or poached fish instead of having it fried or breaded. · Ask your  to have your meals prepared with olive oil instead of butter. · Order dishes made with marinara sauce or sauces made from olive oil. Avoid sauces made from cream or mayonnaise. · Choose whole-grain breads, whole wheat pasta and pizza crust, brown rice, beans, and lentils. · Cut back on butter or margarine on bread. Instead, you can dip your bread in a small amount of olive oil. · Ask for a side salad or grilled vegetables instead of french fries or chips. Where can you learn more? Go to http://www.lizama.com/  Enter O407 in the search box to learn more about \"Learning About the Mediterranean Diet. \"  Current as of: August 22, 2019               Content Version: 12.6  © 2167-0079 [employer]. Care instructions adapted under license by [employer] (which disclaims liability or warranty for this information).  If you have questions about a medical condition or this instruction, always ask your healthcare professional. [place] [address] any warranty or liability for your use of this information.

## 2021-02-08 NOTE — PROGRESS NOTES
HISTORY OF PRESENT ILLNESS  Wyvonnia Cockayne is a 40 y.o. female. 11/20 follow-up of non-STEMI status post PCI with BLANCA in RCA, hypertension, hyperlipidemia, obesity, diabetes        Shortness of Breath  The history is provided by the patient. This is a new problem. The problem occurs intermittently. The current episode started more than 1 week ago (Early 2020). The problem has been gradually improving. Associated symptoms include leg swelling. Pertinent negatives include no fever, no headaches, no cough, no wheezing, no PND, no orthopnea, no chest pain, no vomiting, no rash and no claudication. The problem's precipitants include exercise (100 ft; 2/21 steps- 4 steps on stairs; ). Leg Swelling  The history is provided by the patient. This is a recurrent problem. The current episode started more than 1 week ago (2/20). The problem occurs every several days. Progression since onset: Since hospital discharge in 10/20. Associated symptoms include shortness of breath. Pertinent negatives include no chest pain and no headaches. Hypertension  The history is provided by the medical records. This is a chronic problem. Associated symptoms include shortness of breath. Pertinent negatives include no chest pain and no headaches.      Allergies   Allergen Reactions    Azithromycin Other (comments)     Rapid response was called for bradycardia and hypotension     Pcn [Penicillins] Hives       Past Medical History:   Diagnosis Date    Diabetes ([address]     HTN (hypertension)     Hyperlipidemia        Family History   Problem Relation Age of Onset    Lupus Mother     Cancer Neg Hx     Diabetes Neg Hx     Heart Disease Neg Hx     Hypertension Neg Hx     Heart Attack Neg Hx     Stroke Neg Hx        Social History     Tobacco Use    Smoking status: Never Smoker    Smokeless tobacco: Never Used   Substance Use Topics    Alcohol use: No    Drug use: No        Current Outpatient Medications   Medication Sig    insulin lispro (HUMALOG) 100 unit/mL injection For use in the Insulin pump    atorvastatin (LIPITOR) 80 mg tablet Take 1 Tab by mouth daily.  losartan (COZAAR) 100 mg tablet Take 1 Tab by mouth daily.  carvediloL (Coreg) 6.25 mg tablet Take 1 Tab by mouth two (2) times daily (with meals).  ticagrelor (BRILINTA) 90 mg tablet Take 1 Tab by mouth every twelve (12) hours every twelve (12) hours.  insulin glargine (LANTUS,BASAGLAR) 100 unit/mL (3 mL) inpn Take 45 units daily  Indications: type 2 diabetes mellitus    aspirin 81 mg chewable tablet Take 1 Tab by mouth daily. (Patient taking differently: Take 325 mg by mouth daily.)    metFORMIN ER (GLUCOPHAGE XR) 500 mg tablet Take 1 Tab by mouth daily (with dinner). (Patient taking differently: Take 750 mg by mouth daily (with dinner). 0.5 tab daily)    melatonin 5 mg cap capsule Take 1 Cap by mouth nightly.  Insulin Needles, Disposable, 31 gauge x 5/16\" ndle Use to check blood glucose BID    Insulin Syringe-Needle U-100 (INSULIN SYRINGE) 1 mL 30 gauge x 5/16 syrg As directed for lantus insulin    ferrous sulfate 325 mg (65 mg iron) tablet Take 1 Tab by mouth Daily (before breakfast). No current facility-administered medications for this visit. Past Surgical History:   Procedure Laterality Date    HX CORONARY STENT PLACEMENT      HX GYN             Visit Vitals  /82 (BP 1 Location: Left upper arm, BP Patient Position: Sitting, BP Cuff Size: Adult)   Pulse (!) 105   Temp 97.7 °F (36.5 °C) (Temporal)   Ht 5' 6\" (1.676 m)   Wt 97.1 kg (214 lb)   SpO2 100%   BMI 34.54 kg/m²       Diagnostic Studies:  I have reviewed the relevant tests done on the patient and show as follows  EKG tracings reviewed by me today.     EKG Results     None        XR Results (most recent):  Results from Hospital Encounter encounter on 20   XR CHEST PORT    Narrative EXAM: CHEST PORTABLE     CLINICAL HISTORY/INDICATION: r/o CHF, shortness of breath    COMPARISON: 10/22/2020. TECHNIQUE: Portable AP view was obtained. FINDINGS:     LINES/DEVICES: EKG leads overlie the patient. HEART/MEDIASTINUM: The cardiomediastinal silhouette is unremarkable in size. LUNGS: No focal airspace opacity suggestive of pneumonia, pulmonary edema,  pneumothorax, or pleural effusion. SOFT TISSUES: Unremarkable. BONES: Unremarkable for age. Impression IMPRESSION:  1. No radiographic evidence of acute cardiopulmonary disease. Thank you for enabling us to participate in the care of this patient. 10/19/20   ECHO ADULT FOLLOW-UP OR LIMITED 10/20/2020 10/20/2020    Narrative · LV: Estimated LVEF is 55 - 60%. Visually measured ejection fraction. Normal cavity size, wall thickness and systolic function (ejection   fraction normal). Wall motion: normal.  · COVID r/o        Signed by: Olivia Enriquez MD          10/19/20   CARDIAC PROCEDURE 10/23/2020 10/23/2020    Narrative 100% mid RCA occlusion with thrombus. S/p aspiration thrombectomy followed by ptca/stent ( 2 BLANCA stents placed   overlapping extending from mid RCA to distal RCA). Mild mid LAD stenosis. Normal LV function by echo. TPM inserted via right femoral vein due to complete heart block. Plan:  Continue DAPT for 1 yr/ high dose statin. Post PCI developed juctional tachycardia. Will discontinue TPM. Monitor   and consider PPM if needed. Recommend intense risk factor modification. Signed by: Sheyla Jang MD           Ms. Keren Adler has a reminder for a \"due or due soon\" health maintenance. I have asked that she contact her primary care provider for follow-up on this health maintenance. Review of Systems   Constitutional: Negative for chills, fever, malaise/fatigue and weight loss. HENT: Negative for nosebleeds. Eyes: Negative for discharge. Respiratory: Positive for shortness of breath. Negative for cough and wheezing.     Cardiovascular: Positive for leg swelling. Negative for chest pain, palpitations, orthopnea, claudication and PND. Gastrointestinal: Negative for diarrhea, nausea and vomiting. Genitourinary: Negative for dysuria and hematuria. Musculoskeletal: Negative for joint pain. Skin: Negative for rash. Neurological: Negative for dizziness, seizures, loss of consciousness and headaches. Endo/Heme/Allergies: Negative for polydipsia. Does not bruise/bleed easily. Psychiatric/Behavioral: Negative for depression and substance abuse. The patient does not have insomnia. 10/19/20   ECHO ADULT FOLLOW-UP OR LIMITED 10/20/2020 10/20/2020    Narrative · LV: Estimated LVEF is 55 - 60%. Visually measured ejection fraction. Normal cavity size, wall thickness and systolic function (ejection   fraction normal). Wall motion: normal.  · COVID r/o        Signed by: Bernard Florence MD     10/19/20   CARDIAC PROCEDURE 10/23/2020 10/23/2020    Narrative 100% mid RCA occlusion with thrombus. S/p aspiration thrombectomy followed by ptca/stent ( 2 BLANCA stents placed   overlapping extending from mid RCA to distal RCA). Mild mid LAD stenosis. Normal LV function by echo. TPM inserted via right femoral vein due to complete heart block. Plan:  Continue DAPT for 1 yr/ high dose statin. Post PCI developed juctional tachycardia. Will discontinue TPM. Monitor   and consider PPM if needed. Recommend intense risk factor modification. Signed by: Sharon Dodson MD   12/20 Holter  Normal sinus rhythm, no symptoms, no arrhythmias    Physical Exam   Constitutional: She is oriented to person, place, and time. She appears well-developed and well-nourished. No distress. sev obese   HENT:   Head: Normocephalic and atraumatic. Mouth/Throat: Normal dentition. Eyes: Right eye exhibits no discharge. Left eye exhibits no discharge. No scleral icterus. Neck: Neck supple. No JVD present. Carotid bruit is not present. No thyromegaly present.    Cardiovascular: Normal rate, regular rhythm, S1 normal, S2 normal, normal heart sounds and intact distal pulses. Exam reveals no gallop and no friction rub. No murmur heard. Pulmonary/Chest: Effort normal and breath sounds normal. She has no wheezes. She has no rales. Abdominal: Soft. She exhibits no mass. There is no abdominal tenderness. Musculoskeletal:         General: No edema. Lymphadenopathy:        Right cervical: No superficial cervical adenopathy present. Left cervical: No superficial cervical adenopathy present. Neurological: She is alert and oriented to person, place, and time. Skin: Skin is warm and dry. No rash noted. Psychiatric: She has a normal mood and affect. Her behavior is normal.       ASSESSMENT and PLAN    10/20 CAD stable. Patient seems to have chronic diastolic CHF. No evidence of fluid overload. Increase losartan and follow home BP chart. Check a Holter monitor before starting beta-blockers. Refer to cardiac rehab  Diet weight and exercise discussed in detail. Diagnoses and all orders for this visit:    1. Atherosclerosis of native coronary artery of native heart without angina pectoris    2. Acute on chronic diastolic congestive heart failure (HCC)  -     furosemide (LASIX) 20 mg tablet; Take 1 Tab by mouth daily as needed (Edema). -     METABOLIC PANEL, BASIC; Future    3. Essential hypertension    4. Hypercholesteremia  -     LIPID PANEL; Future  -     HEPATIC FUNCTION PANEL; Future    5. History of coronary artery stent placement    6. Severe obesity ([address]    7. Mobitz (type) I (Wenckebach's) atrioventricular block        Pertinent laboratory and test data reviewed and discussed with patient. See patient instructions also for other medical advice given    There are no discontinued medications.     Follow-up and Dispositions    · Return in about 6 months (around 8/8/2021), or if symptoms worsen or fail to improve, for post test, Get labs from [facility] including lipids. 2/21 CAD stable. Blood pressure is controlled. Patient is getting recurrent shortness of breath and some edema intermittently. Overall she does feel better. Use Lasix as needed and follow electrolytes. Lipid not available despite the fact she had a blood drawn. May have to redo it if not traceable from [facility] or [facility]. Holter was unremarkable without any arrhythmias. No significant dizziness. Continue present medications. Diet weight and exercise discussed in detail.

## 2021-02-08 NOTE — PROGRESS NOTES
Patient didn't bring medications, verbally reviewed    1. Have you been to the ER, urgent care clinic since your last visit? Hospitalized since your last visit? No    2. Have you seen or consulted any other health care providers outside of the [address] [place] [address] since your last visit? Include any pap smears or colon screening. Yes Where: Podiatry Routine     3. Since your last visit, have you had any of the following symptoms? shortness of breath and swelling in legs/arms. 4.  Have you had any blood work, X-rays or cardiac testing? Yes Where: [facility]     Requested: NO     In [facility]: YES    5. Where do you normally have your labs drawn? [facility]    6. Do you need any refills today?    No

## 2021-03-15 DIAGNOSIS — I50.33 ACUTE ON CHRONIC DIASTOLIC CONGESTIVE HEART FAILURE (HCC): ICD-10-CM

## 2021-03-20 ENCOUNTER — TRANSCRIBE ORDER (OUTPATIENT)
Dept: SCHEDULING | Age: 46
End: 2021-03-20

## 2021-03-20 DIAGNOSIS — M86.172 ACUTE OSTEOMYELITIS OF LEFT ANKLE OR FOOT (HCC): Primary | ICD-10-CM

## 2021-03-22 ENCOUNTER — DOCUMENTATION ONLY (OUTPATIENT)
Dept: PULMONOLOGY | Age: 46
End: 2021-03-22

## 2021-04-04 ENCOUNTER — APPOINTMENT (OUTPATIENT)
Dept: MRI IMAGING | Age: 46
DRG: 720 | End: 2021-04-04
Attending: PODIATRIST
Payer: MEDICAID

## 2021-04-04 ENCOUNTER — APPOINTMENT (OUTPATIENT)
Dept: GENERAL RADIOLOGY | Age: 46
DRG: 720 | End: 2021-04-04
Attending: EMERGENCY MEDICINE
Payer: MEDICAID

## 2021-04-04 ENCOUNTER — HOSPITAL ENCOUNTER (INPATIENT)
Age: 46
LOS: 3 days | Discharge: HOME HEALTH | DRG: 720 | End: 2021-04-07
Attending: EMERGENCY MEDICINE | Admitting: HOSPITALIST
Payer: MEDICAID

## 2021-04-04 DIAGNOSIS — M86.9 DIABETIC FOOT ULCER WITH OSTEOMYELITIS (HCC): Primary | ICD-10-CM

## 2021-04-04 DIAGNOSIS — E11.621 DIABETIC FOOT ULCER WITH OSTEOMYELITIS (HCC): Primary | ICD-10-CM

## 2021-04-04 DIAGNOSIS — R50.9 FEVER, UNSPECIFIED FEVER CAUSE: ICD-10-CM

## 2021-04-04 DIAGNOSIS — I10 ESSENTIAL HYPERTENSION: ICD-10-CM

## 2021-04-04 DIAGNOSIS — L08.9 DIABETIC FOOT INFECTION (HCC): ICD-10-CM

## 2021-04-04 DIAGNOSIS — E11.628 DIABETIC FOOT INFECTION (HCC): ICD-10-CM

## 2021-04-04 DIAGNOSIS — A41.9 SEPSIS, DUE TO UNSPECIFIED ORGANISM, UNSPECIFIED WHETHER ACUTE ORGAN DYSFUNCTION PRESENT (HCC): ICD-10-CM

## 2021-04-04 DIAGNOSIS — E11.69 DIABETIC FOOT ULCER WITH OSTEOMYELITIS (HCC): Primary | ICD-10-CM

## 2021-04-04 DIAGNOSIS — L97.509 DIABETIC FOOT ULCER WITH OSTEOMYELITIS (HCC): Primary | ICD-10-CM

## 2021-04-04 LAB
ALBUMIN SERPL-MCNC: 1.8 G/DL (ref 3.4–5)
ALBUMIN/GLOB SERPL: 0.3 {RATIO} (ref 0.8–1.7)
ALP SERPL-CCNC: 278 U/L (ref 45–117)
ALT SERPL-CCNC: 38 U/L (ref 13–56)
ANION GAP SERPL CALC-SCNC: 12 MMOL/L (ref 3–18)
APPEARANCE UR: CLEAR
AST SERPL-CCNC: 25 U/L (ref 10–38)
BACTERIA URNS QL MICRO: ABNORMAL /HPF
BASOPHILS # BLD: 0 K/UL (ref 0–0.1)
BASOPHILS NFR BLD: 0 % (ref 0–2)
BILIRUB SERPL-MCNC: 0.8 MG/DL (ref 0.2–1)
BILIRUB UR QL: NEGATIVE
BUN SERPL-MCNC: 10 MG/DL (ref 7–18)
BUN/CREAT SERPL: 6 (ref 12–20)
CALCIUM SERPL-MCNC: 8.5 MG/DL (ref 8.5–10.1)
CHLORIDE SERPL-SCNC: 100 MMOL/L (ref 100–111)
CO2 SERPL-SCNC: 21 MMOL/L (ref 21–32)
COLOR UR: YELLOW
COVID-19 RAPID TEST, COVR: NOT DETECTED
CREAT SERPL-MCNC: 1.54 MG/DL (ref 0.6–1.3)
DIFFERENTIAL METHOD BLD: ABNORMAL
EOSINOPHIL # BLD: 0 K/UL (ref 0–0.4)
EOSINOPHIL NFR BLD: 0 % (ref 0–5)
EPITH CASTS URNS QL MICRO: ABNORMAL /LPF (ref 0–5)
ERYTHROCYTE [DISTWIDTH] IN BLOOD BY AUTOMATED COUNT: 17.5 % (ref 11.6–14.5)
EST. AVERAGE GLUCOSE BLD GHB EST-MCNC: 226 MG/DL
FERRITIN SERPL-MCNC: 176 NG/ML (ref 8–388)
FOLATE SERPL-MCNC: 6.2 NG/ML (ref 3.1–17.5)
GLOBULIN SER CALC-MCNC: 5.7 G/DL (ref 2–4)
GLUCOSE BLD STRIP.AUTO-MCNC: 353 MG/DL (ref 70–110)
GLUCOSE SERPL-MCNC: 568 MG/DL (ref 74–99)
GLUCOSE UR STRIP.AUTO-MCNC: >1000 MG/DL
GRAN CASTS URNS QL MICRO: ABNORMAL /LPF
HBA1C MFR BLD: 9.5 % (ref 4.2–5.6)
HCG SERPL QL: NEGATIVE
HCT VFR BLD AUTO: 26.4 % (ref 35–45)
HGB BLD-MCNC: 8.5 G/DL (ref 12–16)
HGB UR QL STRIP: ABNORMAL
IRON SATN MFR SERPL: 6 % (ref 20–50)
IRON SERPL-MCNC: 13 UG/DL (ref 50–175)
KETONES UR QL STRIP.AUTO: NEGATIVE MG/DL
LACTATE BLD-SCNC: 1.07 MMOL/L (ref 0.4–2)
LACTATE BLD-SCNC: 2.85 MMOL/L (ref 0.4–2)
LEUKOCYTE ESTERASE UR QL STRIP.AUTO: NEGATIVE
LYMPHOCYTES # BLD: 1.2 K/UL (ref 0.9–3.6)
LYMPHOCYTES NFR BLD: 8 % (ref 21–52)
MCH RBC QN AUTO: 21.8 PG (ref 24–34)
MCHC RBC AUTO-ENTMCNC: 32.2 G/DL (ref 31–37)
MCV RBC AUTO: 67.7 FL (ref 74–97)
MONOCYTES # BLD: 1 K/UL (ref 0.05–1.2)
MONOCYTES NFR BLD: 7 % (ref 3–10)
NEUTS SEG # BLD: 12.2 K/UL (ref 1.8–8)
NEUTS SEG NFR BLD: 85 % (ref 40–73)
NITRITE UR QL STRIP.AUTO: NEGATIVE
PH UR STRIP: 5 [PH] (ref 5–8)
PLATELET # BLD AUTO: 251 K/UL (ref 135–420)
PLATELET COMMENTS,PCOM: ABNORMAL
PMV BLD AUTO: 9.8 FL (ref 9.2–11.8)
POTASSIUM SERPL-SCNC: 3.3 MMOL/L (ref 3.5–5.5)
PROT SERPL-MCNC: 7.5 G/DL (ref 6.4–8.2)
PROT UR STRIP-MCNC: 300 MG/DL
RBC # BLD AUTO: 3.9 M/UL (ref 4.2–5.3)
RBC #/AREA URNS HPF: ABNORMAL /HPF (ref 0–5)
RBC MORPH BLD: ABNORMAL
SARS-COV-2, COV2: NORMAL
SODIUM SERPL-SCNC: 133 MMOL/L (ref 136–145)
SOURCE, COVRS: NORMAL
SP GR UR REFRACTOMETRY: >1.03 (ref 1–1.03)
TIBC SERPL-MCNC: 218 UG/DL (ref 250–450)
UROBILINOGEN UR QL STRIP.AUTO: 1 EU/DL (ref 0.2–1)
VIT B12 SERPL-MCNC: 842 PG/ML (ref 211–911)
WBC # BLD AUTO: 14.4 K/UL (ref 4.6–13.2)

## 2021-04-04 PROCEDURE — 84703 CHORIONIC GONADOTROPIN ASSAY: CPT

## 2021-04-04 PROCEDURE — 74011636637 HC RX REV CODE- 636/637: Performed by: NURSE PRACTITIONER

## 2021-04-04 PROCEDURE — 99223 1ST HOSP IP/OBS HIGH 75: CPT | Performed by: HOSPITALIST

## 2021-04-04 PROCEDURE — 82607 VITAMIN B-12: CPT

## 2021-04-04 PROCEDURE — 74011000250 HC RX REV CODE- 250: Performed by: NURSE PRACTITIONER

## 2021-04-04 PROCEDURE — 74011250636 HC RX REV CODE- 250/636: Performed by: EMERGENCY MEDICINE

## 2021-04-04 PROCEDURE — 80053 COMPREHEN METABOLIC PANEL: CPT

## 2021-04-04 PROCEDURE — 73630 X-RAY EXAM OF FOOT: CPT

## 2021-04-04 PROCEDURE — 87635 SARS-COV-2 COVID-19 AMP PRB: CPT

## 2021-04-04 PROCEDURE — 96375 TX/PRO/DX INJ NEW DRUG ADDON: CPT

## 2021-04-04 PROCEDURE — 83540 ASSAY OF IRON: CPT

## 2021-04-04 PROCEDURE — APPSS180 APP SPLIT SHARED TIME > 60 MINUTES: Performed by: NURSE PRACTITIONER

## 2021-04-04 PROCEDURE — 83036 HEMOGLOBIN GLYCOSYLATED A1C: CPT

## 2021-04-04 PROCEDURE — 96374 THER/PROPH/DIAG INJ IV PUSH: CPT

## 2021-04-04 PROCEDURE — 82962 GLUCOSE BLOOD TEST: CPT

## 2021-04-04 PROCEDURE — 81001 URINALYSIS AUTO W/SCOPE: CPT

## 2021-04-04 PROCEDURE — 71045 X-RAY EXAM CHEST 1 VIEW: CPT

## 2021-04-04 PROCEDURE — 87040 BLOOD CULTURE FOR BACTERIA: CPT

## 2021-04-04 PROCEDURE — 83605 ASSAY OF LACTIC ACID: CPT

## 2021-04-04 PROCEDURE — 74011250637 HC RX REV CODE- 250/637: Performed by: NURSE PRACTITIONER

## 2021-04-04 PROCEDURE — 74011250636 HC RX REV CODE- 250/636: Performed by: HOSPITALIST

## 2021-04-04 PROCEDURE — 99285 EMERGENCY DEPT VISIT HI MDM: CPT

## 2021-04-04 PROCEDURE — 74011250636 HC RX REV CODE- 250/636: Performed by: NURSE PRACTITIONER

## 2021-04-04 PROCEDURE — 82728 ASSAY OF FERRITIN: CPT

## 2021-04-04 PROCEDURE — 65660000000 HC RM CCU STEPDOWN

## 2021-04-04 PROCEDURE — 85025 COMPLETE CBC W/AUTO DIFF WBC: CPT

## 2021-04-04 PROCEDURE — 74011250637 HC RX REV CODE- 250/637: Performed by: HOSPITALIST

## 2021-04-04 PROCEDURE — 93005 ELECTROCARDIOGRAM TRACING: CPT

## 2021-04-04 PROCEDURE — 74011000250 HC RX REV CODE- 250: Performed by: EMERGENCY MEDICINE

## 2021-04-04 RX ORDER — INSULIN GLARGINE 100 [IU]/ML
30 INJECTION, SOLUTION SUBCUTANEOUS DAILY
Status: DISCONTINUED | OUTPATIENT
Start: 2021-04-04 | End: 2021-04-06

## 2021-04-04 RX ORDER — DEXTROSE 50 % IN WATER (D50W) INTRAVENOUS SYRINGE
25-50 AS NEEDED
Status: DISCONTINUED | OUTPATIENT
Start: 2021-04-04 | End: 2021-04-07 | Stop reason: HOSPADM

## 2021-04-04 RX ORDER — METRONIDAZOLE 500 MG/100ML
500 INJECTION, SOLUTION INTRAVENOUS EVERY 8 HOURS
Status: DISCONTINUED | OUTPATIENT
Start: 2021-04-04 | End: 2021-04-04

## 2021-04-04 RX ORDER — CARVEDILOL 6.25 MG/1
6.25 TABLET ORAL 2 TIMES DAILY WITH MEALS
Status: DISCONTINUED | OUTPATIENT
Start: 2021-04-04 | End: 2021-04-07 | Stop reason: HOSPADM

## 2021-04-04 RX ORDER — VANCOMYCIN HYDROCHLORIDE
1250
Status: DISCONTINUED | OUTPATIENT
Start: 2021-04-05 | End: 2021-04-05

## 2021-04-04 RX ORDER — CHOLECALCIFEROL (VITAMIN D3) 125 MCG
5 CAPSULE ORAL
Status: DISCONTINUED | OUTPATIENT
Start: 2021-04-04 | End: 2021-04-05

## 2021-04-04 RX ORDER — SODIUM CHLORIDE 9 MG/ML
50 INJECTION, SOLUTION INTRAVENOUS CONTINUOUS
Status: DISCONTINUED | OUTPATIENT
Start: 2021-04-04 | End: 2021-04-05

## 2021-04-04 RX ORDER — ATORVASTATIN CALCIUM 40 MG/1
80 TABLET, FILM COATED ORAL DAILY
Status: DISCONTINUED | OUTPATIENT
Start: 2021-04-05 | End: 2021-04-07 | Stop reason: HOSPADM

## 2021-04-04 RX ORDER — SODIUM CHLORIDE 0.9 % (FLUSH) 0.9 %
5-10 SYRINGE (ML) INJECTION AS NEEDED
Status: DISCONTINUED | OUTPATIENT
Start: 2021-04-04 | End: 2021-04-07 | Stop reason: HOSPADM

## 2021-04-04 RX ORDER — ONDANSETRON 2 MG/ML
4 INJECTION INTRAMUSCULAR; INTRAVENOUS
Status: DISCONTINUED | OUTPATIENT
Start: 2021-04-04 | End: 2021-04-07 | Stop reason: HOSPADM

## 2021-04-04 RX ORDER — CARVEDILOL 6.25 MG/1
6.25 TABLET ORAL 2 TIMES DAILY WITH MEALS
Status: DISCONTINUED | OUTPATIENT
Start: 2021-04-05 | End: 2021-04-04

## 2021-04-04 RX ORDER — GUAIFENESIN 100 MG/5ML
81 LIQUID (ML) ORAL DAILY
Status: DISCONTINUED | OUTPATIENT
Start: 2021-04-05 | End: 2021-04-07 | Stop reason: HOSPADM

## 2021-04-04 RX ORDER — AMLODIPINE BESYLATE 5 MG/1
5 TABLET ORAL DAILY
Status: DISCONTINUED | OUTPATIENT
Start: 2021-04-05 | End: 2021-04-05

## 2021-04-04 RX ORDER — MAGNESIUM SULFATE 100 %
4 CRYSTALS MISCELLANEOUS AS NEEDED
Status: DISCONTINUED | OUTPATIENT
Start: 2021-04-04 | End: 2021-04-07 | Stop reason: HOSPADM

## 2021-04-04 RX ORDER — INSULIN LISPRO 100 [IU]/ML
INJECTION, SOLUTION INTRAVENOUS; SUBCUTANEOUS
Status: DISCONTINUED | OUTPATIENT
Start: 2021-04-04 | End: 2021-04-07 | Stop reason: HOSPADM

## 2021-04-04 RX ORDER — VANCOMYCIN 2 GRAM/500 ML IN 0.9 % SODIUM CHLORIDE INTRAVENOUS
2000 ONCE
Status: COMPLETED | OUTPATIENT
Start: 2021-04-04 | End: 2021-04-04

## 2021-04-04 RX ORDER — METRONIDAZOLE 500 MG/100ML
500 INJECTION, SOLUTION INTRAVENOUS EVERY 12 HOURS
Status: DISCONTINUED | OUTPATIENT
Start: 2021-04-05 | End: 2021-04-07

## 2021-04-04 RX ORDER — HYDRALAZINE HYDROCHLORIDE 20 MG/ML
10 INJECTION INTRAMUSCULAR; INTRAVENOUS
Status: DISCONTINUED | OUTPATIENT
Start: 2021-04-04 | End: 2021-04-07 | Stop reason: HOSPADM

## 2021-04-04 RX ADMIN — SODIUM CHLORIDE 1000 ML: 900 INJECTION, SOLUTION INTRAVENOUS at 17:20

## 2021-04-04 RX ADMIN — ONDANSETRON 4 MG: 2 INJECTION INTRAMUSCULAR; INTRAVENOUS at 22:27

## 2021-04-04 RX ADMIN — CEFEPIME HYDROCHLORIDE 2 G: 2 INJECTION, POWDER, FOR SOLUTION INTRAVENOUS at 22:27

## 2021-04-04 RX ADMIN — INSULIN GLARGINE 30 UNITS: 100 INJECTION, SOLUTION SUBCUTANEOUS at 20:50

## 2021-04-04 RX ADMIN — CARVEDILOL 6.25 MG: 6.25 TABLET, FILM COATED ORAL at 20:49

## 2021-04-04 RX ADMIN — POTASSIUM BICARBONATE 40 MEQ: 782 TABLET, EFFERVESCENT ORAL at 20:49

## 2021-04-04 RX ADMIN — SODIUM CHLORIDE 1000 ML: 900 INJECTION, SOLUTION INTRAVENOUS at 17:21

## 2021-04-04 RX ADMIN — TICAGRELOR 90 MG: 90 TABLET ORAL at 20:49

## 2021-04-04 RX ADMIN — SODIUM CHLORIDE 75 ML/HR: 900 INJECTION, SOLUTION INTRAVENOUS at 20:50

## 2021-04-04 RX ADMIN — MEROPENEM 1 G: 1 INJECTION INTRAVENOUS at 18:06

## 2021-04-04 RX ADMIN — SODIUM CHLORIDE 709 ML: 900 INJECTION, SOLUTION INTRAVENOUS at 20:50

## 2021-04-04 RX ADMIN — VANCOMYCIN HYDROCHLORIDE 2000 MG: 10 INJECTION, POWDER, LYOPHILIZED, FOR SOLUTION INTRAVENOUS at 18:00

## 2021-04-04 NOTE — ED NOTES
Report given to Erlinda Heimlich, RN. No questions or concerns at this time.  Pt resting comfortably in stretcher, given additional warm blankets per request.

## 2021-04-04 NOTE — H&P
Hospitalist Admission History and Physical    NAME:  Abel Guerrero   :   1975   MRN:   [id number]     PCP:  Yesi Carlson MD  Date/Time:  2021 7:41 PM    Subjective:   CHIEF COMPLAINT:  Left foot draining pus    HISTORY OF PRESENT ILLNESS:     Amadou Nguyen is a 40 y.o.  female who presents with left foot draining pus. Patient reports area to her left foot initially presented as a callous in 2021, she followed with podiatry in early 2021 with recommendation for surgical boot to ease weight bearing and now draining pus per patient starting earlier today. Patient denies fevers at home but states she is always cold. She reports her blood sugars at home have been running generally in the 200s and she reports compliance with home medications as prescribed. Patient states she has been having dyspnea with exertion which is worse since she contracted Covid in 2021. Otherwise patient is without current complaints including no headaches, cough, chest pain, no shortness of breath at rest, denies current nausea (she does report some nausea associated with Metformin use), denies vomiting, abdominal pain, no swelling of extremities. She does have some pain to her left foot. Patient also notes she received her second Pfizer vaccine earlier today at approximately 12 noon.     Past Medical History:   Diagnosis Date    CAD (coronary artery disease)     Diabetes ([address]     HTN (hypertension)     Hyperlipidemia         Past Surgical History:   Procedure Laterality Date    HX CORONARY STENT PLACEMENT      HX GYN             Social History     Tobacco Use    Smoking status: Never Smoker    Smokeless tobacco: Never Used   Substance Use Topics    Alcohol use: No        Family History   Problem Relation Age of Onset    Lupus Mother     Cancer Neg Hx     Diabetes Neg Hx     Heart Disease Neg Hx     Hypertension Neg Hx     Heart Attack Neg Hx     Stroke Neg Hx Allergies   Allergen Reactions    Azithromycin Other (comments)     Rapid response was called for bradycardia and hypotension     Pcn [Penicillins] Hives        Prior to Admission Medications   Prescriptions Last Dose Informant Patient Reported? Taking? Insulin Needles, Disposable, 31 gauge x 5/16\" ndle 4/3/2021 at Unknown time  No Yes   Sig: Use to check blood glucose BID   Insulin Syringe-Needle U-100 (INSULIN SYRINGE) 1 mL 30 gauge x 5/16 syrg 4/3/2021 at Unknown time  No Yes   Sig: As directed for lantus insulin   aspirin 81 mg chewable tablet 4/4/2021 at Unknown time  No Yes   Sig: Take 1 Tab by mouth daily. Patient taking differently: Take 325 mg by mouth daily. atorvastatin (LIPITOR) 80 mg tablet 4/4/2021 at Unknown time  No Yes   Sig: Take 1 Tab by mouth daily. carvediloL (Coreg) 6.25 mg tablet 4/4/2021 at Unknown time  No Yes   Sig: Take 1 Tab by mouth two (2) times daily (with meals). furosemide (LASIX) 20 mg tablet 4/3/2021 at Unknown time  No Yes   Sig: Take 1 Tab by mouth daily as needed (Edema). insulin glargine (LANTUS,BASAGLAR) 100 unit/mL (3 mL) inpn 4/3/2021 at Unknown time  No Yes   Sig: Take 45 units daily  Indications: type 2 diabetes mellitus   Patient taking differently: 50 Units nightly. Indications: type 2 diabetes mellitus   insulin lispro (HUMALOG) 100 unit/mL injection Not Taking at Unknown time  No No   Sig: For use in the Insulin pump   losartan (COZAAR) 100 mg tablet 4/3/2021 at Unknown time  No Yes   Sig: Take 1 Tab by mouth daily. melatonin 5 mg cap capsule 4/3/2021 at Unknown time  No Yes   Sig: Take 1 Cap by mouth nightly. Patient taking differently: Take 5 mg by mouth nightly. Reports taking every other day   metFORMIN ER (GLUCOPHAGE XR) 500 mg tablet 4/3/2021 at Unknown time  No Yes   Sig: Take 1 Tab by mouth daily (with dinner). Patient taking differently: Take 250 mg by mouth daily (with dinner).  0.5 tab daily   ticagrelor (BRILINTA) 90 mg tablet 2021 at Unknown time  No Yes   Sig: Take 1 Tab by mouth every twelve (12) hours every twelve (12) hours. Facility-Administered Medications: None     REVIEW OF SYSTEMS:     [] Unable to obtain  ROS due to  []mental status change  []sedated   []intubated   [x]Total of 12 systems reviewed as follows:    GENERAL: no fever / + chills   HEENT: no sinus congestion / hearing or vision changes  NECK: No pain or stiffness. PULMONARY: + EUGENE; no shortness of breath or cough  Cardiovascular: denies palpitations;  no chest pain  GASTROINTESTINAL: Denies abdominal pain, constipation, diarrhea, nausea, vomiting or melena / bloody stools  GENITOURINARY: No urinary frequency, urgency, hesitancy or dysuria; reports last menstrual cycle ended 2 days ago. MUSCULOSKELETAL: no back / joint or muscle pain, no recent trauma. DERMATOLOGIC: denies pruritis, rashes or open areas   ENDOCRINE: No polyuria, polydipsia, no heat or cold intolerance. HEMATOLOGICAL: No easy bruising or bleeding. NEUROLOGIC:  no focal weakness,  no speech changes     Objective:   VITALS:    Visit Vitals  BP (!) 150/81 (BP 1 Location: Left upper arm, BP Patient Position: Sitting)   Pulse (!) 108   Temp 98.9 °F (37.2 °C)   Resp 18   Ht 5' 6\" (1.676 m)   Wt 90.3 kg (199 lb)   SpO2 100%   BMI 32.12 kg/m²     Temp (24hrs), Av.9 °F (37.2 °C), Min:98.9 °F (37.2 °C), Max:98.9 °F (37.2 °C)    PHYSICAL EXAM:   General:          Alert, in NAD  HEENT:          Sclera anicteric. Conjunctiva pink. Mucous membranes                          Moist, no ear or nasal discharge  Neck:               Supple. Trachea midline. No accessory muscle use. CV:                  Regular rate and rhythm. S1S2+  Lungs:             Clear to auscultation bilaterally. No Wheezing or Rhonchi. No rales. Abdomen:        Soft, non-tender. Not distended.   Bowel sounds normal.   Extremities:      Left foot with erythema, large area of swelling to pulses 2+ b/l  Neurologic: Alert and oriented X 4. Follows commands, responds appropriately. No focal neurological deficit was noted  Skin:                Warm and dry. No rashes. LAB DATA REVIEWED:    No components found for: [place]  Recent Labs     04/04/21  1709   *   K 3.3*      CO2 21   BUN 10   CREA 1.54*   *   CA 8.5   ALB 1.8*   WBC 14.4*   HGB 8.5*   HCT 26.4*        IMAGING RESULTS:     []  I have personally reviewed the actual   []CXR  []CT scan    Assessment/Plan:      Active Problems:    Diabetic foot infection ([facility] [address] (12/30/2018)       ___________________________________________________  PLAN:    1. Cellulitis with concern for abscess and/or osteomyelitis - cont broad spectrum antibiotics. MRI ordered. N.p.o. after midnight in anticipation of surgical intervention. Podiatry consult pending with Dr. Leola Ramírez r/o   2. Sepsis, POA -in setting of cellulitis, with tachycardia and leukocytosis on admission  3. Type 2 diabetes, poorly controlled -continue SSI, lower dose of Lantus as n.p.o. after midnight. Recheck A1c 9.5. Diabetic educator consulted, A1c improved from admission last year. 4. Hypertension -continue Coreg, hydralazine as needed. Hold Cozaar in setting of reduced renal function. Add norvasc. 5. Risk for renal injury -provide IV hydration, avoid nephrotoxic meds, follow in a.m.  6. Electrolyte abnormalities (mild hypokalemia and hyponatremia) -replace,  follow in a.m.  7. CAD status post stent placement October 2020 -cardiology consulted for surgical clearance, message left on nonemergent line  8. Chronic diastolic CHF -per outpatient records, hold Lasix for now  9. Chronic microcytic anemia -no evidence of acute bleed   10. History of Covid infection -reports + status without hospitalization in February 2021.   States received second Covid shot earlier today on 4/4/2021     Patient affirms full CODE STATUS    Consults called - Podiatry by ED MD    Prophylaxis:  []Lovenox []Coumadin  [x]Hep SQ  []SCDs  []H2B/PPI    Discussed Code Status:    [x]Full Code      []DNR     ___________________________________________________  Admitting Provider: Jayesh Burgos NP

## 2021-04-04 NOTE — ED TRIAGE NOTES
Patient arrived from home to triage for toe pain and ulcer. Patient states her foot doctor told her to wear the boot and it made it worse.

## 2021-04-04 NOTE — ED PROVIDER NOTES
EMERGENCY DEPARTMENT HISTORY AND PHYSICAL EXAM  This was created with voice recognition software and transcription errors may be present. 5:12 PM  Date: 2021  Patient Name: Urbano Stewart    History of Presenting Illness     Chief Complaint:    History Provided By:     HPI: Urbano Stewart is a 40 y.o. female past medical history of diabetes hypertension hyper lipidemia who presents with diabetic foot infection. Patient states she had an ulcer on her foot about a month ago and was told to wear a special boot since then she has had worsening swelling pain and drainage. No fever no chills no nausea no vomiting patient is concerned that she has an infection. No other aggravating alleviating factors no other associated symptoms    PCP: Junior Brenda MD      Past History     Past Medical History:  Past Medical History:   Diagnosis Date    Diabetes ([address]     HTN (hypertension)     Hyperlipidemia        Past Surgical History:  Past Surgical History:   Procedure Laterality Date    HX CORONARY STENT PLACEMENT      HX GYN             Family History:  Family History   Problem Relation Age of Onset    Lupus Mother     Cancer Neg Hx     Diabetes Neg Hx     Heart Disease Neg Hx     Hypertension Neg Hx     Heart Attack Neg Hx     Stroke Neg Hx        Social History:  Social History     Tobacco Use    Smoking status: Never Smoker    Smokeless tobacco: Never Used   Substance Use Topics    Alcohol use: No    Drug use: No       Allergies: Allergies   Allergen Reactions    Azithromycin Other (comments)     Rapid response was called for bradycardia and hypotension     Pcn [Penicillins] Hives       Review of Systems     Review of Systems   Constitutional: Negative for activity change. HENT: Negative for congestion. Cardiovascular: Negative for chest pain. All other systems reviewed and are negative. 10 point review of systems otherwise negative unless noted in HPI.     Physical Exam       Physical Exam  Constitutional:       Appearance: She is well-developed. HENT:      Head: Normocephalic and atraumatic. Eyes:      Pupils: Pupils are equal, round, and reactive to light. Neck:      Musculoskeletal: Normal range of motion and neck supple. Cardiovascular:      Rate and Rhythm: Normal rate and regular rhythm. Heart sounds: Normal heart sounds. No murmur. No friction rub. Pulmonary:      Effort: Pulmonary effort is normal. No respiratory distress. Breath sounds: Normal breath sounds. No wheezing. Abdominal:      General: There is no distension. Palpations: Abdomen is soft. Tenderness: There is no abdominal tenderness. There is no guarding or rebound. Musculoskeletal: Normal range of motion. Comments: Patient with large swollen fluctuance MTP on the left foot with about of 5 to 6 cm area of fluctuance   Skin:     General: Skin is warm and dry. Neurological:      Mental Status: She is alert and oriented to person, place, and time. Psychiatric:         Behavior: Behavior normal.         Thought Content: Thought content normal.         Diagnostic Study Results     Vital Signs   Visit Vitals  BP (!) 150/81 (BP 1 Location: Left upper arm, BP Patient Position: Sitting)   Pulse (!) 108   Temp 98.9 °F (37.2 °C)   Resp 18   Ht 5' 6\" (1.676 m)   Wt 90.3 kg (199 lb)   SpO2 100%   BMI 32.12 kg/m²      EKG: EKG shows sinus at 106 normal axis normal intervals there is no ST elevation or depression no hypertro  phy  Labs: UA negative  Elevated white count shift but no bands  Chemistry unremarkable elevated glucose normal bicarb normal gap mild hypokalemia  Imaging:   X-ray and foot x-ray unremarkable  Medical Decision Making     ED Course: Progress Notes, Reevaluation, and Consults:      Provider Notes (Medical Decision Making): Patient presents with evidence of likely osteomyelitis swollen fluctuant MTP to the left foot.   Will start antibiotics and obtain an x-ray    X-ray unremarkable but patient with a diabetic foot ulcer that will likely need debridement will admit to the hospital service and consult podiatry    D/w Dr Luis Mendoza. D/w dr Hayes Fleming          Diagnosis     Clinical Impression: No diagnosis found. Disposition:    Patient's Medications   Start Taking    No medications on file   Continue Taking    ASPIRIN 81 MG CHEWABLE TABLET    Take 1 Tab by mouth daily. ATORVASTATIN (LIPITOR) 80 MG TABLET    Take 1 Tab by mouth daily. CARVEDILOL (COREG) 6.25 MG TABLET    Take 1 Tab by mouth two (2) times daily (with meals). FERROUS SULFATE 325 MG (65 MG IRON) TABLET    Take 1 Tab by mouth Daily (before breakfast). FUROSEMIDE (LASIX) 20 MG TABLET    Take 1 Tab by mouth daily as needed (Edema). INSULIN GLARGINE (LANTUS,BASAGLAR) 100 UNIT/ML (3 ML) INPN    Take 45 units daily  Indications: type 2 diabetes mellitus    INSULIN LISPRO (HUMALOG) 100 UNIT/ML INJECTION    For use in the Insulin pump    INSULIN NEEDLES, DISPOSABLE, 31 GAUGE X 5/16\" NDLE    Use to check blood glucose BID    INSULIN SYRINGE-NEEDLE U-100 (INSULIN SYRINGE) 1 ML 30 GAUGE X 5/16 SYRG    As directed for lantus insulin    LOSARTAN (COZAAR) 100 MG TABLET    Take 1 Tab by mouth daily. MELATONIN 5 MG CAP CAPSULE    Take 1 Cap by mouth nightly. METFORMIN ER (GLUCOPHAGE XR) 500 MG TABLET    Take 1 Tab by mouth daily (with dinner). TICAGRELOR (BRILINTA) 90 MG TABLET    Take 1 Tab by mouth every twelve (12) hours every twelve (12) hours.    These Medications have changed    No medications on file   Stop Taking    No medications on file

## 2021-04-04 NOTE — PROGRESS NOTES
Kinetic Dosing- Initial Progress Note    Pharmacy Consult ordered by Dr. Gurpreet Mckinley     Indication: diabetic foot infection    Patient clinical status and labs ordered/reviewed. Dose for naïve patient was initiated at: Vancomycin 2000 mg IV x 1 dose then 1250 mg IV q18 hours    FEH=923  Predicted trough 15.2  Daily BMP x3    Metronidazole 500 mg IV q12hr was therapeutically interchanged for metronidazole 500 my IV q8h per the P&T Committee approved Therapeutic Interchanges Policy.       Continue to monitor    Sign: Avis Phoenix, [facility]  Date: 4/4/2021  Time: 5:12 PM

## 2021-04-05 ENCOUNTER — ANESTHESIA EVENT (OUTPATIENT)
Dept: SURGERY | Age: 46
DRG: 720 | End: 2021-04-05
Payer: MEDICAID

## 2021-04-05 ENCOUNTER — APPOINTMENT (OUTPATIENT)
Dept: MRI IMAGING | Age: 46
DRG: 720 | End: 2021-04-05
Attending: HOSPITALIST
Payer: MEDICAID

## 2021-04-05 ENCOUNTER — ANESTHESIA (OUTPATIENT)
Dept: SURGERY | Age: 46
DRG: 720 | End: 2021-04-05
Payer: MEDICAID

## 2021-04-05 LAB
ANION GAP SERPL CALC-SCNC: 7 MMOL/L (ref 3–18)
ATRIAL RATE: 106 BPM
BASOPHILS # BLD: 0 K/UL (ref 0–0.1)
BASOPHILS NFR BLD: 0 % (ref 0–2)
BNP SERPL-MCNC: 775 PG/ML (ref 0–450)
BUN SERPL-MCNC: 7 MG/DL (ref 7–18)
BUN/CREAT SERPL: 8 (ref 12–20)
CALCIUM SERPL-MCNC: 8 MG/DL (ref 8.5–10.1)
CALCULATED P AXIS, ECG09: 64 DEGREES
CALCULATED R AXIS, ECG10: 49 DEGREES
CALCULATED T AXIS, ECG11: 33 DEGREES
CHLORIDE SERPL-SCNC: 114 MMOL/L (ref 100–111)
CO2 SERPL-SCNC: 20 MMOL/L (ref 21–32)
CREAT SERPL-MCNC: 0.9 MG/DL (ref 0.6–1.3)
DIAGNOSIS, 93000: NORMAL
DIFFERENTIAL METHOD BLD: ABNORMAL
EOSINOPHIL # BLD: 0.1 K/UL (ref 0–0.4)
EOSINOPHIL NFR BLD: 1 % (ref 0–5)
ERYTHROCYTE [DISTWIDTH] IN BLOOD BY AUTOMATED COUNT: 17.7 % (ref 11.6–14.5)
GLUCOSE BLD STRIP.AUTO-MCNC: 118 MG/DL (ref 70–110)
GLUCOSE BLD STRIP.AUTO-MCNC: 153 MG/DL (ref 70–110)
GLUCOSE BLD STRIP.AUTO-MCNC: 206 MG/DL (ref 70–110)
GLUCOSE BLD STRIP.AUTO-MCNC: 91 MG/DL (ref 70–110)
GLUCOSE SERPL-MCNC: 187 MG/DL (ref 74–99)
HCT VFR BLD AUTO: 24.2 % (ref 35–45)
HGB BLD-MCNC: 7.7 G/DL (ref 12–16)
LYMPHOCYTES # BLD: 1.4 K/UL (ref 0.9–3.6)
LYMPHOCYTES NFR BLD: 12 % (ref 21–52)
MAGNESIUM SERPL-MCNC: 2.1 MG/DL (ref 1.6–2.6)
MCH RBC QN AUTO: 21.4 PG (ref 24–34)
MCHC RBC AUTO-ENTMCNC: 31.8 G/DL (ref 31–37)
MCV RBC AUTO: 67.2 FL (ref 74–97)
MONOCYTES # BLD: 0.8 K/UL (ref 0.05–1.2)
MONOCYTES NFR BLD: 7 % (ref 3–10)
NEUTS SEG # BLD: 9.5 K/UL (ref 1.8–8)
NEUTS SEG NFR BLD: 80 % (ref 40–73)
P-R INTERVAL, ECG05: 130 MS
PLATELET # BLD AUTO: 280 K/UL (ref 135–420)
PMV BLD AUTO: 9.7 FL (ref 9.2–11.8)
POTASSIUM SERPL-SCNC: 3.2 MMOL/L (ref 3.5–5.5)
Q-T INTERVAL, ECG07: 328 MS
QRS DURATION, ECG06: 84 MS
QTC CALCULATION (BEZET), ECG08: 435 MS
RBC # BLD AUTO: 3.6 M/UL (ref 4.2–5.3)
SODIUM SERPL-SCNC: 141 MMOL/L (ref 136–145)
VENTRICULAR RATE, ECG03: 106 BPM
WBC # BLD AUTO: 11.7 K/UL (ref 4.6–13.2)

## 2021-04-05 PROCEDURE — 93325 DOPPLER ECHO COLOR FLOW MAPG: CPT | Performed by: INTERNAL MEDICINE

## 2021-04-05 PROCEDURE — 87205 SMEAR GRAM STAIN: CPT

## 2021-04-05 PROCEDURE — 2709999900 HC NON-CHARGEABLE SUPPLY

## 2021-04-05 PROCEDURE — 74011250637 HC RX REV CODE- 250/637: Performed by: PHYSICIAN ASSISTANT

## 2021-04-05 PROCEDURE — 74011000250 HC RX REV CODE- 250: Performed by: NURSE PRACTITIONER

## 2021-04-05 PROCEDURE — 88311 DECALCIFY TISSUE: CPT

## 2021-04-05 PROCEDURE — 99232 SBSQ HOSP IP/OBS MODERATE 35: CPT | Performed by: HOSPITALIST

## 2021-04-05 PROCEDURE — 74011636637 HC RX REV CODE- 636/637: Performed by: ANESTHESIOLOGY

## 2021-04-05 PROCEDURE — 74011250637 HC RX REV CODE- 250/637: Performed by: NURSE PRACTITIONER

## 2021-04-05 PROCEDURE — 87077 CULTURE AEROBIC IDENTIFY: CPT

## 2021-04-05 PROCEDURE — 82962 GLUCOSE BLOOD TEST: CPT

## 2021-04-05 PROCEDURE — 74011250636 HC RX REV CODE- 250/636: Performed by: NURSE PRACTITIONER

## 2021-04-05 PROCEDURE — 76210000006 HC OR PH I REC 0.5 TO 1 HR: Performed by: PODIATRIST

## 2021-04-05 PROCEDURE — 93321 DOPPLER ECHO F-UP/LMTD STD: CPT | Performed by: INTERNAL MEDICINE

## 2021-04-05 PROCEDURE — 73720 MRI LWR EXTREMITY W/O&W/DYE: CPT

## 2021-04-05 PROCEDURE — 74011636320 HC RX REV CODE- 636/320: Performed by: HOSPITALIST

## 2021-04-05 PROCEDURE — 74011250636 HC RX REV CODE- 250/636: Performed by: NURSE ANESTHETIST, CERTIFIED REGISTERED

## 2021-04-05 PROCEDURE — 0QBP0ZX EXCISION OF LEFT METATARSAL, OPEN APPROACH, DIAGNOSTIC: ICD-10-PCS | Performed by: PODIATRIST

## 2021-04-05 PROCEDURE — 74011000258 HC RX REV CODE- 258: Performed by: PHYSICIAN ASSISTANT

## 2021-04-05 PROCEDURE — 65660000000 HC RM CCU STEPDOWN

## 2021-04-05 PROCEDURE — 74011250636 HC RX REV CODE- 250/636: Performed by: PHYSICIAN ASSISTANT

## 2021-04-05 PROCEDURE — 80048 BASIC METABOLIC PNL TOTAL CA: CPT

## 2021-04-05 PROCEDURE — 83735 ASSAY OF MAGNESIUM: CPT

## 2021-04-05 PROCEDURE — 87075 CULTR BACTERIA EXCEPT BLOOD: CPT

## 2021-04-05 PROCEDURE — 76010000138 HC OR TIME 0.5 TO 1 HR: Performed by: PODIATRIST

## 2021-04-05 PROCEDURE — 85025 COMPLETE CBC W/AUTO DIFF WBC: CPT

## 2021-04-05 PROCEDURE — 74011250636 HC RX REV CODE- 250/636: Performed by: EMERGENCY MEDICINE

## 2021-04-05 PROCEDURE — 2709999900 HC NON-CHARGEABLE SUPPLY: Performed by: PODIATRIST

## 2021-04-05 PROCEDURE — 99223 1ST HOSP IP/OBS HIGH 75: CPT | Performed by: INTERNAL MEDICINE

## 2021-04-05 PROCEDURE — 74011250636 HC RX REV CODE- 250/636: Performed by: ANESTHESIOLOGY

## 2021-04-05 PROCEDURE — 74011250636 HC RX REV CODE- 250/636: Performed by: HOSPITALIST

## 2021-04-05 PROCEDURE — 00400 ANES INTEGUMENTARY SYS NOS: CPT | Performed by: NURSE ANESTHETIST, CERTIFIED REGISTERED

## 2021-04-05 PROCEDURE — 83880 ASSAY OF NATRIURETIC PEPTIDE: CPT

## 2021-04-05 PROCEDURE — 0JBR0ZZ EXCISION OF LEFT FOOT SUBCUTANEOUS TISSUE AND FASCIA, OPEN APPROACH: ICD-10-PCS | Performed by: PODIATRIST

## 2021-04-05 PROCEDURE — 00400 ANES INTEGUMENTARY SYS NOS: CPT | Performed by: ANESTHESIOLOGY

## 2021-04-05 PROCEDURE — 36415 COLL VENOUS BLD VENIPUNCTURE: CPT

## 2021-04-05 PROCEDURE — A9575 INJ GADOTERATE MEGLUMI 0.1ML: HCPCS | Performed by: HOSPITALIST

## 2021-04-05 PROCEDURE — 76060000032 HC ANESTHESIA 0.5 TO 1 HR: Performed by: PODIATRIST

## 2021-04-05 PROCEDURE — 74011000250 HC RX REV CODE- 250: Performed by: PODIATRIST

## 2021-04-05 PROCEDURE — 74011250637 HC RX REV CODE- 250/637: Performed by: HOSPITALIST

## 2021-04-05 PROCEDURE — 87186 SC STD MICRODIL/AGAR DIL: CPT

## 2021-04-05 PROCEDURE — 88307 TISSUE EXAM BY PATHOLOGIST: CPT

## 2021-04-05 PROCEDURE — 77030003445 HC NDL BIOP BN BD -B: Performed by: PODIATRIST

## 2021-04-05 RX ORDER — POTASSIUM CHLORIDE 20 MEQ/1
20 TABLET, EXTENDED RELEASE ORAL ONCE
Status: COMPLETED | OUTPATIENT
Start: 2021-04-05 | End: 2021-04-05

## 2021-04-05 RX ORDER — AMLODIPINE BESYLATE 10 MG/1
10 TABLET ORAL DAILY
Status: DISCONTINUED | OUTPATIENT
Start: 2021-04-06 | End: 2021-04-07 | Stop reason: HOSPADM

## 2021-04-05 RX ORDER — INSULIN LISPRO 100 [IU]/ML
INJECTION, SOLUTION INTRAVENOUS; SUBCUTANEOUS ONCE
Status: COMPLETED | OUTPATIENT
Start: 2021-04-05 | End: 2021-04-05

## 2021-04-05 RX ORDER — POTASSIUM CHLORIDE 20 MEQ/1
40 TABLET, EXTENDED RELEASE ORAL
Status: COMPLETED | OUTPATIENT
Start: 2021-04-05 | End: 2021-04-05

## 2021-04-05 RX ORDER — HYDROMORPHONE HYDROCHLORIDE 2 MG/ML
0.5 INJECTION, SOLUTION INTRAMUSCULAR; INTRAVENOUS; SUBCUTANEOUS
Status: DISCONTINUED | OUTPATIENT
Start: 2021-04-05 | End: 2021-04-05 | Stop reason: HOSPADM

## 2021-04-05 RX ORDER — AMLODIPINE BESYLATE 5 MG/1
5 TABLET ORAL ONCE
Status: COMPLETED | OUTPATIENT
Start: 2021-04-05 | End: 2021-04-05

## 2021-04-05 RX ORDER — SODIUM CHLORIDE 0.9 % (FLUSH) 0.9 %
5-40 SYRINGE (ML) INJECTION EVERY 8 HOURS
Status: DISCONTINUED | OUTPATIENT
Start: 2021-04-05 | End: 2021-04-05 | Stop reason: HOSPADM

## 2021-04-05 RX ORDER — SODIUM CHLORIDE, SODIUM LACTATE, POTASSIUM CHLORIDE, CALCIUM CHLORIDE 600; 310; 30; 20 MG/100ML; MG/100ML; MG/100ML; MG/100ML
25 INJECTION, SOLUTION INTRAVENOUS CONTINUOUS
Status: DISCONTINUED | OUTPATIENT
Start: 2021-04-05 | End: 2021-04-05 | Stop reason: HOSPADM

## 2021-04-05 RX ORDER — CALCIUM CARB/MAGNESIUM CARB 311-232MG
5 TABLET ORAL
Status: DISCONTINUED | OUTPATIENT
Start: 2021-04-05 | End: 2021-04-07 | Stop reason: HOSPADM

## 2021-04-05 RX ORDER — VANCOMYCIN HYDROCHLORIDE
1250 EVERY 12 HOURS
Status: DISCONTINUED | OUTPATIENT
Start: 2021-04-05 | End: 2021-04-07

## 2021-04-05 RX ORDER — FENTANYL CITRATE 50 UG/ML
50 INJECTION, SOLUTION INTRAMUSCULAR; INTRAVENOUS AS NEEDED
Status: DISCONTINUED | OUTPATIENT
Start: 2021-04-05 | End: 2021-04-05 | Stop reason: HOSPADM

## 2021-04-05 RX ORDER — FAMOTIDINE 20 MG/1
20 TABLET, FILM COATED ORAL 2 TIMES DAILY
Status: DISCONTINUED | OUTPATIENT
Start: 2021-04-05 | End: 2021-04-07 | Stop reason: HOSPADM

## 2021-04-05 RX ORDER — DEXTROSE 50 % IN WATER (D50W) INTRAVENOUS SYRINGE
25-50 AS NEEDED
Status: DISCONTINUED | OUTPATIENT
Start: 2021-04-05 | End: 2021-04-05 | Stop reason: HOSPADM

## 2021-04-05 RX ORDER — PROPOFOL 10 MG/ML
VIAL (ML) INTRAVENOUS
Status: DISCONTINUED | OUTPATIENT
Start: 2021-04-05 | End: 2021-04-05 | Stop reason: HOSPADM

## 2021-04-05 RX ORDER — ONDANSETRON 2 MG/ML
4 INJECTION INTRAMUSCULAR; INTRAVENOUS ONCE
Status: DISCONTINUED | OUTPATIENT
Start: 2021-04-05 | End: 2021-04-05 | Stop reason: HOSPADM

## 2021-04-05 RX ORDER — INSULIN LISPRO 100 [IU]/ML
INJECTION, SOLUTION INTRAVENOUS; SUBCUTANEOUS ONCE
Status: DISCONTINUED | OUTPATIENT
Start: 2021-04-05 | End: 2021-04-05 | Stop reason: HOSPADM

## 2021-04-05 RX ORDER — MAGNESIUM SULFATE 100 %
4 CRYSTALS MISCELLANEOUS AS NEEDED
Status: DISCONTINUED | OUTPATIENT
Start: 2021-04-05 | End: 2021-04-05 | Stop reason: HOSPADM

## 2021-04-05 RX ORDER — SODIUM CHLORIDE 0.9 % (FLUSH) 0.9 %
5-40 SYRINGE (ML) INJECTION AS NEEDED
Status: DISCONTINUED | OUTPATIENT
Start: 2021-04-05 | End: 2021-04-05 | Stop reason: HOSPADM

## 2021-04-05 RX ORDER — PROPOFOL 10 MG/ML
INJECTION, EMULSION INTRAVENOUS AS NEEDED
Status: DISCONTINUED | OUTPATIENT
Start: 2021-04-05 | End: 2021-04-05 | Stop reason: HOSPADM

## 2021-04-05 RX ORDER — SODIUM CHLORIDE, SODIUM LACTATE, POTASSIUM CHLORIDE, CALCIUM CHLORIDE 600; 310; 30; 20 MG/100ML; MG/100ML; MG/100ML; MG/100ML
75 INJECTION, SOLUTION INTRAVENOUS CONTINUOUS
Status: DISCONTINUED | OUTPATIENT
Start: 2021-04-05 | End: 2021-04-05 | Stop reason: HOSPADM

## 2021-04-05 RX ADMIN — POTASSIUM CHLORIDE 40 MEQ: 1500 TABLET, EXTENDED RELEASE ORAL at 09:32

## 2021-04-05 RX ADMIN — TICAGRELOR 90 MG: 90 TABLET ORAL at 21:28

## 2021-04-05 RX ADMIN — SODIUM CHLORIDE 75 ML/HR: 900 INJECTION, SOLUTION INTRAVENOUS at 03:57

## 2021-04-05 RX ADMIN — CEFEPIME HYDROCHLORIDE 2 G: 2 INJECTION, POWDER, FOR SOLUTION INTRAVENOUS at 21:28

## 2021-04-05 RX ADMIN — SODIUM CHLORIDE, SODIUM LACTATE, POTASSIUM CHLORIDE, AND CALCIUM CHLORIDE: 600; 310; 30; 20 INJECTION, SOLUTION INTRAVENOUS at 18:52

## 2021-04-05 RX ADMIN — ATORVASTATIN CALCIUM 80 MG: 40 TABLET, FILM COATED ORAL at 09:33

## 2021-04-05 RX ADMIN — FAMOTIDINE 20 MG: 20 TABLET ORAL at 10:48

## 2021-04-05 RX ADMIN — INSULIN LISPRO 3 UNITS: 100 INJECTION, SOLUTION INTRAVENOUS; SUBCUTANEOUS at 18:54

## 2021-04-05 RX ADMIN — METRONIDAZOLE 500 MG: 500 INJECTION, SOLUTION INTRAVENOUS at 17:03

## 2021-04-05 RX ADMIN — AMLODIPINE BESYLATE 5 MG: 5 TABLET ORAL at 09:33

## 2021-04-05 RX ADMIN — TICAGRELOR 90 MG: 90 TABLET ORAL at 09:33

## 2021-04-05 RX ADMIN — PROPOFOL 40 MG: 10 INJECTION, EMULSION INTRAVENOUS at 19:00

## 2021-04-05 RX ADMIN — VANCOMYCIN HYDROCHLORIDE 1250 MG: 10 INJECTION, POWDER, LYOPHILIZED, FOR SOLUTION INTRAVENOUS at 23:49

## 2021-04-05 RX ADMIN — CARVEDILOL 6.25 MG: 6.25 TABLET, FILM COATED ORAL at 09:33

## 2021-04-05 RX ADMIN — CEFEPIME HYDROCHLORIDE 2 G: 2 INJECTION, POWDER, FOR SOLUTION INTRAVENOUS at 09:34

## 2021-04-05 RX ADMIN — GADOTERATE MEGLUMINE 15 ML: 376.9 INJECTION INTRAVENOUS at 08:56

## 2021-04-05 RX ADMIN — PROPOFOL 60 MCG/KG/MIN: 10 INJECTION, EMULSION INTRAVENOUS at 18:58

## 2021-04-05 RX ADMIN — POTASSIUM CHLORIDE 20 MEQ: 1500 TABLET, EXTENDED RELEASE ORAL at 13:20

## 2021-04-05 RX ADMIN — PROPOFOL 60 MCG/KG/MIN: 10 INJECTION, EMULSION INTRAVENOUS at 18:56

## 2021-04-05 RX ADMIN — IRON SUCROSE 200 MG: 20 INJECTION, SOLUTION INTRAVENOUS at 11:41

## 2021-04-05 RX ADMIN — CARVEDILOL 6.25 MG: 6.25 TABLET, FILM COATED ORAL at 16:30

## 2021-04-05 RX ADMIN — AMLODIPINE BESYLATE 5 MG: 5 TABLET ORAL at 12:56

## 2021-04-05 RX ADMIN — FAMOTIDINE 20 MG: 20 TABLET ORAL at 18:00

## 2021-04-05 RX ADMIN — PROPOFOL 40 MG: 10 INJECTION, EMULSION INTRAVENOUS at 18:58

## 2021-04-05 RX ADMIN — VANCOMYCIN HYDROCHLORIDE 1250 MG: 10 INJECTION, POWDER, LYOPHILIZED, FOR SOLUTION INTRAVENOUS at 11:42

## 2021-04-05 RX ADMIN — ASPIRIN 81 MG: 81 TABLET, CHEWABLE ORAL at 09:33

## 2021-04-05 NOTE — PROGRESS NOTES
MRI Screening form needs to be filled out and faxed to [phone] MRI can be scheduled. If unable to obtain information from pt, [facility] needs to be contacted.  If pt is claustro or will need pain meds, please have ordered in advance in order to facilitate exam.

## 2021-04-05 NOTE — PROGRESS NOTES
[facility]: Per phlebotomist, pt refused blood draw as ordered. Nurse explained to pt the need for her labs to be drawn. Pt stated she doesn't want the use of needle on her if it is the only way to draw blood.

## 2021-04-05 NOTE — PROGRESS NOTES
Reason for Admission:  Diabetic foot infection ([facility] [P52.872, L08.9]                 RUR Score:  16%           Plan for utilizing home health: To be determined                    Likelihood of Readmission:   LOW                         Transition of Care Plan:              Initial assessment completed with patient. Cognitive status of patient: oriented to time, place, person and situation. Face sheet information confirmed:  yes. The patient designates Karis Ask, spouse ([phone]) and Mei An, mother ([phone]) to participate in her discharge plan and to receive any needed information. This patient lives in a [facility] with spouse and children. Patient is able to navigate steps as needed. Prior to hospitalization, patient was considered to be independent with ADLs/IADLS : yes . Patient has a current ACP document on file: no      Healthcare Decision Maker:  no    The patient's spouse will be available to transport patient home upon discharge. The patient reported no medical equipment available in the home. Patient is not currently active with home health. Patient has not stayed in a skilled nursing facility or rehab. This patient is on dialysis :no     Freedom of choice signed: no.    Currently, the discharge plan is Home with family assistance    CM will continue to monitor and assist with transitional needs. The patient states that she can obtain her medications from the pharmacy, and take her medications as directed. Patient's current insurance is [facility] Medicaid      Care Management Interventions  PCP Verified by CM:  Yes  Mode of Transport at Discharge: Self  Transition of Care Consult (CM Consult): Discharge Planning  Discharge Durable Medical Equipment: No  Physical Therapy Consult: No  Occupational Therapy Consult: No  Speech Therapy Consult: No  Current Support Network: Lives with Spouse  Confirm Follow Up Transport: Self  Discharge Location  Discharge Placement: Home with family assistance        ROLAND Lepe, RN  Pager # [phone]  Care Manager

## 2021-04-05 NOTE — PROGRESS NOTES
Problem: Diabetes Self-Management  Goal: *Disease process and treatment process  Description: Define diabetes and identify own type of diabetes; list 3 options for treating diabetes. Outcome: Progressing Towards Goal  Goal: *Incorporating nutritional management into lifestyle  Description: Describe effect of type, amount and timing of food on blood glucose; list 3 methods for planning meals. Outcome: Progressing Towards Goal  Goal: *Incorporating physical activity into lifestyle  Description: State effect of exercise on blood glucose levels. Outcome: Progressing Towards Goal  Goal: *Developing strategies to promote health/change behavior  Description: Define the ABC's of diabetes; identify appropriate screenings, schedule and personal plan for screenings. Outcome: Progressing Towards Goal  Goal: *Using medications safely  Description: State effect of diabetes medications on diabetes; name diabetes medication taking, action and side effects. Outcome: Progressing Towards Goal  Goal: *Monitoring blood glucose, interpreting and using results  Description: Identify recommended blood glucose targets  and personal targets. Outcome: Progressing Towards Goal  Goal: *Prevention, detection, treatment of acute complications  Description: List symptoms of hyper- and hypoglycemia; describe how to treat low blood sugar and actions for lowering  high blood glucose level. Outcome: Progressing Towards Goal  Goal: *Prevention, detection and treatment of chronic complications  Description: Define the natural course of diabetes and describe the relationship of blood glucose levels to long term complications of diabetes.   Outcome: Progressing Towards Goal  Goal: *Developing strategies to address psychosocial issues  Description: Describe feelings about living with diabetes; identify support needed and support network  Outcome: Progressing Towards Goal  Goal: *Patient Specific Goal (EDIT GOAL, INSERT TEXT)  Outcome: Progressing Towards Goal     Problem: Patient Education: Go to Patient Education Activity  Goal: Patient/Family Education  Outcome: Progressing Towards Goal     Problem: Falls - Risk of  Goal: *Absence of Falls  Description: Document Rylan Banerjee Fall Risk and appropriate interventions in the flowsheet.   Outcome: Progressing Towards Goal  Note: Fall Risk Interventions:            Medication Interventions: Bed/chair exit alarm, Evaluate medications/consider consulting pharmacy, Patient to call before getting OOB, Teach patient to arise slowly    Elimination Interventions: Bed/chair exit alarm, Call light in reach, Patient to call for help with toileting needs, Stay With Me (per policy), Toilet paper/wipes in reach, Toileting schedule/hourly rounds    History of Falls Interventions: Bed/chair exit alarm, Evaluate medications/consider consulting pharmacy, Investigate reason for fall, Room close to nurse's station, Assess for delayed presentation/identification of injury for 48 hrs (comment for end date), Vital signs minimum Q4HRs X 24 hrs (comment for end date)         Problem: Patient Education: Go to Patient Education Activity  Goal: Patient/Family Education  Outcome: Progressing Towards Goal

## 2021-04-05 NOTE — DIABETES MGMT
GLYCEMIC CONTROL PLAN OF CARE    Assessment:  Morning lab glucose:  187 mg/dl  IVF containing dextrose:  None   Steroids:  None   Diet/TF:  NPO for procedure    Recommendations:  Continue basal and corrective insulin coverage as ordered  Will continue inpatient monitoring. Most recent BG values:      Results for Cristobal Frank (MRN [id number]) as of 4/5/2021 13:43   Ref. Range 4/4/2021 20:14 4/5/2021 09:45 4/5/2021 13:24   GLUCOSE,FAST - POC Latest Ref Range: 70 - 110 mg/dL 353 (H) 206 (H) 153 (H)     Within target range  mg/dl? Progressing towards goal.  Current A1C:  9.5%  equivalent to an average blood glucose of 226 mg/dl over the past 2-3 months. Adequate glycemic control PTA? No   Current hospital diabetes medications:  lantus 30 units daily  Lispro corrective insulin coverage  AC&HS  Previous days insulin requirements:  lantus 30 units  Home diabetes medication:  Per pta med list.  Unverified. Lantus 50 units every bedtime  Metformin 250 mg every pm  Education:  ___ see diabetes education record            x__ diabetes education not indicated at this time. Pt not in room. Went for procedure.   [facility] RN CDE  Ext [phone]

## 2021-04-05 NOTE — ANESTHESIA PREPROCEDURE EVALUATION
Relevant Problems   CARDIOVASCULAR   (+) HTN (hypertension)   (+) NSTEMI (non-ST elevated myocardial infarction) (HCC)      ENDOCRINE   (+) Insulin dependent diabetes mellitus   (+) Severe obesity (HCC)       Anesthetic History   No history of anesthetic complications            Review of Systems / Medical History  Patient summary reviewed and pertinent labs reviewed    Pulmonary  Within defined limits                 Neuro/Psych   Within defined limits           Cardiovascular    Hypertension          CAD         GI/Hepatic/Renal                Endo/Other    Diabetes: type 2    Morbid obesity     Other Findings              Physical Exam    Airway  Mallampati: III  TM Distance: 4 - 6 cm  Neck ROM: decreased range of motion   Mouth opening: Diminished (comment)     Cardiovascular    Rhythm: regular  Rate: normal         Dental    Dentition: Poor dentition     Pulmonary  Breath sounds clear to auscultation               Abdominal  GI exam deferred       Other Findings            Anesthetic Plan    ASA: 3  Anesthesia type: MAC and general - backup            Anesthetic plan and risks discussed with: Patient

## 2021-04-05 NOTE — ED NOTES
Patient in bed resting quietly showing no signs of distress. Patient requested bedside commode which was provided. Patient has no further request or complaints at this time.

## 2021-04-05 NOTE — ACP (ADVANCE CARE PLANNING)
Advance Care Planning     General Advance Care Planning (ACP) Conversation      Date of Conversation: 4/5/21  Conducted with: Patient with Decision Making Capacity    Healthcare Decision Maker: no    Today we discussed Healthcare Decision Makers. The patient is considering options. Content/Action Overview:   DECLINED ACP conversation - will revisit periodically       ROLAND Montalvo, RN  Pager # [phone]  Care Manager

## 2021-04-05 NOTE — CONSULTS
Podiatry History and Physical    Subjective:         Date of Consultation:  2021    Patient is a 40 y.o. female who is being seen for left foot abscess with plantar ulcer. Patient has chronic ulcer to left submet 1. She was seen outpatient in office for management of ulcer. She was recommended to use forefoot offloading shoe to help heal ulcer. She states that over weekend she noticed bulla to medial 1st MTPJ. Patient noted to have warmness to foot and was admitted for further management. Denies N/V/c/F. Patient Active Problem List    Diagnosis Date Noted    Severe obesity ([facility] [address] 2020    Fever 10/20/2020    [facility]STEMI (non-ST elevated [facility] infarction) [facility] 10/20/2020    Insulin dependent diabetes mellitus 2019    HTN (hypertension) 2019    Diabetic foot infection ([facility] 2018    Right foot infection 2018    Acute pain of right foot 2018    Acute bronchitis 2017    Hyperglycemia 2017     Past Medical History:   Diagnosis Date    CAD (coronary artery disease)     Diabetes ([facility]     HTN (hypertension)     Hyperlipidemia       Family History   Problem Relation Age of Onset    Lupus Mother     Cancer Neg Hx     Diabetes Neg Hx     Heart Disease Neg Hx     Hypertension Neg Hx     Heart Attack Neg Hx     Stroke Neg Hx       Social History     Tobacco Use    Smoking status: Never Smoker    Smokeless tobacco: Never Used   Substance Use Topics    Alcohol use: No     Past Surgical History:   Procedure Laterality Date    HX CORONARY STENT PLACEMENT      HX GYN            Prior to Admission medications    Medication Sig Start Date End Date Taking? Authorizing Provider   furosemide (LASIX) 20 mg tablet Take 1 Tab by mouth daily as needed (Edema). 21  Yes Silvia Sanz MD   atorvastatin (LIPITOR) 80 mg tablet Take 1 Tab by mouth daily. 21  Yes Mal Thapa MD   losartan (COZAAR) 100 mg tablet Take 1 Tab by mouth daily. 21  Yes Carito Guajardo MD   carvediloL (Coreg) 6.25 mg tablet Take 1 Tab by mouth two (2) times daily (with meals). 12/3/20  Yes Tc Goodson MD   ticagrelor (BRILINTA) 90 mg tablet Take 1 Tab by mouth every twelve (12) hours every twelve (12) hours. 20  Yes Rosalio Henderson NP   insulin glargine (LANTUS,BASAGLAR) 100 unit/mL (3 mL) inpn Take 45 units daily  Indications: type 2 diabetes mellitus  Patient taking differently: 50 Units nightly. Indications: type 2 diabetes mellitus 10/23/20  Yes Jos Shaffer NP   aspirin 81 mg chewable tablet Take 1 Tab by mouth daily. Patient taking differently: Take 325 mg by mouth daily. 10/24/20  Yes Jos Sahffer NP   metFORMIN ER (GLUCOPHAGE XR) 500 mg tablet Take 1 Tab by mouth daily (with dinner). Patient taking differently: Take 250 mg by mouth daily (with dinner). 0.5 tab daily 20  Yes Carito Guajardo MD   melatonin 5 mg cap capsule Take 1 Cap by mouth nightly. Patient taking differently: Take 5 mg by mouth nightly. Reports taking every other day 20  Yes Carito Guajardo MD   Insulin Needles, Disposable, 31 gauge x 5/16\" ndle Use to check blood glucose BID 20  Yes Carito Guajardo MD   Insulin Syringe-Needle U-100 (INSULIN SYRINGE) 1 mL 30 gauge x 5/16 syrg As directed for lantus insulin 19  Yes Leanne Duarte MD   insulin lispro (HUMALOG) 100 unit/mL injection For use in the Insulin pump 21   America Calles MD     Allergies   Allergen Reactions    Azithromycin Other (comments)     Rapid response was called for bradycardia and hypotension     Pcn [Penicillins] Hives        Review of Systems:  A comprehensive review of systems was negative except for that written in the HPI.     Objective:     Patient Vitals for the past 8 hrs:   BP Temp Pulse Resp SpO2   21 1830 (!) 140/69 -- (!) 104 16 100 %   21 1321 130/81 99.1 °F (37.3 °C) 100 20 100 %     Temp (24hrs), Av.6 °F (37.6 °C), Min:99 °F (37.2 °C), Max:100.9 °F (38.3 °C)      Bilateral ELADIO: Palpable pedal pulses 2/4 DP and PT, sparse pedal hair growth noted, left submet 1 ulcer covered with hyperkeratotic tissue, no erythema or edema noted, bulla localized to medial left 1st MTPJ, warm to touch to left foot. Diminished protective sensation noted to both feet. 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 Toes 2-5 dorsally contracted bilateral with varus rotation of fifth toes.  Hyperkeratotic skin noted to dorsolateral fifth toes bilateral.     Data Review:   Recent Results (from the past 24 hour(s))   POC LACTIC ACID    Collection Time: 04/04/21  7:12 PM   Result Value Ref Range    Lactic Acid (POC) 1.07 0.40 - 2.00 mmol/L   GLUCOSE, POC    Collection Time: 04/04/21  8:14 PM   Result Value Ref Range    Glucose (POC) 353 (H) 70 - 110 mg/dL   SARS-COV-2    Collection Time: 04/04/21  9:08 PM   Result Value Ref Range    SARS-CoV-2 Please find results under separate order     COVID-19 RAPID TEST    Collection Time: 04/04/21  9:08 PM   Result Value Ref Range    Specimen source Nasopharyngeal      COVID-19 rapid test Not detected NOTD     METABOLIC PANEL, BASIC    Collection Time: 04/05/21  6:15 AM   Result Value Ref Range    Sodium 141 136 - 145 mmol/L    Potassium 3.2 (L) 3.5 - 5.5 mmol/L    Chloride 114 (H) 100 - 111 mmol/L    CO2 20 (L) 21 - 32 mmol/L    Anion gap 7 3.0 - 18 mmol/L    Glucose 187 (H) 74 - 99 mg/dL    BUN 7 7.0 - 18 MG/DL    Creatinine 0.90 0.6 - 1.3 MG/DL    BUN/Creatinine ratio 8 (L) 12 - 20      GFR est AA >60 >60 ml/min/1.73m2    GFR est non-AA >60 >60 ml/min/1.73m2    Calcium 8.0 (L) 8.5 - 10.1 MG/DL   CBC WITH AUTOMATED DIFF    Collection Time: 04/05/21  6:15 AM   Result Value Ref Range    WBC 11.7 4.6 - 13.2 K/uL    RBC 3.60 (L) 4.20 - 5.30 M/uL    HGB 7.7 (L) 12.0 - 16.0 g/dL    HCT 24.2 (L) 35.0 - 45.0 %    MCV 67.2 (L) 74.0 - 97.0 FL    MCH 21.4 (L) 24.0 - 34.0 PG    MCHC 31.8 31.0 - 37.0 g/dL    RDW 17.7 (H) 11.6 - 14.5 %    PLATELET 215 991 - 153 K/uL    MPV 9.7 9.2 - 11.8 FL    NEUTROPHILS 80 (H) 40 - 73 %    LYMPHOCYTES 12 (L) 21 - 52 %    MONOCYTES 7 3 - 10 %    EOSINOPHILS 1 0 - 5 %    BASOPHILS 0 0 - 2 %    ABS. NEUTROPHILS 9.5 (H) 1.8 - 8.0 K/UL    ABS. LYMPHOCYTES 1.4 0.9 - 3.6 K/UL    ABS. MONOCYTES 0.8 0.05 - 1.2 K/UL    ABS. EOSINOPHILS 0.1 0.0 - 0.4 K/UL    ABS. BASOPHILS 0.0 0.0 - 0.1 K/UL    DF AUTOMATED     MAGNESIUM    Collection Time: 04/05/21  6:15 AM   Result Value Ref Range    Magnesium 2.1 1.6 - 2.6 mg/dL   NT-PRO BNP    Collection Time: 04/05/21  6:15 AM   Result Value Ref Range    NT pro- (H) 0 - 450 PG/ML   GLUCOSE, POC    Collection Time: 04/05/21  9:45 AM   Result Value Ref Range    Glucose (POC) 206 (H) 70 - 110 mg/dL   GLUCOSE, POC    Collection Time: 04/05/21  1:24 PM   Result Value Ref Range    Glucose (POC) 153 (H) 70 - 110 mg/dL         Impression:     DM with chronic ulcer, bulla to left foot, hallux malleus and hammer toe deformity, diabetic neuropathy, left foot cellulitis      Recommendation:     - x-rays and MRI reviewed. No acute bony change noted. Blistering cellulitis at the medial left forefoot. Plantar soft tissue ulceration with short length sinus tract. No drainable abscess. No osteomyelitis. - Patient needs OR I & D with bone biopsy. Left plantar foot ulcer noted to have improved with forefoot offloading shoe. - Patient will need left foot Pruitt tenosuspension procedure once we r/o infection in order to prevent ulcer to left submet 1. Will perform outpatient procedure once she recovers from cellulitis. - Abx management per ID recommendation.   - Remain NWB to left forefoot.   - NPO confirmed for procedure tonight.

## 2021-04-05 NOTE — CONSULTS
[facility] - Consult Note    Consultation request by Dr. Harleen Gregory for cardiac clearance. Dr. Regine Grullon will evaluate patient       Date of  Admission: 4/4/2021  3:52 PM   Primary Care Physician:  Fawad Tran MD     Assessment:       - CAD- s/p Cardiac cath 10/22/20- stable no anginal symptoms          100% mid RCA occlusion with thrombus. S/p aspiration thrombectomy followed by ptca/stent ( 2 BLANCA stents placed overlapping extending from mid RCA to distal RCA). Mild mid LAD stenosis. -Chronic diastolic heart failure  - Left foot infection and sepsis on admission   - Hypertension- uncontrolled   - Hx of high degree AV block - 10/20  s/p TPM due to complete heart block during PCI. Post PCI developed juctional tachycardia. TPM was removed. ekg 4/5/21 sinus tachycardia with nonspecific T wave abnormality   - Hyperlipidemia-   - Diabetes-poorly controlled with A1c 8.5  - Hx of COV-19 infection- reports intermittent SOB since COV19  - Hypokalemia          Plan:     Continue asa, bb,  and Brilinta. Continue statin and follow lipid panel   OK to proceed with surgery with low to moderate  risk from cardiac stand point   Continue to monitor for s/s fluid overload. Consider to resume  Lasix post procedure   Increased Norvasc as BP is elevated. May consider to resume ARB as renal function improved. Continue to replace K+. Will give additional 20 meq. Follow mag level  Obtain echo to reassess lvf. Follow NT pro BNP and troponin   Sepsis/infection recommendations per Podiatry. Plan for surgery today possible debridement         Patient with CAD s/p PCI in oct 2020- ( BLANCA to mid to distal RCA) seen for pre op evaluation. Unclear about what type of surgery is planned. Recommend to continue DAPT for now. O/e- nvbs  Regular heart sounds with no significant heart murmur. Left toe inflamed and swollen. Will f/u       History of Present Illness:      This is a 40 y.o. female admitted [facility] [U19.707, L08.9]. Tammie Grajeda is a 40 y.o. female who presented to the hospital due to left foot pain infection. Patient with PMHx of CAD s/p PCI 12/20, hypertension, diabetes, hyperlipidemia and morbid obesity. Patient presented to the ED with left foot draining pus. Patient reports area to her left foot initially presented as a callous in February 2021, she followed with podiatry in early March 2021 with recommendation for surgical boot to ease weight bearing and now draining pus. Patient denies fevers at home but states she is always cold. She reports her blood sugars at home have been running generally in the 200s and she reports compliance with home medications as prescribed. Patient states she has been having dyspnea with exertion which is worse since she contracted Covid in February 2021. Otherwise patient is without current complaints including no headaches, cough, chest pain, no shortness of breath at rest, denies current nausea (she does report some nausea associated with Metformin use), denies vomiting, abdominal pain, no swelling of extremities. She does have some pain to her left foot. Patient also notes she received her second Envia LÃ¡ vaccine . On my exam patient resting in bed comfortable. No distress noted no leg swelling. No dizziness no chest pain no chest pressure.        ROS    Constitutional: No fever, no chills, no weight loss, no night sweats   HEENT: No epistaxis, no nasal drainage, no difficulty in swallowing, no redness in eyes  Respiratory:  dyspnea on exertion or emphysema  Cardiovascular:  dyspnea  Gastrointestinal: no abd pain, no vomiting, no diarrhea, no bleeding symptoms  Genitourinary: No urinary symptoms or hematuria  Integument/breast: No ulcers or rashes  Musculoskeletal: left foot pain  Neurological: No focal weakness, no seizures, no headaches  Behvioral/Psych: No anxiety, no depression     Past Medical History:     Past Medical History:   Diagnosis Date    CAD (coronary artery disease)     Diabetes ([facility]     HTN (hypertension)     Hyperlipidemia          Social History:     Social History     Socioeconomic History    Marital status:      Spouse name: Not on file    Number of children: Not on file    Years of education: Not on file    Highest education level: Not on file   Tobacco Use    Smoking status: Never Smoker    Smokeless tobacco: Never Used   Substance and Sexual Activity    Alcohol use: No    Drug use: No    Sexual activity: Yes     Partners: Male        Family History:     Family History   Problem Relation Age of Onset    Lupus Mother     Cancer Neg Hx     Diabetes Neg Hx     Heart Disease Neg Hx     Hypertension Neg Hx     Heart Attack Neg Hx     Stroke Neg Hx         Medications:      Allergies   Allergen Reactions    Azithromycin Other (comments)     Rapid response was called for bradycardia and hypotension     Pcn [Penicillins] Hives        Current Facility-Administered Medications   Medication Dose Route Frequency    melatonin (rapid dissolve) tablet 5 mg  5 mg Oral QHS PRN    vancomycin (VANCOCIN) 1250 mg in  ml infusion  1,250 mg IntraVENous Q12H    iron sucrose (VENOFER) 200 mg in 0.9% sodium chloride 100 mL IVPB  200 mg IntraVENous Q24H    famotidine (PEPCID) tablet 20 mg  20 mg Oral BID    sodium chloride (NS) flush 5-10 mL  5-10 mL IntraVENous PRN    metroNIDAZOLE (FLAGYL) IVPB premix 500 mg  500 mg IntraVENous Q12H    insulin lispro (HUMALOG) injection   SubCUTAneous AC&HS    glucose chewable tablet 16 g  4 Tab Oral PRN    glucagon (GLUCAGEN) injection 1 mg  1 mg IntraMUSCular PRN    dextrose (D50W) injection syrg 12.5-25 g  25-50 mL IntraVENous PRN    hydrALAZINE (APRESOLINE) 20 mg/mL injection 10 mg  10 mg IntraVENous Q6H PRN    atorvastatin (LIPITOR) tablet 80 mg  80 mg Oral DAILY    ticagrelor (BRILINTA) tablet 90 mg  90 mg Oral Q12H    0.9% sodium chloride infusion 50 mL/hr IntraVENous CONTINUOUS    aspirin chewable tablet 81 mg  81 mg Oral DAILY    insulin glargine (LANTUS) injection 30 Units  30 Units SubCUTAneous DAILY    carvediloL (COREG) tablet 6.25 mg  6.25 mg Oral BID WITH MEALS    amLODIPine (NORVASC) tablet 5 mg  5 mg Oral DAILY    cefepime (MAXIPIME) 2 g in sterile water (preservative free) 10 mL IV syringe  2 g IntraVENous Q12H    ondansetron (ZOFRAN) injection 4 mg  4 mg IntraVENous Q4H PRN         Physical Exam:     Visit Vitals  BP (!) 173/93 (BP 1 Location: Right upper arm, BP Patient Position: At rest)   Pulse (!) 108   Temp (!) 100.9 °F (38.3 °C)   Resp 20   Ht 5' 6\" (1.676 m)   Wt 91.5 kg (201 lb 11.2 oz)   SpO2 99%   BMI 32.56 kg/m²     BP Readings from Last 3 Encounters:   04/05/21 (!) 173/93   02/08/21 132/82   12/13/20 (!) 155/94     Pulse Readings from Last 3 Encounters:   04/05/21 (!) 108   02/08/21 (!) 105   12/13/20 93     Wt Readings from Last 3 Encounters:   04/05/21 91.5 kg (201 lb 11.2 oz)   02/08/21 97.1 kg (214 lb)   12/13/20 100.7 kg (222 lb)       General:  alert, cooperative, no distress, appears stated age, morbidly obese  Skin: Warm and dry, acyanotic, normal color. Head: Normocephalic, atraumatic. Eyes: Sclerae anicteric, conjunctivae without injection. Neck:  nontender, no nuchal rigidity, no masses, no stridor, no carotid bruit, no JVD  Lungs:  diminished breath sounds b/l. Heart:  regular rate and rhythm, S1, S2 normal, no murmur, click, rub or gallop  Abdomen:  abdomen is soft without significant tenderness, masses, organomegaly or guarding  Extremities:  extremities normal, atraumatic, no cyanosis. LLE with large blister . Mild edema and erythema noted. Neurological: grossly intact. No focal abnormalities, moves all extremities well. Psychiatric Affect: The patient is awake, alert and oriented x3. [place] is interactive and appropriate.      Data Review:     Recent Labs     04/05/21  0615 04/04/21  1709   WBC 11.7 14.4*   HGB 7.7* 8.5*   HCT 24.2* 26.4*    251     Recent Labs     04/05/21  0615 04/04/21  1709    133*   K 3.2* 3.3*   * 100   CO2 20* 21   * 568*   BUN 7 10   CREA 0.90 1.54*   CA 8.0* 8.5   MG 2.1  --    ALB  --  1.8*   ALT  --  38       Results for orders placed or performed during the hospital encounter of 04/04/21   EKG, 12 LEAD, INITIAL   Result Value Ref Range    Ventricular Rate 106 BPM    Atrial Rate 106 BPM    P-R Interval 130 ms    QRS Duration 84 ms    Q-T Interval 328 ms    QTC Calculation (Bezet) 435 ms    Calculated P Axis 64 degrees    Calculated R Axis 49 degrees    Calculated T Axis 33 degrees    Diagnosis       Sinus tachycardia  Possible Left atrial enlargement  Left ventricular hypertrophy  Nonspecific T wave abnormality  Abnormal ECG  When compared with ECG of 13-DEC-2020 01:01,  No significant change was found  Confirmed by Tequila Alford [id number]) on 4/5/2021 7:43:18 AM         All Cardiac Markers in the last 24 hours:  No results found for: CPK, CK, CKMMB, CKMB, RCK3, CKMBT, CKNDX, CKND1, ROBERTA, TROPT, TROIQ, KEATON, TROPT, TNIPOC, BNP, BNPP    Last Lipid:    Lab Results   Component Value Date/Time    Cholesterol, total 218 (H) 09/28/2020 01:00 PM    HDL Cholesterol 49 09/28/2020 01:00 PM    LDL, calculated 121 (H) 09/28/2020 01:00 PM    Triglyceride 241 (H) 09/28/2020 01:00 PM       Signed By: Christiana Macias NP supervised    April 5, 2021      I have independently evaluated and examined the patient. All relevant labs and testing data's are reviewed. Care plan discussed and updated after review.     Fe Mcdonald MD

## 2021-04-05 NOTE — PROGRESS NOTES
Spoke with pharmacy - patient has tolerated rocephin and cefpodoxime in the past.  Will transition to cefepime, cont flagyl and vanco.     Signed By: Sheila Hester NP     April 4, 2021

## 2021-04-05 NOTE — PROGRESS NOTES
1800: Pt off floor to OR via bed. Bedside and Verbal shift change report given to Quentin Shabazz RN (oncoming nurse) by Diana Montgomery (offgoing nurse). Report included the following information SBAR, Kardex, MAR and Recent Results.     SITUATION:    Code Status: Prior   Reason for Admission: Diabetic foot infection ([facility] [R54.953, L08.9]    [facility] day: 1   Problem List:       Hospital Problems  Date Reviewed: 2/8/2021          Codes Class Noted POA    Diabetic foot infection [facility]) ICD-10-CM: E11.628, L08.9  ICD-9-CM: 250.80, 686.9  12/30/2018 Unknown              BACKGROUND:    Past Medical History:   Past Medical History:   Diagnosis Date    CAD (coronary artery disease)     Diabetes ([facility]     HTN (hypertension)     Hyperlipidemia          Patient taking anticoagulants no     ASSESSMENT:    Changes in Assessment Throughout Shift: N/A     Patient has Central Line: no Reasons if yes: N/A   Patient has Lucas Cath: no Reasons if yes: N/A      Last Vitals:     Vitals:    04/05/21 0230 04/05/21 0313 04/05/21 0950 04/05/21 1321   BP: (!) 142/71 (!) 145/97 (!) 173/93 130/81   Pulse: 98 (!) 106 (!) 108 100   Resp: 22 20 20 20   Temp: 99.6 °F (37.6 °C) 99 °F (37.2 °C) (!) 100.9 °F (38.3 °C) 99.1 °F (37.3 °C)   SpO2: 99% 100% 99% 100%   Weight:  91.5 kg (201 lb 11.2 oz)     Height:            IV and DRAINS (will only show if present)   Peripheral IV 04/04/21 Left Antecubital-Site Assessment: Clean, dry, & intact     WOUND (if present)   Wound Type: Diabetic foot ulcer   Dressing present Dressing Present : No   Wound Concerns/Notes:  none     PAIN    Pain Assessment    Pain Intensity 1: 0 (04/05/21 1321)    Pain Location 1: Toe (comment which one)         Patient Stated Pain Goal: 0  o Interventions for Pain:  none  o Intervention effective: no  o Time of last intervention: n/a   o Reassessment Completed: 1750  o       Last 3 Weights:  Last 3 Recorded Weights in this Encounter    04/04/21 1548 04/05/21 0313   Weight: 90.3 kg (199 lb) 91.5 kg (201 lb 11.2 oz)     Weight change:      INTAKE/OUPUT    Current Shift: 04/05 0701 - 04/05 1900  In: 927.1 [I.V.:927.1]  Out: -     Last three shifts: 04/03 1901 - 04/05 0700  In: 283.8 [I.V.:283.8]  Out: -      LAB RESULTS     Recent Labs     04/05/21  0615 04/04/21  1709   WBC 11.7 14.4*   HGB 7.7* 8.5*   HCT 24.2* 26.4*    251        Recent Labs     04/05/21  0615 04/04/21  1709    133*   K 3.2* 3.3*   * 568*   BUN 7 10   CREA 0.90 1.54*   CA 8.0* 8.5   MG 2.1  --        RECOMMENDATIONS AND DISCHARGE PLANNING     1. Pending tests/procedures/ Plan of Care or Other Needs: Out for surgery on foot     2. Discharge plan for patient and Needs/Barriers: n/a    3. Estimated Discharge Date: TBD Posted on Whiteboard in Patients Room: no      4. The patient's care plan was reviewed with the oncoming nurse. \"HEALS\" SAFETY CHECK      Fall Risk    Total Score: 3    Safety Measures: Safety Measures: Bed/Chair-Wheels locked, Bed in low position, Call light within reach, Fall prevention (comment), Gripper socks, Nurse at bedside, Side rails X 3    A safety check occurred in the patient's room between off going nurse and oncoming nurse listed above. The safety check included the below items  Area Items   H  High Alert Medications - Verify all high alert medication drips (heparin, PCA, etc.)   E  Equipment - Suction is set up for ALL patients (with yanker)  - Red plugs utilized for all equipment (IV pumps, etc.)  - WOWs wiped down at end of shift.  - Room stocked with oxygen, suction, and other unit-specific supplies   A  Alarms - Bed alarm is set for fall risk patients  - Ensure chair alarm is in place and activated if patient is up in a chair   L  Lines - Check IV for any infiltration  - Lucas bag is empty if patient has a Lucas   - Tubing and IV bags are labeled   S  Safety   - Room is clean, patient is clean, and equipment is clean.   - Hallways are clear from equipment besides carts. - Fall bracelet on for fall risk patients  - Ensure room is clear and free of clutter  - Suction is set up for ALL patients (with sathish)  - Hallways are clear from equipment besides carts.    - Isolation precautions followed, supplies available outside room, sign posted     Eder Knights

## 2021-04-05 NOTE — PROGRESS NOTES
[facility]ist [facility]  Progress Note    Patient: Yanet Sewell Age: 40 y.o. : 1975 MR#: [id number] SSN: [id number]  Date: 2021     Subjective:   Patient seen in the presence of stepdown nurse. The patient c/o mild pain in the left foot. No other complaints. Temp 100.9 F this AM. HR 100s. Stable on room air. Blood glucose 353 last night. 187, 206  this AM   Resolved leukocytosis. Hgb down to 7.7 from 8.5   Na 141 from 133   Cr better     Blood cultures neg to date. Xray left foot: Marked soft tissue swelling with possible plantar heel ulcer and ulcer at the  tip of the left first toe. No definite finding for osteomyelitis or acute fracture. MRI left foot: . Blistering cellulitis at the medial left forefoot  -Plantar soft tissue ulceration with short length sinus tract  -No drainable abscess  -No osteomyelitis           Assessment/Plan:     hcg negative     CONSULTS: Toshia Tran     1. Left foot Cellulitis with blistering cellulitis medial left forefoot, plantar soft tissue ulceration with short sinus tract, no drainable abscess no OM   2. Sepsis, POA  in setting of left foot cellulitis, with tachycardia and leukocytosis on admission, now low grade fever   3. Hypokalemia   4. Iron deficiency anemia, iron level 13   5. Hyponatremia - resolved. 6. Lactic acidosis from sepsis - resolved. 7. Type 2 diabetes, poorly controlled . A1c is 9.5% this admission. 8. HTN   9. CAD status post stent placement 2020  10. Chronic diastolic CHF   11. Chronic microcytic anemia   12. Hx of COVID infection in 2021, s/p 2 covid shots.     - replete K 40 meq, check Mg   - replete iron IV venofer x3 doses. Then change to oral iron   - continue broad spectrum  Antibiotics cefepime, flagyl, vanc. Blood cultures neg to date.   Keeping NPO for now until further input from podiatry   - case discussed with Podiatry- please keep NPO today for debridement with bone biopsy. - Cardiology consult for clearance. asa, brillinta,  and statin for CAD , recent stent . holding lasix   - rapid covid negative, take off droplet plus. - continue SSI and lantus 30 units daily , can titrate up if needed. DM consult. - resume coreg and norvasc, prn hydralazine. Holding cozaar in the setting of elevated Cr, can resume as Cr continues to improve   - will decrease rate of IVFs now, Cr much better, stop IVFs when patient is no longer NPO    - encourage OOB, incentive felipe    Patient does not need to be on stepdown   Case discussed with RN who was at bedside. Full code   dvt ppx- scds   Gi ppx- pepcid     Additional Notes:      Case discussed with:  [x]Patient  []Family  [x]Nursing  []Case Management  DVT Prophylaxis:  []Lovenox  []Hep SQ  [x]SCDs  []Coumadin   []On Heparin gtt    Objective:   VS:   Visit Vitals  BP (!) 145/97 (BP 1 Location: Right upper arm, BP Patient Position: Sitting)   Pulse (!) 106   Temp 99 °F (37.2 °C)   Resp 20   Ht 5' 6\" (1.676 m)   Wt 91.5 kg (201 lb 11.2 oz)   SpO2 100%   BMI 32.56 kg/m²      Tmax/24hrs: Temp (24hrs), Av.3 °F (37.4 °C), Min:98.9 °F (37.2 °C), Max:99.6 °F (37.6 °C)      Intake/Output Summary (Last 24 hours) at 2021 0851  Last data filed at 2021 0700  Gross per 24 hour   Intake 283.75 ml   Output --   Net 283.75 ml       General: AA woman laying in bed,   Alert, NAD  Cardiovascular:  RRR  Pulmonary:  LSC throughout; respiratory effort WNL, room air   GI:  +BS in all four quadrants, soft, non-tender  Extremities:  No edema; 2+ dorsalis pedis pulses bilaterally  Neuro: awake, alert, ox3 , moving arms legs, follow commands.      Her left foot is edematous all over, hot to palpation, there is an ulcer on the plantar surface medial aspect, no necrotic tissues noted no blood no drianage       Labs:    Recent Results (from the past 24 hour(s))   CULTURE, BLOOD    Collection Time: 21  4:58 PM    Specimen: Blood   Result Value Ref Range    Special Requests: NO SPECIAL REQUESTS      Culture result: NO GROWTH AFTER 13 HOURS     HCG QL SERUM    Collection Time: 04/04/21  5:02 PM   Result Value Ref Range    HCG, Ql. Negative NEG     CULTURE, BLOOD    Collection Time: 04/04/21  5:09 PM    Specimen: Blood   Result Value Ref Range    Special Requests: NO SPECIAL REQUESTS      Culture result: NO GROWTH AFTER 13 HOURS     METABOLIC PANEL, COMPREHENSIVE    Collection Time: 04/04/21  5:09 PM   Result Value Ref Range    Sodium 133 (L) 136 - 145 mmol/L    Potassium 3.3 (L) 3.5 - 5.5 mmol/L    Chloride 100 100 - 111 mmol/L    CO2 21 21 - 32 mmol/L    Anion gap 12 3.0 - 18 mmol/L    Glucose 568 (HH) 74 - 99 mg/dL    BUN 10 7.0 - 18 MG/DL    Creatinine 1.54 (H) 0.6 - 1.3 MG/DL    BUN/Creatinine ratio 6 (L) 12 - 20      GFR est AA 44 (L) >60 ml/min/1.73m2    GFR est non-AA 36 (L) >60 ml/min/1.73m2    Calcium 8.5 8.5 - 10.1 MG/DL    Bilirubin, total 0.8 0.2 - 1.0 MG/DL    ALT (SGPT) 38 13 - 56 U/L    AST (SGOT) 25 10 - 38 U/L    Alk. phosphatase 278 (H) 45 - 117 U/L    Protein, total 7.5 6.4 - 8.2 g/dL    Albumin 1.8 (L) 3.4 - 5.0 g/dL    Globulin 5.7 (H) 2.0 - 4.0 g/dL    A-G Ratio 0.3 (L) 0.8 - 1.7     CBC WITH AUTOMATED DIFF    Collection Time: 04/04/21  5:09 PM   Result Value Ref Range    WBC 14.4 (H) 4.6 - 13.2 K/uL    RBC 3.90 (L) 4.20 - 5.30 M/uL    HGB 8.5 (L) 12.0 - 16.0 g/dL    HCT 26.4 (L) 35.0 - 45.0 %    MCV 67.7 (L) 74.0 - 97.0 FL    MCH 21.8 (L) 24.0 - 34.0 PG    MCHC 32.2 31.0 - 37.0 g/dL    RDW 17.5 (H) 11.6 - 14.5 %    PLATELET 749 197 - 939 K/uL    MPV 9.8 9.2 - 11.8 FL    NEUTROPHILS 85 (H) 40 - 73 %    LYMPHOCYTES 8 (L) 21 - 52 %    MONOCYTES 7 3 - 10 %    EOSINOPHILS 0 0 - 5 %    BASOPHILS 0 0 - 2 %    ABS. NEUTROPHILS 12.2 (H) 1.8 - 8.0 K/UL    ABS. LYMPHOCYTES 1.2 0.9 - 3.6 K/UL    ABS. MONOCYTES 1.0 0.05 - 1.2 K/UL    ABS. EOSINOPHILS 0.0 0.0 - 0.4 K/UL    ABS.  BASOPHILS 0.0 0.0 - 0.1 K/UL    DF AUTOMATED      PLATELET COMMENTS ADEQUATE PLATELETS      RBC COMMENTS POLYCHROMASIA  1+        RBC COMMENTS SCHISTOCYTES  1+        RBC COMMENTS HYPOCHROMIA  1+       HEMOGLOBIN A1C WITH EAG    Collection Time: 04/04/21  5:09 PM   Result Value Ref Range    Hemoglobin A1c 9.5 (H) 4.2 - 5.6 %    Est. average glucose 226 mg/dL   FERRITIN    Collection Time: 04/04/21  5:09 PM   Result Value Ref Range    Ferritin 176 8 - 388 NG/ML   IRON PROFILE    Collection Time: 04/04/21  5:09 PM   Result Value Ref Range    Iron 13 (L) 50 - 175 ug/dL    TIBC 218 (L) 250 - 450 ug/dL    Iron % saturation 6 (L) 20 - 50 %   VITAMIN B12 & FOLATE    Collection Time: 04/04/21  5:09 PM   Result Value Ref Range    Vitamin B12 842 211 - 911 pg/mL    Folate 6.2 3.10 - 17.50 ng/mL   POC LACTIC ACID    Collection Time: 04/04/21  5:20 PM   Result Value Ref Range    Lactic Acid (POC) 2.85 (HH) 0.40 - 2.00 mmol/L   EKG, 12 LEAD, INITIAL    Collection Time: 04/04/21  5:46 PM   Result Value Ref Range    Ventricular Rate 106 BPM    Atrial Rate 106 BPM    P-R Interval 130 ms    QRS Duration 84 ms    Q-T Interval 328 ms    QTC Calculation (Bezet) 435 ms    Calculated P Axis 64 degrees    Calculated R Axis 49 degrees    Calculated T Axis 33 degrees    Diagnosis       Sinus tachycardia  Possible Left atrial enlargement  Left ventricular hypertrophy  Nonspecific T wave abnormality  Abnormal ECG  When compared with ECG of 13-DEC-2020 01:01,  No significant change was found  Confirmed by Anila Jones [id number]) on 4/5/2021 7:43:18 AM     URINALYSIS W/ RFLX MICROSCOPIC    Collection Time: 04/04/21  6:11 PM   Result Value Ref Range    Color YELLOW      Appearance CLEAR      Specific gravity >1.030 (H) 1.005 - 1.030    pH (UA) 5.0 5.0 - 8.0      Protein 300 (A) NEG mg/dL    Glucose >1,000 (A) NEG mg/dL    Ketone Negative NEG mg/dL    Bilirubin Negative NEG      Blood SMALL (A) NEG      Urobilinogen 1.0 0.2 - 1.0 EU/dL    Nitrites Negative NEG      Leukocyte Esterase Negative NEG     URINE MICROSCOPIC ONLY    Collection Time: 04/04/21  6:11 PM   Result Value Ref Range    RBC 4 to 10 0 - 5 /hpf    Epithelial cells FEW 0 - 5 /lpf    Bacteria FEW (A) NEG /hpf    Granular cast 1 to 3 NEG /lpf   POC LACTIC ACID    Collection Time: 04/04/21  7:12 PM   Result Value Ref Range    Lactic Acid (POC) 1.07 0.40 - 2.00 mmol/L   GLUCOSE, POC    Collection Time: 04/04/21  8:14 PM   Result Value Ref Range    Glucose (POC) 353 (H) 70 - 110 mg/dL   SARS-COV-2    Collection Time: 04/04/21  9:08 PM   Result Value Ref Range    SARS-CoV-2 Please find results under separate order     COVID-19 RAPID TEST    Collection Time: 04/04/21  9:08 PM   Result Value Ref Range    Specimen source Nasopharyngeal      COVID-19 rapid test Not detected NOTD     METABOLIC PANEL, BASIC    Collection Time: 04/05/21  6:15 AM   Result Value Ref Range    Sodium 141 136 - 145 mmol/L    Potassium 3.2 (L) 3.5 - 5.5 mmol/L    Chloride 114 (H) 100 - 111 mmol/L    CO2 20 (L) 21 - 32 mmol/L    Anion gap 7 3.0 - 18 mmol/L    Glucose 187 (H) 74 - 99 mg/dL    BUN 7 7.0 - 18 MG/DL    Creatinine 0.90 0.6 - 1.3 MG/DL    BUN/Creatinine ratio 8 (L) 12 - 20      GFR est AA >60 >60 ml/min/1.73m2    GFR est non-AA >60 >60 ml/min/1.73m2    Calcium 8.0 (L) 8.5 - 10.1 MG/DL   CBC WITH AUTOMATED DIFF    Collection Time: 04/05/21  6:15 AM   Result Value Ref Range    WBC 11.7 4.6 - 13.2 K/uL    RBC 3.60 (L) 4.20 - 5.30 M/uL    HGB 7.7 (L) 12.0 - 16.0 g/dL    HCT 24.2 (L) 35.0 - 45.0 %    MCV 67.2 (L) 74.0 - 97.0 FL    MCH 21.4 (L) 24.0 - 34.0 PG    MCHC 31.8 31.0 - 37.0 g/dL    RDW 17.7 (H) 11.6 - 14.5 %    PLATELET 747 572 - 018 K/uL    MPV 9.7 9.2 - 11.8 FL    NEUTROPHILS 80 (H) 40 - 73 %    LYMPHOCYTES 12 (L) 21 - 52 %    MONOCYTES 7 3 - 10 %    EOSINOPHILS 1 0 - 5 %    BASOPHILS 0 0 - 2 %    ABS. NEUTROPHILS 9.5 (H) 1.8 - 8.0 K/UL    ABS. LYMPHOCYTES 1.4 0.9 - 3.6 K/UL    ABS. MONOCYTES 0.8 0.05 - 1.2 K/UL    ABS. EOSINOPHILS 0.1 0.0 - 0.4 K/UL    ABS.  BASOPHILS 0.0 0.0 - 0.1 K/UL    DF AUTOMATED         Signed By: DEXTER Bai Chi     April 5, 2021

## 2021-04-05 NOTE — PROGRESS NOTES
Problem: Diabetes Self-Management  Goal: *Disease process and treatment process  Description: Define diabetes and identify own type of diabetes; list 3 options for treating diabetes. Outcome: Progressing Towards Goal  Goal: *Incorporating nutritional management into lifestyle  Description: Describe effect of type, amount and timing of food on blood glucose; list 3 methods for planning meals. Outcome: Progressing Towards Goal  Goal: *Incorporating physical activity into lifestyle  Description: State effect of exercise on blood glucose levels. Outcome: Progressing Towards Goal  Goal: *Developing strategies to promote health/change behavior  Description: Define the ABC's of diabetes; identify appropriate screenings, schedule and personal plan for screenings. Outcome: Progressing Towards Goal  Goal: *Using medications safely  Description: State effect of diabetes medications on diabetes; name diabetes medication taking, action and side effects. Outcome: Progressing Towards Goal  Goal: *Monitoring blood glucose, interpreting and using results  Description: Identify recommended blood glucose targets  and personal targets. Outcome: Progressing Towards Goal  Goal: *Prevention, detection, treatment of acute complications  Description: List symptoms of hyper- and hypoglycemia; describe how to treat low blood sugar and actions for lowering  high blood glucose level. Outcome: Progressing Towards Goal  Goal: *Prevention, detection and treatment of chronic complications  Description: Define the natural course of diabetes and describe the relationship of blood glucose levels to long term complications of diabetes.   Outcome: Progressing Towards Goal  Goal: *Developing strategies to address psychosocial issues  Description: Describe feelings about living with diabetes; identify support needed and support network  Outcome: Progressing Towards Goal  Goal: *Insulin pump training  Outcome: Progressing Towards Goal  Goal: *Sick day guidelines  Outcome: Progressing Towards Goal  Goal: *Patient Specific Goal (EDIT GOAL, INSERT TEXT)  Outcome: Progressing Towards Goal     Problem: Patient Education: Go to Patient Education Activity  Goal: Patient/Family Education  Outcome: Progressing Towards Goal     Problem: Falls - Risk of  Goal: *Absence of Falls  Description: Document Steven Read Fall Risk and appropriate interventions in the flowsheet.   Outcome: Progressing Towards Goal  Note: Fall Risk Interventions:            Medication Interventions: Bed/chair exit alarm, Evaluate medications/consider consulting pharmacy, Patient to call before getting OOB, Teach patient to arise slowly    Elimination Interventions: Bed/chair exit alarm, Call light in reach, Patient to call for help with toileting needs, Stay With Me (per policy), Toilet paper/wipes in reach, Toileting schedule/hourly rounds    History of Falls Interventions: Bed/chair exit alarm, Evaluate medications/consider consulting pharmacy, Investigate reason for fall, Room close to nurse's station, Assess for delayed presentation/identification of injury for 48 hrs (comment for end date), Vital signs minimum Q4HRs X 24 hrs (comment for end date)         Problem: Patient Education: Go to Patient Education Activity  Goal: Patient/Family Education  Outcome: Progressing Towards Goal

## 2021-04-05 NOTE — ROUTINE PROCESS
[id number] TRANSFER - IN REPORT:    Verbal report received from Arminda Leblanc RN(name) on Adam Pert  being received from ED(unit) for routine progression of care      Report consisted of patients Situation, Background, Assessment and   Recommendations(SBAR). Information from the following report(s) SBAR, ED Summary, Intake/Output, MAR, Recent Results and Cardiac Rhythm NSR/Sinus tach was reviewed with the receiving nurse. Opportunity for questions and clarification was provided. Assessment completed upon patients arrival to unit and care assumed. 1760 Pt arrived to unit in wheelchair transported on room air with NAD. Ambulated from wheelchair to [place] bed with steady gait, no complaints of dizziness or weakness. Was promptly assessed, VS taken on arrival. See flow sheet. 5484 Verbal shift change report given to Tonja Vance RN (oncoming nurse) by David Roque (offgoing nurse). Report included the following information SBAR, Intake/Output, MAR, Recent Results and Cardiac Rhythm NSR/Sinus tach.

## 2021-04-05 NOTE — ROUTINE PROCESS
TRANSFER - IN REPORT:    Verbal report received from [facility]) on Yanet Peed  being received from [facility](unit) for ordered procedure      Report consisted of patients Situation, Background, Assessment and   Recommendations(SBAR). Information from the following report(s) SBAR and Kardex was reviewed with the receiving nurse. Opportunity for questions and clarification was provided. Assessment completed upon patients arrival to unit and care assumed.

## 2021-04-06 ENCOUNTER — APPOINTMENT (OUTPATIENT)
Dept: NON INVASIVE DIAGNOSTICS | Age: 46
DRG: 720 | End: 2021-04-06
Attending: NURSE PRACTITIONER
Payer: MEDICAID

## 2021-04-06 LAB
ECHO IVC PROX: 2.03 CM
ECHO LV INTERNAL DIMENSION DIASTOLIC: 4.46 CM (ref 3.9–5.3)
ECHO LV INTERNAL DIMENSION SYSTOLIC: 2.91 CM
ECHO LV IVSD: 1.16 CM (ref 0.6–0.9)
ECHO LV MASS 2D: 195.4 G (ref 67–162)
ECHO LV MASS INDEX 2D: 97.5 G/M2 (ref 43–95)
ECHO LV POSTERIOR WALL DIASTOLIC: 1.24 CM (ref 0.6–0.9)
ECHO TV REGURGITANT MAX VELOCITY: 242.13 CM/S
ECHO TV REGURGITANT PEAK GRADIENT: 23.45 MMHG
GLUCOSE BLD STRIP.AUTO-MCNC: 112 MG/DL (ref 70–110)
GLUCOSE BLD STRIP.AUTO-MCNC: 191 MG/DL (ref 70–110)
GLUCOSE BLD STRIP.AUTO-MCNC: 203 MG/DL (ref 70–110)
GLUCOSE BLD STRIP.AUTO-MCNC: 237 MG/DL (ref 70–110)

## 2021-04-06 PROCEDURE — 80202 ASSAY OF VANCOMYCIN: CPT

## 2021-04-06 PROCEDURE — 99232 SBSQ HOSP IP/OBS MODERATE 35: CPT | Performed by: INTERNAL MEDICINE

## 2021-04-06 PROCEDURE — 74011250636 HC RX REV CODE- 250/636: Performed by: HOSPITALIST

## 2021-04-06 PROCEDURE — 74011250636 HC RX REV CODE- 250/636: Performed by: EMERGENCY MEDICINE

## 2021-04-06 PROCEDURE — 74011250636 HC RX REV CODE- 250/636: Performed by: NURSE PRACTITIONER

## 2021-04-06 PROCEDURE — 74011250637 HC RX REV CODE- 250/637: Performed by: FAMILY MEDICINE

## 2021-04-06 PROCEDURE — 74011250637 HC RX REV CODE- 250/637: Performed by: PHYSICIAN ASSISTANT

## 2021-04-06 PROCEDURE — 82962 GLUCOSE BLOOD TEST: CPT

## 2021-04-06 PROCEDURE — 74011000258 HC RX REV CODE- 258: Performed by: PHYSICIAN ASSISTANT

## 2021-04-06 PROCEDURE — 74011250637 HC RX REV CODE- 250/637: Performed by: HOSPITALIST

## 2021-04-06 PROCEDURE — 74011636637 HC RX REV CODE- 636/637: Performed by: INTERNAL MEDICINE

## 2021-04-06 PROCEDURE — 74011636637 HC RX REV CODE- 636/637: Performed by: NURSE PRACTITIONER

## 2021-04-06 PROCEDURE — 74011000250 HC RX REV CODE- 250: Performed by: NURSE PRACTITIONER

## 2021-04-06 PROCEDURE — 74011250637 HC RX REV CODE- 250/637: Performed by: NURSE PRACTITIONER

## 2021-04-06 PROCEDURE — 74011250636 HC RX REV CODE- 250/636: Performed by: PHYSICIAN ASSISTANT

## 2021-04-06 PROCEDURE — 2709999900 HC NON-CHARGEABLE SUPPLY

## 2021-04-06 PROCEDURE — 93308 TTE F-UP OR LMTD: CPT | Performed by: INTERNAL MEDICINE

## 2021-04-06 PROCEDURE — 93321 DOPPLER ECHO F-UP/LMTD STD: CPT

## 2021-04-06 PROCEDURE — 65660000000 HC RM CCU STEPDOWN

## 2021-04-06 PROCEDURE — 74011250637 HC RX REV CODE- 250/637: Performed by: INTERNAL MEDICINE

## 2021-04-06 RX ORDER — ACETAMINOPHEN 325 MG/1
650 TABLET ORAL
Status: DISCONTINUED | OUTPATIENT
Start: 2021-04-06 | End: 2021-04-07 | Stop reason: HOSPADM

## 2021-04-06 RX ORDER — INSULIN GLARGINE 100 [IU]/ML
35 INJECTION, SOLUTION SUBCUTANEOUS DAILY
Status: DISCONTINUED | OUTPATIENT
Start: 2021-04-07 | End: 2021-04-07 | Stop reason: HOSPADM

## 2021-04-06 RX ORDER — INSULIN GLARGINE 100 [IU]/ML
5 INJECTION, SOLUTION SUBCUTANEOUS
Status: COMPLETED | OUTPATIENT
Start: 2021-04-06 | End: 2021-04-06

## 2021-04-06 RX ADMIN — CARVEDILOL 6.25 MG: 6.25 TABLET, FILM COATED ORAL at 09:07

## 2021-04-06 RX ADMIN — INSULIN LISPRO 4 UNITS: 100 INJECTION, SOLUTION INTRAVENOUS; SUBCUTANEOUS at 11:55

## 2021-04-06 RX ADMIN — METRONIDAZOLE 500 MG: 500 INJECTION, SOLUTION INTRAVENOUS at 05:48

## 2021-04-06 RX ADMIN — IRON SUCROSE 200 MG: 20 INJECTION, SOLUTION INTRAVENOUS at 09:08

## 2021-04-06 RX ADMIN — ATORVASTATIN CALCIUM 80 MG: 40 TABLET, FILM COATED ORAL at 09:07

## 2021-04-06 RX ADMIN — VANCOMYCIN HYDROCHLORIDE 1250 MG: 10 INJECTION, POWDER, LYOPHILIZED, FOR SOLUTION INTRAVENOUS at 23:22

## 2021-04-06 RX ADMIN — ASPIRIN 81 MG: 81 TABLET, CHEWABLE ORAL at 09:07

## 2021-04-06 RX ADMIN — INSULIN GLARGINE 5 UNITS: 100 INJECTION, SOLUTION SUBCUTANEOUS at 16:30

## 2021-04-06 RX ADMIN — METRONIDAZOLE 500 MG: 500 INJECTION, SOLUTION INTRAVENOUS at 17:05

## 2021-04-06 RX ADMIN — TICAGRELOR 90 MG: 90 TABLET ORAL at 20:51

## 2021-04-06 RX ADMIN — INSULIN LISPRO 2 UNITS: 100 INJECTION, SOLUTION INTRAVENOUS; SUBCUTANEOUS at 15:43

## 2021-04-06 RX ADMIN — AMLODIPINE BESYLATE 10 MG: 10 TABLET ORAL at 09:12

## 2021-04-06 RX ADMIN — FAMOTIDINE 20 MG: 20 TABLET ORAL at 17:05

## 2021-04-06 RX ADMIN — INSULIN GLARGINE 30 UNITS: 100 INJECTION, SOLUTION SUBCUTANEOUS at 09:11

## 2021-04-06 RX ADMIN — CEFEPIME HYDROCHLORIDE 2 G: 2 INJECTION, POWDER, FOR SOLUTION INTRAVENOUS at 09:07

## 2021-04-06 RX ADMIN — CEFEPIME HYDROCHLORIDE 2 G: 2 INJECTION, POWDER, FOR SOLUTION INTRAVENOUS at 20:51

## 2021-04-06 RX ADMIN — TICAGRELOR 90 MG: 90 TABLET ORAL at 09:07

## 2021-04-06 RX ADMIN — INSULIN LISPRO 4 UNITS: 100 INJECTION, SOLUTION INTRAVENOUS; SUBCUTANEOUS at 20:55

## 2021-04-06 RX ADMIN — ACETAMINOPHEN 650 MG: 325 TABLET ORAL at 00:27

## 2021-04-06 RX ADMIN — FAMOTIDINE 20 MG: 20 TABLET ORAL at 09:07

## 2021-04-06 RX ADMIN — POTASSIUM BICARBONATE 40 MEQ: 782 TABLET, EFFERVESCENT ORAL at 16:31

## 2021-04-06 RX ADMIN — VANCOMYCIN HYDROCHLORIDE 1250 MG: 10 INJECTION, POWDER, LYOPHILIZED, FOR SOLUTION INTRAVENOUS at 10:13

## 2021-04-06 RX ADMIN — CARVEDILOL 6.25 MG: 6.25 TABLET, FILM COATED ORAL at 16:32

## 2021-04-06 NOTE — DIABETES MGMT
GLYCEMIC CONTROL PLAN OF CARE    Assessment:  Morning blood glucose:  112 mg/dl  IVF containing dextrose:  None   Steroids:  None   Diet/TF:  DIET DIABETIC CONSISTENT CARB Regular    Recommendations:  Continue basal and corrective insulin coverage as ordered  Will continue inpatient monitoring. Most recent BG values:        Results for Alex Elise (MRN [id number]) as of 4/6/2021 13:43   Ref. Range 4/5/2021 13:24 4/5/2021 19:35 4/5/2021 21:41 4/6/2021 07:48 4/6/2021 11:53   GLUCOSE,FAST - POC Latest Ref Range: 70 - 110 mg/dL 153 (H) 118 (H) 91 112 (H) 203 (H)     Within target range  mg/dl? Progressing towards goal.  Current A1C:  9.5%  equivalent to an average blood glucose of 226 mg/dl over the past 2-3 months. Adequate glycemic control PTA? No   Current hospital diabetes medications:  lantus 30 units daily  Lispro corrective insulin coverage  AC&HS  Previous days insulin requirements:  lantus 0 units  Lispro 3 units corrective insulin  Home diabetes medication:  Per pta med list.  Unverified. Lantus 50 units every bedtime  Metformin 250 mg every pm  Education:  _X_ see diabetes education record            ___ diabetes education not indicated at this time.     [facility] RN CDE  Ext [phone]

## 2021-04-06 NOTE — PROGRESS NOTES
Cardiology progress note         - CAD- s/p Cardiac cath 10/22/20- stable no anginal symptoms          100% mid RCA occlusion with thrombus. S/p aspiration thrombectomy followed by ptca/stent ( 2 BLANCA stents placed overlapping extending from mid RCA to distal RCA). Mild mid LAD stenosis. -Chronic diastolic heart failure  echo 4/5/21  · LV: Estimated LVEF is 55 - 60%. Visually measured ejection fraction. · Normal cavity size and systolic function (ejection fraction normal). Mild concentric hypertrophy. Wall motion: normal.  · PA: Pulmonary arterial systolic pressure is 26 mmHg. - Left foot infection and sepsis on admission   - Hypertension-improved   - Hx of high degree AV block - 10/20  s/p TPM due to complete heart block during PCI. Post PCI developed juctional tachycardia. TPM was removed. ekg 4/5/21 sinus tachycardia with nonspecific T wave abnormality   - Hyperlipidemia-   - Diabetes-poorly controlled with A1c 8.5  - Hx of COV-19 infection- reports intermittent SOB since COV19  - Hypokalemia    -Fevers         Plan:      Stable post op   Continue medications for CAD asa, bb, statin. Brilinta. Continue tight BP control. Continue to monitor for s/s fluid overload. Consider to resume  Lasix prior discharge   Sepsis/infection recommendations per Podiatry. S/p I and D with bone biopsy  4/5/21. Now with fevers ID was consulted        Staff addendum:  Stable from CV standpoint post-op, however, fevers noted. Stable rhythm. Continue ASA/Brilinta for stent in October. No further cardiac workup. Resume lasix before discharge. Please call if questions. I saw, examined, and evaluated the patient. I personally reviewed the patient's labs, tests, vitals, orders, medications, updated history, and other providers assessments. I personally agree with the findings as stated and the plan as documented.   Jaems Eason MD         ASSESSMENT:  Hospital Problems  Date Reviewed: 4/5/2021 Codes Class Noted POA    [facility] ICD-10-CM: E11.628, L08.9  ICD-9-CM: 250.80, 686.9  12/30/2018 Unknown                SUBJECTIVE:  No CP  Intermittent SOB    OBJECTIVE:    VS:   Visit Vitals  /84   Pulse 94   Temp 98.9 °F (37.2 °C)   Resp 20   Ht 5' 6\" (1.676 m)   Wt 91.2 kg (201 lb)   SpO2 100%   BMI 32.44 kg/m²         Intake/Output Summary (Last 24 hours) at 4/6/2021 1040  Last data filed at 4/6/2021 1013  Gross per 24 hour   Intake 1857.08 ml   Output 350 ml   Net 1507.08 ml     TELE: normal sinus rhythm/sinus tachycardia     General: alert, well developed, pleasant and in no apparent distress  HENT: Normocephalic, atraumatic. Normal external eye. Neck :  no bruit, no JVD  Cardiac:  regular rate and rhythm, S1, S2 normal, no murmur, click, rub or gallop  Lungs: clear to auscultation bilaterally  Abdomen: Soft, nontender, no masses  Extremities:  LLE foot cover with Kerlix/ace wrap. Labs: Results:       Chemistry Recent Labs     04/05/21  0615 04/04/21  1709   * 568*    133*   K 3.2* 3.3*   * 100   CO2 20* 21   BUN 7 10   CREA 0.90 1.54*   CA 8.0* 8.5   AGAP 7 12   BUCR 8* 6*   AP  --  278*   TP  --  7.5   ALB  --  1.8*   GLOB  --  5.7*   AGRAT  --  0.3*      CBC w/Diff Recent Labs     04/05/21  0615 04/04/21  1709   WBC 11.7 14.4*   RBC 3.60* 3.90*   HGB 7.7* 8.5*   HCT 24.2* 26.4*    251   GRANS 80* 85*   LYMPH 12* 8*   EOS 1 0      Cardiac Enzymes No results for input(s): CPK, CKND1, ROBERTA in the last 72 hours. No lab exists for component: CKRMB, TROIP   Coagulation No results for input(s): PTP, INR, APTT, INREXT in the last 72 hours.     Lipid Panel Lab Results   Component Value Date/Time    Cholesterol, total 218 (H) 09/28/2020 01:00 PM    HDL Cholesterol 49 09/28/2020 01:00 PM    LDL, calculated 121 (H) 09/28/2020 01:00 PM    VLDL, calculated 48 (H) 09/28/2020 01:00 PM    Triglyceride 241 (H) 09/28/2020 01:00 PM      BNP No results for input(s): BNPP in the last 72 hours.    Liver Enzymes Recent Labs     04/04/21  1709   TP 7.5   ALB 1.8*   *      Thyroid Studies Lab Results   Component Value Date/Time    TSH 1.47 10/19/2020 06:30 PM              [address] NP-C Gen[place]:[phone]

## 2021-04-06 NOTE — PROGRESS NOTES
[facility] Hospitalist Group  Progress Note    Patient: Jody Ruvalcaba Age: 40 y.o. : 1975 MR#: [id number] SSN: [id number]  Date: 2021     Subjective:   Patient no complaints, she denies any pain in her left foot at this time. Patient refused AM labs. Fever 102.7 midnight, 2am 100.7 F  Blood cultures neg to date. Wound cultures from OR pending. Interim events:   - s/p I and D with bone biopsy with Dr Ronnie Pacheco last night -- for  left foot bullae with abscess, left foot chronic ulcer  Assessment/Plan:     hcg negative     CONSULTS: Kassy Marie     1. Left foot Cellulitis with blistering cellulitis medial left forefoot, plantar soft tissue ulceration with short sinus tract, no drainable abscess no OM   2. Sepsis, POA  in setting of left foot cellulitis, with tachycardia and leukocytosis on admission, now low grade fever   3. Hypokalemia   4. Iron deficiency anemia, iron level 13   5. Hyponatremia - resolved. 6. Lactic acidosis from sepsis - resolved. 7. Type 2 diabetes, poorly controlled . A1c is 9.5% this admission. 8. HTN   9. CAD status post stent placement 2020 an brillinta and aspirin   10. Chronic diastolic CHF   11. Chronic microcytic anemia   12. Hx of COVID infection in 2021, s/p 2 covid shots.     - Podiatry follows. POD #1. S/p I and D with bone biopsy . Will need left foot [place] tenosuspension procedure as outpt with Dr Ronnie Pacheco when left foot cellulitis treatment is completed. Pain control prn tylenol. Continue abxs. - ID is following . Currently on cefepime, metronidazole, vancomycin. Follow blood and OR cultures. - Cardiology follows- continue asa, brillinta, statin. Lasix held. Continue coreg and norvasc. Holding losartan due to elevated Cr . Cardiology recs ECHO. - continue SSI and lantus 30 units daily , can titrate up if needed. DM consult. - replete iron IV venofer x3 doses.  Then change to oral iron   - PT OT pending . Keep NWB to left foot per Podiatry. - encourage OOB, incentive felipe  - try to get labs in AM if patient will agree     Patient does not need to be on stepdown , telemetry bed okay   Patient will update her family   Case discussed with RN     Full code   dvt ppx- scds   Gi ppx- pepcid  Diabetic diet. Additional Notes:      Case discussed with:  [x]Patient  []Family  [x]Nursing  []Case Management  DVT Prophylaxis:  []Lovenox  []Hep SQ  [x]SCDs  []Coumadin   []On Heparin gtt    Objective:   VS:   Visit Vitals  /84   Pulse 93   Temp 99 °F (37.2 °C)   Resp 20   Ht 5' 6\" (1.676 m)   Wt 91.5 kg (201 lb 11.2 oz)   SpO2 99%   BMI 32.56 kg/m²      Tmax/24hrs: Temp (24hrs), Av.5 °F (37.5 °C), Min:97.4 °F (36.3 °C), Max:102.7 °F (39.3 °C)      Intake/Output Summary (Last 24 hours) at 2021 0751  Last data filed at 2021 2349  Gross per 24 hour   Intake 1247.08 ml   Output 350 ml   Net 897.08 ml       General: AA woman laying in bed,   Alert, NAD  Cardiovascular:  RRR  Pulmonary:  LSC throughout; respiratory effort WNL, room air   GI:  +BS in all four quadrants, soft, non-tender  Extremities:  No edema; 2+ dorsalis pedis pulses bilaterally  Neuro: awake, alert, ox3 , moving arms legs, follow commands. Her left foot is wrapped in dressing.        Labs:    Recent Results (from the past 24 hour(s))   GLUCOSE, POC    Collection Time: 21  9:45 AM   Result Value Ref Range    Glucose (POC) 206 (H) 70 - 110 mg/dL   GLUCOSE, POC    Collection Time: 21  1:24 PM   Result Value Ref Range    Glucose (POC) 153 (H) 70 - 110 mg/dL   GLUCOSE, POC    Collection Time: 21  7:35 PM   Result Value Ref Range    Glucose (POC) 118 (H) 70 - 110 mg/dL   GLUCOSE, POC    Collection Time: 21  9:41 PM   Result Value Ref Range    Glucose (POC) 91 70 - 110 mg/dL   GLUCOSE, POC    Collection Time: 21  7:48 AM   Result Value Ref Range    Glucose (POC) 112 (H) 70 - 110 mg/dL       Signed By: Stephane Cruz DEXTER Maldonado     April 6, 2021

## 2021-04-06 NOTE — ROUTINE PROCESS
Received verbal report from [address] [place] [address], report included SBAR, MAR, and Kardex. Mews Score 4 - Patient had a temp of 102.7, heart rate was 108. Tylenol was given for temp, heavy blankets were removed. 8969 - Patient had pulled one light weight blanket up over her body, asked patient to pull it down or remove. 5269 - Refused morning labs. Gave bedside report to [address] [place] [address], report included SBAR, MAR, and Kardex.

## 2021-04-06 NOTE — ANESTHESIA POSTPROCEDURE EVALUATION
Procedure(s):  INCISION AND DRAINAGE LEFT FOOT/FIRST METATARSAL PHALANGEAL JOINT/BONE BIOPSY. MAC, general - backup    Anesthesia Post Evaluation      Multimodal analgesia: multimodal analgesia used between 6 hours prior to anesthesia start to PACU discharge  Patient location during evaluation: bedside  Patient participation: complete - patient participated  Level of consciousness: awake and alert  Pain score: 0  Pain management: adequate  Airway patency: patent  Anesthetic complications: no  Cardiovascular status: acceptable  Respiratory status: acceptable, nonlabored ventilation and spontaneous ventilation  Hydration status: acceptable  Post anesthesia nausea and vomiting:  none  Final Post Anesthesia Temperature Assessment:  Normothermia (36.0-37.5 degrees C)      INITIAL Post-op Vital signs:   Vitals Value Taken Time   /83 04/05/21 2004   Temp 36.3 °C (97.4 °F) 04/05/21 1955   Pulse 99 04/05/21 2008   Resp 23 04/05/21 2008   SpO2 100 % 04/05/21 2009   Vitals shown include unvalidated device data.

## 2021-04-06 NOTE — OP NOTES
Operative Report     Patient: Tian Vieyra MRN: [id number]  SSN: [id number]    YOB: 1975  Age: 40 y.o. Sex: female       Indications: The patient was admitted to the hospital for left foot abscess with bullae. Patient has chronic ulcer to left submet 1 which is not healed with conservative treatment. Patient recently developed bullae to medial left 1st MTPJ over weekend and noticed swelling and warmth to left foot. Patient had an x-ray and MRI which was negative for osteomyelitis. Patient needed I & D to drain abscess/bullae to prevent sepsis and to obtain bone biopsy. All risks, benefits, complications of procedure discussed in detail with patient. No guarantee made to outcome of procedure. Patient voiced understanding and signed consent. Date of Surgery: 4/5/2021     Preoperative Diagnosis:  left foot bullae with abscess, left foot chronic ulcer    Postoperative Diagnosis: Same    Surgeon(s) and Role:     * Fredrick Sullivan DPM - Primary      Surgical Assistants:   Operating Room Staff    Anesthesia: MAC WITH LOCAL    Procedure:   INCISION AND DRAINAGE LEFT FOOT ABSCESS, BONE BIOPSY OF FIRST METATARSAL HEAD    Procedure in Detail:     The patient was brought to the operative room and secured in the supine position on the operating room table. After IV sedation from department of anesthesia, local block consisting of 20 cc of 1:1 mixture of 1% lidocaine plain and 0.5% marcaine plain administered. Left foot prepped and draped in usual sterile fashion. Attention then directed to left foot medial 1st MTPJ where about 4 cm bullae lanced with # 15 blade and purulent drainage discharged of about 5 cc. Bullae was deroofed and necrotic skin was excised. Underneath noted 2 ulcers with fibrotic slough. Dorsal ulcer noted to be 2 x 1 cm and medial ulcer noted 1 cm in diameter. Healthy, granular surrounding tissue noted.  Ulcer on left submet 1 excisional debridement performed with # 15 blade down to and including subcutaneous tissue. No underneath deeper abscess or collection of fluid noted. Stab incision performed with fresh # 15 blade to medial 1st metatarsal head and using jamshidi needle, bone biopsy obtained from 1st metatarsal head and sent to micro for sensitivity and histology. Bone noted to be firm and in good quality. Ulcer sites dressed with betadine soaked gauze and covered with dry gauze, ace bandage. The patient tolerated the procedure and anesthesia well. The patient was sent to the recovery room in apparent satisfactory condition and stable vitals. Findings:  Abscess filled with purulence drained and underneath ulcers noted with fibrotic slough.      Estimated Blood Loss:  Minimal    Drains: none           Specimens:   ID Type Source Tests Collected by Time Destination   1 : LEFT 1st metatarsal bone biopsy Preservative Foot, left  Michelle DEVLIN, [facility] 4/5/2021 1919 Pathology   1 : LEFT bullae 1st metatarsal Wound Foot, left CULTURE, ANAEROBIC AND AEROBIC Michelle DEVLIN, SIMRAN 4/5/2021 1912 Microbiology   2 : LEFT 1st metatarsal bone biopsy Wound Foot, left CULTURE, ANAEROBIC AND AEROBIC Michelle DEVLIN [facility] 4/5/2021 1920 Microbiology               Implants:  * No implants in log *           Complications:  None

## 2021-04-06 NOTE — PERIOP NOTES
TRANSFER - OUT REPORT:    Verbal report given to ELIZABET Cooley RN on Tian Vieyra  being transferred to (unit) for routine post - op       Report consisted of patients Situation, Background, Assessment and   Recommendations(SBAR). Information from the following report(s) SBAR, Kardex, Procedure Summary, Intake/Output, MAR and Recent Results was reviewed with the receiving nurse. Lines:   Peripheral IV 04/04/21 Left Antecubital (Active)   Site Assessment Clean, dry, & intact 04/05/21 1934   Phlebitis Assessment 0 04/05/21 1934   Infiltration Assessment 0 04/05/21 1934   Dressing Status Clean, dry, & intact 04/05/21 1934   Dressing Type Tape;Transparent 04/05/21 1934   Hub Color/Line Status Pink; Infusing 04/05/21 1934   Action Taken Open ports on tubing capped 04/05/21 1321   Alcohol Cap Used Yes 04/05/21 1321        Opportunity for questions and clarification was provided.       Patient transported with:   [employer]

## 2021-04-06 NOTE — PROGRESS NOTES
Problem: Diabetes Self-Management  Goal: *Disease process and treatment process  Description: Define diabetes and identify own type of diabetes; list 3 options for treating diabetes. Outcome: Progressing Towards Goal  Goal: *Incorporating nutritional management into lifestyle  Description: Describe effect of type, amount and timing of food on blood glucose; list 3 methods for planning meals. Outcome: Progressing Towards Goal  Goal: *Incorporating physical activity into lifestyle  Description: State effect of exercise on blood glucose levels. Outcome: Progressing Towards Goal  Goal: *Developing strategies to promote health/change behavior  Description: Define the ABC's of diabetes; identify appropriate screenings, schedule and personal plan for screenings. Outcome: Progressing Towards Goal  Goal: *Using medications safely  Description: State effect of diabetes medications on diabetes; name diabetes medication taking, action and side effects. Outcome: Progressing Towards Goal  Goal: *Monitoring blood glucose, interpreting and using results  Description: Identify recommended blood glucose targets  and personal targets. Outcome: Progressing Towards Goal  Goal: *Prevention, detection, treatment of acute complications  Description: List symptoms of hyper- and hypoglycemia; describe how to treat low blood sugar and actions for lowering  high blood glucose level. Outcome: Progressing Towards Goal  Goal: *Prevention, detection and treatment of chronic complications  Description: Define the natural course of diabetes and describe the relationship of blood glucose levels to long term complications of diabetes.   Outcome: Progressing Towards Goal  Goal: *Developing strategies to address psychosocial issues  Description: Describe feelings about living with diabetes; identify support needed and support network  Outcome: Progressing Towards Goal  Goal: *Insulin pump training  Outcome: Progressing Towards Goal  Goal: *Sick day guidelines  Outcome: Progressing Towards Goal  Goal: *Patient Specific Goal (EDIT GOAL, INSERT TEXT)  Outcome: Progressing Towards Goal     Problem: Patient Education: Go to Patient Education Activity  Goal: Patient/Family Education  Outcome: Progressing Towards Goal     Problem: Falls - Risk of  Goal: *Absence of Falls  Description: Document Onalee Gum Fall Risk and appropriate interventions in the flowsheet.   Outcome: Progressing Towards Goal  Note: Fall Risk Interventions:            Medication Interventions: Patient to call before getting OOB    Elimination Interventions: Bed/chair exit alarm    History of Falls Interventions: Bed/chair exit alarm         Problem: Patient Education: Go to Patient Education Activity  Goal: Patient/Family Education  Outcome: Progressing Towards Goal

## 2021-04-06 NOTE — PROGRESS NOTES
1914: Bedside and Verbal shift change report given to Jeannette Walker RN (oncoming nurse) by Justa Charles (offgoing nurse). Report included the following information SBAR, Kardex, MAR and Recent Results.     SITUATION:    Code Status: Prior   Reason for Admission: Diabetic foot infection ([facility] [address] [T67.392, L08.9]    [facility] day: 2   Problem List:       Hospital Problems  Date Reviewed: 4/5/2021          Codes Class Noted POA    Diabetic foot infection [facility]) ICD-10-CM: E11.628, L08.9  ICD-9-CM: 250.80, 686.9  12/30/2018 Unknown              BACKGROUND:    Past Medical History:   Past Medical History:   Diagnosis Date    CAD (coronary artery disease)     Diabetes ([facility] [address]     HTN (hypertension)     Hyperlipidemia          Patient taking anticoagulants no     ASSESSMENT:    Changes in Assessment Throughout Shift: N/A     Patient has Central Line: no Reasons if yes: N/A   Patient has Lucas Cath: no Reasons if yes: N/A      Last Vitals:     Vitals:    04/06/21 0807 04/06/21 0827 04/06/21 1214 04/06/21 1539   BP: (!) 136/57 138/84 127/85 121/73   Pulse: 94  95 98   Resp: 20  24 22   Temp: 98.9 °F (37.2 °C)  98.4 °F (36.9 °C) 98.5 °F (36.9 °C)   SpO2: 100%  100% 99%   Weight:  91.2 kg (201 lb)     Height:  5' 6\" (1.676 m)          IV and DRAINS (will only show if present)   Peripheral IV 04/04/21 Left Antecubital-Site Assessment: Clean, dry, & intact     WOUND (if present)   Wound Type: Diabetic foot ulcer   Dressing present Dressing Present : No   Wound Concerns/Notes:  none     PAIN    Pain Assessment    Pain Intensity 1: 0 (04/06/21 1539)    Pain Location 1: Toe (comment which one)         Patient Stated Pain Goal: 0  o Interventions for Pain:  none  o Intervention effective: no  o Time of last intervention: n/a   o Reassessment Completed: 1750  o       Last 3 Weights:  Last 3 Recorded Weights in this Encounter    04/04/21 1548 04/05/21 0313 04/06/21 0827   Weight: 90.3 kg (199 lb) 91.5 kg (201 lb 11.2 oz) 91.2 kg (201 lb)     Weight change:      INTAKE/OUPUT    Current Shift: 04/06 0701 - 04/06 1900  In: 389 [P.O.:500; I.V.:370]  Out: -     Last three shifts: 04/04 1901 - 04/06 0700  In: 1530.8 [P.O.:220; I.V.:1310.8]  Out: 350 [Urine:350]     LAB RESULTS     Recent Labs     04/05/21 0615 04/04/21  1709   WBC 11.7 14.4*   HGB 7.7* 8.5*   HCT 24.2* 26.4*    251        Recent Labs     04/05/21 0615 04/04/21  1709    133*   K 3.2* 3.3*   * 568*   BUN 7 10   CREA 0.90 1.54*   CA 8.0* 8.5   MG 2.1  --        RECOMMENDATIONS AND DISCHARGE PLANNING     1. Pending tests/procedures/ Plan of Care or Other Needs: Out for surgery on foot     2. Discharge plan for patient and Needs/Barriers: n/a    3. Estimated Discharge Date: TBD Posted on Whiteboard in Patients Room: no      4. The patient's care plan was reviewed with the oncoming nurse. \"HEALS\" SAFETY CHECK      Fall Risk    Total Score: 3    Safety Measures: Safety Measures: Bed/Chair-Wheels locked, Bed in low position, Call light within reach, Fall prevention (comment), Gripper socks, Nurse at bedside, Side rails X 3    A safety check occurred in the patient's room between off going nurse and oncoming nurse listed above. The safety check included the below items  Area Items   H  High Alert Medications - Verify all high alert medication drips (heparin, PCA, etc.)   E  Equipment - Suction is set up for ALL patients (with yanker)  - Red plugs utilized for all equipment (IV pumps, etc.)  - WOWs wiped down at end of shift.  - Room stocked with oxygen, suction, and other unit-specific supplies   A  Alarms - Bed alarm is set for fall risk patients  - Ensure chair alarm is in place and activated if patient is up in a chair   L  Lines - Check IV for any infiltration  - Lucas bag is empty if patient has a [place]   - Tubing and IV bags are labeled   S  Safety   - Room is clean, patient is clean, and equipment is clean.   - [place] are clear from equipment besides carts. - Fall bracelet on for fall risk patients  - Ensure room is clear and free of clutter  - Suction is set up for ALL patients (with sathish)  - Hallways are clear from equipment besides carts.    - Isolation precautions followed, supplies available outside room, sign posted     [facility]

## 2021-04-06 NOTE — DIABETES MGMT
Diabetes Patient/Family Education Record  Factors That  May Influence Patients Ability  to Learn or  Comply with Recommendations   []   Language barrier    []   Cultural needs   []   Motivation    []   Cognitive limitation    []   Physical   []   Education    []   Physiological factors   []   Hearing/vision/speaking impairment   []   Sikh beliefs    []   Financial factors   []  Other:   [x]  No factors identified at this time. Person Instructed:   [x]   Patient   []   Family   []  Other     Preference for Learning:   [x]   Verbal   [x]   Written   []  Demonstration     Level of Comprehension & Competence:    [x]  Good                                      [] Fair                                     []  Poor                             []  Needs Reinforcement   [x]  Teachback completed    Education Component:   [x]  Medication management, including how to administer insulin (if appropriate) and potential medication interactions   · Pt states she currently takes lantus insulin 50 units every bedtime and metformin 250 mg daily with dinner. She states she used to take humalog with meals, and she feels like she needs to start taking it again to better control her blood sugars. []  Nutritional management -obtain usual meal pattern   []  Exercise   [x]  Signs, symptoms, and treatment of hyperglycemia and hypoglycemia   [x] Prevention, recognition and treatment of hyperglycemia and hypoglycemia   [x]  Importance of blood glucose monitoring and how to obtain a blood glucose meter   · Has a glucometer and supplies at home  · States, \"I am tired of pricking my fingers. \"  · Information given to pt on the [facility] continuous blood monitoring system.   Pt says she will discuss with her PCP   []  Instruction on use of the blood glucose meter   [x]  Discuss the importance of HbA1C monitoring    [x]  Sick day guidelines   [x]  Proper use and disposal of lancets, needles, syringes or insulin pens (if appropriate)   [x]  Potential long-term complications (retinopathy, kidney disease, neuropathy, foot care)   [x] Information about whom to contact in case of emergency or for more information    [x]  Goal:  Patient/family will demonstrate understanding of Diabetes Self Management Skills by: (date) __4/11/2021_____  Plan for post-discharge education or self-management support:    [] Outpatient class schedule provided            [] Patient Declined    [] Scheduled for outpatient classes (date) _______  Verify:  Does patient understand how diabetes medications work? Yes. Does patient know what their most recent A1c is? Yes. Does patient monitor glucose at home? Yes. \"But I tired of pricking my fingers. \"  Does patient have difficulty obtaining diabetes medications or testing supplies? No issues voiced.

## 2021-04-07 ENCOUNTER — HOME HEALTH ADMISSION (OUTPATIENT)
Dept: HOME HEALTH SERVICES | Facility: HOME HEALTH | Age: 46
End: 2021-04-07
Payer: MEDICAID

## 2021-04-07 VITALS
SYSTOLIC BLOOD PRESSURE: 164 MMHG | RESPIRATION RATE: 17 BRPM | TEMPERATURE: 98.2 F | BODY MASS INDEX: 32.3 KG/M2 | HEART RATE: 97 BPM | HEIGHT: 66 IN | OXYGEN SATURATION: 99 % | WEIGHT: 201 LBS | DIASTOLIC BLOOD PRESSURE: 82 MMHG

## 2021-04-07 LAB
ANION GAP SERPL CALC-SCNC: 7 MMOL/L (ref 3–18)
BASOPHILS # BLD: 0 K/UL (ref 0–0.1)
BASOPHILS NFR BLD: 0 % (ref 0–2)
BUN SERPL-MCNC: 7 MG/DL (ref 7–18)
BUN/CREAT SERPL: 8 (ref 12–20)
CALCIUM SERPL-MCNC: 8.1 MG/DL (ref 8.5–10.1)
CHLORIDE SERPL-SCNC: 111 MMOL/L (ref 100–111)
CO2 SERPL-SCNC: 23 MMOL/L (ref 21–32)
CREAT SERPL-MCNC: 0.88 MG/DL (ref 0.6–1.3)
DATE LAST DOSE: ABNORMAL
DIFFERENTIAL METHOD BLD: ABNORMAL
EOSINOPHIL # BLD: 0.3 K/UL (ref 0–0.4)
EOSINOPHIL NFR BLD: 3 % (ref 0–5)
ERYTHROCYTE [DISTWIDTH] IN BLOOD BY AUTOMATED COUNT: 18.4 % (ref 11.6–14.5)
GLUCOSE BLD STRIP.AUTO-MCNC: 133 MG/DL (ref 70–110)
GLUCOSE BLD STRIP.AUTO-MCNC: 156 MG/DL (ref 70–110)
GLUCOSE SERPL-MCNC: 155 MG/DL (ref 74–99)
HCT VFR BLD AUTO: 24.1 % (ref 35–45)
HGB BLD-MCNC: 7.4 G/DL (ref 12–16)
LYMPHOCYTES # BLD: 1.6 K/UL (ref 0.9–3.6)
LYMPHOCYTES NFR BLD: 18 % (ref 21–52)
MCH RBC QN AUTO: 21.1 PG (ref 24–34)
MCHC RBC AUTO-ENTMCNC: 30.7 G/DL (ref 31–37)
MCV RBC AUTO: 68.7 FL (ref 74–97)
MONOCYTES # BLD: 0.8 K/UL (ref 0.05–1.2)
MONOCYTES NFR BLD: 9 % (ref 3–10)
NEUTS SEG # BLD: 6.3 K/UL (ref 1.8–8)
NEUTS SEG NFR BLD: 70 % (ref 40–73)
PLATELET # BLD AUTO: 262 K/UL (ref 135–420)
PLATELET COMMENTS,PCOM: ABNORMAL
PMV BLD AUTO: 9.8 FL (ref 9.2–11.8)
POTASSIUM SERPL-SCNC: 3.5 MMOL/L (ref 3.5–5.5)
RBC # BLD AUTO: 3.51 M/UL (ref 4.2–5.3)
RBC MORPH BLD: ABNORMAL
REPORTED DOSE,DOSE: ABNORMAL UNITS
REPORTED DOSE/TIME,TMG: 1000
SODIUM SERPL-SCNC: 141 MMOL/L (ref 136–145)
VANCOMYCIN TROUGH SERPL-MCNC: 24.2 UG/ML (ref 10–20)
WBC # BLD AUTO: 9 K/UL (ref 4.6–13.2)

## 2021-04-07 PROCEDURE — 2709999900 HC NON-CHARGEABLE SUPPLY

## 2021-04-07 PROCEDURE — 82962 GLUCOSE BLOOD TEST: CPT

## 2021-04-07 PROCEDURE — 97165 OT EVAL LOW COMPLEX 30 MIN: CPT

## 2021-04-07 PROCEDURE — 74011250636 HC RX REV CODE- 250/636: Performed by: NURSE PRACTITIONER

## 2021-04-07 PROCEDURE — 74011636637 HC RX REV CODE- 636/637: Performed by: NURSE PRACTITIONER

## 2021-04-07 PROCEDURE — 97116 GAIT TRAINING THERAPY: CPT

## 2021-04-07 PROCEDURE — 74011250637 HC RX REV CODE- 250/637: Performed by: NURSE PRACTITIONER

## 2021-04-07 PROCEDURE — 74011250637 HC RX REV CODE- 250/637: Performed by: PHYSICIAN ASSISTANT

## 2021-04-07 PROCEDURE — 74011636637 HC RX REV CODE- 636/637: Performed by: INTERNAL MEDICINE

## 2021-04-07 PROCEDURE — 74011250637 HC RX REV CODE- 250/637: Performed by: HOSPITALIST

## 2021-04-07 PROCEDURE — 85025 COMPLETE CBC W/AUTO DIFF WBC: CPT

## 2021-04-07 PROCEDURE — 74011000250 HC RX REV CODE- 250: Performed by: NURSE PRACTITIONER

## 2021-04-07 PROCEDURE — 97162 PT EVAL MOD COMPLEX 30 MIN: CPT

## 2021-04-07 PROCEDURE — APPSS60 APP SPLIT SHARED TIME 46-60 MINUTES: Performed by: PHYSICIAN ASSISTANT

## 2021-04-07 PROCEDURE — 74011250637 HC RX REV CODE- 250/637: Performed by: INTERNAL MEDICINE

## 2021-04-07 PROCEDURE — 36415 COLL VENOUS BLD VENIPUNCTURE: CPT

## 2021-04-07 PROCEDURE — 74011250636 HC RX REV CODE- 250/636: Performed by: EMERGENCY MEDICINE

## 2021-04-07 PROCEDURE — 80048 BASIC METABOLIC PNL TOTAL CA: CPT

## 2021-04-07 PROCEDURE — 99239 HOSP IP/OBS DSCHRG MGMT >30: CPT | Performed by: INTERNAL MEDICINE

## 2021-04-07 RX ORDER — LINEZOLID 600 MG/1
600 TABLET, FILM COATED ORAL 2 TIMES DAILY
Qty: 14 TAB | Refills: 0 | Status: SHIPPED | OUTPATIENT
Start: 2021-04-07 | End: 2021-04-07

## 2021-04-07 RX ORDER — LANOLIN ALCOHOL/MO/W.PET/CERES
1 CREAM (GRAM) TOPICAL
Status: DISCONTINUED | OUTPATIENT
Start: 2021-04-08 | End: 2021-04-07 | Stop reason: HOSPADM

## 2021-04-07 RX ORDER — VANCOMYCIN HYDROCHLORIDE
1250
Status: DISCONTINUED | OUTPATIENT
Start: 2021-04-07 | End: 2021-04-07 | Stop reason: HOSPADM

## 2021-04-07 RX ORDER — LANOLIN ALCOHOL/MO/W.PET/CERES
325 CREAM (GRAM) TOPICAL
Qty: 30 TAB | Refills: 0 | Status: ON HOLD | OUTPATIENT
Start: 2021-04-08 | End: 2021-06-30 | Stop reason: SDUPTHER

## 2021-04-07 RX ORDER — CEPHALEXIN 250 MG/1
500 CAPSULE ORAL EVERY 6 HOURS
Status: DISCONTINUED | OUTPATIENT
Start: 2021-04-07 | End: 2021-04-07 | Stop reason: HOSPADM

## 2021-04-07 RX ORDER — CEPHALEXIN 500 MG/1
500 CAPSULE ORAL 3 TIMES DAILY
Qty: 21 CAP | Refills: 0 | Status: SHIPPED | OUTPATIENT
Start: 2021-04-07 | End: 2021-04-14

## 2021-04-07 RX ORDER — CIPROFLOXACIN 500 MG/1
500 TABLET ORAL 2 TIMES DAILY
Qty: 14 TAB | Refills: 0 | Status: SHIPPED | OUTPATIENT
Start: 2021-04-07 | End: 2021-04-14

## 2021-04-07 RX ADMIN — CEPHALEXIN 500 MG: 250 CAPSULE ORAL at 15:48

## 2021-04-07 RX ADMIN — ATORVASTATIN CALCIUM 80 MG: 40 TABLET, FILM COATED ORAL at 09:21

## 2021-04-07 RX ADMIN — INSULIN GLARGINE 35 UNITS: 100 INJECTION, SOLUTION SUBCUTANEOUS at 09:22

## 2021-04-07 RX ADMIN — INSULIN LISPRO 2 UNITS: 100 INJECTION, SOLUTION INTRAVENOUS; SUBCUTANEOUS at 12:34

## 2021-04-07 RX ADMIN — AMLODIPINE BESYLATE 10 MG: 10 TABLET ORAL at 09:21

## 2021-04-07 RX ADMIN — METRONIDAZOLE 500 MG: 500 INJECTION, SOLUTION INTRAVENOUS at 06:08

## 2021-04-07 RX ADMIN — CARVEDILOL 6.25 MG: 6.25 TABLET, FILM COATED ORAL at 09:21

## 2021-04-07 RX ADMIN — CEFEPIME HYDROCHLORIDE 2 G: 2 INJECTION, POWDER, FOR SOLUTION INTRAVENOUS at 09:22

## 2021-04-07 RX ADMIN — TICAGRELOR 90 MG: 90 TABLET ORAL at 09:21

## 2021-04-07 RX ADMIN — ASPIRIN 81 MG: 81 TABLET, CHEWABLE ORAL at 09:21

## 2021-04-07 RX ADMIN — FAMOTIDINE 20 MG: 20 TABLET ORAL at 09:21

## 2021-04-07 NOTE — PROGRESS NOTES
Problem: Self Care Deficits Care Plan (Adult)  Goal: *Acute Goals and Plan of Care (Insert Text)  Outcome: Resolved/Met       OCCUPATIONAL THERAPY EVALUATION/DISCHARGE    Patient: Zara Monzon (53 y.o. female)  Date: 2021  Primary Diagnosis: Diabetic foot infection ([facility] [address] [E11.628, L08.9]  Procedure(s) (LRB):  INCISION AND DRAINAGE LEFT FOOT/FIRST METATARSAL PHALANGEAL JOINT/BONE BIOPSY (Left) 2 Days Post-Op   Precautions: Fall, PWB(L foot to heel)  PLOF: Patient was independent with self-care and functional mobility PTA. ASSESSMENT AND RECOMMENDATIONS:  Patient cleared to participate in OT evaluation by RN. Upon entering the room, patient was supine in bed, alert and agreeable to participate in OT evaluation. Patient educated on weight-bearing status, importance of ice, and safety around the house/during this admission. Patient is independent - modified independent with basic self-care, modified independent with bed mobility and Supervision - modified independent with functional transfers. Based on the objective data described below, the patient presents with no deficits that impede pt function with ADLs, functional transfers, and functional mobility. OT to d/c from caseload at this time. Skilled occupational therapy is not indicated at this time.   Discharge Recommendations: Home Health Safety Eval  Further Equipment Recommendations for Discharge: rolling walker for safety, long distances     SUBJECTIVE:   Patient stated i'm ready to go home    OBJECTIVE DATA SUMMARY:     Past Medical History:   Diagnosis Date    CAD (coronary artery disease)     Diabetes ([facility] [address]     HTN (hypertension)     Hyperlipidemia      Past Surgical History:   Procedure Laterality Date    HX CORONARY STENT PLACEMENT      HX GYN           Barriers to Learning/Limitations: None  Compensate with: visual, verbal, tactile, kinesthetic cues/model    Home Situation:   Home Situation  Home Environment: Private residence  One/Two Story Residence: Two story  Living Alone: No  Support Systems: Spouse/Significant Other/Partner, Family member(s)  Patient Expects to be Discharged to[de-identified] Private residence  Current DME Used/Available at Home: None  Tub or Shower Type: Tub/Shower combination  [x]     Right hand dominant   []     Left hand dominant    Cognitive/Behavioral Status:  Neurologic State: Alert  Orientation Level: Oriented X4  Cognition: Follows commands  Safety/Judgement: Fall prevention; Awareness of environment    Skin: Intact  Edema: None noted    Vision/Perceptual:    Acuity: Within Defined Limits      Coordination: BUE  Fine Motor Skills-Upper: Left Intact; Right Intact    Gross Motor Skills-Upper: Left Intact; Right Intact    Balance:  Sitting: Intact  Standing: Intact  Standing - Static: Good  Standing - Dynamic : Good    Strength: BUE    Strength: Within functional limits              Tone & Sensation: BUE    Tone: Normal  Sensation: Intact                    Range of Motion: BUE    AROM: Within functional limits                         Functional Mobility and Transfers for ADLs:  Bed Mobility:     Supine to Sit: Modified independent  Sit to Supine: Modified independent  Scooting: Modified independent  Transfers:  Sit to Stand: Supervision  Stand to Sit: Modified independent                                 ADL Assessment:  Feeding: Independent    Oral Facial Hygiene/Grooming: Independent    Bathing: Modified independent    Upper Body Dressing: Independent    Lower Body Dressing: Modified independent    Toileting: Modified independent                ADL Intervention:                           Lower Body Dressing Assistance  Dressing Assistance: Modified independent  Socks: Modified independent  Shoes with Velcro: Modified independent(post op shoe)  Leg Crossed Method Used: Yes  Position Performed: Seated edge of bed         Cognitive Retraining  Safety/Judgement: Fall prevention; Awareness of environment    Pain:  Pain level pre-treatment: 0/10   Pain level post-treatment: 0/10   Pain Intervention(s): Medication (see MAR); Response to intervention: Nurse notified, See doc flow    Activity Tolerance:   Good    Please refer to the flowsheet for vital signs taken during this treatment. After treatment:   []  Patient left in no apparent distress sitting up in chair  [x]  Patient left in no apparent distress in bed  [x]  Call bell left within reach  [x]  Nursing notified  []  Caregiver present  []  Bed alarm activated    COMMUNICATION/EDUCATION:   [x]      Role of Occupational Therapy in the acute care setting  [x]      Home safety education was provided and the patient/caregiver indicated understanding. [x]      Patient/family have participated as able and agree with findings and recommendations. []      Patient is unable to participate in plan of care at this time. Thank you for this referral.  Nadia Valentine OTR/L  Time Calculation: 10 mins      Eval Complexity: History: MEDIUM Complexity : Expanded review of history including physical, cognitive and psychosocial  history ; Examination: LOW Complexity : 1-3 performance deficits relating to physical, cognitive , or psychosocial skils that result in activity limitations and / or participation restrictions ;    Decision Making:LOW Complexity : No comorbidities that affect functional and no verbal or physical assistance needed to complete eval tasks

## 2021-04-07 NOTE — PROGRESS NOTES
Problem: Mobility Impaired (Adult and Pediatric)  Goal: *Acute Goals and Plan of Care (Insert Text)  Description: Physical Therapy Goals  Initiated 4/7/2021 and to be accomplished within 7 day(s)  1. Patient will move from supine to sit and sit to supine in bed with modified independence. 2.  Patient will transfer from bed to chair and chair to bed with modified independence using the least restrictive device. 3.  Patient will perform sit to stand with modified independence. 4.  Patient will ambulate with modified independence for 150 feet with the least restrictive device. 5.  Patient will ascend/descend 10 stairs with handrail(s) with modified independence. PLOF: Independent    Outcome: Progressing Towards Goal   PHYSICAL THERAPY EVALUATION    Patient: Nataliia Agee (13 y.o. female)  Date: 4/7/2021  Primary Diagnosis: [facility] [E11.628, L08.9]  Procedure(s) (LRB):  INCISION AND DRAINAGE LEFT FOOT/FIRST METATARSAL PHALANGEAL JOINT/BONE BIOPSY (Left) 2 Days Post-Op   Precautions: Fall  PWB LLE; NWB forefoot LLE  ASSESSMENT :  Underwent left 1st metatarsal I&D and biopsy 4/5/2021. Per podiatry note, NWB left forefoot; PT orders state PWB LLE. Will need forefoot offloading shoe; RN Kala edmond. Verbalized understanding of LLE precautions. Reports amb in room without difficulties. Mod I for supine to sit. Supervision for sit to stand to ww. Amb 50ft with ww and supervision; complaint with offloading left forefoot. Returned to seated EOB with supervision. Rest 2-3 minutes. Completed step up to 6 inch box; 2 reps with supervision and bilateral handrails. Reviewed proper negotiation for stairs for 2nd floor in home. Spouse available for support. Will need ww for home to comply with WB precautions. Returned to supine in bed with mod I. Patient anticipates discharge today; no orders in chart. If remains in hospital overnight will follow to ensure safety with home mobility.  Encouraged EOB/OOB for meals and toileting with staff supervision. Educated on need for RN assistance with mobility; verbalized understanding. Call bell in reach. Patient will benefit from skilled intervention to address the above impairments. Patient's rehabilitation potential is considered to be Good  Factors which may influence rehabilitation potential include:   []         None noted  []         Mental ability/status  [x]         Medical condition  []         Home/family situation and support systems  []         Safety awareness  []         Pain tolerance/management  []         Other:      PLAN :  Recommendations and Planned Interventions:   [x]           Bed Mobility Training             [x]    Neuromuscular Re-Education  [x]           Transfer Training                   []    Orthotic/Prosthetic Training  [x]           Gait Training                          []    Modalities  [x]           Therapeutic Exercises           []    Edema Management/Control  [x]           Therapeutic Activities            [x]    Family Training/Education  [x]           Patient Education  []           Other (comment):    Frequency/Duration: Patient will be followed by physical therapy 1-2 times a week to address goals. Discharge Recommendations: Home Health  Further Equipment Recommendations for Discharge: rolling walker, forefoot shoe     SUBJECTIVE:   Patient stated Okay.     OBJECTIVE DATA SUMMARY:     Past Medical History:   Diagnosis Date    CAD (coronary artery disease)     Diabetes ([facility]     HTN (hypertension)     Hyperlipidemia      Past Surgical History:   Procedure Laterality Date    HX CORONARY STENT PLACEMENT      HX GYN           Barriers to Learning/Limitations: None  Compensate with: Visual Cues, Verbal Cues, Tactile Cues and Kinesthetic Cues    Home Situation:  Home Situation  Home Environment: Private residence  One/Two Story Residence: Two story  Living Alone: No  Support Systems: Spouse/Significant Other/Partner, Family member(s)  Patient Expects to be Discharged to[de-identified] Private residence  Current DME Used/Available at Home: None    Critical Behavior:  Neurologic State: Alert  Orientation Level: Oriented X4  Cognition: Follows commands     Psychosocial  Patient Behaviors: Cooperative    Strength:    Manual Muscle Testing (LE)         R     L    Hip Flexion:   5/5 5/5  Knee EXT:   5/5 5/5  Knee FLEX:   5/5 5/5  Ankle DF:   5/5  3/5  _________________________________________________   Range Of Motion:  BLE AROM WFL  Functional Mobility:  Bed Mobility:  Supine to Sit: Modified independent  Sit to Supine: Modified independent  Transfers:  Sit to Stand: Supervision  Stand to Sit: Supervision  Balance:   Sitting: Intact  Standing: Impaired  Standing - Static: Good  Standing - Dynamic : Good  Ambulation/Gait Training:  Distance (ft): 50 Feet (ft)   Assistive Device: Walker, rolling  Ambulation - Level of Assistance: Supervision  Stairs:   Level of Assistance: Supervision  Assistive Device: rolling walker as handrails  Rail Use: both  Number of Stairs: 2  Therapeutic Exercises:   Sit to stand x2  Pain:  Pain level pre-treatment: 0/10   Pain level post-treatment: 0/10     Activity Tolerance:   Good    After treatment:   []         Patient left in no apparent distress sitting up in chair  [x]         Patient left in no apparent distress in bed  [x]         Call bell left within reach  [x]         Nursing notified  []         Caregiver present  []         Bed alarm activated  []         SCDs applied    COMMUNICATION/EDUCATION:   [x]         Role of physical therapy and plan of care in the acute care setting. [x]         Fall prevention education was provided and the patient/caregiver indicated understanding. [x]         Patient/family have participated as able in goal setting and plan of care. []         Patient/family agree to work toward stated goals and plan of care.   []         Patient understands intent and goals of therapy, but is neutral about his/her participation. []         Patient is unable to participate in goal setting/plan of care: ongoing with therapy staff.     Thank you for this referral.  Giancarlo Mancera, PT   Time Calculation: 16 mins    Eval Complexity: History: MEDIUM  Complexity : 1-2 comorbidities / personal factors will impact the outcome/ POC Exam:MEDIUM Complexity : 3 Standardized tests and measures addressing body structure, function, activity limitation and / or participation in recreation  Presentation: MEDIUM Complexity : Evolving with changing characteristics  Clinical Decision Making:Medium Complexity    Clinical judgement; ROM, MMT, functional mobility Overall Complexity:MEDIUM

## 2021-04-07 NOTE — HOME CARE
Spoke with patient Verified address and telephone numbers. Explained home care services and routines for PT OT  Orders noted and arranged. Contact information for home care office on discharge paperwork.      Neelima Henry RN, BSN   [employer]

## 2021-04-07 NOTE — PROGRESS NOTES
Progress Note    Patient: Rene Burch MRN: [id number]  SSN: [id number]    YOB: 1975  Age: 40 y.o. Sex: female      Admit Date: 4/4/2021  2 Days Post-Op     Procedure:   Procedure(s):  INCISION AND DRAINAGE LEFT FOOT/FIRST METATARSAL PHALANGEAL JOINT/BONE BIOPSY    Subjective:     Patient seen resting quiet and comfortably and no apparent distress. Patient reports no pain to surgical site. Her OR wound culture is growing streptococci, beta hemolytic group B. Patient denies N/V/C/F. Status post: cellulitis    Objective:     Visit Vitals  BP (!) 164/82   Pulse 97   Temp 98.2 °F (36.8 °C)   Resp 17   Ht 5' 6\" (1.676 m)   Wt 91.2 kg (201 lb)   SpO2 99%   BMI 32.44 kg/m²        Physical Exam:  Left ELADIO: palpable pedal pulses, CFT less than 3 seconds to distal digits, left 1st MTPJ ulcer 4 x 3.5 cm with central two ulcers with fibrotic slough, minimal purulence noted. Left submet 1 ulcer stable with hyperkeratotic tissue over it. Eythema to left forefoot resolved. Labs/Radiology Review: images and reports reviewed    Assessment:     Hospital Problems  Date Reviewed: 4/5/2021          Codes Class Noted POA    Diabetic foot infection [facility]) ICD-10-CM: E11.628, L08.9  ICD-9-CM: 250.80, 686.9  12/30/2018 Unknown              Plan/Recommendations/Medical Decision Making:     - Dressings changed at bedside with betadine soaked gauze and covered with dry gauze.   - OR pathology pending. Micro growing streptococci and B-hemolytic group B. Per ID recommendation, patient is being discharged with keflex and ciprofloxacin.   - Rec to change dressings every 3 days with betadine and dry gauze. - Remain partial weightbearing in surgical shoe. - Will see her in office in 1 week.

## 2021-04-07 NOTE — CONSULTS
Infectious Disease Consultation Note        Reason: Foot infection    Current abx Prior abx         Lines: PIV      Assessment :  67-year-old pleasant female with history of poorly controlled DM, HTN, CAD s/p RCA stents, COVID-19 infection in February with subsequent persistent dyspnea who was admitted on 4/4 for left foot infection:    1. Left foot soft tissue infection:   Status post I&D 4/5-operative culture in progress-stain shows GPC in pairs-possible GBS  -MRI 4/5 without drainable abscess or osteomyelitis  -Blood culture 4/4 neg x3 days  -T-max 102.730 hours ago-currently afebrile, WBC 12 K    2-DM-poorly controlled  -A1c 9.5  3-CAD  4-penicillin and azithromycin allergies-reports throat swelling and body swelling to penicillin when she was 19    Lab Results   Component Value Date/Time    Hemoglobin A1c 9.5 (H) 04/04/2021 05:09 PM    Hemoglobin A1c (POC) 12.3 03/28/2019 09:49 AM         Recommendations:    Continue with vancomycin at this time as only GPC seen on stain. GBS is the most common cause of soft tissue infections in diabetics-or it may be other strep or staph species- stop cefepime and metronidazole    Follow-up cultures for ID and sensitivities    Advance Care planning: discussed  with patient and primary team      Thank you for consultation request. Please call me if any further questions or concerns. Will continue to participate in the care of this patient. HPI:  Mrs. Inga Macias is 67-year-old pleasant female with history of poorly controlled DM, HTN, CAD s/p RCA stents, COVID-19 infection in February with subsequent persistent dyspnea who was admitted on 4/4 for left foot infection. She reports noticing some swelling of the dorsal area with drainage later. She was afraid that it might just burst open. She had a lot of pain and the swelling was moving towards her leg. She denies any fevers, chills. She cannot remember any injury to the foot.   Her diabetes is not well controlled and she does have some neuropathy. She otherwise feels okay. She denies any increased shortness of breath from baseline. No coughing or congestion. She denies any nausea, vomiting or or diarrhea. She was seen by podiatry and underwent I&D of the foot. Imaging did not show any osteomyelitis. She is status post both doses of Pfizer vaccine. Past Medical History:   Diagnosis Date    CAD (coronary artery disease)     Diabetes ([address]     HTN (hypertension)     Hyperlipidemia        Past Surgical History:   Procedure Laterality Date    HX CORONARY STENT PLACEMENT      HX GYN             Current Discharge Medication List      CONTINUE these medications which have NOT CHANGED    Details   furosemide (LASIX) 20 mg tablet Take 1 Tab by mouth daily as needed (Edema). Qty: 30 Tab, Refills: 1    Associated Diagnoses: Acute on chronic diastolic congestive heart failure (HCC)      atorvastatin (LIPITOR) 80 mg tablet Take 1 Tab by mouth daily. Qty: 90 Tab, Refills: 3    Associated Diagnoses: Screening for cholesterol level      losartan (COZAAR) 100 mg tablet Take 1 Tab by mouth daily. Qty: 90 Tab, Refills: 3    Associated Diagnoses: Essential hypertension      carvediloL (Coreg) 6.25 mg tablet Take 1 Tab by mouth two (2) times daily (with meals). Qty: 60 Tab, Refills: 5      ticagrelor (BRILINTA) 90 mg tablet Take 1 Tab by mouth every twelve (12) hours every twelve (12) hours. Qty: 60 Tab, Refills: 5      insulin glargine (LANTUS,BASAGLAR) 100 unit/mL (3 mL) inpn Take 45 units daily  Indications: type 2 diabetes mellitus  Qty: 4 Pen, Refills: 5    Associated Diagnoses: Type 2 diabetes mellitus with complication (HCC)      aspirin 81 mg chewable tablet Take 1 Tab by mouth daily. Qty: 30 Tab, Refills: 0      metFORMIN ER (GLUCOPHAGE XR) 500 mg tablet Take 1 Tab by mouth daily (with dinner).   Qty: 30 Tab, Refills: 3    Associated Diagnoses: Type 2 diabetes mellitus with complication (HCC)      melatonin 5 mg cap capsule Take 1 Cap by mouth nightly.   Qty: 30 Cap, Refills: 1    Associated Diagnoses: Difficulty sleeping      Insulin Needles, Disposable, 31 gauge x 5/16\" ndle Use to check blood glucose BID  Qty: 100 Pen Needle, Refills: 11    Associated Diagnoses: Type 2 diabetes mellitus with complication (HCC)      Insulin Syringe-Needle U-100 (INSULIN SYRINGE) 1 mL 30 gauge x 5/16 syrg As directed for lantus insulin  Qty: 50 Syringe, Refills: 0      insulin lispro (HUMALOG) 100 unit/mL injection For use in the Insulin pump  Qty: 1 Vial, Refills: 11    Associated Diagnoses: Type 2 diabetes mellitus with complication (HCC)             Current Facility-Administered Medications   Medication Dose Route Frequency    acetaminophen (TYLENOL) tablet 650 mg  650 mg Oral Q4H PRN    insulin glargine (LANTUS) injection 35 Units  35 Units SubCUTAneous DAILY    Vancomycin Lab Information  1 Each Other Once per day on Wed    melatonin (rapid dissolve) tablet 5 mg  5 mg Oral QHS PRN    vancomycin (VANCOCIN) 1250 mg in  ml infusion  1,250 mg IntraVENous Q12H    iron sucrose (VENOFER) 200 mg in 0.9% sodium chloride 100 mL IVPB  200 mg IntraVENous Q24H    famotidine (PEPCID) tablet 20 mg  20 mg Oral BID    amLODIPine (NORVASC) tablet 10 mg  10 mg Oral DAILY    sodium chloride (NS) flush 5-10 mL  5-10 mL IntraVENous PRN    metroNIDAZOLE (FLAGYL) IVPB premix 500 mg  500 mg IntraVENous Q12H    insulin lispro (HUMALOG) injection   SubCUTAneous AC&HS    glucose chewable tablet 16 g  4 Tab Oral PRN    glucagon (GLUCAGEN) injection 1 mg  1 mg IntraMUSCular PRN    dextrose (D50W) injection syrg 12.5-25 g  25-50 mL IntraVENous PRN    hydrALAZINE (APRESOLINE) 20 mg/mL injection 10 mg  10 mg IntraVENous Q6H PRN    atorvastatin (LIPITOR) tablet 80 mg  80 mg Oral DAILY    ticagrelor (BRILINTA) tablet 90 mg  90 mg Oral Q12H    aspirin chewable tablet 81 mg  81 mg Oral DAILY    carvediloL (COREG) tablet 6.25 mg  6.25 mg Oral BID WITH MEALS    cefepime (MAXIPIME) 2 g in sterile water (preservative free) 10 mL IV syringe  2 g IntraVENous Q12H    ondansetron (ZOFRAN) injection 4 mg  4 mg IntraVENous Q4H PRN       Allergies: Azithromycin and Pcn [penicillins]    Family History   Problem Relation Age of Onset    Lupus Mother     Cancer Neg Hx     Diabetes Neg Hx     Heart Disease Neg Hx     Hypertension Neg Hx     Heart Attack Neg Hx     Stroke Neg Hx      Social History     Socioeconomic History    Marital status:      Spouse name: Not on file    Number of children: Not on file    Years of education: Not on file    Highest education level: Not on file   Occupational History    Not on file   Social Needs    Financial resource strain: Not on file    Food insecurity     Worry: Not on file     Inability: Not on file    Transportation needs     Medical: Not on file     Non-medical: Not on file   Tobacco Use    Smoking status: Never Smoker    Smokeless tobacco: Never Used   Substance and Sexual Activity    Alcohol use: No    Drug use: No    Sexual activity: Yes     Partners: Male   Lifestyle    Physical activity     Days per week: Not on file     Minutes per session: Not on file    Stress: Not on file   Relationships    Social connections     Talks on phone: Not on file     Gets together: Not on file     Attends Congregational service: Not on file     Active member of club or organization: Not on file     Attends meetings of clubs or organizations: Not on file     Relationship status: Not on file    Intimate partner violence     Fear of current or ex partner: Not on file     Emotionally abused: Not on file     Physically abused: Not on file     Forced sexual activity: Not on file   Other Topics Concern    Not on file   Social History Narrative    Not on file     Social History     Tobacco Use   Smoking Status Never Smoker   Smokeless Tobacco Never Used        Temp (24hrs), Av.6 °F (37 °C), Min:98.4 °F (36.9 °C), Max:99 °F (37.2 °C)    Visit Vitals  BP (!) 161/89   Pulse (!) 103   Temp 98.4 °F (36.9 °C)   Resp 16   Ht 5' 6\" (1.676 m)   Wt 91.2 kg (201 lb)   SpO2 98%   BMI 32.44 kg/m²       ROS: 12 point ROS obtained in details. Pertinent positives as mentioned in HPI,   otherwise negative    Physical Exam:    General: Well developed, well nourished female sitting on the  bed AAOx3 in no acute distress. General:   awake alert and oriented   HEENT:  Normocephalic, atraumatic, PERRL, EOMI, no scleral icterus or pallor; no conjunctival hemmohage;  nasal and oral mucous are moist and without evidence of lesions. No thrush. Dentition good. Neck supple, no bruits. Lymph Nodes:   no cervical, axillary or inguinal adenopathy   Lungs:   non-labored, bilaterally clear to auscultation- no crackles wheezes rales or rhonchi   Heart:  RRR, s1 and s2; no murmurs rubs or gallops, no edema   Abdomen:  soft, non-distended, active bowel sounds, no hepatomegaly, no splenomegaly. Non-tender   Genitourinary:  deferred   Extremities:   no clubbing, cyanosis; no joint effusions or swelling; Full ROM of all large joints to the upper and lower extremities; muscle mass appropriate for age   Neurologic:  No gross focal sensory abnormalities; 5/5 muscle strength to upper and lower extremities. Speech appropriate.  Cranial nerves intact                        Skin:  No rash or ulcers noted   Wound:   Left foot in dressing and bandage-C/D/I     Back:  no spinal or paraspinal muscle tenderness or rigidity, no CVA tenderness     Psychiatric:  No suicidal or homicidal ideations, appropriate mood and affect         Labs: Results:   Chemistry Recent Labs     04/05/21 0615 04/04/21  1709   * 568*    133*   K 3.2* 3.3*   * 100   CO2 20* 21   BUN 7 10   CREA 0.90 1.54*   CA 8.0* 8.5   AGAP 7 12   BUCR 8* 6*   AP  --  278*   TP  --  7.5   ALB  --  1.8*   GLOB  --  5.7*   AGRAT  --  0.3*      CBC w/Diff Recent Labs     04/05/21  0615 04/04/21  1709   WBC 11.7 14.4*   RBC 3.60* 3.90*   HGB 7.7* 8.5*   HCT 24.2* 26.4*    251   GRANS 80* 85*   LYMPH 12* 8*   EOS 1 0      Microbiology Recent Labs     04/05/21  1920 04/05/21  1912 04/04/21  1709 04/04/21  1658   CULT PENDING PENDING NO GROWTH 3 DAYS NO GROWTH 3 DAYS          RADIOLOGY:    All available imaging studies/reports in [facility] for this admission were reviewed      Disclaimer: Sections of this note are dictated utilizing voice recognition software, which may have resulted in some phonetic based errors in grammar and contents. Even though attempts were made to correct all the mistakes, some may have been missed, and remained in the body of the document. If questions arise, please contact our department.     Dr. Johny Collins  Infectious Disease   April 7, 2021  9:41 AM

## 2021-04-07 NOTE — ROUTINE PROCESS
TRANSFER - IN REPORT:    Verbal report received from 1 Bernard Giang RN(name) on Germania Woodard  being received from [facility](unit) for routine progression of care      Report consisted of patients Situation, Background, Assessment and   Recommendations(SBAR). Information from the following report(s) SBAR, Kardex, Intake/Output, MAR and Recent Results was reviewed with the receiving nurse. Opportunity for questions and clarification was provided. Assessment completed upon patients arrival to unit and care assumed.

## 2021-04-07 NOTE — DISCHARGE INSTRUCTIONS
Patient Education   Patient armband removed and shredded  MyChart Activation    Thank you for requesting access to [facility]. Please follow the instructions below to securely access and download your online medical record. [facility] allows you to send messages to your doctor, view your test results, renew your prescriptions, schedule appointments, and more. How Do I Sign Up? 1. In your internet browser, go to [email or web address]  2. Click on the First Time User? Click Here link in the Sign In box. You will be redirect to the New Member Sign Up page. 3. Enter your [facility] Access Code exactly as it appears below. You will not need to use this code after youve completed the sign-up process. If you do not sign up before the expiration date, you must request a new code. [facility] Access Code: Activation code not generated  Current [facility] Status: Active (This is the date your [facility] access code will )    4. Enter the last four digits of your Social Security Number (xxxx) and Date of Birth (mm/dd/yyyy) as indicated and click Submit. You will be taken to the next sign-up page. 5. Create a [facility] ID. This will be your [facility] login ID and cannot be changed, so think of one that is secure and easy to remember. 6. Create a [facility] password. You can change your password at any time. 7. Enter your Password Reset Question and Answer. This can be used at a later time if you forget your password. 8. Enter your e-mail address. You will receive e-mail notification when new information is available in [address]. 9. Click Sign Up. You can now view and download portions of your medical record. 10. Click the Download Summary menu link to download a portable copy of your medical information. Additional Information    If you have questions, please visit the Frequently Asked Questions section of the [facility] website at [email or web address] Remember, [facility] is NOT to be used for urgent needs.  For medical emergencies, dial 911. DISCHARGE SUMMARY from Nurse    PATIENT INSTRUCTIONS:    After general anesthesia or intravenous sedation, for 24 hours or while taking prescription Narcotics:  · Limit your activities  · Do not drive and operate hazardous machinery  · Do not make important personal or business decisions  · Do  not drink alcoholic beverages  · If you have not urinated within 8 hours after discharge, please contact your surgeon on call. Report the following to your surgeon:  · Excessive pain, swelling, redness or odor of or around the surgical area  · Temperature over 100.5  · Nausea and vomiting lasting longer than 4 hours or if unable to take medications  · Any signs of decreased circulation or nerve impairment to extremity: change in color, persistent  numbness, tingling, coldness or increase pain  · Any questions    What to do at Home:  Recommended activity: Activity as tolerated,     If you experience any of the following symptoms open wounds or sore on your feet, fever, chills, or nausea and vomiting, please follow up with 911. *  Please give a list of your current medications to your Primary Care Provider. *  Please update this list whenever your medications are discontinued, doses are      changed, or new medications (including over-the-counter products) are added. *  Please carry medication information at all times in case of emergency situations. These are general instructions for a healthy lifestyle:    No smoking/ No tobacco products/ Avoid exposure to second hand smoke  Surgeon General's Warning:  Quitting smoking now greatly reduces serious risk to your health.     Obesity, smoking, and sedentary lifestyle greatly increases your risk for illness    A healthy diet, regular physical exercise & weight monitoring are important for maintaining a healthy lifestyle    You may be retaining fluid if you have a history of heart failure or if you experience any of the following symptoms:  Weight gain of 3 pounds or more overnight or 5 pounds in a week, increased swelling in our hands or feet or shortness of breath while lying flat in bed. Please call your doctor as soon as you notice any of these symptoms; do not wait until your next office visit. The discharge information has been reviewed with the patient. The patient verbalized understanding. Discharge medications reviewed with the patient and appropriate educational materials and side effects teaching were provided. ___________________________________________________________________________________________________________________________________     Learning About Foot and Toenail Care  Foot and toenail care: Overview  Checking your loved one's feet and keeping them clean and soft can help prevent cracks and infection in the skin. This is especially important for people who have diabetes. Keeping toenails trimmed--and polished if that's what the person likes--also helps the person feel well-groomed. If the person you care for has diabetes or has foot problems, such as bad bunions and corns, think about taking them to see a podiatrist. This is a doctor who specializes in the care of the feet. Sometimes a podiatrist will come to the home if the person can't go out for visits. Try to take the person for salon pedicures if that is what they want. It's a chance to get out and see people and continue a favorite activity. You can do basic nail care at home. Usually all you need to do is keep the nails clean and at a safe length. How do you trim someone's toenails? Try to trim the person's nails every week. Or check the nails each week to see if they need to be trimmed. It's easiest to trim nails after the person has had a shower or foot bath. It makes the nails softer and easier to trim. Start by gathering your supplies. You will need toenail clippers and a nail file. You may also need nail polish and nail polish remover.   To trim the nails:  6. Wash and dry your hands. You don't need to wear gloves. 7. Use nail polish remover to take off any polish. 8. Hold the person's foot and toe steady with one hand while you trim the nail with your other hand. Trim the nails straight across. Leave the nails a little longer at the corners so that the sharp ends don't cut into the skin. 9. Keep the nails no longer than the tip of the toes. 10. Let the nails dry if they are still damp and soft. 11. Use a nail file to gently smooth the edges of the nails, especially at the corners. They may be sharp after the nails are cut straight. 12. Apply nail polish, if the person wants it. If the person's nails are thick and discolored, it may be safest to have a podiatrist cut them. What else do you need to know? When you're caring for someone's nails, it is important to remember not to trim or cut the cuticles. A minor cut in a cuticle could lead to an infection. Wash the feet daily in the shower or bath or in a basin made for washing feet. It's extra important to wash the feet carefully if the person has diabetes. After washing the feet, dry gently. Put lotion on the feet, especially on the heels. But don't put it between the toes. If the person doesn't have diabetes and you see signs of athlete's foot (such as dry, cracking, or itchy skin between the toes), you can try an over-the-counter medicine. These medicines can kill the fungus that causes athlete's foot. If the problem doesn't go away, talk to the person's doctor. Look every day for cuts or signs of infection, such as pain, swelling, redness, or warmth. If you see any of these signs--especially in someone who has diabetes--call the doctor. Where can you learn more? Go to http://www.gray.com/  Enter A726 in the search box to learn more about \"Learning About Foot and Toenail Care. \"  Current as of: July 17, 2020               Content Version: 12.8  © 4173-4409 Healthwise, Incorporated. Care instructions adapted under license by [employer] (which disclaims liability or warranty for this information). If you have questions about a medical condition or this instruction, always ask your healthcare professional. [address]  any warranty or liability for your use of this information. Patient Education        Home Blood Pressure Test: About This Test  What is it? A home blood pressure test allows you to keep track of your blood pressure at home. Blood pressure is a measure of the force of blood against the walls of your arteries. Blood pressure readings include two numbers, such as 130/80 (say \"130 over 80\"). The first number is the systolic pressure. The second number is the diastolic pressure. Why is this test done? You may do this test at home to:  · Find out if you have high blood pressure. · Track your blood pressure if you have high blood pressure. · Track how well medicine is working to reduce high blood pressure. · Check how lifestyle changes, such as weight loss and exercise, are affecting blood pressure. How do you prepare for the test?  For at least 30 minutes before you take your blood pressure, don't exercise, drink caffeine, or smoke. Empty your bladder before the test. Sit quietly with your back straight and both feet on the floor for at least 5 minutes. This helps you take your blood pressure while you feel comfortable and relaxed. How is the test done? · If your doctor recommends it, take your blood pressure twice a day. Take it in the morning and evening. · Sit with your arm slightly bent and resting on a table so that your upper arm is at the same level as your heart. · Use the same arm each time you take your blood pressure. · Place the blood pressure cuff on the bare skin of your upper arm. You may have to roll up your sleeve, remove your arm from the sleeve, or take your shirt off.   · Wrap the blood pressure cuff around your upper arm so that the lower edge of the cuff is about 1 inch above the bend of your elbow. · Do not move, talk, or text while you take your blood pressure. Follow the instructions that came with your blood pressure monitor. They might be different from the following. · Press the on/off button on the automatic monitor. Then you may need to wait until the screen says the monitor is ready. · Press the start button. The cuff will inflate and deflate by itself. · Your blood pressure numbers will appear on the screen. · Wait one minute and take your blood pressure again. · If your monitor does not automatically save your numbers, write them in your log book, along with the date and time. Follow-up care is a key part of your treatment and safety. Be sure to make and go to all appointments, and call your doctor if you are having problems. It's also a good idea to keep a list of the medicines you take. Where can you learn more? Go to http://www.lizama.com/  Enter C427 in the search box to learn more about \"Home Blood Pressure Test: About This Test.\"  Current as of: August 31, 2020               Content Version: 12.8  © 8256-8880 [employer]. Care instructions adapted under license by [employer] (which disclaims liability or warranty for this information). If you have questions about a medical condition or this instruction, always ask your healthcare professional. [place] [address] any warranty or liability for your use of this information. Patient Education     Learning About Diabetes and Your Teeth  How does diabetes affect your teeth and gums? When you have diabetes, managing blood sugar levels and taking good care of your teeth and gums are both important. When blood sugar levels are high, there's a greater risk for:  · Gum (periodontal) disease. · Tooth decay.   · Fungal infections in the mouth, like thrush. · Dry mouth, or xerostomia (say \"zee-ruh-STO-la-uh\"). The mouth needs saliva to neutralize the acids in your mouth. These acids can lead to gum disease and tooth decay. Keeping your blood sugar levels in your target range can help prevent problems with the teeth and gums. If you have any problems with your teeth or gums, see your dentist.  How do you care for your teeth and gums when you have diabetes? · Brush your teeth twice a day. · Floss daily. Make sure to press the floss against your teeth and not your gums. · Check each day for areas where your gums might be red or painful. Be sure to let your dentist know of any sores in your mouth. · See your dentist regularly for professional cleaning of your teeth and to look for gum problems. Many dentists recommend getting checkups twice a year. Remind your dentist that you have diabetes before any work is done. · Don't smoke or use smokeless tobacco. Tobacco use with diabetes can lead to a greater risk of severe gum disease. If you need help quitting, talk to your doctor about stop-smoking programs and medicines. These can increase your chances of quitting for good. Follow-up care is a key part of your treatment and safety. Be sure to make and go to all appointments, and call your doctor if you are having problems. It's also a good idea to know your test results and keep a list of the medicines you take. Where can you learn more? Go to [employer]  Enter H523 in the search box to learn more about \"Learning About Diabetes and Your Teeth. \"   © 3161-7848 Healthwise, Incorporated. Care instructions adapted under license by [facility] (which disclaims liability or warranty for this information).  This care instruction is for use with your licensed healthcare professional. If you have questions about a medical condition or this instruction, always ask your healthcare professional. [employer] disclaims any warranty or liability for your use of this information. Content Version: 94.2.510873; Current as of: May 22, 2015           Patient Education        Learning About Meal Planning for Diabetes  Why plan your meals? Meal planning can be a key part of managing diabetes. Planning meals and snacks with the right balance of carbohydrate, protein, and fat can help you keep your blood sugar at the target level you set with your doctor. You don't have to eat special foods. You can eat what your family eats, including sweets once in a while. But you do have to pay attention to how often you eat and how much you eat of certain foods. You may want to work with a dietitian or a certified diabetes educator. He or she can give you tips and meal ideas and can answer your questions about meal planning. This health professional can also help you reach a healthy weight if that is one of your goals. What plan is right for you? Your dietitian or diabetes educator may suggest that you start with the plate format or carbohydrate counting. The plate format  The plate format is a simple way to help you manage how you eat. You plan meals by learning how much space each food should take on a plate. Using the plate format helps you spread carbohydrate throughout the day. It can make it easier to keep your blood sugar level within your target range. It also helps you see if you're eating healthy portion sizes. To use the plate format, you put non-starchy vegetables on half your plate. Add meat or meat substitutes on one-quarter of the plate. Put a grain or starchy vegetable (such as brown rice or a potato) on the final quarter of the plate. You can add a small piece of fruit and some low-fat or fat-free milk or yogurt, depending on your carbohydrate goal for each meal.  Here are some tips for using the plate format:  · Make sure that you are not using an oversized plate. A 9-inch plate is best. Many restaurants use larger plates.   · Get used to using the plate format at home. Then you can use it when you eat out. · Write down your questions about using the plate format. Talk to your doctor, a dietitian, or a diabetes educator about your concerns. Carbohydrate counting  With carbohydrate counting, you plan meals based on the amount of carbohydrate in each food. Carbohydrate raises blood sugar higher and more quickly than any other nutrient. It is found in desserts, breads and cereals, and fruit. It's also found in starchy vegetables such as potatoes and corn, grains such as rice and pasta, and milk and yogurt. Spreading carbohydrate throughout the day helps keep your blood sugar levels within your target range. Your daily amount depends on several things, including your weight, how active you are, which diabetes medicines you take, and what your goals are for your blood sugar levels. A registered dietitian or diabetes educator can help you plan how much carbohydrate to include in each meal and snack. A guideline for your daily amount of carbohydrate is:  · 45 to 60 grams at each meal. That's about the same as 3 to 4 carbohydrate servings. · 15 to 20 grams at each snack. That's about the same as 1 carbohydrate serving. The Nutrition Facts label on packaged foods tells you how much carbohydrate is in a serving of the food. First, look at the serving size on the food label. Is that the amount you eat in a serving? All of the nutrition information on a food label is based on that serving size. So if you eat more or less than that, you'll need to adjust the other numbers. Total carbohydrate is the next thing you need to look for on the label. If you count carbohydrate servings, one serving of carbohydrate is 15 grams. For foods that don't come with labels, such as fresh fruits and vegetables, you'll need a guide that lists carbohydrate in these foods.  Ask your doctor, dietitian, or diabetes educator about books or other nutrition guides you can use.  If you take insulin, you need to know how many grams of carbohydrate are in a meal. This lets you know how much rapid-acting insulin to take before you eat. If you use an insulin pump, you get a constant rate of insulin during the day. So the pump must be programmed at meals to give you extra insulin to cover the rise in blood sugar after meals. When you know how much carbohydrate you will eat, you can take the right amount of insulin. Or, if you always use the same amount of insulin, you need to make sure that you eat the same amount of carbohydrate at meals. If you need more help to understand carbohydrate counting and food labels, ask your doctor, dietitian, or diabetes educator. How can you plan healthy meals? Here are some tips to get started:  · Plan your meals a week at a time. Don't forget to include snacks too. · Use cookbooks or online recipes to plan several main meals. Plan some quick meals for busy nights. You also can double some recipes that freeze well. Then you can save half for other busy nights when you don't have time to cook. · Make sure you have the ingredients you need for your recipes. If you're running low on basic items, put these items on your shopping list too. · List foods that you use to make breakfasts, lunches, and snacks. List plenty of fruits and vegetables. · Post this list on the refrigerator. Add to it as you think of more things you need. · Take the list to the store to do your weekly shopping. Follow-up care is a key part of your treatment and safety. Be sure to make and go to all appointments, and call your doctor if you are having problems. It's also a good idea to know your test results and keep a list of the medicines you take. Where can you learn more? Go to http://taye-triny.info/  Enter X864 in the search box to learn more about \"Learning About Meal Planning for Diabetes. \"  Current as of: August 31, 2020               Content Version: 12.8  © 7028-0880 Healthwise, Incorporated. Care instructions adapted under license by [employer] (which disclaims liability or warranty for this information). If you have questions about a medical condition or this instruction, always ask your healthcare professional. [address] any warranty or liability for your use of this information.

## 2021-04-07 NOTE — DISCHARGE SUMMARY
Physician Discharge Summary       Patient: Jennifer Mosquera MRN: [id number]  SSN: [id number]    YOB: 1975  Age: 40 y.o. Sex: female    PCP: Breana Billings MD    Allergies: Azithromycin and Pcn [penicillins]    Admit date: 4/4/2021  Admitting Provider: Gordon Lucas MD    Discharge date: 4/7/2021  Discharging Provider: DEXTER Lind    * Admission Diagnoses: Diabetic foot infection ([facility] [address] [F85.177, L08.9]    * Discharge Diagnoses:    1. left foot bullae with abscess, left foot chronic ulcer s/p I and D and bone biopsy on 4/5   2. Sepsis, POA  in setting of left foot cellulitis, with tachycardia and leukocytosis on admission, now low grade fever    3. Iron deficiency anemia, iron level 13   4. Hyponatremia - resolved. 5. Lactic acidosis from sepsis - resolved. 6. Type 2 diabetes, poorly controlled . A1c is 9.5% this admission. 7. HTN   8. CAD status post stent placement October 2020 an brillinta and aspirin   9. Chronic diastolic CHF   10. Chronic microcytic anemia   11. Hx of COVID infection in Feb 2021, s/p 2 covid shots.      HPI per admitting MD: Ab Trisha is a 40 y.o.  female who presents with left foot draining pus. Patient reports area to her left foot initially presented as a callous in February 2021, she followed with podiatry in early March 2021 with recommendation for surgical boot to ease weight bearing and now draining pus per patient starting earlier today. Patient denies fevers at home but states she is always cold. She reports her blood sugars at home have been running generally in the 200s and she reports compliance with home medications as prescribed. Patient states she has been having dyspnea with exertion which is worse since she contracted Covid in February 2021.   Otherwise patient is without current complaints including no headaches, cough, chest pain, no shortness of breath at rest, denies current nausea (she does report some nausea associated with Metformin use), denies vomiting, abdominal pain, no swelling of extremities. She does have some pain to her left foot. Patient also notes she received her second Pfizer vaccine earlier today at approximately 12 noon. [facility] Course: Ms Carolin Borjas is a 41 yo AA woman type 2 DM who came to hospital with drainage from left foot. Xray left foot showed \"Marked soft tissue swelling with possible plantar heel ulcer and ulcer at the  tip of the left first toe. No definite finding for osteomyelitis or acute fracture\" . MRI left foot showed \"Blistering cellulitis at the medial left forefoot. Plantar soft tissue ulceration with short length sinus tract. No drainable abscess. No osteomyelitis. \"   the patient was started on broad spectrum antibiotics. Cardiology was consulted and cleared the patient for surgery. They recommended to continue home regimen including aspirin and brillinta for recent stent. ECHO was done it was normal. Podiatry Dr Lynnette Sargent was consulted- the patient was taken to the OR on 4/5 for Left foot bullae with abscess and chronic ulcer status post incision drainage and bone marrow biopsy. ID was consulted- antibiotics were narrowed down to vancomycin. Blood cultures remained negative to date. OR cultures were reviewed. ID okay with Sarah Em on discharge. The patient's pain was well controlled, she did not require pain medicines. Maintain NWB to LLE. PT OT recommended home health. To note, iron level was low, she got 3 doses of venofer then oral iron. Lantus was continued. DISPO- HOME   Per Dr Church Nurse- Will need left foot Pruitt tenosuspension procedure as outpt with Dr Lynnette Sargent when left foot cellulitis treatment is completed. Patient aware that she needs to follow up with him for this.      * Procedures: 4/5   Procedure(s):  INCISION AND DRAINAGE LEFT FOOT/FIRST METATARSAL PHALANGEAL JOINT/BONE BIOPSY      Consults: Cardiology Dr Mónica Church Nurse, [facility] Dr Thanh Valiente Significant Diagnostic Studies:   Recent Results (from the past 24 hour(s))   GLUCOSE, POC    Collection Time: 04/06/21  3:23 PM   Result Value Ref Range    Glucose (POC) 191 (H) 70 - 110 mg/dL   GLUCOSE, POC    Collection Time: 04/06/21  8:42 PM   Result Value Ref Range    Glucose (POC) 237 (H) 70 - 110 mg/dL   GLUCOSE, POC    Collection Time: 04/07/21  6:10 AM   Result Value Ref Range    Glucose (POC) 133 (H) 70 - 766 mg/dL   METABOLIC PANEL, BASIC    Collection Time: 04/07/21  9:19 AM   Result Value Ref Range    Sodium 141 136 - 145 mmol/L    Potassium 3.5 3.5 - 5.5 mmol/L    Chloride 111 100 - 111 mmol/L    CO2 23 21 - 32 mmol/L    Anion gap 7 3.0 - 18 mmol/L    Glucose 155 (H) 74 - 99 mg/dL    BUN 7 7.0 - 18 MG/DL    Creatinine 0.88 0.6 - 1.3 MG/DL    BUN/Creatinine ratio 8 (L) 12 - 20      GFR est AA >60 >60 ml/min/1.73m2    GFR est non-AA >60 >60 ml/min/1.73m2    Calcium 8.1 (L) 8.5 - 10.1 MG/DL   CBC WITH AUTOMATED DIFF    Collection Time: 04/07/21  9:19 AM   Result Value Ref Range    WBC 9.0 4.6 - 13.2 K/uL    RBC 3.51 (L) 4.20 - 5.30 M/uL    HGB 7.4 (L) 12.0 - 16.0 g/dL    HCT 24.1 (L) 35.0 - 45.0 %    MCV 68.7 (L) 74.0 - 97.0 FL    MCH 21.1 (L) 24.0 - 34.0 PG    MCHC 30.7 (L) 31.0 - 37.0 g/dL    RDW 18.4 (H) 11.6 - 14.5 %    PLATELET 544 664 - 030 K/uL    MPV 9.8 9.2 - 11.8 FL    NEUTROPHILS PENDING %    LYMPHOCYTES PENDING %    MONOCYTES PENDING %    EOSINOPHILS PENDING %    BASOPHILS PENDING %    ABS. NEUTROPHILS PENDING K/UL    ABS. LYMPHOCYTES PENDING K/UL    ABS. MONOCYTES PENDING K/UL    ABS. EOSINOPHILS PENDING K/UL    ABS.  BASOPHILS PENDING K/UL    DF PENDING    GLUCOSE, POC    Collection Time: 04/07/21 11:37 AM   Result Value Ref Range    Glucose (POC) 156 (H) 70 - 110 mg/dL     MRI Results (most recent):  Results from [place] encounter on 04/04/21   MRI FOOT LT W WO CONT    Narrative EXAM: MRI FOOT LT W WO CONT    CLINICAL INDICATION/HISTORY: ulcer , left foot draining pus    TECHNIQUE: Multisequence multiplanar MR imaging acquired through the left foot    CONTRAST USED: 15 cc Dotarem    COMPARISON: Current plain films, prior MRI 2019    FINDINGS:        Bones/joints: Intact. Minor degenerative spurring of the dorsal midfoot. Mild  valgus inclination of the 1st IP joint. Soft tissues: Broad crescent-shaped blister at the distal medial forefoot along  the hallux. On the order of 4-5 cm length and width, about 1 cm thickness.  -Just deep of this there is moderate heterogeneity of moderately pronounced  enhancement. There are regions within the subcutaneous tissue plane that are  nonenhancing, surrounded by more avidly enhancing components.  -There is a linear ulcer crater at the plantar hallux at extends about 1.5 cm  deep pointed towards the lateral sesamoid of the hallux. However, there is no  osseous or articular involvement. Reference coronal and sagittal imaging due to  best appreciated    Other findings: There is moderately pronounced mostly nonenhancing subcutaneous  tissue edema on the dorsal foot. Regional long tendons are intact. There is some intrinsic muscle signal increase  on T2/STIR imaging. No intramuscular fluid collection. Impression 1. Blistering cellulitis at the medial left forefoot  -Plantar soft tissue ulceration with short length sinus tract  -No drainable abscess  -No osteomyelitis       Xray left foot  FINDINGS: 3 views of the left foot. There is marked soft tissue swelling at the  medial aspect of the first metatarsophalangeal region. Subjacent bone appears  intact without erosion or periosteal reaction. There are no fractures. The  dorsal foot is also edematous. There is a soft tissue defect at the tip of the  first toe potentially artifact. Posterior soft tissue calcifications of the  distal ankle stable. Prominent calcaneal spurs.   IMPRESSION  Marked soft tissue swelling with possible plantar heel ulcer and ulcer at the  tip of the left first toe. No definite finding for osteomyelitis or acute  Fracture. XR Results (most recent):  Results from Hospital Encounter encounter on 04/04/21   XR FOOT LT MIN 3 V    Narrative Left foot    HISTORY: Toe pain and ulcer    COMPARISON: 12/30/2018    FINDINGS: 3 views of the left foot. There is marked soft tissue swelling at the  medial aspect of the first metatarsophalangeal region. Subjacent bone appears  intact without erosion or periosteal reaction. There are no fractures. The  dorsal foot is also edematous. There is a soft tissue defect at the tip of the  first toe potentially artifact. Posterior soft tissue calcifications of the  distal ankle stable. Prominent calcaneal spurs. Impression Marked soft tissue swelling with possible plantar heel ulcer and ulcer at the  tip of the left first toe. No definite finding for osteomyelitis or acute  fracture. Discharge Exam:  General: AA woman laying in bed,   Alert, NAD  Cardiovascular:  RRR  Pulmonary:  LSC throughout; respiratory effort WNL, room air   GI:  +BS in all four quadrants, soft, non-tender  Extremities:  No edema; 2+ dorsalis pedis pulses bilaterally  Neuro: awake, alert, ox3 , moving arms legs, follow commands.      Her left foot is wrapped in dressing.        * Discharge Condition: improved  * Disposition: Home    Discharge Medications:  Current Discharge Medication List      START taking these medications    Details   cephALEXin (KEFLEX) 500 mg capsule Take 1 Cap by mouth three (3) times daily for 7 days. Qty: 21 Cap, Refills: 0      ferrous sulfate 325 mg (65 mg iron) tablet Take 1 Tab by mouth daily (with breakfast). Qty: 30 Tab, Refills: 0         CONTINUE these medications which have NOT CHANGED    Details   furosemide (LASIX) 20 mg tablet Take 1 Tab by mouth daily as needed (Edema).   Qty: 30 Tab, Refills: 1    Associated Diagnoses: Acute on chronic diastolic congestive heart failure (HCC)      atorvastatin (LIPITOR) 80 mg tablet Take 1 Tab by mouth daily. Qty: 90 Tab, Refills: 3    Associated Diagnoses: Screening for cholesterol level      losartan (COZAAR) 100 mg tablet Take 1 Tab by mouth daily. Qty: 90 Tab, Refills: 3    Associated Diagnoses: Essential hypertension      carvediloL (Coreg) 6.25 mg tablet Take 1 Tab by mouth two (2) times daily (with meals). Qty: 60 Tab, Refills: 5      ticagrelor (BRILINTA) 90 mg tablet Take 1 Tab by mouth every twelve (12) hours every twelve (12) hours. Qty: 60 Tab, Refills: 5      insulin glargine (LANTUS,BASAGLAR) 100 unit/mL (3 mL) inpn Take 45 units daily  Indications: type 2 diabetes mellitus  Qty: 4 Pen, Refills: 5    Associated Diagnoses: Type 2 diabetes mellitus with complication (HCC)      aspirin 81 mg chewable tablet Take 1 Tab by mouth daily. Qty: 30 Tab, Refills: 0      metFORMIN ER (GLUCOPHAGE XR) 500 mg tablet Take 1 Tab by mouth daily (with dinner). Qty: 30 Tab, Refills: 3    Associated Diagnoses: Type 2 diabetes mellitus with complication (HCC)      melatonin 5 mg cap capsule Take 1 Cap by mouth nightly. Qty: 30 Cap, Refills: 1    Associated Diagnoses: Difficulty sleeping      Insulin Needles, Disposable, 31 gauge x 5/16\" ndle Use to check blood glucose BID  Qty: 100 Pen Needle, Refills: 11    Associated Diagnoses: Type 2 diabetes mellitus with complication (HCC)      Insulin Syringe-Needle U-100 (INSULIN SYRINGE) 1 mL 30 gauge x 5/16 syrg As directed for lantus insulin  Qty: 50 Syringe, Refills: 0      insulin lispro (HUMALOG) 100 unit/mL injection For use in the Insulin pump  Qty: 1 Vial, Refills: 11    Associated Diagnoses: Type 2 diabetes mellitus with complication (HCC)               * Follow-up Care/Patient Instructions:   Activity: PT/OT per Home Health  Diet: Diabetic Diet  Wound Care: Keep wound clean and dry    Follow-up Appointments   Procedures    FOLLOW UP VISIT Appointment in: Other (Specify) With primary care physician in 5 days With Dr. Pablo Carr and group in 10 days With Dr. Hersey Romberg in 5 days     With primary care physician in 5 days  With Dr. Bhumika Can and group in 10 days  With Dr. Hersey Romberg in 5 days     Standing Status:   Standing     Number of Occurrences:   1     Order Specific Question:   Appointment in     Answer:    Other (Specify)       Signed:  DEXTER Garsia  4/7/2021  11:49 AM

## 2021-04-07 NOTE — PROGRESS NOTES
[facility]  Progress Note    Patient: Nataliia Agee Age: 40 y.o. : 1975 MR#: [id number] SSN: [id number]  Date: 2021     Subjective:   Patient no complaints, she denies any pain in her left foot at this time. Patient declines labs   No fever. HR 100s. Blood cxs neg to date, wound culture gram neg rods, gram pos cocci     Interim events:   - s/p I and D with bone biopsy with Dr Sia Walker last night -- for  left foot bullae with abscess, left foot chronic ulcer  Assessment/Plan:     hcg negative     CONSULTS: Kuldip Borden       1. left foot bullae with abscess, left foot chronic ulcer s/p I and D and bone biopsy on    2. Sepsis, POA  in setting of left foot cellulitis, with tachycardia and leukocytosis on admission, now low grade fever    3. Hypokalemia   4. Iron deficiency anemia, iron level 13   5. Hyponatremia - resolved. 6. Lactic acidosis from sepsis - resolved. 7. Type 2 diabetes, poorly controlled . A1c is 9.5% this admission. 8. HTN   9. CAD status post stent placement 2020 an brillinta and aspirin   10. Chronic diastolic CHF   11. Chronic microcytic anemia   12. Hx of COVID infection in 2021, s/p 2 covid shots.     - Podiatry follows. POD #2. Will need left foot Pruitt tenosuspension procedure as outpt with Dr Sia Walker when left foot cellulitis treatment is completed. Pain control prn tylenol. NWB left foot. - ID is following . Currently on vancomycin. Follow sensitivity report then change to oral meds. - Cardiology follows- continue asa, brillinta, statin, coreg, norvasc. Lasix held. Holding losartan due to elevated Cr . ECHO done, normal.   - continue SSI and lantus 35 units daily , can titrate up if needed  - s/p venofer, start  oral iron   - PT OT pending . Keep NWB to left foot per Podiatry. - encourage OOB, incentive felipe    DISPO- HOME WHEN CLEARED BY ID.  Will need to make sure insurance covers abx chosen. Hopefully home today versus tomorrow, depends when we get back sensitivity report. Patient will update her family       Full code   dvt ppx- scds   Gi ppx- pepcid  Diabetic diet. Additional Notes:      Case discussed with:  [x]Patient  []Family  []Nursing  []Case Management  DVT Prophylaxis:  []Lovenox  []Hep SQ  [x]SCDs  []Coumadin   []On Heparin gtt    Objective:   VS:   Visit Vitals  BP (!) 161/89   Pulse (!) 103   Temp 98.4 °F (36.9 °C)   Resp 16   Ht 5' 6\" (1.676 m)   Wt 91.2 kg (201 lb)   SpO2 98%   BMI 32.44 kg/m²      Tmax/24hrs: Temp (24hrs), Av.6 °F (37 °C), Min:98.4 °F (36.9 °C), Max:99 °F (37.2 °C)      Intake/Output Summary (Last 24 hours) at 2021 1007  Last data filed at 2021 2347  Gross per 24 hour   Intake 1300 ml   Output --   Net 1300 ml       General: AA woman laying in bed,   Alert, NAD  Cardiovascular:  RRR  Pulmonary:  LSC throughout; respiratory effort WNL, room air   GI:  +BS in all four quadrants, soft, non-tender  Extremities:  No edema; 2+ dorsalis pedis pulses bilaterally  Neuro: awake, alert, ox3 , moving arms legs, follow commands. Her left foot is wrapped in dressing.        Labs:    Recent Results (from the past 24 hour(s))   GLUCOSE, POC    Collection Time: 21 11:53 AM   Result Value Ref Range    Glucose (POC) 203 (H) 70 - 110 mg/dL   GLUCOSE, POC    Collection Time: 21  3:23 PM   Result Value Ref Range    Glucose (POC) 191 (H) 70 - 110 mg/dL   GLUCOSE, POC    Collection Time: 21  8:42 PM   Result Value Ref Range    Glucose (POC) 237 (H) 70 - 110 mg/dL   GLUCOSE, POC    Collection Time: 21  6:10 AM   Result Value Ref Range    Glucose (POC) 133 (H) 70 - 110 mg/dL       Signed By: DEXTER Rivera     2021

## 2021-04-07 NOTE — DISCHARGE SUMMARY
[address] [facility]  Face to Face Encounter    Patients Name: Madison Ortiz    YOB: 1975    Primary Diagnosis:     1. Left foot bullae with abscess and chronic ulcer status post incision drainage and bone marrow biopsy  2. Sepsis due to #1, improving  3. Iron deficiency anemia  4. Hypokalemia  5. Coronary artery disease  6. Hypertension  7. Diabetes mellitus  8. Chronic diastolic CHF, compensated  9. Hypertension    Date of Face to Face:   April 7, 2021                                  Face to Face Encounter findings are related to primary reason for home care:   yes    1. I certify that the patient needs intermittent skilled nursing care, physical therapy and/or speech therapy. I will not be following this patient in the Community and Dr. Marielena Avila MD will be responsible for signing the [address] of Care. 2. Initial Orders for Care: Must be completed only if Face to Face MD will not be signing the [address] Skilled Nursing, Physical Therapy and Occupational Therapy    3. I certify that this patient is homebound for the following reason(s): Requires considerable and taxing effort to leave the home , Requires the assistance of 1 or more persons to leave the home  and Decreased mental status requiring 24 hour supervision     4. I certify that this patient is under my care and that I, or a nurse practitioner or [facility] working with me, had a Face-to-Face Encounter that meets the physician Face-to-Face Encounter requirements. Document the physical findings from the Face-to-Face Encounter that support the need for skilled services:  Has wound that requires skilled nursing assessment and treatment , Has new medications that requires skilled nursing teaching and monitoring for understanding and compliance  and Has new finding of weakness and altered mobility that requires skilled physical/occupational and/or speech therapy services for evaluation and interventions.      Amanda Sloan MD  4/7/2021

## 2021-04-07 NOTE — PROGRESS NOTES
Discharge order noted for today. Pt has been accepted to [facility] agency. Met with patient  and are agreeable to the transition plan today. Transport has been arranged through family. Patient's discharge summary and home health  orders have been forwarded to [address] [place] [address] health  agency via [facility] Updated bedside RN,  to the transition plan.   Discharge information has been documented on the AVS.       Emerald Gongora RN BSN  Care Manager  [phone]

## 2021-04-07 NOTE — ROUTINE PROCESS
Received verbal report from [address] [place] [address], report included SBAR, MAR, and Kardex. Patient refused trough level draw, called Pharmacy to verify if the patient could receive the vancomycin. Pharmacy advised to give the vancomycin, but encourage the patient submit to morning trough level draw. Educated the patient on the importance of having a trough drawn, patient acknowledged she understood. Gave verbal report to [address] [place] [address],[address], using SBAR, MAR, and Kardex.

## 2021-04-07 NOTE — PROGRESS NOTES
conducted an initial consultation and Spiritual Assessment for Adam Garza, who is a 40 y.o.,female. Patients Primary Language is: [place]. According to the patients EMR Latter-day Affiliation is: [facility].     The reason the Patient came to the hospital is:   Patient Active Problem List    Diagnosis Date Noted    Severe obesity ([facility] [address] 11/05/2020    Fever 10/20/2020    NSTEMI (non-ST elevated myocardial infarction) ([facility] [address] 10/20/2020    Insulin dependent diabetes mellitus 01/11/2019    HTN (hypertension) 01/11/2019    Diabetic foot infection ([facility] [address] 12/30/2018    Right foot infection 12/30/2018    Acute pain of right foot 12/30/2018    Acute bronchitis 04/07/2017    Hyperglycemia 04/07/2017        The  provided the following Interventions:  Initiated a relationship of care and support. with patient in room 407 today   Listened empathically as she talked about being here for an infected foot and it had to be drained to bring he some relief. Patient says she is feeling better. Provided information about Spiritual Care Services. Offered prayer and assurance of continued prayers on patients behalf. .    The following outcomes were achieved:  Patient shared limited information about her medical narrative and spiritual journey/beliefs. Patient processed feeling about current hospitalization. Patient expressed gratitude for pastoral care visit. Assessment:  Patient does not have any Church/cultural needs that will affect patients preferences in health care. There are no further spiritual or Church issues which require Spiritual Care Services interventions at this time. Plan:  Chaplains will continue to follow and will provide pastoral care on an as needed/requested basis    . Chavo Dao   Spiritual Care   [phone]

## 2021-04-08 LAB
BACTERIA SPEC CULT: ABNORMAL
BACTERIA SPEC CULT: NORMAL
BACTERIA SPEC CULT: NORMAL
GRAM STN SPEC: ABNORMAL
SERVICE CMNT-IMP: ABNORMAL
SERVICE CMNT-IMP: NORMAL
SERVICE CMNT-IMP: NORMAL

## 2021-04-09 ENCOUNTER — HOME CARE VISIT (OUTPATIENT)
Dept: HOME HEALTH SERVICES | Facility: HOME HEALTH | Age: 46
End: 2021-04-09

## 2021-04-09 ENCOUNTER — HOME CARE VISIT (OUTPATIENT)
Dept: SCHEDULING | Facility: HOME HEALTH | Age: 46
End: 2021-04-09
Payer: MEDICAID

## 2021-04-09 VITALS
SYSTOLIC BLOOD PRESSURE: 160 MMHG | HEART RATE: 98 BPM | TEMPERATURE: 97.8 F | OXYGEN SATURATION: 99 % | DIASTOLIC BLOOD PRESSURE: 98 MMHG

## 2021-04-09 PROCEDURE — 400013 HH SOC

## 2021-04-09 PROCEDURE — G0151 HHCP-SERV OF PT,EA 15 MIN: HCPCS

## 2021-04-10 NOTE — PROGRESS NOTES
Consent: Cristina David, who was seen by synchronous (real-time) audio-video technology, and/or her healthcare decision maker, is aware that this patient-initiated, Telehealth encounter on 4/12/2021 is a billable service, with coverage as determined by her insurance carrier. She is aware that she may receive a bill and has provided verbal consent to proceed: Yes. The patient was at home and I was at the offices of the [address] [place] [address] no one else participated in the service. 04/10/21  The primary encounter diagnosis was NSTEMI (non-ST elevated myocardial infarction) ([facility] [address] Diagnoses of Type 2 diabetes mellitus with complication ([facility]) and Type 2 diabetes mellitus with complication ([facility] [address] were also pertinent to this visit. Our patient is s/p hospital discharge with left heel ulcer. It is bandaged. I did not see it today per her request.  She is due to follow-up podiatry tomorrow. The patient has diabetes mellitus type 2. I wrote a prescription to restart the Humalog in addition to the Lantus and refilled the Metformin. Her last A1c was in 2019. Follow-up is indicated. Will order for when she can come to the office or labs. Scheduled for follow-up with endocrinology. Patient is presently not on an insulin pump. History of coronary artery disease, stent s/p NSTEMI with stenting. The patient has had no recurrent symptoms. She is due for follow-up with cardiology this week. Elevated cholesterol. Not controlled on last visit. We will switch to Crestor 10 mg daily to further decrease the LDL cholesterol as now she is indicated for secondary prevention. Her blood pressure was not controlled on last visit. We will increase Coreg to 12.5 mg twice daily.   lab results and schedule of future lab studies reviewed with patient  Diagnostic and radiologic results and the schedule of future studies were reviewed with the patient  reviewed diet, exercise and weight control  All questions were answered and understood. reviewed medications and side effects in detail  She had an incidental pulmonary nodule noted on a CAT scan in 2020 October. She is a non-smoker. We will follow periodically. ICD-10-CM ICD-9-CM    1. NSTEMI (non-ST elevated myocardial infarction) (HCC)  I21.4 410.70 rosuvastatin (CRESTOR) 10 mg tablet      carvediloL (Coreg) 12.5 mg tablet   2. Essential hypertension  I10 401.9 carvediloL (Coreg) 12.5 mg tablet   3. Type 2 diabetes mellitus with complication (HCC)  Y52.3 250.90 insulin lispro (HUMALOG) 100 unit/mL injection      metFORMIN ER (GLUCOPHAGE XR) 500 mg tablet           Health Maintenance Due   Topic Date Due    Hepatitis C Screening  Never done    Pneumococcal 0-64 years (1 of 1 - PPSV23) Never done    COVID-19 Vaccine (1) Never done    DTaP/Tdap/Td series (1 - Tdap) Never done    PAP AKA CERVICAL CYTOLOGY  Never done    Foot Exam Q1  10/29/2020       Subjective:   Eveline Hawley is a 40 y.o. female. has Acute bronchitis, Hyperglycemia, Diabetic foot infection ([address] Right foot infection, Acute pain of right foot, Insulin dependent diabetes mellitus, HTN (hypertension), Fever, NSTEMI (non-ST elevated myocardial infarction) ([address] and Severe obesity ([address] on their problem list.. She was seen and admitted 4/4/2021 and was discharged 4/7/2021. The discharge diagnosis was left foot bullae with abscess and chronic ulcer status post incision drainage and bone marrow biopsy. She was felt to have sepsis secondary to this problem. The patient has diabetes mellitus type 2. There is a history of coronary artery disease, congestive heart failure, iron deficiency anemia and hypokalemia among other illnesses. The patient was seen in October 22, 2020 and underwent cardiac catheterization. She had 100% mid RCA occlusion with thrombus. She was s/p aspiration thrombectomy followed by PTA stent placement. There was mild mid LAD stenosis and normal LV function by echo. There was plan to consider select consider continue DAPT for 1 year as well as high-dose statin. The patient was seen by cardiology in February 2021. The patient had coronary artery disease and it was felt to be stable and is noted to have stable chronic diastolic congestive heart failure with no evidence of fluid overload. Her losartan was increased. She was referred to cardiac rehab. She has Mobitz type I AV block by history. She was seen by nephrology in February 2021 for chronic kidney disease stage III thought to be due to diabetic and hypertensive nephropathy with significant proteinuria. Her blood sugar at that time was also noted to be poorly controlled. She was seen in our office in February 2021 for evaluation of her cholesterol level, diabetes mellitus control vaginal candidiasis and essential hypertension. The patient has Acute bronchitis, Hyperglycemia, Diabetic foot infection ([address] Right foot infection, Acute pain of right foot, Insulin dependent diabetes mellitus, HTN (hypertension), Fever, NSTEMI (non-ST elevated myocardial infarction) ([address] and Severe obesity ([address] on their problem list.. She was seen in September 2020 for essential hypertension and Cozaar 100 mg daily was prescribed as she was not in control. The patient had type 2 diabetes mellitus with complication that was not in control. We restarted the Metformin  mg daily in addition to the Lantus 40 units daily. The patient had microalbuminuria and was hoped that the Cozaar would help. She had the complication of right toe ulcer. She was screened for cholesterol. Her carpal tunnel problem was  in control at that time. She had trouble sleeping and for this reason melatonin was started. Immunizations were ordered. A CTA on 10/19/2020 showed a 3 mm right pulmonary nodule. She is a non-smoker. We will follow periodically. Ulcer left foot  Status post hospital discharge.   Not seen today per request.  She states that is doing better. Has had no fevers or chills. She denies any significant drainage. She is due to follow-up with podiatry tomorrow. Diabetes mellitus  The patient denies polyuria, polydipsia, or polyphagia. There has been no problem with the medications. She never received the insulin pump. She is now on injections. She is followed by a specialist.  Wt Readings from Last 3 Encounters:   04/06/21 201 lb (91.2 kg)   02/08/21 214 lb (97.1 kg)   12/13/20 222 lb (100.7 kg)       Lab Results   Component Value Date/Time    Hemoglobin A1c 9.5 (H) 04/04/2021 05:09 PM    Hemoglobin A1c (POC) 12.3 03/28/2019 09:49 AM       Hyperlipidemia  The patient has had no problem with the medications  The patient denies muscle aches, headache, GI symptoms or difficulty with mentation. Lab Results   Component Value Date/Time    Cholesterol, total 218 (H) 09/28/2020 01:00 PM    HDL Cholesterol 49 09/28/2020 01:00 PM    LDL, calculated 121 (H) 09/28/2020 01:00 PM    VLDL, calculated 48 (H) 09/28/2020 01:00 PM    Triglyceride 241 (H) 09/28/2020 01:00 PM       Cardiac condition  The patient is due for follow-up with cardiology this month. She denies any chest pain pressure palpitations nausea diaphoresis orthopnea or PND. She is last seen by cardiology February 8, 2021 for shortness of breath and leg swelling. Her last echocardiogram was in October 20, 2020 showed ejection fraction 55 to 60% with normal cavity size and wall thickness this as well as systolic function. \"  CARDIAC PROCEDURE 10/23/2020 10/23/2020     Narrative 100% mid RCA occlusion with thrombus. S/p aspiration thrombectomy followed by ptca/stent ( 2 BLANCA stents placed   overlapping extending from mid RCA to distal RCA). Mild mid LAD stenosis. Normal LV function by echo. TPM inserted via right femoral vein due to complete heart block.     Plan:  Continue DAPT for 1 yr/ high dose statin. Post PCI developed juctional tachycardia.  Will discontinue TPM. Monitor and consider PPM if needed. Recommend intense risk factor modification.       Signed by: Luann Banegas MD\"         Hypertension  The patient has had no problem with the medication. The patient has no headaches, visual changes, chest pain or pressure,dyspnea, orthopnea, abdominal pain, dysuria, weakness, or paresthesias. BP Readings from Last 3 Encounters:   04/09/21 (!) 160/98   04/07/21 (!) 164/82   02/08/21 132/82     Chronic kidney disease  She denies dysuria, polyuria hematuria or cloudy urine. The patient has a history of chronic kidney disease stage III. She is also noted to have proteinuria. She was last seen by nephrology on February 2021. Current Outpatient Medications   Medication Sig    spironolactone (ALDACTONE) 25 mg tablet Take 25 mg by mouth daily.  cephALEXin (KEFLEX) 500 mg capsule Take 1 Cap by mouth three (3) times daily for 7 days.  ferrous sulfate 325 mg (65 mg iron) tablet Take 1 Tab by mouth daily (with breakfast).  ciprofloxacin HCl (CIPRO) 500 mg tablet Take 1 Tab by mouth two (2) times a day for 7 days.  furosemide (LASIX) 20 mg tablet Take 1 Tab by mouth daily as needed (Edema).  insulin lispro (HUMALOG) 100 unit/mL injection For use in the Insulin pump    atorvastatin (LIPITOR) 80 mg tablet Take 1 Tab by mouth daily.  losartan (COZAAR) 100 mg tablet Take 1 Tab by mouth daily.  carvediloL (Coreg) 6.25 mg tablet Take 1 Tab by mouth two (2) times daily (with meals).  ticagrelor (BRILINTA) 90 mg tablet Take 1 Tab by mouth every twelve (12) hours every twelve (12) hours.  insulin glargine (LANTUS,BASAGLAR) 100 unit/mL (3 mL) inpn Take 45 units daily  Indications: type 2 diabetes mellitus (Patient taking differently: 50 Units nightly. Indications: type 2 diabetes mellitus)    aspirin 81 mg chewable tablet Take 1 Tab by mouth daily.  metFORMIN ER (GLUCOPHAGE XR) 500 mg tablet Take 1 Tab by mouth daily (with dinner).  (Patient taking differently: Take 250 mg by mouth daily (with dinner). 0.5 tab daily)    melatonin 5 mg cap capsule Take 1 Cap by mouth nightly. (Patient taking differently: Take 5 mg by mouth as needed (difficulty in sleeping). Reports taking it as needed)    Insulin Needles, Disposable, 31 gauge x 5/16\" ndle Use to check blood glucose BID    Insulin Syringe-Needle U-100 (INSULIN SYRINGE) 1 mL 30 gauge x 5/16 syrg As directed for lantus insulin     No current facility-administered medications for this visit. Allergies   Allergen Reactions    Azithromycin Other (comments)     Rapid response was called for bradycardia and hypotension     Pcn [Penicillins] Hives     has Acute bronchitis, Hyperglycemia, Diabetic foot infection ([address] Right foot infection, Acute pain of right foot, Insulin dependent diabetes mellitus, HTN (hypertension), Fever, NSTEMI (non-ST elevated myocardial infarction) ([address] and Severe obesity ([facility] [address] on their problem list.  Past Surgical History:   Procedure Laterality Date    HX CORONARY STENT PLACEMENT      HX GYN            reports that she has never smoked. She has never used smokeless tobacco. She reports that she does not drink alcohol or use drugs. family history includes Lupus in her mother. Review of Systems   Constitutional: Negative for chills, fever and malaise/fatigue. HENT: Negative for congestion and sore throat. Eyes: Negative for blurred vision and redness. Respiratory: Negative for cough, shortness of breath and wheezing. Cardiovascular: Positive for leg swelling. Negative for chest pain. Gastrointestinal: Negative for abdominal pain, blood in stool, constipation, diarrhea and heartburn. Genitourinary: Negative for dysuria and urgency. Musculoskeletal: Negative for joint pain and myalgias. Skin:        The blood blister is no longer present. The primary problem is a stage II ulcer on the ball of the left foot.   There appears to be some surrounding 1+ edema up to the ankles and legs. Neurological: Negative for dizziness, sensory change, speech change and focal weakness. Endo/Heme/Allergies: Does not bruise/bleed easily. Psychiatric/Behavioral: Positive for depression. Negative for suicidal ideas. The patient has insomnia (New problem for 3 weeks. Also has a history of RODNEY not treated. The patient is also depressed. ). The patient is not nervous/anxious. Physical Exam  Constitutional:       General: She is not in acute distress. HENT:      Right Ear: External ear normal.      Left Ear: External ear normal.      Mouth/Throat:      Mouth: Mucous membranes are moist.      Pharynx: Oropharynx is clear. Eyes:      General: No scleral icterus. Pupils: Pupils are equal, round, and reactive to light. Neck:      Musculoskeletal: No neck rigidity. Pulmonary:      Effort: Pulmonary effort is normal.   Musculoskeletal:         General: No swelling. Right lower leg: No edema. Left lower leg: No edema. Skin:     Coloration: Skin is not pale. Findings: No erythema or lesion (Foot bandaged). Neurological:      Mental Status: She is alert and oriented to person, place, and time. Gait: Gait normal.   Psychiatric:         Mood and Affect: Mood normal.         Behavior: Behavior normal.         Thought Content:  Thought content normal.              Lab Results   Component Value Date/Time    WBC 9.0 04/07/2021 09:19 AM    HGB 7.4 (L) 04/07/2021 09:19 AM    Hemoglobin, POC 14.3 12/27/2014 05:54 AM    HCT 24.1 (L) 04/07/2021 09:19 AM    Hematocrit, POC 42 12/27/2014 05:54 AM    PLATELET 672 21/16/1257 09:19 AM    MCV 68.7 (L) 04/07/2021 09:19 AM     Lab Results   Component Value Date/Time    Hemoglobin A1c 9.5 (H) 04/04/2021 05:09 PM    Hemoglobin A1c >14.0 (H) 10/20/2020 10:15 AM    Hemoglobin A1c 15.1 (H) 09/28/2020 01:00 PM    Glucose 155 (H) 04/07/2021 09:19 AM    Glucose (POC) 156 (H) 04/07/2021 11:37 AM    Glucose,  (H) 12/27/2014 05:54 AM    Microalb/Creat ratio (ug/mg creat.) 4,204.9 (H) 09/28/2020 01:00 PM    LDL, calculated 121 (H) 09/28/2020 01:00 PM    Creatinine, POC 0.7 12/27/2014 05:54 AM    Creatinine 0.88 04/07/2021 09:19 AM      Lab Results   Component Value Date/Time    Cholesterol, total 218 (H) 09/28/2020 01:00 PM    HDL Cholesterol 49 09/28/2020 01:00 PM    LDL, calculated 121 (H) 09/28/2020 01:00 PM    Triglyceride 241 (H) 09/28/2020 01:00 PM     Lab Results   Component Value Date/Time    Sodium 141 04/07/2021 09:19 AM    Potassium 3.5 04/07/2021 09:19 AM    Chloride 111 04/07/2021 09:19 AM    CO2 23 04/07/2021 09:19 AM    Anion gap 7 04/07/2021 09:19 AM    Glucose 155 (H) 04/07/2021 09:19 AM    BUN 7 04/07/2021 09:19 AM    Creatinine 0.88 04/07/2021 09:19 AM    BUN/Creatinine ratio 8 (L) 04/07/2021 09:19 AM    GFR est AA >60 04/07/2021 09:19 AM    GFR est non-AA >60 04/07/2021 09:19 AM    Calcium 8.1 (L) 04/07/2021 09:19 AM        We discussed the expected course, resolution and complications of the diagnosis(es) in detail. Medication risks, benefits, costs, interactions, and alternatives were discussed as indicated. I advised her to contact the office if her condition worsens, changes or fails to improve as anticipated. She expressed understanding with the diagnosis(es) and plan. Lalito Conti is a 40 y.o. female being evaluated by a video visit encounter for concerns as above. A caregiver was present when appropriate. Due to this being a TeleHealth encounter (During LZVE-55 public health emergency), evaluation of the following organ systems was limited: Vitals/Constitutional/EENT/Resp/CV/GI//MS/Neuro/Skin/Heme-Lymph-Imm.   Pursuant to the emergency declaration under the [address] [facility] [place] [address] waiver authority and the [employer] and [employer] Act, this Virtual  Visit was conducted, with patient's (and/or legal guardian's) consent, to reduce the patient's risk of exposure to COVID-19 and provide necessary medical care. This note was done with the assistance of dragon speech software.   Some inadvertent errors or omissions may be present

## 2021-04-11 LAB
BACTERIA SPEC CULT: ABNORMAL
GRAM STN SPEC: ABNORMAL
GRAM STN SPEC: ABNORMAL
SERVICE CMNT-IMP: ABNORMAL

## 2021-04-12 ENCOUNTER — HOME CARE VISIT (OUTPATIENT)
Dept: HOME HEALTH SERVICES | Facility: HOME HEALTH | Age: 46
End: 2021-04-12
Payer: MEDICAID

## 2021-04-12 ENCOUNTER — VIRTUAL VISIT (OUTPATIENT)
Dept: FAMILY MEDICINE CLINIC | Age: 46
End: 2021-04-12
Payer: MEDICAID

## 2021-04-12 DIAGNOSIS — I21.4 NSTEMI (NON-ST ELEVATED MYOCARDIAL INFARCTION) (HCC): Primary | ICD-10-CM

## 2021-04-12 DIAGNOSIS — I10 ESSENTIAL HYPERTENSION: ICD-10-CM

## 2021-04-12 DIAGNOSIS — R80.9 MICROALBUMINURIA: ICD-10-CM

## 2021-04-12 DIAGNOSIS — E11.8 TYPE 2 DIABETES MELLITUS WITH COMPLICATION (HCC): ICD-10-CM

## 2021-04-12 DIAGNOSIS — E78.5 HYPERLIPIDEMIA, UNSPECIFIED HYPERLIPIDEMIA TYPE: ICD-10-CM

## 2021-04-12 PROCEDURE — 99215 OFFICE O/P EST HI 40 MIN: CPT | Performed by: EMERGENCY MEDICINE

## 2021-04-12 RX ORDER — CARVEDILOL 12.5 MG/1
12.5 TABLET ORAL 2 TIMES DAILY WITH MEALS
Qty: 60 TAB | Refills: 3 | Status: ON HOLD | OUTPATIENT
Start: 2021-04-12 | End: 2021-06-30 | Stop reason: SDUPTHER

## 2021-04-12 RX ORDER — METFORMIN HYDROCHLORIDE 500 MG/1
500 TABLET, EXTENDED RELEASE ORAL
Qty: 90 TAB | Refills: 3 | Status: SHIPPED | OUTPATIENT
Start: 2021-04-12 | End: 2021-06-30

## 2021-04-12 RX ORDER — ROSUVASTATIN CALCIUM 10 MG/1
10 TABLET, COATED ORAL
Qty: 90 TAB | Refills: 3 | Status: SHIPPED | OUTPATIENT
Start: 2021-04-12 | End: 2021-10-13

## 2021-04-12 RX ORDER — INSULIN LISPRO 100 [IU]/ML
INJECTION, SOLUTION INTRAVENOUS; SUBCUTANEOUS
Qty: 1 VIAL | Refills: 11
Start: 2021-04-12 | End: 2021-05-26 | Stop reason: SDUPTHER

## 2021-04-14 ENCOUNTER — HOME CARE VISIT (OUTPATIENT)
Dept: SCHEDULING | Facility: HOME HEALTH | Age: 46
End: 2021-04-14
Payer: MEDICAID

## 2021-04-14 PROCEDURE — G0157 HHC PT ASSISTANT EA 15: HCPCS

## 2021-04-15 ENCOUNTER — HOME CARE VISIT (OUTPATIENT)
Dept: HOME HEALTH SERVICES | Facility: HOME HEALTH | Age: 46
End: 2021-04-15
Payer: MEDICAID

## 2021-04-15 VITALS
RESPIRATION RATE: 18 BRPM | SYSTOLIC BLOOD PRESSURE: 156 MMHG | OXYGEN SATURATION: 97 % | HEART RATE: 112 BPM | TEMPERATURE: 98 F | DIASTOLIC BLOOD PRESSURE: 99 MMHG

## 2021-04-16 ENCOUNTER — HOME CARE VISIT (OUTPATIENT)
Dept: HOME HEALTH SERVICES | Facility: HOME HEALTH | Age: 46
End: 2021-04-16
Payer: MEDICAID

## 2021-04-19 ENCOUNTER — HOME CARE VISIT (OUTPATIENT)
Dept: HOME HEALTH SERVICES | Facility: HOME HEALTH | Age: 46
End: 2021-04-19
Payer: MEDICAID

## 2021-04-20 ENCOUNTER — HOME CARE VISIT (OUTPATIENT)
Dept: HOME HEALTH SERVICES | Facility: HOME HEALTH | Age: 46
End: 2021-04-20
Payer: MEDICAID

## 2021-04-21 ENCOUNTER — HOME CARE VISIT (OUTPATIENT)
Dept: HOME HEALTH SERVICES | Facility: HOME HEALTH | Age: 46
End: 2021-04-21
Payer: MEDICAID

## 2021-04-26 ENCOUNTER — HOME CARE VISIT (OUTPATIENT)
Dept: SCHEDULING | Facility: HOME HEALTH | Age: 46
End: 2021-04-26
Payer: MEDICAID

## 2021-04-26 VITALS
OXYGEN SATURATION: 99 % | HEART RATE: 105 BPM | SYSTOLIC BLOOD PRESSURE: 150 MMHG | DIASTOLIC BLOOD PRESSURE: 94 MMHG | TEMPERATURE: 98.4 F

## 2021-04-26 PROCEDURE — G0151 HHCP-SERV OF PT,EA 15 MIN: HCPCS

## 2021-05-15 ENCOUNTER — HOSPITAL ENCOUNTER (EMERGENCY)
Age: 46
Discharge: LEFT BEFORE DISCHARGE | End: 2021-05-15
Attending: EMERGENCY MEDICINE
Payer: MEDICAID

## 2021-05-15 VITALS
DIASTOLIC BLOOD PRESSURE: 75 MMHG | BODY MASS INDEX: 31.48 KG/M2 | HEART RATE: 97 BPM | TEMPERATURE: 98.8 F | HEIGHT: 67 IN | RESPIRATION RATE: 17 BRPM | SYSTOLIC BLOOD PRESSURE: 90 MMHG | OXYGEN SATURATION: 99 %

## 2021-05-15 LAB
ATRIAL RATE: 96 BPM
CALCULATED P AXIS, ECG09: 43 DEGREES
CALCULATED R AXIS, ECG10: 40 DEGREES
CALCULATED T AXIS, ECG11: 72 DEGREES
DIAGNOSIS, 93000: NORMAL
P-R INTERVAL, ECG05: 130 MS
Q-T INTERVAL, ECG07: 342 MS
QRS DURATION, ECG06: 82 MS
QTC CALCULATION (BEZET), ECG08: 432 MS
VENTRICULAR RATE, ECG03: 96 BPM

## 2021-05-15 PROCEDURE — 93005 ELECTROCARDIOGRAM TRACING: CPT

## 2021-05-15 PROCEDURE — 75810000275 HC EMERGENCY DEPT VISIT NO LEVEL OF CARE

## 2021-05-15 NOTE — ED TRIAGE NOTES
Patient presents to ED with c/o fatigue with dizziness for a couple of days. Patient shares that she had a foot surgery about a month ago and believes to be feeling this way d/t the surgery. She shares that she has some drainage from the surgical site.

## 2021-05-16 ENCOUNTER — HOSPITAL ENCOUNTER (EMERGENCY)
Age: 46
Discharge: STILL ADMITTED OR ADMINISTRATIVE | End: 2021-05-16

## 2021-05-17 ENCOUNTER — HOSPITAL ENCOUNTER (INPATIENT)
Age: 46
LOS: 4 days | Discharge: HOME HEALTH | DRG: 710 | End: 2021-05-21
Attending: EMERGENCY MEDICINE | Admitting: INTERNAL MEDICINE
Payer: MEDICAID

## 2021-05-17 ENCOUNTER — APPOINTMENT (OUTPATIENT)
Dept: GENERAL RADIOLOGY | Age: 46
DRG: 710 | End: 2021-05-17
Attending: PHYSICIAN ASSISTANT
Payer: MEDICAID

## 2021-05-17 ENCOUNTER — APPOINTMENT (OUTPATIENT)
Dept: MRI IMAGING | Age: 46
DRG: 710 | End: 2021-05-17
Attending: PODIATRIST
Payer: MEDICAID

## 2021-05-17 ENCOUNTER — APPOINTMENT (OUTPATIENT)
Dept: ULTRASOUND IMAGING | Age: 46
DRG: 710 | End: 2021-05-17
Attending: INTERNAL MEDICINE
Payer: MEDICAID

## 2021-05-17 DIAGNOSIS — E08.621 DIABETIC ULCER OF TOE OF LEFT FOOT ASSOCIATED WITH DIABETES MELLITUS DUE TO UNDERLYING CONDITION, WITH NECROSIS OF BONE (HCC): ICD-10-CM

## 2021-05-17 DIAGNOSIS — R73.9 HYPERGLYCEMIA: ICD-10-CM

## 2021-05-17 DIAGNOSIS — E87.6 HYPOKALEMIA: ICD-10-CM

## 2021-05-17 DIAGNOSIS — L97.524 DIABETIC ULCER OF TOE OF LEFT FOOT ASSOCIATED WITH DIABETES MELLITUS DUE TO UNDERLYING CONDITION, WITH NECROSIS OF BONE (HCC): ICD-10-CM

## 2021-05-17 DIAGNOSIS — E11.621 DIABETIC ULCER OF LEFT MIDFOOT ASSOCIATED WITH TYPE 2 DIABETES MELLITUS, UNSPECIFIED ULCER STAGE (HCC): ICD-10-CM

## 2021-05-17 DIAGNOSIS — L97.429 DIABETIC ULCER OF LEFT MIDFOOT ASSOCIATED WITH TYPE 2 DIABETES MELLITUS, UNSPECIFIED ULCER STAGE (HCC): ICD-10-CM

## 2021-05-17 DIAGNOSIS — A41.9 SEPSIS, DUE TO UNSPECIFIED ORGANISM, UNSPECIFIED WHETHER ACUTE ORGAN DYSFUNCTION PRESENT (HCC): ICD-10-CM

## 2021-05-17 DIAGNOSIS — E87.1 HYPONATREMIA: ICD-10-CM

## 2021-05-17 DIAGNOSIS — M86.9 OSTEOMYELITIS OF LEFT FOOT, UNSPECIFIED TYPE (HCC): Primary | ICD-10-CM

## 2021-05-17 DIAGNOSIS — N17.9 AKI (ACUTE KIDNEY INJURY) (HCC): ICD-10-CM

## 2021-05-17 PROBLEM — L97.509 DIABETIC FOOT ULCER (HCC): Status: ACTIVE | Noted: 2021-05-17

## 2021-05-17 LAB
ALBUMIN SERPL-MCNC: 1.2 G/DL (ref 3.4–5)
ALBUMIN/GLOB SERPL: 0.2 {RATIO} (ref 0.8–1.7)
ALP SERPL-CCNC: 326 U/L (ref 45–117)
ALT SERPL-CCNC: 8 U/L (ref 13–56)
ANION GAP SERPL CALC-SCNC: 7 MMOL/L (ref 3–18)
ANION GAP SERPL CALC-SCNC: 8 MMOL/L (ref 3–18)
APPEARANCE UR: ABNORMAL
AST SERPL-CCNC: 13 U/L (ref 10–38)
BACTERIA URNS QL MICRO: ABNORMAL /HPF
BASOPHILS # BLD: 0 K/UL (ref 0–0.1)
BASOPHILS NFR BLD: 0 % (ref 0–2)
BILIRUB SERPL-MCNC: 0.6 MG/DL (ref 0.2–1)
BILIRUB UR QL: NEGATIVE
BUN SERPL-MCNC: 18 MG/DL (ref 7–18)
BUN SERPL-MCNC: 18 MG/DL (ref 7–18)
BUN/CREAT SERPL: 8 (ref 12–20)
BUN/CREAT SERPL: 9 (ref 12–20)
CALCIUM SERPL-MCNC: 8 MG/DL (ref 8.5–10.1)
CALCIUM SERPL-MCNC: 8.3 MG/DL (ref 8.5–10.1)
CHLORIDE SERPL-SCNC: 100 MMOL/L (ref 100–111)
CHLORIDE SERPL-SCNC: 93 MMOL/L (ref 100–111)
CO2 SERPL-SCNC: 24 MMOL/L (ref 21–32)
CO2 SERPL-SCNC: 26 MMOL/L (ref 21–32)
COLOR UR: YELLOW
CREAT SERPL-MCNC: 1.99 MG/DL (ref 0.6–1.3)
CREAT SERPL-MCNC: 2.13 MG/DL (ref 0.6–1.3)
DIFFERENTIAL METHOD BLD: ABNORMAL
EOSINOPHIL # BLD: 0 K/UL (ref 0–0.4)
EOSINOPHIL NFR BLD: 0 % (ref 0–5)
EPITH CASTS URNS QL MICRO: ABNORMAL /LPF (ref 0–5)
ERYTHROCYTE [DISTWIDTH] IN BLOOD BY AUTOMATED COUNT: 15.4 % (ref 11.6–14.5)
EST. AVERAGE GLUCOSE BLD GHB EST-MCNC: 344 MG/DL
GLOBULIN SER CALC-MCNC: 6.2 G/DL (ref 2–4)
GLUCOSE BLD STRIP.AUTO-MCNC: 505 MG/DL (ref 70–110)
GLUCOSE BLD STRIP.AUTO-MCNC: 514 MG/DL (ref 70–110)
GLUCOSE BLD STRIP.AUTO-MCNC: 523 MG/DL (ref 70–110)
GLUCOSE BLD STRIP.AUTO-MCNC: 528 MG/DL (ref 70–110)
GLUCOSE SERPL-MCNC: 466 MG/DL (ref 74–99)
GLUCOSE SERPL-MCNC: 616 MG/DL (ref 74–99)
GLUCOSE UR STRIP.AUTO-MCNC: >1000 MG/DL
GRAN CASTS URNS QL MICRO: ABNORMAL /LPF
HBA1C MFR BLD: 13.6 % (ref 4.2–5.6)
HCT VFR BLD AUTO: 26.1 % (ref 35–45)
HGB BLD-MCNC: 8.2 G/DL (ref 12–16)
HGB UR QL STRIP: ABNORMAL
KETONES UR QL STRIP.AUTO: NEGATIVE MG/DL
LACTATE BLD-SCNC: 1.49 MMOL/L (ref 0.4–2)
LEUKOCYTE ESTERASE UR QL STRIP.AUTO: ABNORMAL
LYMPHOCYTES # BLD: 1.6 K/UL (ref 0.9–3.6)
LYMPHOCYTES NFR BLD: 12 % (ref 21–52)
MCH RBC QN AUTO: 22 PG (ref 24–34)
MCHC RBC AUTO-ENTMCNC: 31.4 G/DL (ref 31–37)
MCV RBC AUTO: 70 FL (ref 74–97)
MONOCYTES # BLD: 1.1 K/UL (ref 0.05–1.2)
MONOCYTES NFR BLD: 8 % (ref 3–10)
NEUTS SEG # BLD: 10.8 K/UL (ref 1.8–8)
NEUTS SEG NFR BLD: 79 % (ref 40–73)
NITRITE UR QL STRIP.AUTO: NEGATIVE
PH UR STRIP: 5.5 [PH] (ref 5–8)
PLATELET # BLD AUTO: 286 K/UL (ref 135–420)
PMV BLD AUTO: 10.4 FL (ref 9.2–11.8)
POTASSIUM SERPL-SCNC: 3.3 MMOL/L (ref 3.5–5.5)
POTASSIUM SERPL-SCNC: 3.6 MMOL/L (ref 3.5–5.5)
PROT SERPL-MCNC: 7.4 G/DL (ref 6.4–8.2)
PROT UR STRIP-MCNC: 300 MG/DL
RBC # BLD AUTO: 3.73 M/UL (ref 4.2–5.3)
RBC #/AREA URNS HPF: ABNORMAL /HPF (ref 0–5)
SODIUM SERPL-SCNC: 127 MMOL/L (ref 136–145)
SODIUM SERPL-SCNC: 131 MMOL/L (ref 136–145)
SP GR UR REFRACTOMETRY: 1.02 (ref 1–1.03)
TSH SERPL DL<=0.05 MIU/L-ACNC: 1.12 UIU/ML (ref 0.36–3.74)
UROBILINOGEN UR QL STRIP.AUTO: 1 EU/DL (ref 0.2–1)
WBC # BLD AUTO: 13.6 K/UL (ref 4.6–13.2)
WBC URNS QL MICRO: ABNORMAL /HPF (ref 0–4)
YEAST URNS QL MICRO: ABNORMAL

## 2021-05-17 PROCEDURE — 86920 COMPATIBILITY TEST SPIN: CPT

## 2021-05-17 PROCEDURE — 74011250636 HC RX REV CODE- 250/636: Performed by: PHYSICIAN ASSISTANT

## 2021-05-17 PROCEDURE — 74011250636 HC RX REV CODE- 250/636: Performed by: HOSPITALIST

## 2021-05-17 PROCEDURE — 83036 HEMOGLOBIN GLYCOSYLATED A1C: CPT

## 2021-05-17 PROCEDURE — 74011250636 HC RX REV CODE- 250/636: Performed by: INTERNAL MEDICINE

## 2021-05-17 PROCEDURE — 87205 SMEAR GRAM STAIN: CPT

## 2021-05-17 PROCEDURE — 82962 GLUCOSE BLOOD TEST: CPT

## 2021-05-17 PROCEDURE — 84443 ASSAY THYROID STIM HORMONE: CPT

## 2021-05-17 PROCEDURE — 96374 THER/PROPH/DIAG INJ IV PUSH: CPT

## 2021-05-17 PROCEDURE — 74011636637 HC RX REV CODE- 636/637: Performed by: EMERGENCY MEDICINE

## 2021-05-17 PROCEDURE — 87077 CULTURE AEROBIC IDENTIFY: CPT

## 2021-05-17 PROCEDURE — 74011636637 HC RX REV CODE- 636/637: Performed by: HOSPITALIST

## 2021-05-17 PROCEDURE — 73630 X-RAY EXAM OF FOOT: CPT

## 2021-05-17 PROCEDURE — 36415 COLL VENOUS BLD VENIPUNCTURE: CPT

## 2021-05-17 PROCEDURE — 93005 ELECTROCARDIOGRAM TRACING: CPT

## 2021-05-17 PROCEDURE — 74011250637 HC RX REV CODE- 250/637: Performed by: INTERNAL MEDICINE

## 2021-05-17 PROCEDURE — 76770 US EXAM ABDO BACK WALL COMP: CPT

## 2021-05-17 PROCEDURE — 83605 ASSAY OF LACTIC ACID: CPT

## 2021-05-17 PROCEDURE — 2709999900 HC NON-CHARGEABLE SUPPLY

## 2021-05-17 PROCEDURE — 87040 BLOOD CULTURE FOR BACTERIA: CPT

## 2021-05-17 PROCEDURE — 96375 TX/PRO/DX INJ NEW DRUG ADDON: CPT

## 2021-05-17 PROCEDURE — 73718 MRI LOWER EXTREMITY W/O DYE: CPT

## 2021-05-17 PROCEDURE — 87086 URINE CULTURE/COLONY COUNT: CPT

## 2021-05-17 PROCEDURE — 99223 1ST HOSP IP/OBS HIGH 75: CPT | Performed by: INTERNAL MEDICINE

## 2021-05-17 PROCEDURE — 99284 EMERGENCY DEPT VISIT MOD MDM: CPT

## 2021-05-17 PROCEDURE — 74011636637 HC RX REV CODE- 636/637: Performed by: INTERNAL MEDICINE

## 2021-05-17 PROCEDURE — 74011250637 HC RX REV CODE- 250/637: Performed by: EMERGENCY MEDICINE

## 2021-05-17 PROCEDURE — 80053 COMPREHEN METABOLIC PANEL: CPT

## 2021-05-17 PROCEDURE — 65270000029 HC RM PRIVATE

## 2021-05-17 PROCEDURE — 74011250636 HC RX REV CODE- 250/636: Performed by: EMERGENCY MEDICINE

## 2021-05-17 PROCEDURE — 81001 URINALYSIS AUTO W/SCOPE: CPT

## 2021-05-17 PROCEDURE — 86901 BLOOD TYPING SEROLOGIC RH(D): CPT

## 2021-05-17 PROCEDURE — 71045 X-RAY EXAM CHEST 1 VIEW: CPT

## 2021-05-17 PROCEDURE — 87147 CULTURE TYPE IMMUNOLOGIC: CPT

## 2021-05-17 PROCEDURE — 85025 COMPLETE CBC W/AUTO DIFF WBC: CPT

## 2021-05-17 PROCEDURE — 74011000250 HC RX REV CODE- 250: Performed by: EMERGENCY MEDICINE

## 2021-05-17 PROCEDURE — 87186 SC STD MICRODIL/AGAR DIL: CPT

## 2021-05-17 RX ORDER — POLYETHYLENE GLYCOL 3350 17 G/17G
17 POWDER, FOR SOLUTION ORAL DAILY PRN
Status: DISCONTINUED | OUTPATIENT
Start: 2021-05-17 | End: 2021-05-21 | Stop reason: HOSPADM

## 2021-05-17 RX ORDER — PROMETHAZINE HYDROCHLORIDE 12.5 MG/1
12.5 TABLET ORAL
Status: DISCONTINUED | OUTPATIENT
Start: 2021-05-17 | End: 2021-05-21 | Stop reason: HOSPADM

## 2021-05-17 RX ORDER — METRONIDAZOLE 500 MG/100ML
500 INJECTION, SOLUTION INTRAVENOUS EVERY 8 HOURS
Status: DISCONTINUED | OUTPATIENT
Start: 2021-05-17 | End: 2021-05-17

## 2021-05-17 RX ORDER — HEPARIN SODIUM 5000 [USP'U]/ML
5000 INJECTION, SOLUTION INTRAVENOUS; SUBCUTANEOUS EVERY 8 HOURS
Status: DISCONTINUED | OUTPATIENT
Start: 2021-05-17 | End: 2021-05-21 | Stop reason: HOSPADM

## 2021-05-17 RX ORDER — MORPHINE SULFATE 4 MG/ML
4 INJECTION INTRAVENOUS
Status: COMPLETED | OUTPATIENT
Start: 2021-05-17 | End: 2021-05-17

## 2021-05-17 RX ORDER — MAGNESIUM SULFATE 100 %
4 CRYSTALS MISCELLANEOUS AS NEEDED
Status: DISCONTINUED | OUTPATIENT
Start: 2021-05-17 | End: 2021-05-18 | Stop reason: SDUPTHER

## 2021-05-17 RX ORDER — INSULIN GLARGINE 100 [IU]/ML
50 INJECTION, SOLUTION SUBCUTANEOUS
Status: DISCONTINUED | OUTPATIENT
Start: 2021-05-17 | End: 2021-05-21 | Stop reason: HOSPADM

## 2021-05-17 RX ORDER — ROSUVASTATIN CALCIUM 10 MG/1
10 TABLET, COATED ORAL
Status: DISCONTINUED | OUTPATIENT
Start: 2021-05-17 | End: 2021-05-21 | Stop reason: HOSPADM

## 2021-05-17 RX ORDER — METRONIDAZOLE 500 MG/100ML
500 INJECTION, SOLUTION INTRAVENOUS EVERY 12 HOURS
Status: DISCONTINUED | OUTPATIENT
Start: 2021-05-17 | End: 2021-05-19

## 2021-05-17 RX ORDER — GUAIFENESIN 100 MG/5ML
81 LIQUID (ML) ORAL DAILY
Status: DISCONTINUED | OUTPATIENT
Start: 2021-05-18 | End: 2021-05-21 | Stop reason: HOSPADM

## 2021-05-17 RX ORDER — VANCOMYCIN 2 GRAM/500 ML IN 0.9 % SODIUM CHLORIDE INTRAVENOUS
2000 ONCE
Status: COMPLETED | OUTPATIENT
Start: 2021-05-17 | End: 2021-05-17

## 2021-05-17 RX ORDER — ACETAMINOPHEN 650 MG/1
650 SUPPOSITORY RECTAL
Status: DISCONTINUED | OUTPATIENT
Start: 2021-05-17 | End: 2021-05-21 | Stop reason: HOSPADM

## 2021-05-17 RX ORDER — DEXTROSE 50 % IN WATER (D50W) INTRAVENOUS SYRINGE
25-50 AS NEEDED
Status: DISCONTINUED | OUTPATIENT
Start: 2021-05-17 | End: 2021-05-21 | Stop reason: HOSPADM

## 2021-05-17 RX ORDER — INSULIN LISPRO 100 [IU]/ML
INJECTION, SOLUTION INTRAVENOUS; SUBCUTANEOUS
Status: DISCONTINUED | OUTPATIENT
Start: 2021-05-17 | End: 2021-05-18

## 2021-05-17 RX ORDER — CARVEDILOL 12.5 MG/1
12.5 TABLET ORAL 2 TIMES DAILY WITH MEALS
Status: DISCONTINUED | OUTPATIENT
Start: 2021-05-17 | End: 2021-05-21 | Stop reason: HOSPADM

## 2021-05-17 RX ORDER — ACETAMINOPHEN 325 MG/1
650 TABLET ORAL
Status: DISCONTINUED | OUTPATIENT
Start: 2021-05-17 | End: 2021-05-21 | Stop reason: HOSPADM

## 2021-05-17 RX ORDER — FAMOTIDINE 20 MG/1
20 TABLET, FILM COATED ORAL EVERY EVENING
Status: DISCONTINUED | OUTPATIENT
Start: 2021-05-17 | End: 2021-05-21 | Stop reason: HOSPADM

## 2021-05-17 RX ORDER — ONDANSETRON 2 MG/ML
4 INJECTION INTRAMUSCULAR; INTRAVENOUS
Status: DISCONTINUED | OUTPATIENT
Start: 2021-05-17 | End: 2021-05-21 | Stop reason: HOSPADM

## 2021-05-17 RX ORDER — SODIUM CHLORIDE 0.9 % (FLUSH) 0.9 %
5-40 SYRINGE (ML) INJECTION AS NEEDED
Status: DISCONTINUED | OUTPATIENT
Start: 2021-05-17 | End: 2021-05-21 | Stop reason: HOSPADM

## 2021-05-17 RX ORDER — SODIUM CHLORIDE 9 MG/ML
100 INJECTION, SOLUTION INTRAVENOUS CONTINUOUS
Status: DISCONTINUED | OUTPATIENT
Start: 2021-05-17 | End: 2021-05-19

## 2021-05-17 RX ORDER — INSULIN GLARGINE 100 [IU]/ML
25 INJECTION, SOLUTION SUBCUTANEOUS
Status: DISCONTINUED | OUTPATIENT
Start: 2021-05-17 | End: 2021-05-17

## 2021-05-17 RX ORDER — SODIUM CHLORIDE 0.9 % (FLUSH) 0.9 %
5-10 SYRINGE (ML) INJECTION AS NEEDED
Status: DISCONTINUED | OUTPATIENT
Start: 2021-05-17 | End: 2021-05-21 | Stop reason: HOSPADM

## 2021-05-17 RX ORDER — SODIUM CHLORIDE 0.9 % (FLUSH) 0.9 %
5-40 SYRINGE (ML) INJECTION EVERY 8 HOURS
Status: DISCONTINUED | OUTPATIENT
Start: 2021-05-17 | End: 2021-05-21 | Stop reason: HOSPADM

## 2021-05-17 RX ORDER — POTASSIUM CHLORIDE 7.45 MG/ML
10 INJECTION INTRAVENOUS
Status: COMPLETED | OUTPATIENT
Start: 2021-05-17 | End: 2021-05-17

## 2021-05-17 RX ADMIN — INSULIN GLARGINE 50 UNITS: 100 INJECTION, SOLUTION SUBCUTANEOUS at 23:46

## 2021-05-17 RX ADMIN — FAMOTIDINE 20 MG: 20 TABLET, FILM COATED ORAL at 23:53

## 2021-05-17 RX ADMIN — HUMAN INSULIN 6 UNITS: 100 INJECTION, SOLUTION SUBCUTANEOUS at 18:41

## 2021-05-17 RX ADMIN — SODIUM CHLORIDE 100 ML/HR: 900 INJECTION, SOLUTION INTRAVENOUS at 23:45

## 2021-05-17 RX ADMIN — SODIUM CHLORIDE 1000 ML: 900 INJECTION, SOLUTION INTRAVENOUS at 17:55

## 2021-05-17 RX ADMIN — HEPARIN SODIUM 5000 UNITS: 5000 INJECTION INTRAVENOUS; SUBCUTANEOUS at 23:45

## 2021-05-17 RX ADMIN — INSULIN LISPRO 10 UNITS: 100 INJECTION, SOLUTION INTRAVENOUS; SUBCUTANEOUS at 21:32

## 2021-05-17 RX ADMIN — POTASSIUM BICARBONATE 20 MEQ: 782 TABLET, EFFERVESCENT ORAL at 18:13

## 2021-05-17 RX ADMIN — ROSUVASTATIN CALCIUM 10 MG: 10 TABLET, COATED ORAL at 23:48

## 2021-05-17 RX ADMIN — WATER 1 G: 1 INJECTION INTRAMUSCULAR; INTRAVENOUS; SUBCUTANEOUS at 18:40

## 2021-05-17 RX ADMIN — POTASSIUM CHLORIDE 10 MEQ: 7.46 INJECTION, SOLUTION INTRAVENOUS at 18:54

## 2021-05-17 RX ADMIN — VANCOMYCIN HYDROCHLORIDE 2000 MG: 10 INJECTION, POWDER, LYOPHILIZED, FOR SOLUTION INTRAVENOUS at 17:54

## 2021-05-17 RX ADMIN — CARVEDILOL 12.5 MG: 12.5 TABLET, FILM COATED ORAL at 18:57

## 2021-05-17 RX ADMIN — MORPHINE SULFATE 4 MG: 4 INJECTION, SOLUTION INTRAMUSCULAR; INTRAVENOUS at 17:54

## 2021-05-17 RX ADMIN — ACETAMINOPHEN 650 MG: 325 TABLET ORAL at 21:31

## 2021-05-17 RX ADMIN — SODIUM CHLORIDE 748 ML: 900 INJECTION, SOLUTION INTRAVENOUS at 19:10

## 2021-05-17 RX ADMIN — METRONIDAZOLE 500 MG: 500 INJECTION, SOLUTION INTRAVENOUS at 17:54

## 2021-05-17 NOTE — H&P
History & Physical    Patient: Zara Monzon MRN: [id number]  CSN: [id number]    YOB: 1975  Age: 40 y.o. Sex: female      DOA: 5/17/2021  CC: foot pain and draining     PCP: Kevin Jeffers MD       HPI:     Zara Monzon is a 40 y.o. female with medical co-morbidities including HTN, CAD with recent NSTEMI, hyperlipidemia, type 2 DM, morbid obesity, recently hospitalized for diabetic foot ulcer, presented with left foot pain/swelling and drainage. According to her, she had left foot swelling and painful about 3-4 days ago. Her left foot ulcer has been draining pus. She expressed not feeling well and having shaking chill. No fever. Otherwise, she has no sick contact, she has on-going cough but no shortness of breath. No chest pain. She is complaint with her medications. In the ER, she was found to have lower grade fever, leukocytosis, tachycardia, she was started on sepsis protocol with IV ceftriaxone/flagyl/vancomycin. Lab with evidences of FRANCISCA and hyperglycemia, hyponatremia. Insulin was given. Podiatry evaluated and ordered MRI foot with vascular duplex. Review of Systems  GENERAL: No fever, ++ chill, No malaise   HEENT: No change in vision, no ear ache, no sore throat or sinus congestion. NECK: No pain or stiffness. PULMONARY: No shortness of breath, no cough or wheeze. Cardiovascular: no pnd / orthopnea, no Chest Pain  GASTROINTESTINAL: No abd pain, No nausea/vomiting, No diarrhea, No bright red blood per rectum. GENITOURINARY: No urinary frequency, No urgency or pain with urination. MUSCULOSKELETAL: No joint or muscle pain, no back pain, no recent trauma. DERMATOLOGIC: No rash, no itching, no lesions. (left foot ulcer, drainage, infected)  ENDOCRINE: No polyuria, polydipsia, No recent change in weight. HEMATOLOGICAL: No easy bruising or bleeding.    NEUROLOGIC: No headache, No seizures, No generalized weakness         Past Medical History:   Diagnosis Date    CAD (coronary artery disease)     Diabetes ([facility] [address]     HTN (hypertension)     Hyperlipidemia        Past Surgical History:   Procedure Laterality Date    HX CORONARY STENT PLACEMENT      HX GYN             Family History   Problem Relation Age of Onset    Lupus Mother     Cancer Neg Hx     Diabetes Neg Hx     Heart Disease Neg Hx     Hypertension Neg Hx     Heart Attack Neg Hx     Stroke Neg Hx        Social History     Socioeconomic History    Marital status:      Spouse name: Not on file    Number of children: Not on file    Years of education: Not on file    Highest education level: Not on file   Tobacco Use    Smoking status: Never Smoker    Smokeless tobacco: Never Used   Substance and Sexual Activity    Alcohol use: No    Drug use: No    Sexual activity: Yes     Partners: Male       Prior to Admission medications    Medication Sig Start Date End Date Taking? Authorizing Provider   insulin lispro (HUMALOG) 100 unit/mL injection Take 10 units three times a day with meals  Patient taking differently: by SubCUTAneous route. Take 10 units three times a day with meals 21  Yes Junior Brenda MD   metFORMIN ER (GLUCOPHAGE XR) 500 mg tablet Take 1 Tab by mouth daily (with dinner). 21  Yes Junior Brenda MD   rosuvastatin (CRESTOR) 10 mg tablet Take 1 Tab by mouth nightly. 21  Yes Junior Brenda MD   carvediloL (Coreg) 12.5 mg tablet Take 1 Tab by mouth two (2) times daily (with meals). Indications: myocardial reinfarction prevention 21  Yes Junior Brenda MD   spironolactone (ALDACTONE) 25 mg tablet Take 25 mg by mouth daily. Yes Provider, Historical   ferrous sulfate 325 mg (65 mg iron) tablet Take 1 Tab by mouth daily (with breakfast). 21  Yes Riley Borjas MD   furosemide (LASIX) 20 mg tablet Take 1 Tab by mouth daily as needed (Edema). 21  Yes Chetna Israel MD   losartan (COZAAR) 100 mg tablet Take 1 Tab by mouth daily. 2/1/21  Yes Mal Thapa MD   ticagrelor (BRILINTA) 90 mg tablet Take 1 Tab by mouth every twelve (12) hours every twelve (12) hours. 11/17/20  Yes Rosalio Henderson NP   insulin glargine (LANTUS,BASAGLAR) 100 unit/mL (3 mL) inpn Take 45 units daily  Indications: type 2 diabetes mellitus  Patient taking differently: 50 Units by SubCUTAneous route nightly. Indications: type 2 diabetes mellitus 10/23/20  Yes Jos Shaffer NP   aspirin 81 mg chewable tablet Take 1 Tab by mouth daily. 10/24/20  Yes Jos Shaffer NP   melatonin 5 mg cap capsule Take 1 Cap by mouth nightly. Patient taking differently: Take 5 mg by mouth as needed (difficulty in sleeping). Reports taking it as needed 9/29/20  Yes Mal Thapa MD   Insulin Needles, Disposable, 31 gauge x 5/16\" ndle Use to check blood glucose BID 6/11/20  Yes Mal Thapa MD   Insulin Syringe-Needle U-100 (INSULIN SYRINGE) 1 mL 30 gauge x 5/16 syrg As directed for lantus insulin 1/2/19  Yes Jamia Delgadillo MD       Allergies   Allergen Reactions    Azithromycin Other (comments)     Rapid response was called for bradycardia and hypotension     Pcn [Penicillins] Hives              Physical Exam:      Visit Vitals  BP (!) 141/92   Pulse (!) 105   Temp 100.1 °F (37.8 °C)   Resp 16   Ht 5' 7\" (1.702 m)   Wt 86.2 kg (190 lb)   SpO2 100%   BMI 29.76 kg/m²       Physical Exam:  Tele: sinus  General:  Cooperative, Not in acute distress, speaks in full sentence while in bed  HEENT: PERRL, EOMI, supple neck, no JVD, dry oral mucosa  Cardiovascular: S1S2 regular, no rub/gallop   Pulmonary: air entry bilaterally, no wheezing, no crackle  GI:  Soft, non tender, non distended, +bs, no guarding   Extremities:  No pedal edema, +distal pulses appreciated   Neuro: AOx3, moving all extremities, no gross deficit.    Left foot with bleeding ulcer, warm foot/pain to touch    Lab/Data Review:  Labs: Results:       Chemistry Recent Labs     05/17/21  0302 *   *   K 3.3*   CL 93*   CO2 26   BUN 18   CREA 2.13*   CA 8.3*   AGAP 8   BUCR 8*   *   TP 7.4   ALB 1.2*   GLOB 6.2*   AGRAT 0.2*      CBC w/Diff Recent Labs     05/17/21 1709   WBC 13.6*   RBC 3.73*   HGB 8.2*   HCT 26.1*      GRANS 79*   LYMPH 12*   EOS 0      Coagulation No results for input(s): PTP, INR, APTT, INREXT in the last 72 hours. Iron/Ferritin No results for input(s): IRON in the last 72 hours. No lab exists for component: TIBCCALC   BNP No results for input(s): BNPP in the last 72 hours. Cardiac Enzymes No results for input(s): CPK, CKND1, ROBERTA in the last 72 hours. No lab exists for component: CKRMB, TROIP   Liver Enzymes Recent Labs     05/17/21 1709   TP 7.4   ALB 1.2*   *      Thyroid Studies Lab Results   Component Value Date/Time    TSH 1.47 10/19/2020 06:30 PM          All Micro Results     Procedure Component Value Units Date/Time    CULTURE, URINE [[id number]]     Order Status: Sent Specimen: Urine from Clean catch     CULTURE, MRSA [[id number]]     Order Status: Sent Specimen: Nasal from Nares     CULTURE, BLOOD [[id number]] Collected: 05/17/21 1715    Order Status: Completed Specimen: Blood Updated: 05/17/21 1737    CULTURE, BLOOD [[id number]] Collected: 05/17/21 1709    Order Status: Completed Specimen: Blood Updated: 05/17/21 1737    CULTURE, WOUND Rise Schreiber STAIN [[id number]]     Order Status: Sent Specimen: Wound from Foot           Imaging Reviewed:  CXR without infiltrate   Lt foot XRAY pending       Assessment:   Active Problems:  1)  Left foot diabetic ulcer with infection, suspected underlying Osteomyelitis  2)  Left foot cellulitis   3)  Sepsis poa with cellulitis of foot   4)  Leukocytosis   5)  Microcytic anemia     6)  Hyponatremia, with mild dehydration   7)  Hyperglycemia with T2DM HgbA1c 13.6%   8)   Hypoalbuminemia  9)   Hypertension   10)  CAD   11)  Hyperlipidemia   12)  Morbid obesity     Plan:      Will continue with IV antibiotics will consult ID for further care. Wound culture and blood Cx follow up   Spoke with Podiatry for MRI and surgical wound care. NPO for OR tomorrow   MRI and artery duplex   IV fluid continue. Check her BMP at 8PM.   Resume her ISS and Lantus dosing   Resume home medications as tolerated. Might need to hold Brilinta for her surgery   Avoid nephrotoxic medication  US renal. Spoke with nephrology for further care.    pepcid    Full code      Risk of deterioration:  []Low    [x]Moderate  []High     Prophylaxis:  []Lovenox  []Coumadin  [x]Hep SQ  []SCDs  [x]H2B/PPI     Disposition:  []Home w/ Family   [x] PT,OT,RN   []SNF/LTC   []SAH/Rehab     Discussed Code Status:         [x]Full Code      []DNR         ___________________________________________________     Care Plan discussed with:    [x]Patient   []Family    []ED Care Manager  [x]ED Doc   [x]Specialist :  Total Time Coordinating Admission:  70    minutes    []Total Critical Care Time:       Vangie Piedra MD  5/17/2021, 6:44 PM

## 2021-05-17 NOTE — ED NOTES
Sepsis order set placed from triage, sepsis alert called overhead. I performed a brief evaluation, including history and physical, of the patient here in triage and I have determined that pt will need further treatment and evaluation from the main side ER physician. I have placed initial orders to help in expediting patients care.      May 17, 2021 at 4:59 PM - DEXTER Morillo        Visit Vitals  /75 (BP 1 Location: Left upper arm, BP Patient Position: At rest)   Pulse (!) 105   Temp 98.9 °F (37.2 °C)   Resp 16   Ht 5' 7\" (1.702 m)   Wt 86.2 kg (190 lb)   SpO2 99%   BMI 29.76 kg/m²

## 2021-05-17 NOTE — REMOTE MONITORING
Spoke with primary RN Monty Maria regarding Sepsis 3-hour bundle.            Thank Michael Whelan, BSN, RN, [facility]  [address] # [phone]

## 2021-05-17 NOTE — ROUTINE PROCESS
TRANSFER - OUT REPORT:    Verbal report given to Isidro Doan on Lauren Palacio  being transferred to [address] for routine progression of care       Report consisted of patients Situation, Background, Assessment and   Recommendations(SBAR). Information from the following report(s) ED Summary was reviewed with the receiving nurse. Lines:   Peripheral IV 05/17/21 Left Antecubital (Active)   Site Assessment Clean, dry, & intact 05/17/21 1721   Phlebitis Assessment 0 05/17/21 1721   Infiltration Assessment 0 05/17/21 1721   Dressing Status Clean, dry, & intact 05/17/21 1721   Hub Color/Line Status Pink 05/17/21 1721       Peripheral IV 05/17/21 Right Antecubital (Active)   Site Assessment Clean, dry, & intact 05/17/21 1721   Phlebitis Assessment 0 05/17/21 1721   Infiltration Assessment 0 05/17/21 1721   Dressing Status Clean, dry, & intact 05/17/21 1721   Hub Color/Line Status Pink 05/17/21 1721        Opportunity for questions and clarification was provided.       Patient transported with:   [employer]

## 2021-05-17 NOTE — CONSULTS
Podiatry History and Physical    Subjective:         Date of Consultation:  May 17, 2021    Patient is a 40 y.o. female with PMHx noted below who is being seen for left foot ulcer. Patient states that her foot is worse over last 2 days and it is swollen, warm and draining purulence from first MTPJ ulcer. Patient was recently admitted about a month ago and was taken to OR for I & D with bone biopsy which was negative for osteomyelitis. Patient was discharged on PO cephalexin and ciprofloxacin per ID recommendation. Patient was healing fine until 2 days ago when they noticed purulence discharge. Patient also noted to have chills. Patient Active Problem List    Diagnosis Date Noted    Osteomyelitis ([address] 2021    Diabetic foot ulcer ([address] 2021    Severe obesity ([address] 2020    Fever 10/20/2020    NSTEMI (non-ST elevated myocardial infarction) ([address] 10/20/2020    Insulin dependent diabetes mellitus 2019    HTN (hypertension) 2019    Diabetic foot infection ([address] 2018    Right foot infection 2018    Acute pain of right foot 2018    Acute bronchitis 2017    Hyperglycemia 2017     Past Medical History:   Diagnosis Date    CAD (coronary artery disease)     Diabetes ([address]     HTN (hypertension)     Hyperlipidemia       Family History   Problem Relation Age of Onset    Lupus Mother     Cancer Neg Hx     Diabetes Neg Hx     Heart Disease Neg Hx     Hypertension Neg Hx     Heart Attack Neg Hx     Stroke Neg Hx       Social History     Tobacco Use    Smoking status: Never Smoker    Smokeless tobacco: Never Used   Substance Use Topics    Alcohol use: No     Past Surgical History:   Procedure Laterality Date    HX CORONARY STENT PLACEMENT      HX GYN            Prior to Admission medications    Medication Sig Start Date End Date Taking?  Authorizing Provider   insulin lispro (HUMALOG) 100 unit/mL injection Take 10 units three times a day with meals  Patient taking differently: by SubCUTAneous route. Take 10 units three times a day with meals 4/12/21  Yes Gabriela Curran MD   metFORMIN ER (GLUCOPHAGE XR) 500 mg tablet Take 1 Tab by mouth daily (with dinner). 4/12/21  Yes Gabriela Curran MD   rosuvastatin (CRESTOR) 10 mg tablet Take 1 Tab by mouth nightly. 4/12/21  Yes Gabriela Curran MD   carvediloL (Coreg) 12.5 mg tablet Take 1 Tab by mouth two (2) times daily (with meals). Indications: myocardial reinfarction prevention 4/12/21  Yes Gabriela Curran MD   spironolactone (ALDACTONE) 25 mg tablet Take 25 mg by mouth daily. Yes Derick, Lucía   ferrous sulfate 325 mg (65 mg iron) tablet Take 1 Tab by mouth daily (with breakfast). 4/8/21  Yes Melody Puga MD   furosemide (LASIX) 20 mg tablet Take 1 Tab by mouth daily as needed (Edema). 2/8/21  Yes Lisa Ozuna MD   losartan (COZAAR) 100 mg tablet Take 1 Tab by mouth daily. 2/1/21  Yes Gabriela Curran MD   ticagrelor (BRILINTA) 90 mg tablet Take 1 Tab by mouth every twelve (12) hours every twelve (12) hours. 11/17/20  Yes Rosalio Henderson NP   insulin glargine (LANTUS,BASAGLAR) 100 unit/mL (3 mL) inpn Take 45 units daily  Indications: type 2 diabetes mellitus  Patient taking differently: 50 Units by SubCUTAneous route nightly. Indications: type 2 diabetes mellitus 10/23/20  Yes Jos Shaffer NP   aspirin 81 mg chewable tablet Take 1 Tab by mouth daily. 10/24/20  Yes Jos Shaffer NP   melatonin 5 mg cap capsule Take 1 Cap by mouth nightly. Patient taking differently: Take 5 mg by mouth as needed (difficulty in sleeping).  Reports taking it as needed 9/29/20  Yes Gabriela Curran MD   Insulin Needles, Disposable, 31 gauge x 5/16\" ndle Use to check blood glucose BID 6/11/20  Yes Gabriela Curran MD   Insulin Syringe-Needle U-100 (INSULIN SYRINGE) 1 mL 30 gauge x 5/16 syrg As directed for lantus insulin 1/2/19  Yes Melody Puga MD     Allergies Allergen Reactions    Azithromycin Other (comments)     Rapid response was called for bradycardia and hypotension     Pcn [Penicillins] Hives        Review of Systems:  A comprehensive review of systems was negative except for that written in the HPI. Objective:     Patient Vitals for the past 8 hrs:   BP Temp Pulse Resp SpO2 Height Weight   21 1857 (!) 152/83 -- (!) 108 -- -- -- --   21 1830 (!) 152/83 -- -- -- 100 % -- --   21 1823 (!) 141/92 100.1 °F (37.8 °C) -- -- 100 % -- --   21 1658 126/75 98.9 °F (37.2 °C) (!) 105 16 99 % 5' 7\" (1.702 m) 86.2 kg (190 lb)     Temp (24hrs), Av.5 °F (37.5 °C), Min:98.9 °F (37.2 °C), Max:100.1 °F (37.8 °C)    Left ELADIO: palpable pedal pulses, CFT less than 3 seconds to distal digits, left 1st MTPJ dorsomedial ulcer measure 5 x 3 cm with fibrogranular base with purulence drainage. Left medial forefoot noted to be swollen, 2+ pitting edema noted. Warm to touch to forefoot. Diminished protective sensation to feet. Paresthesia symptoms present to feet. Manual muscle strength 5/5 of all extrinsic muscle groups on both feet. Data Review:   Recent Results (from the past 24 hour(s))   POC LACTIC ACID    Collection Time: 21  5:08 PM   Result Value Ref Range    Lactic Acid (POC) 1.49 0.40 - 6.74 mmol/L   METABOLIC PANEL, COMPREHENSIVE    Collection Time: 21  5:09 PM   Result Value Ref Range    Sodium 127 (L) 136 - 145 mmol/L    Potassium 3.3 (L) 3.5 - 5.5 mmol/L    Chloride 93 (L) 100 - 111 mmol/L    CO2 26 21 - 32 mmol/L    Anion gap 8 3.0 - 18 mmol/L    Glucose 616 (HH) 74 - 99 mg/dL    BUN 18 7.0 - 18 MG/DL    Creatinine 2.13 (H) 0.6 - 1.3 MG/DL    BUN/Creatinine ratio 8 (L) 12 - 20      GFR est AA 30 (L) >60 ml/min/1.73m2    GFR est non-AA 25 (L) >60 ml/min/1.73m2    Calcium 8.3 (L) 8.5 - 10.1 MG/DL    Bilirubin, total 0.6 0.2 - 1.0 MG/DL    ALT (SGPT) 8 (L) 13 - 56 U/L    AST (SGOT) 13 10 - 38 U/L    Alk.  phosphatase 326 (H) 45 - 117 U/L    Protein, total 7.4 6.4 - 8.2 g/dL    Albumin 1.2 (L) 3.4 - 5.0 g/dL    Globulin 6.2 (H) 2.0 - 4.0 g/dL    A-G Ratio 0.2 (L) 0.8 - 1.7     CBC WITH AUTOMATED DIFF    Collection Time: 05/17/21  5:09 PM   Result Value Ref Range    WBC 13.6 (H) 4.6 - 13.2 K/uL    RBC 3.73 (L) 4.20 - 5.30 M/uL    HGB 8.2 (L) 12.0 - 16.0 g/dL    HCT 26.1 (L) 35.0 - 45.0 %    MCV 70.0 (L) 74.0 - 97.0 FL    MCH 22.0 (L) 24.0 - 34.0 PG    MCHC 31.4 31.0 - 37.0 g/dL    RDW 15.4 (H) 11.6 - 14.5 %    PLATELET 913 143 - 654 K/uL    MPV 10.4 9.2 - 11.8 FL    NEUTROPHILS 79 (H) 40 - 73 %    LYMPHOCYTES 12 (L) 21 - 52 %    MONOCYTES 8 3 - 10 %    EOSINOPHILS 0 0 - 5 %    BASOPHILS 0 0 - 2 %    ABS. NEUTROPHILS 10.8 (H) 1.8 - 8.0 K/UL    ABS. LYMPHOCYTES 1.6 0.9 - 3.6 K/UL    ABS. MONOCYTES 1.1 0.05 - 1.2 K/UL    ABS. EOSINOPHILS 0.0 0.0 - 0.4 K/UL    ABS. BASOPHILS 0.0 0.0 - 0.1 K/UL    DF AUTOMATED     TSH 3RD GENERATION    Collection Time: 05/17/21  5:09 PM   Result Value Ref Range    TSH 1.12 0.36 - 3.74 uIU/mL   EKG, 12 LEAD, INITIAL    Collection Time: 05/17/21  5:17 PM   Result Value Ref Range    Ventricular Rate 100 BPM    Atrial Rate 100 BPM    P-R Interval 124 ms    QRS Duration 82 ms    Q-T Interval 346 ms    QTC Calculation (Bezet) 446 ms    Calculated P Axis 42 degrees    Calculated R Axis 33 degrees    Calculated T Axis 47 degrees    Diagnosis       Normal sinus rhythm  Normal ECG  When compared with ECG of 15-MAY-2021 13:49,  No significant change was found           Impression:     Left foot diabetic ulcer with cellulitis/abscess, possible osteomyelitis of first metatarsal head with septic 1st MTPJ. Recommendation:     - x-ray of left foot reviewed. Lucency noted in media 1st metatarsal head concerning for osteomyelitis. Soft tissue swelling noted to first MTPJ with ulceration. No soft tissue gas noted. - Start on broad spectrum IV abxs. - Left foot dressed with dry gauze.  Swab wound culture obtained for aerobic and anaerobic analysis. - Remain NWB to left forefoot.   - MRI of left foot ordered. Plan to take her to OR tomorrow for I & D with possible 1st MTPJ resection with antibiotic bead placement if positive for septic 1st MTPJ. Thank you for allowing me the opportunity to participate in Ms. Alaniz's care.

## 2021-05-17 NOTE — Clinical Note
Status[de-identified] INPATIENT [101]   Type of Bed: Medical [8]   Inpatient Hospitalization Certified Necessary for the Following Reasons: 3.  Patient receiving treatment that can only be provided in an inpatient setting (further clarification in H&P documentation)   Admitting Diagnosis: Osteomyelitis [facility]) [[id number]]   Admitting Diagnosis: Diabetic foot ulcer [facility]) [[id number]]   Admitting Physician: James Fernando [[id number]]   Attending Physician: Radha Flynn   Estimated Length of Stay: 3-4 Midnights   Discharge Plan[de-identified] Home with Office Follow-up

## 2021-05-17 NOTE — ED PROVIDER NOTES
EMERGENCY DEPARTMENT HISTORY AND PHYSICAL EXAM      Date: 2021  Patient Name: Belgica Keenan    History of Presenting Illness     Chief Complaint   Patient presents with    Foot Ulcer       History Provided By: Patient    HPI: Belgica Keenan is a 40 y.o. female with past medical history diabetes, hypertension, hyperlipidemia, CAD, left foot bullae with abscess and chronic ulcer status post incision and drainage with admission  presenting with 2 days of left foot pain, drainage, warmth, erythema with associated chills. Patient reports that she finished her antibiotics earlier this month and had been doing well up until 2 days ago when she noted that her foot was warm, swollen, and was leaking fluid. She denies fever however endorses chills. No associated chest pain, shortness of breath, abdominal pain, nausea. Endorses chronic cough. Reports she is fully vaccinated against Covid. Order set placed in triage, sepsis alert called overhead. PCP: Apple Carmichael MD    Past History     Past Medical History:  Past Medical History:   Diagnosis Date    CAD (coronary artery disease)     Diabetes ([address]     HTN (hypertension)     Hyperlipidemia        Past Surgical History:  Past Surgical History:   Procedure Laterality Date    HX CORONARY STENT PLACEMENT      HX GYN             Family History:  Family History   Problem Relation Age of Onset    Lupus Mother     Cancer Neg Hx     Diabetes Neg Hx     Heart Disease Neg Hx     Hypertension Neg Hx     Heart Attack Neg Hx     Stroke Neg Hx        Social History:  Social History     Tobacco Use    Smoking status: Never Smoker    Smokeless tobacco: Never Used   Substance Use Topics    Alcohol use: No    Drug use: No       Allergies:   Allergies   Allergen Reactions    Azithromycin Other (comments)     Rapid response was called for bradycardia and hypotension     Pcn [Penicillins] Hives       Review of Systems Constitutional: No fevers. Eyes: No visual symptoms. ENT: No sore throat, runny nose, or ear pain. Respiratory: No cough, dyspnea, or wheezing. Cardiovascular: No chest pain, palpitations, or heaviness. Gastrointestinal: No nausea, vomiting, diarrhea. Genitourinary: No dysuria, frequency, or urgency. Musculoskeletal: No joint pain or swelling. Integumentary: No rashes. + Ulceration, erythema and warmth left lower extremity  Neurological: No headaches, sensory or motor symptoms. All other systems negative. Physical Exam     Patient Vitals for the past 12 hrs:   Temp Pulse Resp BP SpO2   05/17/21 1658 98.9 °F (37.2 °C) (!) 105 16 126/75 99 %       Physical Exam   Constitutional: Appears well-developed. Is not diaphoretic. Eyes: Conjunctiva clear, EOMI  Head: Normocephalic and atraumatic. Neck: Normal range of motion. No stridor or tracheal deviation. Cardiovascular: Intact distal pulses. Tachycardic with regular late and rhythm  Pulmonary/Chest: Effort normal and no respiratory distress. Clear breath sounds bilaterally  Abdominal: Non distended. Soft and nontender  Musculoskeletal: Normal range of motion. Exhibits no tenderness. Neurological: Conversant, alert. Skin: Skin is warm and dry. In the left foot there is ulceration, warmth, tenderness and active drainage of pus from the first toe. There is erythema and edema extending up the foot, tracking towards the ankle. 2+ pulses, neurovascular intact. Psychiatric: Has a normal mood and affect. Behavior is normal.   Nursing note and vitals reviewed.     Diagnostic Study Results     Labs -  Recent Results (from the past 12 hour(s))   POC LACTIC ACID    Collection Time: 05/17/21  5:08 PM   Result Value Ref Range    Lactic Acid (POC) 1.49 0.40 - 2.00 mmol/L   CBC WITH AUTOMATED DIFF    Collection Time: 05/17/21  5:09 PM   Result Value Ref Range    WBC 13.6 (H) 4.6 - 13.2 K/uL    RBC 3.73 (L) 4.20 - 5.30 M/uL    HGB 8.2 (L) 12.0 - 16.0 g/dL    HCT 26.1 (L) 35.0 - 45.0 %    MCV 70.0 (L) 74.0 - 97.0 FL    MCH 22.0 (L) 24.0 - 34.0 PG    MCHC 31.4 31.0 - 37.0 g/dL    RDW 15.4 (H) 11.6 - 14.5 %    PLATELET 344 780 - 077 K/uL    MPV 10.4 9.2 - 11.8 FL    NEUTROPHILS 79 (H) 40 - 73 %    LYMPHOCYTES 12 (L) 21 - 52 %    MONOCYTES 8 3 - 10 %    EOSINOPHILS 0 0 - 5 %    BASOPHILS 0 0 - 2 %    ABS. NEUTROPHILS 10.8 (H) 1.8 - 8.0 K/UL    ABS. LYMPHOCYTES 1.6 0.9 - 3.6 K/UL    ABS. MONOCYTES 1.1 0.05 - 1.2 K/UL    ABS. EOSINOPHILS 0.0 0.0 - 0.4 K/UL    ABS. BASOPHILS 0.0 0.0 - 0.1 K/UL    DF AUTOMATED     EKG, 12 LEAD, INITIAL    Collection Time: 05/17/21  5:17 PM   Result Value Ref Range    Ventricular Rate 100 BPM    Atrial Rate 100 BPM    P-R Interval 124 ms    QRS Duration 82 ms    Q-T Interval 346 ms    QTC Calculation (Bezet) 446 ms    Calculated P Axis 42 degrees    Calculated R Axis 33 degrees    Calculated T Axis 47 degrees    Diagnosis       Normal sinus rhythm  Normal ECG  When compared with ECG of 15-MAY-2021 13:49,  No significant change was found         Radiologic Studies -   No results found. Medical Decision Making     ED Course: Progress Notes, Reevaluation, and Consults:    6462 initial assessment performed. The patients presenting problems have been discussed, and they/their family are in agreement with the care plan formulated and outlined with them. I have encouraged them to ask questions as they arise throughout their visit. Leukocytosis of 13.6 with left shift. H&H stable 8.2/26. 1. Corrected sodium 136. Patient noted to be hypokalemic, potassium of 3.3 ordered for p.o. and IV potassium. Noted to be hyperglycemic, glucose 616, given insulin 6 units IV push. FRANCISCA noted with creatinine 2.13. Patient is being hydrated with sepsis bolus. 1803: Spoke with Dr. Marla Birch, accepted to his service. Will add on Rocephin as antibiotic choice. Results and plan for admission discussed with patient who is in agreement.       Provider Notes (Medical Decision Making): 32-year-old female with history of diabetes, left foot diabetic ulcer with cellulitis/abscess presenting with active drainage and concern for osteomyelitis. Patient met sepsis/SIRS criteria. Lactic acid 1.49. Patient initiated for sepsis fluid bolus and started on Vanco and Flagyl from triage. Will give morphine for pain. Critical Care Time: 3341   I have spent 60 minutes of critical care time involved in lab review, consultations with specialist, family decision-making, and documentation. During this entire length of time I was immediately available to the patient. Critical Care: The reason for providing this level of medical care for this critically ill patient was due a critical illness that impaired one or more vital organ systems such that there was a high probability of imminent or life threatening deterioration in the patients condition. This care involved high complexity decision making to assess, manipulate, and support vital system functions, to treat this degreee vital organ system failure and to prevent further life threatening deterioration of the patients condition. Vital Signs-Reviewed the patient's vital signs. Reviewed pt's pulse ox reading. EKG: Interpreted by the EP. Time Interpreted: 1717   Rate: 100   Rhythm: Normal Sinus Rhythm, rate 100, QRS 82, QTc 446. No acute ischemia. Interpretation: Normal sinus rhythm, rate 100, no STEMI   Comparison: 5/15/2021    Records Reviewed: Nursing Notes, Old Medical Records and Previous electrocardiograms (Time of Review: 5:53 PM)  -I am the first provider for this patient.  -I reviewed the vital signs, available nursing notes, past medical history, past surgical history, family history and social history.     Current Facility-Administered Medications   Medication Dose Route Frequency Provider Last Rate Last Admin    sodium chloride (NS) flush 5-10 mL  5-10 mL IntraVENous PRN DEXTER Coleman sodium chloride 0.9 % bolus infusion 1,000 mL  1,000 mL IntraVENous Deitra [facility] [place], [place]        Followed by   Heddie Shira sodium chloride 0.9 % bolus infusion 1,000 mL  1,000 mL IntraVENous Deitra [place], [place]        Followed by   Heddie Konig sodium chloride 0.9 % bolus infusion 748 mL  748 mL IntraVENous [place]        metroNIDAZOLE (FLAGYL) IVPB premix 500 mg  500 mg IntraVENous Q12H Armand Mooney        vancomycin (VANCOCIN) 2000 mg in  ml infusion  2,000 mg IntraVENous Deitra [facility] [place], [place]        morphine injection 4 mg  4 mg IntraVENous NOW Nilam Sharma, DO         Current Outpatient Medications   Medication Sig Dispense Refill    insulin lispro (HUMALOG) 100 unit/mL injection Take 10 units three times a day with meals (Patient taking differently: by SubCUTAneous route. Take 10 units three times a day with meals) 1 Vial 11    metFORMIN ER (GLUCOPHAGE XR) 500 mg tablet Take 1 Tab by mouth daily (with dinner). 90 Tab 3    rosuvastatin (CRESTOR) 10 mg tablet Take 1 Tab by mouth nightly. 90 Tab 3    carvediloL (Coreg) 12.5 mg tablet Take 1 Tab by mouth two (2) times daily (with meals). Indications: myocardial reinfarction prevention 60 Tab 3    spironolactone (ALDACTONE) 25 mg tablet Take 25 mg by mouth daily.  ferrous sulfate 325 mg (65 mg iron) tablet Take 1 Tab by mouth daily (with breakfast). 30 Tab 0    furosemide (LASIX) 20 mg tablet Take 1 Tab by mouth daily as needed (Edema). 30 Tab 1    losartan (COZAAR) 100 mg tablet Take 1 Tab by mouth daily. 90 Tab 3    ticagrelor (BRILINTA) 90 mg tablet Take 1 Tab by mouth every twelve (12) hours every twelve (12) hours. 60 Tab 5    insulin glargine (LANTUS,BASAGLAR) 100 unit/mL (3 mL) inpn Take 45 units daily  Indications: type 2 diabetes mellitus (Patient taking differently: 50 Units by SubCUTAneous route nightly. Indications: type 2 diabetes mellitus) 4 Pen 5    aspirin 81 mg chewable tablet Take 1 Tab by mouth daily.  30 Tab 0    melatonin 5 mg cap capsule Take 1 Cap by mouth nightly. (Patient taking differently: Take 5 mg by mouth as needed (difficulty in sleeping). Reports taking it as needed) 30 Cap 1    Insulin Needles, Disposable, 31 gauge x 5/16\" ndle Use to check blood glucose  Pen Needle 11    Insulin Syringe-Needle U-100 (INSULIN SYRINGE) 1 mL 30 gauge x 5/16 syrg As directed for lantus insulin 50 Syringe 0        Clinical Impression     Clinical Impression: No diagnosis found. 1. Osteomyelitis of left foot, unspecified type ([address]  2. Diabetic ulcer of toe of left foot associated with diabetes mellitus due to underlying condition, with necrosis of bone ([address]  3. Sepsis, due to unspecified organism, unspecified whether acute organ dysfunction present ([address]  4. FRANCISCA (acute kidney injury) ([address]  5. Hyperglycemia  6. Hypokalemia      Disposition: Admitted        This note was dictated utilizing voice recognition software which may lead to typographical errors. I apologize in advance if the situation occurs. If questions arise please do not hesitate to contact me or call our department.

## 2021-05-18 ENCOUNTER — ANESTHESIA (OUTPATIENT)
Dept: SURGERY | Age: 46
DRG: 710 | End: 2021-05-18
Payer: MEDICAID

## 2021-05-18 ENCOUNTER — ANESTHESIA EVENT (OUTPATIENT)
Dept: SURGERY | Age: 46
DRG: 710 | End: 2021-05-18
Payer: MEDICAID

## 2021-05-18 ENCOUNTER — APPOINTMENT (OUTPATIENT)
Dept: VASCULAR SURGERY | Age: 46
DRG: 710 | End: 2021-05-18
Attending: PODIATRIST
Payer: MEDICAID

## 2021-05-18 LAB
ANION GAP SERPL CALC-SCNC: 9 MMOL/L (ref 3–18)
APPEARANCE UR: ABNORMAL
BACTERIA URNS QL MICRO: ABNORMAL /HPF
BILIRUB UR QL: NEGATIVE
BUN SERPL-MCNC: 18 MG/DL (ref 7–18)
BUN/CREAT SERPL: 10 (ref 12–20)
CALCIUM SERPL-MCNC: 7.3 MG/DL (ref 8.5–10.1)
CALCIUM SERPL-MCNC: 7.4 MG/DL (ref 8.5–10.1)
CHLORIDE SERPL-SCNC: 102 MMOL/L (ref 100–111)
CO2 SERPL-SCNC: 24 MMOL/L (ref 21–32)
COLOR UR: ABNORMAL
COVID-19 RAPID TEST, COVR: NOT DETECTED
CREAT SERPL-MCNC: 1.78 MG/DL (ref 0.6–1.3)
CREAT UR-MCNC: 200 MG/DL (ref 30–125)
EPITH CASTS URNS QL MICRO: ABNORMAL /LPF (ref 0–5)
ERYTHROCYTE [DISTWIDTH] IN BLOOD BY AUTOMATED COUNT: 15.4 % (ref 11.6–14.5)
FERRITIN SERPL-MCNC: 389 NG/ML (ref 8–388)
GLUCOSE BLD STRIP.AUTO-MCNC: 131 MG/DL (ref 70–110)
GLUCOSE BLD STRIP.AUTO-MCNC: 152 MG/DL (ref 70–110)
GLUCOSE BLD STRIP.AUTO-MCNC: 212 MG/DL (ref 70–110)
GLUCOSE BLD STRIP.AUTO-MCNC: 216 MG/DL (ref 70–110)
GLUCOSE BLD STRIP.AUTO-MCNC: 329 MG/DL (ref 70–110)
GLUCOSE BLD STRIP.AUTO-MCNC: 365 MG/DL (ref 70–110)
GLUCOSE SERPL-MCNC: 318 MG/DL (ref 74–99)
GLUCOSE UR STRIP.AUTO-MCNC: >1000 MG/DL
GRAN CASTS URNS QL MICRO: ABNORMAL /LPF
HCG UR QL: NEGATIVE
HCT VFR BLD AUTO: 21.9 % (ref 35–45)
HGB BLD-MCNC: 6.8 G/DL (ref 12–16)
HGB UR QL STRIP: ABNORMAL
HISTORY CHECKED?,CKHIST: NORMAL
IRON SATN MFR SERPL: 9 % (ref 20–50)
IRON SERPL-MCNC: 15 UG/DL (ref 50–175)
KETONES UR QL STRIP.AUTO: ABNORMAL MG/DL
LEUKOCYTE ESTERASE UR QL STRIP.AUTO: ABNORMAL
MAGNESIUM SERPL-MCNC: 1.9 MG/DL (ref 1.6–2.6)
MCH RBC QN AUTO: 21.7 PG (ref 24–34)
MCHC RBC AUTO-ENTMCNC: 31.1 G/DL (ref 31–37)
MCV RBC AUTO: 70 FL (ref 74–97)
MICROALBUMIN UR-MCNC: 408 MG/DL (ref 0–3)
MICROALBUMIN/CREAT UR-RTO: 2040 MG/G (ref 0–30)
MUCOUS THREADS URNS QL MICRO: ABNORMAL /LPF
NITRITE UR QL STRIP.AUTO: NEGATIVE
PH UR STRIP: 5 [PH] (ref 5–8)
PLATELET # BLD AUTO: 240 K/UL (ref 135–420)
PMV BLD AUTO: 10 FL (ref 9.2–11.8)
POTASSIUM SERPL-SCNC: 3.5 MMOL/L (ref 3.5–5.5)
PROT UR STRIP-MCNC: >1000 MG/DL
PTH-INTACT SERPL-MCNC: 93.5 PG/ML (ref 18.4–88)
RBC # BLD AUTO: 3.13 M/UL (ref 4.2–5.3)
RBC #/AREA URNS HPF: ABNORMAL /HPF (ref 0–5)
SARS-COV-2, COV2: NORMAL
SODIUM SERPL-SCNC: 135 MMOL/L (ref 136–145)
SOURCE, COVRS: NORMAL
SP GR UR REFRACTOMETRY: 1.02 (ref 1–1.03)
TIBC SERPL-MCNC: 171 UG/DL (ref 250–450)
UROBILINOGEN UR QL STRIP.AUTO: 1 EU/DL (ref 0.2–1)
VANCOMYCIN SERPL-MCNC: 11.5 UG/ML (ref 5–40)
WBC # BLD AUTO: 12.1 K/UL (ref 4.6–13.2)
WBC URNS QL MICRO: ABNORMAL /HPF (ref 0–4)

## 2021-05-18 PROCEDURE — 36415 COLL VENOUS BLD VENIPUNCTURE: CPT

## 2021-05-18 PROCEDURE — 77030013708 HC HNDPC SUC IRR PULS STRY –B: Performed by: PODIATRIST

## 2021-05-18 PROCEDURE — 77030011985 HC AIRWY NP COVD -A: Performed by: ANESTHESIOLOGY

## 2021-05-18 PROCEDURE — 83735 ASSAY OF MAGNESIUM: CPT

## 2021-05-18 PROCEDURE — 65270000029 HC RM PRIVATE

## 2021-05-18 PROCEDURE — 80048 BASIC METABOLIC PNL TOTAL CA: CPT

## 2021-05-18 PROCEDURE — 93923 UPR/LXTR ART STDY 3+ LVLS: CPT

## 2021-05-18 PROCEDURE — 00400 ANES INTEGUMENTARY SYS NOS: CPT | Performed by: ANESTHESIOLOGY

## 2021-05-18 PROCEDURE — 74011000250 HC RX REV CODE- 250: Performed by: NURSE ANESTHETIST, CERTIFIED REGISTERED

## 2021-05-18 PROCEDURE — 00400 ANES INTEGUMENTARY SYS NOS: CPT | Performed by: NURSE ANESTHETIST, CERTIFIED REGISTERED

## 2021-05-18 PROCEDURE — 74011250637 HC RX REV CODE- 250/637: Performed by: INTERNAL MEDICINE

## 2021-05-18 PROCEDURE — 85027 COMPLETE CBC AUTOMATED: CPT

## 2021-05-18 PROCEDURE — 74011250636 HC RX REV CODE- 250/636: Performed by: INTERNAL MEDICINE

## 2021-05-18 PROCEDURE — 88311 DECALCIFY TISSUE: CPT

## 2021-05-18 PROCEDURE — 87147 CULTURE TYPE IMMUNOLOGIC: CPT

## 2021-05-18 PROCEDURE — 80202 ASSAY OF VANCOMYCIN: CPT

## 2021-05-18 PROCEDURE — 74011250636 HC RX REV CODE- 250/636: Performed by: NURSE ANESTHETIST, CERTIFIED REGISTERED

## 2021-05-18 PROCEDURE — 77030002986 HC SUT PROL J&J -A: Performed by: PODIATRIST

## 2021-05-18 PROCEDURE — 2709999900 HC NON-CHARGEABLE SUPPLY: Performed by: PODIATRIST

## 2021-05-18 PROCEDURE — 99233 SBSQ HOSP IP/OBS HIGH 50: CPT | Performed by: INTERNAL MEDICINE

## 2021-05-18 PROCEDURE — 76010000149 HC OR TIME 1 TO 1.5 HR: Performed by: PODIATRIST

## 2021-05-18 PROCEDURE — 87075 CULTR BACTERIA EXCEPT BLOOD: CPT

## 2021-05-18 PROCEDURE — 87635 SARS-COV-2 COVID-19 AMP PRB: CPT

## 2021-05-18 PROCEDURE — 87176 TISSUE HOMOGENIZATION CULTR: CPT

## 2021-05-18 PROCEDURE — 87205 SMEAR GRAM STAIN: CPT

## 2021-05-18 PROCEDURE — C1713 ANCHOR/SCREW BN/BN,TIS/BN: HCPCS | Performed by: PODIATRIST

## 2021-05-18 PROCEDURE — 77030031139 HC SUT VCRL2 J&J -A: Performed by: PODIATRIST

## 2021-05-18 PROCEDURE — APPSS45 APP SPLIT SHARED TIME 31-45 MINUTES: Performed by: NURSE PRACTITIONER

## 2021-05-18 PROCEDURE — 76060000033 HC ANESTHESIA 1 TO 1.5 HR: Performed by: PODIATRIST

## 2021-05-18 PROCEDURE — 87186 SC STD MICRODIL/AGAR DIL: CPT

## 2021-05-18 PROCEDURE — 74011000250 HC RX REV CODE- 250: Performed by: INTERNAL MEDICINE

## 2021-05-18 PROCEDURE — 81001 URINALYSIS AUTO W/SCOPE: CPT

## 2021-05-18 PROCEDURE — 77030013079 HC BLNKT BAIR HGGR 3M -A: Performed by: ANESTHESIOLOGY

## 2021-05-18 PROCEDURE — 74011000250 HC RX REV CODE- 250: Performed by: PODIATRIST

## 2021-05-18 PROCEDURE — 77030040922 HC BLNKT HYPOTHRM STRY -A: Performed by: PODIATRIST

## 2021-05-18 PROCEDURE — 36430 TRANSFUSION BLD/BLD COMPNT: CPT

## 2021-05-18 PROCEDURE — 76210000063 HC OR PH I REC FIRST 0.5 HR: Performed by: PODIATRIST

## 2021-05-18 PROCEDURE — 83540 ASSAY OF IRON: CPT

## 2021-05-18 PROCEDURE — 0SBN0ZZ EXCISION OF LEFT METATARSAL-PHALANGEAL JOINT, OPEN APPROACH: ICD-10-PCS | Performed by: PODIATRIST

## 2021-05-18 PROCEDURE — 82728 ASSAY OF FERRITIN: CPT

## 2021-05-18 PROCEDURE — 87077 CULTURE AEROBIC IDENTIFY: CPT

## 2021-05-18 PROCEDURE — 88309 TISSUE EXAM BY PATHOLOGIST: CPT

## 2021-05-18 PROCEDURE — 74011636637 HC RX REV CODE- 636/637: Performed by: INTERNAL MEDICINE

## 2021-05-18 PROCEDURE — 83970 ASSAY OF PARATHORMONE: CPT

## 2021-05-18 PROCEDURE — 82962 GLUCOSE BLOOD TEST: CPT

## 2021-05-18 PROCEDURE — P9016 RBC LEUKOCYTES REDUCED: HCPCS

## 2021-05-18 PROCEDURE — 88304 TISSUE EXAM BY PATHOLOGIST: CPT

## 2021-05-18 PROCEDURE — 2709999900 HC NON-CHARGEABLE SUPPLY

## 2021-05-18 PROCEDURE — 82043 UR ALBUMIN QUANTITATIVE: CPT

## 2021-05-18 PROCEDURE — 77030040361 HC SLV COMPR DVT MDII -B: Performed by: PODIATRIST

## 2021-05-18 PROCEDURE — 30233N1 TRANSFUSION OF NONAUTOLOGOUS RED BLOOD CELLS INTO PERIPHERAL VEIN, PERCUTANEOUS APPROACH: ICD-10-PCS | Performed by: INTERNAL MEDICINE

## 2021-05-18 PROCEDURE — 0SBQ0ZZ EXCISION OF LEFT TOE PHALANGEAL JOINT, OPEN APPROACH: ICD-10-PCS | Performed by: PODIATRIST

## 2021-05-18 PROCEDURE — 74011636637 HC RX REV CODE- 636/637: Performed by: HOSPITALIST

## 2021-05-18 PROCEDURE — 77030006773 HC BLD SAW OSC BRSM -A: Performed by: PODIATRIST

## 2021-05-18 PROCEDURE — 81025 URINE PREGNANCY TEST: CPT

## 2021-05-18 PROCEDURE — 74011250636 HC RX REV CODE- 250/636: Performed by: HOSPITALIST

## 2021-05-18 PROCEDURE — 97161 PT EVAL LOW COMPLEX 20 MIN: CPT

## 2021-05-18 PROCEDURE — 88305 TISSUE EXAM BY PATHOLOGIST: CPT

## 2021-05-18 DEVICE — STIMULAN® RAPID CURE PROVIDED STERILE FOR SINGLE PATIENT USE. STIMULAN® RAPID CURE CONTAINS CALCIUM SULFATE POWDER AND MIXING SOLUTION IN PRE-MEASURED QUANTITIES SO THAT WHEN MIXED TOGETHER IN A STERILE MIXING BOWL, THE RESULTANT PASTE IS TO BE DIGITALLY PACKED INTO OPEN BONE VOID/GAP TO SET INSITU OR PLACED INTO THE MOULD PROVIDED, THE MIXTURE SETS TO FORM BEADS. THE BIODEGRADABLE, RADIOPAQUE BEADS ARE RESORBED IN APPROXIMATELY 30 – 60 DAYS WHEN USED IN ACCORDANCE WITH THE DEVICE LABELLING. STIMULAN® RAPID CURE IS MANUFACTURED FROM SYNTHETIC IMPLANT GRADE CALCIUM SULFATE DIHYDRATE(CASO4.2H2O) THAT RESORBS AND IS REPLACED WITH BONE DURING THE HEALING PROCESS. ALSO, AS THE BONE VOID FILLER BEADS ARE BIODEGRADABLE AND BIOCOMPATIBLE, THEY MAY BE USED AT AN INFECTED SITE.
Type: IMPLANTABLE DEVICE | Site: FOOT | Status: FUNCTIONAL
Brand: STIMULAN® RAPID CURE

## 2021-05-18 RX ORDER — DIPHENHYDRAMINE HYDROCHLORIDE 50 MG/ML
12.5 INJECTION, SOLUTION INTRAMUSCULAR; INTRAVENOUS
Status: DISCONTINUED | OUTPATIENT
Start: 2021-05-18 | End: 2021-05-18 | Stop reason: HOSPADM

## 2021-05-18 RX ORDER — SODIUM CHLORIDE 9 MG/ML
250 INJECTION, SOLUTION INTRAVENOUS AS NEEDED
Status: DISCONTINUED | OUTPATIENT
Start: 2021-05-18 | End: 2021-05-21 | Stop reason: HOSPADM

## 2021-05-18 RX ORDER — PROPOFOL 10 MG/ML
INJECTION, EMULSION INTRAVENOUS AS NEEDED
Status: DISCONTINUED | OUTPATIENT
Start: 2021-05-18 | End: 2021-05-18 | Stop reason: HOSPADM

## 2021-05-18 RX ORDER — MIDAZOLAM HYDROCHLORIDE 1 MG/ML
INJECTION, SOLUTION INTRAMUSCULAR; INTRAVENOUS AS NEEDED
Status: DISCONTINUED | OUTPATIENT
Start: 2021-05-18 | End: 2021-05-18 | Stop reason: HOSPADM

## 2021-05-18 RX ORDER — SODIUM CHLORIDE 9 MG/ML
INJECTION, SOLUTION INTRAVENOUS
Status: DISCONTINUED | OUTPATIENT
Start: 2021-05-18 | End: 2021-05-18 | Stop reason: HOSPADM

## 2021-05-18 RX ORDER — HYDROMORPHONE HYDROCHLORIDE 1 MG/ML
0.2 INJECTION, SOLUTION INTRAMUSCULAR; INTRAVENOUS; SUBCUTANEOUS AS NEEDED
Status: DISCONTINUED | OUTPATIENT
Start: 2021-05-18 | End: 2021-05-18 | Stop reason: HOSPADM

## 2021-05-18 RX ORDER — MAGNESIUM SULFATE 100 %
4 CRYSTALS MISCELLANEOUS AS NEEDED
Status: DISCONTINUED | OUTPATIENT
Start: 2021-05-18 | End: 2021-05-18 | Stop reason: HOSPADM

## 2021-05-18 RX ORDER — ONDANSETRON 2 MG/ML
4 INJECTION INTRAMUSCULAR; INTRAVENOUS ONCE
Status: DISCONTINUED | OUTPATIENT
Start: 2021-05-18 | End: 2021-05-18 | Stop reason: HOSPADM

## 2021-05-18 RX ORDER — FENTANYL CITRATE 50 UG/ML
INJECTION, SOLUTION INTRAMUSCULAR; INTRAVENOUS AS NEEDED
Status: DISCONTINUED | OUTPATIENT
Start: 2021-05-18 | End: 2021-05-18 | Stop reason: HOSPADM

## 2021-05-18 RX ORDER — HYDROMORPHONE HYDROCHLORIDE 1 MG/ML
0.5 INJECTION, SOLUTION INTRAMUSCULAR; INTRAVENOUS; SUBCUTANEOUS
Status: DISCONTINUED | OUTPATIENT
Start: 2021-05-18 | End: 2021-05-18 | Stop reason: HOSPADM

## 2021-05-18 RX ORDER — PROPOFOL 10 MG/ML
VIAL (ML) INTRAVENOUS
Status: DISCONTINUED | OUTPATIENT
Start: 2021-05-18 | End: 2021-05-18 | Stop reason: HOSPADM

## 2021-05-18 RX ORDER — DEXTROSE 50 % IN WATER (D50W) INTRAVENOUS SYRINGE
25-50 AS NEEDED
Status: DISCONTINUED | OUTPATIENT
Start: 2021-05-18 | End: 2021-05-21 | Stop reason: HOSPADM

## 2021-05-18 RX ORDER — SODIUM CHLORIDE 0.9 % (FLUSH) 0.9 %
5-40 SYRINGE (ML) INJECTION EVERY 8 HOURS
Status: DISCONTINUED | OUTPATIENT
Start: 2021-05-18 | End: 2021-05-18 | Stop reason: HOSPADM

## 2021-05-18 RX ORDER — NEOMYCIN AND POLYMYXIN B SULFATES 40; 200000 MG/ML; [USP'U]/ML
SOLUTION IRRIGATION AS NEEDED
Status: DISCONTINUED | OUTPATIENT
Start: 2021-05-18 | End: 2021-05-18 | Stop reason: HOSPADM

## 2021-05-18 RX ORDER — DEXTROSE 50 % IN WATER (D50W) INTRAVENOUS SYRINGE
25-50 AS NEEDED
Status: DISCONTINUED | OUTPATIENT
Start: 2021-05-18 | End: 2021-05-18 | Stop reason: HOSPADM

## 2021-05-18 RX ORDER — SODIUM CHLORIDE 0.9 % (FLUSH) 0.9 %
5-40 SYRINGE (ML) INJECTION AS NEEDED
Status: DISCONTINUED | OUTPATIENT
Start: 2021-05-18 | End: 2021-05-18 | Stop reason: HOSPADM

## 2021-05-18 RX ORDER — OXYCODONE AND ACETAMINOPHEN 5; 325 MG/1; MG/1
1 TABLET ORAL AS NEEDED
Status: DISCONTINUED | OUTPATIENT
Start: 2021-05-18 | End: 2021-05-18 | Stop reason: HOSPADM

## 2021-05-18 RX ORDER — INSULIN LISPRO 100 [IU]/ML
INJECTION, SOLUTION INTRAVENOUS; SUBCUTANEOUS ONCE
Status: DISCONTINUED | OUTPATIENT
Start: 2021-05-18 | End: 2021-05-18 | Stop reason: HOSPADM

## 2021-05-18 RX ORDER — VANCOMYCIN/0.9 % SOD CHLORIDE 1.5G/250ML
1500 PLASTIC BAG, INJECTION (ML) INTRAVENOUS ONCE
Status: COMPLETED | OUTPATIENT
Start: 2021-05-18 | End: 2021-05-18

## 2021-05-18 RX ORDER — MAGNESIUM SULFATE 100 %
16 CRYSTALS MISCELLANEOUS AS NEEDED
Status: DISCONTINUED | OUTPATIENT
Start: 2021-05-18 | End: 2021-05-21 | Stop reason: HOSPADM

## 2021-05-18 RX ORDER — INSULIN LISPRO 100 [IU]/ML
INJECTION, SOLUTION INTRAVENOUS; SUBCUTANEOUS
Status: DISCONTINUED | OUTPATIENT
Start: 2021-05-18 | End: 2021-05-21 | Stop reason: HOSPADM

## 2021-05-18 RX ORDER — PHENYLEPHRINE HCL IN 0.9% NACL 1 MG/10 ML
SYRINGE (ML) INTRAVENOUS AS NEEDED
Status: DISCONTINUED | OUTPATIENT
Start: 2021-05-18 | End: 2021-05-18 | Stop reason: HOSPADM

## 2021-05-18 RX ORDER — SODIUM CHLORIDE, SODIUM LACTATE, POTASSIUM CHLORIDE, CALCIUM CHLORIDE 600; 310; 30; 20 MG/100ML; MG/100ML; MG/100ML; MG/100ML
100 INJECTION, SOLUTION INTRAVENOUS CONTINUOUS
Status: DISCONTINUED | OUTPATIENT
Start: 2021-05-18 | End: 2021-05-18 | Stop reason: HOSPADM

## 2021-05-18 RX ORDER — LIDOCAINE HYDROCHLORIDE 20 MG/ML
INJECTION, SOLUTION EPIDURAL; INFILTRATION; INTRACAUDAL; PERINEURAL AS NEEDED
Status: DISCONTINUED | OUTPATIENT
Start: 2021-05-18 | End: 2021-05-18 | Stop reason: HOSPADM

## 2021-05-18 RX ADMIN — SODIUM CHLORIDE: 9 INJECTION, SOLUTION INTRAVENOUS at 16:36

## 2021-05-18 RX ADMIN — VANCOMYCIN HYDROCHLORIDE 1500 MG: 10 INJECTION, POWDER, LYOPHILIZED, FOR SOLUTION INTRAVENOUS at 12:32

## 2021-05-18 RX ADMIN — PROPOFOL 30 MG: 10 INJECTION, EMULSION INTRAVENOUS at 16:53

## 2021-05-18 RX ADMIN — FENTANYL CITRATE 25 MCG: 50 INJECTION, SOLUTION INTRAMUSCULAR; INTRAVENOUS at 16:52

## 2021-05-18 RX ADMIN — Medication 100 MCG: at 17:31

## 2021-05-18 RX ADMIN — ROSUVASTATIN CALCIUM 10 MG: 10 TABLET, COATED ORAL at 21:42

## 2021-05-18 RX ADMIN — SODIUM CHLORIDE 100 ML/HR: 900 INJECTION, SOLUTION INTRAVENOUS at 12:32

## 2021-05-18 RX ADMIN — ASPIRIN 81 MG CHEWABLE TABLET 81 MG: 81 TABLET CHEWABLE at 08:58

## 2021-05-18 RX ADMIN — INSULIN LISPRO 6 UNITS: 100 INJECTION, SOLUTION INTRAVENOUS; SUBCUTANEOUS at 16:02

## 2021-05-18 RX ADMIN — MIDAZOLAM HYDROCHLORIDE 2 MG: 2 INJECTION, SOLUTION INTRAMUSCULAR; INTRAVENOUS at 16:36

## 2021-05-18 RX ADMIN — INSULIN LISPRO 12 UNITS: 100 INJECTION, SOLUTION INTRAVENOUS; SUBCUTANEOUS at 06:06

## 2021-05-18 RX ADMIN — WATER 1 G: 1 INJECTION INTRAMUSCULAR; INTRAVENOUS; SUBCUTANEOUS at 20:25

## 2021-05-18 RX ADMIN — PROPOFOL 30 MG: 10 INJECTION, EMULSION INTRAVENOUS at 16:46

## 2021-05-18 RX ADMIN — CARVEDILOL 12.5 MG: 12.5 TABLET, FILM COATED ORAL at 20:26

## 2021-05-18 RX ADMIN — METRONIDAZOLE 500 MG: 500 INJECTION, SOLUTION INTRAVENOUS at 05:33

## 2021-05-18 RX ADMIN — PROPOFOL 120 MCG/KG/MIN: 10 INJECTION, EMULSION INTRAVENOUS at 16:47

## 2021-05-18 RX ADMIN — ACETAMINOPHEN 650 MG: 325 TABLET ORAL at 23:59

## 2021-05-18 RX ADMIN — CARVEDILOL 12.5 MG: 12.5 TABLET, FILM COATED ORAL at 08:58

## 2021-05-18 RX ADMIN — LIDOCAINE HYDROCHLORIDE 50 MG: 20 INJECTION, SOLUTION EPIDURAL; INFILTRATION; INTRACAUDAL; PERINEURAL at 16:47

## 2021-05-18 RX ADMIN — FENTANYL CITRATE 25 MCG: 50 INJECTION, SOLUTION INTRAMUSCULAR; INTRAVENOUS at 16:45

## 2021-05-18 RX ADMIN — INSULIN GLARGINE 50 UNITS: 100 INJECTION, SOLUTION SUBCUTANEOUS at 21:51

## 2021-05-18 RX ADMIN — METRONIDAZOLE 500 MG: 500 INJECTION, SOLUTION INTRAVENOUS at 20:25

## 2021-05-18 RX ADMIN — SODIUM CHLORIDE 100 ML/HR: 900 INJECTION, SOLUTION INTRAVENOUS at 20:30

## 2021-05-18 RX ADMIN — ACETAMINOPHEN 650 MG: 325 TABLET ORAL at 12:32

## 2021-05-18 NOTE — PROGRESS NOTES
Problem: Mobility Impaired (Adult and Pediatric)  Goal: *Acute Goals and Plan of Care (Insert Text)  Description: Physical Therapy Goals  Initiated 5/18/2021 and to be accomplished within 7 day(s)  1. Patient will move from supine to sit and sit to supine , scoot up and down, and roll side to side in bed with supervision/set-up. 2.  Patient will transfer from bed to chair and chair to bed with supervision/set-up using the least restrictive device. 3.  Patient will perform sit to stand with supervision/set-up. 4.  Patient will ambulate with minimal assistance/contact guard assist for 50 feet with the least restrictive device. 5.  Patient will ascend/descend 10 stairs with single handrail(s) with minimal assistance/contact guard assist.    PLOF: Patient lives with her  and 2 children in a 2 level townhouse, 0 stairs to enter, 10 stairs to second floor where bedroom and bathroom are located. No bathroom on first floor. Pt has SPC at home. Ambulates no AD baseline, indep at baseline     Outcome: Progressing Towards Goal   PHYSICAL THERAPY EVALUATION    Patient: Lin Willett (1 y.o. female)  Date: 5/18/2021  Primary Diagnosis: Osteomyelitis ([facility] [M86.9]  Diabetic foot ulcer ([facility] [address] [T63.280, L97.509]        Precautions: NWB L forefoot (pre-operatively)     NWB L forefoot  PLOF: please see above    ASSESSMENT :  Based on the objective data described below, the patient presents with impaired functional mobility, increased pain. Patient cleared by nursing to work with therapy. Pt to go to OR this afternoon, low H&H this morning OOB deferred, supine there ex only performed. Pt agreeable to PT evaluation. Pt reporting 5/10 pain in L foot. Pt able to scoot in bed supervised. Pt educated on NWB L forefoot per podiatry pre-op and to await new post op orders. Supine there ex 10 reps; ankle pumps, heel slides, SLR, hip abduction and quad set. Pt reporting using BSC with nursing and performing SPT.  Pt left supine in bed with call bell in reach, educated on PT POC. Patient will benefit from skilled intervention to address the above impairments. Patient's rehabilitation potential is considered to be Good  Factors which may influence rehabilitation potential include:    []         None noted  []         Mental ability/status  [x]         Medical condition  [x]         Home/family situation and support systems  []         Safety awareness  []         Pain tolerance/management  []         Other:      PLAN :  Recommendations and Planned Interventions:    [x]           Bed Mobility Training             [x]    Neuromuscular Re-Education  [x]           Transfer Training                   []    Orthotic/Prosthetic Training  [x]           Gait Training                          []    Modalities  [x]           Therapeutic Exercises           []    Edema Management/Control  [x]           Therapeutic Activities            [x]    Family Training/Education  [x]           Patient Education  []           Other (comment):    Frequency/Duration: Patient will be followed by physical therapy 1-2 times per day/4-7 days per week to address goals. Discharge Recommendations: To Be Determined  Further Equipment Recommendations for Discharge: TBD pending participation with functional mobility     SUBJECTIVE:   Patient stated Briseida Harms got to the [facility] with nursing earlier, i'm tired now.     OBJECTIVE DATA SUMMARY:     Past Medical History:   Diagnosis Date    CAD (coronary artery disease)     Di[address] [address]     HTN (hypertension)     Hyperlipidemia      Past Surgical History:   Procedure Laterality Date    HX CORONARY STENT PLACEMENT      HX GYN           Barriers to Learning/Limitations: None  Compensate with: Verbal Cues  Home Situation:  Home Situation  Home Environment: Private residence  # Steps to Enter: 0(10 stairs to bedroom on 2nd floor)  Rails to Enter: No  One/Two Story Residence: Two story  # of Interior Steps: 10  Height of Each Step (in): 6 inches  Interior Rails: Right  Lift Chair Available: No  Living Alone: No  Support Systems: Family member(s)  Patient Expects to be Discharged to[de-identified] Private residence  Current DME Used/Available at Home: Manual Renata, straight  Critical Behavior:  Neurologic State: Alert  Orientation Level: Oriented X4  Cognition: Appropriate decision making; Appropriate for age attention/concentration; Appropriate safety awareness  Safety/Judgement: Fall prevention;Good awareness of safety precautions  Psychosocial  Patient Behaviors: Calm; Cooperative                 Strength:    Strength: Generally decreased, functional        Tone & Sensation:         Sensation: Intact     Range Of Motion:  AROM: Generally decreased, functional           Functional Mobility:  Bed Mobility:           Scooting: Stand-by assistance  Transfers:     Left Side Weight Bearing: (NWB forefoot (pre-operatively))     Therapeutic Exercises:   Supine there ex x10 reps: ankle pumps, heel slides, SLR, hip abduction and quad sets  Pain:  Pain level pre-treatment: 5/10 in L foot  Pain level post-treatment: 5/10  in L foot  Pain Intervention(s) : Medication (see MAR); Rest, Ice, Repositioning   Response to intervention: Nurse notified, See doc flow     Activity Tolerance:   Limited tolerance to functional mobility d/t fatigue   Please refer to the flowsheet for vital signs taken during this treatment. After treatment:   []         Patient left in no apparent distress sitting up in chair  [x]         Patient left in no apparent distress in bed  [x]         Call bell left within reach  []         Nursing notified  []         Caregiver present  []         Bed alarm activated  []         SCDs applied    COMMUNICATION/EDUCATION:   [x]         Role of Physical Therapy in the acute care setting. [x]         Fall prevention education was provided and the patient/caregiver indicated understanding.   [x]         Patient/family have participated as able in goal setting and plan of care. []         Patient/family agree to work toward stated goals and plan of care. []         Patient understands intent and goals of therapy, but is neutral about his/her participation. []         Patient is unable to participate in goal setting/plan of care: ongoing with therapy staff.  []         Other:     Thank you for this referral.  Amalia Phalen, PT   Time Calculation: 10 mins      Eval Complexity: History: LOW Complexity : Zero comorbidities / personal factors that will impact the outcome / POCExam:LOW Complexity : 1-2 Standardized tests and measures addressing body structure, function, activity limitation and / or participation in recreation  Presentation: MEDIUM Complexity : Evolving with changing characteristics  Clinical Decision Making:Medium Complexity    Overall Complexity:LOW

## 2021-05-18 NOTE — CONSULTS
Consult Note  Consult requested by: Dr. Sin Linh is a 40 y.o. female BLACK/ who is being seen on consult for acute kidney injury  Chief Complaint   Patient presents with    Foot Ulcer     Admission diagnosis:foot ulcer     81-year-old female with past medical history of diabetes, hypertension, peripheral vascular disease, admitted for foot infection, following for renal failure  Impression & Plan:   IMPRESSION:   Acute on chronic kidney injury, risk factor septic FRANCISCA, nonoliguric, other prerenal risk factor including diuretics, uncontrolled blood suger (baseline creatinine  At 1mg/dl)   Chronic kidney disease stage III with heavy proteinuria, diabetic and hypertensive nephropathy, baseline creatinine around 1 mg per DL  Sepsis, source left foot diabetic ulcer  Diabetes, uncontrolled  Protein calorie malnutrition   PLAN:   Continue IV hydratio. Her renal function is gradually improving. Hold Metformin and ARB. Monitor vancomycin level  Check PTH and urine protein study. Adjust medication per current renal function status   Discussed with Dr. Donovan Conway  Thank you very much for allowing me to participate in the care of this patient. We will continue to monitor with you and make recommendations as needed.               HPI: 81-year-old female with past medical history of diabetes, hypertension, proteinuria presented to emergency room with left foot pain. She has a known left foot ulcer and wound she admit it has been pouring pus so she comes to the emergency room. In the emergency room her work-up shows sepsis source from her left foot, she received broad-spectrum antibiotic received fluids exudation per sepsis protocol and she is admitted to the floor. During my visit she denies for any fever or any cardiac or GI symptoms. She follows with my partner and CKD clinic for heavy proteinuria. I reviewed her chart from my clinic.     Her current medications include metformin and diuretics Lasix and spironolactone.       Past Medical History:   Diagnosis Date    CAD (coronary artery disease)     Diabetes ([facility] [address]     HTN (hypertension)     Hyperlipidemia       Past Surgical History:   Procedure Laterality Date    HX CORONARY STENT PLACEMENT      HX GYN             Social History     Socioeconomic History    Marital status:      Spouse name: Not on file    Number of children: Not on file    Years of education: Not on file    Highest education level: Not on file   Occupational History    Not on file   Social Needs    Financial resource strain: Not on file    Food insecurity     Worry: Not on file     Inability: Not on file    Transportation needs     Medical: Not on file     Non-medical: Not on file   Tobacco Use    Smoking status: Never Smoker    Smokeless tobacco: Never Used   Substance and Sexual Activity    Alcohol use: No    Drug use: No    Sexual activity: Yes     Partners: Male   Lifestyle    Physical activity     Days per week: Not on file     Minutes per session: Not on file    Stress: Not on file   Relationships    Social connections     Talks on phone: Not on file     Gets together: Not on file     Attends Mandaeism service: Not on file     Active member of club or organization: Not on file     Attends meetings of clubs or organizations: Not on file     Relationship status: Not on file    Intimate partner violence     Fear of current or ex partner: Not on file     Emotionally abused: Not on file     Physically abused: Not on file     Forced sexual activity: Not on file   Other Topics Concern    Not on file   Social History Narrative    Not on file       Family History   Problem Relation Age of Onset    Lupus Mother     Cancer Neg Hx     Diabetes Neg Hx     Heart Disease Neg Hx     Hypertension Neg Hx     Heart Attack Neg Hx     Stroke Neg Hx      Allergies   Allergen Reactions    Azithromycin Other (comments)     Rapid response was called for bradycardia and hypotension     Pcn [Penicillins] Hives        Home Medications:     Prior to Admission Medications   Prescriptions Last Dose Informant Patient Reported? Taking? Insulin Needles, Disposable, 31 gauge x 5/16\" ndle 5/16/2021 at Unknown time  No Yes   Sig: Use to check blood glucose BID   Insulin Syringe-Needle U-100 (INSULIN SYRINGE) 1 mL 30 gauge x 5/16 syrg 5/16/2021 at Unknown time  No Yes   Sig: As directed for lantus insulin   aspirin 81 mg chewable tablet 5/16/2021 at Unknown time  No Yes   Sig: Take 1 Tab by mouth daily. carvediloL (Coreg) 12.5 mg tablet 5/16/2021 at Unknown time  No Yes   Sig: Take 1 Tab by mouth two (2) times daily (with meals). Indications: myocardial reinfarction prevention   ferrous sulfate 325 mg (65 mg iron) tablet 5/16/2021 at Unknown time  No Yes   Sig: Take 1 Tab by mouth daily (with breakfast). furosemide (LASIX) 20 mg tablet 5/16/2021 at Unknown time  No Yes   Sig: Take 1 Tab by mouth daily as needed (Edema). insulin glargine (LANTUS,BASAGLAR) 100 unit/mL (3 mL) inpn 5/16/2021 at Unknown time  No Yes   Sig: Take 45 units daily  Indications: type 2 diabetes mellitus   Patient taking differently: 50 Units by SubCUTAneous route nightly. Indications: type 2 diabetes mellitus   insulin lispro (HUMALOG) 100 unit/mL injection 5/16/2021 at Unknown time  No Yes   Sig: Take 10 units three times a day with meals   Patient taking differently: by SubCUTAneous route. Take 10 units three times a day with meals   losartan (COZAAR) 100 mg tablet 5/16/2021 at Unknown time  No Yes   Sig: Take 1 Tab by mouth daily. melatonin 5 mg cap capsule 5/16/2021 at Unknown time  No Yes   Sig: Take 1 Cap by mouth nightly. Patient taking differently: Take 5 mg by mouth as needed (difficulty in sleeping). Reports taking it as needed   metFORMIN ER (GLUCOPHAGE XR) 500 mg tablet 5/16/2021 at Unknown time  No Yes   Sig: Take 1 Tab by mouth daily (with dinner). rosuvastatin (CRESTOR) 10 mg tablet 5/16/2021 at Unknown time  No Yes   Sig: Take 1 Tab by mouth nightly. spironolactone (ALDACTONE) 25 mg tablet 5/16/2021 at Unknown time  Yes Yes   Sig: Take 25 mg by mouth daily. ticagrelor (BRILINTA) 90 mg tablet 5/16/2021 at Unknown time  No Yes   Sig: Take 1 Tab by mouth every twelve (12) hours every twelve (12) hours.       Facility-Administered Medications: None       Current Facility-Administered Medications   Medication Dose Route Frequency    insulin lispro (HUMALOG) injection   SubCUTAneous AC&HS    glucose chewable tablet 16 g  16 g Oral PRN    glucagon (GLUCAGEN) injection 1 mg  1 mg IntraMUSCular PRN    dextrose (D50W) injection syrg 12.5-25 g  25-50 mL IntraVENous PRN    [START ON 5/19/2021] Vancomycin random level due 5/19/2021 at 0400  1 Each Other ONCE    0.9% sodium chloride infusion 250 mL  250 mL IntraVENous PRN    sodium chloride (NS) flush 5-10 mL  5-10 mL IntraVENous PRN    metroNIDAZOLE (FLAGYL) IVPB premix 500 mg  500 mg IntraVENous Q12H    cefTRIAXone (ROCEPHIN) 1 g in sterile water (preservative free) 10 mL IV syringe  1 g IntraVENous Q24H    VANCOMYCIN INFORMATION NOTE   Other Rx Dosing/Monitoring    dextrose (D50W) injection syrg 12.5-25 g  25-50 mL IntraVENous PRN    aspirin chewable tablet 81 mg  81 mg Oral DAILY    carvediloL (COREG) tablet 12.5 mg  12.5 mg Oral BID WITH MEALS    rosuvastatin (CRESTOR) tablet 10 mg  10 mg Oral QHS    sodium chloride (NS) flush 5-40 mL  5-40 mL IntraVENous Q8H    sodium chloride (NS) flush 5-40 mL  5-40 mL IntraVENous PRN    acetaminophen (TYLENOL) tablet 650 mg  650 mg Oral Q6H PRN    Or    acetaminophen (TYLENOL) suppository 650 mg  650 mg Rectal Q6H PRN    polyethylene glycol (MIRALAX) packet 17 g  17 g Oral DAILY PRN    promethazine (PHENERGAN) tablet 12.5 mg  12.5 mg Oral Q6H PRN    Or    ondansetron (ZOFRAN) injection 4 mg  4 mg IntraVENous Q6H PRN    famotidine (PEPCID) tablet 20 mg 20 mg Oral QPM    [Held by provider] heparin (porcine) injection 5,000 Units  5,000 Units SubCUTAneous Q8H    insulin glargine (LANTUS) injection 50 Units  50 Units SubCUTAneous QHS    0.9% sodium chloride infusion  100 mL/hr IntraVENous CONTINUOUS       Review of Systems:     Complete 10-point review of systems were obtained and discussed in length  with the patient. Complete review of systems was negative/unremarkable  except as mentioned in HPI section. Data Review:    Labs: Results:       Chemistry Recent Labs     05/18/21 0355 05/18/21 0335 05/17/21 2109 05/17/21  1709   *  --  466* 616*   *  --  131* 127*   K 3.5  --  3.6 3.3*     --  100 93*   CO2 24  --  24 26   BUN 18  --  18 18   CREA 1.78*  --  1.99* 2.13*   CA 7.3* 7.4* 8.0* 8.3*   AGAP 9  --  7 8   BUCR 10*  --  9* 8*   AP  --   --   --  326*   TP  --   --   --  7.4   ALB  --   --   --  1.2*   GLOB  --   --   --  6.2*   AGRAT  --   --   --  0.2*      CBC w/Diff Recent Labs     05/18/21 0355 05/17/21 1709   WBC 12.1 13.6*   RBC 3.13* 3.73*   HGB 6.8* 8.2*   HCT 21.9* 26.1*    286   GRANS  --  79*   LYMPH  --  12*   EOS  --  0      Coagulation No results for input(s): PTP, INR, APTT, INREXT in the last 72 hours. Iron/Ferritin Recent Labs     05/18/21 0335   IRON 15*      BNP No results for input(s): BNPP in the last 72 hours. Cardiac Enzymes No results for input(s): CPK, CKND1, ROBERTA in the last 72 hours.     No lab exists for component: CKRMB, TROIP   Liver Enzymes Recent Labs     05/17/21 1709   TP 7.4   ALB 1.2*   *      Thyroid Studies Lab Results   Component Value Date/Time    TSH 1.12 05/17/2021 05:09 PM         EKG:sinus     Physical Assessment:     Visit Vitals  /75   Pulse 92   Temp 98 °F (36.7 °C)   Resp 16   Ht 5' 7\" (1.702 m)   Wt 89.7 kg (197 lb 12.8 oz)   SpO2 100%   Breastfeeding No   BMI 30.98 kg/m²     Weight change:     Intake/Output Summary (Last 24 hours) at 5/18/2021 1631  Last data filed at 5/18/2021 1321  Gross per 24 hour   Intake 2540 ml   Output 300 ml   Net 2240 ml     Physical Exam:   General: comfortable, no acute distress   HEENT sclera anicteric, supple neck, no thyromegaly  CVS: S1S2 heard,  no rub  RS: + air entry b/l,   Abd: Soft, Non tender, Not distended, Positive bowel sounds, no organomegaly, no CVA / supra pubic tenderness  Neuro: non focal, awake, alert , CN II-XII are grossly intact  Extrm: left foot under dressing   Skin: no visible  Rash      Procedures/imaging: see electronic medical records for all procedures, Xrays and details which were not copied into this note but were reviewed prior to creation of Uma Gomez MD  May 18, 2021  [facility]  Office [phone]

## 2021-05-18 NOTE — ROUTINE PROCESS
TRANSFER - OUT REPORT:    Verbal report given to Fe(name) on Lauren Palacio  being transferred to room 514(unit) for routine progression of care       Report consisted of patients Situation, Background, Assessment and   Recommendations(SBAR). Information from the following report(s) SBAR, OR Summary, Intake/Output, MAR and Recent Results was reviewed with the receiving nurse. Lines:   Peripheral IV 05/18/21 Right; Inner Forearm (Active)   Site Assessment Clean, dry, & intact 05/18/21 1751   Phlebitis Assessment 0 05/18/21 1751   Infiltration Assessment 0 05/18/21 1751   Dressing Status Clean, dry, & intact 05/18/21 1751   Dressing Type Tape;Transparent 05/18/21 1751   Hub Color/Line Status Infusing;Pink 05/18/21 1751   Action Taken Dressing reinforced 05/18/21 1637   Alcohol Cap Used No 05/18/21 1637       Peripheral IV 05/18/21 Left; Inner Forearm (Active)   Site Assessment Clean, dry, & intact 05/18/21 1751   Phlebitis Assessment 0 05/18/21 1751   Infiltration Assessment 0 05/18/21 1751   Dressing Status Clean, dry, & intact 05/18/21 1751   Dressing Type Tape;Transparent 05/18/21 1751   Hub Color/Line Status Pink;Capped;Flushed 05/18/21 1751   Action Taken Dressing reinforced 05/18/21 1637   Alcohol Cap Used Yes 05/18/21 1637        Opportunity for questions and clarification was provided.       Patient transported with:   [employer]

## 2021-05-18 NOTE — PROGRESS NOTES
[facility] Hospitalist Group  Progress Note    Patient: Reji Pace Age: 40 y.o. : 1975 MR#: [id number] SSN: [id number]  Date: 2021     Subjective:   Alert and oriented x4. Left foot pain with minimal relief with pain medications. 5/10, constant aching pain and swelling of foot. Left leg feels warmer compared to right leg. She is aware of positive blood cultures and plan for echo today. Assessment/Plan:   1. GPC blood cultures  2. Left foot diabetic ulcer with infection, suspect underlying OM  3. Left foot cellulitis   4. Sepsis POA. Fever   5. Leukocytosis   6. Hyponatremia   7. DMT2 with hyperglycemia   8. FRANCISCA  9. Hypoalbuminemia   10. Microcytic anemia   11. HTN  12. CAD  15. HLD  14. Morbid obesity    Plan  1. Echo ordered for today. Continue metronidazole, vanco, ceftriaxone   2. Order one unit of PRBC. Drop in hemoglobin by 2 points since yesterday. 3. Podiatry consult and recommendations for abx therapy, left foot dressing, follow wound culture, remain NWB to left forefoot. PT/OT. Possible plan for OR, date pending. Plan for OR this afternoon   4. Nephrology consult. Monitor renal function. IVF   5. POC glucose, SSI, Lantus. Diabetes management consult  6. Designated  spouse, Aneesh Rodríguez. Ok to update on plan of care. Attempted to call spouse Lori Domingo for updates. Not a working number. Called and updated parent Rhina Memory [phone]. Mother to updates spouse.      Additional Notes:      Case discussed with:  [x]Patient  [x]Family  [x]Nursing  []Case Management  DVT Prophylaxis:  []Lovenox  [x]Hep SQ  []SCDs  []Coumadin   []On Heparin gtt    Objective:   VS:   Visit Vitals  /74 (BP 1 Location: Right upper arm, BP Patient Position: At rest)   Pulse 99   Temp 99.1 °F (37.3 °C)   Resp 18   Ht 5' 7\" (1.702 m)   Wt 89.7 kg (197 lb 12.8 oz)   SpO2 99%   Breastfeeding No   BMI 30.98 kg/m²      Tmax/24hrs: Temp (24hrs), Av.4 °F (37.4 °C), Min:97.3 °F (36.3 °C), Max:102.6 °F (39.2 °C)      Intake/Output Summary (Last 24 hours) at 5/18/2021 1022  Last data filed at 5/18/2021 0427  Gross per 24 hour   Intake 2540 ml   Output 300 ml   Net 2240 ml       General:  Alert, NAD  Cardiovascular:  RRR  Pulmonary:  LSC throughout; respiratory effort WNL  GI:  +BS in all four quadrants, soft, non-tender  Extremities: LLE warm to touch, left foot dressing with shadowing, left foot digits edema. RLE no edema   Neuro: alert and oriented x4      Labs:    Recent Results (from the past 24 hour(s))   POC LACTIC ACID    Collection Time: 05/17/21  5:08 PM   Result Value Ref Range    Lactic Acid (POC) 1.49 0.40 - 2.00 mmol/L   CULTURE, BLOOD    Collection Time: 05/17/21  5:09 PM    Specimen: Blood   Result Value Ref Range    Special Requests: NO SPECIAL REQUESTS      GRAM STAIN ANAEROBIC BOTTLE      GRAM STAIN GRAM POSITIVE COCCI IN PAIRS IN GROUPS      GRAM STAIN        SMEAR CALLED TO AND CORRECTLY REPEATED BY: JACOB Maya, [address], [address], 05/18/2021, NJE    Culture result:        CULTURE IN [address] UPDATES TO FOLLOW Sent to [facility] for ID/Susceptibility if indicated. METABOLIC PANEL, COMPREHENSIVE    Collection Time: 05/17/21  5:09 PM   Result Value Ref Range    Sodium 127 (L) 136 - 145 mmol/L    Potassium 3.3 (L) 3.5 - 5.5 mmol/L    Chloride 93 (L) 100 - 111 mmol/L    CO2 26 21 - 32 mmol/L    Anion gap 8 3.0 - 18 mmol/L    Glucose 616 (HH) 74 - 99 mg/dL    BUN 18 7.0 - 18 MG/DL    Creatinine 2.13 (H) 0.6 - 1.3 MG/DL    BUN/Creatinine ratio 8 (L) 12 - 20      GFR est AA 30 (L) >60 ml/min/1.73m2    GFR est non-AA 25 (L) >60 ml/min/1.73m2    Calcium 8.3 (L) 8.5 - 10.1 MG/DL    Bilirubin, total 0.6 0.2 - 1.0 MG/DL    ALT (SGPT) 8 (L) 13 - 56 U/L    AST (SGOT) 13 10 - 38 U/L    Alk.  phosphatase 326 (H) 45 - 117 U/L    Protein, total 7.4 6.4 - 8.2 g/dL    Albumin 1.2 (L) 3.4 - 5.0 g/dL    Globulin 6.2 (H) 2.0 - 4.0 g/dL    A-G Ratio 0.2 (L) 0.8 - 1.7     CBC WITH AUTOMATED DIFF Collection Time: 05/17/21  5:09 PM   Result Value Ref Range    WBC 13.6 (H) 4.6 - 13.2 K/uL    RBC 3.73 (L) 4.20 - 5.30 M/uL    HGB 8.2 (L) 12.0 - 16.0 g/dL    HCT 26.1 (L) 35.0 - 45.0 %    MCV 70.0 (L) 74.0 - 97.0 FL    MCH 22.0 (L) 24.0 - 34.0 PG    MCHC 31.4 31.0 - 37.0 g/dL    RDW 15.4 (H) 11.6 - 14.5 %    PLATELET 944 211 - 290 K/uL    MPV 10.4 9.2 - 11.8 FL    NEUTROPHILS 79 (H) 40 - 73 %    LYMPHOCYTES 12 (L) 21 - 52 %    MONOCYTES 8 3 - 10 %    EOSINOPHILS 0 0 - 5 %    BASOPHILS 0 0 - 2 %    ABS. NEUTROPHILS 10.8 (H) 1.8 - 8.0 K/UL    ABS. LYMPHOCYTES 1.6 0.9 - 3.6 K/UL    ABS. MONOCYTES 1.1 0.05 - 1.2 K/UL    ABS. EOSINOPHILS 0.0 0.0 - 0.4 K/UL    ABS. BASOPHILS 0.0 0.0 - 0.1 K/UL    DF AUTOMATED     HEMOGLOBIN A1C WITH EAG    Collection Time: 05/17/21  5:09 PM   Result Value Ref Range    Hemoglobin A1c 13.6 (H) 4.2 - 5.6 %    Est. average glucose 344 mg/dL   TSH 3RD GENERATION    Collection Time: 05/17/21  5:09 PM   Result Value Ref Range    TSH 1.12 0.36 - 3.74 uIU/mL   CULTURE, BLOOD    Collection Time: 05/17/21  5:15 PM    Specimen: Blood   Result Value Ref Range    Special Requests: NO SPECIAL REQUESTS      GRAM STAIN ANAEROBIC BOTTLE      GRAM STAIN GRAM POSITIVE COCCI IN PAIRS IN GROUPS      GRAM STAIN        SMEAR CALLED TO AND CORRECTLY REPEATED BY: JACOB Irby [facility], [address], [address], 05/18/2021, JUDE    Culture result:        CULTURE IN PROGRESS,FURTHER UPDATES TO FOLLOW Sent to [facility] for ID/Susceptibility if indicated.    EKG, 12 LEAD, INITIAL    Collection Time: 05/17/21  5:17 PM   Result Value Ref Range    Ventricular Rate 100 BPM    Atrial Rate 100 BPM    P-R Interval 124 ms    QRS Duration 82 ms    Q-T Interval 346 ms    QTC Calculation (Bezet) 446 ms    Calculated P Axis 42 degrees    Calculated R Axis 33 degrees    Calculated T Axis 47 degrees    Diagnosis       Normal sinus rhythm  Normal ECG  When compared with ECG of 15-MAY-2021 13:49,  No significant change was found     GLUCOSE, POC Collection Time: 05/17/21  7:15 PM   Result Value Ref Range    Glucose (POC) 505 (HH) 70 - 110 mg/dL   GLUCOSE, POC    Collection Time: 05/17/21  7:17 PM   Result Value Ref Range    Glucose (POC) 514 (HH) 70 - 110 mg/dL   TYPE & SCREEN    Collection Time: 05/17/21  9:09 PM   Result Value Ref Range    Crossmatch Expiration 05/20/2021,2359     ABO/Rh(D) O POSITIVE     Antibody screen NEG    METABOLIC PANEL, BASIC    Collection Time: 05/17/21  9:09 PM   Result Value Ref Range    Sodium 131 (L) 136 - 145 mmol/L    Potassium 3.6 3.5 - 5.5 mmol/L    Chloride 100 100 - 111 mmol/L    CO2 24 21 - 32 mmol/L    Anion gap 7 3.0 - 18 mmol/L    Glucose 466 (HH) 74 - 99 mg/dL    BUN 18 7.0 - 18 MG/DL    Creatinine 1.99 (H) 0.6 - 1.3 MG/DL    BUN/Creatinine ratio 9 (L) 12 - 20      GFR est AA 33 (L) >60 ml/min/1.73m2    GFR est non-AA 27 (L) >60 ml/min/1.73m2    Calcium 8.0 (L) 8.5 - 10.1 MG/DL   GLUCOSE, POC    Collection Time: 05/17/21  9:21 PM   Result Value Ref Range    Glucose (POC) 523 (HH) 70 - 110 mg/dL   GLUCOSE, POC    Collection Time: 05/17/21  9:22 PM   Result Value Ref Range    Glucose (POC) 528 (HH) 70 - 110 mg/dL   URINALYSIS W/ RFLX MICROSCOPIC    Collection Time: 05/17/21  9:40 PM   Result Value Ref Range    Color YELLOW      Appearance CLOUDY      Specific gravity 1.023 1.005 - 1.030      pH (UA) 5.5 5.0 - 8.0      Protein 300 (A) NEG mg/dL    Glucose >1,000 (A) NEG mg/dL    Ketone Negative NEG mg/dL    Bilirubin Negative NEG      Blood MODERATE (A) NEG      Urobilinogen 1.0 0.2 - 1.0 EU/dL    Nitrites Negative NEG      Leukocyte Esterase TRACE (A) NEG     URINE MICROSCOPIC ONLY    Collection Time: 05/17/21  9:40 PM   Result Value Ref Range    WBC 4 to 10 0 - 4 /hpf    RBC 4 to 10 0 - 5 /hpf    Epithelial cells 3+ 0 - 5 /lpf    Bacteria 2+ (A) NEG /hpf    Granular cast 4 to 10 NEG /lpf    Yeast 0 to 3 NEG   GLUCOSE, POC    Collection Time: 05/18/21  1:11 AM   Result Value Ref Range    Glucose (POC) 365 (H) 70 - 110 mg/dL   VANCOMYCIN, RANDOM    Collection Time: 05/18/21  3:55 AM   Result Value Ref Range    Vancomycin, random 11.5 5.0 - 67.6 UG/ML   METABOLIC PANEL, BASIC    Collection Time: 05/18/21  3:55 AM   Result Value Ref Range    Sodium 135 (L) 136 - 145 mmol/L    Potassium 3.5 3.5 - 5.5 mmol/L    Chloride 102 100 - 111 mmol/L    CO2 24 21 - 32 mmol/L    Anion gap 9 3.0 - 18 mmol/L    Glucose 318 (H) 74 - 99 mg/dL    BUN 18 7.0 - 18 MG/DL    Creatinine 1.78 (H) 0.6 - 1.3 MG/DL    BUN/Creatinine ratio 10 (L) 12 - 20      GFR est AA 37 (L) >60 ml/min/1.73m2    GFR est non-AA 31 (L) >60 ml/min/1.73m2    Calcium 7.3 (L) 8.5 - 10.1 MG/DL   MAGNESIUM    Collection Time: 05/18/21  3:55 AM   Result Value Ref Range    Magnesium 1.9 1.6 - 2.6 mg/dL   CBC W/O DIFF    Collection Time: 05/18/21  3:55 AM   Result Value Ref Range    WBC 12.1 4.6 - 13.2 K/uL    RBC 3.13 (L) 4.20 - 5.30 M/uL    HGB 6.8 (L) 12.0 - 16.0 g/dL    HCT 21.9 (L) 35.0 - 45.0 %    MCV 70.0 (L) 74.0 - 97.0 FL    MCH 21.7 (L) 24.0 - 34.0 PG    MCHC 31.1 31.0 - 37.0 g/dL    RDW 15.4 (H) 11.6 - 14.5 %    PLATELET 751 187 - 060 K/uL    MPV 10.0 9.2 - 11.8 FL   GLUCOSE, POC    Collection Time: 05/18/21  5:56 AM   Result Value Ref Range    Glucose (POC) 329 (H) 70 - 110 mg/dL   LOWER EXT ART PVR MULT LEVEL SEG PRESSURES    Collection Time: 05/18/21  7:47 AM   Result Value Ref Range    Left 1st Digit Comp  mmHg    Left 1st Digit Comp  mmHg    Left 1st Digit Comp  mmHg    Left 1st Digit Comp  mmHg    Left 1st Digit Comp  mmHg    Left 1st Digit Comp  mmHg    Left 1st Digit Comp  mmHg    Left 1st Digit Comp  mmHg    Left arm  mmHg    Right arm  mmHg    Left posterior tibial 163 mmHg    Right posterior tibial 159 mmHg    Left anterior tibial 146 mmHg    Right anterior tibial 153 mmHg    Left OSMIN 1.20     Right OSMIN 1.17     Right calf pressure 174 mmHg    Left calf pressure 210 mmHg       Signed By: Essie Gandhi [address], [place]     May 18, 2021

## 2021-05-18 NOTE — ROUTINE PROCESS
TRANSFER - IN REPORT:    Verbal report received from [facility]) on Audrey Bergeronin  being received from [facility](unit) for ordered procedure      Report consisted of patients Situation, Background, Assessment and   Recommendations(SBAR). Information from the following report(s) SBAR and Kardex was reviewed with the receiving nurse. Opportunity for questions and clarification was provided. Assessment completed upon patients arrival to unit and care assumed.

## 2021-05-18 NOTE — CONSULTS
Infectious Disease Consultation Note        Reason: Diabetic left foot infection    Current abx Prior abx   Ceftriaxone, metronidazole, vancomycin since 5/17      Lines:       Assessment :    40 y.o.  female  With PMH of HTN, uncontrolled type 2 DM-last hemoglobin A1c 13.6 on 5/17/2021, peripheral Neuropathy came tot he ER on 5/17/21 for left foot infection    Hospitalization January 2019 for left second toe, left foot cellulitis    Hospitalization April 2021 for left foot infection, bone biopsy negative for osteomyelitis  Wound cultures group B streptococcus    Clinical presentation consistent with left foot diabetic ulcer with cellulitis/abscess, possible osteomyelitis of first metatarsal head with septic 1st MTPJ. Looking at the h/o uncontrolled DM, close proximity of infection to bone; high risk of future complications; will recommend aggressive abx treatment.      Recommendations:     1. Continue ceftriaxone, metronidazole, vancomycin  2. Follow-up podiatry recommendations regarding left foot surgery  3. Follow-up Intra-Op cultures and modify antibiotics as needed  4. Patient will likely need outpatient IV antibiotics. Will discuss Intra-Op findings with podiatrist     Thank you for consultation request. Above plan was discussed in details with patient. Please call me if any further questions or concerns. Will continue to participate in the care of this patient. HPI:    40 y.o. female with medical co-morbidities including HTN, CAD with recent NSTEMI, hyperlipidemia, type 2 DM, morbid obesity, recently hospitalized for diabetic foot ulcer, presented with left foot pain/swelling and drainage. According to her, she had left foot swelling and painful about 3-4 days ago. Her left foot ulcer has been draining pus. She expressed not feeling well and having shaking chill. No fever. Otherwise, she has no sick contact, she has on-going cough but no shortness of breath. No chest pain.  She is complaint with her medications. In the ER, she was found to have lower grade fever, leukocytosis, tachycardia, she was started on sepsis protocol with IV ceftriaxone/flagyl/vancomycin. Lab with evidences of FRANCISCA and hyperglycemia, hyponatremia. Insulin was given. Podiatry evaluated. MRI obtained. MRI revealed worsening infectious/inflammatory changes surrounding the first MTP joint with new osteomyelitis of the first metatarsal head and first proximal phalanx. New multifocal abscesses/phlegmon within the soft tissues of the plantar foot and extending into the first toe. Plans to take patient to operating room today. I have been consulted for further recommendations. Patient denies any subjective fever or chills at this time. She complains of pain in her left leg. She denies any chest pain, shortness of breath, abdominal pain, diarrhea. No prior history of MRSA colonization or infection. Past Medical History:   Diagnosis Date    CAD (coronary artery disease)     Diabetes ([facility]     HTN (hypertension)     Hyperlipidemia        Past Surgical History:   Procedure Laterality Date    HX CORONARY STENT PLACEMENT      HX GYN             Current Discharge Medication List      CONTINUE these medications which have NOT CHANGED    Details   insulin lispro (HUMALOG) 100 unit/mL injection Take 10 units three times a day with meals  Qty: 1 Vial, Refills: 11    Associated Diagnoses: Type 2 diabetes mellitus with complication (HCC)      metFORMIN ER (GLUCOPHAGE XR) 500 mg tablet Take 1 Tab by mouth daily (with dinner). Qty: 90 Tab, Refills: 3    Associated Diagnoses: Type 2 diabetes mellitus with complication (HCC)      rosuvastatin (CRESTOR) 10 mg tablet Take 1 Tab by mouth nightly. Qty: 90 Tab, Refills: 3    Associated Diagnoses: NSTEMI (non-ST elevated myocardial infarction) (HCC)      carvediloL (Coreg) 12.5 mg tablet Take 1 Tab by mouth two (2) times daily (with meals).  Indications: myocardial reinfarction prevention  Qty: 60 Tab, Refills: 3    Associated Diagnoses: NSTEMI (non-ST elevated [facility] infarction) ([facility] Essential hypertension      spironolactone (ALDACTONE) 25 mg tablet Take 25 mg by mouth daily. ferrous sulfate 325 mg (65 mg iron) tablet Take 1 Tab by mouth daily (with breakfast). Qty: 30 Tab, Refills: 0      furosemide (LASIX) 20 mg tablet Take 1 Tab by mouth daily as needed (Edema). Qty: 30 Tab, Refills: 1    Associated Diagnoses: Acute on chronic diastolic congestive heart failure (HCC)      losartan (COZAAR) 100 mg tablet Take 1 Tab by mouth daily. Qty: 90 Tab, Refills: 3    Associated Diagnoses: Essential hypertension      ticagrelor (BRILINTA) 90 mg tablet Take 1 Tab by mouth every twelve (12) hours every twelve (12) hours. Qty: 60 Tab, Refills: 5      insulin glargine (LANTUS,BASAGLAR) 100 unit/mL (3 mL) inpn Take 45 units daily  Indications: type 2 diabetes mellitus  Qty: 4 Pen, Refills: 5    Associated Diagnoses: Type 2 diabetes mellitus with complication (HCC)      aspirin 81 mg chewable tablet Take 1 Tab by mouth daily. Qty: 30 Tab, Refills: 0      melatonin 5 mg cap capsule Take 1 Cap by mouth nightly.   Qty: 30 Cap, Refills: 1    Associated Diagnoses: Difficulty sleeping      Insulin Needles, Disposable, 31 gauge x 5/16\" ndle Use to check blood glucose BID  Qty: 100 Pen Needle, Refills: 11    Associated Diagnoses: Type 2 diabetes mellitus with complication (HCC)      Insulin Syringe-Needle U-100 (INSULIN SYRINGE) 1 mL 30 gauge x 5/16 syrg As directed for lantus insulin  Qty: 50 Syringe, Refills: 0             Current Facility-Administered Medications   Medication Dose Route Frequency    insulin lispro (HUMALOG) injection   SubCUTAneous AC&HS    glucose chewable tablet 16 g  16 g Oral PRN    glucagon (GLUCAGEN) injection 1 mg  1 mg IntraMUSCular PRN    dextrose (D50W) injection syrg 12.5-25 g  25-50 mL IntraVENous PRN    vancomycin (VANCOCIN) 1500 mg in  ml infusion  1,500 mg IntraVENous ONCE    [START ON 5/19/2021] Vancomycin random level due 5/19/2021 at 0400  1 Each Other ONCE    sodium chloride (NS) flush 5-10 mL  5-10 mL IntraVENous PRN    metroNIDAZOLE (FLAGYL) IVPB premix 500 mg  500 mg IntraVENous Q12H    cefTRIAXone (ROCEPHIN) 1 g in sterile water (preservative free) 10 mL IV syringe  1 g IntraVENous Q24H    VANCOMYCIN INFORMATION NOTE   Other Rx Dosing/Monitoring    dextrose (D50W) injection syrg 12.5-25 g  25-50 mL IntraVENous PRN    aspirin chewable tablet 81 mg  81 mg Oral DAILY    carvediloL (COREG) tablet 12.5 mg  12.5 mg Oral BID WITH MEALS    rosuvastatin (CRESTOR) tablet 10 mg  10 mg Oral QHS    sodium chloride (NS) flush 5-40 mL  5-40 mL IntraVENous Q8H    sodium chloride (NS) flush 5-40 mL  5-40 mL IntraVENous PRN    acetaminophen (TYLENOL) tablet 650 mg  650 mg Oral Q6H PRN    Or    acetaminophen (TYLENOL) suppository 650 mg  650 mg Rectal Q6H PRN    polyethylene glycol (MIRALAX) packet 17 g  17 g Oral DAILY PRN    promethazine (PHENERGAN) tablet 12.5 mg  12.5 mg Oral Q6H PRN    Or    ondansetron (ZOFRAN) injection 4 mg  4 mg IntraVENous Q6H PRN    famotidine (PEPCID) tablet 20 mg  20 mg Oral QPM    heparin (porcine) injection 5,000 Units  5,000 Units SubCUTAneous Q8H    insulin glargine (LANTUS) injection 50 Units  50 Units SubCUTAneous QHS    0.9% sodium chloride infusion  100 mL/hr IntraVENous CONTINUOUS       Allergies: Azithromycin and Pcn [penicillins]    Family History   Problem Relation Age of Onset    Lupus Mother     Cancer Neg Hx     Diabetes Neg Hx     Heart Disease Neg Hx     Hypertension Neg Hx     Heart Attack Neg Hx     Stroke Neg Hx      Social History     Socioeconomic History    Marital status:      Spouse name: Not on file    Number of children: Not on file    Years of education: Not on file    Highest education level: Not on file   Occupational History    Not on file   Social Needs    Financial resource strain: Not on file    Food insecurity     Worry: Not on file     Inability: Not on file    Transportation needs     Medical: Not on file     Non-medical: Not on file   Tobacco Use    Smoking status: Never Smoker    Smokeless tobacco: Never Used   Substance and Sexual Activity    Alcohol use: No    Drug use: No    Sexual activity: Yes     Partners: Male   Lifestyle    Physical activity     Days per week: Not on file     Minutes per session: Not on file    Stress: Not on file   Relationships    Social connections     Talks on phone: Not on file     Gets together: Not on file     Attends [employer] service: Not on file     Active member of club or organization: Not on file     Attends meetings of clubs or organizations: Not on file     Relationship status: Not on file    Intimate partner violence     Fear of current or ex partner: Not on file     Emotionally abused: Not on file     Physically abused: Not on file     Forced sexual activity: Not on file   Other Topics Concern    Not on file   Social History Narrative    Not on file     Social History     Tobacco Use   Smoking Status Never Smoker   Smokeless Tobacco Never Used        Temp (24hrs), Av.4 °F (37.4 °C), Min:97.3 °F (36.3 °C), Max:102.6 °F (39.2 °C)    Visit Vitals  /74 (BP 1 Location: Right upper arm, BP Patient Position: At rest)   Pulse 99   Temp 99.1 °F (37.3 °C)   Resp 18   Ht 5' 7\" (1.702 m)   Wt 89.7 kg (197 lb 12.8 oz)   SpO2 99%   Breastfeeding No   BMI 30.98 kg/m²       ROS: 12 point ROS obtained in details. Pertinent positives as mentioned in HPI,   otherwise negative    Physical Exam:    Constitutional: She is oriented to person, place, and time. She appears well-developed and well-nourished. No distress. HENT:   Head: Normocephalic and atraumatic. Mouth/Throat: Oropharynx is clear and moist.   Eyes: Conjunctivae are normal. Pupils are equal, round, and reactive to light. No scleral icterus. Neck: Normal range of motion. Neck supple. Cardiovascular: Normal rate and intact distal pulses. Pulmonary/Chest: Effort normal and breath sounds normal. No respiratory distress. She has no wheezes. Abdominal: Soft. Bowel sounds are normal. She exhibits no distension. There is no tenderness. Musculoskeletal: Normal range of motion. Lymphadenopathy:     She has no cervical adenopathy. Neurological: She is alert and oriented to person, place, and time. No cranial nerve deficit.   Decreased sensation of 2nd toe of left foot, likely due to neuropathy.   Skin: Skin is warm. She is not diaphoretic. Superficial Ulcer left second toe plantar aspect with bloody drainage. Darkish discoloration in 2nd toe of left foot     Nursing note and vitals reviewed. Labs: Results:   Chemistry Recent Labs     05/18/21  0355 05/17/21  2109 05/17/21  1709   * 466* 616*   * 131* 127*   K 3.5 3.6 3.3*    100 93*   CO2 24 24 26   BUN 18 18 18   CREA 1.78* 1.99* 2.13*   CA 7.3* 8.0* 8.3*   AGAP 9 7 8   BUCR 10* 9* 8*   AP  --   --  326*   TP  --   --  7.4   ALB  --   --  1.2*   GLOB  --   --  6.2*   AGRAT  --   --  0.2*      CBC w/Diff Recent Labs     05/18/21  0355 05/17/21  1709   WBC 12.1 13.6*   RBC 3.13* 3.73*   HGB 6.8* 8.2*   HCT 21.9* 26.1*    286   GRANS  --  79*   LYMPH  --  12*   EOS  --  0      Microbiology Recent Labs     05/17/21  1715 05/17/21  1709   CULT CULTURE IN PROGRESS,FURTHER UPDATES TO FOLLOW Sent to [facility] [employer] for ID/Susceptibility if indicated. CULTURE IN PROGRESS,FURTHER UPDATES TO FOLLOW Sent to [facility] [employer] for ID/Susceptibility if indicated. RADIOLOGY:    All available imaging studies/reports in [facility] for this admission were reviewed      Disclaimer: Sections of this note are dictated utilizing voice recognition software, which may have resulted in some phonetic based errors in grammar and contents.  Even though attempts were made to correct all the mistakes, some may have been missed, and remained in the body of the document. If questions arise, please contact our department.     Dr. Fredy Avila, Infectious Disease Specialist  [phone]  May 18, 2021  9:43 AM

## 2021-05-18 NOTE — PROGRESS NOTES
MRI Safety Screening form needs to be filled out and FAXED to [phone] BEFORE MRI can be scheduled. If unable to acquire information from patient, [facility] must be contacted, or screening xrays will need to be ordred.      If pt is Claustrophobic or needs Pain Meds, please have these ordered in advance to help facilitate MRI exam.

## 2021-05-18 NOTE — PROGRESS NOTES
Problem: Diabetes Self-Management  Goal: *Disease process and treatment process  Description: Define diabetes and identify own type of diabetes; list 3 options for treating diabetes. 5/18/2021 0847 by Carmen Puckett RN  Outcome: Progressing Towards Goal  5/18/2021 0847 by Carmen Puckett RN  Outcome: Progressing Towards Goal  Goal: *Incorporating nutritional management into lifestyle  Description: Describe effect of type, amount and timing of food on blood glucose; list 3 methods for planning meals. 5/18/2021 0847 by Carmen Puckett RN  Outcome: Progressing Towards Goal  5/18/2021 0847 by aCrmen Puckett RN  Outcome: Progressing Towards Goal  Goal: *Incorporating physical activity into lifestyle  Description: State effect of exercise on blood glucose levels. 5/18/2021 0847 by Carmen Puckett RN  Outcome: Progressing Towards Goal  5/18/2021 0847 by Carmen Puckett RN  Outcome: Progressing Towards Goal  Goal: *Developing strategies to promote health/change behavior  Description: Define the ABC's of diabetes; identify appropriate screenings, schedule and personal plan for screenings. 5/18/2021 0847 by Carmen Puckett RN  Outcome: Progressing Towards Goal  5/18/2021 0847 by Carmen Puckett RN  Outcome: Progressing Towards Goal  Goal: *Using medications safely  Description: State effect of diabetes medications on diabetes; name diabetes medication taking, action and side effects. 5/18/2021 0847 by Carmen Puckett RN  Outcome: Progressing Towards Goal  5/18/2021 0847 by Carmen Puckett RN  Outcome: Progressing Towards Goal  Goal: *Monitoring blood glucose, interpreting and using results  Description: Identify recommended blood glucose targets  and personal targets.   5/18/2021 0847 by Carmen Puckett RN  Outcome: Progressing Towards Goal  5/18/2021 0847 by Carmen Puckett RN  Outcome: Progressing Towards Goal  Goal: *Prevention, detection, treatment of acute complications  Description: List symptoms of hyper- and hypoglycemia; describe how to treat low blood sugar and actions for lowering  high blood glucose level. 5/18/2021 0847 by Ghazala Santacruz RN  Outcome: Progressing Towards Goal  5/18/2021 0847 by Ghazala Santacruz RN  Outcome: Progressing Towards Goal  Goal: *Prevention, detection and treatment of chronic complications  Description: Define the natural course of diabetes and describe the relationship of blood glucose levels to long term complications of diabetes. 5/18/2021 0847 by Ghazala Santacruz RN  Outcome: Progressing Towards Goal  5/18/2021 0847 by Ghazala Santacruz RN  Outcome: Progressing Towards Goal  Goal: *Developing strategies to address psychosocial issues  Description: Describe feelings about living with diabetes; identify support needed and support network  5/18/2021 0847 by Ghazala Santacruz RN  Outcome: Progressing Towards Goal  5/18/2021 0847 by Ghazala Santacruz RN  Outcome: Progressing Towards Goal  Goal: *Insulin pump training  5/18/2021 0847 by Ghazala Santacruz RN  Outcome: Progressing Towards Goal  5/18/2021 0847 by Ghazala Santacruz RN  Outcome: Progressing Towards Goal  Goal: *Sick day guidelines  5/18/2021 0847 by Ghazala Santacruz RN  Outcome: Progressing Towards Goal  5/18/2021 0847 by Ghazala Santacruz RN  Outcome: Progressing Towards Goal  Goal: *Patient Specific Goal (EDIT GOAL, INSERT TEXT)  5/18/2021 0847 by Ghazala Santacruz RN  Outcome: Progressing Towards Goal  5/18/2021 0847 by Ghazala Santacruz RN  Outcome: Progressing Towards Goal     Problem: Falls - Risk of  Goal: *Absence of Falls  Description: Document Howardjacqui Acosta Fall Risk and appropriate interventions in the flowsheet.   5/18/2021 0847 by Ghazala Santacruz RN  Outcome: Progressing Towards Goal  Note: Fall Risk Interventions:  Mobility Interventions: Utilize walker, cane, or other assistive device         Medication Interventions: Patient to call before getting OOB, Teach patient to arise slowly    Elimination Interventions: Call light in reach 5/18/2021 0847 by Alejandro Dupont, JACOB  Outcome: Progressing Towards Goal  Note: Fall Risk Interventions:  Mobility Interventions: Utilize walker, cane, or other assistive device         Medication Interventions: Patient to call before getting OOB, Teach patient to arise slowly    Elimination Interventions: Call light in reach

## 2021-05-18 NOTE — PROGRESS NOTES
Hospitalist repaged about elevated blood sugar. Still downstairs in MRI @ present. 10:31 PM  Spoke with MD Micaela Seaman about blood sugar and elevated temperature. Patient stated \" I ran out of regular insulin and when my doctor tried to order it it wouldn't go through so I just take my lantus and metformin. \"  1:27 AM  Patient resting in bed, no complaints offered MD Wallace notified of elevated blood sugar that is decreasing in level. No further orders at present, continue to monitor. 6:49 AM  Patient awake and quiet, No complaints of pain or discomfort. Blood sugar elevated yet trending down. Patient changed to resistant scale for am dose. Low grade temperature @npresen, continue to monitor,tCall bell within reach, IV fluids continue.   Patient Vitals for the past 12 hrs:   Temp Pulse Resp BP SpO2   05/18/21 0345 99.3 °F (37.4 °C) 96 16 (!) 150/81 99 %   05/17/21 2324 97.3 °F (36.3 °C) 98 16 108/73 98 %   05/17/21 2224 98.6 °F (37 °C) -- -- -- --   05/17/21 2025 (!) 102.6 °F (39.2 °C) (!) 108 -- 135/82 97 %   05/17/21 1930 -- -- -- (!) 155/92 100 %   05/17/21 1857 -- (!) 108 -- (!) 152/83 --

## 2021-05-18 NOTE — DIABETES MGMT
Diabetes Patient/Family Education Record    Factors That May Influence Patients Ability to Learn or Comply with Recommendations   []   Language barrier    []   Cultural needs   []   Motivation    []   Cognitive limitation    []   Physical   [x]   Education    []   Physiological factors   []   Hearing/vision/speaking impairment   []   Orthodox beliefs    []   Financial factors   []  Other:   []  No factors identified at this time. Person Instructed:   [x]   Patient   []   Family   []  Other     Preference for Learning:   [x]   Verbal   []   Written   []  Demonstration     Level of Comprehension & Competence:    [x]  Good                                      [] Fair                                     []  Poor                             []  Needs Reinforcement   [x]  Teach back completed    Education Component:   [x]  Medication management, including how to administer insulin (if appropriate) and potential medication interactions   · Pt states she takes her lantus 50 units daily and metformin. · During last admission, pt stated that when her doctor had her on mealtime insulin, she felt her blood sugars were better. Pt states she didn't get a chance to talk to her doctor about starting mealtime insulin again. []  Nutritional management - [] Obtained usual meal pattern   []   Basic carbohydrate counting  []  Plate method  []  Limit concentrated sweets and avoid sweetened beverages  []  Portion control  []    Avoid skipping meals   []  Exercise   [x]  Signs, symptoms, and treatment of hyperglycemia and hypoglycemia   [] Prevention, recognition and treatment of hyperglycemia and hypoglycemia   [x]  Importance of blood glucose monitoring  [] Blood Glucose targets   []   Provided patient with blood glucose meter  [x]  Has glucometer and supplies at home  · During last admission in April 2021, I had given pt literature about the [employer].   Pt states she hasn't had a chance to discuss it with her physician yet. · A1C last admission was 9.5%. Asked pt if she has been doing anything different that could have caused her A1C to jump up to 13.6%. Pt states she has been doing the same thing as she always does. []  Instruction on use of the blood glucose meter and recommended monitoring schedule   [x]  Discuss the importance of HbA1C monitoring. Patients A1c is 13.6 %. This is equivalent to average glucose of ___ mg/dl for the past 2-3 months. [x]  Sick day guidelines   [x]  Proper use and disposal of lancets, needles, syringes or insulin pens (if appropriate)   [x]  Potential long-term complications (retinopathy, kidney disease, neuropathy, foot care)   [x] Information about whom to contact in case of emergency or for more information    [x]  Goal:  Patient/family will demonstrate understanding of Diabetes Self- Management Skills by: (date) _5/23/2021______  Plan for post-discharge education or self-management support:    [] Outpatient class schedule provided            [] Patient Declined    [] Scheduled for outpatient classes (date) _______    [] Written information provided  Verify:  Does patient understand how diabetes medications work? Yes   Does patient have difficulty obtaining diabetes medications or testing supplies? No issues voiced.

## 2021-05-18 NOTE — PROGRESS NOTES
Problem: Diabetes Self-Management  Goal: *Disease process and treatment process  Description: Define diabetes and identify own type of diabetes; list 3 options for treating diabetes. Outcome: Progressing Towards Goal  Goal: *Incorporating nutritional management into lifestyle  Description: Describe effect of type, amount and timing of food on blood glucose; list 3 methods for planning meals. Outcome: Progressing Towards Goal  Goal: *Incorporating physical activity into lifestyle  Description: State effect of exercise on blood glucose levels. Outcome: Progressing Towards Goal  Goal: *Developing strategies to promote health/change behavior  Description: Define the ABC's of diabetes; identify appropriate screenings, schedule and personal plan for screenings. Outcome: Progressing Towards Goal  Goal: *Using medications safely  Description: State effect of diabetes medications on diabetes; name diabetes medication taking, action and side effects. Outcome: Progressing Towards Goal  Goal: *Monitoring blood glucose, interpreting and using results  Description: Identify recommended blood glucose targets  and personal targets. Outcome: Progressing Towards Goal  Goal: *Prevention, detection, treatment of acute complications  Description: List symptoms of hyper- and hypoglycemia; describe how to treat low blood sugar and actions for lowering  high blood glucose level. Outcome: Progressing Towards Goal  Goal: *Prevention, detection and treatment of chronic complications  Description: Define the natural course of diabetes and describe the relationship of blood glucose levels to long term complications of diabetes.   Outcome: Progressing Towards Goal  Goal: *Developing strategies to address psychosocial issues  Description: Describe feelings about living with diabetes; identify support needed and support network  Outcome: Progressing Towards Goal  Goal: *Insulin pump training  Outcome: Progressing Towards Goal  Goal: *Sick day guidelines  Outcome: Progressing Towards Goal  Goal: *Patient Specific Goal (EDIT GOAL, INSERT TEXT)  Outcome: Progressing Towards Goal     Problem: Patient Education: Go to Patient Education Activity  Goal: Patient/Family Education  Outcome: Progressing Towards Goal     Problem: Falls - Risk of  Goal: *Absence of Falls  Description: Document Burrell Canavan Fall Risk and appropriate interventions in the flowsheet.   Outcome: Progressing Towards Goal  Note: Fall Risk Interventions:  Mobility Interventions: Utilize walker, cane, or other assistive device         Medication Interventions: Patient to call before getting OOB, Teach patient to arise slowly    Elimination Interventions: Call light in reach              Problem: Patient Education: Go to Patient Education Activity  Goal: Patient/Family Education  Outcome: Progressing Towards Goal

## 2021-05-18 NOTE — ANESTHESIA POSTPROCEDURE EVALUATION
Procedure(s):  INCISION AND DRAINAGE LEFT FOOT/ resection of FIRST METATARSOPHALANGEAL JOINT,removal of sesmoids, antibiotic bead placement and all indicated procedures. MAC, general - backup    Anesthesia Post Evaluation      Multimodal analgesia: multimodal analgesia used between 6 hours prior to anesthesia start to PACU discharge  Patient location during evaluation: PACU  Patient participation: complete - patient participated  Level of consciousness: awake and alert  Pain management: adequate  Airway patency: patent  Anesthetic complications: no  Cardiovascular status: acceptable  Respiratory status: acceptable  Hydration status: acceptable  Post anesthesia nausea and vomiting:  controlled  Final Post Anesthesia Temperature Assessment:  Normothermia (36.0-37.5 degrees C)      INITIAL Post-op Vital signs:   Vitals Value Taken Time   /65 05/18/21 1811   Temp 36.7 °C (98 °F) 05/18/21 1751   Pulse 88 05/18/21 1820   Resp 13 05/18/21 1820   SpO2 100 % 05/18/21 1820   Vitals shown include unvalidated device data.

## 2021-05-18 NOTE — PROGRESS NOTES
TRANSFER - IN REPORT:    Verbal report received from Friday aguilar RN(name) on Susan Howe  being received from [facility]) for routine post - op      Report consisted of patients Situation, Background, Assessment and   Recommendations(SBAR). Information from the following report(s) SBAR, Kardex, Procedure Summary, Intake/Output and MAR was reviewed with the receiving nurse. Opportunity for questions and clarification was provided. Assessment completed upon patients arrival to unit and care assumed.

## 2021-05-18 NOTE — OP NOTES
Operative Report     Patient: Abel Guerrero MRN: [id number]  SSN: [id number]    YOB: 1975  Age: 40 y.o. Sex: female       Indications: The patient was admitted to hospital for left foot abscess/osteomyelitis with septic first MTPJ. Patient was seen previously about a month ago and bone biopsy was negative for osteomyelitis as well as no drainable abscess. Patient noted to have increased redness, swelling, purulence over last week. Patient had an MRI confirming osteomyelitis of first metatarsal head and base of proximal phalanx with septic joint and abscess. All risks, benefits, complications of procedure discussed in detail with patient. Possible complications discussed including not limited to non-resolution of symptoms, loss of limb, proximal amputation or death. No guarantee made to outcome of procedure. Patient voiced understanding and signed consent. Her Hgb was low earlier and she was transfused 1 unit of pRBC. Date of Surgery: 5/18/2021     Preoperative Diagnosis: Left foot abscess with septic first MTPJ, osteomyelitis of first metatarsal head, base of proximal phalanx    Postoperative Diagnosis: Same       Surgeon(s) and Role:     * Anna Purcell DPM - Primary      Surgical Assistants:   Operating Room Staff    Anesthesia: MAC with Local    Procedure:    INCISION AND DRAINAGE LEFT FOOT, resection of FIRST METATARSOPHALANGEAL JOINT,removal of sesmoids, antibiotic bead placement. Procedure in Detail:     The patient was brought to the operative room and secured in the supine position on the operating room table. After IV sedation from department of anesthesia, local block consisting of 20 cc of 1:1 mixture of 1% lidocaine plain and 0.5% marcaine plain administered proximal to surgical area. Left foot prepped and draped in usual sterile fashion. Attention directed to left foot dorsal first MTPJ where about 5 cm dorsal incision performed medial to EHL tendon.  Incision performed down to bone level. Soft tissue at ulcer site noted to be devitalized, necrotic. Soft tissue reflected off bone and pocket of purulence noted in first web space and in first MTPJ. First metatarsal head noted to be fragmented with dystrophy of bone. Using sagittal saw, head of first metatarsal and base of proximal phalanx resected and fragmented pieces of bone excised. There was noted collection of purulence at sesamoid-metatarsal articulation. Tibial and fibular sesamoids freed up from attached soft tissue and resected out. Using hemostat, soft tissue planes opened up proximally and distally as well as dorsal and plantar foot. Pocket of purulence also noted proximally in first intermetatarsal space. Devitalized FHL tendon was transected proximally and removed. Surgical site irrigated with 3000 mL of normal saline mixed with  solution using pulse lavage. Outer gloves were removed. Using fresh bone rongeur, piece of bone obtained from first metatarsal stump and sent as clean margin for both micro and path. Vancomycin impregnated antibiotic beads were placed in resected first MTPJ space. Skin edges loosely approximated proximally and distally with 2-0 prolene, central portion left open. Surgical site dressed with betadine soaked adaptic, 4 x 4, abd pad, kerlex. The patient tolerated the procedure and anesthesia well. The patient was sent to the recovery room in apparent satisfactory condition and with stable vitals. Findings:  Septic first MTPJ with dystrophy of first metatarsal head, multiple pockets of purulence noted around first MTPJ as well as proximal in first intermetatarsal space.      Estimated Blood Loss:  20 cc    Drains: none           Specimens:   ID Type Source Tests Collected by Time Destination   1 : fibia sesmoid Preservative Foot, left  [facility] SHAHRAM DEVLIN 5/18/2021 1715 Pathology   2 : proximal [facility]arlinx [facility]   3 : tibia sesmoid Preservative Foot, left  Ayden Nnamdi A, DPM 5/18/2021 1717 Pathology   4 : metatarsal Preservative Foot, left  Fredrick Sullivan, DPM 5/18/2021 1718 Pathology   5 : clean margin left foot Preservative Foot, left  Fredrick Sullivan, DPM 5/18/2021 1726 Pathology   1 : abscess tissue left foot Tissue Abscess CULTURE, ANAEROBIC, CULTURE, TISSUE W GRAM STAIN Erby Friendly, DPM 5/18/2021 1704 Microbiology   2 : metatarsal bone culture left foot  Wound Bone CULTURE, ANAEROBIC, CULTURE, WOUND W GRAM STAIN Erby Friendly, DPM 5/18/2021 1707 Microbiology   3 : clean margins Tissue Foot, left CULTURE, ANAEROBIC, CULTURE, TISSUE W GRAM STAIN Erby Friendly, DPM 5/18/2021 1727 Microbiology               Implants:    Implant Name Type Inv.  Item Serial No.  Lot No. LRB No. Used Action   GRAFT BNE SUB 10CC BEAD 25CC CA SULPHATE RAP SET W/ INDIV - [id number]  GRAFT BNE SUB 10CC BEAD 25CC CA SULPHATE RAP SET W/ INDIV  [employer]_ [id number] Left 1 Implanted              Complications:  None

## 2021-05-18 NOTE — PROGRESS NOTES
OT orders received and chart reviewed. Pt presents with low HGB at 6.8 on 5/18/2021. Mobility contraindicated at this time. Will monitor and f/u with pt as appropriate.     Thank you for this referral.    Jasmine Pedro, SHERLYN, OTR/L

## 2021-05-18 NOTE — PROGRESS NOTES
Progress Note    Patient: Pebbles Sheppard MRN: [id number]  SSN: [id number]    YOB: 1975  Age: 40 y.o. Sex: female      Admit Date: 5/17/2021    Subjective:     Patient seen resting quiet and comfortably and no apparent distress. Pain noted to have positive blood culture. She had an MRI as well as OSMIN/PVR of bilateral LE's. Patient denies N/V/c/F. Objective:     Visit Vitals  /75   Pulse 92   Temp 98 °F (36.7 °C)   Resp 16   Ht 5' 7\" (1.702 m)   Wt 89.7 kg (197 lb 12.8 oz)   SpO2 100%   Breastfeeding No   BMI 30.98 kg/m²        Physical Exam:  Dressings intact to left foot. Positive strikethrough noted. Labs/Radiology Review: images and reports reviewed    Assessment:     Hospital Problems  Date Reviewed: 5/18/2021          Codes Class Noted POA    Osteomyelitis ([facility] ICD-10-CM: M86.9  ICD-9-CM: 730.20  5/17/2021 Unknown        Diabetic foot ulcer ([facility] ICD-10-CM: E11.621, L97.509  ICD-9-CM: 250.80, 707.15  5/17/2021 Unknown        FRANCISCA (acute kidney injury) ([facility] ICD-10-CM: N17.9  ICD-9-CM: 584.9  5/17/2021 Unknown        Sepsis ([facility] ICD-10-CM: A41.9  ICD-9-CM: 038.9, 995.91  5/17/2021 Unknown        Hyponatremia ICD-10-CM: E87.1  ICD-9-CM: 276.1  5/17/2021 Unknown              Plan/Recommendations/Medical Decision Making:     - MRI of left foot reviewed. Markedly worsening infectious/inflammatory changes surrounding the first MTP joint with new osteomyelitis of the first metatarsal head and first proximal phalanx. New multifocal abscesses/phlegmon within the soft tissues of the plantar foot and extending into the first toe.  - OSMIN/PVR reviewed. Bilateral lower extremity arterial ankle brachial index within normal limits. Triphasic arterial waveforms identified at all levels assessed within the bilateral lower extremities.   - Discussed results of MRI and arterial studies with patient and her  over phone.   - Patient will need I & D with resection of 1st MTPJ with antibiotic bead placement. Patient confirmed NPO since breakfast. Plan to do procedure tonight.

## 2021-05-18 NOTE — PROGRESS NOTES
Called by nursing to report gram-positive cocci in blood cultures. .  2 out of 4 bottles with gram-positive cocci  Patient is on vancomycin and ceftriaxone  will order echocardiogram to rule out endocarditis

## 2021-05-18 NOTE — PROGRESS NOTES
Pt. Refused lab draws, pt stated, \"I prefer a more experienced technician. I don't want to be stuck a thousand times.

## 2021-05-18 NOTE — PERIOP NOTES
Vancomycin 1500 mg IVPB resumed at 150 ml/hr. Via rt ac. Blood transfusion infusing well with no sign of allergic reaction. Instructed to report any itching, rash, SOB, any new symptoms.

## 2021-05-18 NOTE — ANESTHESIA PREPROCEDURE EVALUATION
Relevant Problems   CARDIOVASCULAR   (+) HTN (hypertension)   (+) NSTEMI (non-ST elevated myocardial infarction) (HCC)      RENAL FAILURE   (+) FRANCISCA (acute kidney injury) ([facility]      ENDOCRINE   (+) Insulin dependent diabetes mellitus   (+) Severe obesity (HCC)      HEMATOLOGY   (+) Osteomyelitis (HCC)       Anesthetic History   No history of anesthetic complications            Review of Systems / Medical History  Patient summary reviewed and pertinent labs reviewed    Pulmonary  Within defined limits                 Neuro/Psych   Within defined limits           Cardiovascular    Hypertension          CAD         GI/Hepatic/Renal                Endo/Other    Diabetes: type 2    Obesity     Other Findings   Comments: Anemia - prbc's ordered           Physical Exam    Airway  Mallampati: II  TM Distance: 4 - 6 cm  Neck ROM: decreased range of motion        Cardiovascular    Rhythm: regular  Rate: normal         Dental    Dentition: Poor dentition     Pulmonary  Breath sounds clear to auscultation               Abdominal  GI exam deferred       Other Findings            Anesthetic Plan    ASA: 3  Anesthesia type: MAC and general - backup          Induction: Intravenous  Anesthetic plan and risks discussed with: Patient

## 2021-05-19 ENCOUNTER — APPOINTMENT (OUTPATIENT)
Dept: NON INVASIVE DIAGNOSTICS | Age: 46
DRG: 710 | End: 2021-05-19
Attending: INTERNAL MEDICINE
Payer: MEDICAID

## 2021-05-19 LAB
ANION GAP SERPL CALC-SCNC: 10 MMOL/L (ref 3–18)
ATRIAL RATE: 100 BPM
BACTERIA SPEC CULT: NORMAL
BUN SERPL-MCNC: 16 MG/DL (ref 7–18)
BUN/CREAT SERPL: 10 (ref 12–20)
CALCIUM SERPL-MCNC: 7 MG/DL (ref 8.5–10.1)
CALCULATED P AXIS, ECG09: 42 DEGREES
CALCULATED R AXIS, ECG10: 33 DEGREES
CALCULATED T AXIS, ECG11: 47 DEGREES
CC UR VC: NORMAL
CHLORIDE SERPL-SCNC: 110 MMOL/L (ref 100–111)
CO2 SERPL-SCNC: 22 MMOL/L (ref 21–32)
CREAT SERPL-MCNC: 1.61 MG/DL (ref 0.6–1.3)
DIAGNOSIS, 93000: NORMAL
ERYTHROCYTE [DISTWIDTH] IN BLOOD BY AUTOMATED COUNT: 16.3 % (ref 11.6–14.5)
GLUCOSE BLD STRIP.AUTO-MCNC: 118 MG/DL (ref 70–110)
GLUCOSE BLD STRIP.AUTO-MCNC: 154 MG/DL (ref 70–110)
GLUCOSE BLD STRIP.AUTO-MCNC: 224 MG/DL (ref 70–110)
GLUCOSE BLD STRIP.AUTO-MCNC: 68 MG/DL (ref 70–110)
GLUCOSE BLD STRIP.AUTO-MCNC: 72 MG/DL (ref 70–110)
GLUCOSE BLD STRIP.AUTO-MCNC: 93 MG/DL (ref 70–110)
GLUCOSE SERPL-MCNC: 70 MG/DL (ref 74–99)
HCT VFR BLD AUTO: 22.7 % (ref 35–45)
HGB BLD-MCNC: 6.9 G/DL (ref 12–16)
HISTORY CHECKED?,CKHIST: NORMAL
LEFT 1ST DIGIT COMP BP: 134 MMHG
LEFT 1ST DIGIT COMP BP: 136 MMHG
LEFT 1ST DIGIT COMP BP: 146 MMHG
LEFT 1ST DIGIT COMP BP: 153 MMHG
LEFT 1ST DIGIT COMP BP: 159 MMHG
LEFT 1ST DIGIT COMP BP: 163 MMHG
LEFT 1ST DIGIT COMP BP: 174 MMHG
LEFT 1ST DIGIT COMP BP: 210 MMHG
LEFT ABI: 1.2
LEFT ANTERIOR TIBIAL: 146 MMHG
LEFT ARM BP: 136 MMHG
LEFT CALF PRESSURE: 210 MMHG
LEFT POSTERIOR TIBIAL: 163 MMHG
MAGNESIUM SERPL-MCNC: 1.7 MG/DL (ref 1.6–2.6)
MCH RBC QN AUTO: 22.5 PG (ref 24–34)
MCHC RBC AUTO-ENTMCNC: 30.4 G/DL (ref 31–37)
MCV RBC AUTO: 73.9 FL (ref 74–97)
P-R INTERVAL, ECG05: 124 MS
PLATELET # BLD AUTO: 262 K/UL (ref 135–420)
PMV BLD AUTO: 9.9 FL (ref 9.2–11.8)
POTASSIUM SERPL-SCNC: 3.1 MMOL/L (ref 3.5–5.5)
Q-T INTERVAL, ECG07: 346 MS
QRS DURATION, ECG06: 82 MS
QTC CALCULATION (BEZET), ECG08: 446 MS
RBC # BLD AUTO: 3.07 M/UL (ref 4.2–5.3)
RIGHT ABI: 1.17
RIGHT ANTERIOR TIBIAL: 153 MMHG
RIGHT ARM BP: 134 MMHG
RIGHT CALF PRESSURE: 174 MMHG
RIGHT POSTERIOR TIBIAL: 159 MMHG
SERVICE CMNT-IMP: NORMAL
SODIUM SERPL-SCNC: 142 MMOL/L (ref 136–145)
VANCOMYCIN SERPL-MCNC: 19.6 UG/ML (ref 5–40)
VENTRICULAR RATE, ECG03: 100 BPM
WBC # BLD AUTO: 13.1 K/UL (ref 4.6–13.2)

## 2021-05-19 PROCEDURE — 2709999900 HC NON-CHARGEABLE SUPPLY

## 2021-05-19 PROCEDURE — 97535 SELF CARE MNGMENT TRAINING: CPT

## 2021-05-19 PROCEDURE — APPSS30 APP SPLIT SHARED TIME 16-30 MINUTES: Performed by: NURSE PRACTITIONER

## 2021-05-19 PROCEDURE — 97530 THERAPEUTIC ACTIVITIES: CPT

## 2021-05-19 PROCEDURE — 93321 DOPPLER ECHO F-UP/LMTD STD: CPT

## 2021-05-19 PROCEDURE — 74011250637 HC RX REV CODE- 250/637: Performed by: INTERNAL MEDICINE

## 2021-05-19 PROCEDURE — 74011000250 HC RX REV CODE- 250: Performed by: INTERNAL MEDICINE

## 2021-05-19 PROCEDURE — 85027 COMPLETE CBC AUTOMATED: CPT

## 2021-05-19 PROCEDURE — 80202 ASSAY OF VANCOMYCIN: CPT

## 2021-05-19 PROCEDURE — 74011250636 HC RX REV CODE- 250/636: Performed by: INTERNAL MEDICINE

## 2021-05-19 PROCEDURE — 99233 SBSQ HOSP IP/OBS HIGH 50: CPT | Performed by: INTERNAL MEDICINE

## 2021-05-19 PROCEDURE — 74011636637 HC RX REV CODE- 636/637: Performed by: INTERNAL MEDICINE

## 2021-05-19 PROCEDURE — 74011636637 HC RX REV CODE- 636/637: Performed by: HOSPITALIST

## 2021-05-19 PROCEDURE — 80048 BASIC METABOLIC PNL TOTAL CA: CPT

## 2021-05-19 PROCEDURE — 97116 GAIT TRAINING THERAPY: CPT

## 2021-05-19 PROCEDURE — 36415 COLL VENOUS BLD VENIPUNCTURE: CPT

## 2021-05-19 PROCEDURE — P9016 RBC LEUKOCYTES REDUCED: HCPCS

## 2021-05-19 PROCEDURE — 97165 OT EVAL LOW COMPLEX 30 MIN: CPT

## 2021-05-19 PROCEDURE — 65270000029 HC RM PRIVATE

## 2021-05-19 PROCEDURE — 74011250636 HC RX REV CODE- 250/636: Performed by: HOSPITALIST

## 2021-05-19 PROCEDURE — 99233 SBSQ HOSP IP/OBS HIGH 50: CPT | Performed by: NURSE PRACTITIONER

## 2021-05-19 PROCEDURE — 83735 ASSAY OF MAGNESIUM: CPT

## 2021-05-19 PROCEDURE — 36430 TRANSFUSION BLD/BLD COMPNT: CPT

## 2021-05-19 PROCEDURE — 82962 GLUCOSE BLOOD TEST: CPT

## 2021-05-19 PROCEDURE — 87040 BLOOD CULTURE FOR BACTERIA: CPT

## 2021-05-19 RX ORDER — VANCOMYCIN/0.9 % SOD CHLORIDE 1.5G/250ML
1500 PLASTIC BAG, INJECTION (ML) INTRAVENOUS ONCE
Status: DISCONTINUED | OUTPATIENT
Start: 2021-05-19 | End: 2021-05-19

## 2021-05-19 RX ORDER — POTASSIUM CHLORIDE 20 MEQ/1
40 TABLET, EXTENDED RELEASE ORAL
Status: COMPLETED | OUTPATIENT
Start: 2021-05-19 | End: 2021-05-19

## 2021-05-19 RX ADMIN — INSULIN GLARGINE 50 UNITS: 100 INJECTION, SOLUTION SUBCUTANEOUS at 22:23

## 2021-05-19 RX ADMIN — SODIUM CHLORIDE 100 ML/HR: 900 INJECTION, SOLUTION INTRAVENOUS at 00:01

## 2021-05-19 RX ADMIN — ASPIRIN 81 MG CHEWABLE TABLET 81 MG: 81 TABLET CHEWABLE at 09:01

## 2021-05-19 RX ADMIN — INSULIN LISPRO 3 UNITS: 100 INJECTION, SOLUTION INTRAVENOUS; SUBCUTANEOUS at 17:28

## 2021-05-19 RX ADMIN — INSULIN LISPRO 6 UNITS: 100 INJECTION, SOLUTION INTRAVENOUS; SUBCUTANEOUS at 13:06

## 2021-05-19 RX ADMIN — ACETAMINOPHEN 650 MG: 325 TABLET ORAL at 04:00

## 2021-05-19 RX ADMIN — CARVEDILOL 12.5 MG: 12.5 TABLET, FILM COATED ORAL at 17:29

## 2021-05-19 RX ADMIN — POTASSIUM CHLORIDE 40 MEQ: 1500 TABLET, EXTENDED RELEASE ORAL at 13:01

## 2021-05-19 RX ADMIN — METRONIDAZOLE 500 MG: 500 INJECTION, SOLUTION INTRAVENOUS at 05:07

## 2021-05-19 RX ADMIN — CEFTRIAXONE SODIUM 2 G: 2 INJECTION, POWDER, FOR SOLUTION INTRAMUSCULAR; INTRAVENOUS at 17:40

## 2021-05-19 RX ADMIN — CARVEDILOL 12.5 MG: 12.5 TABLET, FILM COATED ORAL at 08:57

## 2021-05-19 RX ADMIN — SODIUM CHLORIDE, SODIUM ACETATE ANHYDROUS, SODIUM GLUCONATE, POTASSIUM CHLORIDE, AND MAGNESIUM CHLORIDE 1000 ML: 526; 222; 502; 37; 30 INJECTION, SOLUTION INTRAVENOUS at 12:00

## 2021-05-19 RX ADMIN — FAMOTIDINE 20 MG: 20 TABLET, FILM COATED ORAL at 17:40

## 2021-05-19 RX ADMIN — Medication 10 ML: at 13:07

## 2021-05-19 RX ADMIN — Medication 10 ML: at 06:00

## 2021-05-19 RX ADMIN — ROSUVASTATIN CALCIUM 10 MG: 10 TABLET, COATED ORAL at 22:23

## 2021-05-19 RX ADMIN — Medication 10 ML: at 22:27

## 2021-05-19 NOTE — PROGRESS NOTES
Reason for Admission:  Osteomyelitis [facility]) [M86.9]  Diabetic foot ulcer ([facility] [E11.621, L97.509]                 RUR Score:    24%            Plan for utilizing home health:    yes                      Likelihood of Readmission:   Moderate                         Do you (patient/family) have any concerns for transition/discharge?  no    Transition of Care Plan:       Initial assessment completed with patient. Cognitive status of patient: oriented to time, place, person and situation. Face sheet information confirmed:  yes. The patient designates , [place] to participate in her discharge plan and to receive any needed information. This patient lives in a single family home with . Patient is able to navigate steps as needed. Prior to hospitalization, patient was considered to be independent with ADLs/IADLS : yes . Patient has a current ACP document on file: no, completed at bedside. Healthcare Decision Maker:   Primary Decision Maker: Manuel Thakurk - Parent - [phone]    Secondary Decision Maker: Jonathon Willette - Spouse - [phone]    Click here to complete [address] including selection of the Healthcare Decision Maker Relationship (ie \"Primary\")    The  will be available to transport patient home upon discharge. The patient has no DME available in the home. Patient is not currently active with home health. Patient has not stayed in a skilled nursing facility or rehab. This patient is on dialysis :no    List of available Home Health agencies were provided and reviewed with the patient prior to discharge. Freedom of choice signed: yes, for [facility] or whomever is able to accommodate her needs. . Currently, the discharge plan is Home with [address] [place] [address] Blas Barger. The patient states that she can obtain her medications from the pharmacy, and take her medications as directed. Patient's current insurance is [employer].       Care Management Interventions  PCP Verified by CM:  Yes  Mode of Transport at Discharge: Self  Transition of Care Consult (CM Consult): Discharge Planning, [address]: Yes  Current Support Network: Lives with Spouse  Confirm Follow Up Transport: Family  The Patient and/or Patient Representative was Provided with a Choice of Provider and Agrees with the Discharge Plan?: Yes  Freedom of Choice List was Provided with Basic Dialogue that Supports the Patient's Individualized Plan of Care/Goals, Treatment Preferences and Shares the Quality Data Associated with the Providers?: Yes  Discharge Location  Discharge Placement: Home with home health        Kelley Shoemaker RN - Outcomes Manager  [phone]

## 2021-05-19 NOTE — DIABETES MGMT
GLYCEMIC CONTROL PLAN OF CARE    Assessment:  History:  Admitted with osteomyelitis diabetic foot ulcer  Morning blood glucose:  68, 72, 118 mg/dl  IVF containing dextrose:  None  Steroids:  None   Diet/TF:  DIET DIABETIC CONSISTENT CARB  Recommendations:  Continue basal and corrective insulin coverage as ordered. May benefit from mealtime insulin lispro 3 units 3 times daily with meals. Will continue inpatient monitoring. Most recent BG values:      Results for Mikie Ramirez (MRN [id number]) as of 5/19/2021 13:52   Ref. Range 5/18/2021 21:46 5/19/2021 06:08 5/19/2021 06:12 5/19/2021 06:41 5/19/2021 13:05   GLUCOSE,FAST - POC Latest Ref Range: 70 - 110 mg/dL 131 (H) 68 (L) 72 118 (H) 224 (H)     Within target range  mg/dl? No   Current A1C:  13.6 equivalent to an average blood glucose of 344 mg/dl over the past 2-3 months. Adequate glycemic control PTA? No   Current hospital diabetes medications:  Lantus 50 units every bedtime  Lispro corrective insulin coverage AC&HS  Previous days insulin requirements:  Lantus 50 units  Lispro 18 units corrective insulin  Regular insulin 6 units   Home diabetes medication:  Per pt. Lantus 50 units daily  Metformin 500 daily with dinner  Education:  x__ see diabetes education record            ___ diabetes education not indicated at this time.     Phoenix Bennett RN CDE  Ext [phone]

## 2021-05-19 NOTE — PROGRESS NOTES
Progress Note      27-year-old female with past medical history of diabetes, hypertension, peripheral vascular disease, admitted for foot infection, following for renal failure  Subjective    Overnight event noted  She had surgery yesterday , s/p I and D and first metatarsophalangeal joint resection   Febrile   Documented urine output about 500cc      IMPRESSION:   Acute on chronic kidney injury, risk factor septic FRANCISCA, nonoliguric, other prerenal risk factor including diuretics, uncontrolled blood suger (baseline creatinine  At 1mg/dl)   Chronic kidney disease stage III with heavy proteinuria, diabetic and hypertensive nephropathy, baseline creatinine around 1 mg per DL  Sepsis, source left foot diabetic ulcer  Diabetes, uncontrolled  Protein calorie malnutrition   PLAN:   Her renal function is improving but still not at baseline,continue to hold metfromin and ARB. Change iv fluid to normosol, monitor sodium/chloride. Ordered K supplement.    Adjust medication per current renal function status                    Facility-Administered Medications: None       Current Facility-Administered Medications   Medication Dose Route Frequency    cefTRIAXone (ROCEPHIN) 2 g in sterile water (preservative free) 20 mL IV syringe  2 g IntraVENous Q24H    insulin lispro (HUMALOG) injection   SubCUTAneous AC&HS    glucose chewable tablet 16 g  16 g Oral PRN    glucagon (GLUCAGEN) injection 1 mg  1 mg IntraMUSCular PRN    dextrose (D50W) injection syrg 12.5-25 g  25-50 mL IntraVENous PRN    0.9% sodium chloride infusion 250 mL  250 mL IntraVENous PRN    sodium chloride (NS) flush 5-10 mL  5-10 mL IntraVENous PRN    metroNIDAZOLE (FLAGYL) IVPB premix 500 mg  500 mg IntraVENous Q12H    dextrose (D50W) injection syrg 12.5-25 g  25-50 mL IntraVENous PRN    aspirin chewable tablet 81 mg  81 mg Oral DAILY    carvediloL (COREG) tablet 12.5 mg  12.5 mg Oral BID WITH MEALS    rosuvastatin (CRESTOR) tablet 10 mg  10 mg Oral QHS    sodium chloride (NS) flush 5-40 mL  5-40 mL IntraVENous Q8H    sodium chloride (NS) flush 5-40 mL  5-40 mL IntraVENous PRN    acetaminophen (TYLENOL) tablet 650 mg  650 mg Oral Q6H PRN    Or    acetaminophen (TYLENOL) suppository 650 mg  650 mg Rectal Q6H PRN    polyethylene glycol (MIRALAX) packet 17 g  17 g Oral DAILY PRN    promethazine (PHENERGAN) tablet 12.5 mg  12.5 mg Oral Q6H PRN    Or    ondansetron (ZOFRAN) injection 4 mg  4 mg IntraVENous Q6H PRN    famotidine (PEPCID) tablet 20 mg  20 mg Oral QPM    [Held by provider] heparin (porcine) injection 5,000 Units  5,000 Units SubCUTAneous Q8H    insulin glargine (LANTUS) injection 50 Units  50 Units SubCUTAneous QHS    0.9% sodium chloride infusion  100 mL/hr IntraVENous CONTINUOUS       Review of Systems:     Complete 10-point review of systems were obtained and discussed in length  with the patient. Complete review of systems was negative/unremarkable  except as mentioned in HPI section. Data Review:    Labs: Results:       Chemistry Recent Labs     05/19/21 0400 05/18/21 0355 05/18/21  0335 05/17/21  2109 05/17/21  1709   GLU 70* 318*  --  466* 616*    135*  --  131* 127*   K 3.1* 3.5  --  3.6 3.3*    102  --  100 93*   CO2 22 24  --  24 26   BUN 16 18  --  18 18   CREA 1.61* 1.78*  --  1.99* 2.13*   CA 7.0* 7.3* 7.4* 8.0* 8.3*   AGAP 10 9  --  7 8   BUCR 10* 10*  --  9* 8*   AP  --   --   --   --  326*   TP  --   --   --   --  7.4   ALB  --   --   --   --  1.2*   GLOB  --   --   --   --  6.2*   AGRAT  --   --   --   --  0.2*      CBC w/Diff Recent Labs     05/19/21  0400 05/18/21  0355 05/17/21  1709   WBC 13.1 12.1 13.6*   RBC 3.07* 3.13* 3.73*   HGB 6.9* 6.8* 8.2*   HCT 22.7* 21.9* 26.1*    240 286   GRANS  --   --  79*   LYMPH  --   --  12*   EOS  --   --  0      Coagulation No results for input(s): PTP, INR, APTT, INREXT, INREXT in the last 72 hours.     Iron/Ferritin Recent Labs     05/18/21  0335   IRON 15* BNP No results for input(s): BNPP in the last 72 hours. Cardiac Enzymes No results for input(s): CPK, CKND1, ROBERTA in the last 72 hours.     No lab exists for component: CKRMB, TROIP   Liver Enzymes Recent Labs     05/17/21  1709   TP 7.4   ALB 1.2*   *      Thyroid Studies Lab Results   Component Value Date/Time    TSH 1.12 05/17/2021 05:09 PM         EKG:sinus     Physical Assessment:     Visit Vitals  /71   Pulse 96   Temp 98.9 °F (37.2 °C)   Resp 16   Ht 5' 7\" (1.702 m)   Wt 89.4 kg (197 lb)   SpO2 98%   Breastfeeding No   BMI 30.85 kg/m²     Weight change:     Intake/Output Summary (Last 24 hours) at 5/19/2021 1140  Last data filed at 5/19/2021 0853  Gross per 24 hour   Intake 1713.3 ml   Output 250 ml   Net 1463.3 ml     Physical Exam:   General: comfortable, no acute distress   HEENT sclera anicteric, supple neck, no thyromegaly  CVS: S1S2 heard,  no rub  RS: + air entry b/l,   Abd: Soft, Non tender, Not distended, Positive bowel sounds, no organomegaly, no CVA / supra pubic tenderness  Neuro: non focal, awake, alert , CN II-XII are grossly intact  Extrm: left foot under dressing   Skin: no visible  Rash      Procedures/imaging: see electronic medical records for all procedures, Xrays and details which were not copied into this note but were reviewed prior to creation of Plan  Discussed with Dr. Benjie Sloan MD  May 19, 2021  [facility]  Office [phone]

## 2021-05-19 NOTE — PROGRESS NOTES
Physician Progress Note      PATIENT:               Phani Borden  CSN #:                  [id number]  :                       1975  ADMIT DATE:       2021 5:00 PM  [address] [place] DATE:  RESPONDING  PROVIDER #:        Reina MALIN DO          QUERY TEXT:    Dear Hospitalist  Pt admitted with Sepsis POA due to Left foot abscess with septic first MTPJ, osteomyelitis of first metatarsal head, base of proximal phalanx . Pt noted to have acute organ dysfunction FRANCISCA  . If possible, please document in the progress notes and discharge summary if you are evaluating and /or treating any of the following: The medical record reflects the following:  Risk Factors: Sepsis POA  Left foot diabetic ulcer with cellulitis/abscess, osteomyelitis of first metatarsal head with septic 1st MTPJ. Clinical Indicators: Per Nephrology MDPN ? Acute on chronic kidney injury, risk factor septic FRANCISCA, nonoliguric, other prerenal risk factor including diuretics, uncontrolled blood sugar (baseline creatinine  At 1mg/dl) Chronic kidney disease stage III with heavy proteinuria, diabetic and hypertensive nephropathy, baseline creatinine around 1 mg per DL . ED creat 2.13   creat 1.61    Treatment:Renal consult Labs monitored carefully  IV Flds and Continue ceftriaxone, metronidazole, vancomycin IV  Thank you  Jose Enrique Foster RN     Options provided:  -- Sepsis POA due to Left foot abscess with septic first MTPJ with associated FRANCISCA , present on admission  -- Sepsis POA due to Left foot abscess with septic first MTPJ  without acute organ dysfunction  , present on admission  -- Other - I will add my own diagnosis  -- Disagree - Not applicable / Not valid  -- Disagree - Clinically unable to determine / Unknown  -- Refer to Clinical Documentation Reviewer    PROVIDER RESPONSE TEXT:    This patient has Sepsis POA due to Left foot abscess with septic first MTPJ with associated FRANCISCA which was present on admission.     Query created by: Anastacia Carrion on 5/19/2021 3:04 PM      QUERY TEXT:    Dear Hospitalist  Pt admitted with Sepsis   . Pt noted to have Acute on chronic kidney injury stage 3. If possible, please document in the progress notes and discharge summary if you are evaluating and/or treating any of the following: The medical record reflects the following:  Risk Factors: Sepsis poa Left foot diabetic ulcer with infection/abscess with septic first MTPJ OM of first metatarsal head    Clinical Indicators: Podiatry 5/18 I/D left foot, resection of first metatarsophalangeal joint, removal of sesmoids, antibiotic bead placement , Nephrology MDPN Acute on chronic kidney injury, risk factor septic FRANCISCA, nonoliguric, other prerenal risk factor including diuretics, uncontrolled blood suger (baseline creatinine  At 1mg/dl) admitting creat. 2.13 UA on 5/18 dark yellow turbid appearance  Casts 8-10 , trace ketones >1,000 protein    Treatment: IVflds Labs monitored carefully ,Nephrology consulted ,UA  Thank you  Connie Alfonso RN      Options provided:  -- Acute kidney failure with CKD stage 3  -- Acute kidney failure with acute tubular necrosis  -- Other - I will add my own diagnosis  -- Disagree - Not applicable / Not valid  -- Disagree - Clinically unable to determine / Unknown  -- Refer to Clinical Documentation Reviewer    PROVIDER RESPONSE TEXT:    This patient is in Acute kidney failure with CKD stage 3 . Query created by:  Anastacia Carrion on 5/19/2021 3:18 PM      Electronically signed by:  Luis Daniel Matt DO 5/19/2021 4:11 PM

## 2021-05-19 NOTE — ACP (ADVANCE CARE PLANNING)
Advance Care Planning     General Advance Care Planning (ACP) Conversation      Date of Conversation: 5/17/2021  Conducted with: Patient with Decision Making Capacity    Healthcare Decision Maker:     Primary Decision Maker: Geoffrey Wilson - [phone]    Secondary Decision Maker: Diego Bronson - [phone]  Click here to complete [id number] including selection of the Healthcare Decision Maker Relationship (ie \"Primary\")  Today we documented decision makers according to patient preference    Content/Action Overview:    Has ACP document(s) on file - reflects the patient's care preferences    Elodia Barrera RN - Outcomes Manager  [phone]

## 2021-05-19 NOTE — PROGRESS NOTES
OCCUPATIONAL THERAPY EVALUATION/DISCHARGE    Patient: Patric Trinidad (69 y.o. female)  Date: 5/19/2021  Primary Diagnosis: Osteomyelitis ([facility] [address] [M86.9]  Diabetic foot ulcer ([facility] [address] [E11.621, L97.509]  Procedure(s) (LRB):  INCISION AND DRAINAGE LEFT FOOT/ resection of FIRST METATARSOPHALANGEAL JOINT,removal of sesmoids, antibiotic bead placement and all indicated procedures (Left) 1 Day Post-Op   Precautions: NWB L forefoot     PLOF: Pt was receiving help for self-care skills prior to admission from  and children as needed. Pt stated  was nervous and didn't want her to get hurt so would perform bed/basin bath, help with dressing, and do the cooking. Pt lives in 2 story home with  and 2 children. Bedroom and bathroom on second floor. ASSESSMENT AND RECOMMENDATIONS:  Pt cleared by RN for OT eval at this time. Pt with low HgB at 6.9. Pt received sitting up in chair and agreeable to OT eval at this time. Pt NWB L forefoot post-op. Based on the objective data described below, the patient presents with overall functional participation in self-care skills. Pt demonstrated functional ROM and strength in UE to complete self-care tasks while seated up in chair. Pt able to don/doff sock on R foot only d/t L foot bandage. Pt able to stand with supv/SBA, but increased dizziness. Stood for 30 seconds to see if dizziness subsides; pt stating still dizzy so returned to chair. Provided education regarding AD at home (ie [facility] and [place]) in order to help re-gain functional independence and for pt safety. Pt demonstrated understanding and agreed with suggestion. Transport arrived to take pt to [place]. Pt able to stand with supv and use RW to transfer to bed to demonstrate functional transfers. Defer to PT for further functional mobility at this time. No further OT required in acute setting. Skilled occupational therapy is not indicated at this time.   Discharge Recommendations: Home Health with assist as needed only  Further Equipment Recommendations for Discharge: bedside commode, shower chair and rolling walker      SUBJECTIVE:   Patient stated Joselito Bernal My  doesn't let me do much, he jumps right in to help cause he's scared I'll fall or hurt myself. I try to tell him I can do it, but I do like having the help close by still for safety.     OBJECTIVE DATA SUMMARY:     Past Medical History:   Diagnosis Date    CAD (coronary artery disease)     Diabetes ([address]     HTN (hypertension)     Hyperlipidemia      Past Surgical History:   Procedure Laterality Date    HX CORONARY STENT PLACEMENT      HX GYN           Barriers to Learning/Limitations: None  Compensate with: visual, verbal, tactile, kinesthetic cues/model    Home Situation:   Home Situation  Home Environment: Private residence  # Steps to Enter: 0 (10 stairs to bedroom on 2nd floor)  Rails to [employer]: No  One/Two Story Residence: Two story  # of Interior Steps: 10  Height of Each Step (in): 6 inches  Interior Rails: Right  Lift Chair Available: No  Living Alone: No  Support Systems: Family member(s)  Patient Expects to be Discharged to[de-identified] Private residence  Current DME Used/Available at Home: Cane, straight  Tub or Shower Type: Tub/Shower combination (had been performing basin bath prior to admission)  [x]     Right hand dominant   []     Left hand dominant    Cognitive/Behavioral Status:  Neurologic State: Alert  Orientation Level: Oriented X4  Cognition: Appropriate decision making       Skin: visible skin intact  Edema: none to note    Vision/Perceptual:     Acuity - WDL  Wears glasses    Coordination: BUE     Fine Motor Skills-Upper: Left Intact; Right Intact    Gross Motor Skills-Upper: Left Intact; Right Intact    Balance:  Sitting: Intact  Standing: Impaired  Standing - Static: Good  Standing - Dynamic : Fair    Strength: BUE  Strength: Generally decreased, functional    Tone & Sensation: BUE     Sensation: Intact      Range of Motion: BUE    AROM: Generally decreased, functional     Functional Mobility and Transfers for ADLs:  Bed Mobility:   Sit to supine: Supv/ Mod I     Transfers:  Sit to Stand: Stand-by assistance  Stand to Sit: Stand-by assistance     Bed to Chair: Supervision    Bathroom Mobility: Supervision/set up    ADL Assessment:  Feeding: Modified independent    Oral Facial Hygiene/Grooming: Modified Independent    Bathing: Modified independent    Upper Body Dressing: Modified independent    Lower Body Dressing: Modified independent    Toileting: Supervision       ADL Intervention:     Upper Body Dressing Assistance  Dressing Assistance: Modified independent  Shirt simulation with hospital gown: Modified independent    Lower Body Dressing Assistance  Dressing Assistance: Modified independent  Socks: Modified independent  Leg Crossed Method Used: No  Position Performed: Seated in chair (long sit in recliner)    Pain:  Pain level pre-treatment: 5/10   Pain level post-treatment: 5/10   Pain Intervention(s): Medication (see MAR); Rest, Ice, Repositioning   Response to intervention: Nurse notified, See doc flow    Activity Tolerance:   Fair - dec d/t dizziness upon standing (low H    Please refer to the flowsheet for vital signs taken during this treatment. After treatment:   [x]  Patient left in no apparent distress sitting up in chair; then transport arrived, assisted to bed for transport  []  Patient left in no apparent distress in bed  []  Call bell left within reach  [x]  Nursing notified  []  Caregiver present  []  Bed alarm activated    COMMUNICATION/EDUCATION:   [x]      Role of Occupational Therapy in the acute care setting  [x]      Home safety education was provided and the patient/caregiver indicated understanding. [x]      Patient/family have participated as able and agree with findings and recommendations. []      Patient is unable to participate in plan of care at this time.     Thank you for this referral.  Riley Johns SHERLYN Guadarrama, OTR/L  Time Calculation: 30 mins      Eval Complexity: History: LOW Complexity : Brief history review ; Examination: LOW Complexity : 1-3 performance deficits relating to physical, cognitive , or psychosocial skils that result in activity limitations and / or participation restrictions ;    Decision Making:LOW Complexity : No comorbidities that affect functional and no verbal or physical assistance needed to complete eval tasks

## 2021-05-19 NOTE — PROGRESS NOTES
Infectious Disease progress Note        Reason: Diabetic left foot infection    Current abx Prior abx   Ceftriaxone, metronidazole, vancomycin since 5/17      Lines:       Assessment :    40 y.o.  female  With PMH of HTN, uncontrolled type 2 DM-last hemoglobin A1c 13.6 on 5/17/2021, peripheral Neuropathy came tot he ER on 5/17/21 for left foot infection    Hospitalization January 2019 for left second toe, left foot cellulitis    Hospitalization April 2021 for left foot infection, bone biopsy negative for osteomyelitis  Wound cultures group B streptococcus    Clinical presentation consistent with sepsis -present on admission due to group B bloodstream infection  (positive blood culture 5/17 ), left foot diabetic ulcer with cellulitis/abscess, possible osteomyelitis of first metatarsal head with septic 1st MTPJ. Intra-Op cultures 5/18-group B streptococcus    Most likely source of group B bloodstream infection is left foot infection      Looking at the h/o uncontrolled DM, close proximity of infection to bone; high risk of future complications; will recommend aggressive abx treatment.      Recommendations:     1. Continue ceftriaxone-increase dose to 2 g IV every 24 hours. Discontinue metronidazole & vancomycin  2. Repeat blood culture today   3. Will place PICC line on 5/21/2021 if repeat blood cultures remain negative   4. follow-up podiatry recommendations regarding left foot wound care   5. Follow-up Intra-Op cultures   6. Patient was advised regarding importance of better glycemic control to aid wound healing, avoid future infectious complication. Risk of limb loss with persistent high blood sugars discussed with patient      Above plan was discussed in details with patient. Please call me if any further questions or concerns. Will continue to participate in the care of this patient. HPI:    Patient denies any subjective fever or chills at this time.   She complains of pain in her left leg. She denies any chest pain, shortness of breath, abdominal pain, diarrhea. Current Discharge Medication List      CONTINUE these medications which have NOT CHANGED    Details   insulin lispro (HUMALOG) 100 unit/mL injection Take 10 units three times a day with meals  Qty: 1 Vial, Refills: 11    Associated Diagnoses: Type 2 diabetes mellitus with complication (HCC)      metFORMIN ER (GLUCOPHAGE XR) 500 mg tablet Take 1 Tab by mouth daily (with dinner). Qty: 90 Tab, Refills: 3    Associated Diagnoses: Type 2 diabetes mellitus with complication ([facility])      rosuvastatin (CRESTOR) 10 mg tablet Take 1 Tab by mouth nightly. Qty: 90 Tab, Refills: 3    Associated Diagnoses: NSTEMI (non-ST elevated myocardial infarction) ([facility])      carvediloL (Coreg) 12.5 mg tablet Take 1 Tab by mouth two (2) times daily (with meals). Indications: myocardial reinfarction prevention  Qty: 60 Tab, Refills: 3    Associated Diagnoses: [facility]STEM[facility] (non-ST elevated [facility] infarction) [facility] Essential hypertension      spironolactone (ALDACTONE) 25 mg tablet Take 25 mg by mouth daily. ferrous sulfate 325 mg (65 mg iron) tablet Take 1 Tab by mouth daily (with breakfast). Qty: 30 Tab, Refills: 0      furosemide (LASIX) 20 mg tablet Take 1 Tab by mouth daily as needed (Edema). Qty: 30 Tab, Refills: 1    Associated Diagnoses: Acute on chronic diastolic congestive heart failure (HCC)      losartan (COZAAR) 100 mg tablet Take 1 Tab by mouth daily. Qty: 90 Tab, Refills: 3    Associated Diagnoses: Essential hypertension      ticagrelor (BRILINTA) 90 mg tablet Take 1 Tab by mouth every twelve (12) hours every twelve (12) hours. Qty: 60 Tab, Refills: 5      insulin glargine (LANTUS,BASAGLAR) 100 unit/mL (3 mL) inpn Take 45 units daily  Indications: type 2 diabetes mellitus  Qty: 4 Pen, Refills: 5    Associated Diagnoses: Type 2 diabetes mellitus with complication ([facility])      aspirin 81 mg chewable tablet Take 1 Tab by mouth daily.   Qty: 30 Tab, Refills: 0      melatonin 5 mg cap capsule Take 1 Cap by mouth nightly.   Qty: 30 Cap, Refills: 1    Associated Diagnoses: Difficulty sleeping      Insulin Needles, Disposable, 31 gauge x 5/16\" ndle Use to check blood glucose BID  Qty: 100 Pen Needle, Refills: 11    Associated Diagnoses: Type 2 diabetes mellitus with complication (HCC)      Insulin Syringe-Needle U-100 (INSULIN SYRINGE) 1 mL 30 gauge x 5/16 syrg As directed for lantus insulin  Qty: 50 Syringe, Refills: 0             Current Facility-Administered Medications   Medication Dose Route Frequency    vancomycin (VANCOCIN) 1500 mg in  ml infusion  1,500 mg IntraVENous ONCE    cefTRIAXone (ROCEPHIN) 2 g in sterile water (preservative free) 20 mL IV syringe  2 g IntraVENous Q24H    insulin lispro (HUMALOG) injection   SubCUTAneous AC&HS    glucose chewable tablet 16 g  16 g Oral PRN    glucagon (GLUCAGEN) injection 1 mg  1 mg IntraMUSCular PRN    dextrose (D50W) injection syrg 12.5-25 g  25-50 mL IntraVENous PRN    0.9% sodium chloride infusion 250 mL  250 mL IntraVENous PRN    sodium chloride (NS) flush 5-10 mL  5-10 mL IntraVENous PRN    metroNIDAZOLE (FLAGYL) IVPB premix 500 mg  500 mg IntraVENous Q12H    dextrose (D50W) injection syrg 12.5-25 g  25-50 mL IntraVENous PRN    aspirin chewable tablet 81 mg  81 mg Oral DAILY    carvediloL (COREG) tablet 12.5 mg  12.5 mg Oral BID WITH MEALS    rosuvastatin (CRESTOR) tablet 10 mg  10 mg Oral QHS    sodium chloride (NS) flush 5-40 mL  5-40 mL IntraVENous Q8H    sodium chloride (NS) flush 5-40 mL  5-40 mL IntraVENous PRN    acetaminophen (TYLENOL) tablet 650 mg  650 mg Oral Q6H PRN    Or    acetaminophen (TYLENOL) suppository 650 mg  650 mg Rectal Q6H PRN    polyethylene glycol (MIRALAX) packet 17 g  17 g Oral DAILY PRN    promethazine (PHENERGAN) tablet 12.5 mg  12.5 mg Oral Q6H PRN    Or    ondansetron (ZOFRAN) injection 4 mg  4 mg IntraVENous Q6H PRN    famotidine (PEPCID) tablet 20 mg  20 mg Oral QPM    [Held by provider] heparin (porcine) injection 5,000 Units  5,000 Units SubCUTAneous Q8H    insulin glargine (LANTUS) injection 50 Units  50 Units SubCUTAneous QHS    0.9% sodium chloride infusion  100 mL/hr IntraVENous CONTINUOUS       Allergies: Azithromycin and Pcn [penicillins]    Temp (24hrs), Av.2 °F (37.3 °C), Min:97.9 °F (36.6 °C), Max:102.3 °F (39.1 °C)    Visit Vitals  /72 (BP 1 Location: Left upper arm)   Pulse 96   Temp 98.9 °F (37.2 °C)   Resp 16   Ht 5' 7\" (1.702 m)   Wt 89.7 kg (197 lb 12.8 oz)   SpO2 98%   Breastfeeding No   BMI 30.98 kg/m²       ROS: 12 point ROS obtained in details. Pertinent positives as mentioned in HPI,   otherwise negative    Physical Exam:    Constitutional: She is oriented to person, place, and time. She appears well-developed and well-nourished. No distress. HENT:   Head: Normocephalic and atraumatic. Mouth/Throat: Oropharynx is clear and moist.   Eyes: Conjunctivae are normal. Pupils are equal, round, and reactive to light. No scleral icterus. Neck: Normal range of motion. Neck supple. Cardiovascular: Normal rate and intact distal pulses. Pulmonary/Chest: Effort normal and breath sounds normal. No respiratory distress. She has no wheezes. Abdominal: Soft. Bowel sounds are normal. She exhibits no distension. There is no tenderness. Musculoskeletal: Normal range of motion. Lymphadenopathy:     She has no cervical adenopathy. Neurological: She is alert and oriented to person, place, and time. No cranial nerve deficit.   Decreased sensation of 2nd toe of left foot, likely due to neuropathy.   Skin: Skin is warm. Left foot surgical dressing not opened-dried blood noted on the dressing       nursing note and vitals reviewed.     Labs: Results:   Chemistry Recent Labs     21  0400 21  0355 21  0335 21  2109 21  1709   GLU 70* 318*  --  466* 616*    135*  --  131* 127*   K 3.1* 3.5 --  3.6 3.3*    102  --  100 93*   CO2 22 24  --  24 26   BUN 16 18  --  18 18   CREA 1.61* 1.78*  --  1.99* 2.13*   CA 7.0* 7.3* 7.4* 8.0* 8.3*   AGAP 10 9  --  7 8   BUCR 10* 10*  --  9* 8*   AP  --   --   --   --  326*   TP  --   --   --   --  7.4   ALB  --   --   --   --  1.2*   GLOB  --   --   --   --  6.2*   AGRAT  --   --   --   --  0.2*      CBC w/Diff Recent Labs     05/19/21  0400 05/18/21  0355 05/17/21  1709   WBC 13.1 12.1 13.6*   RBC 3.07* 3.13* 3.73*   HGB 6.9* 6.8* 8.2*   HCT 22.7* 21.9* 26.1*    240 286   GRANS  --   --  79*   LYMPH  --   --  12*   EOS  --   --  0      Microbiology Recent Labs     05/18/21  1727 05/18/21  1707 05/18/21  1704 05/17/21  2140 05/17/21  1857   CULT PENDING PENDING PENDING MIXED UROGENITAL EDUIN ISOLATED LIGHT STREPTOCOCCI, BETA HEMOLYTIC GROUP B Penicillin and ampicillin are drugs of choice for treatment of beta-hemolytic streptococcal infections. Susceptibility testing of penicillins and beta-lactams approved by the FDA for treatment of beta-hemolytic streptococcal infections need not be performed routinely, because nonsusceptible isolates are extremely rare. CLSI 2012*  SO FAR          RADIOLOGY:    All available imaging studies/reports in [facility] for this admission were reviewed  High complexity decision making was performed during the evaluation of this patient at high risk for decompensation          Disclaimer: Sections of this note are dictated utilizing voice recognition software, which may have resulted in some phonetic based errors in grammar and contents. Even though attempts were made to correct all the mistakes, some may have been missed, and remained in the body of the document. If questions arise, please contact our department.     Dr. Nandini Youssef, Infectious Disease Specialist  [phone]  May 19, 2021  9:43 AM

## 2021-05-19 NOTE — PROGRESS NOTES
Shift Summary Note:  Assumed care of patient in bed post-op from procedure. Temperature elevated x2 tylenol given. Some break thru drainage noted on left foot dressing, placed on chux with no reinforcement needed, continue to monitor. Morning accuchk low, juice given with increase to 118. Patient incontinent of large amount of liquid stool, cleansed and linen changed, call bell within reach.   Patient Vitals for the past 12 hrs:   Temp Pulse Resp BP SpO2   05/19/21 0400 (!) 100.9 °F (38.3 °C) 98 17 137/82 99 %   05/18/21 2356 (!) 102.3 °F (39.1 °C) (!) 108 18 118/71 99 %

## 2021-05-19 NOTE — PROGRESS NOTES
Problem: Mobility Impaired (Adult and Pediatric)  Goal: *Acute Goals and Plan of Care (Insert Text)  Description: Physical Therapy Goals  Initiated 5/18/2021 and to be accomplished within 7 day(s)  1. Patient will move from supine to sit and sit to supine , scoot up and down, and roll side to side in bed with supervision/set-up. 2.  Patient will transfer from bed to chair and chair to bed with supervision/set-up using the least restrictive device. 3.  Patient will perform sit to stand with supervision/set-up. 4.  Patient will ambulate with minimal assistance/contact guard assist for 50 feet with the least restrictive device. 5.  Patient will ascend/descend 10 stairs with single handrail(s) with minimal assistance/contact guard assist.    PLOF: Patient lives with her  and 2 children in a 2 level townhouse, 0 stairs to enter, 10 stairs to second floor where bedroom and bathroom are located. No bathroom on first floor. Pt has SPC at home. Ambulates no AD baseline, indep at baseline     Outcome: Progressing Towards Goal    PHYSICAL THERAPY TREATMENT    Patient: Luisa Hidalgo (89 y.o. female)  Date: 5/19/2021  Diagnosis: Osteomyelitis ([facility] [address] [M86.9]  Diabetic foot ulcer ([facility] [E33.310, L97.509] <principal problem not specified>  Procedure(s) (LRB):  INCISION AND DRAINAGE LEFT FOOT/ resection of FIRST METATARSOPHALANGEAL JOINT,removal of sesmoids, antibiotic bead placement and all indicated procedures (Left) 1 Day Post-Op  Precautions: Fall (NWB left forefoot)      ASSESSMENT:  Patient received supine in NAD, agreeable to PT treatment, and cleared by nursing. Reviewed left LE weight bearing precautions and administered new post-op shoe as pt states he spouse took it home. Patient transfers from supine to sitting EOB with mod I. Provided demonstration with instruction on transfers using RW. Pt stands up and transfers to [facility] using RW with SBA.  She proceeds to ambulate 15 ft inside the room with antalgic gait and distance limited by dizziness. Pt demonstrates good understanding of precautions and maintains during mobility. Patient transfers to the chair at end of session and left comfortably reclined with call bell in reach. /70 following activity. Patient reports she has supportive family and they will be able to provide assistance as needed at home. Nurse notified of patient performance. Progression toward goals:   [x]      Improving appropriately and progressing toward goals  []      Improving slowly and progressing toward goals  []      Not making progress toward goals and plan of care will be adjusted     PLAN:  Patient continues to benefit from skilled intervention to address the above impairments. Continue treatment per established plan of care. Discharge Recommendations:  Home Health with family support  Further Equipment Recommendations for Discharge:  rolling walker     SUBJECTIVE:   Patient stated \" I'm scared because I have no bone in my toe .     OBJECTIVE DATA SUMMARY:   Critical Behavior:  Neurologic State: Alert  Orientation Level: Oriented X4  Cognition: Appropriate decision making  Safety/Judgement: Fall prevention, Good awareness of safety precautions  Functional Mobility Training:  Bed Mobility:     Supine to Sit: Modified independent           Transfers:  Sit to Stand: Stand-by assistance  Stand to Sit: Stand-by assistance  Bed to Chair: Stand-by assistance             Balance:  Sitting: Intact  Standing: Impaired  Standing - Static: Good  Standing - Dynamic : Fair          Ambulation/Gait Training:  Distance (ft): 15 Feet (ft)  Assistive Device: Walker, rolling  Ambulation - Level of Assistance: Stand-by assistance  Gait Abnormalities: Decreased step clearance; Antalgic  Stance: Right increased  Speed/Li: Slow       Therapeutic Exercises:   20x ankle pumps      Pain:  Pain level pre-treatment: 0/10  Pain level post-treatment: 0/10   Pain Intervention(s): Medication (see MAR); Rest, Ice, Repositioning\   Response to intervention: Nurse notified, See doc flow    Activity Tolerance:   Fair, limited by dizziness  Please refer to the flowsheet for vital signs taken during this treatment. After treatment:   [x] Patient left in no apparent distress sitting up in chair  [] Patient left in no apparent distress in bed  [x] Call bell left within reach  [x] Nursing notified  [] Caregiver present  [] Bed alarm activated  [] SCDs applied      COMMUNICATION/EDUCATION:   [x]         Role of Physical Therapy in the acute care setting. [x]         Fall prevention education was provided and the patient/caregiver indicated understanding. [x]         Patient/family have participated as able in working toward goals and plan of care. [x]         Patient/family agree to work toward stated goals and plan of care. []         Patient understands intent and goals of therapy, but is neutral about his/her participation.   []         Patient is unable to participate in stated goals/plan of care: ongoing with therapy staff.  []         Other:        Hanna Signs, PT   Time Calculation: 39 mins

## 2021-05-19 NOTE — PROGRESS NOTES
End of Shift Note     Bedside and verbal shift change report given to Emilee (On coming nurse) by Stevie Hernandez (Off going nurse).   Report included the following information:      --Procedure Summary     --MAR,     --Recent Results     --Med Rec Status

## 2021-05-19 NOTE — PROGRESS NOTES
conducted an initial consultation and Spiritual Assessment for Jennifer Mosquera, who is a 40 y.o.,female. Patient's Primary Language is: [place]. According to the patient's EMR Orthodoxy Affiliation is: [facility].     The reason the Patient came to the hospital is:   Patient Active Problem List    Diagnosis Date Noted    Osteomyelitis ([facility] 05/17/2021    Diabetic foot ulcer ([facility] [address] 05/17/2021    FRANCISCA (acute kidney injury) ([facility] [address] 05/17/2021    Sepsis ([facility] [address] 05/17/2021    Hyponatremia 05/17/2021    Severe obesity ([address] 11/05/2020    Fever 10/20/2020    NSTEMI (non-ST elevated myocardial infarction) ([facility] [address] 10/20/2020    Insulin dependent diabetes mellitus 01/11/2019    HTN (hypertension) 01/11/2019    Diabetic foot infection ([facility] [address] 12/30/2018    Right foot infection 12/30/2018    Acute pain of right foot 12/30/2018    Acute bronchitis 04/07/2017    Hyperglycemia 04/07/2017        The  provided the following Interventions:   was unable to assess, patient was off the floor. Plan:  Chaplains will continue to follow and will provide pastoral care on an as needed/requested basis.  recommends bedside caregivers page  on duty if patient shows signs of acute spiritual or emotional distress.     [address] [facility] Giang   [phone]

## 2021-05-19 NOTE — PROGRESS NOTES
[facility]  Progress Note    Patient: Audrey Mckeon Age: 40 y.o. : 1975 MR#: [id number] SSN: [id number]  Date: 2021     Subjective:   Sleepy and oriented x4. Denies pain of left foot. Assessment/Plan:   1. GPC blood cultures  2. Left foot diabetic ulcer with infection/abscess with septic first MTPJ OM of first metatarsal head  3. Acute blood loss anemia s/o one unit PRBC  4. Sepsis POA. Fever   5. Leukocytosis   6. Hyponatremia   7. DMT2 with hyperglycemia   8. FRANCISCA on CKD   9. Hypoalbuminemia   10. Microcytic anemia   11. HTN  12. CAD  15. HLD  14. Morbid obesity    Plan  1. Podiatry  I/D left foot, resection of first metatarsophalangeal joint, removal of sesmoids, antibiotic bead placement  2. ID recommendations continue ceftriaxone, metronidazole, vanco. Follow intra-op cultures and modify antibiotics as needed. Patient will need IV abx. PICC line placement needed? 3. Echo  results pending  4. Monitor HH. 6.8 this am s/p one unit on . Order one unit PRBC for today   5. Monitor fever and treat with tylenol. Follow cultures  6. Nephrology recommendations IVF and monitor renal function. Hold metformin and ARB. 7. POC glucose, SSI. Lantus   8.  PT/OT eval and treat    Additional Notes:      Case discussed with:  [x]Patient  [x]Family  [x]Nursing  []Case Management  DVT Prophylaxis:  []Lovenox  []Hep SQ  []SCDs  []Coumadin   []On Heparin gtt    Objective:   VS:   Visit Vitals  BP (!) 145/92 (BP 1 Location: Left upper arm, BP Patient Position: At rest)   Pulse 90   Temp 97.5 °F (36.4 °C)   Resp 16   Ht 5' 7\" (1.702 m)   Wt 89.4 kg (197 lb)   SpO2 100%   Breastfeeding No   BMI 30.85 kg/m²      Tmax/24hrs: Temp (24hrs), Av °F (37.2 °C), Min:97.5 °F (36.4 °C), Max:102.3 °F (39.1 °C)      Intake/Output Summary (Last 24 hours) at 2021 1354  Last data filed at 2021 1314  Gross per 24 hour   Intake 1923.3 ml   Output 450 ml   Net 1473.3 ml       General:  Alert, NAD  Cardiovascular:  RRR  Pulmonary:  LSC throughout; respiratory effort WNL  GI:  +BS in all four quadrants, soft, non-tender  Extremities: left foot dressing with saturation. Left foot pedal and tarsal edema.    Neuro: sleepy and oriented x4         Labs:    Recent Results (from the past 24 hour(s))   GLUCOSE, POC    Collection Time: 05/18/21  3:53 PM   Result Value Ref Range    Glucose (POC) 216 (H) 70 - 110 mg/dL   CULTURE, TISSUE W GRAM STAIN    Collection Time: 05/18/21  5:04 PM    Specimen: Tissue   Result Value Ref Range    Special Requests: LF FOOT ABSCESS     GRAM STAIN RARE YEAST      GRAM STAIN 1+ GRAM POSITIVE COCCI      GRAM STAIN 1+ GRAM POSITIVE RODS      GRAM STAIN RARE APPARENT GRAM NEGATIVE RODS      Culture result: PENDING    CULTURE, TISSUE W GRAM STAIN    Collection Time: 05/18/21  5:07 PM    Specimen: Bone   Result Value Ref Range    Special Requests: LF FOOT METATARSAL BONE     GRAM STAIN OCCASIONAL WBCS SEEN      GRAM STAIN NO ORGANISMS SEEN      Culture result: PENDING    CULTURE, TISSUE W GRAM STAIN    Collection Time: 05/18/21  5:27 PM    Specimen: Tissue   Result Value Ref Range    Special Requests: LF FOOT CLEAN MARGINS     GRAM STAIN RARE GRAM POSITIVE COCCI      Culture result: PENDING    GLUCOSE, POC    Collection Time: 05/18/21  5:58 PM   Result Value Ref Range    Glucose (POC) 152 (H) 70 - 110 mg/dL   GLUCOSE, POC    Collection Time: 05/18/21  9:46 PM   Result Value Ref Range    Glucose (POC) 131 (H) 70 - 081 mg/dL   METABOLIC PANEL, BASIC    Collection Time: 05/19/21  4:00 AM   Result Value Ref Range    Sodium 142 136 - 145 mmol/L    Potassium 3.1 (L) 3.5 - 5.5 mmol/L    Chloride 110 100 - 111 mmol/L    CO2 22 21 - 32 mmol/L    Anion gap 10 3.0 - 18 mmol/L    Glucose 70 (L) 74 - 99 mg/dL    BUN 16 7.0 - 18 MG/DL    Creatinine 1.61 (H) 0.6 - 1.3 MG/DL    BUN/Creatinine ratio 10 (L) 12 - 20      GFR est AA 42 (L) >60 ml/min/1.73m2    GFR est non-AA 35 (L) >60 ml/min/1.73m2    Calcium 7.0 (L) 8.5 - 10.1 MG/DL   MAGNESIUM    Collection Time: 05/19/21  4:00 AM   Result Value Ref Range    Magnesium 1.7 1.6 - 2.6 mg/dL   CBC W/O DIFF    Collection Time: 05/19/21  4:00 AM   Result Value Ref Range    WBC 13.1 4.6 - 13.2 K/uL    RBC 3.07 (L) 4.20 - 5.30 M/uL    HGB 6.9 (L) 12.0 - 16.0 g/dL    HCT 22.7 (L) 35.0 - 45.0 %    MCV 73.9 (L) 74.0 - 97.0 FL    MCH 22.5 (L) 24.0 - 34.0 PG    MCHC 30.4 (L) 31.0 - 37.0 g/dL    RDW 16.3 (H) 11.6 - 14.5 %    PLATELET 958 340 - 932 K/uL    MPV 9.9 9.2 - 11.8 FL   VANCOMYCIN, RANDOM    Collection Time: 05/19/21  4:00 AM   Result Value Ref Range    Vancomycin, random 19.6 5.0 - 40.0 UG/ML   GLUCOSE, POC    Collection Time: 05/19/21  6:08 AM   Result Value Ref Range    Glucose (POC) 68 (L) 70 - 110 mg/dL   GLUCOSE, POC    Collection Time: 05/19/21  6:12 AM   Result Value Ref Range    Glucose (POC) 72 70 - 110 mg/dL   GLUCOSE, POC    Collection Time: 05/19/21  6:41 AM   Result Value Ref Range    Glucose (POC) 118 (H) 70 - 110 mg/dL   GLUCOSE, POC    Collection Time: 05/19/21  1:05 PM   Result Value Ref Range    Glucose (POC) 224 (H) 70 - 110 mg/dL       Signed By: Briana Mo NP     May 19, 2021

## 2021-05-20 LAB
ABO + RH BLD: NORMAL
ANION GAP SERPL CALC-SCNC: 6 MMOL/L (ref 3–18)
BACTERIA SPEC CULT: ABNORMAL
BACTERIA SPEC CULT: NORMAL
BLD PROD TYP BPU: NORMAL
BLD PROD TYP BPU: NORMAL
BLOOD GROUP ANTIBODIES SERPL: NORMAL
BPU ID: NORMAL
BPU ID: NORMAL
BUN SERPL-MCNC: 12 MG/DL (ref 7–18)
BUN/CREAT SERPL: 9 (ref 12–20)
CALCIUM SERPL-MCNC: 7.8 MG/DL (ref 8.5–10.1)
CALLED TO:,BCALL1: NORMAL
CALLED TO:,BCALL2: NORMAL
CHLORIDE SERPL-SCNC: 110 MMOL/L (ref 100–111)
CO2 SERPL-SCNC: 25 MMOL/L (ref 21–32)
CREAT SERPL-MCNC: 1.35 MG/DL (ref 0.6–1.3)
CROSSMATCH RESULT,%XM: NORMAL
CROSSMATCH RESULT,%XM: NORMAL
ERYTHROCYTE [DISTWIDTH] IN BLOOD BY AUTOMATED COUNT: 16.8 % (ref 11.6–14.5)
GLUCOSE BLD STRIP.AUTO-MCNC: 130 MG/DL (ref 70–110)
GLUCOSE BLD STRIP.AUTO-MCNC: 157 MG/DL (ref 70–110)
GLUCOSE BLD STRIP.AUTO-MCNC: 165 MG/DL (ref 70–110)
GLUCOSE BLD STRIP.AUTO-MCNC: 186 MG/DL (ref 70–110)
GLUCOSE SERPL-MCNC: 98 MG/DL (ref 74–99)
GRAM STN SPEC: ABNORMAL
HCT VFR BLD AUTO: 24 % (ref 35–45)
HGB BLD-MCNC: 7.6 G/DL (ref 12–16)
MAGNESIUM SERPL-MCNC: 1.8 MG/DL (ref 1.6–2.6)
MCH RBC QN AUTO: 23.1 PG (ref 24–34)
MCHC RBC AUTO-ENTMCNC: 31.7 G/DL (ref 31–37)
MCV RBC AUTO: 72.9 FL (ref 74–97)
PLATELET # BLD AUTO: 284 K/UL (ref 135–420)
PMV BLD AUTO: 9.8 FL (ref 9.2–11.8)
POTASSIUM SERPL-SCNC: 3 MMOL/L (ref 3.5–5.5)
RBC # BLD AUTO: 3.29 M/UL (ref 4.2–5.3)
SERVICE CMNT-IMP: ABNORMAL
SERVICE CMNT-IMP: NORMAL
SODIUM SERPL-SCNC: 141 MMOL/L (ref 136–145)
SPECIMEN EXP DATE BLD: NORMAL
STATUS OF UNIT,%ST: NORMAL
STATUS OF UNIT,%ST: NORMAL
UNIT DIVISION, %UDIV: 0
UNIT DIVISION, %UDIV: 0
WBC # BLD AUTO: 12 K/UL (ref 4.6–13.2)

## 2021-05-20 PROCEDURE — 74011636637 HC RX REV CODE- 636/637: Performed by: INTERNAL MEDICINE

## 2021-05-20 PROCEDURE — 74011636637 HC RX REV CODE- 636/637: Performed by: HOSPITALIST

## 2021-05-20 PROCEDURE — 82962 GLUCOSE BLOOD TEST: CPT

## 2021-05-20 PROCEDURE — 77030038269 HC DRN EXT URIN PURWCK BARD -A

## 2021-05-20 PROCEDURE — 77030027138 HC INCENT SPIROMETER -A

## 2021-05-20 PROCEDURE — 97605 NEG PRS WND THER DME<=50SQCM: CPT

## 2021-05-20 PROCEDURE — 65270000029 HC RM PRIVATE

## 2021-05-20 PROCEDURE — APPSS45 APP SPLIT SHARED TIME 31-45 MINUTES: Performed by: NURSE PRACTITIONER

## 2021-05-20 PROCEDURE — 83735 ASSAY OF MAGNESIUM: CPT

## 2021-05-20 PROCEDURE — 77030019934 HC DRSG VAC ASST KCON -B

## 2021-05-20 PROCEDURE — 74011250637 HC RX REV CODE- 250/637: Performed by: INTERNAL MEDICINE

## 2021-05-20 PROCEDURE — 74011000250 HC RX REV CODE- 250: Performed by: INTERNAL MEDICINE

## 2021-05-20 PROCEDURE — 2709999900 HC NON-CHARGEABLE SUPPLY

## 2021-05-20 PROCEDURE — 99232 SBSQ HOSP IP/OBS MODERATE 35: CPT | Performed by: INTERNAL MEDICINE

## 2021-05-20 PROCEDURE — 74011250637 HC RX REV CODE- 250/637: Performed by: NURSE PRACTITIONER

## 2021-05-20 PROCEDURE — 77030041076 HC DRSG AG OPTICELL MDII -A

## 2021-05-20 PROCEDURE — 80048 BASIC METABOLIC PNL TOTAL CA: CPT

## 2021-05-20 PROCEDURE — 74011250636 HC RX REV CODE- 250/636: Performed by: INTERNAL MEDICINE

## 2021-05-20 PROCEDURE — 85027 COMPLETE CBC AUTOMATED: CPT

## 2021-05-20 PROCEDURE — 36415 COLL VENOUS BLD VENIPUNCTURE: CPT

## 2021-05-20 RX ORDER — POTASSIUM CHLORIDE 20 MEQ/1
40 TABLET, EXTENDED RELEASE ORAL
Status: COMPLETED | OUTPATIENT
Start: 2021-05-20 | End: 2021-05-20

## 2021-05-20 RX ORDER — POTASSIUM CHLORIDE 20 MEQ/1
40 TABLET, EXTENDED RELEASE ORAL DAILY
Status: DISCONTINUED | OUTPATIENT
Start: 2021-05-20 | End: 2021-05-21

## 2021-05-20 RX ADMIN — POTASSIUM CHLORIDE 40 MEQ: 1500 TABLET, EXTENDED RELEASE ORAL at 13:05

## 2021-05-20 RX ADMIN — INSULIN LISPRO 3 UNITS: 100 INJECTION, SOLUTION INTRAVENOUS; SUBCUTANEOUS at 13:12

## 2021-05-20 RX ADMIN — SODIUM CHLORIDE, SODIUM ACETATE ANHYDROUS, SODIUM GLUCONATE, POTASSIUM CHLORIDE, AND MAGNESIUM CHLORIDE: 526; 222; 502; 37; 30 INJECTION, SOLUTION INTRAVENOUS at 04:39

## 2021-05-20 RX ADMIN — CEFTRIAXONE SODIUM 2 G: 2 INJECTION, POWDER, FOR SOLUTION INTRAMUSCULAR; INTRAVENOUS at 17:21

## 2021-05-20 RX ADMIN — INSULIN LISPRO 3 UNITS: 100 INJECTION, SOLUTION INTRAVENOUS; SUBCUTANEOUS at 21:56

## 2021-05-20 RX ADMIN — CARVEDILOL 12.5 MG: 12.5 TABLET, FILM COATED ORAL at 16:08

## 2021-05-20 RX ADMIN — FAMOTIDINE 20 MG: 20 TABLET, FILM COATED ORAL at 17:21

## 2021-05-20 RX ADMIN — Medication 10 ML: at 13:13

## 2021-05-20 RX ADMIN — INSULIN LISPRO 3 UNITS: 100 INJECTION, SOLUTION INTRAVENOUS; SUBCUTANEOUS at 17:21

## 2021-05-20 RX ADMIN — ASPIRIN 81 MG CHEWABLE TABLET 81 MG: 81 TABLET CHEWABLE at 09:03

## 2021-05-20 RX ADMIN — POTASSIUM CHLORIDE 40 MEQ: 1500 TABLET, EXTENDED RELEASE ORAL at 09:03

## 2021-05-20 RX ADMIN — ROSUVASTATIN CALCIUM 10 MG: 10 TABLET, COATED ORAL at 22:09

## 2021-05-20 RX ADMIN — INSULIN GLARGINE 50 UNITS: 100 INJECTION, SOLUTION SUBCUTANEOUS at 21:59

## 2021-05-20 RX ADMIN — Medication 10 ML: at 05:01

## 2021-05-20 RX ADMIN — CARVEDILOL 12.5 MG: 12.5 TABLET, FILM COATED ORAL at 09:03

## 2021-05-20 RX ADMIN — SODIUM CHLORIDE, SODIUM ACETATE ANHYDROUS, SODIUM GLUCONATE, POTASSIUM CHLORIDE, AND MAGNESIUM CHLORIDE: 526; 222; 502; 37; 30 INJECTION, SOLUTION INTRAVENOUS at 15:32

## 2021-05-20 NOTE — PROGRESS NOTES
Call received from Suzy Merlin with [facility] [id number], [address]  notifying me that they are NOT able to accept the pt for IV infusions. [facility] is still reviewing.           Toni Clayton, MSN, RN, ACM-RN     [phone]- pager  [phone]- main office

## 2021-05-20 NOTE — PROGRESS NOTES
[employer] website updated for wound vac. Once MD signs wound vac script will fax clinicals to [facility] for approval of wound vac.  MARYA Billings, [facility]- [phone]

## 2021-05-20 NOTE — PROGRESS NOTES
Received pt resting in bed. She is alert and oriented x 4 and in pleasant spirits. Blood transfusion has completed. No complaints voiced. Dressing dry and intact to left foot. Will continue to monitor.     9220 Bedside report given to Isabella Jung

## 2021-05-20 NOTE — PROGRESS NOTES
Pt not seen for skilled PT due to:    []  Nausea/vomiting  []  Eating  []  Pain  []  Pt lethargic  []  Off Unit  Other: Pt received in NAD, LLE elevated and undressed, with L foot wound exposed, undergoing ongoing wound care at this time. Wound care nursing states she will be unable to get up for a little while. Will f/u later as schedule allows. Thank you.   Narinder Lawton, PT, DPT

## 2021-05-20 NOTE — PROGRESS NOTES
Infectious Disease progress Note        Reason: Diabetic left foot infection    Current abx Prior abx   Ceftriaxone  since 5/17 , metronidazole, vancomycin 5/17-5/19     Lines:       Assessment :    40 y.o.  female  With PMH of HTN, uncontrolled type 2 DM-last hemoglobin A1c 13.6 on 5/17/2021, peripheral Neuropathy came tot he ER on 5/17/21 for left foot infection    Hospitalization January 2019 for left second toe, left foot cellulitis    Hospitalization April 2021 for left foot infection, bone biopsy negative for osteomyelitis  Wound cultures group B streptococcus    Clinical presentation consistent with sepsis -present on admission due to group B bloodstream infection  (positive blood culture 5/17, negative blood culture 5/19 ), left foot diabetic ulcer with cellulitis/abscess, possible osteomyelitis of first metatarsal head with septic 1st MTPJ. Intra-Op cultures 5/18-group B streptococcus    Most likely source of group B bloodstream infection is left foot infection      Looking at the h/o uncontrolled DM, close proximity of infection to bone; high risk of future complications; will recommend aggressive abx treatment.      Recommendations:     1. Continue ceftriaxone-increase dose to 2 g IV every 24 hours. 2.  F/u Repeat blood culture 5/19  3. place PICC line in am if repeat blood cultures remain negative   4. follow-up podiatry recommendations regarding left foot wound care   5. Patient was advised regarding importance of better glycemic control to aid wound healing, avoid future infectious complication. Risk of limb loss with persistent high blood sugars discussed with patient  7. Recommend intravenous ceftriaxone till 6/30/2021 if blood cultures 5/19 remain negative in a.m. Home health orders on chart (click on \"chart review, other orders, IP home health)      Above plan was discussed in details with patient, , Dr. Noni Tavarez.   All questions answered to their full satisfaction. Spent additional 35 minutes in management and evaluation of this patient. >50% time spent in counselling and coordination of care. Please call me if any further questions or concerns. Will continue to participate in the care of this patient. HPI:    Patient denies any subjective fever or chills at this time. She complains of pain in her left leg. She denies any chest pain, shortness of breath, abdominal pain, diarrhea. Current Discharge Medication List      CONTINUE these medications which have NOT CHANGED    Details   insulin lispro (HUMALOG) 100 unit/mL injection Take 10 units three times a day with meals  Qty: 1 Vial, Refills: 11    Associated Diagnoses: Type 2 diabetes mellitus with complication (HCC)      metFORMIN ER (GLUCOPHAGE XR) 500 mg tablet Take 1 Tab by mouth daily (with dinner). Qty: 90 Tab, Refills: 3    Associated Diagnoses: Type 2 diabetes mellitus with complication ([facility])      rosuvastatin (CRESTOR) 10 mg tablet Take 1 Tab by mouth nightly. Qty: 90 Tab, Refills: 3    Associated Diagnoses: NSTEMI (non-ST elevated myocardial infarction) ([facility])      carvediloL (Coreg) 12.5 mg tablet Take 1 Tab by mouth two (2) times daily (with meals). Indications: myocardial reinfarction prevention  Qty: 60 Tab, Refills: 3    Associated Diagnoses: NSTEMI (non-ST elevated myocardial infarction) ([facility] Essential hypertension      spironolactone (ALDACTONE) 25 mg tablet Take 25 mg by mouth daily. ferrous sulfate 325 mg (65 mg iron) tablet Take 1 Tab by mouth daily (with breakfast). Qty: 30 Tab, Refills: 0      furosemide (LASIX) 20 mg tablet Take 1 Tab by mouth daily as needed (Edema). Qty: 30 Tab, Refills: 1    Associated Diagnoses: Acute on chronic diastolic congestive heart failure (HCC)      losartan (COZAAR) 100 mg tablet Take 1 Tab by mouth daily.   Qty: 90 Tab, Refills: 3    Associated Diagnoses: Essential hypertension      ticagrelor (BRILINTA) 90 mg tablet Take 1 Tab by mouth every twelve (12) hours every twelve (12) hours. Qty: 60 Tab, Refills: 5      insulin glargine (LANTUS,BASAGLAR) 100 unit/mL (3 mL) inpn Take 45 units daily  Indications: type 2 diabetes mellitus  Qty: 4 Pen, Refills: 5    Associated Diagnoses: Type 2 diabetes mellitus with complication (HCC)      aspirin 81 mg chewable tablet Take 1 Tab by mouth daily. Qty: 30 Tab, Refills: 0      melatonin 5 mg cap capsule Take 1 Cap by mouth nightly.   Qty: 30 Cap, Refills: 1    Associated Diagnoses: Difficulty sleeping      Insulin Needles, Disposable, 31 gauge x 5/16\" ndle Use to check blood glucose BID  Qty: 100 Pen Needle, Refills: 11    Associated Diagnoses: Type 2 diabetes mellitus with complication (HCC)      Insulin Syringe-Needle U-100 (INSULIN SYRINGE) 1 mL 30 gauge x 5/16 syrg As directed for lantus insulin  Qty: 50 Syringe, Refills: 0             Current Facility-Administered Medications   Medication Dose Route Frequency    potassium chloride (K-DUR, KLOR-CON) SR tablet 40 mEq  40 mEq Oral DAILY    cefTRIAXone (ROCEPHIN) 2 g in sterile water (preservative free) 20 mL IV syringe  2 g IntraVENous Q24H    electrolyte-r (NORMOSOL R) infusion   IntraVENous CONTINUOUS    insulin lispro (HUMALOG) injection   SubCUTAneous AC&HS    glucose chewable tablet 16 g  16 g Oral PRN    glucagon (GLUCAGEN) injection 1 mg  1 mg IntraMUSCular PRN    dextrose (D50W) injection syrg 12.5-25 g  25-50 mL IntraVENous PRN    0.9% sodium chloride infusion 250 mL  250 mL IntraVENous PRN    sodium chloride (NS) flush 5-10 mL  5-10 mL IntraVENous PRN    dextrose (D50W) injection syrg 12.5-25 g  25-50 mL IntraVENous PRN    aspirin chewable tablet 81 mg  81 mg Oral DAILY    carvediloL (COREG) tablet 12.5 mg  12.5 mg Oral BID WITH MEALS    rosuvastatin (CRESTOR) tablet 10 mg  10 mg Oral QHS    sodium chloride (NS) flush 5-40 mL  5-40 mL IntraVENous Q8H    sodium chloride (NS) flush 5-40 mL  5-40 mL IntraVENous PRN    acetaminophen (TYLENOL) tablet 650 mg  650 mg Oral Q6H PRN    Or    acetaminophen (TYLENOL) suppository 650 mg  650 mg Rectal Q6H PRN    polyethylene glycol (MIRALAX) packet 17 g  17 g Oral DAILY PRN    promethazine (PHENERGAN) tablet 12.5 mg  12.5 mg Oral Q6H PRN    Or    ondansetron (ZOFRAN) injection 4 mg  4 mg IntraVENous Q6H PRN    famotidine (PEPCID) tablet 20 mg  20 mg Oral QPM    [Held by provider] heparin (porcine) injection 5,000 Units  5,000 Units SubCUTAneous Q8H    insulin glargine (LANTUS) injection 50 Units  50 Units SubCUTAneous QHS       Allergies: Azithromycin and Pcn [penicillins]    Temp (24hrs), Av.8 °F (37.1 °C), Min:97.5 °F (36.4 °C), Max:100 °F (37.8 °C)    Visit Vitals  BP (!) 160/95 (BP 1 Location: Right arm, BP Patient Position: At rest)   Pulse 96   Temp 98.9 °F (37.2 °C)   Resp 16   Ht 5' 7\" (1.702 m)   Wt 89.4 kg (197 lb)   SpO2 99%   Breastfeeding No   BMI 30.85 kg/m²       ROS: 12 point ROS obtained in details. Pertinent positives as mentioned in HPI,   otherwise negative    Physical Exam:    Constitutional: She is oriented to person, place, and time. She appears well-developed and well-nourished. No distress. HENT:   Head: Normocephalic and atraumatic. Mouth/Throat: Oropharynx is clear and moist.   Eyes: Conjunctivae are normal. Pupils are equal, round, and reactive to light. No scleral icterus. Neck: Normal range of motion. Neck supple. Cardiovascular: Normal rate and intact distal pulses. Pulmonary/Chest: Effort normal and breath sounds normal. No respiratory distress. She has no wheezes. Abdominal: Soft. Bowel sounds are normal. She exhibits no distension. There is no tenderness. Musculoskeletal: Normal range of motion. Lymphadenopathy:     She has no cervical adenopathy. Neurological: She is alert and oriented to person, place, and time. No cranial nerve deficit.   Decreased sensation of 2nd toe of left foot, likely due to neuropathy.   Skin: Skin is warm.   Left foot surgical dressing not opened-dried blood noted on the dressing       nursing note and vitals reviewed. Labs: Results:   Chemistry Recent Labs     05/20/21  0009 05/19/21  0400 05/18/21  0355 05/17/21  1709   GLU 98 70* 318* 616*    142 135* 127*   K 3.0* 3.1* 3.5 3.3*    110 102 93*   CO2 25 22 24 26   BUN 12 16 18 18   CREA 1.35* 1.61* 1.78* 2.13*   CA 7.8* 7.0* 7.3* 8.3*   AGAP 6 10 9 8   BUCR 9* 10* 10* 8*   AP  --   --   --  326*   TP  --   --   --  7.4   ALB  --   --   --  1.2*   GLOB  --   --   --  6.2*   AGRAT  --   --   --  0.2*      CBC w/Diff Recent Labs     05/20/21  0009 05/19/21  0400 05/18/21  0355 05/17/21  1709   WBC 12.0 13.1 12.1 13.6*   RBC 3.29* 3.07* 3.13* 3.73*   HGB 7.6* 6.9* 6.8* 8.2*   HCT 24.0* 22.7* 21.9* 26.1*    262 240 286   GRANS  --   --   --  79*   LYMPH  --   --   --  12*   EOS  --   --   --  0      Microbiology Recent Labs     05/19/21  1207 05/18/21  1727 05/18/21  1707 05/18/21  1704 05/17/21  2140   CULT NO GROWTH AFTER 17 HOURS LIGHT STREPTOCOCCI, BETA HEMOLYTIC GROUP B SENSITIVITY TO FOLLOW* LIGHT STREPTOCOCCI, BETA HEMOLYTIC GROUP B SENSITIVITY TO FOLLOW* LIGHT STREPTOCOCCI, BETA HEMOLYTIC GROUP B SENSITIVITY TO FOLLOW* MIXED UROGENITAL EDUIN ISOLATED          RADIOLOGY:    All available imaging studies/reports in [facility] care for this admission were reviewed  High complexity decision making was performed during the evaluation of this patient at high risk for decompensation          Disclaimer: Sections of this note are dictated utilizing voice recognition software, which may have resulted in some phonetic based errors in grammar and contents. Even though attempts were made to correct all the mistakes, some may have been missed, and remained in the body of the document. If questions arise, please contact our department.     Dr. Meggan Pickens, Infectious Disease Specialist  [phone]  May 20, 2021  9:43 AM

## 2021-05-20 NOTE — PROGRESS NOTES
Problem: Diabetes Self-Management  Goal: *Disease process and treatment process  Description: Define diabetes and identify own type of diabetes; list 3 options for treating diabetes. Outcome: Progressing Towards Goal  Goal: *Incorporating nutritional management into lifestyle  Description: Describe effect of type, amount and timing of food on blood glucose; list 3 methods for planning meals. Outcome: Progressing Towards Goal     Problem: Patient Education: Go to Patient Education Activity  Goal: Patient/Family Education  Outcome: Progressing Towards Goal     Problem: Falls - Risk of  Goal: *Absence of Falls  Description: Document Briana Franklin Fall Risk and appropriate interventions in the flowsheet.   Outcome: Progressing Towards Goal  Note: Fall Risk Interventions:  Mobility Interventions: Patient to call before getting OOB         Medication Interventions: Patient to call before getting OOB    Elimination Interventions: Call light in reach              Problem: Patient Education: Go to Patient Education Activity  Goal: Patient/Family Education  Outcome: Progressing Towards Goal

## 2021-05-20 NOTE — PROGRESS NOTES
Progress Note      56-year-old female with past medical history of diabetes, hypertension, peripheral vascular disease, admitted for foot infection, following for renal failure  Subjective    Overnight event noted  She had surgery yesterday , s/p I and D and first metatarsophalangeal joint resection   Febrile   Documented urine output about 500cc      IMPRESSION:   Acute on chronic kidney injury, risk factor septic FRANCISCA, nonoliguric, other prerenal risk factor including diuretics, uncontrolled blood suger (baseline creatinine  At 1mg/dl)   Chronic kidney disease stage III with heavy proteinuria, diabetic and hypertensive nephropathy, baseline creatinine around 1 mg per DL  Sepsis, source left foot diabetic ulcer  Diabetes, uncontrolled  Protein calorie malnutrition   PLAN:   Renal function continue to improve, continue iv hydration. Ordered K supplement  Will be available if any question or concern.    Adjust medication per current renal function status   Discussed with Dr. Clara Lauren                 Facility-Administered Medications: None       Current Facility-Administered Medications   Medication Dose Route Frequency    potassium chloride (K-DUR, KLOR-CON) SR tablet 40 mEq  40 mEq Oral DAILY    cefTRIAXone (ROCEPHIN) 2 g in sterile water (preservative free) 20 mL IV syringe  2 g IntraVENous Q24H    electrolyte-r (NORMOSOL R) infusion   IntraVENous CONTINUOUS    insulin lispro (HUMALOG) injection   SubCUTAneous AC&HS    glucose chewable tablet 16 g  16 g Oral PRN    glucagon (GLUCAGEN) injection 1 mg  1 mg IntraMUSCular PRN    dextrose (D50W) injection syrg 12.5-25 g  25-50 mL IntraVENous PRN    0.9% sodium chloride infusion 250 mL  250 mL IntraVENous PRN    sodium chloride (NS) flush 5-10 mL  5-10 mL IntraVENous PRN    dextrose (D50W) injection syrg 12.5-25 g  25-50 mL IntraVENous PRN    aspirin chewable tablet 81 mg  81 mg Oral DAILY    carvediloL (COREG) tablet 12.5 mg  12.5 mg Oral BID WITH MEALS    rosuvastatin (CRESTOR) tablet 10 mg  10 mg Oral QHS    sodium chloride (NS) flush 5-40 mL  5-40 mL IntraVENous Q8H    sodium chloride (NS) flush 5-40 mL  5-40 mL IntraVENous PRN    acetaminophen (TYLENOL) tablet 650 mg  650 mg Oral Q6H PRN    Or    acetaminophen (TYLENOL) suppository 650 mg  650 mg Rectal Q6H PRN    polyethylene glycol (MIRALAX) packet 17 g  17 g Oral DAILY PRN    promethazine (PHENERGAN) tablet 12.5 mg  12.5 mg Oral Q6H PRN    Or    ondansetron (ZOFRAN) injection 4 mg  4 mg IntraVENous Q6H PRN    famotidine (PEPCID) tablet 20 mg  20 mg Oral QPM    [Held by provider] heparin (porcine) injection 5,000 Units  5,000 Units SubCUTAneous Q8H    insulin glargine (LANTUS) injection 50 Units  50 Units SubCUTAneous QHS       Review of Systems:     Complete 10-point review of systems were obtained and discussed in length  with the patient. Complete review of systems was negative/unremarkable  except as mentioned in HPI section. Data Review:    Labs: Results:       Chemistry Recent Labs     05/20/21 0009 05/19/21 0400 05/18/21 0355 05/17/21  1709   GLU 98 70* 318* 616*    142 135* 127*   K 3.0* 3.1* 3.5 3.3*    110 102 93*   CO2 25 22 24 26   BUN 12 16 18 18   CREA 1.35* 1.61* 1.78* 2.13*   CA 7.8* 7.0* 7.3* 8.3*   AGAP 6 10 9 8   BUCR 9* 10* 10* 8*   AP  --   --   --  326*   TP  --   --   --  7.4   ALB  --   --   --  1.2*   GLOB  --   --   --  6.2*   AGRAT  --   --   --  0.2*      CBC w/Diff Recent Labs     05/20/21 0009 05/19/21 0400 05/18/21 0355 05/17/21  1709   WBC 12.0 13.1 12.1 13.6*   RBC 3.29* 3.07* 3.13* 3.73*   HGB 7.6* 6.9* 6.8* 8.2*   HCT 24.0* 22.7* 21.9* 26.1*    262 240 286   GRANS  --   --   --  79*   LYMPH  --   --   --  12*   EOS  --   --   --  0      Coagulation No results for input(s): PTP, INR, APTT, INREXT, INREXT in the last 72 hours. Iron/Ferritin Recent Labs     05/18/21  0335   IRON 15*      BNP No results for input(s): BNPP in the last 72 hours. Cardiac Enzymes No results for input(s): CPK, CKND1, ROBERTA in the last 72 hours.     No lab exists for component: CKRMB, TROIP   Liver Enzymes Recent Labs     05/17/21  1709   TP 7.4   ALB 1.2*   *      Thyroid Studies Lab Results   Component Value Date/Time    TSH 1.12 05/17/2021 05:09 PM         EKG:sinus     Physical Assessment:     Visit Vitals  BP (!) 168/100 (BP 1 Location: Right upper arm, BP Patient Position: At rest)   Pulse 89   Temp 97 °F (36.1 °C)   Resp 16   Ht 5' 7\" (1.702 m)   Wt 89.4 kg (197 lb)   SpO2 100%   Breastfeeding No   BMI 30.85 kg/m²     Weight change:     Intake/Output Summary (Last 24 hours) at 5/20/2021 1230  Last data filed at 5/20/2021 1138  Gross per 24 hour   Intake 2221.67 ml   Output 900 ml   Net 1321.67 ml     Physical Exam:   General: comfortable, no acute distress   HEENT sclera anicteric, supple neck, no thyromegaly  CVS: S1S2 heard,  no rub  RS: + air entry b/l,   Abd: Soft, Non tender, Not distended, Positive bowel sounds, no organomegaly, no CVA / supra pubic tenderness  Neuro: non focal, awake, alert , CN II-XII are grossly intact  Extrm: left foot under dressing   Skin: no visible  Rash      Procedures/imaging: see electronic medical records for all procedures, Xrays and details which were not copied into this note but were reviewed prior to creation of Plan  Discussed with Dr. Agusto Awan MD  May 20, 2021  [facility]  Office [phone]

## 2021-05-20 NOTE — PROGRESS NOTES
Bedside and Verbal shift change report given to JACOB Powers (oncoming nurse) by Mihaela Oates RN (offgoing nurse). Report included the following information SBAR and Kardex.

## 2021-05-20 NOTE — PROGRESS NOTES
Notified Dr. Adelita Oquendo of patient's refusal of lab draws. Patient stated, \" I do not want them fishing for my veins and poking me over and over again. Patient would like an experienced phlebotomist to draw her labs. Also made Dr. Adelita Oquendo aware of the drainage on patient's dressing and small amount on the floor while helping the patient transfer from the [facility] to bed. Order given to continue to monitor and if drainage increases to inform her.

## 2021-05-20 NOTE — WOUND CARE
Physical Exam  Musculoskeletal:        Feet:      Room 514: Focused wound assess  Left medial foot, hallux, diabetic foot ulcer, 2.5x3.5x0.3cm, vancomycin beads visible on wound bed, rest of wound bed is pink & red, no odor, periwound callous, POA. Macerated areas between all toes left foot, applied silver hydrofiber rope, weaved between all web spaces left foot, with Dr. Brett Arellano permission via phone conversation. Dr. Denver Purple agreeable, via phone conversation, to regular wound vac with single layer adaptic instead of irrigation vac due to antibiotic beads in place. Wound Care Orders for Hospital: Wound vac dressing changes by unit staff nurses every Monday, Wednesday, & Friday on day shift. Use single layer adaptic on wound bed over Vancomycin beads. Measure wound size & document with each dressing change. Settings: 125mmHG low continuous suction. Upon discharge from [facility], place hospital machine in dirty utility room under white laminated sign. May apply daily saline dressing if home wound vac unavailable. Wound Care Orders for Home Health Care: Wound vac dressing changes three times per week, settings 125mmHG continuous suction. Use single layer adaptic over Vancomycin beads. Wound Care Orders web spaces between toes left foot hospital & home health care: Unit nursing staff to cleanse wound with MicroKlenz antimicrobial wound spray from par room, apply [employer] gelling fiber dressing, rope/ribbon, cover with 4x4 gauze, change with each vac dressing change. Notified Elva Stallings CM of need for home wound vac. Wound measurements placed into [facility] express. Will turn over care to nursing staff at this time.   Tianna ESTEVEZN, RN, [facility], [address]

## 2021-05-20 NOTE — PROGRESS NOTES
Pt discussed with Dr. Davion Nicole. Devon Coker and notified about pt's need for home IV antibx. Pt also discussed with enterostomy nurse and informed of need for wound vac. Pt referred in [facility] for home health. Pt declined by [facility] for skilled nursing. Pt posted out to [employer] and [address] [place] [address] Infusion for consideration- pending a decision. Pt [facility] [employer] [facility] with the earliest start of care date being Tuesday/ Wednesday per Makenna Orozco in intake. Case management will follow up on an accepting IV infusion agency.            Bonita Wyatt, MSN, RN, ACM-RN     [phone]- pager  [phone]- main office

## 2021-05-20 NOTE — PROGRESS NOTES
Progress Note    Patient: Peterson Khan MRN: [id number]  SSN: [id number]    YOB: 1975  Age: 40 y.o. Sex: female      Admit Date: 5/17/2021  1 Day Post-Op     Procedure:   Procedure(s):  INCISION AND DRAINAGE LEFT FOOT/ resection of FIRST METATARSOPHALANGEAL JOINT,removal of sesmoids, antibiotic bead placement and all indicated procedures    Subjective:     Patient seen resting quiet and comfortably and no apparent distress. Patient states that she feels much better today. She noted to be anemic and blood is being transfused while I was in room. Patient noted to have strikethrough on dressings. Patient also noted to have ulcer on medial left fifth toe which has been present since her admission. Denies N/V/C/F. Status post: osteomyelitis    Objective:     Visit Vitals  /81   Pulse 94   Temp 98.7 °F (37.1 °C)   Resp 16   Ht 5' 7\" (1.702 m)   Wt 89.4 kg (197 lb)   SpO2 100%   Breastfeeding No   BMI 30.85 kg/m²        Physical Exam:  Left ELADIO: palpable pedal pulses, CFT less than 3 seconds to distal digits, 1st MTPJ resection site noted to be open with clotted blood, antibiotic beads in place, no active bleeding noted, about 2.5 cm central area on incision open. No active drainage noted. Swelling and warmness to left foot subsidizing. Ulcer on left fifth toe with macerated web space likely from rubbing against fourth toe.      Labs/Radiology Review: images and reports reviewed    Assessment:     Hospital Problems  Date Reviewed: 5/18/2021        Codes Class Noted POA    Osteomyelitis ([facility] ICD-10-CM: M86.9  ICD-9-CM: 730.20  5/17/2021 Unknown        Diabetic foot ulcer ([facility] ICD-10-CM: E11.621, L97.509  ICD-9-CM: 250.80, 707.15  5/17/2021 Unknown        FRANCISCA (acute kidney injury) ([facility] ICD-10-CM: N17.9  ICD-9-CM: 584.9  5/17/2021 Unknown        Sepsis ([facility] ICD-10-CM: A41.9  ICD-9-CM: 038.9, 995.91  5/17/2021 Unknown        Hyponatremia ICD-10-CM: E87.1  ICD-9-CM: 276.1  5/17/2021 Unknown Plan/Recommendations/Medical Decision Making:     - Dressings changed to left foot with betadine application to margins and adaptic on surgical site with 4 x 4, adaptic, dry gauze. - Betadine applied to left fifth toe and gauze placed in fourth web space. - Abx management per ID recommendation. F/u OR micro/path. - Remain NWB to left forefoot.   - Ordered wound vac therapy. Rec veraflow wound vac therapy. - Medical management per hospitalist service.

## 2021-05-20 NOTE — PROGRESS NOTES
End of Shift Note     Bedside and verbal shift change report given to Kar Gallo (On coming nurse) by Kash Ramirez (Off going nurse).   Report included the following information:      --Procedure Summary     --MAR,     --Recent Results     --Med Rec Status

## 2021-05-20 NOTE — PROGRESS NOTES
[facility]  [facility]    Patient: Tian Vieyra Age: 40 y.o. : 1975 MR#: [id number] SSN: [id number]  Date: 2021     Subjective:   Alert and oriented x4. Minimal pain right foot. Wound vac to right foot. Patient is aware of PICC line needed for long term antibiotic. Assessment/Plan:   1. GPC blood cultures  2. Left foot diabetic ulcer with infection/abscess with septic first MTPJ OM of first metatarsal head s/p  I/D left foot, resection of first metatarsophalangeal joint, removal of sesmoids, antibiotic bead placement  3. Acute blood loss anemia s/p two units PRBC  4. Sepsis POA. Fever   5. Leukocytosis. Resolved. 6. Hyponatremia. Resolved    7. DMT2 with hyperglycemia   8. FRANCISCA on CKD III  9. Hypoalbuminemia   10. Microcytic anemia   11. HTN  12. CAD  15. HLD  14. Morbid obesity    Plan  1. PICC line order placed  2. ID consult. Continue ceftriaxone 2 gm daily, metronidazole, vanco. Follow intra-op cultures and modify abx therapy as needed. Intra op cultures group B streptococcus   3. Monitor HH and transfuse per protocol. Hemoglobin today 7.6  4. Wound vac placement today. NWB to left forefoot  5. Monitor metabolic panel and renal function  6. Nephrology consult. Hold metformin and ARB  7. POC glucose, SSI. Diabetes management consult  8. Patient to update family.  Patient alert and oriented x4   Additional Notes:      Case discussed with:  [x]Patient  [x]Family  [x]Nursing  []Case Management  DVT Prophylaxis:  []Lovenox  []Hep SQ  [x]SCDs  []Coumadin   []On Heparin gtt    Objective:   VS:   Visit Vitals  BP (!) 160/95 (BP 1 Location: Right arm, BP Patient Position: At rest)   Pulse 96   Temp 98.9 °F (37.2 °C)   Resp 16   Ht 5' 7\" (1.702 m)   Wt 89.4 kg (197 lb)   SpO2 99%   Breastfeeding No   BMI 30.85 kg/m²      Tmax/24hrs: Temp (24hrs), Av.9 °F (37.2 °C), Min:97.9 °F (36.6 °C), Max:100 °F (37.8 °C)      Intake/Output Summary (Last 24 hours) at 5/20/2021 1158  Last data filed at 5/20/2021 1138  Gross per 24 hour   Intake 2221.67 ml   Output 900 ml   Net 1321.67 ml       General:  Alert, NAD  Cardiovascular:  RRR  Pulmonary:  LSC throughout; respiratory effort WNL  GI:  +BS in all four quadrants, soft, non-tender  Extremities:  No edema; 2+ dorsalis pedis pulses bilaterally  Neuro: alert and oriented x4  Left foot wound vac  Left foot pedal edema, digit edema         Labs:    Recent Results (from the past 24 hour(s))   CULTURE, BLOOD    Collection Time: 05/19/21 12:07 PM    Specimen: Blood   Result Value Ref Range    Special Requests: NO SPECIAL REQUESTS      Culture result: NO GROWTH AFTER 17 HOURS     GLUCOSE, POC    Collection Time: 05/19/21  1:05 PM   Result Value Ref Range    Glucose (POC) 224 (H) 70 - 110 mg/dL   RBC, ALLOCATE    Collection Time: 05/19/21  2:15 PM   Result Value Ref Range    HISTORY CHECKED?  Historical check performed    GLUCOSE, POC    Collection Time: 05/19/21  3:58 PM   Result Value Ref Range    Glucose (POC) 154 (H) 70 - 110 mg/dL   GLUCOSE, POC    Collection Time: 05/19/21  9:26 PM   Result Value Ref Range    Glucose (POC) 93 70 - 110 mg/dL   MAGNESIUM    Collection Time: 05/20/21 12:09 AM   Result Value Ref Range    Magnesium 1.8 1.6 - 2.6 mg/dL   METABOLIC PANEL, BASIC    Collection Time: 05/20/21 12:09 AM   Result Value Ref Range    Sodium 141 136 - 145 mmol/L    Potassium 3.0 (L) 3.5 - 5.5 mmol/L    Chloride 110 100 - 111 mmol/L    CO2 25 21 - 32 mmol/L    Anion gap 6 3.0 - 18 mmol/L    Glucose 98 74 - 99 mg/dL    BUN 12 7.0 - 18 MG/DL    Creatinine 1.35 (H) 0.6 - 1.3 MG/DL    BUN/Creatinine ratio 9 (L) 12 - 20      GFR est AA 51 (L) >60 ml/min/1.73m2    GFR est non-AA 42 (L) >60 ml/min/1.73m2    Calcium 7.8 (L) 8.5 - 10.1 MG/DL   CBC W/O DIFF    Collection Time: 05/20/21 12:09 AM   Result Value Ref Range    WBC 12.0 4.6 - 13.2 K/uL    RBC 3.29 (L) 4.20 - 5.30 M/uL    HGB 7.6 (L) 12.0 - 16.0 g/dL    HCT 24.0 (L) 35.0 - 45.0 %    MCV 72.9 (L) 74.0 - 97.0 FL    MCH 23.1 (L) 24.0 - 34.0 PG    MCHC 31.7 31.0 - 37.0 g/dL    RDW 16.8 (H) 11.6 - 14.5 %    PLATELET 640 788 - 352 K/uL    MPV 9.8 9.2 - 11.8 FL   GLUCOSE, POC    Collection Time: 05/20/21  6:25 AM   Result Value Ref Range    Glucose (POC) 130 (H) 70 - 110 mg/dL       Signed By: Donny Alaniz NP     May 20, 2021

## 2021-05-21 ENCOUNTER — HOSPITAL ENCOUNTER (INPATIENT)
Dept: INTERVENTIONAL RADIOLOGY/VASCULAR | Age: 46
Discharge: HOME | DRG: 710 | End: 2021-05-21
Attending: NURSE PRACTITIONER
Payer: MEDICAID

## 2021-05-21 VITALS
SYSTOLIC BLOOD PRESSURE: 145 MMHG | RESPIRATION RATE: 16 BRPM | DIASTOLIC BLOOD PRESSURE: 88 MMHG | BODY MASS INDEX: 34.21 KG/M2 | TEMPERATURE: 97.5 F | WEIGHT: 218 LBS | HEIGHT: 67 IN | HEART RATE: 96 BPM | OXYGEN SATURATION: 99 %

## 2021-05-21 LAB
ANION GAP SERPL CALC-SCNC: 8 MMOL/L (ref 3–18)
BACTERIA SPEC CULT: ABNORMAL
BUN SERPL-MCNC: 8 MG/DL (ref 7–18)
BUN/CREAT SERPL: 6 (ref 12–20)
CALCIUM SERPL-MCNC: 8.2 MG/DL (ref 8.5–10.1)
CHLORIDE SERPL-SCNC: 109 MMOL/L (ref 100–111)
CO2 SERPL-SCNC: 25 MMOL/L (ref 21–32)
CREAT SERPL-MCNC: 1.24 MG/DL (ref 0.6–1.3)
ERYTHROCYTE [DISTWIDTH] IN BLOOD BY AUTOMATED COUNT: 16.9 % (ref 11.6–14.5)
GLUCOSE BLD STRIP.AUTO-MCNC: 108 MG/DL (ref 70–110)
GLUCOSE BLD STRIP.AUTO-MCNC: 146 MG/DL (ref 70–110)
GLUCOSE BLD STRIP.AUTO-MCNC: 260 MG/DL (ref 70–110)
GLUCOSE SERPL-MCNC: 59 MG/DL (ref 74–99)
GRAM STN SPEC: ABNORMAL
HCT VFR BLD AUTO: 25.7 % (ref 35–45)
HGB BLD-MCNC: 8 G/DL (ref 12–16)
MCH RBC QN AUTO: 22.9 PG (ref 24–34)
MCHC RBC AUTO-ENTMCNC: 31.1 G/DL (ref 31–37)
MCV RBC AUTO: 73.4 FL (ref 74–97)
PLATELET # BLD AUTO: 320 K/UL (ref 135–420)
PMV BLD AUTO: 9.6 FL (ref 9.2–11.8)
POTASSIUM SERPL-SCNC: 3 MMOL/L (ref 3.5–5.5)
RBC # BLD AUTO: 3.5 M/UL (ref 4.2–5.3)
SERVICE CMNT-IMP: ABNORMAL
SODIUM SERPL-SCNC: 142 MMOL/L (ref 136–145)
WBC # BLD AUTO: 9.8 K/UL (ref 4.6–13.2)

## 2021-05-21 PROCEDURE — 2709999900 HC NON-CHARGEABLE SUPPLY

## 2021-05-21 PROCEDURE — 02HV33Z INSERTION OF INFUSION DEVICE INTO SUPERIOR VENA CAVA, PERCUTANEOUS APPROACH: ICD-10-PCS | Performed by: RADIOLOGY

## 2021-05-21 PROCEDURE — 80048 BASIC METABOLIC PNL TOTAL CA: CPT

## 2021-05-21 PROCEDURE — 74011250636 HC RX REV CODE- 250/636: Performed by: NURSE PRACTITIONER

## 2021-05-21 PROCEDURE — 99239 HOSP IP/OBS DSCHRG MGMT >30: CPT | Performed by: INTERNAL MEDICINE

## 2021-05-21 PROCEDURE — 74011000250 HC RX REV CODE- 250: Performed by: INTERNAL MEDICINE

## 2021-05-21 PROCEDURE — 74011636637 HC RX REV CODE- 636/637: Performed by: INTERNAL MEDICINE

## 2021-05-21 PROCEDURE — 77001 FLUOROGUIDE FOR VEIN DEVICE: CPT

## 2021-05-21 PROCEDURE — 77030038269 HC DRN EXT URIN PURWCK BARD -A

## 2021-05-21 PROCEDURE — APPSS30 APP SPLIT SHARED TIME 16-30 MINUTES: Performed by: NURSE PRACTITIONER

## 2021-05-21 PROCEDURE — 74011250637 HC RX REV CODE- 250/637: Performed by: INTERNAL MEDICINE

## 2021-05-21 PROCEDURE — 36415 COLL VENOUS BLD VENIPUNCTURE: CPT

## 2021-05-21 PROCEDURE — 82962 GLUCOSE BLOOD TEST: CPT

## 2021-05-21 PROCEDURE — 74011250636 HC RX REV CODE- 250/636: Performed by: INTERNAL MEDICINE

## 2021-05-21 PROCEDURE — 85027 COMPLETE CBC AUTOMATED: CPT

## 2021-05-21 PROCEDURE — 74011250637 HC RX REV CODE- 250/637: Performed by: NURSE PRACTITIONER

## 2021-05-21 RX ORDER — AMLODIPINE BESYLATE 10 MG/1
10 TABLET ORAL DAILY
Status: DISCONTINUED | OUTPATIENT
Start: 2021-05-21 | End: 2021-05-21 | Stop reason: HOSPADM

## 2021-05-21 RX ORDER — OXYCODONE AND ACETAMINOPHEN 5; 325 MG/1; MG/1
1 TABLET ORAL
Qty: 12 TABLET | Refills: 0 | Status: SHIPPED | OUTPATIENT
Start: 2021-05-21 | End: 2021-05-24

## 2021-05-21 RX ORDER — OXYCODONE AND ACETAMINOPHEN 5; 325 MG/1; MG/1
1 TABLET ORAL
Status: DISCONTINUED | OUTPATIENT
Start: 2021-05-21 | End: 2021-05-21 | Stop reason: HOSPADM

## 2021-05-21 RX ORDER — POTASSIUM CHLORIDE 20 MEQ/1
40 TABLET, EXTENDED RELEASE ORAL 2 TIMES DAILY
Status: DISCONTINUED | OUTPATIENT
Start: 2021-05-21 | End: 2021-05-21 | Stop reason: HOSPADM

## 2021-05-21 RX ORDER — POTASSIUM CHLORIDE 7.45 MG/ML
10 INJECTION INTRAVENOUS
Status: COMPLETED | OUTPATIENT
Start: 2021-05-21 | End: 2021-05-21

## 2021-05-21 RX ADMIN — SODIUM CHLORIDE, SODIUM ACETATE ANHYDROUS, SODIUM GLUCONATE, POTASSIUM CHLORIDE, AND MAGNESIUM CHLORIDE: 526; 222; 502; 37; 30 INJECTION, SOLUTION INTRAVENOUS at 10:16

## 2021-05-21 RX ADMIN — POTASSIUM CHLORIDE 10 MEQ: 7.46 INJECTION, SOLUTION INTRAVENOUS at 12:01

## 2021-05-21 RX ADMIN — INSULIN LISPRO 9 UNITS: 100 INJECTION, SOLUTION INTRAVENOUS; SUBCUTANEOUS at 12:26

## 2021-05-21 RX ADMIN — FAMOTIDINE 20 MG: 20 TABLET, FILM COATED ORAL at 18:30

## 2021-05-21 RX ADMIN — ASPIRIN 81 MG CHEWABLE TABLET 81 MG: 81 TABLET CHEWABLE at 10:17

## 2021-05-21 RX ADMIN — CARVEDILOL 12.5 MG: 12.5 TABLET, FILM COATED ORAL at 18:30

## 2021-05-21 RX ADMIN — CEFTRIAXONE SODIUM 2 G: 2 INJECTION, POWDER, FOR SOLUTION INTRAMUSCULAR; INTRAVENOUS at 18:30

## 2021-05-21 RX ADMIN — AMLODIPINE BESYLATE 10 MG: 10 TABLET ORAL at 10:18

## 2021-05-21 RX ADMIN — CARVEDILOL 12.5 MG: 12.5 TABLET, FILM COATED ORAL at 10:18

## 2021-05-21 RX ADMIN — POTASSIUM CHLORIDE 40 MEQ: 1500 TABLET, EXTENDED RELEASE ORAL at 10:16

## 2021-05-21 RX ADMIN — POTASSIUM CHLORIDE 40 MEQ: 1500 TABLET, EXTENDED RELEASE ORAL at 18:31

## 2021-05-21 RX ADMIN — POTASSIUM CHLORIDE 10 MEQ: 7.46 INJECTION, SOLUTION INTRAVENOUS at 10:15

## 2021-05-21 NOTE — DISCHARGE INSTRUCTIONS
Patient Education        Diabetic Foot Ulcer: Care Instructions  Your Care Instructions  Diabetes can damage the nerve endings and blood vessels in your feet. That means you are less likely to notice when your feet are injured. A small skin problem like a callus, blister, or cracked skin can turn into a larger sore, called a foot ulcer. Foot ulcers form most often on the pad (ball) of the foot or the bottom of the big toe. You can also get them on the top and bottom of each toe. Foot ulcers can get infected. If the infection is severe, then tissue in the foot can die. This is called gangrene. In that case, one or more of the toes, part or all of the foot, and sometimes part of the leg may have to be removed (amputated). Your doctor may have removed the dead tissue and cleaned the ulcer. Your foot wound may be wrapped in a protective bandage. It is very important to keep your weight off your injured foot. After a foot ulcer has formed, it will not heal as long as you keep putting weight on the area. Always get early treatment for foot problems. A minor irritation can lead to a major problem if it's not taken care of soon. Follow-up care is a key part of your treatment and safety. Be sure to make and go to all appointments, and call your doctor if you are having problems. It's also a good idea to know your test results and keep a list of the medicines you take. How can you care for yourself at home? · Follow your doctor's instructions about keeping pressure off the foot ulcer. You may need to use crutches or a wheelchair. Or you may wear a cast or a walking boot. · Follow your doctor's instructions on how to clean the ulcer and change the bandage. · If your doctor prescribed antibiotics, take them as directed. Do not stop taking them just because you feel better. You need to take the full course of antibiotics.   To prevent foot ulcers  · Keep your blood sugar close to normal by watching what and how much you eat. Track your blood sugar, take medicines if prescribed, and get regular exercise. · Do not smoke. Smoking affects blood flow and can make foot problems worse. If you need help quitting, talk to your doctor about stop-smoking programs and medicines. These can increase your chances of quitting for good. · Do not go barefoot. Protect your feet by wearing shoes that fit well. Choose shoes that are made of materials that are flexible and breathable, such as leather or cloth. · Inspect your feet daily for blisters, cuts, cracks, or sores. If you can't see well, use a mirror or have someone help you. · Have your doctor check your feet during each visit. If you have a foot problem, see your doctor. Do not try to treat your foot problem on your own. Home remedies or treatments that you can buy without a prescription (such as corn removers) can be harmful. When should you call for help? Call your doctor now or seek immediate medical care if:    · You have symptoms of infection, such as:  ? Increased pain, swelling, warmth, or redness. ? Red streaks leading from the area. ? Pus draining from the area. ? A fever. Watch closely for changes in your health, and be sure to contact your doctor if:    · You have a new problem with your feet, such as:  ? A new sore or ulcer. ? A break in the skin that is not healing after several days. ? Bleeding corns or calluses. ? An ingrown toenail.     · You do not get better as expected. Where can you learn more? Go to http://www.gray.com/  Enter T131 in the search box to learn more about \"Diabetic Foot Ulcer: Care Instructions. \"  Current as of: August 31, 2020               Content Version: 12.8  © 0923-6300 [employer]. Care instructions adapted under license by [facility] (which disclaims liability or warranty for this information).  If you have questions about a medical condition or this instruction, always ask your healthcare professional. [address] any warranty or liability for your use of this information.

## 2021-05-21 NOTE — PROGRESS NOTES
Discharge order noted and reviewed. Pt is booked with [facility] but they are not able to do a start of care visit until tues or weds of next week. Pt and MD are aware. Denisha Zuñiga RN will be doing PICC line/IV infusion teaching prior to discharge today. Patients  is planning to be here for teaching as well. Vac machine delivered to patient room. Paperwork signed and faxed back to Jackie Segal at [facility] and then placed in patient chart. Patient given Chelsey's number (with her permission) to contact prior to home health involvement if patient has any issues with the vac. Pt verbalized understanding of discharge plan and is in agreement.   Zari Victoria RN - Outcomes Manager  [phone]

## 2021-05-21 NOTE — PROGRESS NOTES
Plan to discharge once PICC line is placed. [facility] is set up for wound changes, wound vac, IV antibiotic therapy. RN at bedside aware of discharge plan after PICC line placement.

## 2021-05-21 NOTE — DISCHARGE SUMMARY
[facility]  Discharge Summary       Patient: Belgica Keenan Age: 40 y.o. : 1975 MR#: [id number] SSN: [id number]  PCP on record: Apple Carmichael MD  Admit date: 2021  Discharge date: 2021    Disposition:    []Home   [x]Home with Home Health   []SNF/NH   []Rehab   []Home with family   []Alternate Facility:____________________    Discharge Diagnoses:                             GPC blood cultures   Repeat blood cultures NGTD  Abnormal urinalysis   Left foot diabetic ulcer infection I/D left foot resection of first metatarsophalangeal joint, removal of sesmoids, antibiotic bead placement     Admission diagnoses  Sepsis   Leukocytosis   Fever  Hyponatremia   Hyperglycemia   Uncontrolled hyperglycemia   Hypoalbuminemia   FRANCISCA on CKD III     Past medical history   HTN  CAD  HLD  Obesity   CKD       Discharge Medications:     Current Discharge Medication List      START taking these medications    Details   oxyCODONE-acetaminophen (PERCOCET) 5-325 mg per tablet Take 1 Tablet by mouth every six (6) hours as needed for Pain for up to 3 days. Max Daily Amount: 4 Tablets. Qty: 12 Tablet, Refills: 0    Associated Diagnoses: Diabetic ulcer of left midfoot associated with type 2 diabetes mellitus, unspecified ulcer stage ([facility]         CONTINUE these medications which have NOT CHANGED    Details   insulin lispro (HUMALOG) 100 unit/mL injection Take 10 units three times a day with meals  Qty: 1 Vial, Refills: 11    Associated Diagnoses: Type 2 diabetes mellitus with complication (HCC)      metFORMIN ER (GLUCOPHAGE XR) 500 mg tablet Take 1 Tab by mouth daily (with dinner). Qty: 90 Tab, Refills: 3    Associated Diagnoses: Type 2 diabetes mellitus with complication (HCC)      rosuvastatin (CRESTOR) 10 mg tablet Take 1 Tab by mouth nightly.   Qty: 90 Tab, Refills: 3    Associated Diagnoses: NSTEMI (non-ST elevated myocardial infarction) (HCC)      carvediloL (Coreg) 12.5 mg tablet Take 1 Tab by mouth two (2) times daily (with meals). Indications: myocardial reinfarction prevention  Qty: 60 Tab, Refills: 3    Associated Diagnoses: NSTEMI (non-ST elevated myocardial infarction) ([facility] Essential hypertension      spironolactone (ALDACTONE) 25 mg tablet Take 25 mg by mouth daily. ferrous sulfate 325 mg (65 mg iron) tablet Take 1 Tab by mouth daily (with breakfast). Qty: 30 Tab, Refills: 0      furosemide (LASIX) 20 mg tablet Take 1 Tab by mouth daily as needed (Edema). Qty: 30 Tab, Refills: 1    Associated Diagnoses: Acute on chronic diastolic congestive heart failure (HCC)      ticagrelor (BRILINTA) 90 mg tablet Take 1 Tab by mouth every twelve (12) hours every twelve (12) hours. Qty: 60 Tab, Refills: 5      insulin glargine (LANTUS,BASAGLAR) 100 unit/mL (3 mL) inpn Take 45 units daily  Indications: type 2 diabetes mellitus  Qty: 4 Pen, Refills: 5    Associated Diagnoses: Type 2 diabetes mellitus with complication (HCC)      aspirin 81 mg chewable tablet Take 1 Tab by mouth daily. Qty: 30 Tab, Refills: 0      melatonin 5 mg cap capsule Take 1 Cap by mouth nightly.   Qty: 30 Cap, Refills: 1    Associated Diagnoses: Difficulty sleeping      Insulin Needles, Disposable, 31 gauge x 5/16\" ndle Use to check blood glucose BID  Qty: 100 Pen Needle, Refills: 11    Associated Diagnoses: Type 2 diabetes mellitus with complication (HCC)      Insulin Syringe-Needle U-100 (INSULIN SYRINGE) 1 mL 30 gauge x 5/16 syrg As directed for lantus insulin  Qty: 50 Syringe, Refills: 0         STOP taking these medications       losartan (COZAAR) 100 mg tablet Comments:   Reason for Stopping:               Consults:    Nephrology   Podiatry   PT/OT  Diabetes management     Procedures:  I/D left foot resection of first metatarsophalangeal joint, removal of sesmoids, antibiotic bead placement 5/20  PICC line 5/21    Significant Diagnostic Studies:   US Results (most recent):  Results from Hospital Encounter encounter on 05/17/21    US RETROPERITONEUM COMP    Narrative  Retroperitoneal ultrasound limited    Indication: FRANCISCA    Technique: Select images from retroperitoneal ultrasound provided for  interpretation. No prior studies for comparison. Findings: The right kidney measures 11.9 x 6.4 x 5.8 cm and the left kidney measures 13.2  x 5.9 x 8.3 cm. Normal parenchymal echogenicity. No calculus is demonstrated. No  hydronephrosis. No cyst or mass. No perinephric free fluid. Urinary bladder measures 6.1 x 8.3 x 8.7 cm for a volume of 310 mL. No bladder  wall thickening. Bilateral ureteral jets demonstrated. Impression  1. Unremarkable sonographic imaging of the kidneys and bladder. MRI Results (most recent):  Results from [place] encounter on 05/17/21    MRI FOOT LT WO CONT    Narrative  MRI FOOT LT WO CONT    CLINICAL INFORMATION  Left foot ulcer, worse over 2 days, swollen, warm, draining purulence from first  MTP joint. Incision and drainage with bone biopsy negative for osteomyelitis one  month ago. COMPARISON  Left foot MRI 4/5/2021. TECHNIQUE  Multiplane MR images of the mid/forefoot obtained including T1 and T2-weighted  sequences. FINDINGS  OSSEOUS STRUCTURES  Interval markedly increased bone marrow edema at the first metatarsal and first  proximal phalanx with cortical erosions at the plantar aspect of the first  metatarsal head. Additional cortical erosions along the base of the dorsal  aspect of the first proximal phalanx. JOINTS  Severe infectious and inflammatory changes at the first tarsometatarsal and MTP  joint. Remainder of the joint spaces appear well-preserved. LIGAMENTS AND PLANTAR PLATES  The plantar plates appear intact. The ligaments of the forefoot appear intact. INTERMETATARSAL SPACES  Significant intermetatarsal bursitis between the first and second digits. .    MUSCLES AND TENDONS  Normal muscle volume and signal intensity.  Intact tendons with no evidence of  tear or tendon sheath effusion. PLANTAR FASCIA  Possibly reactive edema around the medial band of the plantar fascia. LISFRANC LIGAMENT  Intact. OTHER FINDINGS  Severe circumferential soft tissue edema with open ulceration along the medial  aspect of the first MTP joint. Phlegmon/abscess measuring approximately 2.1 x  2.1 cm along the plantar aspect of the midshaft of the first metatarsal.  Extensive additional phlegmon/abscesses surrounding the first  metatarsophalangeal joint extending into the soft tissues of the proximal first  toe. Impression  Markedly worsening infectious/inflammatory changes surrounding the first MTP  joint with new osteomyelitis of the first metatarsal head and first proximal  phalanx. New multifocal abscesses/phlegmon within the soft tissues of the  plantar foot and extending into the first toe. As attending radiologist, I have assessed the study images and dictated or  reviewed/edited the final report as needed. XR Results (most recent):  Results from Hospital Encounter encounter on 05/17/21    XR FOOT LT MIN 3 V    Narrative  History: Infection. Correlation is made with MRI performed subsequently. Comparison is made with  radiograph 4/4/2021. TECHNIQUE: 4 views left foot. FINDINGS:    There is suggestion of some new osseous lucency at the first MTP joint with  overlying soft tissue edema and ulcer formation. MRI noted septic arthritis. Calcaneal spurring noted. Calcifications that are chronic appearing the  Achilles. No osseous coalition. Lisfranc joint preserved. Impression  Septic arthritis of first MTP joint noted on subsequent MRI. More subtle finding  seen on radiograph with overlying soft tissue edema and ulceration. Phlegmon/abscess suggested on MRI as well.    05/17/21    ECHO ADULT FOLLOW-UP OR LIMITED 05/20/2021 5/20/2021    Interpretation Summary  · LV: Estimated LVEF is 55 - 60%.  Visually measured ejection fraction. Normal cavity size, wall thickness and systolic function (ejection fraction normal). · There is no vegetation on the aortic valve. · There is no vegetation on the mitral valve. · There is no vegetation on the tricuspid valve. · There is no vegetation. · MV: Mitral valve non-specific thickening. Signed by: Rica Black MD on 5/20/2021 12:20 PM    Hospital Course by Problem   Per H&P on 5/17 Jennifer Mosquera is a 40 y.o. female with medical co-morbidities including HTN, CAD with recent NSTEMI, hyperlipidemia, type 2 DM, morbid obesity, recently hospitalized for diabetic foot ulcer, presented with left foot pain/swelling and drainage. According to her, she had left foot swelling and painful about 3-4 days ago. Her left foot ulcer has been draining pus. She expressed not feeling well and having shaking chill. No fever. Otherwise, she has no sick contact, she has on-going cough but no shortness of breath. No chest pain. She is complaint with her medications. In the ER, she was found to have lower grade fever, leukocytosis, tachycardia, she was started on sepsis protocol with IV ceftriaxone/flagyl/vancomycin. Lab with evidences of FRANCISCA and hyperglycemia, hyponatremia. Insulin was given. Podiatry evaluated and ordered MRI foot with vascular duplex. Summary of hospital course  Podiatry recommendations for surgical intervention, broad spectrum IV abx, wound culture, NWB of left forefoot. Post surgery wound vac placed. Surgical findings septic first MTPJ with dystrophy of first metatarsal head, multiple pockets of purulence noted around first MTPJ as well as proximal in first intermetatarsal space  5/18 blood cultures [facility]PC. vanco and ceftriaxone   ID recommendations for abx therapy, follow intra op cultures, PICC line and plan for long term antibiotic therapy 5/21. IV abx this admission vanco, metronidazole, and ceftriaxone.    Nephrology recommendations IV hydration, monitor renal function, hold metformin and ARB, adjust medication per renal function. Hypokalemia noted this admission with potassium supplementation. Follow up with BMP on . Follow up CBC/HH on       Today's examination of the patient revealed:     Subjective: All 10 systems reviewed and negative   Objective:   VS:   Visit Vitals  BP (!) 177/98 (BP 1 Location: Left upper arm, BP Patient Position: At rest) Comment: Haseeb Hooker aware at this time. Pulse 99   Temp 97.4 °F (36.3 °C)   Resp 16   Ht 5' 7\" (1.702 m)   Wt 98.9 kg (218 lb)   SpO2 100%   Breastfeeding No   BMI 34.14 kg/m²      Tmax/24hrs: Temp (24hrs), Av.7 °F (36.5 °C), Min:97 °F (36.1 °C), Max:98.9 °F (37.2 °C)     Input/Output:     Intake/Output Summary (Last 24 hours) at 2021 1042  Last data filed at 2021 0078  Gross per 24 hour   Intake 320 ml   Output 2450 ml   Net -2130 ml       General:  Alert, NAD  Cardiovascular:  RRR  Pulmonary:  LSC throughout  GI:  +BS in all four quadrants, soft, non-tender  Extremities:  No edema; 2+ dorsalis pedis pulses bilaterally  Neurology: Patient A&O x4  Left foot wound vac dressing.  Left foot digits edema     Labs:    Recent Results (from the past 24 hour(s))   GLUCOSE, POC    Collection Time: 21  1:10 PM   Result Value Ref Range    Glucose (POC) 157 (H) 70 - 110 mg/dL   GLUCOSE, POC    Collection Time: 21  4:35 PM   Result Value Ref Range    Glucose (POC) 165 (H) 70 - 110 mg/dL   GLUCOSE, POC    Collection Time: 21  9:55 PM   Result Value Ref Range    Glucose (POC) 186 (H) 70 - 110 mg/dL   CBC W/O DIFF    Collection Time: 21  3:08 AM   Result Value Ref Range    WBC 9.8 4.6 - 13.2 K/uL    RBC 3.50 (L) 4.20 - 5.30 M/uL    HGB 8.0 (L) 12.0 - 16.0 g/dL    HCT 25.7 (L) 35.0 - 45.0 %    MCV 73.4 (L) 74.0 - 97.0 FL    MCH 22.9 (L) 24.0 - 34.0 PG    MCHC 31.1 31.0 - 37.0 g/dL    RDW 16.9 (H) 11.6 - 14.5 %    PLATELET 348 961 - 015 K/uL    MPV 9.6 9.2 - 59.0 FL   METABOLIC PANEL, BASIC    Collection Time: 05/21/21  3:08 AM   Result Value Ref Range    Sodium 142 136 - 145 mmol/L    Potassium 3.0 (L) 3.5 - 5.5 mmol/L    Chloride 109 100 - 111 mmol/L    CO2 25 21 - 32 mmol/L    Anion gap 8 3.0 - 18 mmol/L    Glucose 59 (L) 74 - 99 mg/dL    BUN 8 7.0 - 18 MG/DL    Creatinine 1.24 0.6 - 1.3 MG/DL    BUN/Creatinine ratio 6 (L) 12 - 20      GFR est AA 57 (L) >60 ml/min/1.73m2    GFR est non-AA 47 (L) >60 ml/min/1.73m2    Calcium 8.2 (L) 8.5 - 10.1 MG/DL   GLUCOSE, POC    Collection Time: 05/21/21  6:32 AM   Result Value Ref Range    Glucose (POC) 146 (H) 70 - 110 mg/dL     Additional Data Reviewed:     Condition: stable   Follow-up Appointments:   1. Your PCP: Zohreh Banegas MD, within 5-7 days  2. Follow up with podiatry in 2 weeks  3.  Follow up with nephrology in 2 weeks           >30 minutes spent coordinating this discharge (review instructions/follow-up, prescriptions, preparing report for sign off)    Signed:  Olivia Nelson NP  5/21/2021  10:42 AM

## 2021-05-21 NOTE — PROGRESS NOTES
Progress Note    Patient: Lalito Conti MRN: [id number]  SSN: [id number]    YOB: 1975  Age: 40 y.o. Sex: female      Admit Date: 5/17/2021  2 Days Post-Op     Procedure:   Procedure(s):  INCISION AND DRAINAGE LEFT FOOT/ resection of FIRST METATARSOPHALANGEAL JOINT,removal of sesmoids, antibiotic bead placement and all indicated procedures    Subjective:     Patient seen resting quiet and comfortably and no apparent distress. Pain denies any pain to left foot. Patient denies N/V/C/F. She has intact wound VAC to left foot. Status post: osteomyelitis    Objective:     Visit Vitals  BP (!) 162/88 (BP 1 Location: Right lower arm, BP Patient Position: At rest)   Pulse 93   Temp 98.9 °F (37.2 °C)   Resp 16   Ht 5' 7\" (1.702 m)   Wt 98.9 kg (218 lb)   SpO2 100%   Breastfeeding No   BMI 34.14 kg/m²        Physical Exam:  Dressings dry, clean, intact to left foot. Wound VAC attached and secured. Labs/Radiology Review: images and reports reviewed    Assessment:     Hospital Problems  Date Reviewed: 5/18/2021        Codes Class Noted POA    Osteomyelitis ([facility] ICD-10-CM: M86.9  ICD-9-CM: 730.20  5/17/2021 Unknown        [facility] foot ulcer ([facility] ICD-10-CM: E11.621, L97.509  ICD-9-CM: 250.80, 707.15  5/17/2021 Unknown        FRANCISCA (acute kidney injury) ([facility] ICD-10-CM: N17.9  ICD-9-CM: 584.9  5/17/2021 Unknown        Sepsis ([facility] ICD-10-CM: A41.9  ICD-9-CM: 038.9, 995.91  5/17/2021 Unknown        Hyponatremia ICD-10-CM: E87.1  ICD-9-CM: 276.1  5/17/2021 Unknown              Plan/Recommendations/Medical Decision Making:     - Continue wound VAC therapy. - Remain NWB to left forefoot. - Abx per ID recommendation.   - Patient is stable to d/c from foot standpoint. Home nursing set up with change wound VAC every 2 days.   - Patient will f/u outpatient in 1 week in office.

## 2021-05-21 NOTE — PROGRESS NOTES
1588: Attempted PT treatment. RNs present finishing providing care; patient declines further mobility with PT. Will follow up.   1009: Discharge orders in chart. 2nd PT attempt. Wound care present. Will follow up.   1103: 3rd PT attempt. Declines PT. Denies further mobility concerns for discharge home. Inquired about stair negotiation; states \"I'll be fine with my cane\".

## 2021-05-21 NOTE — PROGRESS NOTES
Infectious Disease progress Note        Reason: Diabetic left foot infection    Current abx Prior abx   Ceftriaxone  since 5/17 , metronidazole, vancomycin 5/17-5/19     Lines:       Assessment :    40 y.o.  female  With PMH of HTN, uncontrolled type 2 DM-last hemoglobin A1c 13.6 on 5/17/2021, peripheral Neuropathy came tot he ER on 5/17/21 for left foot infection    Hospitalization January 2019 for left second toe, left foot cellulitis    Hospitalization April 2021 for left foot infection, bone biopsy negative for osteomyelitis  Wound cultures group B streptococcus    Clinical presentation consistent with sepsis -present on admission due to group B bloodstream infection  (positive blood culture 5/17, negative blood culture 5/19 ), left foot diabetic ulcer with cellulitis/abscess, possible osteomyelitis of first metatarsal head with septic 1st MTPJ. Intra-Op cultures 5/18-group B streptococcus    Most likely source of group B bloodstream infection is left foot infection      Looking at the h/o uncontrolled DM, close proximity of infection to bone; high risk of future complications; will recommend aggressive abx treatment.      Recommendations:     1. Continue ceftriaxone till 6/30/21    2. place PICC line  For outpt iv abx  3. Patient was advised regarding importance of better glycemic control to aid wound healing, avoid future infectious complication. Risk of limb loss with persistent high blood sugars discussed with patient    Home health orders on chart (click on \"chart review, other orders, IP home health)      Above plan was discussed in details with patient, , Dr. Delma Martins. All questions answered to their full satisfaction. Spent additional 35 minutes in management and evaluation of this patient. >50% time spent in counselling and coordination of care. Please call me if any further questions or concerns.  Will continue to participate in the care of this patient. HPI:    Patient denies any subjective fever or chills at this time. She complains of pain in her left leg. She denies any chest pain, shortness of breath, abdominal pain, diarrhea. Current Discharge Medication List      START taking these medications    Details   oxyCODONE-acetaminophen (PERCOCET) 5-325 mg per tablet Take 1 Tablet by mouth every six (6) hours as needed for Pain for up to 3 days. Max Daily Amount: 4 Tablets. Qty: 12 Tablet, Refills: 0    Associated Diagnoses: Diabetic ulcer of left midfoot associated with type 2 diabetes mellitus, unspecified ulcer stage ([facility]         CONTINUE these medications which have NOT CHANGED    Details   insulin lispro (HUMALOG) 100 unit/mL injection Take 10 units three times a day with meals  Qty: 1 Vial, Refills: 11    Associated Diagnoses: Type 2 diabetes mellitus with complication (HCC)      metFORMIN ER (GLUCOPHAGE XR) 500 mg tablet Take 1 Tab by mouth daily (with dinner). Qty: 90 Tab, Refills: 3    Associated Diagnoses: Type 2 diabetes mellitus with complication ([facility])      rosuvastatin (CRESTOR) 10 mg tablet Take 1 Tab by mouth nightly. Qty: 90 Tab, Refills: 3    Associated Diagnoses: NSTEMI (non-ST elevated myocardial infarction) ([facility])      carvediloL (Coreg) 12.5 mg tablet Take 1 Tab by mouth two (2) times daily (with meals). Indications: myocardial reinfarction prevention  Qty: 60 Tab, Refills: 3    Associated Diagnoses: NSTEMI (non-ST elevated myocardial infarction) ([facility] Essential hypertension      spironolactone (ALDACTONE) 25 mg tablet Take 25 mg by mouth daily. ferrous sulfate 325 mg (65 mg iron) tablet Take 1 Tab by mouth daily (with breakfast). Qty: 30 Tab, Refills: 0      furosemide (LASIX) 20 mg tablet Take 1 Tab by mouth daily as needed ([place]).   Qty: 30 Tab, Refills: 1    Associated Diagnoses: Acute on chronic diastolic congestive heart failure (HCC)      ticagrelor (BRILINTA) 90 mg tablet Take 1 Tab by mouth every twelve (12) hours every twelve (12) hours. Qty: 60 Tab, Refills: 5      insulin glargine (LANTUS,BASAGLAR) 100 unit/mL (3 mL) inpn Take 45 units daily  Indications: type 2 diabetes mellitus  Qty: 4 Pen, Refills: 5    Associated Diagnoses: Type 2 diabetes mellitus with complication (HCC)      aspirin 81 mg chewable tablet Take 1 Tab by mouth daily. Qty: 30 Tab, Refills: 0      melatonin 5 mg cap capsule Take 1 Cap by mouth nightly.   Qty: 30 Cap, Refills: 1    Associated Diagnoses: Difficulty sleeping      Insulin Needles, Disposable, 31 gauge x 5/16\" ndle Use to check blood glucose BID  Qty: 100 Pen Needle, Refills: 11    Associated Diagnoses: Type 2 diabetes mellitus with complication (HCC)      Insulin Syringe-Needle U-100 (INSULIN SYRINGE) 1 mL 30 gauge x 5/16 syrg As directed for lantus insulin  Qty: 50 Syringe, Refills: 0         STOP taking these medications       losartan (COZAAR) 100 mg tablet Comments:   Reason for Stopping:               Current Facility-Administered Medications   Medication Dose Route Frequency    potassium chloride (K-DUR, KLOR-CON) SR tablet 40 mEq  40 mEq Oral BID    potassium chloride 10 mEq in 100 ml IVPB  10 mEq IntraVENous Q1H    oxyCODONE-acetaminophen (PERCOCET) 5-325 mg per tablet 1 Tablet  1 Tablet Oral Q6H PRN    amLODIPine (NORVASC) tablet 10 mg  10 mg Oral DAILY    cefTRIAXone (ROCEPHIN) 2 g in sterile water (preservative free) 20 mL IV syringe  2 g IntraVENous Q24H    electrolyte-r (NORMOSOL R) infusion   IntraVENous CONTINUOUS    insulin lispro (HUMALOG) injection   SubCUTAneous AC&HS    glucose chewable tablet 16 g  16 g Oral PRN    glucagon (GLUCAGEN) injection 1 mg  1 mg IntraMUSCular PRN    dextrose (D50W) injection syrg 12.5-25 g  25-50 mL IntraVENous PRN    0.9% sodium chloride infusion 250 mL  250 mL IntraVENous PRN    sodium chloride (NS) flush 5-10 mL  5-10 mL IntraVENous PRN    dextrose (D50W) injection syrg 12.5-25 g  25-50 mL IntraVENous PRN    aspirin chewable tablet 81 mg  81 mg Oral DAILY    carvediloL (COREG) tablet 12.5 mg  12.5 mg Oral BID WITH MEALS    rosuvastatin (CRESTOR) tablet 10 mg  10 mg Oral QHS    sodium chloride (NS) flush 5-40 mL  5-40 mL IntraVENous Q8H    sodium chloride (NS) flush 5-40 mL  5-40 mL IntraVENous PRN    acetaminophen (TYLENOL) tablet 650 mg  650 mg Oral Q6H PRN    Or    acetaminophen (TYLENOL) suppository 650 mg  650 mg Rectal Q6H PRN    polyethylene glycol (MIRALAX) packet 17 g  17 g Oral DAILY PRN    promethazine (PHENERGAN) tablet 12.5 mg  12.5 mg Oral Q6H PRN    Or    ondansetron (ZOFRAN) injection 4 mg  4 mg IntraVENous Q6H PRN    famotidine (PEPCID) tablet 20 mg  20 mg Oral QPM    [Held by provider] heparin (porcine) injection 5,000 Units  5,000 Units SubCUTAneous Q8H    insulin glargine (LANTUS) injection 50 Units  50 Units SubCUTAneous QHS       Allergies: Azithromycin and Pcn [penicillins]    Temp (24hrs), Av.7 °F (36.5 °C), Min:97 °F (36.1 °C), Max:98.9 °F (37.2 °C)    Visit Vitals  BP (!) 177/98 (BP 1 Location: Left upper arm, BP Patient Position: At rest) Comment: Haseeb Hooker aware at this time. Pulse 99   Temp 97.4 °F (36.3 °C)   Resp 16   Ht 5' 7\" (1.702 m)   Wt 98.9 kg (218 lb)   SpO2 100%   Breastfeeding No   BMI 34.14 kg/m²       ROS: 12 point ROS obtained in details. Pertinent positives as mentioned in HPI,   otherwise negative    Physical Exam:    Constitutional: She is oriented to person, place, and time. She appears well-developed and well-nourished. No distress. HENT:   Head: Normocephalic and atraumatic. Mouth/Throat: Oropharynx is clear and moist.   Eyes: Conjunctivae are normal. Pupils are equal, round, and reactive to light. No scleral icterus. Neck: Normal range of motion. Neck supple. Cardiovascular: Normal rate and intact distal pulses. Pulmonary/Chest: Effort normal and breath sounds normal. No respiratory distress. She has no wheezes.    Abdominal: Soft. Bowel sounds are normal. She exhibits no distension. There is no tenderness. Musculoskeletal: Normal range of motion. Lymphadenopathy:     She has no cervical adenopathy. Neurological: She is alert and oriented to person, place, and time. No cranial nerve deficit.   Decreased sensation of 2nd toe of left foot, likely due to neuropathy.   Skin: Skin is warm. Left foot surgical dressing not opened-dried blood noted on the dressing       nursing note and vitals reviewed. Labs: Results:   Chemistry Recent Labs     05/21/21  0308 05/20/21  0009 05/19/21  0400   GLU 59* 98 70*    141 142   K 3.0* 3.0* 3.1*    110 110   CO2 25 25 22   BUN 8 12 16   CREA 1.24 1.35* 1.61*   CA 8.2* 7.8* 7.0*   AGAP 8 6 10   BUCR 6* 9* 10*      CBC w/Diff Recent Labs     05/21/21  0308 05/20/21  0009 05/19/21  0400   WBC 9.8 12.0 13.1   RBC 3.50* 3.29* 3.07*   HGB 8.0* 7.6* 6.9*   HCT 25.7* 24.0* 22.7*    284 262      Microbiology Recent Labs     05/19/21  1207 05/18/21  1727 05/18/21  1707 05/18/21  1704   CULT NO GROWTH 2 DAYS NO ANAEROBES ISOLATED  LIGHT STREPTOCOCCI, BETA HEMOLYTIC GROUP B REFER TO [facility] [id number] FOR SENSITIVITIES*  CHECKING FOR POSSIBLE  OTHER ORGANISMS   NO ANAEROBES ISOLATED  LIGHT STREPTOCOCCI, BETA HEMOLYTIC GROUP B SENSITIVITY TO FOLLOW*  CHECKING FOR POSSIBLE  OTHER ORGANISMS   NO ANAEROBES ISOLATED  LIGHT STREPTOCOCCI, BETA HEMOLYTIC GROUP B REFER TO [facility] [id number]  FOR SENSITIVITIES*  CHECKING FOR POSSIBLE  OTHER ORGANISMS            RADIOLOGY:    All available imaging studies/reports in [facility] for this admission were reviewed  High complexity decision making was performed during the evaluation of this patient at high risk for decompensation          Disclaimer: Sections of this note are dictated utilizing voice recognition software, which may have resulted in some phonetic based errors in grammar and contents.  Even though attempts were made to correct all the mistakes, some may have been missed, and remained in the body of the document. If questions arise, please contact our department.     Dr. Catherine Strong, Infectious Disease Specialist  [phone]  May 21, 2021  9:43 AM

## 2021-05-22 NOTE — PROGRESS NOTES
Problem: Diabetes Self-Management  Goal: *Disease process and treatment process  Description: Define diabetes and identify own type of diabetes; list 3 options for treating diabetes. Outcome: Resolved/Met  Goal: *Incorporating nutritional management into lifestyle  Description: Describe effect of type, amount and timing of food on blood glucose; list 3 methods for planning meals. Outcome: Resolved/Met  Goal: *Incorporating physical activity into lifestyle  Description: State effect of exercise on blood glucose levels. Outcome: Resolved/Met  Goal: *Developing strategies to promote health/change behavior  Description: Define the ABC's of diabetes; identify appropriate screenings, schedule and personal plan for screenings. Outcome: Resolved/Met  Goal: *Using medications safely  Description: State effect of diabetes medications on diabetes; name diabetes medication taking, action and side effects. Outcome: Resolved/Met  Goal: *Monitoring blood glucose, interpreting and using results  Description: Identify recommended blood glucose targets  and personal targets. Outcome: Resolved/Met  Goal: *Prevention, detection, treatment of acute complications  Description: List symptoms of hyper- and hypoglycemia; describe how to treat low blood sugar and actions for lowering  high blood glucose level. Outcome: Resolved/Met  Goal: *Prevention, detection and treatment of chronic complications  Description: Define the natural course of diabetes and describe the relationship of blood glucose levels to long term complications of diabetes.   Outcome: Resolved/Met  Goal: *Developing strategies to address psychosocial issues  Description: Describe feelings about living with diabetes; identify support needed and support network  Outcome: Resolved/Met  Goal: *Insulin pump training  Outcome: Resolved/Met  Goal: *Sick day guidelines  Outcome: Resolved/Met  Goal: *Patient Specific Goal (EDIT GOAL, INSERT TEXT)  Outcome: Resolved/Met Problem: Patient Education: Go to Patient Education Activity  Goal: Patient/Family Education  Outcome: Resolved/Met     Problem: Falls - Risk of  Goal: *Absence of Falls  Description: Document Gaye Dow Fall Risk and appropriate interventions in the flowsheet.   Outcome: Resolved/Met     Problem: Patient Education: Go to Patient Education Activity  Goal: Patient/Family Education  Outcome: Resolved/Met     Problem: Patient Education: Go to Patient Education Activity  Goal: Patient/Family Education  Outcome: Resolved/Met

## 2021-05-23 NOTE — PROGRESS NOTES
05/26/21      ICD-10-CM ICD-9-CM    1. Type 2 diabetes mellitus with complication (HCC)  B62.4 250.90 insulin lispro (HUMALOG) 100 unit/mL kwikpen         The patient is s/p hospitalization for infected ulcer of the foot. She is improving. She is on antibiotics at home and gets home care with wound care. She has a PICC line for antibiotic administration I reordered the Humalog which she was running out of. She is still on Metformin. We are keeping a close eye on her kidney function. Should it worsen we may try one of the other medications and if her blood sugar does not stay in control I will probably be inclined to try one of the SGLT2 inhibitors or GLP-1. Her blood pressure has been in good control. Her cholesterol has been elevated we will recheck that on her next visit and look forward to changing her medication if needed. She is on iron for anemia.  lab results and schedule of future lab studies reviewed with patient  Diagnostic and radiologic results and the schedule of future studies were reviewed with the patient  reviewed diet, exercise and weight control  All questions were answered and understood. Health Maintenance Due   Topic Date Due    Hepatitis C Screening  Never done    Pneumococcal 0-64 years (1 of 2 - PPSV23) Never done    COVID-19 Vaccine (1) Never done    DTaP/Tdap/Td series (1 - Tdap) Never done    PAP AKA CERVICAL CYTOLOGY  Never done       Subjective:   Jennifer Mosquera is a 40 y.o. female has Acute bronchitis, Hyperglycemia, Diabetic foot infection ([address] Right foot infection, Acute pain of right foot, Insulin dependent diabetes mellitus, HTN (hypertension), Fever, NSTEMI (non-ST elevated myocardial infarction) ([address] Severe obesity ([address] Osteomyelitis ([address] Diabetic foot ulcer ([address] FRANCISCA (acute kidney injury) ([address] Sepsis ([address] and Hyponatremia on their problem list.. No chief complaint on file. The patient was seen by nephrology on May 2021.   She was admitted May 17-21, 2021. Discharge diagnoses included gram-positive cocci blood cultures on 5/18, abnormal urinalysis, left foot diabetic ulcer infection with left foot resection of the first metatarsophalangeal joint, removal of sesamoids and antibiotic bead placement on 5/202021. We saw her by virtual visit on 4/12/2021 for NSTEMI and she is on rosuvastatin and Coreg. She had essential hypertension on treatment. The patient was seen by cardiology in February 2021. The patient had coronary artery disease it was felt to be stable and is noted to have stable chronic diastolic congestive heart failure with no evidence of fluid overload. Her losartan was increased. She was referred to cardiac rehab. She has Mobitz type I AV block by history. She was seen by nephrology in February 2021 for chronic kidney disease stage III thought to be due to diabetic and hypertensive nephropathy with significant proteinuria. Her blood sugar at that time was also noted to be poorly controlled. She was seen in our office in February 2021 for evaluation of her cholesterol level, diabetes mellitus control vaginal candidiasis and essential hypertension. The patient has Acute bronchitis, Hyperglycemia, Diabetic foot infection ([address] Right foot infection, Acute pain of right foot, Insulin dependent diabetes mellitus, HTN (hypertension), Fever, NSTEMI (non-ST elevated myocardial infarction) ([address] and Severe obesity ([address] on their problem list.. She was seen in September 2020 for essential hypertension and Cozaar 100 mg daily was prescribed as she was not in control. The patient had type 2 diabetes mellitus with complication that was not in control. We restarted the Metformin  mg daily in addition to the Lantus 40 units daily. The patient had microalbuminuria and was hoped that the Cozaar would help. She had the complication of right toe ulcer. She was screened for cholesterol.   Her carpal tunnel problem was quiesced sent at that time. She had trouble sleeping and for this reason melatonin was started. Immunizations were ordered. Cough   This has been present 2 weeks. It is nonproductive. It is improving. No fevers chills or sweats are admitted to. Hypertension  The patient has had no problem with the medication. The patient has no headaches, visual changes, chest pain or pressure,dyspnea, orthopnea, or PND  Diabetes mellitus  The patient denies polyuria, polydipsia, or polyphagia. There has been no problem with the medications. Now on wound care machine. PICC line, still on antibioticsThe blood sugar has been reading around 199  Hyperlipidemia  The patient has had no problem with the medications. The patient denies any myalgias or weakness. No abdominal discomfort admitted to. BP Readings from Last 3 Encounters:   05/21/21 (!) 145/88   05/15/21 90/75   04/26/21 (!) 150/94       ECHO ADULT FOLLOW-UP OR LIMITED 05/20/2021 5/20/2021    Interpretation Summary  · LV: Estimated LVEF is 55 - 60%. Visually measured ejection fraction. Normal cavity size, wall thickness and systolic function (ejection fraction normal). · There is no vegetation on the aortic valve. · There is no vegetation on the mitral valve. · There is no vegetation on the tricuspid valve. · There is no vegetation. · MV: Mitral valve non-specific thickening.     Signed by: Alejandra Allred MD on 5/20/2021 12:20 PM    Lab Results   Component Value Date/Time    Hemoglobin A1c 13.6 (H) 05/17/2021 05:09 PM    Hemoglobin A1c (POC) 12.3 03/28/2019 09:49 AM     Lab Results   Component Value Date/Time    Cholesterol, total 218 (H) 09/28/2020 01:00 PM    HDL Cholesterol 49 09/28/2020 01:00 PM    LDL, calculated 121 (H) 09/28/2020 01:00 PM    VLDL, calculated 48 (H) 09/28/2020 01:00 PM    Triglyceride 241 (H) 09/28/2020 01:00 PM       Current Outpatient Medications   Medication Sig    oxyCODONE-acetaminophen (PERCOCET) 5-325 mg per tablet Take 1 Tablet by mouth every six (6) hours as needed for Pain for up to 3 days. Max Daily Amount: 4 Tablets.  insulin lispro (HUMALOG) 100 unit/mL injection Take 10 units three times a day with meals (Patient taking differently: by SubCUTAneous route. Take 10 units three times a day with meals)    metFORMIN ER (GLUCOPHAGE XR) 500 mg tablet Take 1 Tab by mouth daily (with dinner).  rosuvastatin (CRESTOR) 10 mg tablet Take 1 Tab by mouth nightly.  carvediloL (Coreg) 12.5 mg tablet Take 1 Tab by mouth two (2) times daily (with meals). Indications: myocardial reinfarction prevention    spironolactone (ALDACTONE) 25 mg tablet Take 25 mg by mouth daily.  ferrous sulfate 325 mg (65 mg iron) tablet Take 1 Tab by mouth daily (with breakfast).  furosemide (LASIX) 20 mg tablet Take 1 Tab by mouth daily as needed (Edema).  ticagrelor (BRILINTA) 90 mg tablet Take 1 Tab by mouth every twelve (12) hours every twelve (12) hours.  insulin glargine (LANTUS,BASAGLAR) 100 unit/mL (3 mL) inpn Take 45 units daily  Indications: type 2 diabetes mellitus (Patient taking differently: 50 Units by SubCUTAneous route nightly. Indications: type 2 diabetes mellitus)    aspirin 81 mg chewable tablet Take 1 Tab by mouth daily.  melatonin 5 mg cap capsule Take 1 Cap by mouth nightly. (Patient taking differently: Take 5 mg by mouth as needed (difficulty in sleeping). Reports taking it as needed)    Insulin Needles, Disposable, 31 gauge x 5/16\" ndle Use to check blood glucose BID    Insulin Syringe-Needle U-100 (INSULIN SYRINGE) 1 mL 30 gauge x 5/16 syrg As directed for lantus insulin     No current facility-administered medications for this visit.      Allergies   Allergen Reactions    Azithromycin Other (comments)     Rapid response was called for bradycardia and hypotension     Pcn [Penicillins] Hives     has Acute bronchitis, Hyperglycemia, Diabetic foot infection ([address] Right foot infection, Acute pain of right foot, Insulin dependent diabetes mellitus, HTN (hypertension), Fever, NSTEMI (non-ST elevated myocardial infarction) ([facility] Severe obesity ([facility] Osteomyelitis ([facility] Diabetic foot ulcer ([facility] FRANCISCA (acute kidney injury) ([facility] Sepsis ([facility] and Hyponatremia on their problem list.    Past Surgical History:   Procedure Laterality Date    HX CORONARY STENT PLACEMENT      HX GYN            reports that she has never smoked. She has never used smokeless tobacco. She reports that she does not drink alcohol and does not use drugs. family history includes Lupus in her mother. Review of Systems   Constitutional: Negative for chills, fever and malaise/fatigue. HENT: Negative for congestion and sore throat. Eyes: Negative for blurred vision and redness. Respiratory: Negative for cough, shortness of breath and wheezing. Cardiovascular: Positive for leg swelling. Negative for chest pain. Gastrointestinal: Negative for abdominal pain, blood in stool, constipation, diarrhea and heartburn. Genitourinary: Negative for dysuria and urgency. Musculoskeletal: Negative for joint pain and myalgias. The ulcer, she states is healing. She declines stool unbandaged today. Skin:        The blood blister is no longer present. The primary problem is a stage II ulcer on the ball of the left foot. There appears to be some surrounding 1+ edema up to the ankles and legs. Neurological: Negative for dizziness, sensory change, speech change and focal weakness. Endo/Heme/Allergies: Does not bruise/bleed easily. Psychiatric/Behavioral: Positive for depression. Negative for suicidal ideas. The patient has insomnia (New problem for 3 weeks. Also has a history of RODNEY not treated. The patient is also depressed. ). The patient is not nervous/anxious. There were no vitals taken for this visit.   BP Readings from Last 3 Encounters:   21 (!) 145/88   05/15/21 90/75   21 (!) 150/94       Physical Exam  Constitutional:       General: She is not in acute distress. HENT:      Right Ear: External ear normal.      Left Ear: External ear normal.      Mouth/Throat:      Mouth: Mucous membranes are moist.      Pharynx: Oropharynx is clear. Eyes:      General: No scleral icterus. Pupils: Pupils are equal, round, and reactive to light. Pulmonary:      Effort: Pulmonary effort is normal.   Abdominal:      General: There is no distension. Musculoskeletal:         General: Signs of injury (Bandages clean and dry. Wound VAC) present. No swelling. Cervical back: No rigidity. Skin:     Coloration: Skin is not pale. Findings: No erythema or lesion (Foot bandaged). Neurological:      Mental Status: She is alert and oriented to person, place, and time. Gait: Gait normal.   Psychiatric:         Mood and Affect: Mood normal.         Behavior: Behavior normal.         Thought Content:  Thought content normal.            Lab Results   Component Value Date/Time    WBC 9.8 05/21/2021 03:08 AM    HGB 8.0 (L) 05/21/2021 03:08 AM    Hemoglobin, POC 14.3 12/27/2014 05:54 AM    HCT 25.7 (L) 05/21/2021 03:08 AM    Hematocrit, POC 42 12/27/2014 05:54 AM    PLATELET 766 50/75/7925 03:08 AM    MCV 73.4 (L) 05/21/2021 03:08 AM     Lab Results   Component Value Date/Time    Hemoglobin A1c 13.6 (H) 05/17/2021 05:09 PM    Hemoglobin A1c 9.5 (H) 04/04/2021 05:09 PM    Hemoglobin A1c >14.0 (H) 10/20/2020 10:15 AM    Glucose 59 (L) 05/21/2021 03:08 AM    Glucose (POC) 108 05/21/2021 04:53 PM    Glucose,  (H) 12/27/2014 05:54 AM    Microalbumin/Creat ratio (mg/g creat) 2,040 (H) 05/18/2021 12:57 PM    Microalbumin,urine random 408.00 (H) 05/18/2021 12:57 PM    LDL, calculated 121 (H) 09/28/2020 01:00 PM    Creatinine, POC 0.7 12/27/2014 05:54 AM    Creatinine 1.24 05/21/2021 03:08 AM      Lab Results   Component Value Date/Time    Cholesterol, total 218 (H) 09/28/2020 01:00 PM    HDL Cholesterol 49 09/28/2020 01:00 PM    LDL, calculated 121 (H) 09/28/2020 01:00 PM    Triglyceride 241 (H) 09/28/2020 01:00 PM     Lab Results   Component Value Date/Time    NT pro- (H) 04/05/2021 06:15 AM    NT pro- (H) 12/13/2020 01:12 AM    NT pro- (H) 10/22/2020 11:37 AM      Lab Results   Component Value Date/Time    Sodium 142 05/21/2021 03:08 AM    Potassium 3.0 (L) 05/21/2021 03:08 AM    Chloride 109 05/21/2021 03:08 AM    CO2 25 05/21/2021 03:08 AM    Anion gap 8 05/21/2021 03:08 AM    Glucose 59 (L) 05/21/2021 03:08 AM    BUN 8 05/21/2021 03:08 AM    Creatinine 1.24 05/21/2021 03:08 AM    BUN/Creatinine ratio 6 (L) 05/21/2021 03:08 AM    GFR est AA 57 (L) 05/21/2021 03:08 AM    GFR est non-AA 47 (L) 05/21/2021 03:08 AM    Calcium 8.2 (L) 05/21/2021 03:08 AM          XR Results (maximum last 3): Results from [place] encounter on 05/17/21    XR FOOT LT MIN 3 V    Impression  Septic arthritis of first MTP joint noted on subsequent MRI. More subtle finding  seen on radiograph with overlying soft tissue edema and ulceration. Phlegmon/abscess suggested on MRI as well. XR CHEST PORT    Impression  Question mild interstitial prominence. Findings similar to prior exam.      Results from Hospital Encounter encounter on 04/04/21    XR FOOT LT MIN 3 V    Impression  Marked soft tissue swelling with possible plantar heel ulcer and ulcer at the  tip of the left first toe. No definite finding for osteomyelitis or acute  fracture. CT Results (maximum last 3): Results from [place] encounter on 10/19/20    CTA CHEST W OR W WO CONT    Impression  IMPRESSION:  There is no pulmonary embolism. Incidental note is made of 3 mm right middle lobe pulmonary nodule. If the  patient has a significant smoking history or is otherwise at high risk for  development of lung cancer, an optional follow-up chest CT may be obtained in  one year.  If there are no significant risk factors for the development of lung  cancer, no specific follow-up is necessary. Possible 1.8 send a left thyroid nodule. Consider follow-up thyroid ultrasound,  nonemergent. Results from [place] encounter on 03/04/19    CT HEAD WO CONT    Impression  IMPRESSION: Unremarkable noncontrast head CT. CTA ABD ART W RUNOFF W WO CONT    Impression  IMPRESSION:    No evidence for an aneurysm or dissection in the aorta. No narrowing of the major branches of the abdominal aorta. Normal iliac arteries bilaterally. No evidence for peripheral arterial disease. [facility] MRI Results (maximum last 3): Results from [place] encounter on 05/17/21    MRI FOOT LT WO CONT    Impression  Markedly worsening infectious/inflammatory changes surrounding the first MTP  joint with new osteomyelitis of the first metatarsal head and first proximal  phalanx. New multifocal abscesses/phlegmon within the soft tissues of the  plantar foot and extending into the first toe. As attending radiologist, I have assessed the study images and dictated or  reviewed/edited the final report as needed. Results from [place] encounter on 04/04/21    MRI FOOT LT W WO CONT    Impression  1. Blistering cellulitis at the medial left forefoot  -Plantar soft tissue ulceration with short length sinus tract  -No drainable abscess  -No osteomyelitis      Results from Hospital Encounter encounter on 04/09/19    MRI BRAIN WO CONT    Impression  IMPRESSION:    Brain looks normal. No acute stroke or evidence of demyelinating disease.       Nuclear Med        Results for orders placed or performed during the hospital encounter of 05/17/21   EKG, 12 LEAD, INITIAL   Result Value Ref Range    Ventricular Rate 100 BPM    Atrial Rate 100 BPM    P-R Interval 124 ms    QRS Duration 82 ms    Q-T Interval 346 ms    QTC Calculation (Bezet) 446 ms    Calculated P Axis 42 degrees    Calculated R Axis 33 degrees    Calculated T Axis 47 degrees    Diagnosis       Normal sinus rhythm  Normal ECG  When compared with ECG of 15-MAY-2021 13:49,  No significant change was found  Confirmed by Jabari Ledbetter MD, ----- ([id number]) on 5/19/2021 4:14:15 PM           We discussed the expected course, resolution and complications of the diagnosis(es) in detail. Medication risks, benefits, costs, interactions, and alternatives were discussed as indicated. I advised her to contact the office if her condition worsens, changes or fails to improve as anticipated. She expressed understanding with the diagnosis(es) and plan. This note was done with the assistance of dragon speech software.   Some inadvertent errors or omissions may be present

## 2021-05-24 RX ORDER — HEPARIN SODIUM 200 [USP'U]/100ML
INJECTION, SOLUTION INTRAVENOUS
Status: DISCONTINUED
Start: 2021-05-24 | End: 2021-05-24 | Stop reason: HOSPADM

## 2021-05-25 LAB
BACTERIA SPEC CULT: NORMAL
SERVICE CMNT-IMP: NORMAL

## 2021-05-26 ENCOUNTER — VIRTUAL VISIT (OUTPATIENT)
Dept: FAMILY MEDICINE CLINIC | Age: 46
End: 2021-05-26
Payer: MEDICAID

## 2021-05-26 DIAGNOSIS — E11.8 TYPE 2 DIABETES MELLITUS WITH COMPLICATION (HCC): ICD-10-CM

## 2021-05-26 DIAGNOSIS — R05.9 COUGH: Primary | ICD-10-CM

## 2021-05-26 PROCEDURE — 99214 OFFICE O/P EST MOD 30 MIN: CPT | Performed by: EMERGENCY MEDICINE

## 2021-05-26 RX ORDER — BENZONATATE 100 MG/1
100 CAPSULE ORAL
Qty: 30 CAPSULE | Refills: 0 | Status: SHIPPED | OUTPATIENT
Start: 2021-05-26 | End: 2021-06-02

## 2021-05-26 RX ORDER — INSULIN LISPRO 100 [IU]/ML
INJECTION, SOLUTION INTRAVENOUS; SUBCUTANEOUS
Qty: 5 PEN | Refills: 11 | Status: SHIPPED | OUTPATIENT
Start: 2021-05-26

## 2021-06-01 NOTE — TELEPHONE ENCOUNTER
Requested Prescriptions     Pending Prescriptions Disp Refills    ticagrelor (BRILINTA) 90 mg tablet 90 Tablet 3     Sig: Take 1 Tablet by mouth every twelve (12) hours every twelve (12) hours.

## 2021-06-24 ENCOUNTER — APPOINTMENT (OUTPATIENT)
Dept: GENERAL RADIOLOGY | Age: 46
DRG: 314 | End: 2021-06-24
Attending: PODIATRIST
Payer: MEDICAID

## 2021-06-24 ENCOUNTER — APPOINTMENT (OUTPATIENT)
Dept: GENERAL RADIOLOGY | Age: 46
DRG: 314 | End: 2021-06-24
Attending: STUDENT IN AN ORGANIZED HEALTH CARE EDUCATION/TRAINING PROGRAM
Payer: MEDICAID

## 2021-06-24 ENCOUNTER — HOSPITAL ENCOUNTER (INPATIENT)
Age: 46
LOS: 6 days | Discharge: HOME HEALTH | DRG: 314 | End: 2021-06-30
Attending: EMERGENCY MEDICINE | Admitting: INTERNAL MEDICINE
Payer: MEDICAID

## 2021-06-24 ENCOUNTER — APPOINTMENT (OUTPATIENT)
Dept: MRI IMAGING | Age: 46
DRG: 314 | End: 2021-06-24
Attending: STUDENT IN AN ORGANIZED HEALTH CARE EDUCATION/TRAINING PROGRAM
Payer: MEDICAID

## 2021-06-24 DIAGNOSIS — I21.4 NSTEMI (NON-ST ELEVATED MYOCARDIAL INFARCTION) (HCC): ICD-10-CM

## 2021-06-24 DIAGNOSIS — L97.404 DIABETIC ULCER OF MIDFOOT ASSOCIATED WITH DIABETES MELLITUS DUE TO UNDERLYING CONDITION, WITH NECROSIS OF BONE, UNSPECIFIED LATERALITY (HCC): ICD-10-CM

## 2021-06-24 DIAGNOSIS — E11.8 TYPE 2 DIABETES MELLITUS WITH COMPLICATION (HCC): ICD-10-CM

## 2021-06-24 DIAGNOSIS — A41.9 SEPSIS, UNSPECIFIED: Primary | ICD-10-CM

## 2021-06-24 DIAGNOSIS — E11.8 CONTROLLED TYPE 2 DIABETES MELLITUS WITH COMPLICATION, WITH LONG-TERM CURRENT USE OF INSULIN (HCC): ICD-10-CM

## 2021-06-24 DIAGNOSIS — E08.621 DIABETIC ULCER OF MIDFOOT ASSOCIATED WITH DIABETES MELLITUS DUE TO UNDERLYING CONDITION, WITH NECROSIS OF BONE, UNSPECIFIED LATERALITY (HCC): ICD-10-CM

## 2021-06-24 DIAGNOSIS — I10 ESSENTIAL HYPERTENSION: ICD-10-CM

## 2021-06-24 DIAGNOSIS — E11.628 DIABETIC FOOT INFECTION (HCC): ICD-10-CM

## 2021-06-24 DIAGNOSIS — D64.9 ANEMIA, UNSPECIFIED TYPE: ICD-10-CM

## 2021-06-24 DIAGNOSIS — L08.9 DIABETIC FOOT INFECTION (HCC): ICD-10-CM

## 2021-06-24 DIAGNOSIS — Z79.4 CONTROLLED TYPE 2 DIABETES MELLITUS WITH COMPLICATION, WITH LONG-TERM CURRENT USE OF INSULIN (HCC): ICD-10-CM

## 2021-06-24 DIAGNOSIS — L97.509 FOOT ULCER DUE TO SECONDARY DM (HCC): ICD-10-CM

## 2021-06-24 DIAGNOSIS — E13.621 FOOT ULCER DUE TO SECONDARY DM (HCC): ICD-10-CM

## 2021-06-24 DIAGNOSIS — B49 FUNGEMIA: ICD-10-CM

## 2021-06-24 PROBLEM — L97.529 FOOT ULCER, LEFT (HCC): Status: ACTIVE | Noted: 2021-06-24

## 2021-06-24 LAB
ALBUMIN SERPL-MCNC: 1.4 G/DL (ref 3.4–5)
ALBUMIN/GLOB SERPL: 0.3 {RATIO} (ref 0.8–1.7)
ALP SERPL-CCNC: 704 U/L (ref 45–117)
ALT SERPL-CCNC: 24 U/L (ref 13–56)
ANION GAP SERPL CALC-SCNC: 6 MMOL/L (ref 3–18)
ANION GAP SERPL CALC-SCNC: 7 MMOL/L (ref 3–18)
APPEARANCE UR: CLEAR
AST SERPL-CCNC: 28 U/L (ref 10–38)
BACTERIA URNS QL MICRO: ABNORMAL /HPF
BASOPHILS # BLD: 0 K/UL (ref 0–0.1)
BASOPHILS NFR BLD: 1 % (ref 0–2)
BILIRUB SERPL-MCNC: 0.3 MG/DL (ref 0.2–1)
BILIRUB UR QL: NEGATIVE
BUN SERPL-MCNC: 10 MG/DL (ref 7–18)
BUN SERPL-MCNC: 8 MG/DL (ref 7–18)
BUN/CREAT SERPL: 7 (ref 12–20)
BUN/CREAT SERPL: 9 (ref 12–20)
CALCIUM SERPL-MCNC: 5.9 MG/DL (ref 8.5–10.1)
CALCIUM SERPL-MCNC: 7.5 MG/DL (ref 8.5–10.1)
CALCIUM SERPL-MCNC: 7.7 MG/DL (ref 8.5–10.1)
CHLORIDE SERPL-SCNC: 107 MMOL/L (ref 100–111)
CHLORIDE SERPL-SCNC: 117 MMOL/L (ref 100–111)
CK MB CFR SERPL CALC: NORMAL % (ref 0–4)
CK MB SERPL-MCNC: <1 NG/ML (ref 5–25)
CK SERPL-CCNC: 103 U/L (ref 26–192)
CO2 SERPL-SCNC: 23 MMOL/L (ref 21–32)
CO2 SERPL-SCNC: 27 MMOL/L (ref 21–32)
COLOR UR: YELLOW
CREAT SERPL-MCNC: 0.87 MG/DL (ref 0.6–1.3)
CREAT SERPL-MCNC: 1.4 MG/DL (ref 0.6–1.3)
DIFFERENTIAL METHOD BLD: ABNORMAL
EOSINOPHIL # BLD: 0.1 K/UL (ref 0–0.4)
EOSINOPHIL NFR BLD: 2 % (ref 0–5)
EPITH CASTS URNS QL MICRO: ABNORMAL /LPF (ref 0–5)
ERYTHROCYTE [DISTWIDTH] IN BLOOD BY AUTOMATED COUNT: 16.9 % (ref 11.6–14.5)
GLOBULIN SER CALC-MCNC: 4.5 G/DL (ref 2–4)
GLUCOSE SERPL-MCNC: 178 MG/DL (ref 74–99)
GLUCOSE SERPL-MCNC: 273 MG/DL (ref 74–99)
GLUCOSE UR STRIP.AUTO-MCNC: 500 MG/DL
HCT VFR BLD AUTO: 25.5 % (ref 35–45)
HGB BLD-MCNC: 7.6 G/DL (ref 12–16)
HGB UR QL STRIP: ABNORMAL
KETONES UR QL STRIP.AUTO: NEGATIVE MG/DL
LACTATE BLD-SCNC: 0.62 MMOL/L (ref 0.4–2)
LEUKOCYTE ESTERASE UR QL STRIP.AUTO: ABNORMAL
LYMPHOCYTES # BLD: 1 K/UL (ref 0.9–3.6)
LYMPHOCYTES NFR BLD: 18 % (ref 21–52)
MAGNESIUM SERPL-MCNC: 1.4 MG/DL (ref 1.6–2.6)
MCH RBC QN AUTO: 21.6 PG (ref 24–34)
MCHC RBC AUTO-ENTMCNC: 29.8 G/DL (ref 31–37)
MCV RBC AUTO: 72.4 FL (ref 74–97)
MONOCYTES # BLD: 0.5 K/UL (ref 0.05–1.2)
MONOCYTES NFR BLD: 10 % (ref 3–10)
NEUTS SEG # BLD: 3.8 K/UL (ref 1.8–8)
NEUTS SEG NFR BLD: 69 % (ref 40–73)
NITRITE UR QL STRIP.AUTO: NEGATIVE
PH UR STRIP: 6 [PH] (ref 5–8)
PLATELET # BLD AUTO: 214 K/UL (ref 135–420)
PMV BLD AUTO: 9.4 FL (ref 9.2–11.8)
POTASSIUM SERPL-SCNC: 2.6 MMOL/L (ref 3.5–5.5)
POTASSIUM SERPL-SCNC: 2.7 MMOL/L (ref 3.5–5.5)
PROT SERPL-MCNC: 5.9 G/DL (ref 6.4–8.2)
PROT UR STRIP-MCNC: >1000 MG/DL
PTH-INTACT SERPL-MCNC: 238.7 PG/ML (ref 18.4–88)
RBC # BLD AUTO: 3.52 M/UL (ref 4.2–5.3)
RBC #/AREA URNS HPF: ABNORMAL /HPF (ref 0–5)
SODIUM SERPL-SCNC: 141 MMOL/L (ref 136–145)
SODIUM SERPL-SCNC: 146 MMOL/L (ref 136–145)
SP GR UR REFRACTOMETRY: 1.02 (ref 1–1.03)
TROPONIN I SERPL-MCNC: <0.02 NG/ML (ref 0–0.04)
UROBILINOGEN UR QL STRIP.AUTO: 0.2 EU/DL (ref 0.2–1)
VANCOMYCIN SERPL-MCNC: <0.8 UG/ML (ref 5–40)
WBC # BLD AUTO: 5.4 K/UL (ref 4.6–13.2)
WBC URNS QL MICRO: ABNORMAL /HPF (ref 0–4)
YEAST URNS QL MICRO: ABNORMAL

## 2021-06-24 PROCEDURE — 74011250636 HC RX REV CODE- 250/636: Performed by: INTERNAL MEDICINE

## 2021-06-24 PROCEDURE — 87040 BLOOD CULTURE FOR BACTERIA: CPT

## 2021-06-24 PROCEDURE — 87077 CULTURE AEROBIC IDENTIFY: CPT

## 2021-06-24 PROCEDURE — 96361 HYDRATE IV INFUSION ADD-ON: CPT

## 2021-06-24 PROCEDURE — 83735 ASSAY OF MAGNESIUM: CPT

## 2021-06-24 PROCEDURE — 74011250637 HC RX REV CODE- 250/637: Performed by: STUDENT IN AN ORGANIZED HEALTH CARE EDUCATION/TRAINING PROGRAM

## 2021-06-24 PROCEDURE — 74011000258 HC RX REV CODE- 258: Performed by: INTERNAL MEDICINE

## 2021-06-24 PROCEDURE — 99223 1ST HOSP IP/OBS HIGH 75: CPT | Performed by: INTERNAL MEDICINE

## 2021-06-24 PROCEDURE — 96366 THER/PROPH/DIAG IV INF ADDON: CPT

## 2021-06-24 PROCEDURE — 96367 TX/PROPH/DG ADDL SEQ IV INF: CPT

## 2021-06-24 PROCEDURE — 80202 ASSAY OF VANCOMYCIN: CPT

## 2021-06-24 PROCEDURE — 71045 X-RAY EXAM CHEST 1 VIEW: CPT

## 2021-06-24 PROCEDURE — 83605 ASSAY OF LACTIC ACID: CPT

## 2021-06-24 PROCEDURE — 74011000250 HC RX REV CODE- 250: Performed by: STUDENT IN AN ORGANIZED HEALTH CARE EDUCATION/TRAINING PROGRAM

## 2021-06-24 PROCEDURE — 96365 THER/PROPH/DIAG IV INF INIT: CPT

## 2021-06-24 PROCEDURE — 74011250637 HC RX REV CODE- 250/637: Performed by: INTERNAL MEDICINE

## 2021-06-24 PROCEDURE — 87086 URINE CULTURE/COLONY COUNT: CPT

## 2021-06-24 PROCEDURE — 87186 SC STD MICRODIL/AGAR DIL: CPT

## 2021-06-24 PROCEDURE — 2709999900 HC NON-CHARGEABLE SUPPLY

## 2021-06-24 PROCEDURE — 96375 TX/PRO/DX INJ NEW DRUG ADDON: CPT

## 2021-06-24 PROCEDURE — 83970 ASSAY OF PARATHORMONE: CPT

## 2021-06-24 PROCEDURE — 87205 SMEAR GRAM STAIN: CPT

## 2021-06-24 PROCEDURE — 85025 COMPLETE CBC W/AUTO DIFF WBC: CPT

## 2021-06-24 PROCEDURE — 65270000029 HC RM PRIVATE

## 2021-06-24 PROCEDURE — 82550 ASSAY OF CK (CPK): CPT

## 2021-06-24 PROCEDURE — 73718 MRI LOWER EXTREMITY W/O DYE: CPT

## 2021-06-24 PROCEDURE — 74011000250 HC RX REV CODE- 250: Performed by: INTERNAL MEDICINE

## 2021-06-24 PROCEDURE — 80053 COMPREHEN METABOLIC PANEL: CPT

## 2021-06-24 PROCEDURE — 73620 X-RAY EXAM OF FOOT: CPT

## 2021-06-24 PROCEDURE — 74011250636 HC RX REV CODE- 250/636: Performed by: STUDENT IN AN ORGANIZED HEALTH CARE EDUCATION/TRAINING PROGRAM

## 2021-06-24 PROCEDURE — 99284 EMERGENCY DEPT VISIT MOD MDM: CPT

## 2021-06-24 PROCEDURE — 81001 URINALYSIS AUTO W/SCOPE: CPT

## 2021-06-24 PROCEDURE — 93005 ELECTROCARDIOGRAM TRACING: CPT

## 2021-06-24 RX ORDER — GUAIFENESIN 100 MG/5ML
81 LIQUID (ML) ORAL DAILY
Status: DISCONTINUED | OUTPATIENT
Start: 2021-06-25 | End: 2021-06-29

## 2021-06-24 RX ORDER — LANOLIN ALCOHOL/MO/W.PET/CERES
400 CREAM (GRAM) TOPICAL 2 TIMES DAILY
Status: DISCONTINUED | OUTPATIENT
Start: 2021-06-24 | End: 2021-06-30 | Stop reason: HOSPADM

## 2021-06-24 RX ORDER — POLYETHYLENE GLYCOL 3350 17 G/17G
17 POWDER, FOR SOLUTION ORAL DAILY PRN
Status: DISCONTINUED | OUTPATIENT
Start: 2021-06-24 | End: 2021-06-30 | Stop reason: HOSPADM

## 2021-06-24 RX ORDER — CALCIUM GLUCONATE 94 MG/ML
1 INJECTION, SOLUTION INTRAVENOUS ONCE
Status: DISCONTINUED | OUTPATIENT
Start: 2021-06-24 | End: 2021-06-24

## 2021-06-24 RX ORDER — POTASSIUM CHLORIDE 7.45 MG/ML
10 INJECTION INTRAVENOUS
Status: COMPLETED | OUTPATIENT
Start: 2021-06-25 | End: 2021-06-25

## 2021-06-24 RX ORDER — ACETAMINOPHEN 325 MG/1
650 TABLET ORAL
Status: DISCONTINUED | OUTPATIENT
Start: 2021-06-24 | End: 2021-06-30 | Stop reason: HOSPADM

## 2021-06-24 RX ORDER — ONDANSETRON 4 MG/1
4 TABLET, ORALLY DISINTEGRATING ORAL
Status: DISCONTINUED | OUTPATIENT
Start: 2021-06-24 | End: 2021-06-30 | Stop reason: HOSPADM

## 2021-06-24 RX ORDER — POTASSIUM CHLORIDE 20 MEQ/1
40 TABLET, EXTENDED RELEASE ORAL
Status: COMPLETED | OUTPATIENT
Start: 2021-06-24 | End: 2021-06-24

## 2021-06-24 RX ORDER — ACETAMINOPHEN 650 MG/1
650 SUPPOSITORY RECTAL
Status: DISCONTINUED | OUTPATIENT
Start: 2021-06-24 | End: 2021-06-30 | Stop reason: HOSPADM

## 2021-06-24 RX ORDER — LEVOFLOXACIN 5 MG/ML
750 INJECTION, SOLUTION INTRAVENOUS ONCE
Status: COMPLETED | OUTPATIENT
Start: 2021-06-24 | End: 2021-06-24

## 2021-06-24 RX ORDER — SODIUM CHLORIDE 0.9 % (FLUSH) 0.9 %
5-10 SYRINGE (ML) INJECTION AS NEEDED
Status: DISCONTINUED | OUTPATIENT
Start: 2021-06-24 | End: 2021-06-27

## 2021-06-24 RX ORDER — POTASSIUM CHLORIDE 20 MEQ/1
40 TABLET, EXTENDED RELEASE ORAL 2 TIMES DAILY
Status: COMPLETED | OUTPATIENT
Start: 2021-06-24 | End: 2021-06-25

## 2021-06-24 RX ORDER — POTASSIUM CHLORIDE 7.45 MG/ML
10 INJECTION INTRAVENOUS
Status: COMPLETED | OUTPATIENT
Start: 2021-06-24 | End: 2021-06-24

## 2021-06-24 RX ORDER — LANOLIN ALCOHOL/MO/W.PET/CERES
325 CREAM (GRAM) TOPICAL
Status: DISCONTINUED | OUTPATIENT
Start: 2021-06-25 | End: 2021-06-30 | Stop reason: HOSPADM

## 2021-06-24 RX ORDER — VANCOMYCIN 1.75 GRAM/500 ML IN 0.9 % SODIUM CHLORIDE INTRAVENOUS
1750 ONCE
Status: COMPLETED | OUTPATIENT
Start: 2021-06-24 | End: 2021-06-24

## 2021-06-24 RX ORDER — SODIUM CHLORIDE 0.9 % (FLUSH) 0.9 %
5-40 SYRINGE (ML) INJECTION EVERY 8 HOURS
Status: DISCONTINUED | OUTPATIENT
Start: 2021-06-24 | End: 2021-06-30 | Stop reason: HOSPADM

## 2021-06-24 RX ORDER — ONDANSETRON 2 MG/ML
4 INJECTION INTRAMUSCULAR; INTRAVENOUS
Status: DISCONTINUED | OUTPATIENT
Start: 2021-06-24 | End: 2021-06-30 | Stop reason: HOSPADM

## 2021-06-24 RX ORDER — INSULIN LISPRO 100 [IU]/ML
10 INJECTION, SOLUTION INTRAVENOUS; SUBCUTANEOUS
Status: DISCONTINUED | OUTPATIENT
Start: 2021-06-25 | End: 2021-06-25 | Stop reason: DRUGHIGH

## 2021-06-24 RX ORDER — SODIUM CHLORIDE 0.9 % (FLUSH) 0.9 %
5-40 SYRINGE (ML) INJECTION AS NEEDED
Status: DISCONTINUED | OUTPATIENT
Start: 2021-06-24 | End: 2021-06-30 | Stop reason: HOSPADM

## 2021-06-24 RX ORDER — POTASSIUM CHLORIDE 7.45 MG/ML
10 INJECTION INTRAVENOUS
Status: DISPENSED | OUTPATIENT
Start: 2021-06-24 | End: 2021-06-24

## 2021-06-24 RX ORDER — ROSUVASTATIN CALCIUM 10 MG/1
10 TABLET, COATED ORAL
Status: DISCONTINUED | OUTPATIENT
Start: 2021-06-24 | End: 2021-06-30 | Stop reason: HOSPADM

## 2021-06-24 RX ORDER — ENOXAPARIN SODIUM 100 MG/ML
40 INJECTION SUBCUTANEOUS DAILY
Status: DISCONTINUED | OUTPATIENT
Start: 2021-06-25 | End: 2021-06-30 | Stop reason: HOSPADM

## 2021-06-24 RX ORDER — VANCOMYCIN 1.75 GRAM/500 ML IN 0.9 % SODIUM CHLORIDE INTRAVENOUS
1750 ONCE
Status: DISCONTINUED | OUTPATIENT
Start: 2021-06-24 | End: 2021-06-24

## 2021-06-24 RX ADMIN — VANCOMYCIN HYDROCHLORIDE 1750 MG: 10 INJECTION, POWDER, LYOPHILIZED, FOR SOLUTION INTRAVENOUS at 15:37

## 2021-06-24 RX ADMIN — POTASSIUM CHLORIDE 10 MEQ: 7.46 INJECTION, SOLUTION INTRAVENOUS at 14:23

## 2021-06-24 RX ADMIN — TICAGRELOR 90 MG: 90 TABLET ORAL at 22:12

## 2021-06-24 RX ADMIN — POTASSIUM CHLORIDE 10 MEQ: 10 INJECTION, SOLUTION INTRAVENOUS at 18:11

## 2021-06-24 RX ADMIN — MAGNESIUM SULFATE HEPTAHYDRATE 3 G: 500 INJECTION, SOLUTION INTRAMUSCULAR; INTRAVENOUS at 23:32

## 2021-06-24 RX ADMIN — SODIUM CHLORIDE 1000 ML: 900 INJECTION, SOLUTION INTRAVENOUS at 11:30

## 2021-06-24 RX ADMIN — ROSUVASTATIN CALCIUM 10 MG: 10 TABLET, COATED ORAL at 22:12

## 2021-06-24 RX ADMIN — CEFEPIME HYDROCHLORIDE 2 G: 2 INJECTION, POWDER, FOR SOLUTION INTRAVENOUS at 22:14

## 2021-06-24 RX ADMIN — POTASSIUM CHLORIDE 10 MEQ: 10 INJECTION, SOLUTION INTRAVENOUS at 19:00

## 2021-06-24 RX ADMIN — CEFEPIME HYDROCHLORIDE 2 G: 2 INJECTION, POWDER, FOR SOLUTION INTRAVENOUS at 14:31

## 2021-06-24 RX ADMIN — LEVOFLOXACIN 750 MG: 5 INJECTION, SOLUTION INTRAVENOUS at 12:07

## 2021-06-24 RX ADMIN — POTASSIUM CHLORIDE 40 MEQ: 1500 TABLET, EXTENDED RELEASE ORAL at 12:04

## 2021-06-24 RX ADMIN — MAGNESIUM OXIDE TAB 400 MG (241.3 MG ELEMENTAL MG) 400 MG: 400 (241.3 MG) TAB at 18:11

## 2021-06-24 RX ADMIN — Medication 10 ML: at 22:13

## 2021-06-24 RX ADMIN — POTASSIUM CHLORIDE 40 MEQ: 1500 TABLET, EXTENDED RELEASE ORAL at 18:11

## 2021-06-24 RX ADMIN — SODIUM CHLORIDE 1000 ML: 900 INJECTION, SOLUTION INTRAVENOUS at 14:38

## 2021-06-24 NOTE — ED NOTES
Bedside and Verbal transfer report given to Humaira (oncoming nurse) by Blaine Boswell RN (offgoing nurse). Report included the following information SBAR, Kardex, MAR and Recent Results. SITUATION:    Code Status: Prior   Reason for Admission: Sepsis ([facility] [A41.9]   Foot ulcer, left ([facility] day: 0   Problem List:       Hospital Problems  Date Reviewed: 5/26/2021        Codes Class Noted POA    Foot ulcer, left ([facility] ICD-10-CM: K95.413  ICD-9-CM: 707.15  6/24/2021 Unknown        Sepsis ([facility] ICD-10-CM: A41.9  ICD-9-CM: 038.9, 995.91  5/17/2021 Unknown              BACKGROUND:    Past Medical History:   Past Medical History:   Diagnosis Date    CAD (coronary artery disease)     Diabetes ([facility]     HTN (hypertension)     Hyperlipidemia          Patient taking anticoagulants no     ASSESSMENT:    Changes in Assessment Throughout Shift:      Patient has Central Line: yes Reasons if yes: abt treatment   Patient has Lucas Cath: no Reasons if yes: n/a      Last Vitals:     Vitals:    06/24/21 1012 06/24/21 1545 06/24/21 1600   BP: 93/60 (!) 163/95 (!) 160/90   Pulse: 100     Resp: 20     Temp: 98.8 °F (37.1 °C)     SpO2: 100% 100% 100%   Weight: 87.5 kg (193 lb)     Height: 5' 7\" (1.702 m)          IV and DRAINS (will only show if present)         WOUND (if present)   Wound Type:   Wound vac on let foot, wound on left toe    Dressing present  yes, wound vac covered and operating; clean gauze and ace wrap to left foot    Wound Concerns/Notes:  Under care of primary, care physician      PAIN    Pain Assessment    Pain Intensity 1: 0 (06/24/21 1012)              Patient Stated Pain Goal: 0  o Interventions for Pain:  none  o Intervention effective: no  o Time of last intervention: n/a   o Reassessment Completed: no      Last 3 Weights:  Last 3 Recorded Weights in this Encounter    06/24/21 1012   Weight: 87.5 kg (193 lb)     Weight change:      INTAKE/OUPUT    Current Shift: 06/24 0701 - 06/24 1900  In: 1150 [I.V.:1150]  Out: -     Last three shifts: No intake/output data recorded.  LAB RESULTS     Recent Labs     06/24/21  1039   WBC 5.4   HGB 7.6*   HCT 25.5*           Recent Labs     06/24/21  1039      K 2.7*   *   BUN 10   CREA 1.40*   CA 7.7*       RECOMMENDATIONS AND DISCHARGE PLANNING     1. Pending tests/procedures/ Plan of Care or Other Needs: yes     2. Discharge plan for patient and Needs/Barriers: unk    3. Estimated Discharge Date: unk Posted on Whiteboard in Patients Room: n/a      4. The patient's care plan was reviewed with the oncoming nurse. \"HEALS\" SAFETY CHECK      Fall Risk         Safety Measures:      A safety check occurred in the patient's room between off going nurse and oncoming nurse listed above. The safety check included the below items  Area Items   H  High Alert Medications - Verify all high alert medication drips (heparin, PCA, etc.)   E  Equipment - Suction is set up for ALL patients (with sathish)  - Red plugs utilized for all equipment (IV pumps, etc.)  - WOWs wiped down at end of shift.  - Room stocked with oxygen, suction, and other unit-specific supplies   A  Alarms - Bed alarm is set for fall risk patients  - Ensure chair alarm is in place and activated if patient is up in a chair   L  Lines - Check IV for any infiltration  - Lucas bag is empty if patient has a Lucas   - Tubing and IV bags are labeled   S  Safety   - Room is clean, patient is clean, and equipment is clean. - Hallways are clear from equipment besides carts. - Fall bracelet on for fall risk patients  - Ensure room is clear and free of clutter  - Suction is set up for ALL patients (with sathish)  - Hallways are clear from equipment besides carts.    - Isolation precautions followed, supplies available outside room, sign posted     Luc Barrios RN

## 2021-06-24 NOTE — ED TRIAGE NOTES
Pt states she has been feeling lightheaded, dizzy and weak times 2 days feeling like she is going to pass out. Pt has a wound to right foot with a wound vac and states she is getting IV abx at home.

## 2021-06-24 NOTE — PROGRESS NOTES
MRI Safety Screening form needs to be filled out and FAXED to [phone] BEFORE MRI can be scheduled. If unable to acquire information from patient [facility] must be contacted, or Screening xrays will need to be ordered.    IF pt is Claustrophobic or will need Pain Meds, Please Have these ordered in advance to help facilitate MRI Exam. - [facility]

## 2021-06-24 NOTE — ED NOTES
Blood cultures collected from PICC line in Right arm, Red lumen. Second set collected from venipuncture to left [facility].

## 2021-06-24 NOTE — ED PROVIDER NOTES
EMERGENCY DEPARTMENT HISTORY AND PHYSICAL EXAM    10:17 AM      Date: 6/24/2021  Patient Name: Jacinda Shields    History of Presenting Illness     Chief Complaint   Patient presents with    Dizziness    Fatigue         History Provided By: Patient  Location/Duration/Severity/Modifying factors   HPI     45-year-old female past medical history of HTN, HLD, CAD, NSTEMI, IDDM recently hospitalized for infected left foot ulcer (15 May) now on a PICC line presenting with 2 days of weakness and lightheadedness. Reports a single episode 2 days ago of syncope when standing quickly during her son's graduation. She was called. She did not hit her head. She recovered very quickly afterward decided not to go get medical evaluation. She endorses decreased appetite over the past 2 days secondary to nausea. She had one episode of emesis 2 days ago. Has not vomited since. No fevers, night sweats, chills. Reports taking her home antibiotic on a daily basis through her PICC line. Record review demonstrates that she has been getting ceftriaxone and is scheduled to complete the course on 6/30. Has been seen by home nursing 3 times a week for wound changes. Reports the development of a new wound on her left fifth and fourth webspace of her left foot.       PCP: Karla Pollock MD    Current Facility-Administered Medications   Medication Dose Route Frequency Provider Last Rate Last Admin    sodium chloride (NS) flush 5-10 mL  5-10 mL IntraVENous PRN Florence Zacarias MD        sodium chloride 0.9 % bolus infusion 625 mL  625 mL IntraVENous ONCE Florence Zacarias MD        potassium chloride 10 mEq in 100 ml IVPB  10 mEq IntraVENous NOW Florence Zacarias  mL/hr at 06/24/21 1423 10 mEq at 06/24/21 1423    VANCOMYCIN INFORMATION NOTE   Other Rx Dosing/Monitoring Florence Zacarias MD        cefepime (MAXIPIME) 2 g in sterile water (preservative free) 10 mL IV syringe  2 g IntraVENous Q8H Florence Zacarias  mL/hr at 06/24/21 1431 2 g at 06/24/21 1431     Current Outpatient Medications   Medication Sig Dispense Refill    ticagrelor (BRILINTA) 90 mg tablet Take 1 Tablet by mouth every twelve (12) hours every twelve (12) hours. 180 Tablet 1    insulin lispro (HUMALOG) 100 unit/mL kwikpen 10 units before meals  Indications: type 2 diabetes mellitus 5 Pen 11    metFORMIN ER (GLUCOPHAGE XR) 500 mg tablet Take 1 Tab by mouth daily (with dinner). 90 Tab 3    rosuvastatin (CRESTOR) 10 mg tablet Take 1 Tab by mouth nightly. 90 Tab 3    carvediloL (Coreg) 12.5 mg tablet Take 1 Tab by mouth two (2) times daily (with meals). Indications: myocardial reinfarction prevention 60 Tab 3    spironolactone (ALDACTONE) 25 mg tablet Take 25 mg by mouth daily.  ferrous sulfate 325 mg (65 mg iron) tablet Take 1 Tab by mouth daily (with breakfast). 30 Tab 0    furosemide (LASIX) 20 mg tablet Take 1 Tab by mouth daily as needed (Edema). 30 Tab 1    insulin glargine (LANTUS,BASAGLAR) 100 unit/mL (3 mL) inpn Take 45 units daily  Indications: type 2 diabetes mellitus (Patient taking differently: 50 Units by SubCUTAneous route nightly. Indications: type 2 diabetes mellitus) 4 Pen 5    aspirin 81 mg chewable tablet Take 1 Tab by mouth daily. 30 Tab 0    melatonin 5 mg cap capsule Take 1 Cap by mouth nightly. (Patient taking differently: Take 5 mg by mouth as needed (difficulty in sleeping).  Reports taking it as needed) 30 Cap 1    Insulin Needles, Disposable, 31 gauge x 5/16\" ndle Use to check blood glucose  Pen Needle 11    Insulin Syringe-Needle U-100 (INSULIN SYRINGE) 1 mL 30 gauge x 5/16 syrg As directed for lantus insulin 50 Syringe 0       Past History     Past Medical History:  Past Medical History:   Diagnosis Date    CAD (coronary artery disease)     Diabetes ([address]     HTN (hypertension)     Hyperlipidemia        Past Surgical History:  Past Surgical History:   Procedure Laterality Date    HX CORONARY STENT PLACEMENT      HX GYN             Family History:  Family History   Problem Relation Age of Onset    Lupus Mother     Cancer Neg Hx     Diabetes Neg Hx     Heart Disease Neg Hx     Hypertension Neg Hx     Heart Attack Neg Hx     Stroke Neg Hx        Social History:  Social History     Tobacco Use    Smoking status: Never Smoker    Smokeless tobacco: Never Used   Substance Use Topics    Alcohol use: No    Drug use: No       Allergies: Allergies   Allergen Reactions    Azithromycin Other (comments)     Rapid response was called for bradycardia and hypotension     Pcn [Penicillins] Hives         Review of Systems       Review of Systems   Constitutional: Positive for appetite change and fatigue. Negative for chills, diaphoresis and fever. HENT: Negative for ear discharge, ear pain, hearing loss and sore throat. Eyes: Negative for visual disturbance. Respiratory: Positive for cough. Negative for shortness of breath and wheezing. Cardiovascular: Negative for chest pain, palpitations and leg swelling. Gastrointestinal: Negative for abdominal pain, blood in stool, diarrhea, nausea and vomiting. Genitourinary: Negative for dysuria, hematuria, vaginal bleeding, vaginal discharge and vaginal pain. Musculoskeletal: Negative for joint swelling. Skin: Positive for wound. Negative for rash. Neurological: Positive for dizziness, weakness and light-headedness. Negative for headaches. Physical Exam     Visit Vitals  BP 93/60   Pulse 100   Temp 98.8 °F (37.1 °C)   Resp 20   Ht 5' 7\" (1.702 m)   Wt 87.5 kg (193 lb)   SpO2 100%   BMI 30.23 kg/m²         Physical Exam  Vitals and nursing note reviewed. Constitutional:       General: She is not in acute distress. Appearance: She is obese. She is ill-appearing. She is not toxic-appearing or diaphoretic. HENT:      Head: Normocephalic. Mouth/Throat:      Mouth: Mucous membranes are moist.      Pharynx: Oropharynx is clear. Eyes:      Extraocular Movements: Extraocular movements intact. Cardiovascular:      Rate and Rhythm: Regular rhythm. Tachycardia present. Pulses: Normal pulses. Heart sounds: Normal heart sounds. Pulmonary:      Effort: Pulmonary effort is normal.      Breath sounds: Normal breath sounds. No wheezing, rhonchi or rales. Abdominal:      General: Abdomen is flat. Bowel sounds are normal.      Palpations: Abdomen is soft. Tenderness: There is no abdominal tenderness. Musculoskeletal:        Feet:    Skin:     General: Skin is warm and dry. Capillary Refill: Capillary refill takes less than 2 seconds. Neurological:      Mental Status: She is alert. Diagnostic Study Results     Labs -  Recent Results (from the past 12 hour(s))   EKG, 12 LEAD, INITIAL    Collection Time: 06/24/21 10:21 AM   Result Value Ref Range    Ventricular Rate 97 BPM    Atrial Rate 97 BPM    P-R Interval 132 ms    QRS Duration 82 ms    Q-T Interval 386 ms    QTC Calculation (Bezet) 490 ms    Calculated P Axis 41 degrees    Calculated R Axis 37 degrees    Calculated T Axis 60 degrees    Diagnosis       Normal sinus rhythm  Possible Left atrial enlargement  Prolonged QT  Abnormal ECG  When compared with ECG of 17-MAY-2021 17:17,  No significant change was found     CBC WITH AUTOMATED DIFF    Collection Time: 06/24/21 10:39 AM   Result Value Ref Range    WBC 5.4 4.6 - 13.2 K/uL    RBC 3.52 (L) 4.20 - 5.30 M/uL    HGB 7.6 (L) 12.0 - 16.0 g/dL    HCT 25.5 (L) 35.0 - 45.0 %    MCV 72.4 (L) 74.0 - 97.0 FL    MCH 21.6 (L) 24.0 - 34.0 PG    MCHC 29.8 (L) 31.0 - 37.0 g/dL    RDW 16.9 (H) 11.6 - 14.5 %    PLATELET 836 018 - 405 K/uL    MPV 9.4 9.2 - 11.8 FL    NEUTROPHILS 69 40 - 73 %    LYMPHOCYTES 18 (L) 21 - 52 %    MONOCYTES 10 3 - 10 %    EOSINOPHILS 2 0 - 5 %    BASOPHILS 1 0 - 2 %    ABS. NEUTROPHILS 3.8 1.8 - 8.0 K/UL    ABS. LYMPHOCYTES 1.0 0.9 - 3.6 K/UL    ABS. MONOCYTES 0.5 0.05 - 1.2 K/UL    ABS. EOSINOPHILS 0.1 0.0 - 0.4 K/UL    ABS. BASOPHILS 0.0 0.0 - 0.1 K/UL    DF AUTOMATED     METABOLIC PANEL, COMPREHENSIVE    Collection Time: 06/24/21 10:39 AM   Result Value Ref Range    Sodium 141 136 - 145 mmol/L    Potassium 2.7 (LL) 3.5 - 5.5 mmol/L    Chloride 107 100 - 111 mmol/L    CO2 27 21 - 32 mmol/L    Anion gap 7 3.0 - 18 mmol/L    Glucose 273 (H) 74 - 99 mg/dL    BUN 10 7.0 - 18 MG/DL    Creatinine 1.40 (H) 0.6 - 1.3 MG/DL    BUN/Creatinine ratio 7 (L) 12 - 20      GFR est AA 49 (L) >60 ml/min/1.73m2    GFR est non-AA 41 (L) >60 ml/min/1.73m2    Calcium 7.7 (L) 8.5 - 10.1 MG/DL    Bilirubin, total 0.3 0.2 - 1.0 MG/DL    ALT (SGPT) 24 13 - 56 U/L    AST (SGOT) 28 10 - 38 U/L    Alk.  phosphatase 704 (H) 45 - 117 U/L    Protein, total 5.9 (L) 6.4 - 8.2 g/dL    Albumin 1.4 (L) 3.4 - 5.0 g/dL    Globulin 4.5 (H) 2.0 - 4.0 g/dL    A-G Ratio 0.3 (L) 0.8 - 1.7     CARDIAC PANEL,(CK, CKMB & TROPONIN)    Collection Time: 06/24/21 10:39 AM   Result Value Ref Range    CK - MB <1.0 <3.6 ng/ml    CK-MB Index  0.0 - 4.0 %     CALCULATION NOT PERFORMED WHEN RESULT IS BELOW LINEAR LIMIT     26 - 192 U/L    Troponin-I, QT <0.02 0.0 - 0.045 NG/ML   VANCOMYCIN, RANDOM    Collection Time: 06/24/21 10:39 AM   Result Value Ref Range    Vancomycin, random <0.8 (L) 5.0 - 40.0 UG/ML   POC LACTIC ACID    Collection Time: 06/24/21 11:33 AM   Result Value Ref Range    Lactic Acid (POC) 0.62 0.40 - 2.00 mmol/L   URINALYSIS W/ RFLX MICROSCOPIC    Collection Time: 06/24/21 12:35 PM   Result Value Ref Range    Color YELLOW      Appearance CLEAR      Specific gravity 1.016 1.005 - 1.030      pH (UA) 6.0 5.0 - 8.0      Protein >1,000 (A) NEG mg/dL    Glucose 500 (A) NEG mg/dL    Ketone Negative NEG mg/dL    Bilirubin Negative NEG      Blood SMALL (A) NEG      Urobilinogen 0.2 0.2 - 1.0 EU/dL    Nitrites Negative NEG      Leukocyte Esterase TRACE (A) NEG     URINE MICROSCOPIC ONLY    Collection Time: 06/24/21 12:35 PM   Result Value Ref Range    WBC 6 to 9 0 - 4 /hpf    RBC 2 to 4 0 - 5 /hpf    Epithelial cells 2+ 0 - 5 /lpf    Bacteria 1+ (A) NEG /hpf    Yeast FEW (A) NEG         Radiologic Studies -   XR CHEST PORT   Final Result      Interval increase in cardiac silhouette. Correlate for cardiomegaly and/or   pericardial effusion. Interval placement of PICC line. No pneumothorax. Mild pulmonary vascular congestion. Medical Decision Making   I am the first provider for this patient. I reviewed the vital signs, available nursing notes, past medical history, past surgical history, family history and social history. Vital Signs-Reviewed the patient's vital signs. EKG: Normal sinus rhythm. 97 bpm.  No axis deviation. Appropriate UT, QRS, QT intervals. No ST elevation or depression consistent with an acute ischemic process. comparable to prior EKGs    Records Reviewed: Nursing Notes, Old Medical Records and Previous electrocardiograms (Time of Review: 10:17 AM)    ED Course: Progress Notes, Reevaluation, and Consults:    ED Course as of Jun 24 1505   Thu Jun 24, 2021   1049 Patient assessed at bedside. Will initiate sepsis protocol given patient's low blood pressure and tachycardia with a known source of infection. Appears to be a new wound in the fourth and fifth digit webspace of the left foot. [ALFIE]   1323 Discussed case with Dr. Fanta Tomas. He will assess the foot tomorrow. Recommends MRI to assess for any evidence of osteomyelitis. [ALFIE]   0995 SightCall case with Dr. Lucie Rey. [ALFIE]      ED Course User Index  [ALFIE] [place] Cote, MD       Provider Notes (Medical Decision Making):   MDM     78-year-old female past medical history of HTN, IDDM, HLD, NSTEMI receiving PICC line antibiotic therapy (ceftriaxone) for a left diabetic foot ulcer after being hospitalized for osteomyelitis and bacteremia last May now presenting with increased weakness and lightheadedness.     Initially presented tachycardic and with low blood pressure. Sepsis protocol initiated. Discussed case with podiatrist, Amrit Hickman who is requesting an MRI. He will assess patient tomorrow morning for potential of osteomyelitis and whether further surgery is needed. Discussed case with infectious disease Dr. Juanita Cornell, with recommendations to cover for Pseudomonas with cefepime. Antibiotic regimen at this point will be Levaquin, vancomycin, cefepime. Discussed case with Dr. Fahad Lyons for admission. Patient to be admitted for further work-up and IV antibiotics given initial presentation with hypotension and tachycardia concerning for sepsis given her recent history of left diabetic foot ulcer and osteomyelitis. Remaining work-up without a clear source for sepsis. No evidence of pneumonia on chest x-ray. No strong evidence of urinary tract infection. No clear reason for patient's hypotension initially (no diarrhea, no vomiting, no night sweats or closable losses from fevers). Patient start on antibiotics and provided initial IV fluid resuscitation. She is remained stable throughout her ED encounter. She was stable at time of shift end with anticipation for admission with podiatry and infectious disease following. Procedures    Critical Care Time: none      Diagnosis     Clinical Impression:   1. Sepsis, unspecified ([facility] [address]    2. Foot ulcer due to secondary DM ([facility] [address]        Disposition: admit    Follow-up Information    None          Patient's Medications   Start Taking    No medications on file   Continue Taking    ASPIRIN 81 MG CHEWABLE TABLET    Take 1 Tab by mouth daily. CARVEDILOL (COREG) 12.5 MG TABLET    Take 1 Tab by mouth two (2) times daily (with meals). Indications: myocardial reinfarction prevention    FERROUS SULFATE 325 MG (65 MG IRON) TABLET    Take 1 Tab by mouth daily (with breakfast). FUROSEMIDE (LASIX) 20 MG TABLET    Take 1 Tab by mouth daily as needed (Edema).     INSULIN GLARGINE (LANTUS,BASAGLAR) 100 UNIT/ML (3 ML) INPN    Take 45 units daily  Indications: type 2 diabetes mellitus    INSULIN LISPRO (HUMALOG) 100 UNIT/ML KWIKPEN    10 units before meals  Indications: type 2 diabetes mellitus    INSULIN NEEDLES, DISPOSABLE, 31 GAUGE X 5/16\" NDLE    Use to check blood glucose BID    INSULIN SYRINGE-NEEDLE U-100 (INSULIN SYRINGE) 1 ML 30 GAUGE X 5/16 SYRG    As directed for lantus insulin    MELATONIN 5 MG CAP CAPSULE    Take 1 Cap by mouth nightly. METFORMIN ER (GLUCOPHAGE XR) 500 MG TABLET    Take 1 Tab by mouth daily (with dinner). ROSUVASTATIN (CRESTOR) 10 MG TABLET    Take 1 Tab by mouth nightly. SPIRONOLACTONE (ALDACTONE) 25 MG TABLET    Take 25 mg by mouth daily. TICAGRELOR (BRILINTA) 90 MG TABLET    Take 1 Tablet by mouth every twelve (12) hours every twelve (12) hours. These Medications have changed    No medications on file   Stop Taking    No medications on file     Disclaimer: Sections of this note are dictated using utilizing voice recognition software. Minor typographical errors may be present. If questions arise, please do not hesitate to contact me or call our department.

## 2021-06-24 NOTE — PROGRESS NOTES
[facility]- Initial Progress Note    Pharmacy Consult ordered by Dr. Roberto Muro     Indication: SSTI    Patient clinical status and labs ordered/reviewed. Pt Weight Weight: 87.5 kg (193 lb)   Serum Creatinine Lab Results   Component Value Date/Time    Creatinine 1.40 (H) 06/24/2021 10:39 AM    Creatinine, POC 0.7 12/27/2014 05:54 AM       Creatinine Clearance Estimated Creatinine Clearance: 57.7 mL/min (A) (based on SCr of 1.4 mg/dL (H)). BUN Lab Results   Component Value Date/Time    BUN 10 06/24/2021 10:39 AM    BUN, POC 6 (L) 12/27/2014 05:54 AM       WBC Lab Results   Component Value Date/Time    WBC 5.4 06/24/2021 10:39 AM      Temperature Temp: 98.8 °F (37.1 °C)   HR Pulse (Heart Rate): 100     BP BP: 93/60             Drug Levels:   Vancomycin    Recent Labs     06/24/21  1039   VANCR <0.8*      Gentamicin   No results for input(s): GENP, GENT in the last 72 hours. No lab exists for component:  GENR   Tobramycin   No results for input(s): TOBP, TOBT, TOBR in the last 72 hours. Amikacin   No results for input(s): Liang Velasco in the last 72 hours.     No lab exists for component:  YINA Ovalles DAMIKR     Dose for naïve patient was initiated at: Vancomycin 1750mg IV x1 followed by 750mg q12h    Continue to monitor    Sign: FRANK Devine [facility]  Date: 6/24/2021  Time: 3:07 PM

## 2021-06-24 NOTE — Clinical Note
Status[de-identified] INPATIENT [101]   Type of Bed: Medical [8]   Cardiac Monitoring Required?: Yes   Inpatient Hospitalization Certified Necessary for the Following Reasons: 3.  Patient receiving treatment that can only be provided in an inpatient setting (further clarification in H&P documentation)   Admitting Diagnosis: Sepsis [facility]) [[id number]]   Admitting Diagnosis: Foot ulcer, left [facility]) [[id number]]   Admitting Physician: Maninder Quezada [de-identified]   Attending Physician: Maninder Quezada [de-identified]   Estimated Length of Stay: 2 Midnights   Discharge Plan[de-identified] Home with Office Follow-up

## 2021-06-24 NOTE — ED NOTES
Pharmacist called to ask if pt was aware of home abt receiving. Pt not aware; solely states it comes' in a ball'. . Pharmacist is concerned with current dosage mixed with labs; relayed to Dr. Mariano Merrill who is currently looking in pt records/ and will confirm with Dr. Ulises Franz

## 2021-06-24 NOTE — PROGRESS NOTES
Bedside shift change report given to Summer Cordoba RN (oncoming nurse) by Flex Blackwood RN (offgoing nurse). Report included the following information SBAR, Kardex, Procedure Summary, Intake/Output, MAR and Recent Results. Opportunity for questions and clarification was provided.

## 2021-06-24 NOTE — H&P
Hospitalist Admission History and Physical    NAME:  Taurus Day   :   1975   MRN:   [id number]     PCP:  Lj Wiley MD  Date/Time:  2021 3:15 PM  Subjective:   CHIEF COMPLAINT:  LOC    HISTORY OF PRESENT ILLNESS:     Rickey Conner is a 40 y.o.   female with history of admission in  for managment of left foot diabetic ulcer infection dc'd w/ wound vac on ceftriaxone via PICC line w/ end date of  who presents with reports of recurrent syncope. Her first episode occurred a few days ago while she was attending her son's graduation. The second one occurred today while she was at her podiatrist's office so she was brought to the emergency room. She reports that she had been getting wound care at home with home health set up from her description. She was followed by the wound care staff that are small toe on the left foot was looking abnormal.  She denies chest pain, she denies shortness of breath. She reports no fevers or chills. She does report having decreased appetite and some vomiting in the recent past.  For unclear reasons she had not been taking her Coreg over the last few days. She is not sure if she takes the Lasix listed on her medication chart. In the ED she is noted to have low blood pressures, systolic 04D, no leukocytosis, no fever, has tachycardia and we have been asked to see patient for further eval and management.       Past Medical History:   Diagnosis Date    CAD (coronary artery disease)     Diabetes ([address]     HTN (hypertension)     Hyperlipidemia         Past Surgical History:   Procedure Laterality Date    HX CORONARY STENT PLACEMENT      HX GYN             Social History     Tobacco Use    Smoking status: Never Smoker    Smokeless tobacco: Never Used   Substance Use Topics    Alcohol use: No        Family History   Problem Relation Age of Onset    Lupus Mother     Cancer Neg Hx     Diabetes Neg Hx     Heart Disease Neg Hx     Hypertension Neg Hx     Heart Attack Neg Hx     Stroke Neg Hx         Allergies   Allergen Reactions    Azithromycin Other (comments)     Rapid response was called for bradycardia and hypotension     Pcn [Penicillins] Hives        Prior to Admission Medications   Prescriptions Last Dose Informant Patient Reported? Taking? Insulin Needles, Disposable, 31 gauge x 5/16\" ndle   No No   Sig: Use to check blood glucose BID   Insulin Syringe-Needle U-100 (INSULIN SYRINGE) 1 mL 30 gauge x 5/16 syrg   No No   Sig: As directed for lantus insulin   aspirin 81 mg chewable tablet   No No   Sig: Take 1 Tab by mouth daily. carvediloL (Coreg) 12.5 mg tablet   No No   Sig: Take 1 Tab by mouth two (2) times daily (with meals). Indications: myocardial reinfarction prevention   ferrous sulfate 325 mg (65 mg iron) tablet   No No   Sig: Take 1 Tab by mouth daily (with breakfast). furosemide (LASIX) 20 mg tablet   No No   Sig: Take 1 Tab by mouth daily as needed (Edema). insulin glargine (LANTUS,BASAGLAR) 100 unit/mL (3 mL) inpn   No No   Sig: Take 45 units daily  Indications: type 2 diabetes mellitus   Patient taking differently: 50 Units by SubCUTAneous route nightly. Indications: type 2 diabetes mellitus   insulin lispro (HUMALOG) 100 unit/mL kwikpen   No No   Sig: 10 units before meals  Indications: type 2 diabetes mellitus   melatonin 5 mg cap capsule   No No   Sig: Take 1 Cap by mouth nightly. Patient taking differently: Take 5 mg by mouth as needed (difficulty in sleeping). Reports taking it as needed   metFORMIN ER (GLUCOPHAGE XR) 500 mg tablet   No No   Sig: Take 1 Tab by mouth daily (with dinner). rosuvastatin (CRESTOR) 10 mg tablet   No No   Sig: Take 1 Tab by mouth nightly. spironolactone (ALDACTONE) 25 mg tablet   Yes No   Sig: Take 25 mg by mouth daily. ticagrelor (BRILINTA) 90 mg tablet   No No   Sig: Take 1 Tablet by mouth every twelve (12) hours every twelve (12) hours.       Facility-Administered Medications: None       REVIEW OF SYSTEMS:    Please see above otw 10 point ROS checked and negative.  :    Objective:   VITALS:    Visit Vitals  BP 93/60   Pulse 100   Temp 98.8 °F (37.1 °C)   Resp 20   Ht 5' 7\" (1.702 m)   Wt 87.5 kg (193 lb)   SpO2 100%   BMI 30.23 kg/m²     Temp (24hrs), Av.8 °F (37.1 °C), Min:98.8 °F (37.1 °C), Max:98.8 °F (37.1 °C)      PHYSICAL EXAM:   General:    Alert, cooperative, no distress, appears stated age. Head:   Normocephalic, without obvious abnormality, atraumatic. Eyes:   Conjunctivae clear, anicteric sclerae. Pupils are equal  Nose:  Nares normal. No drainage. Throat:    Lips, mucosa, and tongue normal.  No Thrush  Neck:  Supple, symmetrical,  no adenopathy,      and no JVD. Lungs:   Clear to auscultation bilaterally. No Wheezing or Rhonchi. No rales. Chest wall:  No tenderness or deformity. No Accessory muscle use. Heart:   Regular rate and rhythm,  no murmur, rub or gallop. Abdomen:   Soft, non-tender. Not distended. Bowel sounds normal. No masses  Extremities: Extremities normal, atraumatic, No cyanosis. No edema. No clubbing  Skin:     Left foot dressing c/d/i. Texture, turgor normal. No rashes or lesions. Not Jaundiced  Lymph nodes: Cervical, supraclavicular normal.  Psych:  Good insight. Not depressed. Not anxious or agitated. Neurologic: EOMs intact. No facial asymmetry. No aphasia or slurred speech. Normal   strength, Alert and oriented X 3. LAB DATA REVIEWED:    No components found for: [place]  Recent Labs     21  1039      K 2.7*      CO2 27   BUN 10   CREA 1.40*   *   CA 7.7*   ALB 1.4*   WBC 5.4   HGB 7.6*   HCT 25.5*            IMAGING RESULTS:   [x]  I have personally reviewed the actual   [x]CXR  []CT scan  IMPRESSION     Interval increase in cardiac silhouette. Correlate for cardiomegaly and/or  pericardial effusion.     Interval placement of PICC line.  No pneumothorax.     Mild pulmonary vascular congestion. CXR:IMPRESSION     Interval increase in cardiac silhouette. Correlate for cardiomegaly and/or  pericardial effusion.     Interval placement of PICC line. No pneumothorax.     Mild pulmonary vascular congestion. Assessment/Plan:      Active Problems:    Sepsis ([address] (5/17/2021)      Foot ulcer, left ([address] (6/24/2021)       -Syncope  -Hypokalemia, unknown mag  -Prolonged QTc  -Low bp's  -Enlarged cardiac silhouette on CXR    HISTORY OF:  -DM2 on lantus  -HTN  -NSTEMI on DAPT  -Diabetic foot infection    Overall reason for present syncope is unclear. She does not appear to be septic given normal WBC lack of fever and normal lactic acid and foot exam indicating absence of wound infection although she reportedly has a new lesion on the same foot. Differential for syncope does include arrhythmia given her hypokalemia and prolonged QTC. Her troponin is within normal limits EKG without evidence of acute coronary syndrome. She also has an enlarged cardiac silhouette on chest x-ray need to evaluate for pericardial effusion. ___________________________________________________  PLAN:    -Broaden antibiotic coverage for now until foot infection or infection as cause for hemodynamic instability resulting syncope ruled out.   Case has been discussed with infectious disease specialist.  -Replace potassium, check magnesium, monitor on telemetry  -Stat cardiac echo  -Hold all antihypertensives and diuretics  -In terms of her chronic condition management plan is to continue her DAPT, continue her Lantus    Risk of deterioration:  []Low    []Moderate  [x]High              Prophylaxis:  [x]Lovenox  []Coumadin  []Hep SQ  []SCDs  []H2B/PPI    Disposition:  [x]Home w/ Family   []HH PT,OT,RN   []SNF/LTC   []SAH/Rehab    Discussed Code Status:    [x]Full Code      []DNR     ___________________________________________________    Care Plan discussed with:    [x]Patient   []Family    []ED Care Manager  [x]ED Doc [x]Specialist :    Total Time Coordinating Admission:      minutes    []Total Critical Care Time:     ___________________________________________________  Admitting Physician: Brett Hough MD

## 2021-06-25 ENCOUNTER — APPOINTMENT (OUTPATIENT)
Dept: NON INVASIVE DIAGNOSTICS | Age: 46
DRG: 314 | End: 2021-06-25
Attending: INTERNAL MEDICINE
Payer: MEDICAID

## 2021-06-25 ENCOUNTER — ANESTHESIA (OUTPATIENT)
Dept: SURGERY | Age: 46
DRG: 314 | End: 2021-06-25
Payer: MEDICAID

## 2021-06-25 ENCOUNTER — ANESTHESIA EVENT (OUTPATIENT)
Dept: SURGERY | Age: 46
DRG: 314 | End: 2021-06-25
Payer: MEDICAID

## 2021-06-25 LAB
ANION GAP SERPL CALC-SCNC: 6 MMOL/L (ref 3–18)
ATRIAL RATE: 97 BPM
BASOPHILS # BLD: 0 K/UL (ref 0–0.1)
BASOPHILS NFR BLD: 1 % (ref 0–2)
BUN SERPL-MCNC: 8 MG/DL (ref 7–18)
BUN/CREAT SERPL: 8 (ref 12–20)
CA-I SERPL-SCNC: 1.12 MMOL/L (ref 1.12–1.32)
CALCIUM SERPL-MCNC: 7.4 MG/DL (ref 8.5–10.1)
CALCULATED P AXIS, ECG09: 41 DEGREES
CALCULATED R AXIS, ECG10: 37 DEGREES
CALCULATED T AXIS, ECG11: 60 DEGREES
CHLORIDE SERPL-SCNC: 112 MMOL/L (ref 100–111)
CO2 SERPL-SCNC: 25 MMOL/L (ref 21–32)
COVID-19 RAPID TEST, COVR: NOT DETECTED
CREAT SERPL-MCNC: 0.99 MG/DL (ref 0.6–1.3)
DIAGNOSIS, 93000: NORMAL
DIFFERENTIAL METHOD BLD: ABNORMAL
ECHO LV INTERNAL DIMENSION DIASTOLIC: 4.49 CM (ref 3.9–5.3)
ECHO LV INTERNAL DIMENSION SYSTOLIC: 3.6 CM
ECHO LV IVSD: 1.39 CM (ref 0.6–0.9)
ECHO LV MASS 2D: 242.3 G (ref 67–162)
ECHO LV MASS INDEX 2D: 121.8 G/M2 (ref 43–95)
ECHO LV POSTERIOR WALL DIASTOLIC: 1.37 CM (ref 0.6–0.9)
ECHO RV TAPSE: 2.39 CM (ref 1.5–2)
EOSINOPHIL # BLD: 0.1 K/UL (ref 0–0.4)
EOSINOPHIL NFR BLD: 2 % (ref 0–5)
ERYTHROCYTE [DISTWIDTH] IN BLOOD BY AUTOMATED COUNT: 17.2 % (ref 11.6–14.5)
FERRITIN SERPL-MCNC: 413 NG/ML (ref 8–388)
FOLATE SERPL-MCNC: 6 NG/ML (ref 3.1–17.5)
GLUCOSE BLD STRIP.AUTO-MCNC: 175 MG/DL (ref 70–110)
GLUCOSE BLD STRIP.AUTO-MCNC: 81 MG/DL (ref 70–110)
GLUCOSE BLD STRIP.AUTO-MCNC: 83 MG/DL (ref 70–110)
GLUCOSE BLD STRIP.AUTO-MCNC: 84 MG/DL (ref 70–110)
GLUCOSE SERPL-MCNC: 176 MG/DL (ref 74–99)
HBA1C MFR BLD: 9.6 % (ref 4.2–5.6)
HCG UR QL: NEGATIVE
HCT VFR BLD AUTO: 22.9 % (ref 35–45)
HGB BLD-MCNC: 6.8 G/DL (ref 12–16)
HISTORY CHECKED?,CKHIST: NORMAL
IRON SATN MFR SERPL: 38 % (ref 20–50)
IRON SERPL-MCNC: 55 UG/DL (ref 50–175)
LYMPHOCYTES # BLD: 1.2 K/UL (ref 0.9–3.6)
LYMPHOCYTES NFR BLD: 22 % (ref 21–52)
MAGNESIUM SERPL-MCNC: 2.3 MG/DL (ref 1.6–2.6)
MCH RBC QN AUTO: 21.8 PG (ref 24–34)
MCHC RBC AUTO-ENTMCNC: 29.7 G/DL (ref 31–37)
MCV RBC AUTO: 73.4 FL (ref 74–97)
MONOCYTES # BLD: 0.7 K/UL (ref 0.05–1.2)
MONOCYTES NFR BLD: 12 % (ref 3–10)
NEUTS SEG # BLD: 3.4 K/UL (ref 1.8–8)
NEUTS SEG NFR BLD: 63 % (ref 40–73)
P-R INTERVAL, ECG05: 132 MS
PLATELET # BLD AUTO: 206 K/UL (ref 135–420)
PMV BLD AUTO: 9.2 FL (ref 9.2–11.8)
POTASSIUM SERPL-SCNC: 3.6 MMOL/L (ref 3.5–5.5)
Q-T INTERVAL, ECG07: 386 MS
QRS DURATION, ECG06: 82 MS
QTC CALCULATION (BEZET), ECG08: 490 MS
RBC # BLD AUTO: 3.12 M/UL (ref 4.2–5.3)
SARS-COV-2, COV2: NORMAL
SODIUM SERPL-SCNC: 143 MMOL/L (ref 136–145)
SOURCE, COVRS: NORMAL
TIBC SERPL-MCNC: 145 UG/DL (ref 250–450)
VENTRICULAR RATE, ECG03: 97 BPM
VIT B12 SERPL-MCNC: 522 PG/ML (ref 211–911)
WBC # BLD AUTO: 5.4 K/UL (ref 4.6–13.2)

## 2021-06-25 PROCEDURE — 74011250636 HC RX REV CODE- 250/636: Performed by: HOSPITALIST

## 2021-06-25 PROCEDURE — 88307 TISSUE EXAM BY PATHOLOGIST: CPT

## 2021-06-25 PROCEDURE — 99140 ANES COMP EMERGENCY COND: CPT | Performed by: NURSE ANESTHETIST, CERTIFIED REGISTERED

## 2021-06-25 PROCEDURE — 77030031139 HC SUT VCRL2 J&J -A: Performed by: PODIATRIST

## 2021-06-25 PROCEDURE — 77030011265 HC ELECTRD BLD HEX COVD -A: Performed by: PODIATRIST

## 2021-06-25 PROCEDURE — 74011000250 HC RX REV CODE- 250: Performed by: INTERNAL MEDICINE

## 2021-06-25 PROCEDURE — 74011636637 HC RX REV CODE- 636/637: Performed by: INTERNAL MEDICINE

## 2021-06-25 PROCEDURE — 77030020061 HC IV BLD WRMR ADMIN SET 3M -B: Performed by: ANESTHESIOLOGY

## 2021-06-25 PROCEDURE — 83036 HEMOGLOBIN GLYCOSYLATED A1C: CPT

## 2021-06-25 PROCEDURE — 88305 TISSUE EXAM BY PATHOLOGIST: CPT

## 2021-06-25 PROCEDURE — 99233 SBSQ HOSP IP/OBS HIGH 50: CPT | Performed by: HOSPITALIST

## 2021-06-25 PROCEDURE — 82962 GLUCOSE BLOOD TEST: CPT

## 2021-06-25 PROCEDURE — 81025 URINE PREGNANCY TEST: CPT

## 2021-06-25 PROCEDURE — 76010000149 HC OR TIME 1 TO 1.5 HR: Performed by: PODIATRIST

## 2021-06-25 PROCEDURE — 82728 ASSAY OF FERRITIN: CPT

## 2021-06-25 PROCEDURE — 74011250636 HC RX REV CODE- 250/636: Performed by: NURSE ANESTHETIST, CERTIFIED REGISTERED

## 2021-06-25 PROCEDURE — 82330 ASSAY OF CALCIUM: CPT

## 2021-06-25 PROCEDURE — 83540 ASSAY OF IRON: CPT

## 2021-06-25 PROCEDURE — 2709999900 HC NON-CHARGEABLE SUPPLY

## 2021-06-25 PROCEDURE — 2709999900 HC NON-CHARGEABLE SUPPLY: Performed by: PODIATRIST

## 2021-06-25 PROCEDURE — 83735 ASSAY OF MAGNESIUM: CPT

## 2021-06-25 PROCEDURE — 77030003445 HC NDL BIOP BN BD -B: Performed by: PODIATRIST

## 2021-06-25 PROCEDURE — 0Y6Y0Z0 DETACHMENT AT LEFT 5TH TOE, COMPLETE, OPEN APPROACH: ICD-10-PCS | Performed by: PODIATRIST

## 2021-06-25 PROCEDURE — 77030020753 HC CUF TRNQT 1BLA STRY -B: Performed by: PODIATRIST

## 2021-06-25 PROCEDURE — 80048 BASIC METABOLIC PNL TOTAL CA: CPT

## 2021-06-25 PROCEDURE — 86923 COMPATIBILITY TEST ELECTRIC: CPT

## 2021-06-25 PROCEDURE — 74011250636 HC RX REV CODE- 250/636: Performed by: PODIATRIST

## 2021-06-25 PROCEDURE — 0QBP0ZX EXCISION OF LEFT METATARSAL, OPEN APPROACH, DIAGNOSTIC: ICD-10-PCS | Performed by: PODIATRIST

## 2021-06-25 PROCEDURE — 77030026918 HC ADMN ST IV BLD BD -A: Performed by: ANESTHESIOLOGY

## 2021-06-25 PROCEDURE — 93308 TTE F-UP OR LMTD: CPT

## 2021-06-25 PROCEDURE — 87635 SARS-COV-2 COVID-19 AMP PRB: CPT

## 2021-06-25 PROCEDURE — 99140 ANES COMP EMERGENCY COND: CPT | Performed by: ANESTHESIOLOGY

## 2021-06-25 PROCEDURE — 74011000250 HC RX REV CODE- 250: Performed by: PODIATRIST

## 2021-06-25 PROCEDURE — 77030021887 HC CATH ANGI DIAG PROFLO MEDT -B: Performed by: PODIATRIST

## 2021-06-25 PROCEDURE — 77030013179 HC SHOE PSTOP OPN DJOR -A: Performed by: PODIATRIST

## 2021-06-25 PROCEDURE — 76060000033 HC ANESTHESIA 1 TO 1.5 HR: Performed by: PODIATRIST

## 2021-06-25 PROCEDURE — 87176 TISSUE HOMOGENIZATION CULTR: CPT

## 2021-06-25 PROCEDURE — 88311 DECALCIFY TISSUE: CPT

## 2021-06-25 PROCEDURE — 77030006773 HC BLD SAW OSC BRSM -A: Performed by: PODIATRIST

## 2021-06-25 PROCEDURE — 01480 ANES OPEN PX LOWER L/A/F NOS: CPT | Performed by: ANESTHESIOLOGY

## 2021-06-25 PROCEDURE — 76210000006 HC OR PH I REC 0.5 TO 1 HR: Performed by: PODIATRIST

## 2021-06-25 PROCEDURE — 86900 BLOOD TYPING SEROLOGIC ABO: CPT

## 2021-06-25 PROCEDURE — 74011636637 HC RX REV CODE- 636/637: Performed by: HOSPITALIST

## 2021-06-25 PROCEDURE — 77030011247 HC DRN WND KT TLS STRY -B: Performed by: PODIATRIST

## 2021-06-25 PROCEDURE — 74011250637 HC RX REV CODE- 250/637: Performed by: PHYSICIAN ASSISTANT

## 2021-06-25 PROCEDURE — 74011250636 HC RX REV CODE- 250/636: Performed by: INTERNAL MEDICINE

## 2021-06-25 PROCEDURE — P9016 RBC LEUKOCYTES REDUCED: HCPCS

## 2021-06-25 PROCEDURE — 01480 ANES OPEN PX LOWER L/A/F NOS: CPT | Performed by: NURSE ANESTHETIST, CERTIFIED REGISTERED

## 2021-06-25 PROCEDURE — 87205 SMEAR GRAM STAIN: CPT

## 2021-06-25 PROCEDURE — 65270000029 HC RM PRIVATE

## 2021-06-25 PROCEDURE — 87075 CULTR BACTERIA EXCEPT BLOOD: CPT

## 2021-06-25 PROCEDURE — 85025 COMPLETE CBC W/AUTO DIFF WBC: CPT

## 2021-06-25 PROCEDURE — 0Y6W0Z3 DETACHMENT AT LEFT 4TH TOE, LOW, OPEN APPROACH: ICD-10-PCS | Performed by: PODIATRIST

## 2021-06-25 PROCEDURE — 82607 VITAMIN B-12: CPT

## 2021-06-25 PROCEDURE — 99223 1ST HOSP IP/OBS HIGH 75: CPT | Performed by: INTERNAL MEDICINE

## 2021-06-25 PROCEDURE — 74011250637 HC RX REV CODE- 250/637: Performed by: INTERNAL MEDICINE

## 2021-06-25 RX ORDER — INSULIN GLARGINE 100 [IU]/ML
15 INJECTION, SOLUTION SUBCUTANEOUS
Status: DISCONTINUED | OUTPATIENT
Start: 2021-06-25 | End: 2021-06-26

## 2021-06-25 RX ORDER — MIDAZOLAM HYDROCHLORIDE 1 MG/ML
INJECTION, SOLUTION INTRAMUSCULAR; INTRAVENOUS AS NEEDED
Status: DISCONTINUED | OUTPATIENT
Start: 2021-06-25 | End: 2021-06-25 | Stop reason: HOSPADM

## 2021-06-25 RX ORDER — SODIUM CHLORIDE 9 MG/ML
INJECTION, SOLUTION INTRAVENOUS
Status: DISCONTINUED | OUTPATIENT
Start: 2021-06-25 | End: 2021-06-25 | Stop reason: HOSPADM

## 2021-06-25 RX ORDER — FENTANYL CITRATE 50 UG/ML
INJECTION, SOLUTION INTRAMUSCULAR; INTRAVENOUS AS NEEDED
Status: DISCONTINUED | OUTPATIENT
Start: 2021-06-25 | End: 2021-06-25 | Stop reason: HOSPADM

## 2021-06-25 RX ORDER — BUPIVACAINE HYDROCHLORIDE 5 MG/ML
INJECTION, SOLUTION EPIDURAL; INTRACAUDAL AS NEEDED
Status: DISCONTINUED | OUTPATIENT
Start: 2021-06-25 | End: 2021-06-25 | Stop reason: HOSPADM

## 2021-06-25 RX ORDER — ONDANSETRON 2 MG/ML
INJECTION INTRAMUSCULAR; INTRAVENOUS AS NEEDED
Status: DISCONTINUED | OUTPATIENT
Start: 2021-06-25 | End: 2021-06-25 | Stop reason: HOSPADM

## 2021-06-25 RX ORDER — SODIUM CHLORIDE 9 MG/ML
250 INJECTION, SOLUTION INTRAVENOUS AS NEEDED
Status: DISCONTINUED | OUTPATIENT
Start: 2021-06-25 | End: 2021-06-30 | Stop reason: HOSPADM

## 2021-06-25 RX ORDER — DIPHENHYDRAMINE HYDROCHLORIDE 50 MG/ML
25 INJECTION, SOLUTION INTRAMUSCULAR; INTRAVENOUS
Status: DISCONTINUED | OUTPATIENT
Start: 2021-06-25 | End: 2021-06-30 | Stop reason: HOSPADM

## 2021-06-25 RX ORDER — CARVEDILOL 12.5 MG/1
12.5 TABLET ORAL EVERY 12 HOURS
Status: DISCONTINUED | OUTPATIENT
Start: 2021-06-25 | End: 2021-06-29

## 2021-06-25 RX ORDER — DEXTROSE MONOHYDRATE 50 MG/ML
75 INJECTION, SOLUTION INTRAVENOUS CONTINUOUS
Status: DISPENSED | OUTPATIENT
Start: 2021-06-25 | End: 2021-06-26

## 2021-06-25 RX ORDER — PROPOFOL 10 MG/ML
VIAL (ML) INTRAVENOUS
Status: DISCONTINUED | OUTPATIENT
Start: 2021-06-25 | End: 2021-06-25 | Stop reason: HOSPADM

## 2021-06-25 RX ORDER — DEXAMETHASONE SODIUM PHOSPHATE 4 MG/ML
INJECTION, SOLUTION INTRA-ARTICULAR; INTRALESIONAL; INTRAMUSCULAR; INTRAVENOUS; SOFT TISSUE AS NEEDED
Status: DISCONTINUED | OUTPATIENT
Start: 2021-06-25 | End: 2021-06-25 | Stop reason: HOSPADM

## 2021-06-25 RX ORDER — INSULIN LISPRO 100 [IU]/ML
INJECTION, SOLUTION INTRAVENOUS; SUBCUTANEOUS
Status: DISCONTINUED | OUTPATIENT
Start: 2021-06-25 | End: 2021-06-30 | Stop reason: HOSPADM

## 2021-06-25 RX ORDER — LIDOCAINE HYDROCHLORIDE 10 MG/ML
INJECTION, SOLUTION EPIDURAL; INFILTRATION; INTRACAUDAL; PERINEURAL AS NEEDED
Status: DISCONTINUED | OUTPATIENT
Start: 2021-06-25 | End: 2021-06-25 | Stop reason: HOSPADM

## 2021-06-25 RX ADMIN — CEFEPIME HYDROCHLORIDE 2 G: 2 INJECTION, POWDER, FOR SOLUTION INTRAVENOUS at 06:15

## 2021-06-25 RX ADMIN — ROSUVASTATIN CALCIUM 10 MG: 10 TABLET, COATED ORAL at 22:25

## 2021-06-25 RX ADMIN — CEFTRIAXONE SODIUM 2 G: 2 INJECTION, POWDER, FOR SOLUTION INTRAMUSCULAR; INTRAVENOUS at 16:30

## 2021-06-25 RX ADMIN — MAGNESIUM OXIDE TAB 400 MG (241.3 MG ELEMENTAL MG) 400 MG: 400 (241.3 MG) TAB at 17:47

## 2021-06-25 RX ADMIN — CARVEDILOL 12.5 MG: 12.5 TABLET, FILM COATED ORAL at 22:25

## 2021-06-25 RX ADMIN — POTASSIUM CHLORIDE 40 MEQ: 1500 TABLET, EXTENDED RELEASE ORAL at 09:51

## 2021-06-25 RX ADMIN — Medication 10 ML: at 06:15

## 2021-06-25 RX ADMIN — Medication 10 ML: at 22:00

## 2021-06-25 RX ADMIN — ONDANSETRON 4 MG: 2 INJECTION INTRAMUSCULAR; INTRAVENOUS at 18:58

## 2021-06-25 RX ADMIN — INSULIN LISPRO 10 UNITS: 100 INJECTION, SOLUTION INTRAVENOUS; SUBCUTANEOUS at 09:51

## 2021-06-25 RX ADMIN — PROPOFOL 50 MCG/KG/MIN: 10 INJECTION, EMULSION INTRAVENOUS at 18:30

## 2021-06-25 RX ADMIN — INSULIN GLARGINE 15 UNITS: 100 INJECTION, SOLUTION SUBCUTANEOUS at 22:42

## 2021-06-25 RX ADMIN — CARVEDILOL 12.5 MG: 12.5 TABLET, FILM COATED ORAL at 16:30

## 2021-06-25 RX ADMIN — VANCOMYCIN HYDROCHLORIDE 750 MG: 750 INJECTION, POWDER, LYOPHILIZED, FOR SOLUTION INTRAVENOUS at 04:10

## 2021-06-25 RX ADMIN — DIPHENHYDRAMINE HYDROCHLORIDE 25 MG: 50 INJECTION INTRAMUSCULAR; INTRAVENOUS at 17:37

## 2021-06-25 RX ADMIN — Medication 10 ML: at 16:31

## 2021-06-25 RX ADMIN — FERROUS SULFATE TAB 325 MG (65 MG ELEMENTAL FE) 325 MG: 325 (65 FE) TAB at 09:51

## 2021-06-25 RX ADMIN — FENTANYL CITRATE 100 MCG: 50 INJECTION, SOLUTION INTRAMUSCULAR; INTRAVENOUS at 18:22

## 2021-06-25 RX ADMIN — INSULIN LISPRO 3 UNITS: 100 INJECTION, SOLUTION INTRAVENOUS; SUBCUTANEOUS at 22:42

## 2021-06-25 RX ADMIN — MIDAZOLAM HYDROCHLORIDE 2 MG: 2 INJECTION, SOLUTION INTRAMUSCULAR; INTRAVENOUS at 18:22

## 2021-06-25 RX ADMIN — DEXTROSE MONOHYDRATE 75 ML/HR: 5 INJECTION, SOLUTION INTRAVENOUS at 20:21

## 2021-06-25 RX ADMIN — MAGNESIUM OXIDE TAB 400 MG (241.3 MG ELEMENTAL MG) 400 MG: 400 (241.3 MG) TAB at 09:51

## 2021-06-25 RX ADMIN — POTASSIUM CHLORIDE 10 MEQ: 10 INJECTION, SOLUTION INTRAVENOUS at 00:30

## 2021-06-25 RX ADMIN — DEXAMETHASONE SODIUM PHOSPHATE 4 MG: 4 INJECTION, SOLUTION INTRAMUSCULAR; INTRAVENOUS at 18:58

## 2021-06-25 RX ADMIN — POTASSIUM CHLORIDE 10 MEQ: 10 INJECTION, SOLUTION INTRAVENOUS at 01:35

## 2021-06-25 RX ADMIN — SODIUM CHLORIDE: 900 INJECTION, SOLUTION INTRAVENOUS at 18:21

## 2021-06-25 NOTE — PROGRESS NOTES
1630 - Pt was given ceftriaxone IV push  1710 - Was called into patient room per pt request with c/o itching all over  1712- Paged attending provider Dr Fabiana Herring to make aware, will await return call  [phone] - Spk with attending provider and advised of situation, orders to follow  320 2035- Pt was given Benadryl IV   1735 - Still in the room monitoring patient and pt sits up and starts crying and stating tht her whole body is now on fire in addition to the itching. 2157 - Provider on the floor and came into the room to access the patient . 6805- Pre-op in the room to take patient to surgery. Vitals are as listed below.   Per provider Dr Fabiana Herring pt ok to go to surgery         06/25/21 1747   Vital Signs   Temp 98.3 °F (36.8 °C)   Temp Source Oral   Pulse (Heart Rate) 97   Heart Rate Source Monitor   Resp Rate 22   O2 Sat (%) 100 %   Level of Consciousness Alert (0)   BP (!) 160/101   MAP (Calculated) 121   MEWS Score 2   Pain 1   Pain Scale 1 Numeric (0 - 10)   Pain Intensity 1 0   Patient Stated Pain Goal 0   POSS Scale   Opioid Sedation Scale 1

## 2021-06-25 NOTE — ANESTHESIA PREPROCEDURE EVALUATION
Relevant Problems   CARDIOVASCULAR   (+) HTN (hypertension)   (+) NSTEMI (non-ST elevated myocardial infarction) (HCC)      RENAL FAILURE   (+) FRANCISCA (acute kidney injury) ([facility]      ENDOCRINE   (+) Insulin dependent diabetes mellitus   (+) Severe obesity (HCC)      HEMATOLOGY   (+) Osteomyelitis (HCC)       Anesthetic History   No history of anesthetic complications            Review of Systems / Medical History  Patient summary reviewed and pertinent labs reviewed    Pulmonary  Within defined limits                 Neuro/Psych   Within defined limits           Cardiovascular    Hypertension          CAD and cardiac stents    Exercise tolerance: >4 METS  Comments: Cardiac stent 10/2020   GI/Hepatic/Renal                Endo/Other    Diabetes: type 2, using insulin    Obesity and anemia     Other Findings            Physical Exam    Airway  Mallampati: II  TM Distance: 4 - 6 cm  Neck ROM: decreased range of motion        Cardiovascular    Rhythm: regular  Rate: normal         Dental    Dentition: Poor dentition     Pulmonary  Breath sounds clear to auscultation               Abdominal  GI exam deferred       Other Findings            Anesthetic Plan    ASA: 3, emergent  Anesthesia type: MAC and general - backup          Induction: Intravenous  Anesthetic plan and risks discussed with: Patient

## 2021-06-25 NOTE — PROGRESS NOTES
Reason for Admission:  Sepsis ([facility] [A41.9]  Foot ulcer, left ([facility] [L97.529]                 RUR Score:    22           Plan for utilizing home health:    Yes. Pt is active with [facility] [employer] for wound care and wound vac and IV abx                      Likelihood of Readmission:   Moderate                         Do you (patient/family) have any concerns for transition/discharge? yes  Wants to remember to take her wound vac along when discharged. She brought it from home    Transition of Care Plan:       Initial assessment completed with patient. Cognitive status of patient: oriented to time, place, person and situation. Face sheet information confirmed:  yes. The patient designates mother Naomi Boss and  Lj Grandchild to participate in her discharge plan and to receive any needed information. This patient lives in a home with her 2 daughters and . Patient is not able to navigate steps as needed. Prior to hospitalization, patient was considered to be independent with ADLs/IADLS : yes . Patient has a current ACP document on file: yes      Healthcare Decision Maker:   Primary Decision Maker: Anisa Gutierrezing - Parent - [phone]    Secondary Decision Maker: Zachariah Lees - Spouse - [phone]    Click here to complete [address] including selection of the Healthcare Decision Maker Relationship (ie \"Primary\")    The son and  will be available to transport patient home upon discharge. The patient already has [employer] equipment available in the home. Patient is currently active with home health. If active, agency name is [address] [place] [address] [employer]. Patient has not stayed in a skilled nursing facility or rehab. Was  stay within last 60 days : no. This patient is on dialysis :no    List of available Home Health agencies were provided and reviewed with the patient prior to discharge. Freedom of choice signed: yes, for [employer]. Currently, the discharge plan is Home with [address] [place] [address] [facility]. The patient states that she can obtain her medications from the pharmacy, and take her medications as directed. Patient's current insurance is [facility] Medicaid      Care Management Interventions  PCP Verified by CM: Yes  Palliative Care Criteria Met (RRAT>21 & CHF Dx)?: No  Mode of Transport at Discharge:  Other (see comment) (family)  Transition of Care Consult (CM Consult): Discharge Planning, [address]: No  Reason Outside [place]: Patient already serviced by other home care/hospice agency  Current Support Network: Lives with Spouse  Confirm Follow Up Transport: Family  The Patient and/or Patient Representative was Provided with a Choice of Provider and Agrees with the Discharge Plan?: Yes  Freedom of Choice List was Provided with Basic Dialogue that Supports the Patient's Individualized Plan of Care/Goals, Treatment Preferences and Shares the Quality Data Associated with the Providers?: Yes  Discharge Location  Discharge Placement: Home with home health        ROLAND Willis RN  Care Management  Pager: [phone]

## 2021-06-25 NOTE — PROGRESS NOTES
Physician Progress Note      PATIENT:               Tamia William  CSN #:                  [id number]  :                       1975  ADMIT DATE:       2021 10:15 AM  DISCH DATE:  RESPONDING  PROVIDER #:        Fausto Alamguer MD          QUERY TEXT:    Patient admitted with syncope. Patientt noted to have and elevated serum creatinine. If possible, please document in the progress notes and discharge summary if you are evaluating and/or treating any of the following: The medical record reflects the following:  Risk Factors:  hx of diabetic nephropathy,nephrotic syndrome; hypotension; She does report having decreased appetite and some vomiting in the recent past  Clinical Indicators: Initial BUN/Cr 10/1.4 with subsequent values of 8/0.87 and 8/0.99  Treatment: 3L NS; BMP    Thank you,  Briana Westfall RN/CCDS  [phone]  Options provided:  -- Acute kidney injury  -- Other - I will add my own diagnosis  -- Disagree - Not applicable / Not valid  -- Disagree - Clinically unable to determine / Unknown  -- Refer to Clinical Documentation Reviewer    PROVIDER RESPONSE TEXT:    This patient has an Acute kidney injury.     Query created by: Ernestina Taveras on 2021 10:06 AM      Electronically signed by:  Fausto Almaguer MD 2021 6:37 PM

## 2021-06-25 NOTE — CONSULTS
Podiatry History and Physical    Subjective:         Date of Consultation:  2021    Patient is a 40 y.o. female with PMHx noted below who is being seen for left foot ulcers. On last admission, patient had left 1st MTPJ resection with antibiotic bead placement. Patient was suppose to f/u in office tomorrow however she had syncope episode today and came to ER. Patient was found to be tachycardic possibly due to sepsis and was consulted for foot wounds. Patient has home health changing wound VAC every other day. Denies N/V/C/F. Patient Active Problem List    Diagnosis Date Noted    Foot ulcer, left ([address] 2021    Osteomyelitis ([address] 2021    Diabetic foot ulcer ([address] 2021    FRANCISCA (acute kidney injury) ([address] 2021    Sepsis ([address] 2021    Hyponatremia 2021    Severe obesity ([address] 2020    Fever 10/20/2020    NSTEMI (non-ST elevated myocardial infarction) ([address] 10/20/2020    Insulin dependent diabetes mellitus 2019    HTN (hypertension) 2019    Diabetic foot infection ([address] 2018    Right foot infection 2018    Acute pain of right foot 2018    Acute bronchitis 2017    Hyperglycemia 2017     Past Medical History:   Diagnosis Date    CAD (coronary artery disease)     Diabetes ([address]     HTN (hypertension)     Hyperlipidemia       Family History   Problem Relation Age of Onset    Lupus Mother     Cancer Neg Hx     Diabetes Neg Hx     Heart Disease Neg Hx     Hypertension Neg Hx     Heart Attack Neg Hx     Stroke Neg Hx       Social History     Tobacco Use    Smoking status: Never Smoker    Smokeless tobacco: Never Used   Substance Use Topics    Alcohol use: No     Past Surgical History:   Procedure Laterality Date    HX CORONARY STENT PLACEMENT      HX GYN            Prior to Admission medications    Medication Sig Start Date End Date Taking?  Authorizing Provider   ticagrelor (BRILINTA) 90 mg tablet Take 1 Tablet by mouth every twelve (12) hours every twelve (12) hours. 6/1/21   Rosalio Henderson NP   insulin lispro (HUMALOG) 100 unit/mL kwikpen 10 units before meals  Indications: type 2 diabetes mellitus 5/26/21   Lennox Gallant, MD   metFORMIN ER (GLUCOPHAGE XR) 500 mg tablet Take 1 Tab by mouth daily (with dinner). 4/12/21   Lennox Gallant, MD   rosuvastatin (CRESTOR) 10 mg tablet Take 1 Tab by mouth nightly. 4/12/21   Lennox Gallant, MD   carvediloL (Coreg) 12.5 mg tablet Take 1 Tab by mouth two (2) times daily (with meals). Indications: myocardial reinfarction prevention 4/12/21   Lennox Gallant, MD   spironolactone (ALDACTONE) 25 mg tablet Take 25 mg by mouth daily. Provider, Lucía   ferrous sulfate 325 mg (65 mg iron) tablet Take 1 Tab by mouth daily (with breakfast). 4/8/21   Eugenio Jeffers MD   furosemide (LASIX) 20 mg tablet Take 1 Tab by mouth daily as needed (Edema). 2/8/21   Erika Cutler MD   insulin glargine (LANTUS,BASAGLAR) 100 unit/mL (3 mL) inpn Take 45 units daily  Indications: type 2 diabetes mellitus  Patient taking differently: 50 Units by SubCUTAneous route nightly. Indications: type 2 diabetes mellitus 10/23/20   Ramón Cadet NP   aspirin 81 mg chewable tablet Take 1 Tab by mouth daily. 10/24/20   Meghan Shaffer NP   melatonin 5 mg cap capsule Take 1 Cap by mouth nightly. Patient taking differently: Take 5 mg by mouth as needed (difficulty in sleeping).  Reports taking it as needed 9/29/20   Lennox Gallant, MD   Insulin Needles, Disposable, 31 gauge x 5/16\" ndle Use to check blood glucose BID 6/11/20   Dio Calles MD   Insulin Syringe-Needle U-100 (INSULIN SYRINGE) 1 mL 30 gauge x 5/16 syrg As directed for lantus insulin 1/2/19   Eugenio Jeffers MD     Allergies   Allergen Reactions    Azithromycin Other (comments)     Rapid response was called for bradycardia and hypotension     Pcn [Penicillins] Hives        Review of Systems:  A comprehensive review of systems was negative except for that written in the HPI. Objective:     Patient Vitals for the past 8 hrs:   BP Temp Pulse Resp SpO2   21 (!) 172/90 98.1 °F (36.7 °C) 88 18 99 %   21 1900 (!) 167/91 98.4 °F (36.9 °C) 90 20 99 %   21 1730 (!) 170/93 -- -- -- 100 %   21 1715 (!) 169/91 -- -- -- 100 %   21 1630 (!) 166/91 -- -- -- 100 %   21 1600 (!) 160/90 -- -- -- 100 %   21 1545 (!) 163/95 -- -- -- 100 %     Temp (24hrs), Av.4 °F (36.9 °C), Min:98.1 °F (36.7 °C), Max:98.8 °F (37.1 °C)    Bilateral ELADIO:     Musculoskeletal: Muscle Strength is 5/5 for all extrinsic muscle groups of the foot bilateral. First MTPJ and Ankle range of motion is mildly limited with no pain or crepitus. Toes 2-5 dorsally contracted on bilateral feet. Vascular: DP and PT pulses are palpable and +2/4 bilateral. Sparse hair growth noticed on the dorsal foot and digits. Capillary refill time is less than three seconds to distal digits bilateral.  Neurological: Protective sensation is diminished to 7/10 sites of the foot bilateral upon testing with a Semmis Kelsi 5.07 monofilament. Vibratory sensation is diminished to the level of the medial malleolus bilateral. Sharp/dull sensation is diminished to the fore foot. Achilles and patellar deep tendon reflexes are within normal limits bilateral. Paresthesia symptoms present to bilateral LE's. Dermatological: Skin shows signs of mild atrophy with diffuse dry xerosis. Left 1st MTPJ medial ulcer measure 2.5 cm in diameter with granular base, some antibiotic beads visible. No clinical signs of infection noted. Web space third and fourth on left foot macerated, remaining web spaces are clean, dry, and intact. Left fourth toe lateral wound measure 1.5 cm with fibrous base, left fifth toe wound about 1 x 0.7 cm with granular base.  Toenails 1-5 bilateral are elongated thickened, and discolored with subungual debris. Data Review:   Recent Results (from the past 24 hour(s))   EKG, 12 LEAD, INITIAL    Collection Time: 06/24/21 10:21 AM   Result Value Ref Range    Ventricular Rate 97 BPM    Atrial Rate 97 BPM    P-R Interval 132 ms    QRS Duration 82 ms    Q-T Interval 386 ms    QTC Calculation (Bezet) 490 ms    Calculated P Axis 41 degrees    Calculated R Axis 37 degrees    Calculated T Axis 60 degrees    Diagnosis       Normal sinus rhythm  Possible Left atrial enlargement  Prolonged QT  Abnormal ECG  When compared with ECG of 17-MAY-2021 17:17,  No significant change was found     CBC WITH AUTOMATED DIFF    Collection Time: 06/24/21 10:39 AM   Result Value Ref Range    WBC 5.4 4.6 - 13.2 K/uL    RBC 3.52 (L) 4.20 - 5.30 M/uL    HGB 7.6 (L) 12.0 - 16.0 g/dL    HCT 25.5 (L) 35.0 - 45.0 %    MCV 72.4 (L) 74.0 - 97.0 FL    MCH 21.6 (L) 24.0 - 34.0 PG    MCHC 29.8 (L) 31.0 - 37.0 g/dL    RDW 16.9 (H) 11.6 - 14.5 %    PLATELET 873 777 - 331 K/uL    MPV 9.4 9.2 - 11.8 FL    NEUTROPHILS 69 40 - 73 %    LYMPHOCYTES 18 (L) 21 - 52 %    MONOCYTES 10 3 - 10 %    EOSINOPHILS 2 0 - 5 %    BASOPHILS 1 0 - 2 %    ABS. NEUTROPHILS 3.8 1.8 - 8.0 K/UL    ABS. LYMPHOCYTES 1.0 0.9 - 3.6 K/UL    ABS. MONOCYTES 0.5 0.05 - 1.2 K/UL    ABS. EOSINOPHILS 0.1 0.0 - 0.4 K/UL    ABS. BASOPHILS 0.0 0.0 - 0.1 K/UL    DF AUTOMATED     METABOLIC PANEL, COMPREHENSIVE    Collection Time: 06/24/21 10:39 AM   Result Value Ref Range    Sodium 141 136 - 145 mmol/L    Potassium 2.7 (LL) 3.5 - 5.5 mmol/L    Chloride 107 100 - 111 mmol/L    CO2 27 21 - 32 mmol/L    Anion gap 7 3.0 - 18 mmol/L    Glucose 273 (H) 74 - 99 mg/dL    BUN 10 7.0 - 18 MG/DL    Creatinine 1.40 (H) 0.6 - 1.3 MG/DL    BUN/Creatinine ratio 7 (L) 12 - 20      GFR est AA 49 (L) >60 ml/min/1.73m2    GFR est non-AA 41 (L) >60 ml/min/1.73m2    Calcium 7.7 (L) 8.5 - 10.1 MG/DL    Bilirubin, total 0.3 0.2 - 1.0 MG/DL    ALT (SGPT) 24 13 - 56 U/L    AST (SGOT) 28 10 - 38 U/L    Alk.  phosphatase 704 (H) 45 - 117 U/L    Protein, total 5.9 (L) 6.4 - 8.2 g/dL    Albumin 1.4 (L) 3.4 - 5.0 g/dL    Globulin 4.5 (H) 2.0 - 4.0 g/dL    A-G Ratio 0.3 (L) 0.8 - 1.7     CARDIAC PANEL,(CK, CKMB & TROPONIN)    Collection Time: 06/24/21 10:39 AM   Result Value Ref Range    CK - MB <1.0 <3.6 ng/ml    CK-MB Index  0.0 - 4.0 %     CALCULATION NOT PERFORMED WHEN RESULT IS BELOW LINEAR LIMIT     26 - 192 U/L    Troponin-I, QT <0.02 0.0 - 0.045 NG/ML   VANCOMYCIN, RANDOM    Collection Time: 06/24/21 10:39 AM   Result Value Ref Range    Vancomycin, random <0.8 (L) 5.0 - 40.0 UG/ML   PTH INTACT    Collection Time: 06/24/21 10:39 AM   Result Value Ref Range    Calcium 7.5 (L) 8.5 - 10.1 MG/DL    PTH, Intact PENDING pg/mL   POC LACTIC ACID    Collection Time: 06/24/21 11:33 AM   Result Value Ref Range    Lactic Acid (POC) 0.62 0.40 - 2.00 mmol/L   URINALYSIS W/ RFLX MICROSCOPIC    Collection Time: 06/24/21 12:35 PM   Result Value Ref Range    Color YELLOW      Appearance CLEAR      Specific gravity 1.016 1.005 - 1.030      pH (UA) 6.0 5.0 - 8.0      Protein >1,000 (A) NEG mg/dL    Glucose 500 (A) NEG mg/dL    Ketone Negative NEG mg/dL    Bilirubin Negative NEG      Blood SMALL (A) NEG      Urobilinogen 0.2 0.2 - 1.0 EU/dL    Nitrites Negative NEG      Leukocyte Esterase TRACE (A) NEG     URINE MICROSCOPIC ONLY    Collection Time: 06/24/21 12:35 PM   Result Value Ref Range    WBC 6 to 9 0 - 4 /hpf    RBC 2 to 4 0 - 5 /hpf    Epithelial cells 2+ 0 - 5 /lpf    Bacteria 1+ (A) NEG /hpf    Yeast FEW (A) NEG     METABOLIC PANEL, BASIC    Collection Time: 06/24/21  4:55 PM   Result Value Ref Range    Sodium 146 (H) 136 - 145 mmol/L    Potassium 2.6 (LL) 3.5 - 5.5 mmol/L    Chloride 117 (H) 100 - 111 mmol/L    CO2 23 21 - 32 mmol/L    Anion gap 6 3.0 - 18 mmol/L    Glucose 178 (H) 74 - 99 mg/dL    BUN 8 7.0 - 18 MG/DL    Creatinine 0.87 0.6 - 1.3 MG/DL    BUN/Creatinine ratio 9 (L) 12 - 20      GFR est AA >60 >60 ml/min/1.73m2    GFR est non-AA >60 >60 ml/min/1.73m2    Calcium 5.9 (LL) 8.5 - 10.1 MG/DL   MAGNESIUM    Collection Time: 06/24/21  4:55 PM   Result Value Ref Range    Magnesium 1.4 (L) 1.6 - 2.6 mg/dL         Impression:     Diabetic foot ulcers with osteomyelitis, diabetic neuropathy      Recommendation:     - x-ray and MRI of left foot reviewed. No acute bony pathology noted. Extensive osteomyelitis of the medial cuneiform, 1st metatarsal, and 1st proximal phalanx. Adjacent ulcer and extensive soft tissue edema. Septic arthritis of the 1st TMT joint. Osteomyelitis of the 4th and 5th distal phalanges. Adjacent likely ulcers. Mild edema in the 3rd and 4th metatarsal heads. The possibility of contusion versus early osteomyelitis should be considered. - First metatarsal and proximal phalanx marrow changes could be false positive secondary to recent surgical intervention. Fourth and fifth toes concerning for osteomyelitis. - Patient will need bone biopsy of first metatarsal, medial cuneiform, proximal phalanx with possible amputation of fourth and fifth toes. - Plan for biopsy tomorrow. - Remain NWB to left forefoot in surgical shoe. Continue with Wound VAC therapy.

## 2021-06-25 NOTE — DIABETES MGMT
Diabetes Plan of Care    Recommend low dose of lantus 15 units nightly, and corrective lispro - orders obtained     Assessment: admitted with left foot diabetic infection - was on home IV antibiotics per patient with wound vac. Consult received   Currently NPO on her way to OR. She confirms her home DM medications. She consumes 2-3 meals daily and monitors BG with meals -   Is interested in an insulin pump with CGM for better management -   Reviewed BG targets and encouraged her to monitor post-prandial BG to assist with meal planning   Reviewed hospital insulin protocol and lower dose of lantus tonight   Will continue to monitor     Most recent blood glucose values: Within target range     No    Current A1c  Lab Results   Component Value Date/Time    Hemoglobin A1c 9.6 (H) 06/25/2021 06:45 AM    Hemoglobin A1c (POC) 12.3 03/28/2019 09:49 AM     Adequate glycemic control PTA:   No     Current hospital diabetes medications:  Lantus 15 units nightly  Corrective lispro, very insulin resistant, 4 times daily     TDD previous day = admitted yesterday afternoon      Home diabetes medications;   Lantus 50 units daily - takes at night  Humalog 10 units with meals  Patient reports she is no longer taking metformin     Goals: Blood glucose will be within target of 70 - 180 mg/dl by - 6/30/2021    Education:  __X_ Refer to Diabetes Education Record                       _____ Education not indicated at this time     Natalia Herring MPH RN 1 [facility]  Pager [phone]  Office [phone]

## 2021-06-25 NOTE — DIABETES MGMT
Diabetes Patient/Family Education Record    Factors That May Influence Patients Ability to Learn or Comply with Recommendations   []   Language barrier    []   Cultural needs   []   Motivation    []   Cognitive limitation    []   Physical   []   Education    []   Physiological factors   []   Hearing/vision/speaking impairment   []   Hindu beliefs    []   Financial factors   []  Other:   [x]  No factors identified at this time. Person Instructed:   [x]   Patient   []   Family   []  Other     Preference for Learning:   [x]   Verbal   [x]   Written   []  Demonstration     Level of Comprehension & Competence:    [x]  Good                                      [] Fair                                     []  Poor                             []  Needs Reinforcement   [x]  Teach back completed - monitor post-prandial BG     Education Component: see DM note    [x]  Medication management  -  Confirms home DM medications - states compliance     [x]  Nutritional management - [x] Obtained usual meal pattern   []   Basic carbohydrate counting  []  Plate method  []  Limit concentrated sweets and avoid sweetened beverages  []  Portion control  []    Avoid skipping meals   [x]  Exercise   [x]  Signs, symptoms, and treatment of hyperglycemia and hypoglycemia   [x] Prevention, recognition and treatment of hyperglycemia and hypoglycemia   [x]  Importance of blood glucose monitoring  [] Blood Glucose targets   []   Provided patient with blood glucose meter  [x]  Has glucometer and supplies at home   []  Instruction on use of the blood glucose meter and recommended monitoring schedule   [x]  Discuss the importance of HbA1C monitoring. Patients A1c is _9.6__ %.  This is equivalent to average glucose of   _228 mg/dl for the past 2-3 months.   []  Sick day guidelines   []  Proper use and disposal of lancets, needles, syringes or insulin pens (if appropriate)   [x]  Potential long-term complications (retinopathy, kidney disease, neuropathy, foot care) infected left foot ulcer - was on IV antibx at home    [x] Information about whom to contact in case of emergency or for more information    []  Goal:  Patient/family will demonstrate understanding of Diabetes Self- Management Skills by: (date) _6/30/2021____  Plan for post-discharge education or self-management support:    [] Outpatient class schedule provided            [] Patient Declined    [] Scheduled for outpatient classes (date) _______    [] Written information provided  Verify: [] Prior to admission Diabetes medications    Does patient understand how diabetes medications work? ________yes_____________  Does patient have difficulty obtaining diabetes medications or testing supplies? _______no__________   Verónica Cool MPH RN 1 [facility]  Pager [phone]  Office [phone]

## 2021-06-25 NOTE — CONSULTS
Cardiology Consult Note    Consultation request by Anneliese Stewart MD to follow up for CAD and medications recommendations     Date of  Admission: 6/24/2021 10:15 AM   Primary Care Physician:  Gautam Romero MD    Consulting Cardiologist: Dr. Juarez Lot:       1. Recurrent syncope- in the setting severe anemia  2. Severe anemia- no active bleeding - w/u per primary team   3. CAD- s/p Cardiac cath 10/22/20 100% mid RCA occlusion with thrombus. S/p aspiration thrombectomy followed by ptca/stent ( 2 BLANCA stents placed overlapping extending from mid RCA to distal RCA). Mild mid LAD stenosis. 4. Chronic diastolic heart failure  5. Hx of high degree AV block - 10/20  s/p TPM due to complete heart block during PCI. Post PCI developed juctional tachycardia. TPM was removed. ekg 4/5/21 sinus tachycardia with nonspecific T wave abnormality   6. Hypertension-elevated now. She was hypotensive on admission   7. Hyperlipidemia-continue statin   8. Diabetes-poorly controlled with A1c 8.5  9. Hypokalemia- on admission with K+ 2.6 improved   10. Hypomagnesemia- with Mag+ 1.4 on admission- resolved   11. Hypocalcemia  12. Left foot infection- extensive osteomyelitis showed on recent foot MRI - Per Podiatry plans for Foot bone biopsy and possible amputations 4th and 5th toes       Plan:          Patient denies any chest pain or chest tightness. Denies any shortness of breath. No clinical evidence acute CHF   Severe Anemia with Hgb 6.8 noted plans for transfusion today per Primary team.    OK to proceed with surgery low to moderate  risk from cardiac stand point   Multiple electrolytes abnormalities. Continue to replace electrolytes. Renal was consulted appreciate assistance   BP elevated resume BB. Continue with other home medications post op. Will change Brillinta  to Plavix as patient with severe anemia. Resume DAPT  Post op. Last PCI 10/2020-- PRBC to keep HB >8.0.   Okay to proceed with GI intervention if needed. Will f/u    Patient Active Problem List   Diagnosis Code    Acute bronchitis J20.9    Hyperglycemia R73.9    Diabetic foot infection ([facility] [address] E11.628, L08.9    Right foot infection L08.9    Acute pain of right foot M79.671    Insulin dependent diabetes mellitus [id number]    HTN (hypertension) I10    Fever R50.9    NSTEMI (non-ST elevated myocardial infarction) ([facility] [address] I21.4    Severe obesity ([address] E66.01    Osteomyelitis ([address] M86.9    Diabetic foot ulcer ([address] E11.621, L97.509    FRANCISCA (acute kidney injury) ([facility] [address] N17.9    Sepsis ([facility] [address] A41.9    Hyponatremia E87.1    Foot ulcer, left ([facility] [address] L97.529            History of Present Illness: This is a 40 y.o. female admitted for Sepsis ([facility] [address] Chevy.Exon. 9]Foot ulcer, left ([address] [L97.529]. Patient with PMHx of anemia, CHF, hypertension, CAD s/p PCI 10/20, diabetes and chronic foot infection. Patient presented to the ED with who recurrent syncope. Her first episode occurred a few days ago while she was attending her son's graduation. The second one occurred yesterday  while she was at her podiatrist's office so she was brought to the emergency room. She reports that she had been getting wound care at home with home health set up from her description. She was followed by the wound care staff that are small toe on the left foot was looking abnormal.  She denies chest pain, she denies shortness of breath. She reports no fevers or chills. She does report having decreased appetite and some vomiting in the recent past.  For unclear reasons she had not been taking her Coreg over the last few days. She is not sure if she takes the Lasix listed on her medication chart. In the ED she is noted to have low blood pressures, systolic 25H, no leukocytosis, no fever, has tachycardia. On my exam she denies chest pain chest pressure.  No SOB,     ROS    Constitutional: No fever, no chills, no weight loss, no night sweats   HEENT: No epistaxis, no nasal drainage, no difficulty in swallowing, no redness in eyes  Respiratory:  negative for cough, sputum, hemoptysis, pleurisy/chest pain, wheezing, dyspnea on exertion or emphysema  Cardiovascular: syncope  Gastrointestinal: no abd pain, no vomiting, no diarrhea, no bleeding symptoms  Genitourinary: No urinary symptoms or hematuria  Integument/breast: Chronic foot wound  Musculoskeletal: no muscle pain, no weakness  Neurological: No focal weakness, no seizures, no headaches  Behvioral/Psych: No anxiety, no depression     Past Medical History:     Past Medical History:   Diagnosis Date    CAD (coronary artery disease)     Diabetes ([facility] [address]     HTN (hypertension)     Hyperlipidemia          Social History:     Social History     Socioeconomic History    Marital status:      Spouse name: Not on file    Number of children: Not on file    Years of education: Not on file    Highest education level: Not on file   Tobacco Use    Smoking status: Never Smoker    Smokeless tobacco: Never Used   Substance and Sexual Activity    Alcohol use: No    Drug use: No    Sexual activity: Yes     Partners: Male     Social Determinants of Health     Financial Resource Strain:     Difficulty of Paying Living Expenses:    Food Insecurity:     Worried About Running Out of Food in the Last Year:     Ran Out of Food in the Last Year:    Transportation Needs:     Lack of Transportation (Medical):      Lack of Transportation (Non-Medical):    Physical Activity:     Days of Exercise per Week:     Minutes of Exercise per Session:    Stress:     Feeling of Stress :    Social Connections:     Frequency of Communication with Friends and Family:     Frequency of Social Gatherings with Friends and Family:     Attends Taoism Services:     Active Member of Clubs or Organizations:     Attends Club or Organization Meetings:     Marital Status:         Family History:     Family History   Problem Relation Age of Onset    Lupus Mother    [place] Cancer Neg Hx     Diabetes Neg Hx     Heart Disease Neg Hx     Hypertension Neg Hx     Heart Attack Neg Hx     Stroke Neg Hx         Medications:      Allergies   Allergen Reactions    Azithromycin Other (comments)     Rapid response was called for bradycardia and hypotension     Pcn [Penicillins] Hives        Current Facility-Administered Medications   Medication Dose Route Frequency    [START ON 6/26/2021] VANCOMYCIN TROUGH DUE   Other Daily    0.9% sodium chloride infusion 250 mL  250 mL IntraVENous PRN    sodium chloride (NS) flush 5-10 mL  5-10 mL IntraVENous PRN    cefepime (MAXIPIME) 2 g in sterile water (preservative free) 10 mL IV syringe  2 g IntraVENous Q8H    vancomycin (VANCOCIN) 750 mg in 0.9% sodium chloride 250 mL (VIAL-MATE)  750 mg IntraVENous Q12H    [Held by provider] aspirin chewable tablet 81 mg  81 mg Oral DAILY    ferrous sulfate tablet 325 mg  325 mg Oral DAILY WITH BREAKFAST    insulin lispro (HUMALOG) injection 10 Units  10 Units SubCUTAneous TIDAC    rosuvastatin (CRESTOR) tablet 10 mg  10 mg Oral QHS    [Held by provider] ticagrelor (BRILINTA) tablet 90 mg  90 mg Oral Q12H    sodium chloride (NS) flush 5-40 mL  5-40 mL IntraVENous Q8H    sodium chloride (NS) flush 5-40 mL  5-40 mL IntraVENous PRN    acetaminophen (TYLENOL) tablet 650 mg  650 mg Oral Q6H PRN    Or    acetaminophen (TYLENOL) suppository 650 mg  650 mg Rectal Q6H PRN    polyethylene glycol (MIRALAX) packet 17 g  17 g Oral DAILY PRN    ondansetron (ZOFRAN ODT) tablet 4 mg  4 mg Oral Q8H PRN    Or    ondansetron (ZOFRAN) injection 4 mg  4 mg IntraVENous Q6H PRN    [Held by provider] enoxaparin (LOVENOX) injection 40 mg  40 mg SubCUTAneous DAILY    magnesium oxide (MAG-OX) tablet 400 mg  400 mg Oral BID         Physical Exam:     Visit Vitals  BP (!) 169/100 (BP 1 Location: Left upper arm, BP Patient Position: At rest;Semi fowlers)   Pulse 100   Temp 97.8 °F (36.6 °C)   Resp 18   Ht 5' 7\" (1.702 m) Wt 87.5 kg (193 lb)   SpO2 100%   Breastfeeding No   BMI 30.23 kg/m²       TELE: normal sinus rhythm. sinus tachycardia     BP Readings from Last 3 Encounters:   06/25/21 (!) 169/100   05/21/21 (!) 145/88   05/15/21 90/75     Pulse Readings from Last 3 Encounters:   06/25/21 100   05/21/21 96   05/15/21 97     Wt Readings from Last 3 Encounters:   06/24/21 87.5 kg (193 lb)   05/20/21 98.9 kg (218 lb)   05/17/21 89.7 kg (197 lb 12.8 oz)       General:  alert, cooperative, no distress, appears stated age, moderately obese  Neck:  no stridor, no carotid bruit, no JVD  Lungs:  clear to auscultation bilaterally  Heart:  regular rate and rhythm, S1, S2 normal, no murmur, click, rub or gallop  Abdomen:  abdomen is soft without significant tenderness, masses, organomegaly or guarding  Extremities:  extremities normal, atraumatic, no cyanosis or edema  Skin: Warm and dry. no hyperpigmentation, vitiligo, or suspicious lesions.  Left foot wound cover with kerlix  Neuro: alert, oriented x3, affect appropriate, no focal neurological deficits, moves all extremities well, no involuntary movements, reflexes at knee and ankle intact       Data Review:     Recent Labs     06/25/21  0645 06/24/21  1039   WBC 5.4 5.4   HGB 6.8* 7.6*   HCT 22.9* 25.5*    214     Recent Labs     06/25/21  0645 06/24/21  1655 06/24/21  1039    146* 141   K 3.6 2.6* 2.7*   * 117* 107   CO2 25 23 27   * 178* 273*   BUN 8 8 10   CREA 0.99 0.87 1.40*   CA 7.4* 5.9* 7.5*  7.7*   MG 2.3 1.4*  --    ALB  --   --  1.4*   ALT  --   --  24       Results for orders placed or performed during the hospital encounter of 06/24/21   EKG, 12 LEAD, INITIAL   Result Value Ref Range    Ventricular Rate 97 BPM    Atrial Rate 97 BPM    P-R Interval 132 ms    QRS Duration 82 ms    Q-T Interval 386 ms    QTC Calculation (Bezet) 490 ms    Calculated P Axis 41 degrees    Calculated R Axis 37 degrees    Calculated T Axis 60 degrees    Diagnosis Normal sinus rhythm  Possible Left atrial enlargement  Prolonged QT  Abnormal ECG  When compared with ECG of 17-MAY-2021 17:17,  No significant change was found         All Cardiac Markers in the last 24 hours:  No results found for: CPK, CK, CKMMB, CKMB, RCK3, CKMBT, CKNDX, CKND1, ROBERTA, TROPT, TROIQ, KEATON, TROPT, TNIPOC, BNP, BNPP    Last Lipid:    Lab Results   Component Value Date/Time    Cholesterol, total 218 (H) 09/28/2020 01:00 PM    HDL Cholesterol 49 09/28/2020 01:00 PM    LDL, calculated 121 (H) 09/28/2020 01:00 PM    Triglyceride 241 (H) 09/28/2020 01:00 PM       Cardiographics:     EKG Results     Procedure 720 Value Units Date/Time    EKG, 12 LEAD, INITIAL [[id number]]     Order Status: Canceled     EKG, 12 LEAD, INITIAL [[id number]] Collected: 06/24/21 1021    Order Status: Completed Updated: 06/24/21 1022     Ventricular Rate 97 BPM      Atrial Rate 97 BPM      P-R Interval 132 ms      QRS Duration 82 ms      Q-T Interval 386 ms      QTC Calculation (Bezet) 490 ms      Calculated P Axis 41 degrees      Calculated R Axis 37 degrees      Calculated T Axis 60 degrees      Diagnosis --     Normal sinus rhythm  Possible Left atrial enlargement  Prolonged QT  Abnormal ECG  When compared with ECG of 17-MAY-2021 17:17,  No significant change was found          05/17/21    ECHO ADULT FOLLOW-UP OR LIMITED 05/20/2021 5/20/2021    Interpretation Summary  · LV: Estimated LVEF is 55 - 60%. Visually measured ejection fraction. Normal cavity size, wall thickness and systolic function (ejection fraction normal). · There is no vegetation on the aortic valve. · There is no vegetation on the mitral valve. · There is no vegetation on the tricuspid valve. · There is no vegetation. · MV: Mitral valve non-specific thickening.     Signed by: Faustino Mondragon MD on 5/20/2021 12:20 PM        10/19/20    CARDIAC PROCEDURE 10/23/2020 10/23/2020  INVASIVE VASCULAR PROCEDURE 10/23/2020 10/23/2020    Conclusion  100% mid RCA occlusion with thrombus. S/p aspiration thrombectomy followed by ptca/stent ( 2 BLANCA stents placed overlapping extending from mid RCA to distal RCA). Mild mid LAD stenosis. Normal LV function by echo. TPM inserted via right femoral vein due to complete heart block. Plan:  Continue DAPT for 1 yr/ high dose statin. Post PCI developed juctional tachycardia. Will discontinue TPM. Monitor and consider PPM if needed. Recommend intense risk factor modification. Signed by: Meera Rodriguez MD on 10/23/2020 10:41 AM      XR Results (most recent):  Results from [place] encounter on 06/24/21    XR FOOT LT AP/LAT    Narrative  EXAM: Left Foot X-ray    INDICATION:  ulceration    TECHNIQUE: 2 views of the left foot obtained. COMPARISON: 5/17/2021, 4/4/2021, 12/30/2018    FINDINGS: Patient is status post resection of the first MTP joint. There is  mildly exuberant callus formation adjacent the residual first metatarsal.  No  evidence of acute fracture or dislocation. There is periosteal reaction or  erosions at the base of the first metatarsal. There is suggestion of an erosion  at the proximal most end of the first proximal phalanx. No lytic or blastic  focus appreciated. Diffuse soft tissue edema of the forefoot is noted. A wound  VAC is present near the operative bed at the first MTP joint. Impression  1. Findings concerning for osteomyelitis in the proximal most aspect of the  first proximal phalanx and the proximal most aspect of the first metatarsal.    2.  Postoperative changes of resection of the first MTP joint with exuberant  callus adjacent the distal end of the residual first metatarsal.        Signed By: Dez Melendez NP supervised    June 25, 2021      I have independently evaluated and examined the patient. All relevant labs and testing data's are reviewed. Care plan discussed and updated after review.     Hima Douglass MD

## 2021-06-25 NOTE — ANESTHESIA POSTPROCEDURE EVALUATION
Post-Anesthesia Evaluation & Assessment    Visit Vitals  BP (!) 162/93   Pulse 96   Temp 36.5 °C (97.7 °F)   Resp 20   Ht 5' 7\" (1.702 m)   Wt 87.5 kg (193 lb)   SpO2 100%   Breastfeeding No   BMI 30.23 kg/m²       Nausea/Vomiting: no nausea and no vomiting    Post-operative hydration adequate. Pain score (VAS): 0    Mental status & Level of consciousness: alert and oriented x 3    Neurological status: moves all extremities, sensation grossly intact    Pulmonary status: airway patent, no supplemental oxygen required    Complications related to anesthesia: none    Patient has met all discharge requirements. Additional comments:        Padmini Raphael CRNA  June 25, 2021  Procedure(s):  LEFT FOOT FOURTH AND FIFTH TOE AMPUTATION AND BONE BIOPSY OF FIRST METATARSAL, MEDIAL CUNNIFORM, FIRST PROXIMAL PHALANX. MAC    <BSHSIANPOST>    INITIAL Post-op Vital signs:   Vitals Value Taken Time   BP     Temp     Pulse 97 06/25/21 1927   Resp 16 06/25/21 1927   SpO2 99 % 06/25/21 1927   Vitals shown include unvalidated device data.

## 2021-06-25 NOTE — PROGRESS NOTES
Called by dr Edie Persaud  Pt with hx of diabetic nephropathy,nephrotic syndrome,presented with syncope,severe hypokalemia,hypomagnesemia,hypocalcemia  Was on lasix  She is receiving  iv and po potassium and magnesium  Had received 3 liters iv ns for hypotension,sepsis. started antibiotics for foot infection  Ca is low,but partially correct for low albumin. check ionized calcium and pth,replace if needed  Repeat labs   Will follow

## 2021-06-25 NOTE — CONSULTS
Infectious Disease Consultation Note        Reason: left foot infection    Current abx Prior abx   Cefepime, vancomycin, levofloxacin since 6/24/2021  ceftriaxone 5/17-6/23/2021     Lines:       Assessment :    40 y.o.  female  With PMH of HTN, uncontrolled type 2 DM-last hemoglobin A1c 9 .6 on 6/25/2021, peripheral Neuropathy came tot he ER on 6/24/2021 with recurrent dizziness     Hospitalization January 2019 for left second toe, left foot cellulitis     Hospitalization April 2021 for left foot infection, bone biopsy negative for osteomyelitis  Wound cultures group B streptococcus     Hospitalization 5/2021 for sepsis -present on admission due to group B bloodstream infection  (positive blood culture 5/17, negative blood culture 5/19/21 ), left foot diabetic ulcer with cellulitis/abscess, possible osteomyelitis of first metatarsal head with septic 1st MTPJ.    Intra-Op cultures 5/18-group B streptococcus     Now with recurrent fainting spells, evidence of osteomyelitis of left fourth/fifth toe on MRI 6/24/2021    Clinical presentation consistent with osteomyelitis left fourth/fifth toe in a patient with uncontrolled type 2 Dm    I concur with podiatrist that MRI changes seen in the first metatarsal and proximal phalanx left foot could be secondary to recent surgical intervention. No purulence/increased warmth/tenderness left foot noted on today's exam    Doubt recurrent fainting spell is related to sepsis     Recommendations:     1. Discontinue cefepime, levofloxacin, vancomycin. Start ceftriaxone   2. Follow-up blood cultures 6/24, wound cultures  3. Follow podiatry recommendations regarding left foot surgery/biopsy  4. Management of syncope per primary team  5. Will make decision about discontinuation of antibiotics upon discharge versus switching to po based on intra op findings/biopsy.      Thank you for consultation request. Above plan was discussed in details with patient, family and dr Marcello Pyle. Please call me if any further questions or concerns. Will continue to participate in the care of this patient. HPI:    40 y.o.   female with history of admission in  for managment of left foot diabetic ulcer infection dc'd w/ wound vac on ceftriaxone via PICC line w/ end date of  who presents with reports of recurrent syncope. Her first episode occurred a few days ago while she was attending her son's graduation. The second one occurred today while she was at her podiatrist's office so she was brought to the emergency room. She reports that she had been getting wound care at home with home health set up from her description. She was followed by the wound care staff that are small toe on the left foot was looking abnormal.  She denies chest pain, she denies shortness of breath. She reports no fevers or chills. She does report having decreased appetite and some vomiting in the recent past.  For unclear reasons she had not been taking her Coreg over the last few days. She is not sure if she takes the Lasix listed on her medication chart.   In the ED she is noted to have low blood pressures, systolic 67B, no leukocytosis, no fever, has tachycardia. There was concern for undiagnosed infection. Patient was started on abx. I was asked to see patient for further eval and management. Plans for bone biopsy of first metatarsal, medial cuneiform, proximal phalanx with possible amputation of fourth and fifth toes today. Patient denies any subjective fever or chills at home. She has not missed antibiotic doses.   Past Medical History:   Diagnosis Date    CAD (coronary artery disease)     Diabetes ([facility] [address]     HTN (hypertension)     Hyperlipidemia        Past Surgical History:   Procedure Laterality Date    HX CORONARY STENT PLACEMENT      HX GYN             Current Discharge Medication List      CONTINUE these medications which have NOT CHANGED    Details   ticagrelor (BRILINTA) 90 mg tablet Take 1 Tablet by mouth every twelve (12) hours every twelve (12) hours. Qty: 180 Tablet, Refills: 1      insulin lispro (HUMALOG) 100 unit/mL kwikpen 10 units before meals  Indications: type 2 diabetes mellitus  Qty: 5 Pen, Refills: 11    Associated Diagnoses: Type 2 diabetes mellitus with complication (HCC)      metFORMIN ER (GLUCOPHAGE XR) 500 mg tablet Take 1 Tab by mouth daily (with dinner). Qty: 90 Tab, Refills: 3    Associated Diagnoses: Type 2 diabetes mellitus with complication (HCC)      rosuvastatin (CRESTOR) 10 mg tablet Take 1 Tab by mouth nightly. Qty: 90 Tab, Refills: 3    Associated Diagnoses: NSTEMI (non-ST elevated myocardial infarction) ([facility])      carvediloL (Coreg) 12.5 mg tablet Take 1 Tab by mouth two (2) times daily (with meals). Indications: myocardial reinfarction prevention  Qty: 60 Tab, Refills: 3    Associated Diagnoses: [facility]STEM[facility] (non-ST elevated [facility] infarction) [facility] Essential hypertension      spironolactone (ALDACTONE) 25 mg tablet Take 25 mg by mouth daily. ferrous sulfate 325 mg (65 mg iron) tablet Take 1 Tab by mouth daily (with breakfast). Qty: 30 Tab, Refills: 0      furosemide (LASIX) 20 mg tablet Take 1 Tab by mouth daily as needed (Edema). Qty: 30 Tab, Refills: 1    Associated Diagnoses: Acute on chronic diastolic congestive heart failure (HCC)      insulin glargine (LANTUS,BASAGLAR) 100 unit/mL (3 mL) inpn Take 45 units daily  Indications: type 2 diabetes mellitus  Qty: 4 Pen, Refills: 5    Associated Diagnoses: Type 2 diabetes mellitus with complication (HCC)      aspirin 81 mg chewable tablet Take 1 Tab by mouth daily. Qty: 30 Tab, Refills: 0      melatonin 5 mg cap capsule Take 1 Cap by mouth nightly.   Qty: 30 Cap, Refills: 1    Associated Diagnoses: Difficulty sleeping      Insulin Needles, Disposable, 31 gauge x 5/16\" ndle Use to check blood glucose BID  Qty: 100 Pen Needle, Refills: 11    Associated Diagnoses: Type 2 diabetes mellitus with complication (HCC)      Insulin Syringe-Needle U-100 (INSULIN SYRINGE) 1 mL 30 gauge x 5/16 syrg As directed for lantus insulin  Qty: 50 Syringe, Refills: 0             Current Facility-Administered Medications   Medication Dose Route Frequency    [START ON 6/26/2021] VANCOMYCIN TROUGH DUE   Other Daily    0.9% sodium chloride infusion 250 mL  250 mL IntraVENous PRN    sodium chloride (NS) flush 5-10 mL  5-10 mL IntraVENous PRN    cefepime (MAXIPIME) 2 g in sterile water (preservative free) 10 mL IV syringe  2 g IntraVENous Q8H    vancomycin (VANCOCIN) 750 mg in 0.9% sodium chloride 250 mL (VIAL-MATE)  750 mg IntraVENous Q12H    [Held by provider] aspirin chewable tablet 81 mg  81 mg Oral DAILY    ferrous sulfate tablet 325 mg  325 mg Oral DAILY WITH BREAKFAST    insulin lispro (HUMALOG) injection 10 Units  10 Units SubCUTAneous TIDAC    rosuvastatin (CRESTOR) tablet 10 mg  10 mg Oral QHS    [Held by provider] ticagrelor (BRILINTA) tablet 90 mg  90 mg Oral Q12H    sodium chloride (NS) flush 5-40 mL  5-40 mL IntraVENous Q8H    sodium chloride (NS) flush 5-40 mL  5-40 mL IntraVENous PRN    acetaminophen (TYLENOL) tablet 650 mg  650 mg Oral Q6H PRN    Or    acetaminophen (TYLENOL) suppository 650 mg  650 mg Rectal Q6H PRN    polyethylene glycol (MIRALAX) packet 17 g  17 g Oral DAILY PRN    ondansetron (ZOFRAN ODT) tablet 4 mg  4 mg Oral Q8H PRN    Or    ondansetron (ZOFRAN) injection 4 mg  4 mg IntraVENous Q6H PRN    [Held by provider] enoxaparin (LOVENOX) injection 40 mg  40 mg SubCUTAneous DAILY    potassium chloride (K-DUR, KLOR-CON) SR tablet 40 mEq  40 mEq Oral BID    magnesium oxide (MAG-OX) tablet 400 mg  400 mg Oral BID       Allergies: Azithromycin and Pcn [penicillins]    Family History   Problem Relation Age of Onset    Lupus Mother     Cancer Neg Hx     Diabetes Neg Hx     Heart Disease Neg Hx     Hypertension Neg Hx     Heart Attack Neg Hx     Stroke Neg Hx      Social History Socioeconomic History    Marital status:      Spouse name: Not on file    Number of children: Not on file    Years of education: Not on file    Highest education level: Not on file   Occupational History    Not on file   Tobacco Use    Smoking status: Never Smoker    Smokeless tobacco: Never Used   Substance and Sexual Activity    Alcohol use: No    Drug use: No    Sexual activity: Yes     Partners: Male   Other Topics Concern    Not on file   Social History Narrative    Not on file     Social Determinants of Health     Financial Resource Strain:     Difficulty of Paying Living Expenses:    Food Insecurity:     Worried About Running Out of Food in the Last Year:     [address] in the Last Year:    Transportation Needs:     Lack of Transportation (Medical):  Lack of Transportation (Non-Medical):    Physical Activity:     Days of Exercise per Week:     Minutes of Exercise per Session:    Stress:     Feeling of Stress :    Social Connections:     Frequency of Communication with Friends and Family:     Frequency of Social Gatherings with Friends and Family:     Attends Faith Services:     Active Member of Clubs or Organizations:     Attends Club or Organization Meetings:     Marital Status:    Intimate Partner Violence:     Fear of Current or Ex-Partner:     Emotionally Abused:     Physically Abused:     Sexually Abused:      Social History     Tobacco Use   Smoking Status Never Smoker   Smokeless Tobacco Never Used        Temp (24hrs), Av.3 °F (36.8 °C), Min:97.9 °F (36.6 °C), Max:98.8 °F (37.1 °C)    Visit Vitals  BP (!) 168/99 (BP 1 Location: Left upper arm, BP Patient Position: At rest;Semi fowlers)   Pulse 100   Temp 97.9 °F (36.6 °C)   Resp 18   Ht 5' 7\" (1.702 m)   Wt 87.5 kg (193 lb)   SpO2 99%   Breastfeeding No   BMI 30.23 kg/m²       ROS: 12 point ROS obtained in details.  Pertinent positives as mentioned in HPI,   otherwise negative    Physical Exam:    Constitutional: She is oriented to person, place, and time. She appears well-developed and well-nourished. No distress. HENT:   Head: Normocephalic and atraumatic. Mouth/Throat: Oropharynx is clear and moist.   Eyes: Conjunctivae are normal. Pupils are equal, round, and reactive to light. No scleral icterus. Neck: Normal range of motion. Neck supple. Cardiovascular: Normal rate and intact distal pulses. Pulmonary/Chest: Effort normal and breath sounds normal. No respiratory distress. She has no wheezes. Abdominal: Soft. Bowel sounds are normal. She exhibits no distension. There is no tenderness. Musculoskeletal: Normal range of motion. Lymphadenopathy:     She has no cervical adenopathy. Neurological: She is alert and oriented to person, place, and time. No cranial nerve deficit.  Decreased sensation to light touch plantar aspect of both feet. Darkish discoloration of left fourth and fifth toe. Ulceration lateral aspect of left fourth toe without significant drainage  Skin: Skin is warm.   Left foot surgical changes as mentioned above    Labs: Results:   Chemistry Recent Labs     06/25/21  0645 06/24/21  1655 06/24/21  1039   * 178* 273*    146* 141   K 3.6 2.6* 2.7*   * 117* 107   CO2 25 23 27   BUN 8 8 10   CREA 0.99 0.87 1.40*   CA 7.4* 5.9* 7.5*  7.7*   AGAP 6 6 7   BUCR 8* 9* 7*   AP  --   --  704*   TP  --   --  5.9*   ALB  --   --  1.4*   GLOB  --   --  4.5*   AGRAT  --   --  0.3*      CBC w/Diff Recent Labs     06/25/21  0645 06/24/21  1039   WBC 5.4 5.4   RBC 3.12* 3.52*   HGB 6.8* 7.6*   HCT 22.9* 25.5*    214   GRANS 63 69   LYMPH 22 18*   EOS 2 2      Microbiology Recent Labs     06/24/21  1129 06/24/21  1127   CULT NO GROWTH AFTER 19 HOURS NO GROWTH AFTER 19 HOURS          RADIOLOGY:    All available imaging studies/reports in [facility] for this admission were reviewed      Disclaimer: Sections of this note are dictated utilizing voice recognition software, which may have resulted in some phonetic based errors in grammar and contents. Even though attempts were made to correct all the mistakes, some may have been missed, and remained in the body of the document. If questions arise, please contact our department.     Dr. Rashmi Perez, Infectious Disease Specialist  [phone]  June 25, 2021  9:31 AM

## 2021-06-25 NOTE — PROGRESS NOTES
[facility] Hospitalist Group  Progress Note    Patient: Emmanuel Waldron Age: 40 y.o. : 1975 MR#: [id number] SSN: [id number]  Date: 2021     Subjective: The pain in the left foot is tolerable. No chest pain, sob, abd pain, n/v.   Patient not having menstrual bleeding, no melena BRBPR, no hemoptysis nor hematemesis. She feels weak and tired. Hgb 6.8 this morning. K better 3.6   Calcium is 7.5, ionized calcium is 1.12, PTH intact is 238.7 - hyperparathyroidism ? Cr better at 0.99    Assessment/Plan:   Kym Villafuerte is a 40 y.o.   female with history of admission in  for managment of left foot diabetic ulcer infection dc'd w/ wound vac on ceftriaxone via PICC line w/ end date of  who presents with reports of recurrent syncope. CONSULTS: ID , PODIATRY , NEPHROLOGY , CARDIOLOGY     1. Recurrent syncope  2. Anemia  , iron level was 15 in May 18 2021 , iron level is 55 this admission. 3. Diabetic Left foot ulcer  -- s/p left 1st MTPJ resection with antibiotic bead placement. Patient has home health changing wound VAC every other day. --  xray left foot concern for OM in the proximal most aspect of the first proximal phalanx and the proximal most aspect of the first metatarsal.  -- MRI left foot: Extensive osteomyelitis of the medial cuneiform, 1st metatarsal, and 1st proximal phalanx. Adjacent ulcer and extensive soft tissue edema. Septic  arthritis of the 1st TMT joint. Osteomyelitis of the 4th and 5th distal phalanges. Adjacent likely ulcers. Mild edema in the 3rd and 4th metatarsal heads. The possibility of contusion  versus early osteomyelitis should be considered. 4. Elevated Cr   5. Interval increase in cardiac silhouette on CXR   6. Hypokalemia  - she is on lasix at home. Has hx of hypokalemia   7. Hypocalcemia , ionized calcium level is normal.   8. Hypomagnesemia   9. Prolonged Qtc   10. Abnormal UA- with trace LE, wbc 6-9, 1+ bacteria.  Few yeast. Noted epithelial cells. 11. Hypotension - resolved. 12. Hypernatremia- resolved. Past medical hx   13. type 2 DM on lantus , a1c 12.3% in 3/2019 , a1c is 9.6% this admission   14. HTN   15. Hx of NSTEMI on DAPT , stent placed on 10/2020 \"S/p aspiration thrombectomy followed by ptca/stent ( 2 BLANCA stents placed overlapping extending from mid RCA to distal RCA). \"    - Podiatry follows- today, patient needs bone biopsy of  first metatarsal, medial cuneiform, proximal phalanx with possible amputation of fourth and fifth toes. Remain NWB to left forefoot in surgical shoe. Continue wound vac. Holding brillinta, asa and lovenox today in anticipation for OR.   -  Stat 1 unit PRBC in the setting of Hgb less than 7 and anticipation of blood loss in the operating room. Risks of blood transfusion explained to pt, she consents to blood transfusion.   - I asked patient permission to perform rectal exam to test stool for blood, she politely declined and would  Rather wait to have BM and send stool to test for blood. Patient instructed to save sample  - Cardiology consult given cardiac stent placed on 10/2020 with Dr. Sofia Hobbs. The recommendation was to continue DAPT for 1 yr from 10/2020 and high dose statin. Continue statin crestor . ECHO is pending. resume coreg. Resume aldactone after surgery. Hold lasix. - Nephrology is following. Holding lasix. - ID consult. Currently on ceftriaxone and vancomycin. follow blood cxs, follow wound culture from left foot. - addon urine cx.   - SSI , DM consult   - continue Mg supplement BID. - incentive felipe   - pt ot when cleared by podiatrist.     Case discussed with RN about need for blood transfusion before surgery today. Full code   dvt ppx- hold for now due to OR  Patient NPO at current.      Additional Notes:      Case discussed with:  [x]Patient  []Family  []Nursing  []Case Management  DVT Prophylaxis:  []Lovenox  []Hep SQ  []SCDs  []Coumadin   []On Heparin gtt    Objective:   VS:   Visit Vitals  BP (!) 168/99 (BP 1 Location: Left upper arm, BP Patient Position: At rest;Semi fowlers)   Pulse 100   Temp 97.9 °F (36.6 °C)   Resp 18   Ht 5' 7\" (1.702 m)   Wt 87.5 kg (193 lb)   SpO2 99%   Breastfeeding No   BMI 30.23 kg/m²      Tmax/24hrs: Temp (24hrs), Av.3 °F (36.8 °C), Min:97.9 °F (36.6 °C), Max:98.8 °F (37.1 °C)      Intake/Output Summary (Last 24 hours) at 2021 0846  Last data filed at 2021 0410  Gross per 24 hour   Intake 1820 ml   Output --   Net 1820 ml       General: young AA woman sitting up in bed,   Alert, NAD  Cardiovascular:  RRR  Pulmonary:  LSC throughout; respiratory effort WNL, room air. GI:  +BS in all four quadrants, soft, non-tender  Extremities:  No edema; 2+ dorsalis pedis pulses bilaterally  Left heel wrapped in dressing clean dry intac  Neuro: awake, alert, ox3, moving arms legs, follows commands.      Labs:    Recent Results (from the past 24 hour(s))   EKG, 12 LEAD, INITIAL    Collection Time: 21 10:21 AM   Result Value Ref Range    Ventricular Rate 97 BPM    Atrial Rate 97 BPM    P-R Interval 132 ms    QRS Duration 82 ms    Q-T Interval 386 ms    QTC Calculation (Bezet) 490 ms    Calculated P Axis 41 degrees    Calculated R Axis 37 degrees    Calculated T Axis 60 degrees    Diagnosis       Normal sinus rhythm  Possible Left atrial enlargement  Prolonged QT  Abnormal ECG  When compared with ECG of 17-MAY-2021 17:17,  No significant change was found     CBC WITH AUTOMATED DIFF    Collection Time: 21 10:39 AM   Result Value Ref Range    WBC 5.4 4.6 - 13.2 K/uL    RBC 3.52 (L) 4.20 - 5.30 M/uL    HGB 7.6 (L) 12.0 - 16.0 g/dL    HCT 25.5 (L) 35.0 - 45.0 %    MCV 72.4 (L) 74.0 - 97.0 FL    MCH 21.6 (L) 24.0 - 34.0 PG    MCHC 29.8 (L) 31.0 - 37.0 g/dL    RDW 16.9 (H) 11.6 - 14.5 %    PLATELET 840 204 - 413 K/uL    MPV 9.4 9.2 - 11.8 FL    NEUTROPHILS 69 40 - 73 %    LYMPHOCYTES 18 (L) 21 - 52 %    MONOCYTES 10 3 - 10 % EOSINOPHILS 2 0 - 5 %    BASOPHILS 1 0 - 2 %    ABS. NEUTROPHILS 3.8 1.8 - 8.0 K/UL    ABS. LYMPHOCYTES 1.0 0.9 - 3.6 K/UL    ABS. MONOCYTES 0.5 0.05 - 1.2 K/UL    ABS. EOSINOPHILS 0.1 0.0 - 0.4 K/UL    ABS. BASOPHILS 0.0 0.0 - 0.1 K/UL    DF AUTOMATED     METABOLIC PANEL, COMPREHENSIVE    Collection Time: 06/24/21 10:39 AM   Result Value Ref Range    Sodium 141 136 - 145 mmol/L    Potassium 2.7 (LL) 3.5 - 5.5 mmol/L    Chloride 107 100 - 111 mmol/L    CO2 27 21 - 32 mmol/L    Anion gap 7 3.0 - 18 mmol/L    Glucose 273 (H) 74 - 99 mg/dL    BUN 10 7.0 - 18 MG/DL    Creatinine 1.40 (H) 0.6 - 1.3 MG/DL    BUN/Creatinine ratio 7 (L) 12 - 20      GFR est AA 49 (L) >60 ml/min/1.73m2    GFR est non-AA 41 (L) >60 ml/min/1.73m2    Calcium 7.7 (L) 8.5 - 10.1 MG/DL    Bilirubin, total 0.3 0.2 - 1.0 MG/DL    ALT (SGPT) 24 13 - 56 U/L    AST (SGOT) 28 10 - 38 U/L    Alk.  phosphatase 704 (H) 45 - 117 U/L    Protein, total 5.9 (L) 6.4 - 8.2 g/dL    Albumin 1.4 (L) 3.4 - 5.0 g/dL    Globulin 4.5 (H) 2.0 - 4.0 g/dL    A-G Ratio 0.3 (L) 0.8 - 1.7     CARDIAC PANEL,(CK, CKMB & TROPONIN)    Collection Time: 06/24/21 10:39 AM   Result Value Ref Range    CK - MB <1.0 <3.6 ng/ml    CK-MB Index  0.0 - 4.0 %     CALCULATION NOT PERFORMED WHEN RESULT IS BELOW LINEAR LIMIT     26 - 192 U/L    Troponin-I, QT <0.02 0.0 - 0.045 NG/ML   VANCOMYCIN, RANDOM    Collection Time: 06/24/21 10:39 AM   Result Value Ref Range    Vancomycin, random <0.8 (L) 5.0 - 40.0 UG/ML   PTH INTACT    Collection Time: 06/24/21 10:39 AM   Result Value Ref Range    Calcium 7.5 (L) 8.5 - 10.1 MG/DL    PTH, Intact 238.7 (H) 18.4 - 88.0 pg/mL   CULTURE, BLOOD    Collection Time: 06/24/21 11:27 AM    Specimen: Blood   Result Value Ref Range    Special Requests: NO SPECIAL REQUESTS      Culture result: NO GROWTH AFTER 19 HOURS     CULTURE, BLOOD    Collection Time: 06/24/21 11:29 AM    Specimen: Blood   Result Value Ref Range    Special Requests: NO SPECIAL REQUESTS Culture result: NO GROWTH AFTER 19 HOURS     POC LACTIC ACID    Collection Time: 06/24/21 11:33 AM   Result Value Ref Range    Lactic Acid (POC) 0.62 0.40 - 2.00 mmol/L   URINALYSIS W/ RFLX MICROSCOPIC    Collection Time: 06/24/21 12:35 PM   Result Value Ref Range    Color YELLOW      Appearance CLEAR      Specific gravity 1.016 1.005 - 1.030      pH (UA) 6.0 5.0 - 8.0      Protein >1,000 (A) NEG mg/dL    Glucose 500 (A) NEG mg/dL    Ketone Negative NEG mg/dL    Bilirubin Negative NEG      Blood SMALL (A) NEG      Urobilinogen 0.2 0.2 - 1.0 EU/dL    Nitrites Negative NEG      Leukocyte Esterase TRACE (A) NEG     URINE MICROSCOPIC ONLY    Collection Time: 06/24/21 12:35 PM   Result Value Ref Range    WBC 6 to 9 0 - 4 /hpf    RBC 2 to 4 0 - 5 /hpf    Epithelial cells 2+ 0 - 5 /lpf    Bacteria 1+ (A) NEG /hpf    Yeast FEW (A) NEG     METABOLIC PANEL, BASIC    Collection Time: 06/24/21  4:55 PM   Result Value Ref Range    Sodium 146 (H) 136 - 145 mmol/L    Potassium 2.6 (LL) 3.5 - 5.5 mmol/L    Chloride 117 (H) 100 - 111 mmol/L    CO2 23 21 - 32 mmol/L    Anion gap 6 3.0 - 18 mmol/L    Glucose 178 (H) 74 - 99 mg/dL    BUN 8 7.0 - 18 MG/DL    Creatinine 0.87 0.6 - 1.3 MG/DL    BUN/Creatinine ratio 9 (L) 12 - 20      GFR est AA >60 >60 ml/min/1.73m2    GFR est non-AA >60 >60 ml/min/1.73m2    Calcium 5.9 (LL) 8.5 - 10.1 MG/DL   MAGNESIUM    Collection Time: 06/24/21  4:55 PM   Result Value Ref Range    Magnesium 1.4 (L) 1.6 - 2.6 mg/dL   METABOLIC PANEL, BASIC    Collection Time: 06/25/21  6:45 AM   Result Value Ref Range    Sodium 143 136 - 145 mmol/L    Potassium 3.6 3.5 - 5.5 mmol/L    Chloride 112 (H) 100 - 111 mmol/L    CO2 25 21 - 32 mmol/L    Anion gap 6 3.0 - 18 mmol/L    Glucose 176 (H) 74 - 99 mg/dL    BUN 8 7.0 - 18 MG/DL    Creatinine 0.99 0.6 - 1.3 MG/DL    BUN/Creatinine ratio 8 (L) 12 - 20      GFR est AA >60 >60 ml/min/1.73m2    GFR est non-AA >60 >60 ml/min/1.73m2    Calcium 7.4 (L) 8.5 - 10.1 MG/DL CBC WITH AUTOMATED DIFF    Collection Time: 06/25/21  6:45 AM   Result Value Ref Range    WBC 5.4 4.6 - 13.2 K/uL    RBC 3.12 (L) 4.20 - 5.30 M/uL    HGB 6.8 (L) 12.0 - 16.0 g/dL    HCT 22.9 (L) 35.0 - 45.0 %    MCV 73.4 (L) 74.0 - 97.0 FL    MCH 21.8 (L) 24.0 - 34.0 PG    MCHC 29.7 (L) 31.0 - 37.0 g/dL    RDW 17.2 (H) 11.6 - 14.5 %    PLATELET 551 561 - 212 K/uL    MPV 9.2 9.2 - 11.8 FL    NEUTROPHILS 63 40 - 73 %    LYMPHOCYTES 22 21 - 52 %    MONOCYTES 12 (H) 3 - 10 %    EOSINOPHILS 2 0 - 5 %    BASOPHILS 1 0 - 2 %    ABS. NEUTROPHILS 3.4 1.8 - 8.0 K/UL    ABS. LYMPHOCYTES 1.2 0.9 - 3.6 K/UL    ABS. MONOCYTES 0.7 0.05 - 1.2 K/UL    ABS. EOSINOPHILS 0.1 0.0 - 0.4 K/UL    ABS.  BASOPHILS 0.0 0.0 - 0.1 K/UL    DF AUTOMATED     CALCIUM, IONIZED    Collection Time: 06/25/21  6:45 AM   Result Value Ref Range    Ionized Calcium 1.12 1.12 - 1.32 MMOL/L       Signed By: Lennox Greenspan, PA     June 25, 2021

## 2021-06-25 NOTE — PROGRESS NOTES
1801 - Rec'd inbound call from blood bank stated tht pt blood was ready  1805-  Called pre-op and spk with Tong Tompkins advised blood was avail for pt and per Tong Tompkins blood will not be given unless otherwise advised by provider, pt will still need to rec blood transfusion upon arrival back to the floor from OR.  1810 - Attending provider Dr Armando Aldana on the floor and made aware

## 2021-06-25 NOTE — ACP (ADVANCE CARE PLANNING)
Advance Care Planning     General Advance Care Planning (ACP) Conversation      Date of Conversation: 6/25/21  Conducted with: Patient with Decision Making Capacity    Healthcare Decision Maker:     Primary Decision Maker: Thomas Martines [facility] - [phone]    Secondary Decision Maker: Meme Ramsey - [phone]  Click here to complete [address] including selection of the Healthcare Decision Maker Relationship (ie \"Primary\")  Today we documented Decision Maker(s) consistent with Legal Next of Kin hierarchy. Content/Action Overview:    Has ACP document(s) on file - reflects the patient's care preferences             ROLAND Mendoza RN  Care Management  Pager: [phone]

## 2021-06-25 NOTE — PROGRESS NOTES
conducted an initial consultation and Spiritual Assessment for Chandra Jordan, who is a 40 y.o.,female. Patient's Primary Language is: [place]. According to the patient's EMR Denominational Affiliation is: Matty.     The reason the Patient came to the hospital is:   Patient Active Problem List    Diagnosis Date Noted    Foot ulcer, left ([address] 06/24/2021    Osteomyelitis ([address] 05/17/2021    Diabetic foot ulcer ([address] 05/17/2021    FRANCISCA (acute kidney injury) ([address] 05/17/2021    Sepsis ([address] 05/17/2021    Hyponatremia 05/17/2021    Severe obesity ([address] 11/05/2020    Fever 10/20/2020    NSTEMI (non-ST elevated myocardial infarction) ([address] 10/20/2020    Insulin dependent diabetes mellitus 01/11/2019    HTN (hypertension) 01/11/2019    Diabetic foot infection ([address] 12/30/2018    Right foot infection 12/30/2018    Acute pain of right foot 12/30/2018    Acute bronchitis 04/07/2017    Hyperglycemia 04/07/2017        The  provided the following Interventions:  Initiated a relationship of care and support. Explored issues of george, belief, spirituality and Bahai/ritual needs while hospitalized. Listened empathically. Provided chaplaincy education. Provided information about Spiritual Care Services. Chart reviewed. The following outcomes where achieved:  Patient shared limited information about both their medical narrative and spiritual journey/beliefs.  confirmed Patient's Denominational Affiliation. Patient processed feeling about current hospitalization. Patient expressed gratitude for 's visit. Assessment:  Patient does not have any Bahai/cultural needs that will affect patient's preferences in health care. There are no spiritual or Bahai issues which require intervention at this time. Plan:  Chaplains will continue to follow and will provide pastoral care on an as needed/requested basis.     [id number] Chantelle Marie   [phone]

## 2021-06-25 NOTE — PROGRESS NOTES
Nurse alerted by Noe Cornelius in the lab pertaining to the patient aerobic bottle growing yeast. Nurse verbally informed Dr. Rodri Raphael. No orders given. Nurse alerted patient primary nurse JACOB Crowder.

## 2021-06-25 NOTE — CONSULTS
Consult Note  Consult requested by: dr Екатерина Zamora is a 40 y.o. female BLACK/ who is being seen on consult for diabetic nephropathy  Chief Complaint   Patient presents with    Dizziness    Fatigue     Admission diagnosis: <principal problem not specified>     HPI: 40 y o  [place] female admitted last night with severe hypotension,hypokalemia,hypomagnesemia,infected r foot. she received 2 liters of ns ,po and iv potassium,po and iv magnesium. Pt has hx of diabetic nephropathy,nephrotic syndrome,was taking lasix prior to admission. denies diarrhea,vomiting etc.she was also taking aldactone for htn  Past Medical History:   Diagnosis Date    CAD (coronary artery disease)     Diabetes ([address]     HTN (hypertension)     Hyperlipidemia       Past Surgical History:   Procedure Laterality Date    HX CORONARY STENT PLACEMENT      HX GYN             Social History     Socioeconomic History    Marital status:      Spouse name: Not on file    Number of children: Not on file    Years of education: Not on file    Highest education level: Not on file   Occupational History    Not on file   Tobacco Use    Smoking status: Never Smoker    Smokeless tobacco: Never Used   Substance and Sexual Activity    Alcohol use: No    Drug use: No    Sexual activity: Yes     Partners: Male   Other Topics Concern    Not on file   Social History Narrative    Not on file     Social Determinants of Health     Financial Resource Strain:     Difficulty of Paying Living Expenses:    Food Insecurity:     Worried About Running Out of Food in the Last Year:     [address] in the Last Year:    Transportation Needs:     Lack of Transportation (Medical):      Lack of Transportation (Non-Medical):    Physical Activity:     Days of Exercise per Week:     Minutes of Exercise per Session:    Stress:     Feeling of Stress :    Social Connections:     Frequency of Communication with Friends and Family:     Frequency of Social Gatherings with Friends and Family:     Attends Christianity Services:     Active Member of Clubs or Organizations:     Attends Club or Organization Meetings:     Marital Status:    Intimate Partner Violence:     Fear of Current or Ex-Partner:     Emotionally Abused:     Physically Abused:     Sexually Abused:        Family History   Problem Relation Age of Onset    Lupus Mother     Cancer Neg Hx     Diabetes Neg Hx     Heart Disease Neg Hx     Hypertension Neg Hx     Heart Attack Neg Hx     Stroke Neg Hx      Allergies   Allergen Reactions    Azithromycin Other (comments)     Rapid response was called for bradycardia and hypotension     Pcn [Penicillins] Hives        Home Medications:     Prior to Admission Medications   Prescriptions Last Dose Informant Patient Reported? Taking? Insulin Needles, Disposable, 31 gauge x 5/16\" ndle   No No   Sig: Use to check blood glucose BID   Insulin Syringe-Needle U-100 (INSULIN SYRINGE) 1 mL 30 gauge x 5/16 syrg   No No   Sig: As directed for lantus insulin   aspirin 81 mg chewable tablet   No No   Sig: Take 1 Tab by mouth daily. carvediloL (Coreg) 12.5 mg tablet   No No   Sig: Take 1 Tab by mouth two (2) times daily (with meals). Indications: myocardial reinfarction prevention   ferrous sulfate 325 mg (65 mg iron) tablet   No No   Sig: Take 1 Tab by mouth daily (with breakfast). furosemide (LASIX) 20 mg tablet   No No   Sig: Take 1 Tab by mouth daily as needed (Edema). insulin glargine (LANTUS,BASAGLAR) 100 unit/mL (3 mL) inpn   No No   Sig: Take 45 units daily  Indications: type 2 diabetes mellitus   Patient taking differently: 50 Units by SubCUTAneous route nightly.  Indications: type 2 diabetes mellitus   insulin lispro (HUMALOG) 100 unit/mL kwikpen   No No   Sig: 10 units before meals  Indications: type 2 diabetes mellitus   melatonin 5 mg cap capsule   No No   Sig: Take 1 Cap by mouth nightly. Patient taking differently: Take 5 mg by mouth as needed (difficulty in sleeping). Reports taking it as needed   metFORMIN ER (GLUCOPHAGE XR) 500 mg tablet   No No   Sig: Take 1 Tab by mouth daily (with dinner). rosuvastatin (CRESTOR) 10 mg tablet   No No   Sig: Take 1 Tab by mouth nightly. spironolactone (ALDACTONE) 25 mg tablet   Yes No   Sig: Take 25 mg by mouth daily. ticagrelor (BRILINTA) 90 mg tablet   No No   Sig: Take 1 Tablet by mouth every twelve (12) hours every twelve (12) hours.       Facility-Administered Medications: None       Current Facility-Administered Medications   Medication Dose Route Frequency    0.9% sodium chloride infusion 250 mL  250 mL IntraVENous PRN    cefTRIAXone (ROCEPHIN) 2 g in sterile water (preservative free) 20 mL IV syringe  2 g IntraVENous Q24H    carvediloL (COREG) tablet 12.5 mg  12.5 mg Oral Q12H    sodium chloride (NS) flush 5-10 mL  5-10 mL IntraVENous PRN    [Held by provider] aspirin chewable tablet 81 mg  81 mg Oral DAILY    ferrous sulfate tablet 325 mg  325 mg Oral DAILY WITH BREAKFAST    insulin lispro (HUMALOG) injection 10 Units  10 Units SubCUTAneous TIDAC    rosuvastatin (CRESTOR) tablet 10 mg  10 mg Oral QHS    [Held by provider] ticagrelor (BRILINTA) tablet 90 mg  90 mg Oral Q12H    sodium chloride (NS) flush 5-40 mL  5-40 mL IntraVENous Q8H    sodium chloride (NS) flush 5-40 mL  5-40 mL IntraVENous PRN    acetaminophen (TYLENOL) tablet 650 mg  650 mg Oral Q6H PRN    Or    acetaminophen (TYLENOL) suppository 650 mg  650 mg Rectal Q6H PRN    polyethylene glycol (MIRALAX) packet 17 g  17 g Oral DAILY PRN    ondansetron (ZOFRAN ODT) tablet 4 mg  4 mg Oral Q8H PRN    Or    ondansetron (ZOFRAN) injection 4 mg  4 mg IntraVENous Q6H PRN    [Held by provider] enoxaparin (LOVENOX) injection 40 mg  40 mg SubCUTAneous DAILY    magnesium oxide (MAG-OX) tablet 400 mg  400 mg Oral BID       Review of Systems:   A comprehensive review of systems was negative except for that written in the HPI. Data Review:    Labs: Results:       Chemistry Recent Labs     06/25/21  0645 06/24/21  1655 06/24/21  1039   * 178* 273*    146* 141   K 3.6 2.6* 2.7*   * 117* 107   CO2 25 23 27   BUN 8 8 10   CREA 0.99 0.87 1.40*   CA 7.4* 5.9* 7.5*  7.7*   AGAP 6 6 7   BUCR 8* 9* 7*   AP  --   --  704*   TP  --   --  5.9*   ALB  --   --  1.4*   GLOB  --   --  4.5*   AGRAT  --   --  0.3*      PTH  Lab Results   Component Value Date/Time    Calcium 7.4 (L) 06/25/2021 06:45 AM    PTH, Intact 238.7 (H) 06/24/2021 10:39 AM      CBC w/Diff Recent Labs     06/25/21  0645 06/24/21  1039   WBC 5.4 5.4   RBC 3.12* 3.52*   HGB 6.8* 7.6*   HCT 22.9* 25.5*    214   GRANS 63 69   LYMPH 22 18*   EOS 2 2      Coagulation No results for input(s): PTP, INR, APTT, INREXT in the last 72 hours. Iron/Ferritin Recent Labs     06/25/21  0645   IRON 55      BNP No results for input(s): BNPP in the last 72 hours.    Cardiac Enzymes Recent Labs     06/24/21  1039      CKND1 CALCULATION NOT PERFORMED WHEN RESULT IS BELOW LINEAR LIMIT      Liver Enzymes Recent Labs     06/24/21  1039   TP 5.9*   ALB 1.4*   *      Thyroid Studies Lab Results   Component Value Date/Time    TSH 1.12 05/17/2021 05:09 PM        Urinalysis Lab Results   Component Value Date/Time    Color YELLOW 06/24/2021 12:35 PM    Appearance CLEAR 06/24/2021 12:35 PM    Specific gravity 1.016 06/24/2021 12:35 PM    pH (UA) 6.0 06/24/2021 12:35 PM    Protein >1,000 (A) 06/24/2021 12:35 PM    Glucose 500 (A) 06/24/2021 12:35 PM    Ketone Negative 06/24/2021 12:35 PM    Bilirubin Negative 06/24/2021 12:35 PM    Urobilinogen 0.2 06/24/2021 12:35 PM    Nitrites Negative 06/24/2021 12:35 PM    Leukocyte Esterase TRACE (A) 06/24/2021 12:35 PM    Epithelial cells 2+ 06/24/2021 12:35 PM    Bacteria 1+ (A) 06/24/2021 12:35 PM    WBC 6 to 9 06/24/2021 12:35 PM    RBC 2 to 4 06/24/2021 12:35 PM IMAGES:   XR Results (maximum last 3): Results from Hospital Encounter encounter on 06/24/21    XR FOOT LT AP/LAT    Narrative  EXAM: Left Foot X-ray    INDICATION:  ulceration    TECHNIQUE: 2 views of the left foot obtained. COMPARISON: 5/17/2021, 4/4/2021, 12/30/2018    FINDINGS: Patient is status post resection of the first MTP joint. There is  mildly exuberant callus formation adjacent the residual first metatarsal.  No  evidence of acute fracture or dislocation. There is periosteal reaction or  erosions at the base of the first metatarsal. There is suggestion of an erosion  at the proximal most end of the first proximal phalanx. No lytic or blastic  focus appreciated. Diffuse soft tissue edema of the forefoot is noted. A wound  VAC is present near the operative bed at the first MTP joint. Impression  1. Findings concerning for osteomyelitis in the proximal most aspect of the  first proximal phalanx and the proximal most aspect of the first metatarsal.    2.  Postoperative changes of resection of the first MTP joint with exuberant  callus adjacent the distal end of the residual first metatarsal.      XR CHEST PORT    Narrative  EXAM: XR CHEST PORT    INDICATION: meets SIRS criteria    COMPARISON: 5/17/2020    FINDINGS: A portable AP radiograph of the chest was obtained at 1102 hours. Interval placement of right PICC line with tip over the SVC. . Mild bilateral  pulmonary vascular congestion. Cardiac silhouette has increased since prior  exam..  The bones and soft tissues are grossly within normal limits. Impression  Interval increase in cardiac silhouette. Correlate for cardiomegaly and/or  pericardial effusion. Interval placement of PICC line. No pneumothorax. Mild pulmonary vascular congestion. Results from [facility] encounter on 05/17/21    XR FOOT LT MIN 3 V    Narrative  History: Infection. Correlation is made with MRI performed subsequently.  Comparison is made with  radiograph 4/4/2021. TECHNIQUE: 4 views left foot. FINDINGS:    There is suggestion of some new osseous lucency at the first MTP joint with  overlying soft tissue edema and ulcer formation. MRI noted septic arthritis. Calcaneal spurring noted. Calcifications that are chronic appearing the  Achilles. No osseous coalition. Lisfranc joint preserved. Impression  Septic arthritis of first MTP joint noted on subsequent MRI. More subtle finding  seen on radiograph with overlying soft tissue edema and ulceration. Phlegmon/abscess suggested on MRI as well. CT Results (maximum last 3): Results from [facility] encounter on 10/19/20    CTA CHEST W OR W WO CONT    Narrative  EXAM: CT PULMONARY ARTERIOGRAM    CLINICAL INDICATION/HISTORY: Chest pain, elevated d-dimer. TECHNIQUE:  Utilizing a helical acquisition, CT pulmonary arteriography was  performed after the uneventful intravenous administration of 67 cc of Isovue . Axial images are reviewed in 2.5 and 4.28 mm slice thickness sections. Coronally and sagittally reformatted images are also submitted for review. All CT scans at this facility are performed using dose optimization technique as  appropriate to a performed exam, to include automated exposure control,  adjustment of the MA and/or kV according to patient size (including appropriate  matching for site-specific examinations) or use of  iterative reconstruction  technique. COMPARISON:  Chest radiograph same day    FINDINGS:    Technical Quality:  There is satisfactory contrast opacification of the  pulmonary arterial system. Pulmonary Arteries: The pulmonary arterial system is well opacified and patent. There is no pulmonary arterial filling defect. The main pulmonary artery is  normal in caliber. The right ventricle is normal in size and morphology. Other Cardiovascular: The heart is globally normal in size. The thoracic aorta  is normal in course and caliber. There is no pericardial effusion or  pericardial thickening. Mediastinum/Whit:  There are no pathologically enlarged mediastinal or hilar  lymph nodes. Pleura: The pleural surfaces are normal bilaterally. There is no pleural  effusion. There is no pneumothorax. Lungs:  3 mm right middle lobe nodule (series 3 image 28). Airways: The central airways are patent. The peripheral airways are normal.  There is no bronchiectasis. Upper Abdomen: The incompletely imaged upper abdomen is unremarkable. Musculoskeletal:  There is no aggressive osseous lesion. The structures of the  chest wall, including the bones, are normal for age. Possible 1.8 cm left  thyroid nodule. Impression  IMPRESSION:  There is no pulmonary embolism. Incidental note is made of 3 mm right middle lobe pulmonary nodule. If the  patient has a significant smoking history or is otherwise at high risk for  development of lung cancer, an optional follow-up chest CT may be obtained in  one year. If there are no significant risk factors for the development of lung  cancer, no specific follow-up is necessary. Possible 1.8 send a left thyroid nodule. Consider follow-up thyroid ultrasound,  nonemergent. Results from [facility] encounter on 03/04/19    CT HEAD WO CONT    Narrative  CT head without IV contrast    HISTORY: Tingling to left side. NOT a stroke protocol    Comparison 2/20/2017    All CT scans at this facility are performed using dose optimization technique as  appropriate to a performed exam, to include automated exposure control,  adjustment of the mA and/or kV according to patient size (including appropriate  matching for site specific examination) or use of iterative reconstruction  technique. Cortical sulci, ventricles and brain parenchyma unremarkable. No intracranial  hemorrhage, mass lesions or cortical infarct involving a major vessel.   Visualized paranasal sinuses and mastoid air cells are clear.    Impression  IMPRESSION: Unremarkable noncontrast head CT. CTA ABD ART W RUNOFF W WO CONT    Narrative  EXAM: CTA abdominal aorta and extremities. CLINICAL HISTORY/INDICATION: Tingling left side. .    COMPARISON: None. TECHNIQUE: All CT scans at this facility are performed using dose optimization  technique as appropriate to a performed exam, to include automated exposure  control, adjustment of the mA and/or kV according to patient's size (including  appropriate matching for site-specific examinations), or use of iterative  reconstruction technique. [facility] FINDINGS:    A CTA of the abdominal aorta and lower extremity vessels is performed after the  patient was given 100 cc of Isovue-370. 3-D vascular reconstruction and  processing shows a normal tapering of the aorta without evidence for aneurysm or  dissection. The major vessels including the celiac, superior mesenteric artery  and both renal arteries show normal calibers. The iliac arteries are normal  bilaterally. The vessels of the lower extremity show no evidence for peripheral  arterial disease. No aneurysm or obstruction is seen. There is some  calcification noted in the left Achilles tendon. .    Impression  IMPRESSION:    No evidence for an aneurysm or dissection in the aorta. No narrowing of the major branches of the abdominal aorta. Normal iliac arteries bilaterally. No evidence for peripheral arterial disease. [facility] MRI Results (maximum last 3): Results from [place] encounter on 06/24/21    MRI FOOT LT WO CONT    Narrative  EXAM: MRI of the Left  foot    INDICATION: Left diabetic foot ulcer    TECHNIQUE: Multiplanar multisequence MR imaging of the left  foot    IV Contrast: None    COMPARISON: Plain film dated 6/24/2021 and MRI dated 5/17/2021    FINDINGS:  Osseous Structures: The first MTP joint has been surgically resected.  There is  extensive loss of the marrow signal in the distal half of the medial cuneiform  with likely septic arthritis of the first TMT joint. There is extensive  osteomyelitis of the residual first metatarsal. The first proximal phalanx  demonstrates extensive loss of the marrow signal on T1 concerning for  osteomyelitis. There is adjacent subcutaneous emphysema and soft tissue edema. There is loss of the normal marrow signal in the distal phalanx of the fifth toe  as well as the majority of the distal phalanx of the fourth toe. There is edema  in the fourth metatarsal head. In the medial aspect, there is mild loss of the  marrow signal. This may represent an area of edema or potential osteomyelitis. However, no soft tissue defect noted adjacent. There is mild marrow edema in the  third metatarsal head. Tendons/Muscle: There is extensive edema within the musculature of the foot with  extensive atrophy. Soft Tissues/Other:  Extensive abnormal soft tissues are noted adjacent the  first proximal phalanx and first metatarsal. No fluid collection identified. A  medial sided ulcer is noted with adjacent subcutaneous air. There is soft tissue  edema adjacent the fourth metatarsal head as well as adjacent the fifth  metatarsal head. Possible ulcers are noted adjacent the fourth and fifth distal  phalanx. Impression  1. Extensive osteomyelitis of the medial cuneiform, 1st metatarsal, and 1st  proximal phalanx. Adjacent ulcer and extensive soft tissue edema. Septic  arthritis of the 1st TMT joint. 2.  Osteomyelitis of the 4th and 5th distal phalanges. Adjacent likely ulcers. 3.  Mild edema in the 3rd and 4th metatarsal heads. The possibility of contusion  versus early osteomyelitis should be considered. Results from [place] encounter on 05/17/21    MRI FOOT LT WO CONT    Narrative  MRI FOOT LT WO CONT    CLINICAL INFORMATION  Left foot ulcer, worse over 2 days, swollen, warm, draining purulence from first  MTP joint.  Incision and drainage with bone biopsy negative for osteomyelitis one  month ago. COMPARISON  Left foot MRI 4/5/2021. TECHNIQUE  Multiplane MR images of the mid/forefoot obtained including T1 and T2-weighted  sequences. FINDINGS  OSSEOUS STRUCTURES  Interval markedly increased bone marrow edema at the first metatarsal and first  proximal phalanx with cortical erosions at the plantar aspect of the first  metatarsal head. Additional cortical erosions along the base of the dorsal  aspect of the first proximal phalanx. JOINTS  Severe infectious and inflammatory changes at the first tarsometatarsal and MTP  joint. Remainder of the joint spaces appear well-preserved. LIGAMENTS AND PLANTAR PLATES  The plantar plates appear intact. The ligaments of the forefoot appear intact. INTERMETATARSAL SPACES  Significant intermetatarsal bursitis between the first and second digits. .    MUSCLES AND TENDONS  Normal muscle volume and signal intensity. Intact tendons with no evidence of  tear or tendon sheath effusion. PLANTAR FASCIA  Possibly reactive edema around the medial band of the plantar fascia. LISFRANC LIGAMENT  Intact. OTHER FINDINGS  Severe circumferential soft tissue edema with open ulceration along the medial  aspect of the first MTP joint. Phlegmon/abscess measuring approximately 2.1 x  2.1 cm along the plantar aspect of the midshaft of the first metatarsal.  Extensive additional phlegmon/abscesses surrounding the first  metatarsophalangeal joint extending into the soft tissues of the proximal first  toe. Impression  Markedly worsening infectious/inflammatory changes surrounding the first MTP  joint with new osteomyelitis of the first metatarsal head and first proximal  phalanx. New multifocal abscesses/phlegmon within the soft tissues of the  plantar foot and extending into the first toe. As attending radiologist, I have assessed the study images and dictated or  reviewed/edited the final report as needed.       Results from [facility] encounter on 04/04/21    MRI FOOT LT W WO CONT    Narrative  EXAM: MRI FOOT LT W WO CONT    CLINICAL INDICATION/HISTORY: ulcer , left foot draining pus    TECHNIQUE: Multisequence multiplanar MR imaging acquired through the left foot    CONTRAST USED: 15 cc Dotarem    COMPARISON: Current plain films, prior MRI 2019    FINDINGS:        Bones/joints: Intact. Minor degenerative spurring of the dorsal midfoot. Mild  valgus inclination of the 1st IP joint. Soft tissues: Broad crescent-shaped blister at the distal medial forefoot along  the hallux. On the order of 4-5 cm length and width, about 1 cm thickness.  -Just deep of this there is moderate heterogeneity of moderately pronounced  enhancement. There are regions within the subcutaneous tissue plane that are  nonenhancing, surrounded by more avidly enhancing components.  -There is a linear ulcer crater at the plantar hallux at extends about 1.5 cm  deep pointed towards the lateral sesamoid of the hallux. However, there is no  osseous or articular involvement. Reference coronal and sagittal imaging due to  best appreciated    Other findings: There is moderately pronounced mostly nonenhancing subcutaneous  tissue edema on the dorsal foot. Regional long tendons are intact. There is some intrinsic muscle signal increase  on T2/STIR imaging. No intramuscular fluid collection. Impression  1. Blistering cellulitis at the medial left forefoot  -Plantar soft tissue ulceration with short length sinus tract  -No drainable abscess  -No osteomyelitis      Nuclear Medicine Results (maximum last 3): No results found for this or any previous visit. US Results (maximum last 3): Results from [place] encounter on 05/17/21    US RETROPERITONEUM COMP    Narrative  Retroperitoneal ultrasound limited    Indication: FRANCISCA    Technique: Select images from retroperitoneal ultrasound provided for  interpretation. No prior studies for comparison. Findings:     The right kidney measures 11.9 x 6.4 x 5.8 cm and the left kidney measures 13.2  x 5.9 x 8.3 cm. Normal parenchymal echogenicity. No calculus is demonstrated. No  hydronephrosis. No cyst or mass. No perinephric free fluid. Urinary bladder measures 6.1 x 8.3 x 8.7 cm for a volume of 310 mL. No bladder  wall thickening. Bilateral ureteral jets demonstrated. Impression  1. Unremarkable sonographic imaging of the kidneys and bladder. Results from [place] encounter on 10/19/20    US THYROID/PARATHYROID/SOFT TISS    Narrative  Ultrasound thyroid gland    INDICATION: Left thyroid nodule seen on CT chest    Right lobe measures 5 x 1.5 x 1.7 cm. Multiple small anechoic nodules seen, most  compatible with benign cysts. Largest is about 5 mm. There is a small mixed  nodule measuring 0.4 x 0.2 x 0.4 cm, with benign features and below size  criterial for further intervention. Left lobe measures 4.9 x 1.6 x 2.4 cm. Anechoic nodule of 0.7 cm seen in the  lower pole. Most compatible with a benign cyst. No additional nodules. Isthmus measures 0.6 cm in AP dimension, slightly prominent but no focal mass. Impression  IMPRESSION:  1. Borderline thyromegaly. 2. Benign cysts in both lobes, with small benign nodule in the right lobe. DEXA Results (maximum last 3): No results found for this or any previous visit. FARIDA Results (maximum last 3): No results found for this or any previous visit. IR Results (maximum last 3): Results from [place] encounter on 05/17/21    IR PICC INSERT WO PORT OVER 5 YEARS WO IMG    Narrative  PERIPHERALLY INSERTED CENTRAL CATHETER    : Alexys No PA-C    ATTENDING: Dr. Lissett Cannon:   Long-term venous access    Assistant: None    Complications: None    Estimated Blood loss: None    Fluoroscopy radiation dose: Cumulative Air KERMA = 3 mGy.     Fluoroscopy time: 0.4 minutes    Number of fluoroscopic images: 1    TECHNIQUE: After informed consent was obtained, the right arm was prepped and  draped in usual sterile fashion. Maximum sterile barrier technique was used. Under ultrasound guidance, access into the basilic vein was achieved using a  micropuncture set. Several gray scale static US images of the patent basilic  vein as well as proper position of the access needle were obtained and recorded  for documentation purposes. Subsequently, the 5 [place] dual lumen PICC catheter  was cut to 40 cm in length and inserted via the peel away sheath, and  manipulated under fluoroscopic guidance, to the level of the cavoatrial  junction. The catheter was flushed and secured and is ready for use. No  immediate complications were observed. Spot fluoroscopic image of the proper catheter position was obtained and  recorded for documentation purposes. Impression  Successful placement dual lumen peripherally inserted central catheter. VAS/US Results (maximum last 3): No results found for this or any previous visit. PET Results (maximum last 3): No results found for this or any previous visit. No results found for this or any previous visit.   @[email or web address]([id number])    CULTURE:   )  Recent Labs     06/24/21  1129 06/24/21  1127   CULT NO GROWTH AFTER 19 HOURS NO GROWTH AFTER 19 HOURS     Recent Labs     06/24/21  1129 06/24/21  1127   CULT NO GROWTH AFTER 19 HOURS NO GROWTH AFTER 19 HOURS       Physical Assessment:     Visit Vitals  BP (!) 169/100 (BP 1 Location: Left upper arm, BP Patient Position: At rest;Semi fowlers)   Pulse 100   Temp 97.8 °F (36.6 °C)   Resp 18   Ht 5' 7\" (1.702 m)   Wt 87.5 kg (193 lb)   SpO2 100%   Breastfeeding No   BMI 30.23 kg/m²     Last 3 Recorded Weights in this Encounter    06/24/21 1012   Weight: 87.5 kg (193 lb)       Intake/Output Summary (Last 24 hours) at 6/25/2021 1459  Last data filed at 6/25/2021 0917  Gross per 24 hour   Intake 670 ml   Output --   Net 670 ml       Physial Exam:  General appearance: alert, cooperative  Skin: normal coloration and turgor, no rashes, no suspicious skin lesions noted. HEENT: Head; normocephalic, atraumatic. NATALEE. ENT- ENT exam normal, no neck nodes or sinus tenderness. Lungs: clear to auscultation bilaterally  Heart: regular rate and rhythm, S1, S2 normal, no murmur, click, rub or gallop  Abdomen: soft, non-tender. Bowel sounds normal. No masses,  no organomegaly  Extremities: r foot is wrapped, no edema    PLAN / RECOMMENDATION:    Nephrotic range proteinuria,probably related to diabetic nephropathy,may need further evaluation,I will review office notes  Hypokalemia,hypomagnesemia,related to lasix ,improved,recheck labs. check renin,aldosterone to r/o hyperaldo  Hypotension on admission,resolved with fluids,? Early sepsis  Hypocalcemia,corrects for low albumin,normal ionized calcium  Diabetic foot osteomyelitis,for surgery today     Thank you for the consultation to participate in patient's care. I have personally discussed my plan with the referring physician.      Ammon Burnett MD  June 25, 2021

## 2021-06-26 ENCOUNTER — APPOINTMENT (OUTPATIENT)
Dept: GENERAL RADIOLOGY | Age: 46
DRG: 314 | End: 2021-06-26
Attending: PODIATRIST
Payer: MEDICAID

## 2021-06-26 LAB
ABO + RH BLD: NORMAL
BASOPHILS # BLD: 0 K/UL (ref 0–0.1)
BASOPHILS NFR BLD: 0 % (ref 0–2)
BLD PROD TYP BPU: NORMAL
BLOOD GROUP ANTIBODIES SERPL: NORMAL
BPU ID: NORMAL
CALLED TO:,BCALL1: NORMAL
CROSSMATCH RESULT,%XM: NORMAL
DIFFERENTIAL METHOD BLD: ABNORMAL
EOSINOPHIL # BLD: 0 K/UL (ref 0–0.4)
EOSINOPHIL NFR BLD: 0 % (ref 0–5)
ERYTHROCYTE [DISTWIDTH] IN BLOOD BY AUTOMATED COUNT: 17.6 % (ref 11.6–14.5)
GLUCOSE BLD STRIP.AUTO-MCNC: 142 MG/DL (ref 70–110)
GLUCOSE BLD STRIP.AUTO-MCNC: 154 MG/DL (ref 70–110)
GLUCOSE BLD STRIP.AUTO-MCNC: 257 MG/DL (ref 70–110)
GLUCOSE BLD STRIP.AUTO-MCNC: 287 MG/DL (ref 70–110)
HCT VFR BLD AUTO: 24.9 % (ref 35–45)
HCT VFR BLD AUTO: 26.1 % (ref 35–45)
HCT VFR BLD AUTO: 26.4 % (ref 35–45)
HGB BLD-MCNC: 7.8 G/DL (ref 12–16)
HGB BLD-MCNC: 7.8 G/DL (ref 12–16)
HGB BLD-MCNC: 8 G/DL (ref 12–16)
LYMPHOCYTES # BLD: 0.9 K/UL (ref 0.9–3.6)
LYMPHOCYTES NFR BLD: 14 % (ref 21–52)
MCH RBC QN AUTO: 22.3 PG (ref 24–34)
MCHC RBC AUTO-ENTMCNC: 29.9 G/DL (ref 31–37)
MCV RBC AUTO: 74.8 FL (ref 74–97)
MONOCYTES # BLD: 0.4 K/UL (ref 0.05–1.2)
MONOCYTES NFR BLD: 7 % (ref 3–10)
NEUTS SEG # BLD: 5.1 K/UL (ref 1.8–8)
NEUTS SEG NFR BLD: 79 % (ref 40–73)
PLATELET # BLD AUTO: 211 K/UL (ref 135–420)
PMV BLD AUTO: 9.3 FL (ref 9.2–11.8)
RBC # BLD AUTO: 3.49 M/UL (ref 4.2–5.3)
SPECIMEN EXP DATE BLD: NORMAL
STATUS OF UNIT,%ST: NORMAL
UNIT DIVISION, %UDIV: 0
WBC # BLD AUTO: 6.4 K/UL (ref 4.6–13.2)

## 2021-06-26 PROCEDURE — 82962 GLUCOSE BLOOD TEST: CPT

## 2021-06-26 PROCEDURE — 74011250636 HC RX REV CODE- 250/636: Performed by: HOSPITALIST

## 2021-06-26 PROCEDURE — 87070 CULTURE OTHR SPECIMN AEROBIC: CPT

## 2021-06-26 PROCEDURE — 85025 COMPLETE CBC W/AUTO DIFF WBC: CPT

## 2021-06-26 PROCEDURE — 74011000250 HC RX REV CODE- 250: Performed by: HOSPITALIST

## 2021-06-26 PROCEDURE — 74011250637 HC RX REV CODE- 250/637: Performed by: PHYSICIAN ASSISTANT

## 2021-06-26 PROCEDURE — 74011636637 HC RX REV CODE- 636/637: Performed by: HOSPITALIST

## 2021-06-26 PROCEDURE — 77030025414 HC DRSG VAC ASST SMPLC KCON -B

## 2021-06-26 PROCEDURE — 65270000029 HC RM PRIVATE

## 2021-06-26 PROCEDURE — 2709999900 HC NON-CHARGEABLE SUPPLY

## 2021-06-26 PROCEDURE — 85018 HEMOGLOBIN: CPT

## 2021-06-26 PROCEDURE — 73620 X-RAY EXAM OF FOOT: CPT

## 2021-06-26 PROCEDURE — 74011000258 HC RX REV CODE- 258: Performed by: HOSPITALIST

## 2021-06-26 PROCEDURE — 74011250637 HC RX REV CODE- 250/637: Performed by: INTERNAL MEDICINE

## 2021-06-26 PROCEDURE — 99232 SBSQ HOSP IP/OBS MODERATE 35: CPT | Performed by: HOSPITALIST

## 2021-06-26 PROCEDURE — 99232 SBSQ HOSP IP/OBS MODERATE 35: CPT | Performed by: INTERNAL MEDICINE

## 2021-06-26 PROCEDURE — 74011250637 HC RX REV CODE- 250/637: Performed by: HOSPITALIST

## 2021-06-26 RX ORDER — FAMOTIDINE 20 MG/1
20 TABLET, FILM COATED ORAL 2 TIMES DAILY
Status: DISCONTINUED | OUTPATIENT
Start: 2021-06-26 | End: 2021-06-30 | Stop reason: HOSPADM

## 2021-06-26 RX ORDER — INSULIN GLARGINE 100 [IU]/ML
25 INJECTION, SOLUTION SUBCUTANEOUS
Status: DISCONTINUED | OUTPATIENT
Start: 2021-06-26 | End: 2021-06-30 | Stop reason: HOSPADM

## 2021-06-26 RX ORDER — SPIRONOLACTONE 25 MG/1
25 TABLET ORAL DAILY
Status: DISCONTINUED | OUTPATIENT
Start: 2021-06-26 | End: 2021-06-30 | Stop reason: HOSPADM

## 2021-06-26 RX ORDER — CLOPIDOGREL BISULFATE 75 MG/1
75 TABLET ORAL DAILY
Status: DISCONTINUED | OUTPATIENT
Start: 2021-06-26 | End: 2021-06-30 | Stop reason: HOSPADM

## 2021-06-26 RX ORDER — AMLODIPINE BESYLATE 5 MG/1
5 TABLET ORAL DAILY
Status: DISCONTINUED | OUTPATIENT
Start: 2021-06-26 | End: 2021-06-30

## 2021-06-26 RX ORDER — CALCITRIOL 0.25 UG/1
0.25 CAPSULE ORAL DAILY
Status: DISCONTINUED | OUTPATIENT
Start: 2021-06-27 | End: 2021-06-30 | Stop reason: HOSPADM

## 2021-06-26 RX ADMIN — CEFEPIME HYDROCHLORIDE 1 G: 1 INJECTION, POWDER, FOR SOLUTION INTRAMUSCULAR; INTRAVENOUS at 10:31

## 2021-06-26 RX ADMIN — ASPIRIN 81 MG: 81 TABLET, CHEWABLE ORAL at 08:42

## 2021-06-26 RX ADMIN — CARVEDILOL 12.5 MG: 12.5 TABLET, FILM COATED ORAL at 21:57

## 2021-06-26 RX ADMIN — INSULIN LISPRO 3 UNITS: 100 INJECTION, SOLUTION INTRAVENOUS; SUBCUTANEOUS at 16:33

## 2021-06-26 RX ADMIN — CEFEPIME HYDROCHLORIDE 1 G: 1 INJECTION, POWDER, FOR SOLUTION INTRAMUSCULAR; INTRAVENOUS at 17:01

## 2021-06-26 RX ADMIN — Medication 10 ML: at 07:16

## 2021-06-26 RX ADMIN — INSULIN LISPRO 9 UNITS: 100 INJECTION, SOLUTION INTRAVENOUS; SUBCUTANEOUS at 11:55

## 2021-06-26 RX ADMIN — CLOPIDOGREL BISULFATE 75 MG: 75 TABLET ORAL at 08:42

## 2021-06-26 RX ADMIN — INSULIN LISPRO 9 UNITS: 100 INJECTION, SOLUTION INTRAVENOUS; SUBCUTANEOUS at 08:41

## 2021-06-26 RX ADMIN — FAMOTIDINE 20 MG: 20 TABLET, FILM COATED ORAL at 17:01

## 2021-06-26 RX ADMIN — MAGNESIUM OXIDE TAB 400 MG (241.3 MG ELEMENTAL MG) 400 MG: 400 (241.3 MG) TAB at 08:42

## 2021-06-26 RX ADMIN — SODIUM CHLORIDE 200 MG: 9 INJECTION, SOLUTION INTRAVENOUS at 16:32

## 2021-06-26 RX ADMIN — INSULIN GLARGINE 25 UNITS: 100 INJECTION, SOLUTION SUBCUTANEOUS at 22:09

## 2021-06-26 RX ADMIN — SPIRONOLACTONE 25 MG: 25 TABLET, FILM COATED ORAL at 07:07

## 2021-06-26 RX ADMIN — CARVEDILOL 12.5 MG: 12.5 TABLET, FILM COATED ORAL at 08:42

## 2021-06-26 RX ADMIN — FERROUS SULFATE TAB 325 MG (65 MG ELEMENTAL FE) 325 MG: 325 (65 FE) TAB at 08:42

## 2021-06-26 RX ADMIN — AMLODIPINE BESYLATE 5 MG: 5 TABLET ORAL at 14:33

## 2021-06-26 RX ADMIN — Medication 10 ML: at 14:33

## 2021-06-26 RX ADMIN — MAGNESIUM OXIDE TAB 400 MG (241.3 MG ELEMENTAL MG) 400 MG: 400 (241.3 MG) TAB at 17:01

## 2021-06-26 RX ADMIN — FAMOTIDINE 20 MG: 20 TABLET, FILM COATED ORAL at 14:33

## 2021-06-26 RX ADMIN — ACETAMINOPHEN 650 MG: 325 TABLET ORAL at 16:32

## 2021-06-26 RX ADMIN — ROSUVASTATIN CALCIUM 10 MG: 10 TABLET, COATED ORAL at 21:57

## 2021-06-26 RX ADMIN — Medication 10 ML: at 21:58

## 2021-06-26 RX ADMIN — CEFEPIME HYDROCHLORIDE 1 G: 1 INJECTION, POWDER, FOR SOLUTION INTRAMUSCULAR; INTRAVENOUS at 04:58

## 2021-06-26 NOTE — PROGRESS NOTES
Progress Note    Patient: Clara Kim MRN: [id number]  SSN: [id number]    YOB: 1975  Age: 40 y.o. Sex: female      Admit Date: 6/24/2021  Day of Surgery       Subjective:     Patient seen resting quiet and comfortably and no apparent distress. Patient had an MRI yesterday confirming osteomyelitis of fourth and fifth toe distal phalanges. Patient seen with her family members at bedside. She denies N/V/C/F. Objective:     Visit Vitals  BP (!) 173/91   Pulse (!) 103   Temp 98.3 °F (36.8 °C)   Resp 16   Ht 5' 7\" (1.702 m)   Wt 87.5 kg (193 lb)   SpO2 97%   Breastfeeding No   BMI 30.23 kg/m²        Physical Exam:  Dressings intact to left foot. CFT less than three seconds to distal digits on left foot. Labs/Radiology Review: images and reports reviewed    Assessment:     Hospital Problems  Date Reviewed: 5/26/2021          Codes Class Noted POA    Foot ulcer, left ([facility] [address] ICD-10-CM: N48.034  ICD-9-CM: 707.15  6/24/2021 Unknown        Sepsis ([facility] [address] ICD-10-CM: A41.9  ICD-9-CM: 038.9, 995.91  5/17/2021 Unknown                Plan/Recommendations/Medical Decision Making:     - MRI results reviewed with patient and her family. Bone marrow changes in 1st metatarsal and proximal phalanx could be associated with post surgical changes. - Rec partial amputation of fourth and fifth toes and bone biopsy of first metatarsal, proximal phalanx and medial cuneiform. Discussed in detail with patient and family about surgical plan. Keep patient NPO.   - Remain NWB to left forefoot. - Abx per ID recommendation.   - Medical management per hospitalist. Hgb found to be 6.8. Patient will need transfusion.

## 2021-06-26 NOTE — PROGRESS NOTES
Progress Note    Patient: Emmanuel Waldron MRN: [id number]  SSN: [id number]    YOB: 1975  Age: 40 y.o. Sex: female      Admit Date: 6/24/2021  1 Day Post-Op     Procedure:   Procedure(s):  LEFT FOOT FOURTH AND FIFTH TOE AMPUTATION AND BONE BIOPSY OF FIRST METATARSAL, MEDIAL CUNNIFORM, FIRST PROXIMAL PHALANX    Subjective:     Patient seen resting quiet and comfortably and no apparent distress. Patient is accompanied by her family at bedside. Patient reports no pain to surgical site. She had bowel movement and positive void. Patient denies N/V/C/F. Status post: osteomyelitis    Objective:     Visit Vitals  BP (!) 157/94 (BP 1 Location: Left upper arm, BP Patient Position: Lying)   Pulse 71   Temp 98.4 °F (36.9 °C)   Resp 20   Ht 5' 7\" (1.702 m)   Wt 87.5 kg (193 lb)   SpO2 99%   Breastfeeding No   BMI 30.23 kg/m²        Physical Exam:  Left ELADIO: skin edges approximated at surgical sites, skin sutures intact. Mild bleeding noted on dressings at partial fourth and fifth toe amputation site. No clinical signs of erythema, edema or swelling noted. First MTPJ medial ulcer measure 2.5 cm in diameter with granular base and visible antibiotic bead. No active drainage noted. Labs/Radiology Review: images and reports reviewed    Assessment:     Hospital Problems  Date Reviewed: 5/26/2021        Codes Class Noted POA    Foot ulcer, left ([facility] ICD-10-CM: P32.500  ICD-9-CM: 707.15  6/24/2021 Unknown        Sepsis ([facility] ICD-10-CM: A41.9  ICD-9-CM: 038.9, 995.91  5/17/2021 Unknown              Plan/Recommendations/Medical Decision Making:     - f/u OR micro/path. ID recommendation appreciated. Clinically, first ray appears stable and no active signs of infection noted. - Remain NWB to left forefoot. Remain in surgical shoe. - Dressings reinforced with betadine and dry gauze.    - Medical management per hospitalist.

## 2021-06-26 NOTE — PROGRESS NOTES
[facility]  -  Progress Note      Patient: Raine Novak MRN: [id number]  SSN: [id number]    YOB: 1975  Age: 40 y.o. Sex: female      Admit Date: 6/24/2021    Assessment:     Hospital Problems  Date Reviewed: 5/26/2021        Codes Class Noted POA    Foot ulcer, left ([facility] [address] ICD-10-CM: Y08.438  ICD-9-CM: 707.15  6/24/2021 Unknown        Sepsis ([facility] [address] ICD-10-CM: A41.9  ICD-9-CM: 038.9, 995.91  5/17/2021 Unknown               1. Recurrent syncope- in the setting severe anemia  2. Severe anemia- no active bleeding - w/u per primary team   3. CAD- s/p Cardiac cath 10/22/20 100% mid RCA occlusion with thrombus. S/p aspiration thrombectomy followed by ptca/stent ( 2 BLANCA stents placed overlapping extending from mid RCA to distal RCA). Mild mid LAD stenosis. 4. Chronic diastolic heart failure  5. Hx of high degree AV block - 10/20  s/p TPM due to complete heart block during PCI. Post PCI developed juctional tachycardia. TPM was removed.  ekg 4/5/21 sinus tachycardia with nonspecific T wave abnormality   6. Hypertension-elevated now. She was hypotensive on admission   7. Hyperlipidemia-continue statin   8. Diabetes-poorly controlled with A1c 8.5  9. Hypokalemia- on admission with K+ 2.6 improved   10. Hypomagnesemia- with Mag+ 1.4 on admission- resolved   11. Hypocalcemia  12. Left foot infection- extensive osteomyelitis showed on recent foot MRI - Per Podiatry plans for Foot bone biopsy and possible amputations 4th and 5th toes      Plan:     Cardiac status appears stable. If she is able to tolerate antiplatelet regimen, would continue. Obviously, if she has significant bleeding, reasonable to consider holding Brilinta or Plavix. If no other coronary issues, will plan on seeing her back on Monday. Subjective:     No new complaints.      Objective:      Patient Vitals for the past 8 hrs:   Temp Pulse Resp BP SpO2   06/26/21 1114 98.1 °F (36.7 °C) (!) 102 20 (!) 149/94 100 % 06/26/21 0734 99.3 °F (37.4 °C) 93 20 (!) 189/110 100 %   06/26/21 0509 -- -- -- (!) 168/103 --   06/26/21 0506 98.5 °F (36.9 °C) 94 20 (!) 164/92 98 %         Patient Vitals for the past 96 hrs:   Weight   06/25/21 1516 87.5 kg (193 lb)   06/24/21 1012 87.5 kg (193 lb)         Intake/Output Summary (Last 24 hours) at 6/26/2021 1222  Last data filed at 6/26/2021 1031  Gross per 24 hour   Intake 1277 ml   Output 477 ml   Net 800 ml       Physical Exam:  General:  alert, cooperative, no distress, appears stated age  Neck:  no JVD  Lungs:  clear to auscultation bilaterally  Heart:  regular rate and rhythm  Abdomen:  no guarding or rigidity  Extremities:  no edema    Data Review:     Labs: Results:       Chemistry Recent Labs     06/25/21  0645 06/24/21  1655 06/24/21  1039   * 178* 273*    146* 141   K 3.6 2.6* 2.7*   * 117* 107   CO2 25 23 27   BUN 8 8 10   CREA 0.99 0.87 1.40*   CA 7.4* 5.9* 7.5*  7.7*   MG 2.3 1.4*  --    AGAP 6 6 7   BUCR 8* 9* 7*   AP  --   --  704*   TP  --   --  5.9*   ALB  --   --  1.4*   GLOB  --   --  4.5*   AGRAT  --   --  0.3*      CBC w/Diff Recent Labs     06/26/21  0430 06/26/21  0005 06/25/21  0645 06/24/21  1039 06/24/21  1039   WBC 6.4  --  5.4  --  5.4   RBC 3.49*  --  3.12*  --  3.52*   HGB 7.8* 8.0* 6.8*   < > 7.6*   HCT 26.1* 26.4* 22.9*   < > 25.5*     --  206  --  214   GRANS 79*  --  63  --  69   LYMPH 14*  --  22  --  18*   EOS 0  --  2  --  2    < > = values in this interval not displayed. Cardiac Enzymes No results found for: CPK, CK, CKMMB, CKMB, RCK3, CKMBT, CKNDX, CKND1, ROBERTA, TROPT, TROIQ, KEATON, TROPT, TNIPOC, BNP, BNPP   Coagulation No results for input(s): PTP, INR, APTT, INREXT in the last 72 hours.     Lipid Panel Lab Results   Component Value Date/Time    Cholesterol, total 218 (H) 09/28/2020 01:00 PM    HDL Cholesterol 49 09/28/2020 01:00 PM    LDL, calculated 121 (H) 09/28/2020 01:00 PM    VLDL, calculated 48 (H) 09/28/2020 01:00 PM Triglyceride 241 (H) 09/28/2020 01:00 PM      BNP No results found for: BNP, BNPP, XBNPT   Liver Enzymes Recent Labs     06/24/21  1039   TP 5.9*   ALB 1.4*   *      Digoxin    Thyroid Studies Lab Results   Component Value Date/Time    TSH 1.12 05/17/2021 05:09 PM

## 2021-06-26 NOTE — PERIOP NOTES
TRANSFER - OUT REPORT:    Verbal report given to leena rn(name) on Emmanuel Waldron  being transferred to [facility](unit) for routine post - op       Report consisted of patients Situation, Background, Assessment and   Recommendations(SBAR). Information from the following report(s) SBAR, OR Summary and Intake/Output was reviewed with the receiving nurse. Lines:   PICC Double Lumen 51/19/74 Right;Basilic (Active)       PICC Double Lumen 06/24/21 Right (Active)   Central Line Being Utilized Yes 06/24/21 2034   Criteria for Appropriate Use Irritant/vesicant medication 06/24/21 2034   Site Assessment Clean, dry, & intact 06/24/21 2034   Phlebitis Assessment 0 06/24/21 2034   Infiltration Assessment 0 06/24/21 2034   Date of Last Dressing Change 06/24/21 06/24/21 2034   Dressing Status Clean, dry, & intact 06/24/21 2034   Dressing Type Transparent;Tape 06/24/21 2034   Hub Color/Line Status Blue 06/24/21 2034   Positive Blood Return (Site #1) Yes 06/24/21 2034   Hub Color/Line Status Red 06/24/21 2034   Positive Blood Return (Site #2) Yes 06/24/21 2034   Alcohol Cap Used Yes 06/24/21 2034        Opportunity for questions and clarification was provided.       Patient transported with:   [email or web address]

## 2021-06-26 NOTE — PROGRESS NOTES
Interim events noted. Afebrile. Hemodynamically stable. Had chills when given ceftriaxone yesterday. Switched to cefepime. Labs reviewed. Blood cx 6/24 positive for yeast.   Imp:   Fungemia likely secondary to picc line infection/colonization   Chills yesterday could be due to colonized picc. Recommendations   - continue cefepime for now  -start eraxis   -remove picc line and send tip for culture  -repeat blood  culture in am  -f/u podiatry recommendations   D/w jaspreet. Time spent greater than 20 min.

## 2021-06-26 NOTE — OP NOTES
Operative Report     Patient: Chetna Mendoza MRN: [id number]  SSN: [id number]    YOB: 1975  Age: 40 y.o. Sex: female       Indications: The patient was admitted to hospital following syncope episode. Patient has chronic infection in left foot with multiple diabetic foot ulcers. Patient had left 1st MTPJ resection with antibiotic bead placement on last admission. Patient had home healthy changing wound VAC every other day. Patient is healing fine on left 1st MTPJ ulcer. She however developed new ulcers in fourth web space on lateral fourth and medial fifth toes. Patient had an MRI confirming osteomyelitis of fourth and fifth toes distal phalanges. Patient also found to have marrow change in first metatarsal, proximal phalanx and medial cuneiform. It is likely secondary to post-surgical changes. We decided to take bone biopsy of first metatarsal and medial cuneiform and partial amputation of fourth and fifth toes. All risks, benefits, complications of procedure discussed in detail with patient. Possible complications including but not limited to requirement of additional surgery, delayed healing, non-healing, proximal amputation, loss of limb or death. No guarantee made to outcome of procedure. Patient voiced understanding and signed consent. Date of Surgery: 6/25/2021     Preoperative Diagnosis:  OSTEOMYELITIS OF LEFT FOURTH AND FIFTH TOES WITH ULCERATIONS, BONE MARROW CHANGES IN FIRST METATARSAL, MEDIAL CUNEIFORM    Postoperative Diagnosis: SAME    Surgeon(s) and Role:     * Jair Farfan DPM - Primary      Surgical Assistants: Operating Room Staff    Anesthesia: MAC with Local    Procedure:    LEFT FOOT FOURTH AND FIFTH PARTIAL TOE AMPUTATIONS AND BONE BIOPSY OF FIRST METATARSAL, MEDIAL CUNNIFORM    Procedure in Detail:     The patient was brought to the operative suite and secured in the supine position on the operating room table.  After IV sedation from department of anesthesia, local block consisting of 20 cc of 1:1 mixture of 1% lidocaine plain and 0.5% marcaine plain infiltrated proximal to surgical area. Left foot prepped and draped in usual sterile fashion. Attention directed to left fifth toe where incision performed at PIPJ down to bone level with # 15 blade and it was disarticulated at PIPJ. It was made sure to excise ulcer at medial fifth toe. Attention directed to fourth toe and incision performed at PIPJ down to bone with fresh #15 blade. Toe was disarticulated at PIPJ. Ulcer on lateral side of toe was excised. All devitalized and fibrotic tissue from lateral fourth toe excised. Amputated fourth and fifth toes sent to pathology as gross specimen. Surgical sites irrigated with normal saline solution. Head of proximal phalanx resected using sagittal saw from both fourth and fifth toes and sent to micro and pathology as clean margin. Redundant soft tissue excised as necessary. Skin edges approximated with 4-0 prolene in simple interrupted fashion. Attention directed to first metatarsal base where stab incision performed with # 15 blade and blunt dissection performed with hemostat down to bone. Then using Jamshidi needle, bone biopsy obtained and sent to micro and pathology. Attention then directed to medial cuneiform where stab incision performed dorsomedial with # 15 blade and blunt dissection performed down to bone. Then using Jamshidi needle, bone biopsy obtained from medial cuneiform and sent to micro and pathology. Bone biopsy sites irrigated with normal saline and closed with 4-0 prolene in simple interrupted fashion. Surgical sites dressed with betadine soaked adaptic and dry gauze and webroll. The patient tolerated the procedure and anesthesia well. The patient was sent to the recovery room in apparent satisfactory condition. Findings:  Fourth and fifth toes distal phalanges noted to be soft.  First metatarsal and medical cuneiform noted to be firm while obtaining bone biopsies.      Estimated Blood Loss:  10 cc    Drains: none           Specimens:   ID Type Source Tests Collected by Time Destination   1 : left foot 4th toe Preservative Toe  [place], [facility] 6/25/2021 1848 Pathology   2 : left toe 5th toe Preservative Toe  [place], [facility] 6/25/2021 1848 Pathology   3 : left foot 4th toe clean margin Preservative Toe  MinaFredrick stanley A, DP 6/25/2021 1851 Pathology   4 : LEFT FOOT 1ST METATARSAL BONE BIOPSY Preservative Toe  [place] Ste[place] A, [facility] 6/25/2021 1903 Pathology   5 : LEFT FOOT MEDIAL CUNNIFORM bone biopsy Preservative Toe  [place], [facility] 6/25/2021 1905 Pathology   6 : LEFT FOOT FIFTH TOE CLEAN MARGINS Preservative Toe  [place], [facility] 6/25/2021 1908 Pathology   1 : left foot 4th toe  Tissue Toe CULTURE, ANAEROBIC, CULTURE, TISSUE W GRAM STAIN [place], [facility] 6/25/2021 1853 Microbiology   2 : LEFT FOOT FIRST METATARSAL BONE BIOPSY Wound Toe CULTURE, ANAEROBIC, CULTURE, WOUND W GRAM STAIN [place], [facility] 6/25/2021 1901 Microbiology   3 : LEFT FOOT MEDIAL CUNNIFORM  Wound Toe CULTURE, ANAEROBIC, CULTURE, WOUND W GRAM STAIN [place], [facility] 6/25/2021 1907 Microbiology               Implants:  * No implants in log *           Complications:  None

## 2021-06-26 NOTE — PROGRESS NOTES
Bedside shift change report given to Tomasz Maria RN (oncoming nurse) by Lina Gilford, RN (offgoing nurse). Report included the following information SBAR, Kardex and MAR.

## 2021-06-26 NOTE — PROGRESS NOTES
RENAL DAILY PROGRESS NOTE            72-year-old female with past medical history of diabetes, hypertension, chronic for infection admitted with a left foot diabetic ulcer, sepsis, following for renal failure  Subjective:       Complaint:   Overnight events noted  no nausea, vomiting, chest pain, short of breath, cough, seizure. IMPRESSION:   Acute kidney injury, prerenal FRANCISCA, improving renal function  Hypokalemia, improving  Hypernatremia, improving  Heavy proteinuria, diabetic nephropathy  Uncontrolled diabetes  Sepsis, left foot infection, fungemia,  Anemia  Coronary artery disease status post stent placement  Chronic diastolic heart failure  Secondary hyperparathyroidism of CKD   PLAN:   She has marked improvement in her renal function with current supportive treatment. Continue antibiotic per primary team.  Start on calcitriol. abx per ID colleague.            Current Facility-Administered Medications   Medication Dose Route Frequency    clopidogreL (PLAVIX) tablet 75 mg  75 mg Oral DAILY    spironolactone (ALDACTONE) tablet 25 mg  25 mg Oral DAILY    famotidine (PEPCID) tablet 20 mg  20 mg Oral BID    insulin glargine (LANTUS) injection 25 Units  25 Units SubCUTAneous QHS    anidulafungin (ERAXIS) 200 mg in 0.9% sodium chloride 260 mL IVPB  200 mg IntraVENous ONCE    [START ON 6/27/2021] anidulafungin (ERAXIS) 100 mg in 0.9% sodium chloride 130 mL IVPB  100 mg IntraVENous Q24H    0.9% sodium chloride infusion 250 mL  250 mL IntraVENous PRN    carvediloL (COREG) tablet 12.5 mg  12.5 mg Oral Q12H    insulin lispro (HUMALOG) injection   SubCUTAneous AC&HS    diphenhydrAMINE (BENADRYL) injection 25 mg  25 mg IntraVENous Q6H PRN    cefepime (MAXIPIME) 1 g in sterile water (preservative free) 10 mL IV syringe  1 g IntraVENous Q8H    sodium chloride (NS) flush 5-10 mL  5-10 mL IntraVENous PRN    aspirin chewable tablet 81 mg  81 mg Oral DAILY    ferrous sulfate tablet 325 mg  325 mg Oral DAILY WITH BREAKFAST    rosuvastatin (CRESTOR) tablet 10 mg  10 mg Oral QHS    sodium chloride (NS) flush 5-40 mL  5-40 mL IntraVENous Q8H    sodium chloride (NS) flush 5-40 mL  5-40 mL IntraVENous PRN    acetaminophen (TYLENOL) tablet 650 mg  650 mg Oral Q6H PRN    Or    acetaminophen (TYLENOL) suppository 650 mg  650 mg Rectal Q6H PRN    polyethylene glycol (MIRALAX) packet 17 g  17 g Oral DAILY PRN    ondansetron (ZOFRAN ODT) tablet 4 mg  4 mg Oral Q8H PRN    Or    ondansetron (ZOFRAN) injection 4 mg  4 mg IntraVENous Q6H PRN    enoxaparin (LOVENOX) injection 40 mg  40 mg SubCUTAneous DAILY    magnesium oxide (MAG-OX) tablet 400 mg  400 mg Oral BID       Review of Symptoms: comprehensive ROS negative except above.    Objective:     Patient Vitals for the past 24 hrs:   Temp Pulse Resp BP SpO2   06/26/21 1114 98.1 °F (36.7 °C) (!) 102 20 (!) 149/94 100 %   06/26/21 0734 99.3 °F (37.4 °C) 93 20 (!) 189/110 100 %   06/26/21 0509 -- -- -- (!) 168/103 --   06/26/21 0506 98.5 °F (36.9 °C) 94 20 (!) 164/92 98 %   06/26/21 0017 98.7 °F (37.1 °C) (!) 102 16 (!) 148/93 98 %   06/25/21 2222 -- (!) 103 -- (!) 173/91 --   06/25/21 2012 98.3 °F (36.8 °C) 100 16 (!) 161/91 97 %   06/25/21 1947 98.3 °F (36.8 °C) -- -- -- --   06/25/21 1945 -- 97 15 (!) 152/87 100 %   06/25/21 1944 -- 96 16 -- 98 %   06/25/21 1935 -- 96 16 (!) 157/90 98 %   06/25/21 1923 97.7 °F (36.5 °C) 96 20 (!) 162/93 100 %   06/25/21 1747 98.3 °F (36.8 °C) 97 22 (!) 160/101 100 %   06/25/21 1618 97.8 °F (36.6 °C) 100 18 (!) 158/61 100 %   06/25/21 1600 -- 100 -- -- --   06/25/21 1516 -- -- -- (!) 163/95 --        Weight change: 0 kg (0 lb)     06/24 1901 - 06/26 0700  In: 1507 [P.O.:360; I.V.:870]  Out: 477     Intake/Output Summary (Last 24 hours) at 6/26/2021 1354  Last data filed at 6/26/2021 1031  Gross per 24 hour   Intake 1277 ml   Output 477 ml   Net 800 ml     Physical Exam:   General: comfortable, no acute distress   HEENT sclera anicteric, supple neck, no thyromegaly  CVS: S1S2 heard,  no rub  RS: + air entry b/l,   Abd: Soft, Non tender, Not distended, Positive bowel sounds, no organomegaly, no CVA / supra pubic tenderness  Neuro: non focal, awake, alert , CN II-XII are grossly intact  Extrm: edema, no cyanosis, clubbing   Skin: no visible  Rash  Musculoskeletal: No gross joints or bone deformities         Data Review:     LABS:   Hematology:   Recent Labs     06/26/21  1256 06/26/21  0430 06/26/21  0005 06/25/21  0645 06/24/21  1039   WBC  --  6.4  --  5.4 5.4   HGB 7.8* 7.8* 8.0* 6.8* 7.6*   HCT 24.9* 26.1* 26.4* 22.9* 25.5*     Chemistry:   Recent Labs     06/25/21  0645 06/24/21  1655 06/24/21  1039   BUN 8 8 10   CREA 0.99 0.87 1.40*   CA 7.4* 5.9* 7.5*  7.7*   ALB  --   --  1.4*   K 3.6 2.6* 2.7*    146* 141   * 117* 107   CO2 25 23 27   * 178* 273*            Procedures/imaging: see electronic medical records for all procedures, Xrays and details which were not copied into this note but were reviewed prior to creation of Plan          Assessment & Plan:             Natasha Johnson MD  6/26/2021  1:54 PM

## 2021-06-26 NOTE — PROGRESS NOTES
Patient with hx of cardiac stent placed on 10/2020, will resume DAPT as recommended by Cardiology team with asa 81 mg and start  plavix 75 mg daily rather than brillinta given hx of anemia, as per Cardiology recommendation. Hgb 8.0 at midnight. Full progress note to follow. [facility]  [facility] Note    Patient: Piper Gregory Age: 40 y.o. : 1975 MR#: [id number] SSN: [id number]  Date: 2021     Subjective:   Patient denies any chest pain, sob, abd pain, n/v.   Pain in the left foot tolerable  Patient reported to me itchiness with IV ceftriaxone yesterday.     hgb 8.0 s/p 1 unit prbc, hgb 7.8 s/p OR     MICROBIO  Wound cx from  with candida albicans and light possible enterococcus   intraop cxs from  pending   Blood cx from  with yeast one set. INTERIM EVENTS:   S/p 1 unit PRBC on       Assessment/Plan:   Milli Hicks is a 40 y.o.   female with history of admission in  for managment of left foot diabetic ulcer infection dc'd w/ wound vac on ceftriaxone via PICC line w/ end date of  who presents with reports of recurrent syncope. CONSULTS: ID , PODIATRY , NEPHROLOGY , CARDIOLOGY     1. Recurrent syncope  2. Acute on chronic Anemia, iron level was 15 in May 18 2021 , iron level is 55 this admission. 3. Diabetic Left foot ulcer s/p operation with Dr José Miguel Strickland on 2021   -- s/p left 1st MTPJ resection with antibiotic bead placement. Patient has home health changing wound VAC every other day. --  xray left foot concern for OM in the proximal most aspect of the first proximal phalanx and the proximal most aspect of the first metatarsal.  -- MRI left foot: Extensive osteomyelitis of the medial cuneiform, 1st metatarsal, and 1st proximal phalanx. Adjacent ulcer and extensive soft tissue edema. Septic  arthritis of the 1st TMT joint. Osteomyelitis of the 4th and 5th distal phalanges. Adjacent likely ulcers.   Mild edema in the 3rd and 4th metatarsal heads. The possibility of contusion versus early osteomyelitis should be considered. 4. Fungemia- Yeast in blood cx from June 24 2021   5. Elevated Cr , diabetic nephropathy   6. Interval increase in cardiac silhouette on CXR   7. Hypokalemia  - she is on lasix at home. Has hx of hypokalemia   8. Hypocalcemia , ionized calcium level is normal.   9. Hypomagnesemia   10. Prolonged Qtc   11. Abnormal UA- with trace LE, wbc 6-9, 1+ bacteria. Few yeast. Noted epithelial cells. 12. Hypotension - resolved. 13. Hypernatremia- resolved. Past medical hx   14. type 2 DM on lantus , a1c 12.3% in 3/2019 , a1c is 9.6% this admission   15. HTN   16. Hx of NSTEMI on DAPT , stent placed on 10/2020 \"S/p aspiration thrombectomy followed by ptca/stent ( 2 BLANCA stents placed overlapping extending from mid RCA to distal RCA). \"    - Podiatry follows- POD #1. Will follow up on Podiatry report from June 25 operation . Needs wound vac. Pain well controlled at this time. pt ot when cleared by podiatrist.   - Cardiology follows- will resume asa, stop home brillinta and start plavix, patient agrees with this plan as recommended by cardiology team due to severe anemia. Continue BB, aldactone, crestor.   - ID follows: continue cefepime, monitor for any reactions. follow blood cxs, follow wound culture from left foot. Patient already has PICC line present before admission to hospital.   Addendum 115pm: I discussed with ID about the yeast in blood culture from June 24 2021: remove PICC line stat, start eraxis stat, send cath tip for culture and repeat blood cxs in AM. Suspect reaction last night was not from antibiotic but rather infected picc line. - stool occult pending. Monitor H and H closely, goal Hgb >8 given cardiac hx. Will repeat H and H again now. Continue iron supplement.   - Nephrology is following and work up in process to eval for hyperaldosterone. Holding lasix.    - SSI + lantus 15 units  , DM consulted   - continue Mg supplement BID. - incentive felipe       Patient will update her fmaily   Updated plan with nursing . Full code   dvt ppx-lovenox daily   Gi ppx- pepcid     Additional Notes:      Case discussed with:  [x]Patient  []Family  []Nursing  []Case Management  DVT Prophylaxis:  [x]Lovenox  []Hep SQ  []SCDs  []Coumadin   []On Heparin gtt    Objective:   VS:   Visit Vitals  BP (!) 149/94 (BP 1 Location: Left upper arm, BP Patient Position: Lying)   Pulse (!) 102   Temp 98.1 °F (36.7 °C)   Resp 20   Ht 5' 7\" (1.702 m)   Wt 87.5 kg (193 lb)   SpO2 100%   Breastfeeding No   BMI 30.23 kg/m²      Tmax/24hrs: Temp (24hrs), Av.3 °F (36.8 °C), Min:97.7 °F (36.5 °C), Max:99.3 °F (37.4 °C)      Intake/Output Summary (Last 24 hours) at 2021 1221  Last data filed at 2021 1031  Gross per 24 hour   Intake 1277 ml   Output 477 ml   Net 800 ml       General: young AA woman sitting up in bed,   Alert, NAD  Cardiovascular:  RRR  Pulmonary:  LSC throughout; respiratory effort WNL, room air. GI:  +BS in all four quadrants, soft, non-tender  Extremities:  No edema; 2+ dorsalis pedis pulses bilaterally  Left heel wrapped in dressing clean dry intact  Neuro: awake, alert, ox3, moving arms legs, follows commands. PICC in [facility]. Labs:    Recent Results (from the past 24 hour(s))   RBC, ALLOCATE    Collection Time: 21  1:01 PM   Result Value Ref Range    HISTORY CHECKED?  Historical check performed    ECHO ADULT FOLLOW-UP OR LIMITED    Collection Time: 21  3:42 PM   Result Value Ref Range    IVSd 1.39 (A) 0.60 - 0.90 cm    LVIDd 4.49 3.90 - 5.30 cm    LVIDs 3.60 cm    LVPWd 1.37 (A) 0.60 - 0.90 cm    Tapse 2.39 (A) 1.50 - 2.00 cm    LV Mass .3 67.0 - 162.0 g    LV Mass AL Index 121.8 43.0 - 95.0 g/m2   TYPE & SCREEN    Collection Time: 21  4:00 PM   Result Value Ref Range    Crossmatch Expiration 2021,0078     ABO/Rh(D) O POSITIVE     Antibody screen NEG     CALLED TO: Gómez Gee 4N AT 18:04 ON 06/25/2021 BY DEBORAH     Unit number [id number]     Blood component type RC LR,2     Unit division 00     Status of unit TRANSFUSED     Crossmatch result Compatible    GLUCOSE, POC    Collection Time: 06/25/21  4:23 PM   Result Value Ref Range    Glucose (POC) 81 70 - 110 mg/dL   SARS-COV-2    Collection Time: 06/25/21  4:27 PM   Result Value Ref Range    SARS-CoV-2 Please find results under separate order     COVID-19 RAPID TEST    Collection Time: 06/25/21  4:27 PM   Result Value Ref Range    Specimen source Nasopharyngeal      COVID-19 rapid test Not detected NOTD     GLUCOSE, POC    Collection Time: 06/25/21  5:46 PM   Result Value Ref Range    Glucose (POC) 83 70 - 110 mg/dL   HCG URINE, QL. - POC    Collection Time: 06/25/21  6:09 PM   Result Value Ref Range    Pregnancy test,urine (POC) Negative NEG     GLUCOSE, POC    Collection Time: 06/25/21  7:30 PM   Result Value Ref Range    Glucose (POC) 84 70 - 110 mg/dL   GLUCOSE, POC    Collection Time: 06/25/21  9:35 PM   Result Value Ref Range    Glucose (POC) 175 (H) 70 - 110 mg/dL   HGB & HCT    Collection Time: 06/26/21 12:05 AM   Result Value Ref Range    HGB 8.0 (L) 12.0 - 16.0 g/dL    HCT 26.4 (L) 35.0 - 45.0 %   CBC WITH AUTOMATED DIFF    Collection Time: 06/26/21  4:30 AM   Result Value Ref Range    WBC 6.4 4.6 - 13.2 K/uL    RBC 3.49 (L) 4.20 - 5.30 M/uL    HGB 7.8 (L) 12.0 - 16.0 g/dL    HCT 26.1 (L) 35.0 - 45.0 %    MCV 74.8 74.0 - 97.0 FL    MCH 22.3 (L) 24.0 - 34.0 PG    MCHC 29.9 (L) 31.0 - 37.0 g/dL    RDW 17.6 (H) 11.6 - 14.5 %    PLATELET 160 435 - 434 K/uL    MPV 9.3 9.2 - 11.8 FL    NEUTROPHILS 79 (H) 40 - 73 %    LYMPHOCYTES 14 (L) 21 - 52 %    MONOCYTES 7 3 - 10 %    EOSINOPHILS 0 0 - 5 %    BASOPHILS 0 0 - 2 %    ABS. NEUTROPHILS 5.1 1.8 - 8.0 K/UL    ABS. LYMPHOCYTES 0.9 0.9 - 3.6 K/UL    ABS. MONOCYTES 0.4 0.05 - 1.2 K/UL    ABS. EOSINOPHILS 0.0 0.0 - 0.4 K/UL    ABS.  BASOPHILS 0.0 0.0 - 0.1 K/UL    DF AUTOMATED GLUCOSE, POC    Collection Time: 06/26/21  7:42 AM   Result Value Ref Range    Glucose (POC) 257 (H) 70 - 110 mg/dL   GLUCOSE, POC    Collection Time: 06/26/21 11:19 AM   Result Value Ref Range    Glucose (POC) 287 (H) 70 - 110 mg/dL       Signed By: DEXTER Mauricio     June 26, 2021

## 2021-06-27 LAB
ANION GAP SERPL CALC-SCNC: 5 MMOL/L (ref 3–18)
BACTERIA SPEC CULT: ABNORMAL
BACTERIA SPEC CULT: NORMAL
BASOPHILS # BLD: 0 K/UL (ref 0–0.1)
BASOPHILS NFR BLD: 1 % (ref 0–2)
BUN SERPL-MCNC: 10 MG/DL (ref 7–18)
BUN/CREAT SERPL: 9 (ref 12–20)
CALCIUM SERPL-MCNC: 7.8 MG/DL (ref 8.5–10.1)
CC UR VC: NORMAL
CHLORIDE SERPL-SCNC: 109 MMOL/L (ref 100–111)
CO2 SERPL-SCNC: 25 MMOL/L (ref 21–32)
CREAT SERPL-MCNC: 1.17 MG/DL (ref 0.6–1.3)
DIFFERENTIAL METHOD BLD: ABNORMAL
EOSINOPHIL # BLD: 0.1 K/UL (ref 0–0.4)
EOSINOPHIL NFR BLD: 1 % (ref 0–5)
ERYTHROCYTE [DISTWIDTH] IN BLOOD BY AUTOMATED COUNT: 17.9 % (ref 11.6–14.5)
GLUCOSE BLD STRIP.AUTO-MCNC: 146 MG/DL (ref 70–110)
GLUCOSE BLD STRIP.AUTO-MCNC: 151 MG/DL (ref 70–110)
GLUCOSE BLD STRIP.AUTO-MCNC: 156 MG/DL (ref 70–110)
GLUCOSE BLD STRIP.AUTO-MCNC: 204 MG/DL (ref 70–110)
GLUCOSE SERPL-MCNC: 272 MG/DL (ref 74–99)
GRAM STN SPEC: ABNORMAL
GRAM STN SPEC: ABNORMAL
HCT VFR BLD AUTO: 24.5 % (ref 35–45)
HGB BLD-MCNC: 7.3 G/DL (ref 12–16)
LYMPHOCYTES # BLD: 1.6 K/UL (ref 0.9–3.6)
LYMPHOCYTES NFR BLD: 18 % (ref 21–52)
MAGNESIUM SERPL-MCNC: 2 MG/DL (ref 1.6–2.6)
MCH RBC QN AUTO: 22 PG (ref 24–34)
MCHC RBC AUTO-ENTMCNC: 29.8 G/DL (ref 31–37)
MCV RBC AUTO: 73.8 FL (ref 74–97)
MONOCYTES # BLD: 1 K/UL (ref 0.05–1.2)
MONOCYTES NFR BLD: 11 % (ref 3–10)
NEUTS SEG # BLD: 6 K/UL (ref 1.8–8)
NEUTS SEG NFR BLD: 68 % (ref 40–73)
PLATELET # BLD AUTO: 202 K/UL (ref 135–420)
PMV BLD AUTO: 9.3 FL (ref 9.2–11.8)
POTASSIUM SERPL-SCNC: 3.5 MMOL/L (ref 3.5–5.5)
RBC # BLD AUTO: 3.32 M/UL (ref 4.2–5.3)
SERVICE CMNT-IMP: ABNORMAL
SERVICE CMNT-IMP: NORMAL
SODIUM SERPL-SCNC: 139 MMOL/L (ref 136–145)
WBC # BLD AUTO: 8.8 K/UL (ref 4.6–13.2)

## 2021-06-27 PROCEDURE — 74011250637 HC RX REV CODE- 250/637: Performed by: INTERNAL MEDICINE

## 2021-06-27 PROCEDURE — 80048 BASIC METABOLIC PNL TOTAL CA: CPT

## 2021-06-27 PROCEDURE — 74011250637 HC RX REV CODE- 250/637: Performed by: PHYSICIAN ASSISTANT

## 2021-06-27 PROCEDURE — 87040 BLOOD CULTURE FOR BACTERIA: CPT

## 2021-06-27 PROCEDURE — 74011000258 HC RX REV CODE- 258: Performed by: HOSPITALIST

## 2021-06-27 PROCEDURE — 82962 GLUCOSE BLOOD TEST: CPT

## 2021-06-27 PROCEDURE — 74011250636 HC RX REV CODE- 250/636: Performed by: HOSPITALIST

## 2021-06-27 PROCEDURE — 65270000029 HC RM PRIVATE

## 2021-06-27 PROCEDURE — 74011636637 HC RX REV CODE- 636/637: Performed by: HOSPITALIST

## 2021-06-27 PROCEDURE — 83735 ASSAY OF MAGNESIUM: CPT

## 2021-06-27 PROCEDURE — 74011250636 HC RX REV CODE- 250/636: Performed by: INTERNAL MEDICINE

## 2021-06-27 PROCEDURE — 85025 COMPLETE CBC W/AUTO DIFF WBC: CPT

## 2021-06-27 PROCEDURE — 74011000250 HC RX REV CODE- 250: Performed by: HOSPITALIST

## 2021-06-27 PROCEDURE — 99232 SBSQ HOSP IP/OBS MODERATE 35: CPT | Performed by: PHYSICIAN ASSISTANT

## 2021-06-27 PROCEDURE — 36415 COLL VENOUS BLD VENIPUNCTURE: CPT

## 2021-06-27 PROCEDURE — 74011250637 HC RX REV CODE- 250/637: Performed by: HOSPITALIST

## 2021-06-27 PROCEDURE — 2709999900 HC NON-CHARGEABLE SUPPLY

## 2021-06-27 RX ADMIN — SODIUM CHLORIDE 100 MG: 9 INJECTION, SOLUTION INTRAVENOUS at 15:53

## 2021-06-27 RX ADMIN — CEFEPIME HYDROCHLORIDE 1 G: 1 INJECTION, POWDER, FOR SOLUTION INTRAMUSCULAR; INTRAVENOUS at 01:41

## 2021-06-27 RX ADMIN — ROSUVASTATIN CALCIUM 10 MG: 10 TABLET, COATED ORAL at 22:54

## 2021-06-27 RX ADMIN — ENOXAPARIN SODIUM 40 MG: 40 INJECTION SUBCUTANEOUS at 08:58

## 2021-06-27 RX ADMIN — CARVEDILOL 12.5 MG: 12.5 TABLET, FILM COATED ORAL at 08:58

## 2021-06-27 RX ADMIN — CLOPIDOGREL BISULFATE 75 MG: 75 TABLET ORAL at 08:58

## 2021-06-27 RX ADMIN — AMLODIPINE BESYLATE 5 MG: 5 TABLET ORAL at 08:58

## 2021-06-27 RX ADMIN — INSULIN GLARGINE 25 UNITS: 100 INJECTION, SOLUTION SUBCUTANEOUS at 22:46

## 2021-06-27 RX ADMIN — CALCITRIOL CAPSULES 0.25 MCG 0.25 MCG: 0.25 CAPSULE ORAL at 08:58

## 2021-06-27 RX ADMIN — MAGNESIUM OXIDE TAB 400 MG (241.3 MG ELEMENTAL MG) 400 MG: 400 (241.3 MG) TAB at 17:33

## 2021-06-27 RX ADMIN — Medication 10 ML: at 06:01

## 2021-06-27 RX ADMIN — INSULIN LISPRO 3 UNITS: 100 INJECTION, SOLUTION INTRAVENOUS; SUBCUTANEOUS at 11:27

## 2021-06-27 RX ADMIN — Medication 10 ML: at 15:53

## 2021-06-27 RX ADMIN — FAMOTIDINE 20 MG: 20 TABLET, FILM COATED ORAL at 08:58

## 2021-06-27 RX ADMIN — INSULIN LISPRO 6 UNITS: 100 INJECTION, SOLUTION INTRAVENOUS; SUBCUTANEOUS at 08:58

## 2021-06-27 RX ADMIN — MAGNESIUM OXIDE TAB 400 MG (241.3 MG ELEMENTAL MG) 400 MG: 400 (241.3 MG) TAB at 08:58

## 2021-06-27 RX ADMIN — CEFEPIME HYDROCHLORIDE 1 G: 1 INJECTION, POWDER, FOR SOLUTION INTRAMUSCULAR; INTRAVENOUS at 17:34

## 2021-06-27 RX ADMIN — ASPIRIN 81 MG: 81 TABLET, CHEWABLE ORAL at 08:58

## 2021-06-27 RX ADMIN — INSULIN LISPRO 3 UNITS: 100 INJECTION, SOLUTION INTRAVENOUS; SUBCUTANEOUS at 17:34

## 2021-06-27 RX ADMIN — ACETAMINOPHEN 650 MG: 325 TABLET ORAL at 01:52

## 2021-06-27 RX ADMIN — FAMOTIDINE 20 MG: 20 TABLET, FILM COATED ORAL at 17:34

## 2021-06-27 RX ADMIN — CEFEPIME HYDROCHLORIDE 1 G: 1 INJECTION, POWDER, FOR SOLUTION INTRAMUSCULAR; INTRAVENOUS at 10:57

## 2021-06-27 RX ADMIN — CARVEDILOL 12.5 MG: 12.5 TABLET, FILM COATED ORAL at 22:46

## 2021-06-27 RX ADMIN — SPIRONOLACTONE 25 MG: 25 TABLET, FILM COATED ORAL at 08:58

## 2021-06-27 RX ADMIN — FERROUS SULFATE TAB 325 MG (65 MG ELEMENTAL FE) 325 MG: 325 (65 FE) TAB at 08:58

## 2021-06-27 NOTE — PROGRESS NOTES
Progress Note    Patient: Chandra Jordan MRN: [id number]  SSN: [id number]    YOB: 1975  Age: 40 y.o. Sex: female      Admit Date: 6/24/2021  2 Days Post-Op     Procedure:   Procedure(s):  LEFT FOOT FOURTH AND FIFTH TOE AMPUTATION AND BONE BIOPSY OF FIRST METATARSAL, MEDIAL CUNNIFORM    Subjective:     Patient seen resting quiet and comfortably and no apparent distress. Patient has wound VAC attached to left 1st MTPJ ulcer. Dressings intact. Denies any pain to foot. Denies N/V/C/F. Status post: osteomyelitis    Objective:     Visit Vitals  BP (!) 135/96 (BP 1 Location: Left upper arm, BP Patient Position: At rest)   Pulse (!) 104   Temp 98.4 °F (36.9 °C)   Resp 19   Ht 5' 7\" (1.702 m)   Wt 87.5 kg (193 lb)   SpO2 100%   Breastfeeding No   BMI 30.23 kg/m²        Physical Exam:  Left ELADIO: dressings intact to left foot. Wound VAC attached to left medial 1st MTPJ ulcer. Fourth and fifth toes amputation site healing well, skin edges well approximated. Labs/Radiology Review: images and reports reviewed    Assessment:     Hospital Problems  Date Reviewed: 5/26/2021        Codes Class Noted POA    Foot ulcer, left ([facility] ICD-10-CM: B43.077  ICD-9-CM: 707.15  6/24/2021 Unknown        Sepsis ([facility] ICD-10-CM: A41.9  ICD-9-CM: 038.9, 995.91  5/17/2021 Unknown              Plan/Recommendations/Medical Decision Making:     - f/u OR micro/pathology. Negative so far. - Continue abxs per ID recommendation.  - Patient will need to continue outpatient wound Vac therapy. - betadine soaked gauze applied in left third and fourth webspaces.   - Remain NWB to left forefoot.

## 2021-06-27 NOTE — ROUTINE PROCESS
Bedside and Verbal shift change report given to Lisbet Rosen RN (oncoming nurse) by Jennetta Holter, RN (offgoing nurse). Report included the following information SBAR, Kardex, Intake/Output, MAR and Recent Results.

## 2021-06-27 NOTE — PROGRESS NOTES
Problem: Diabetes Self-Management  Goal: *Disease process and treatment process  Description: Define diabetes and identify own type of diabetes; list 3 options for treating diabetes. Outcome: Progressing Towards Goal  Goal: *Incorporating nutritional management into lifestyle  Description: Describe effect of type, amount and timing of food on blood glucose; list 3 methods for planning meals. Outcome: Progressing Towards Goal  Goal: *Incorporating physical activity into lifestyle  Description: State effect of exercise on blood glucose levels. Outcome: Progressing Towards Goal  Goal: *Developing strategies to promote health/change behavior  Description: Define the ABC's of diabetes; identify appropriate screenings, schedule and personal plan for screenings. Outcome: Progressing Towards Goal  Goal: *Using medications safely  Description: State effect of diabetes medications on diabetes; name diabetes medication taking, action and side effects. Outcome: Progressing Towards Goal  Goal: *Monitoring blood glucose, interpreting and using results  Description: Identify recommended blood glucose targets  and personal targets. Outcome: Progressing Towards Goal  Goal: *Prevention, detection, treatment of acute complications  Description: List symptoms of hyper- and hypoglycemia; describe how to treat low blood sugar and actions for lowering  high blood glucose level. Outcome: Progressing Towards Goal  Goal: *Prevention, detection and treatment of chronic complications  Description: Define the natural course of diabetes and describe the relationship of blood glucose levels to long term complications of diabetes.   Outcome: Progressing Towards Goal  Goal: *Developing strategies to address psychosocial issues  Description: Describe feelings about living with diabetes; identify support needed and support network  Outcome: Progressing Towards Goal  Goal: *Insulin pump training  Outcome: Progressing Towards Goal  Goal: *Sick day guidelines  Outcome: Progressing Towards Goal  Goal: *Patient Specific Goal (EDIT GOAL, INSERT TEXT)  Outcome: Progressing Towards Goal     Problem: Patient Education: Go to Patient Education Activity  Goal: Patient/Family Education  Outcome: Progressing Towards Goal     Problem: Falls - Risk of  Goal: *Absence of Falls  Description: Document Radhika Jennifer Fall Risk and appropriate interventions in the flowsheet.   Outcome: Progressing Towards Goal  Note: Fall Risk Interventions:            Medication Interventions: Bed/chair exit alarm, Patient to call before getting OOB                   Problem: Patient Education: Go to Patient Education Activity  Goal: Patient/Family Education  Outcome: Progressing Towards Goal

## 2021-06-27 NOTE — PROGRESS NOTES
Bedside shift change report given to Valentin Smith RN (oncoming nurse) by Elif Stanford RN (offgoing nurse). Report included the following information SBAR, Kardex and MAR.

## 2021-06-27 NOTE — PROGRESS NOTES
Patient with hx of cardiac stent placed on 10/2020, will resume DAPT as recommended by Cardiology team with asa 81 mg and start  plavix 75 mg daily rather than brillinta given hx of anemia, as per Cardiology recommendation. Hgb 8.0 at midnight. Full progress note to follow. [facility]  Progress Note    Patient: Jacinda Shields Age: 40 y.o. : 1975 MR#: [id number] SSN: [id number]  Date: 2021     Subjective:   No fever no chills   No chest pain, abd pain, sob. Patient endorses picc removal yesterday     hgb is 7.3 this am  Cr up to 1.17    MICROBIO  Blood cxs repeated on    Blood cx from  with yeast one set. Wound cx from  with candida albicans and light possible enterococcus  And light diphtheroids   intraop cxs from  pending     INTERIM EVENTS:   PICC line removed on    S/p 1 unit PRBC on     Surgery with Dr Najma Jefferson on 2021     IMAGING  Post op xray left foot: Interval resection of the fourth and fifth digits at the mid shaft of the  proximal phalanx. Overlying soft tissue edema. Resection of the first digit at the MTP with significant soft tissue swelling  and ulceration. Significant edema/inflammation distal foot greater than ankle.          Assessment/Plan:   Sara Lockhart is a 40 y.o.   female with history of admission in  for managment of left foot diabetic ulcer infection dc'd w/ wound vac on ceftriaxone via PICC line w/ end date of  who presents with reports of recurrent syncope. CONSULTS: ID , PODIATRY , NEPHROLOGY , CARDIOLOGY     1. Recurrent syncope  2. Acute on chronic Anemia, iron level was 15 in May 18 2021 , iron level is 55 this admission. 3. Diabetic Left foot ulcer s/p operation with Dr Najma Jefferson on 2021 LEFT FOOT FOURTH AND FIFTH TOE AMPUTATION AND BONE BIOPSY OF FIRST METATARSAL, MEDIAL CUNNIFORM, FIRST PROXIMAL PHALANX  -- s/p left 1st MTPJ resection with antibiotic bead placement. Patient has home health changing wound VAC every other day. --  xray left foot concern for OM in the proximal most aspect of the first proximal phalanx and the proximal most aspect of the first metatarsal.  -- MRI left foot: Extensive osteomyelitis of the medial cuneiform, 1st metatarsal, and 1st proximal phalanx. Adjacent ulcer and extensive soft tissue edema. Septic  arthritis of the 1st TMT joint. Osteomyelitis of the 4th and 5th distal phalanges. Adjacent likely ulcers. Mild edema in the 3rd and 4th metatarsal heads. The possibility of contusion versus early osteomyelitis should be considered. 4. Fungemia- Yeast in blood cx from June 24 2021   5. Elevated Cr , diabetic nephropathy   6. Interval increase in cardiac silhouette on CXR   7. Hypokalemia  - she is on lasix at home. Has hx of hypokalemia   8. Hypocalcemia , ionized calcium level is normal.   9. Hypomagnesemia   10. Prolonged Qtc   11. Abnormal UA- with trace LE, wbc 6-9, 1+ bacteria. Few yeast. Noted epithelial cells. 12. Hypotension - resolved. 13. Hypernatremia- resolved. Past medical hx   14. type 2 DM on lantus , a1c 12.3% in 3/2019 , a1c is 9.6% this admission   15. HTN   16. Hx of NSTEMI on DAPT , stent placed on 10/2020 \"S/p aspiration thrombectomy followed by ptca/stent ( 2 BLANCA stents placed overlapping extending from mid RCA to distal RCA). \"    - Podiatry follows- POD #2. Continue wound vac. NWB left forefoot, keep in surgical shoe. Wound vac in place.   - ID follows- PICC line removed,   - Cardiology follows- will resume asa, plavix,  coreg, aldactone, norvasc, crestor.   - ID follows: continue cefepime (cephalosporin day #3) and eraxis day #2, follow intraop cxs and repeat blood cxs from June 27   - monitor H and H, on iron. - Nephrology is following and work up in process to eval for hyperaldosterone. Holding lasix.  Monitor renal function.   - SSI + lantus 25 units  , DM consulted   - continue Mg supplement BID.   - incentive felipe       Patient will update her fmaily     Full code   dvt ppx-lovenox daily   Gi ppx- pepcid     Additional Notes:      Case discussed with:  [x]Patient  []Family  []Nursing  []Case Management  DVT Prophylaxis:  [x]Lovenox  []Hep SQ  []SCDs  []Coumadin   []On Heparin gtt    Objective:   VS:   Visit Vitals  /83 (BP 1 Location: Left upper arm, BP Patient Position: At rest)   Pulse 99   Temp 98.9 °F (37.2 °C)   Resp 19   Ht 5' 7\" (1.702 m)   Wt 87.5 kg (193 lb)   SpO2 100%   Breastfeeding No   BMI 30.23 kg/m²      Tmax/24hrs: Temp (24hrs), Av.3 °F (36.8 °C), Min:97.2 °F (36.2 °C), Max:98.9 °F (37.2 °C)      Intake/Output Summary (Last 24 hours) at 2021 1300  Last data filed at 2021 1008  Gross per 24 hour   Intake 600 ml   Output --   Net 600 ml       General: young AA woman sitting up in bed,   Alert, NAD  Cardiovascular:  RRR  Pulmonary:  LSC throughout; respiratory effort WNL, room air. GI:  +BS in all four quadrants, soft, non-tender  Extremities:  No edema; 2+ dorsalis pedis pulses bilaterally  Left heel wrapped in dressing clean dry intact  Neuro: awake, alert, ox3, moving arms legs, follows commands.          Labs:    Recent Results (from the past 24 hour(s))   GLUCOSE, POC    Collection Time: 21  3:14 PM   Result Value Ref Range    Glucose (POC) 154 (H) 70 - 110 mg/dL   GLUCOSE, POC    Collection Time: 21  9:39 PM   Result Value Ref Range    Glucose (POC) 142 (H) 70 - 015 mg/dL   METABOLIC PANEL, BASIC    Collection Time: 21  2:22 AM   Result Value Ref Range    Sodium 139 136 - 145 mmol/L    Potassium 3.5 3.5 - 5.5 mmol/L    Chloride 109 100 - 111 mmol/L    CO2 25 21 - 32 mmol/L    Anion gap 5 3.0 - 18 mmol/L    Glucose 272 (H) 74 - 99 mg/dL    BUN 10 7.0 - 18 MG/DL    Creatinine 1.17 0.6 - 1.3 MG/DL    BUN/Creatinine ratio 9 (L) 12 - 20      GFR est AA >60 >60 ml/min/1.73m2    GFR est non-AA 50 (L) >60 ml/min/1.73m2    Calcium 7.8 (L) 8.5 - 10.1 MG/DL   CBC WITH AUTOMATED DIFF    Collection Time: 06/27/21  2:22 AM   Result Value Ref Range    WBC 8.8 4.6 - 13.2 K/uL    RBC 3.32 (L) 4.20 - 5.30 M/uL    HGB 7.3 (L) 12.0 - 16.0 g/dL    HCT 24.5 (L) 35.0 - 45.0 %    MCV 73.8 (L) 74.0 - 97.0 FL    MCH 22.0 (L) 24.0 - 34.0 PG    MCHC 29.8 (L) 31.0 - 37.0 g/dL    RDW 17.9 (H) 11.6 - 14.5 %    PLATELET 508 017 - 418 K/uL    MPV 9.3 9.2 - 11.8 FL    NEUTROPHILS 68 40 - 73 %    LYMPHOCYTES 18 (L) 21 - 52 %    MONOCYTES 11 (H) 3 - 10 %    EOSINOPHILS 1 0 - 5 %    BASOPHILS 1 0 - 2 %    ABS. NEUTROPHILS 6.0 1.8 - 8.0 K/UL    ABS. LYMPHOCYTES 1.6 0.9 - 3.6 K/UL    ABS. MONOCYTES 1.0 0.05 - 1.2 K/UL    ABS. EOSINOPHILS 0.1 0.0 - 0.4 K/UL    ABS.  BASOPHILS 0.0 0.0 - 0.1 K/UL    DF AUTOMATED     MAGNESIUM    Collection Time: 06/27/21  2:22 AM   Result Value Ref Range    Magnesium 2.0 1.6 - 2.6 mg/dL   GLUCOSE, POC    Collection Time: 06/27/21  8:46 AM   Result Value Ref Range    Glucose (POC) 204 (H) 70 - 110 mg/dL   GLUCOSE, POC    Collection Time: 06/27/21 11:01 AM   Result Value Ref Range    Glucose (POC) 156 (H) 70 - 110 mg/dL       Signed By: DEXTER Birch     June 27, 2021

## 2021-06-27 NOTE — PROGRESS NOTES
RENAL DAILY PROGRESS NOTE            43-year-old female with past medical history of diabetes, hypertension, chronic for infection admitted with a left foot diabetic ulcer, sepsis, following for renal failure  Subjective:       Complaint:   Overnight events noted  No accurate urine output documentatin   Afebrile,   Repeat blood culture pending   no nausea, vomiting, chest pain, short of breath, cough, seizure. IMPRESSION:   Acute kidney injury, prerenal FRANCISCA, improving renal function  Hypokalemia, improving  Hypernatremia, improving  Heavy proteinuria, diabetic nephropathy  Uncontrolled diabetes  Sepsis, left foot infection, fungemia,  Anemia  Coronary artery disease status post stent placement  Chronic diastolic heart failure  Secondary hyperparathyroidism of CKD, on calcitriol   PLAN:   Her creatinine is relatively higher today, she is on low dose diureitcs. Monitor for now. abx per ID colleague.            Current Facility-Administered Medications   Medication Dose Route Frequency    clopidogreL (PLAVIX) tablet 75 mg  75 mg Oral DAILY    spironolactone (ALDACTONE) tablet 25 mg  25 mg Oral DAILY    famotidine (PEPCID) tablet 20 mg  20 mg Oral BID    insulin glargine (LANTUS) injection 25 Units  25 Units SubCUTAneous QHS    anidulafungin (ERAXIS) 100 mg in 0.9% sodium chloride 130 mL IVPB  100 mg IntraVENous Q24H    amLODIPine (NORVASC) tablet 5 mg  5 mg Oral DAILY    calcitRIOL (ROCALTROL) capsule 0.25 mcg  0.25 mcg Oral DAILY    0.9% sodium chloride infusion 250 mL  250 mL IntraVENous PRN    carvediloL (COREG) tablet 12.5 mg  12.5 mg Oral Q12H    insulin lispro (HUMALOG) injection   SubCUTAneous AC&HS    diphenhydrAMINE (BENADRYL) injection 25 mg  25 mg IntraVENous Q6H PRN    cefepime (MAXIPIME) 1 g in sterile water (preservative free) 10 mL IV syringe  1 g IntraVENous Q8H    aspirin chewable tablet 81 mg  81 mg Oral DAILY    ferrous sulfate tablet 325 mg  325 mg Oral DAILY WITH BREAKFAST    rosuvastatin (CRESTOR) tablet 10 mg  10 mg Oral QHS    sodium chloride (NS) flush 5-40 mL  5-40 mL IntraVENous Q8H    sodium chloride (NS) flush 5-40 mL  5-40 mL IntraVENous PRN    acetaminophen (TYLENOL) tablet 650 mg  650 mg Oral Q6H PRN    Or    acetaminophen (TYLENOL) suppository 650 mg  650 mg Rectal Q6H PRN    polyethylene glycol (MIRALAX) packet 17 g  17 g Oral DAILY PRN    ondansetron (ZOFRAN ODT) tablet 4 mg  4 mg Oral Q8H PRN    Or    ondansetron (ZOFRAN) injection 4 mg  4 mg IntraVENous Q6H PRN    enoxaparin (LOVENOX) injection 40 mg  40 mg SubCUTAneous DAILY    magnesium oxide (MAG-OX) tablet 400 mg  400 mg Oral BID       Review of Symptoms: comprehensive ROS negative except above.    Objective:     Patient Vitals for the past 24 hrs:   Temp Pulse Resp BP SpO2   06/27/21 1105 98.9 °F (37.2 °C) 99 19 133/83 100 %   06/27/21 0848 98.3 °F (36.8 °C) 99 19 104/86 99 %   06/27/21 0451 98.3 °F (36.8 °C) (!) 101 20 133/83 99 %   06/27/21 0139 98.5 °F (36.9 °C) 99 20 135/74 100 %   06/26/21 2035 97.2 °F (36.2 °C) 93 20 (!) 169/100 100 %   06/26/21 1517 98.4 °F (36.9 °C) 71 20 (!) 157/94 99 %        Weight change:      06/25 1901 - 06/27 0700  In: 1360 [P.O.:960; I.V.:400]  Out: 477     Intake/Output Summary (Last 24 hours) at 6/27/2021 1216  Last data filed at 6/27/2021 1008  Gross per 24 hour   Intake 600 ml   Output --   Net 600 ml     Physical Exam:   General: comfortable, no acute distress   HEENT sclera anicteric, supple neck, no thyromegaly  CVS: S1S2 heard,  no rub  RS: + air entry b/l,   Abd: Soft, Non tender, Not distended, Positive bowel sounds, no organomegaly, no CVA / supra pubic tenderness  Neuro: non focal, awake, alert , CN II-XII are grossly intact  Extrm: edema, no cyanosis, clubbing   Skin: no visible  Rash  Musculoskeletal: No gross joints or bone deformities         Data Review:     LABS:   Hematology:   Recent Labs     06/27/21  0222 06/26/21  1256 06/26/21  0430 06/26/21  0005 06/25/21  0645   WBC 8.8  --  6.4  --  5.4   HGB 7.3* 7.8* 7.8* 8.0* 6.8*   HCT 24.5* 24.9* 26.1* 26.4* 22.9*     Chemistry:   Recent Labs     06/27/21  0222 06/25/21  0645 06/24/21  1655   BUN 10 8 8   CREA 1.17 0.99 0.87   CA 7.8* 7.4* 5.9*   K 3.5 3.6 2.6*    143 146*    112* 117*   CO2 25 25 23   * 176* 178*            Procedures/imaging: see electronic medical records for all procedures, Xrays and details which were not copied into this note but were reviewed prior to creation of Plan          Assessment & Plan:             Nicho Morales MD  6/27/2021  1:54 PM

## 2021-06-28 LAB
ANION GAP SERPL CALC-SCNC: 4 MMOL/L (ref 3–18)
BACTERIA SPEC CULT: ABNORMAL
BASOPHILS # BLD: 0.1 K/UL (ref 0–0.1)
BASOPHILS NFR BLD: 1 % (ref 0–2)
BUN SERPL-MCNC: 11 MG/DL (ref 7–18)
BUN/CREAT SERPL: 10 (ref 12–20)
CALCIUM SERPL-MCNC: 7.9 MG/DL (ref 8.5–10.1)
CHLORIDE SERPL-SCNC: 110 MMOL/L (ref 100–111)
CO2 SERPL-SCNC: 26 MMOL/L (ref 21–32)
CREAT SERPL-MCNC: 1.14 MG/DL (ref 0.6–1.3)
DIFFERENTIAL METHOD BLD: ABNORMAL
EOSINOPHIL # BLD: 0.2 K/UL (ref 0–0.4)
EOSINOPHIL NFR BLD: 2 % (ref 0–5)
ERYTHROCYTE [DISTWIDTH] IN BLOOD BY AUTOMATED COUNT: 18.3 % (ref 11.6–14.5)
GLUCOSE BLD STRIP.AUTO-MCNC: 127 MG/DL (ref 70–110)
GLUCOSE BLD STRIP.AUTO-MCNC: 163 MG/DL (ref 70–110)
GLUCOSE BLD STRIP.AUTO-MCNC: 200 MG/DL (ref 70–110)
GLUCOSE BLD STRIP.AUTO-MCNC: 93 MG/DL (ref 70–110)
GLUCOSE SERPL-MCNC: 133 MG/DL (ref 74–99)
GRAM STN SPEC: ABNORMAL
GRAM STN SPEC: ABNORMAL
HCT VFR BLD AUTO: 23.3 % (ref 35–45)
HCT VFR BLD AUTO: 24.5 % (ref 35–45)
HGB BLD-MCNC: 6.9 G/DL (ref 12–16)
HGB BLD-MCNC: 7.3 G/DL (ref 12–16)
LYMPHOCYTES # BLD: 2.2 K/UL (ref 0.9–3.6)
LYMPHOCYTES NFR BLD: 25 % (ref 21–52)
MAGNESIUM SERPL-MCNC: 1.8 MG/DL (ref 1.6–2.6)
MCH RBC QN AUTO: 22.1 PG (ref 24–34)
MCHC RBC AUTO-ENTMCNC: 29.6 G/DL (ref 31–37)
MCV RBC AUTO: 74.7 FL (ref 74–97)
MONOCYTES # BLD: 0.9 K/UL (ref 0.05–1.2)
MONOCYTES NFR BLD: 11 % (ref 3–10)
NEUTS SEG # BLD: 5.1 K/UL (ref 1.8–8)
NEUTS SEG NFR BLD: 60 % (ref 40–73)
PLATELET # BLD AUTO: 210 K/UL (ref 135–420)
PMV BLD AUTO: 9.4 FL (ref 9.2–11.8)
POTASSIUM SERPL-SCNC: 3.4 MMOL/L (ref 3.5–5.5)
RBC # BLD AUTO: 3.12 M/UL (ref 4.2–5.3)
SERVICE CMNT-IMP: ABNORMAL
SODIUM SERPL-SCNC: 140 MMOL/L (ref 136–145)
WBC # BLD AUTO: 8.6 K/UL (ref 4.6–13.2)

## 2021-06-28 PROCEDURE — 65270000029 HC RM PRIVATE

## 2021-06-28 PROCEDURE — 97605 NEG PRS WND THER DME<=50SQCM: CPT

## 2021-06-28 PROCEDURE — 85025 COMPLETE CBC W/AUTO DIFF WBC: CPT

## 2021-06-28 PROCEDURE — 99232 SBSQ HOSP IP/OBS MODERATE 35: CPT | Performed by: FAMILY MEDICINE

## 2021-06-28 PROCEDURE — 74011250637 HC RX REV CODE- 250/637: Performed by: INTERNAL MEDICINE

## 2021-06-28 PROCEDURE — 74011000250 HC RX REV CODE- 250: Performed by: INTERNAL MEDICINE

## 2021-06-28 PROCEDURE — 74011000250 HC RX REV CODE- 250: Performed by: HOSPITALIST

## 2021-06-28 PROCEDURE — 99232 SBSQ HOSP IP/OBS MODERATE 35: CPT | Performed by: INTERNAL MEDICINE

## 2021-06-28 PROCEDURE — 77030040162

## 2021-06-28 PROCEDURE — 74011250636 HC RX REV CODE- 250/636: Performed by: INTERNAL MEDICINE

## 2021-06-28 PROCEDURE — 74011636637 HC RX REV CODE- 636/637: Performed by: HOSPITALIST

## 2021-06-28 PROCEDURE — 83735 ASSAY OF MAGNESIUM: CPT

## 2021-06-28 PROCEDURE — 2709999900 HC NON-CHARGEABLE SUPPLY

## 2021-06-28 PROCEDURE — 80048 BASIC METABOLIC PNL TOTAL CA: CPT

## 2021-06-28 PROCEDURE — 85018 HEMOGLOBIN: CPT

## 2021-06-28 PROCEDURE — 77030025414 HC DRSG VAC ASST SMPLC KCON -B

## 2021-06-28 PROCEDURE — 74011250637 HC RX REV CODE- 250/637: Performed by: PHYSICIAN ASSISTANT

## 2021-06-28 PROCEDURE — 74011250636 HC RX REV CODE- 250/636: Performed by: HOSPITALIST

## 2021-06-28 PROCEDURE — 74011250637 HC RX REV CODE- 250/637: Performed by: HOSPITALIST

## 2021-06-28 PROCEDURE — 82962 GLUCOSE BLOOD TEST: CPT

## 2021-06-28 PROCEDURE — 36415 COLL VENOUS BLD VENIPUNCTURE: CPT

## 2021-06-28 RX ORDER — FLUCONAZOLE 2 MG/ML
800 INJECTION, SOLUTION INTRAVENOUS ONCE
Status: COMPLETED | OUTPATIENT
Start: 2021-06-28 | End: 2021-06-29

## 2021-06-28 RX ORDER — FLUCONAZOLE 2 MG/ML
400 INJECTION, SOLUTION INTRAVENOUS EVERY 24 HOURS
Status: DISCONTINUED | OUTPATIENT
Start: 2021-06-29 | End: 2021-06-30 | Stop reason: HOSPADM

## 2021-06-28 RX ORDER — POTASSIUM CHLORIDE 20 MEQ/1
40 TABLET, EXTENDED RELEASE ORAL
Status: COMPLETED | OUTPATIENT
Start: 2021-06-28 | End: 2021-06-28

## 2021-06-28 RX ORDER — VANCOMYCIN/0.9 % SOD CHLORIDE 1.5G/250ML
1500 PLASTIC BAG, INJECTION (ML) INTRAVENOUS
Status: DISCONTINUED | OUTPATIENT
Start: 2021-06-29 | End: 2021-06-29

## 2021-06-28 RX ORDER — VANCOMYCIN 1.75 GRAM/500 ML IN 0.9 % SODIUM CHLORIDE INTRAVENOUS
1750 ONCE
Status: COMPLETED | OUTPATIENT
Start: 2021-06-28 | End: 2021-06-29

## 2021-06-28 RX ORDER — DIPHENHYDRAMINE HYDROCHLORIDE 50 MG/ML
25 INJECTION, SOLUTION INTRAMUSCULAR; INTRAVENOUS
Status: COMPLETED | OUTPATIENT
Start: 2021-06-28 | End: 2021-06-28

## 2021-06-28 RX ADMIN — FERROUS SULFATE TAB 325 MG (65 MG ELEMENTAL FE) 325 MG: 325 (65 FE) TAB at 10:26

## 2021-06-28 RX ADMIN — CEFTRIAXONE SODIUM 2 G: 2 INJECTION, POWDER, FOR SOLUTION INTRAMUSCULAR; INTRAVENOUS at 17:58

## 2021-06-28 RX ADMIN — FAMOTIDINE 20 MG: 20 TABLET, FILM COATED ORAL at 10:27

## 2021-06-28 RX ADMIN — AMLODIPINE BESYLATE 5 MG: 5 TABLET ORAL at 10:27

## 2021-06-28 RX ADMIN — ROSUVASTATIN CALCIUM 10 MG: 10 TABLET, COATED ORAL at 21:31

## 2021-06-28 RX ADMIN — MAGNESIUM OXIDE TAB 400 MG (241.3 MG ELEMENTAL MG) 400 MG: 400 (241.3 MG) TAB at 10:26

## 2021-06-28 RX ADMIN — Medication 10 ML: at 15:38

## 2021-06-28 RX ADMIN — VANCOMYCIN HYDROCHLORIDE 1750 MG: 10 INJECTION, POWDER, LYOPHILIZED, FOR SOLUTION INTRAVENOUS at 15:38

## 2021-06-28 RX ADMIN — DIPHENHYDRAMINE HYDROCHLORIDE 25 MG: 50 INJECTION INTRAMUSCULAR; INTRAVENOUS at 18:24

## 2021-06-28 RX ADMIN — CARVEDILOL 12.5 MG: 12.5 TABLET, FILM COATED ORAL at 21:31

## 2021-06-28 RX ADMIN — MAGNESIUM OXIDE TAB 400 MG (241.3 MG ELEMENTAL MG) 400 MG: 400 (241.3 MG) TAB at 17:59

## 2021-06-28 RX ADMIN — CLOPIDOGREL BISULFATE 75 MG: 75 TABLET ORAL at 10:26

## 2021-06-28 RX ADMIN — CEFEPIME HYDROCHLORIDE 1 G: 1 INJECTION, POWDER, FOR SOLUTION INTRAMUSCULAR; INTRAVENOUS at 02:27

## 2021-06-28 RX ADMIN — INSULIN LISPRO 6 UNITS: 100 INJECTION, SOLUTION INTRAVENOUS; SUBCUTANEOUS at 12:43

## 2021-06-28 RX ADMIN — SPIRONOLACTONE 25 MG: 25 TABLET, FILM COATED ORAL at 10:26

## 2021-06-28 RX ADMIN — CARVEDILOL 12.5 MG: 12.5 TABLET, FILM COATED ORAL at 10:26

## 2021-06-28 RX ADMIN — ENOXAPARIN SODIUM 40 MG: 40 INJECTION SUBCUTANEOUS at 10:27

## 2021-06-28 RX ADMIN — FAMOTIDINE 20 MG: 20 TABLET, FILM COATED ORAL at 17:59

## 2021-06-28 RX ADMIN — CALCITRIOL CAPSULES 0.25 MCG 0.25 MCG: 0.25 CAPSULE ORAL at 10:26

## 2021-06-28 RX ADMIN — Medication 10 ML: at 21:49

## 2021-06-28 RX ADMIN — ASPIRIN 81 MG: 81 TABLET, CHEWABLE ORAL at 10:27

## 2021-06-28 RX ADMIN — FLUCONAZOLE 800 MG: 400 INJECTION, SOLUTION INTRAVENOUS at 15:36

## 2021-06-28 RX ADMIN — POTASSIUM CHLORIDE 40 MEQ: 1500 TABLET, EXTENDED RELEASE ORAL at 13:00

## 2021-06-28 RX ADMIN — INSULIN LISPRO 3 UNITS: 100 INJECTION, SOLUTION INTRAVENOUS; SUBCUTANEOUS at 21:48

## 2021-06-28 RX ADMIN — CEFEPIME HYDROCHLORIDE 1 G: 1 INJECTION, POWDER, FOR SOLUTION INTRAMUSCULAR; INTRAVENOUS at 10:26

## 2021-06-28 RX ADMIN — ACETAMINOPHEN 650 MG: 325 TABLET ORAL at 21:31

## 2021-06-28 RX ADMIN — DIPHENHYDRAMINE HYDROCHLORIDE 25 MG: 50 INJECTION INTRAMUSCULAR; INTRAVENOUS at 21:31

## 2021-06-28 NOTE — WOUND CARE
Physical Exam  Musculoskeletal:        Feet:      Room 462: Focused wound assess  Discussed care with primary nurse Wolfgang Cooley RN. Left medial foot, hallux, diabetic ulcer, 2x2x0.2cm, 99% red granular base, 1% white tissue, no odor, scant serosanguinous drainage, POA. Topical treatment orders per nursing unit skin care protocol. Wound Care Orders for Hospital left medial foot: Wound vac dressing first application & dressing changes by unit staff nurses every Monday, Wednesday, & Friday on day shift. Bridge to top of foot. Measure wound size & document with each dressing change. Settings: 125mmHG low continuous suction. Upon discharge from [facility], place hospital machine in dirty utility room under white laminated sign. May apply daily saline dressing if home wound vac unavailable. Wound Care Orders for Home Health Care: Wound vac dressing changes three times per week, settings 125mmHG continuous suction. Pt on [place] bed. Will turn over care to nursing staff at this time.   Adilson ESTEVEZN, RN, [facility], [address]

## 2021-06-28 NOTE — PROGRESS NOTES
RENAL DAILY PROGRESS NOTE            78-year-old female with past medical history of diabetes, hypertension, chronic for infection admitted with a left foot diabetic ulcer, sepsis, following for renal failure  Subjective:       Complaint:   Overnight events noted  Offer no complain      IMPRESSION:   Acute kidney injury, prerenal FRANCISCA, improving renal function  Hypokalemia, improving  Hypernatremia, improving  Heavy proteinuria, diabetic nephropathy  Uncontrolled diabetes  Sepsis, left foot infection, fungemia,  Anemia  Coronary artery disease status post stent placement  Chronic diastolic heart failure  Secondary hyperparathyroidism of CKD, on calcitriol   PLAN:   Her renal function remain stable, ordered K supplement for today , will sign off, available if any question or concern. abx per ID colleague. Discussed with Dr. Julee Rizzo.          Current Facility-Administered Medications   Medication Dose Route Frequency    fluconazole (DIFLUCAN) 400mg/200 mL IVPB (premix)  800 mg IntraVENous ONCE    [START ON 6/29/2021] fluconazole (DIFLUCAN) 400mg/200 mL IVPB (premix)  400 mg IntraVENous Q24H    [START ON 6/29/2021] vancomycin (VANCOCIN) 1500 mg in  ml infusion  1,500 mg IntraVENous Q16H    vancomycin (VANCOCIN) 1750 mg in  ml infusion  1,750 mg IntraVENous ONCE    cefTRIAXone (ROCEPHIN) 2 g in sterile water (preservative free) 20 mL IV syringe  2 g IntraVENous Q24H    clopidogreL (PLAVIX) tablet 75 mg  75 mg Oral DAILY    spironolactone (ALDACTONE) tablet 25 mg  25 mg Oral DAILY    famotidine (PEPCID) tablet 20 mg  20 mg Oral BID    insulin glargine (LANTUS) injection 25 Units  25 Units SubCUTAneous QHS    amLODIPine (NORVASC) tablet 5 mg  5 mg Oral DAILY    calcitRIOL (ROCALTROL) capsule 0.25 mcg  0.25 mcg Oral DAILY    0.9% sodium chloride infusion 250 mL  250 mL IntraVENous PRN    carvediloL (COREG) tablet 12.5 mg  12.5 mg Oral Q12H    insulin lispro (HUMALOG) injection SubCUTAneous AC&HS    diphenhydrAMINE (BENADRYL) injection 25 mg  25 mg IntraVENous Q6H PRN    aspirin chewable tablet 81 mg  81 mg Oral DAILY    ferrous sulfate tablet 325 mg  325 mg Oral DAILY WITH BREAKFAST    rosuvastatin (CRESTOR) tablet 10 mg  10 mg Oral QHS    sodium chloride (NS) flush 5-40 mL  5-40 mL IntraVENous Q8H    sodium chloride (NS) flush 5-40 mL  5-40 mL IntraVENous PRN    acetaminophen (TYLENOL) tablet 650 mg  650 mg Oral Q6H PRN    Or    acetaminophen (TYLENOL) suppository 650 mg  650 mg Rectal Q6H PRN    polyethylene glycol (MIRALAX) packet 17 g  17 g Oral DAILY PRN    ondansetron (ZOFRAN ODT) tablet 4 mg  4 mg Oral Q8H PRN    Or    ondansetron (ZOFRAN) injection 4 mg  4 mg IntraVENous Q6H PRN    enoxaparin (LOVENOX) injection 40 mg  40 mg SubCUTAneous DAILY    magnesium oxide (MAG-OX) tablet 400 mg  400 mg Oral BID       Review of Symptoms: comprehensive ROS negative except above.    Objective:     Patient Vitals for the past 24 hrs:   Temp Pulse Resp BP SpO2   06/28/21 1117 97.5 °F (36.4 °C) 100 15 (!) 154/99 99 %   06/28/21 0806 98.5 °F (36.9 °C) 96 19 (!) 167/98 99 %   06/28/21 0435 98.1 °F (36.7 °C) 95 19 (!) 165/80 99 %   06/28/21 0022 99.1 °F (37.3 °C) 99 19 (!) 159/90 99 %   06/27/21 2235 98 °F (36.7 °C) (!) 104 19 (!) 175/90 100 %   06/27/21 1614 98.4 °F (36.9 °C) (!) 104 19 (!) 135/96 100 %        Weight change:      06/26 1901 - 06/28 0700  In: 1200 [P.O.:1200]  Out: -     Intake/Output Summary (Last 24 hours) at 6/28/2021 1219  Last data filed at 6/27/2021 1728  Gross per 24 hour   Intake 960 ml   Output --   Net 960 ml     Physical Exam:   General: comfortable, no acute distress   HEENT sclera anicteric, supple neck, no thyromegaly  CVS: S1S2 heard,  no rub  RS: + air entry b/l,   Abd: Soft, Non tender, Not distended, Positive bowel sounds, no organomegaly, no CVA / supra pubic tenderness  Neuro: non focal, awake, alert , CN II-XII are grossly intact  Extrm: edema, no cyanosis, clubbing   Skin: no visible  Rash  Musculoskeletal: No gross joints or bone deformities         Data Review:     LABS:   Hematology:   Recent Labs     06/28/21 0219 06/27/21  0222 06/26/21  1256 06/26/21  0430 06/26/21  0005   WBC 8.6 8.8  --  6.4  --    HGB 6.9* 7.3* 7.8* 7.8* 8.0*   HCT 23.3* 24.5* 24.9* 26.1* 26.4*     Chemistry:   Recent Labs     06/28/21 0219 06/27/21  0222   BUN 11 10   CREA 1.14 1.17   CA 7.9* 7.8*   K 3.4* 3.5    139    109   CO2 26 25   * 272*            Procedures/imaging: see electronic medical records for all procedures, Xrays and details which were not copied into this note but were reviewed prior to creation of Plan          Assessment & Plan:     As above         Nicho Morales MD  6/28/2021  1:54 PM

## 2021-06-28 NOTE — DIABETES MGMT
Diabetes Plan of Care    Assessment: follow up - sitting on side of bed eating lunch   Concerned about her  after breakfast - states she consumed 3 carb servings. Reviewed BG targets and monitoring post-prandial to assist with meal planning. She has printed materials for portions and plate method  States she hopes to d/c home tomorrow  Will follow     Most recent blood glucose values: Within target range     No    Current A1c  Lab Results   Component Value Date/Time    Hemoglobin A1c 9.6 (H) 06/25/2021 06:45 AM    Hemoglobin A1c (POC) 12.3 03/28/2019 09:49 AM     Adequate glycemic control PTA  -  No     Current hospital diabetes medications:  Lantus 25 units daily  Corrective lispro, very insulin resistant, 4 times daily    Diet -       TDD previous day = 37 units   25 units lantus  12 units lispro     Home diabetes medications;   Lantus 50 units daily - takes at night  Humalog 10 units with meals  Patient reports she is no longer taking metformin     Henrry Murillo MPH RN [facility] Good  Pager [phone]  Office [phone]

## 2021-06-28 NOTE — PROGRESS NOTES
Infectious Disease progress Note        Reason: left foot infection    Current abx Prior abx   Cefepime, vancomycin, levofloxacin since 6/24/2021  ceftriaxone 5/17-6/23/2021     Lines:       Assessment :    40 y.o.  female  With PMH of HTN, uncontrolled type 2 DM-last hemoglobin A1c 9 .6 on 6/25/2021, peripheral Neuropathy came tot he ER on 6/24/2021 with recurrent dizziness     Hospitalization January 2019 for left second toe, left foot cellulitis     Hospitalization April 2021 for left foot infection, bone biopsy negative for osteomyelitis  Wound cultures group B streptococcus     Hospitalization 5/2021 for sepsis -present on admission due to group B bloodstream infection  (positive blood culture 5/17, negative blood culture 5/19/21 ), left foot diabetic ulcer with cellulitis/abscess, possible osteomyelitis of first metatarsal head with septic 1st MTPJ.    Intra-Op cultures 5/18-group B streptococcus     Now with recurrent fainting spells, evidence of osteomyelitis of left fourth/fifth toe on MRI 6/24/2021    Clinical presentation consistent with osteomyelitis left fourth/fifth toe, Candida albicans fungemia (positive blood culture 6/24, negative blood culture 6/27 ) in a patient with uncontrolled type 2 Dm    Status post left fourth/fifth partial toe amputation, bone biopsy on 6/25/2021      Fungemia likely secondary to picc line infection/colonization   Chills 6/25  could be due to colonized picc rather than allergic reaction to ceftriaxone. S/p picc removal 6/26/21    Recommendations   - d/c cefepime. Start ceftriaxone, vancomycin  -d/c eraxis. Start fluconazole  -f/u bone biopsy results  -f/u repeat blood  culture 6/27  -f/u podiatry recommendations   -will switch to po abx if bone biopsy negative for osteomyelitis. D/w podiatrist.     Above plan was discussed in details with patient,RN. Please call me if any further questions or concerns.  Will continue to participate in the care of this patient. HPI:    Feels better. Denies any further chills. Has some itching when she is due ceftriaxone on Friday      Current Discharge Medication List      CONTINUE these medications which have NOT CHANGED    Details   ticagrelor (BRILINTA) 90 mg tablet Take 1 Tablet by mouth every twelve (12) hours every twelve (12) hours. Qty: 180 Tablet, Refills: 1      insulin lispro (HUMALOG) 100 unit/mL kwikpen 10 units before meals  Indications: type 2 diabetes mellitus  Qty: 5 Pen, Refills: 11    Associated Diagnoses: Type 2 diabetes mellitus with complication (HCC)      metFORMIN ER (GLUCOPHAGE XR) 500 mg tablet Take 1 Tab by mouth daily (with dinner). Qty: 90 Tab, Refills: 3    Associated Diagnoses: Type 2 diabetes mellitus with complication ([facility])      rosuvastatin (CRESTOR) 10 mg tablet Take 1 Tab by mouth nightly. Qty: 90 Tab, Refills: 3    Associated Diagnoses: NSTEMI (non-ST elevated myocardial infarction) ([facility])      carvediloL (Coreg) 12.5 mg tablet Take 1 Tab by mouth two (2) times daily (with meals). Indications: myocardial reinfarction prevention  Qty: 60 Tab, Refills: 3    Associated Diagnoses: NSTEMI (non-ST elevated myocardial infarction) ([facility] Essential hypertension      spironolactone (ALDACTONE) 25 mg tablet Take 25 mg by mouth daily. ferrous sulfate 325 mg (65 mg iron) tablet Take 1 Tab by mouth daily (with breakfast). Qty: 30 Tab, Refills: 0      furosemide (LASIX) 20 mg tablet Take 1 Tab by mouth daily as needed (Edema). Qty: 30 Tab, Refills: 1    Associated Diagnoses: Acute on chronic diastolic congestive heart failure (HCC)      insulin glargine (LANTUS,BASAGLAR) 100 unit/mL (3 mL) inpn Take 45 units daily  Indications: type 2 diabetes mellitus  Qty: 4 Pen, Refills: 5    Associated Diagnoses: Type 2 diabetes mellitus with complication ([facility])      aspirin 81 mg chewable tablet Take 1 Tab by mouth daily.   Qty: 30 Tab, Refills: 0      melatonin 5 mg cap capsule Take 1 Cap by mouth nightly.   Qty: 30 Cap, Refills: 1    Associated Diagnoses: Difficulty sleeping      Insulin Needles, Disposable, 31 gauge x 5/16\" ndle Use to check blood glucose BID  Qty: 100 Pen Needle, Refills: 11    Associated Diagnoses: Type 2 diabetes mellitus with complication (HCC)      Insulin Syringe-Needle U-100 (INSULIN SYRINGE) 1 mL 30 gauge x 5/16 syrg As directed for lantus insulin  Qty: 50 Syringe, Refills: 0             Current Facility-Administered Medications   Medication Dose Route Frequency    clopidogreL (PLAVIX) tablet 75 mg  75 mg Oral DAILY    spironolactone (ALDACTONE) tablet 25 mg  25 mg Oral DAILY    famotidine (PEPCID) tablet 20 mg  20 mg Oral BID    insulin glargine (LANTUS) injection 25 Units  25 Units SubCUTAneous QHS    anidulafungin (ERAXIS) 100 mg in 0.9% sodium chloride 130 mL IVPB  100 mg IntraVENous Q24H    amLODIPine (NORVASC) tablet 5 mg  5 mg Oral DAILY    calcitRIOL (ROCALTROL) capsule 0.25 mcg  0.25 mcg Oral DAILY    0.9% sodium chloride infusion 250 mL  250 mL IntraVENous PRN    carvediloL (COREG) tablet 12.5 mg  12.5 mg Oral Q12H    insulin lispro (HUMALOG) injection   SubCUTAneous AC&HS    diphenhydrAMINE (BENADRYL) injection 25 mg  25 mg IntraVENous Q6H PRN    cefepime (MAXIPIME) 1 g in sterile water (preservative free) 10 mL IV syringe  1 g IntraVENous Q8H    aspirin chewable tablet 81 mg  81 mg Oral DAILY    ferrous sulfate tablet 325 mg  325 mg Oral DAILY WITH BREAKFAST    rosuvastatin (CRESTOR) tablet 10 mg  10 mg Oral QHS    sodium chloride (NS) flush 5-40 mL  5-40 mL IntraVENous Q8H    sodium chloride (NS) flush 5-40 mL  5-40 mL IntraVENous PRN    acetaminophen (TYLENOL) tablet 650 mg  650 mg Oral Q6H PRN    Or    acetaminophen (TYLENOL) suppository 650 mg  650 mg Rectal Q6H PRN    polyethylene glycol (MIRALAX) packet 17 g  17 g Oral DAILY PRN    ondansetron (ZOFRAN ODT) tablet 4 mg  4 mg Oral Q8H PRN    Or    ondansetron (ZOFRAN) injection 4 mg  4 mg IntraVENous Q6H PRN    enoxaparin (LOVENOX) injection 40 mg  40 mg SubCUTAneous DAILY    magnesium oxide (MAG-OX) tablet 400 mg  400 mg Oral BID       Allergies: Azithromycin and Pcn [penicillins]    Temp (24hrs), Av.5 °F (36.9 °C), Min:98 °F (36.7 °C), Max:99.1 °F (37.3 °C)    Visit Vitals  BP (!) 167/98 (BP 1 Location: Left upper arm, BP Patient Position: At rest)   Pulse 96   Temp 98.5 °F (36.9 °C)   Resp 19   Ht 5' 7\" (1.702 m)   Wt 87.5 kg (193 lb)   SpO2 99%   Breastfeeding No   BMI 30.23 kg/m²       ROS: 12 point ROS obtained in details. Pertinent positives as mentioned in HPI,   otherwise negative    Physical Exam:    Constitutional: She is oriented to person, place, and time. She appears well-developed and well-nourished. No distress. HENT:   Head: Normocephalic and atraumatic. Mouth/Throat: Oropharynx is clear and moist.   Eyes: Conjunctivae are normal. Pupils are equal, round, and reactive to light. No scleral icterus. Neck: Normal range of motion. Neck supple. Cardiovascular: Normal rate and intact distal pulses. Pulmonary/Chest: Effort normal and breath sounds normal. No respiratory distress. She has no wheezes. Abdominal: Soft. Bowel sounds are normal. She exhibits no distension. There is no tenderness. Musculoskeletal: Normal range of motion. Lymphadenopathy:     She has no cervical adenopathy. Neurological: She is alert and oriented to person, place, and time. No cranial nerve deficit.  Decreased sensation to light touch plantar aspect of both feet. Darkish discoloration of left fourth and fifth toe. Ulceration lateral aspect of left fourth toe without significant drainage  Skin: Skin is warm.   Left foot surgical changes as mentioned above    Labs: Results:   Chemistry Recent Labs     21  0219 21  0222   * 272*    139   K 3.4* 3.5    109   CO2 26 25   BUN 11 10   CREA 1.14 1.17   CA 7.9* 7.8*   AGAP 4 5   BUCR 10* 9*      CBC w/Diff Recent Labs 06/28/21  0219 06/27/21  0222 06/26/21  1256 06/26/21  0430 06/26/21  0430   WBC 8.6 8.8  --   --  6.4   RBC 3.12* 3.32*  --   --  3.49*   HGB 6.9* 7.3* 7.8*   < > 7.8*   HCT 23.3* 24.5* 24.9*   < > 26.1*    202  --   --  211   GRANS 60 68  --   --  79*   LYMPH 25 18*  --   --  14*   EOS 2 1  --   --  0    < > = values in this interval not displayed. Microbiology Recent Labs     06/27/21  0630 06/27/21  0615 06/25/21  1907 06/25/21  1901 06/25/21  1853   CULT NO GROWTH AFTER 23 HOURS NO GROWTH AFTER 23 HOURS NO GROWTH THUS FAR  NO GROWTH THUS FAR NO GROWTH THUS FAR  NO GROWTH THUS FAR NO GROWTH THUS FAR  NO GROWTH THUS FAR          RADIOLOGY:    All available imaging studies/reports in connect care for this admission were reviewed  High complexity decision making was performed during the evaluation of this patient at high risk for decompensation with multiple organ involvement         Disclaimer: Sections of this note are dictated utilizing voice recognition software, which may have resulted in some phonetic based errors in grammar and contents. Even though attempts were made to correct all the mistakes, some may have been missed, and remained in the body of the document. If questions arise, please contact our department.     Dr. Brie Brice, Infectious Disease Specialist  [phone]  June 28, 2021  9:31 AM

## 2021-06-28 NOTE — PROGRESS NOTES
Cardiology progress note. 1. Recurrent syncope- in the setting severe anemia  2. Severe anemia- s/p 1 unit PRBC's on 6/25/29. Today Hgb 6.9   3. CAD- s/p Cardiac cath 10/22/20 100% mid RCA occlusion with thrombus. S/p aspiration thrombectomy followed by ptca/stent ( 2 BLANCA stents placed overlapping extending from mid RCA to distal RCA). Mild mid LAD stenosis. 4. Chronic diastolic heart failure- compensated   5. Hx of high degree AV block - 10/20  s/p TPM due to complete heart block during PCI. Post PCI developed juctional tachycardia. TPM was removed.  ekg 4/5/21 sinus tachycardia with nonspecific T wave abnormality   6. Hypertension-elevated now. She was hypotensive on admission   7. Hyperlipidemia-continue statin   8. Diabetes-poorly controlled with A1c 8.5  9. Hypokalemia- on admission with K+ 2.6 improved   10. Hypomagnesemia- with Mag+ 1.4 on admission- resolved   11. Hypocalcemia  12. Left foot infection- extensive osteomyelitis showed on recent foot MRI - s/p Foot bone biopsy and amputation 4th and 5th toe         6/25/21 Echo   · LV: Estimated LVEF is 55 - 60%. Visually measured ejection fraction. · Normal cavity size and systolic function (ejection fraction normal). Mild concentric hypertrophy. Wall motion: normal.  · Pericardium: Trivial pericardial effusion measuring 7 mm.       Plan:     Cardiac status appears stable. Recent echo shows preserved EF%   Hgb 6.9 today. Transfusion orders  per primary team recommend to keep Hgb >8.0   Continue to replace electrolytes as needed   Continue HTN medications. Continue aldactone     Currently on dual antiplatelets. If needed okay to stop aspirin and continue Plavix.   Transfuse to keep hemoglobin more than 8.0.      ASSESSMENT:  Hospital Problems  Date Reviewed: 5/26/2021        Codes Class Noted POA    Foot ulcer, left ([facility] [address] ICD-10-CM: V55.125  ICD-9-CM: 707.15  6/24/2021 Unknown        Sepsis ([facility] [address] ICD-10-CM: A41.9  ICD-9-CM: 038.9, 995.91 5/17/2021 Unknown                SUBJECTIVE:  No CP  No SOB    OBJECTIVE:    VS:   Visit Vitals  BP (!) 154/99 (BP 1 Location: Left upper arm, BP Patient Position: Sitting)   Pulse 100   Temp 97.5 °F (36.4 °C)   Resp 15   Ht 5' 7\" (1.702 m)   Wt 87.5 kg (193 lb)   SpO2 99%   Breastfeeding No   BMI 30.23 kg/m²         Intake/Output Summary (Last 24 hours) at 6/28/2021 1248  Last data filed at 6/27/2021 1728  Gross per 24 hour   Intake 960 ml   Output --   Net 960 ml     TELE: normal sinus rhythm/ sinus tachycardia     General: alert, well developed, pleasant and in no apparent distress  HENT: Normocephalic, atraumatic. Normal external eye. Neck :  no bruit, no JVD  Cardiac:  regular rate and rhythm, S1, S2 normal, no murmur, click, rub or gallop  Lungs: clear to auscultation bilaterally  Abdomen: Soft, nontender, no masses  Extremities:  No c/c/e, peripheral pulses present      Labs: Results:       Chemistry Recent Labs     06/28/21 0219 06/27/21 0222   * 272*    139   K 3.4* 3.5    109   CO2 26 25   BUN 11 10   CREA 1.14 1.17   CA 7.9* 7.8*   AGAP 4 5   BUCR 10* 9*      CBC w/Diff Recent Labs     06/28/21  0219 06/27/21  0222 06/26/21  1256 06/26/21  0430 06/26/21  0430   WBC 8.6 8.8  --   --  6.4   RBC 3.12* 3.32*  --   --  3.49*   HGB 6.9* 7.3* 7.8*   < > 7.8*   HCT 23.3* 24.5* 24.9*   < > 26.1*    202  --   --  211   GRANS 60 68  --   --  79*   LYMPH 25 18*  --   --  14*   EOS 2 1  --   --  0    < > = values in this interval not displayed. Cardiac Enzymes No results for input(s): CPK, CKND1, ROBERTA in the last 72 hours. No lab exists for component: CKRMB, TROIP   Coagulation No results for input(s): PTP, INR, APTT, INREXT in the last 72 hours.     Lipid Panel Lab Results   Component Value Date/Time    Cholesterol, total 218 (H) 09/28/2020 01:00 PM    HDL Cholesterol 49 09/28/2020 01:00 PM    LDL, calculated 121 (H) 09/28/2020 01:00 PM    VLDL, calculated 48 (H) 09/28/2020 01:00 PM    Triglyceride 241 (H) 09/28/2020 01:00 PM      BNP No results for input(s): BNPP in the last 72 hours. Liver Enzymes No results for input(s): TP, ALB, TBIL, AP in the last 72 hours. No lab exists for component: SGOT, GPT, DBIL   Thyroid Studies Lab Results   Component Value Date/Time    TSH 1.12 05/17/2021 05:09 PM              Marilyn HAQUE Genia:[phone] supervised  I have independently evaluated and examined the patient. All relevant labs and testing data's are reviewed. Care plan discussed and updated after review.     Malina Knight MD

## 2021-06-28 NOTE — PROGRESS NOTES
Problem: Diabetes Self-Management  Goal: *Disease process and treatment process  Description: Define diabetes and identify own type of diabetes; list 3 options for treating diabetes. Outcome: Progressing Towards Goal  Goal: *Incorporating nutritional management into lifestyle  Description: Describe effect of type, amount and timing of food on blood glucose; list 3 methods for planning meals. Outcome: Progressing Towards Goal  Goal: *Incorporating physical activity into lifestyle  Description: State effect of exercise on blood glucose levels. Outcome: Progressing Towards Goal  Goal: *Developing strategies to promote health/change behavior  Description: Define the ABC's of diabetes; identify appropriate screenings, schedule and personal plan for screenings. Outcome: Progressing Towards Goal  Goal: *Using medications safely  Description: State effect of diabetes medications on diabetes; name diabetes medication taking, action and side effects. Outcome: Progressing Towards Goal  Goal: *Monitoring blood glucose, interpreting and using results  Description: Identify recommended blood glucose targets  and personal targets. Outcome: Progressing Towards Goal  Goal: *Prevention, detection, treatment of acute complications  Description: List symptoms of hyper- and hypoglycemia; describe how to treat low blood sugar and actions for lowering  high blood glucose level. Outcome: Progressing Towards Goal  Goal: *Prevention, detection and treatment of chronic complications  Description: Define the natural course of diabetes and describe the relationship of blood glucose levels to long term complications of diabetes.   Outcome: Progressing Towards Goal  Goal: *Developing strategies to address psychosocial issues  Description: Describe feelings about living with diabetes; identify support needed and support network  Outcome: Progressing Towards Goal  Goal: *Insulin pump training  Outcome: Progressing Towards Goal  Goal: *Sick day guidelines  Outcome: Progressing Towards Goal  Goal: *Patient Specific Goal (EDIT GOAL, INSERT TEXT)  Outcome: Progressing Towards Goal     Problem: Patient Education: Go to Patient Education Activity  Goal: Patient/Family Education  Outcome: Progressing Towards Goal     Problem: Falls - Risk of  Goal: *Absence of Falls  Description: Document Ritu Alston Fall Risk and appropriate interventions in the flowsheet.   Outcome: Progressing Towards Goal  Note: Fall Risk Interventions:            Medication Interventions: Bed/chair exit alarm, Patient to call before getting OOB                   Problem: Patient Education: Go to Patient Education Activity  Goal: Patient/Family Education  Outcome: Progressing Towards Goal

## 2021-06-28 NOTE — PROGRESS NOTES
Problem: Diabetes Self-Management  Goal: *Disease process and treatment process  Description: Define diabetes and identify own type of diabetes; list 3 options for treating diabetes. Outcome: Progressing Towards Goal  Goal: *Incorporating nutritional management into lifestyle  Description: Describe effect of type, amount and timing of food on blood glucose; list 3 methods for planning meals. Outcome: Progressing Towards Goal  Goal: *Incorporating physical activity into lifestyle  Description: State effect of exercise on blood glucose levels. Outcome: Progressing Towards Goal  Goal: *Developing strategies to promote health/change behavior  Description: Define the ABC's of diabetes; identify appropriate screenings, schedule and personal plan for screenings. Outcome: Progressing Towards Goal  Goal: *Using medications safely  Description: State effect of diabetes medications on diabetes; name diabetes medication taking, action and side effects. Outcome: Progressing Towards Goal  Goal: *Monitoring blood glucose, interpreting and using results  Description: Identify recommended blood glucose targets  and personal targets. Outcome: Progressing Towards Goal  Goal: *Prevention, detection, treatment of acute complications  Description: List symptoms of hyper- and hypoglycemia; describe how to treat low blood sugar and actions for lowering  high blood glucose level. Outcome: Progressing Towards Goal  Goal: *Prevention, detection and treatment of chronic complications  Description: Define the natural course of diabetes and describe the relationship of blood glucose levels to long term complications of diabetes.   Outcome: Progressing Towards Goal  Goal: *Developing strategies to address psychosocial issues  Description: Describe feelings about living with diabetes; identify support needed and support network  Outcome: Progressing Towards Goal     Problem: Patient Education: Go to Patient Education Activity  Goal: Patient/Family Education  Outcome: Progressing Towards Goal     Problem: Falls - Risk of  Goal: *Absence of Falls  Description: Document Amari Fall Risk and appropriate interventions in the flowsheet.   Outcome: Progressing Towards Goal  Note: Fall Risk Interventions:            Medication Interventions: Bed/chair exit alarm, Patient to call before getting OOB                   Problem: Patient Education: Go to Patient Education Activity  Goal: Patient/Family Education  Outcome: Progressing Towards Goal     Problem: Pain  Goal: *Control of Pain  Outcome: Progressing Towards Goal     Problem: Patient Education: Go to Patient Education Activity  Goal: Patient/Family Education  Outcome: Progressing Towards Goal     Problem: General Medical Care Plan  Goal: *Vital signs within specified parameters  Outcome: Progressing Towards Goal  Goal: *Labs within defined limits  Outcome: Progressing Towards Goal  Goal: *Absence of infection signs and symptoms  Outcome: Progressing Towards Goal  Goal: *Optimal pain control at patient's stated goal  Outcome: Progressing Towards Goal  Goal: *Skin integrity maintained  Outcome: Progressing Towards Goal  Goal: *Optimize nutritional status  Outcome: Progressing Towards Goal  Goal: *Progressive mobility and function (eg: ADL's)  Outcome: Progressing Towards Goal

## 2021-06-28 NOTE — PROGRESS NOTES
Received a call from [place] at [facility] requesting to speak with patient Paddy Drain with  1975. After receiving 3 identifiers (date of birth/address/policy number) from [facility], it was discovered that [employer] has the patient last name as Rut Walters and [facility] has the patient name last name as Cheyanne Jennings. Informed Claritza Perez that there is a difference in the patient last name with the insurance company. Claritza Perez stated she will speak with the patient.

## 2021-06-29 PROBLEM — I25.118 CORONARY ARTERY DISEASE OF NATIVE ARTERY OF NATIVE HEART WITH STABLE ANGINA PECTORIS (HCC): Status: ACTIVE | Noted: 2021-06-29

## 2021-06-29 LAB
ANION GAP SERPL CALC-SCNC: 6 MMOL/L (ref 3–18)
BACTERIA SPEC AEROBE CULT: NORMAL
BASOPHILS # BLD: 0 K/UL (ref 0–0.1)
BASOPHILS NFR BLD: 1 % (ref 0–2)
BUN SERPL-MCNC: 10 MG/DL (ref 7–18)
BUN/CREAT SERPL: 10 (ref 12–20)
CALCIUM SERPL-MCNC: 7.9 MG/DL (ref 8.5–10.1)
CHLORIDE SERPL-SCNC: 112 MMOL/L (ref 100–111)
CO2 SERPL-SCNC: 25 MMOL/L (ref 21–32)
CREAT SERPL-MCNC: 1.04 MG/DL (ref 0.6–1.3)
DIFFERENTIAL METHOD BLD: ABNORMAL
EOSINOPHIL # BLD: 0.3 K/UL (ref 0–0.4)
EOSINOPHIL NFR BLD: 4 % (ref 0–5)
ERYTHROCYTE [DISTWIDTH] IN BLOOD BY AUTOMATED COUNT: 18.4 % (ref 11.6–14.5)
GLUCOSE BLD STRIP.AUTO-MCNC: 110 MG/DL (ref 70–110)
GLUCOSE BLD STRIP.AUTO-MCNC: 177 MG/DL (ref 70–110)
GLUCOSE BLD STRIP.AUTO-MCNC: 187 MG/DL (ref 70–110)
GLUCOSE BLD STRIP.AUTO-MCNC: 64 MG/DL (ref 70–110)
GLUCOSE BLD STRIP.AUTO-MCNC: 69 MG/DL (ref 70–110)
GLUCOSE BLD STRIP.AUTO-MCNC: 69 MG/DL (ref 70–110)
GLUCOSE BLD STRIP.AUTO-MCNC: 70 MG/DL (ref 70–110)
GLUCOSE BLD STRIP.AUTO-MCNC: 91 MG/DL (ref 70–110)
GLUCOSE SERPL-MCNC: 97 MG/DL (ref 74–99)
HCT VFR BLD AUTO: 23.1 % (ref 35–45)
HGB BLD-MCNC: 6.9 G/DL (ref 12–16)
HISTORY CHECKED?,CKHIST: NORMAL
LYMPHOCYTES # BLD: 1.4 K/UL (ref 0.9–3.6)
LYMPHOCYTES NFR BLD: 20 % (ref 21–52)
MAGNESIUM SERPL-MCNC: 1.7 MG/DL (ref 1.6–2.6)
MCH RBC QN AUTO: 22 PG (ref 24–34)
MCHC RBC AUTO-ENTMCNC: 29.9 G/DL (ref 31–37)
MCV RBC AUTO: 73.8 FL (ref 74–97)
MONOCYTES # BLD: 0.9 K/UL (ref 0.05–1.2)
MONOCYTES NFR BLD: 12 % (ref 3–10)
NEUTS SEG # BLD: 4.6 K/UL (ref 1.8–8)
NEUTS SEG NFR BLD: 63 % (ref 40–73)
PLATELET # BLD AUTO: 188 K/UL (ref 135–420)
PMV BLD AUTO: 8.8 FL (ref 9.2–11.8)
POTASSIUM SERPL-SCNC: 3.4 MMOL/L (ref 3.5–5.5)
RBC # BLD AUTO: 3.13 M/UL (ref 4.2–5.3)
SODIUM SERPL-SCNC: 143 MMOL/L (ref 136–145)
SPECIMEN SOURCE: NORMAL
WBC # BLD AUTO: 7.2 K/UL (ref 4.6–13.2)

## 2021-06-29 PROCEDURE — 86900 BLOOD TYPING SEROLOGIC ABO: CPT

## 2021-06-29 PROCEDURE — 74011000258 HC RX REV CODE- 258: Performed by: INTERNAL MEDICINE

## 2021-06-29 PROCEDURE — 99233 SBSQ HOSP IP/OBS HIGH 50: CPT | Performed by: FAMILY MEDICINE

## 2021-06-29 PROCEDURE — 74011250637 HC RX REV CODE- 250/637: Performed by: PHYSICIAN ASSISTANT

## 2021-06-29 PROCEDURE — 74011250636 HC RX REV CODE- 250/636: Performed by: INTERNAL MEDICINE

## 2021-06-29 PROCEDURE — 74011250637 HC RX REV CODE- 250/637: Performed by: INTERNAL MEDICINE

## 2021-06-29 PROCEDURE — 82962 GLUCOSE BLOOD TEST: CPT

## 2021-06-29 PROCEDURE — 99232 SBSQ HOSP IP/OBS MODERATE 35: CPT | Performed by: INTERNAL MEDICINE

## 2021-06-29 PROCEDURE — P9016 RBC LEUKOCYTES REDUCED: HCPCS

## 2021-06-29 PROCEDURE — 65270000029 HC RM PRIVATE

## 2021-06-29 PROCEDURE — 36430 TRANSFUSION BLD/BLD COMPNT: CPT

## 2021-06-29 PROCEDURE — 83735 ASSAY OF MAGNESIUM: CPT

## 2021-06-29 PROCEDURE — 74011250637 HC RX REV CODE- 250/637: Performed by: NURSE PRACTITIONER

## 2021-06-29 PROCEDURE — 80048 BASIC METABOLIC PNL TOTAL CA: CPT

## 2021-06-29 PROCEDURE — 86923 COMPATIBILITY TEST ELECTRIC: CPT

## 2021-06-29 PROCEDURE — 74011636637 HC RX REV CODE- 636/637: Performed by: HOSPITALIST

## 2021-06-29 PROCEDURE — 2709999900 HC NON-CHARGEABLE SUPPLY

## 2021-06-29 PROCEDURE — 36415 COLL VENOUS BLD VENIPUNCTURE: CPT

## 2021-06-29 PROCEDURE — 85025 COMPLETE CBC W/AUTO DIFF WBC: CPT

## 2021-06-29 PROCEDURE — 74011250637 HC RX REV CODE- 250/637: Performed by: HOSPITALIST

## 2021-06-29 PROCEDURE — 74011250636 HC RX REV CODE- 250/636: Performed by: HOSPITALIST

## 2021-06-29 RX ORDER — SODIUM CHLORIDE 9 MG/ML
250 INJECTION, SOLUTION INTRAVENOUS AS NEEDED
Status: DISCONTINUED | OUTPATIENT
Start: 2021-06-29 | End: 2021-06-30 | Stop reason: HOSPADM

## 2021-06-29 RX ORDER — CARVEDILOL 25 MG/1
25 TABLET ORAL EVERY 12 HOURS
Status: DISCONTINUED | OUTPATIENT
Start: 2021-06-29 | End: 2021-06-30 | Stop reason: HOSPADM

## 2021-06-29 RX ORDER — CARVEDILOL 12.5 MG/1
12.5 TABLET ORAL ONCE
Status: COMPLETED | OUTPATIENT
Start: 2021-06-29 | End: 2021-06-29

## 2021-06-29 RX ORDER — POTASSIUM CHLORIDE 20 MEQ/1
40 TABLET, EXTENDED RELEASE ORAL
Status: ACTIVE | OUTPATIENT
Start: 2021-06-29 | End: 2021-06-29

## 2021-06-29 RX ADMIN — SPIRONOLACTONE 25 MG: 25 TABLET, FILM COATED ORAL at 09:14

## 2021-06-29 RX ADMIN — CARVEDILOL 12.5 MG: 12.5 TABLET, FILM COATED ORAL at 11:52

## 2021-06-29 RX ADMIN — CLOPIDOGREL BISULFATE 75 MG: 75 TABLET ORAL at 09:14

## 2021-06-29 RX ADMIN — MAGNESIUM OXIDE TAB 400 MG (241.3 MG ELEMENTAL MG) 400 MG: 400 (241.3 MG) TAB at 09:14

## 2021-06-29 RX ADMIN — Medication 10 ML: at 22:14

## 2021-06-29 RX ADMIN — ROSUVASTATIN CALCIUM 10 MG: 10 TABLET, COATED ORAL at 22:13

## 2021-06-29 RX ADMIN — INSULIN LISPRO 3 UNITS: 100 INJECTION, SOLUTION INTRAVENOUS; SUBCUTANEOUS at 22:22

## 2021-06-29 RX ADMIN — INSULIN GLARGINE 25 UNITS: 100 INJECTION, SOLUTION SUBCUTANEOUS at 22:22

## 2021-06-29 RX ADMIN — FAMOTIDINE 20 MG: 20 TABLET, FILM COATED ORAL at 19:28

## 2021-06-29 RX ADMIN — MAGNESIUM OXIDE TAB 400 MG (241.3 MG ELEMENTAL MG) 400 MG: 400 (241.3 MG) TAB at 19:28

## 2021-06-29 RX ADMIN — AMLODIPINE BESYLATE 5 MG: 5 TABLET ORAL at 09:14

## 2021-06-29 RX ADMIN — FERROUS SULFATE TAB 325 MG (65 MG ELEMENTAL FE) 325 MG: 325 (65 FE) TAB at 09:13

## 2021-06-29 RX ADMIN — CALCITRIOL CAPSULES 0.25 MCG 0.25 MCG: 0.25 CAPSULE ORAL at 09:14

## 2021-06-29 RX ADMIN — ENOXAPARIN SODIUM 40 MG: 40 INJECTION SUBCUTANEOUS at 09:13

## 2021-06-29 RX ADMIN — CARVEDILOL 25 MG: 25 TABLET, FILM COATED ORAL at 22:14

## 2021-06-29 RX ADMIN — FAMOTIDINE 20 MG: 20 TABLET, FILM COATED ORAL at 09:14

## 2021-06-29 RX ADMIN — DIPHENHYDRAMINE HYDROCHLORIDE 25 MG: 50 INJECTION INTRAMUSCULAR; INTRAVENOUS at 11:51

## 2021-06-29 RX ADMIN — CARVEDILOL 12.5 MG: 12.5 TABLET, FILM COATED ORAL at 09:14

## 2021-06-29 RX ADMIN — AMPICILLIN SODIUM AND SULBACTAM SODIUM 3 G: 2; 1 INJECTION, POWDER, FOR SOLUTION INTRAMUSCULAR; INTRAVENOUS at 12:45

## 2021-06-29 RX ADMIN — Medication 10 ML: at 16:23

## 2021-06-29 RX ADMIN — INSULIN LISPRO 3 UNITS: 100 INJECTION, SOLUTION INTRAVENOUS; SUBCUTANEOUS at 12:45

## 2021-06-29 RX ADMIN — ASPIRIN 81 MG: 81 TABLET, CHEWABLE ORAL at 09:13

## 2021-06-29 NOTE — DIABETES MGMT
Diabetes Plan of Care    Assessment:  Follow-up -   Diabetic left foot ulcer s/p left 4th/5th toe amputation and wound VAC placement  Insulin requirements much lower here than at home - reviewed her recent BG  Reviewed her home meal planning - she has a better understanding of carb servings r/t post prandial BG  Encouraged her to continue BG monitoring, meal planning and compliance at home for optimal wound healing. Says she plans to d.c home with [facility]    Most recent blood glucose values: Within target range   - No  - progressing towards target      Current A1c  Lab Results   Component Value Date/Time    Hemoglobin A1c 9.6 (H) 06/25/2021 06:45 AM    Hemoglobin A1c (POC) 12.3 03/28/2019 09:49 AM     Adequate glycemic control PTA:    No     Current hospital diabetes medications:  Lantus 25 units daily - not given last night  Corrective lispro, very insulin resistant, 4 times daily     Diet -        TDD previous day =    9 units lispro      Home diabetes medications;   Lantus 50 units daily - takes at night  Humalog 10 units with meals  Patient reports she is no longer taking metformin     Dionicia Shone MPH RN [facility]  Pager [phone]  Office [phone]

## 2021-06-29 NOTE — PROGRESS NOTES
Problem: Diabetes Self-Management  Goal: *Disease process and treatment process  Description: Define diabetes and identify own type of diabetes; list 3 options for treating diabetes. Outcome: Progressing Towards Goal  Goal: *Incorporating nutritional management into lifestyle  Description: Describe effect of type, amount and timing of food on blood glucose; list 3 methods for planning meals. Outcome: Progressing Towards Goal  Goal: *Incorporating physical activity into lifestyle  Description: State effect of exercise on blood glucose levels. Outcome: Progressing Towards Goal  Goal: *Developing strategies to promote health/change behavior  Description: Define the ABC's of diabetes; identify appropriate screenings, schedule and personal plan for screenings. Outcome: Progressing Towards Goal  Goal: *Using medications safely  Description: State effect of diabetes medications on diabetes; name diabetes medication taking, action and side effects. Outcome: Progressing Towards Goal  Goal: *Monitoring blood glucose, interpreting and using results  Description: Identify recommended blood glucose targets  and personal targets. Outcome: Progressing Towards Goal  Goal: *Prevention, detection, treatment of acute complications  Description: List symptoms of hyper- and hypoglycemia; describe how to treat low blood sugar and actions for lowering  high blood glucose level. Outcome: Progressing Towards Goal  Goal: *Prevention, detection and treatment of chronic complications  Description: Define the natural course of diabetes and describe the relationship of blood glucose levels to long term complications of diabetes.   Outcome: Progressing Towards Goal  Goal: *Developing strategies to address psychosocial issues  Description: Describe feelings about living with diabetes; identify support needed and support network  Outcome: Progressing Towards Goal  Goal: *Insulin pump training  Outcome: Progressing Towards Goal  Goal: *Sick day guidelines  Outcome: Progressing Towards Goal  Goal: *Patient Specific Goal (EDIT GOAL, INSERT TEXT)  Outcome: Progressing Towards Goal     Problem: Patient Education: Go to Patient Education Activity  Goal: Patient/Family Education  Outcome: Progressing Towards Goal     Problem: Falls - Risk of  Goal: *Absence of Falls  Description: Document Marciana Distance Fall Risk and appropriate interventions in the flowsheet.   Outcome: Progressing Towards Goal  Note: Fall Risk Interventions:            Medication Interventions: Evaluate medications/consider consulting pharmacy, Patient to call before getting OOB, Teach patient to arise slowly                   Problem: Patient Education: Go to Patient Education Activity  Goal: Patient/Family Education  Outcome: Progressing Towards Goal     Problem: Pain  Goal: *Control of Pain  Outcome: Progressing Towards Goal     Problem: Patient Education: Go to Patient Education Activity  Goal: Patient/Family Education  Outcome: Progressing Towards Goal     Problem: General Medical Care Plan  Goal: *Vital signs within specified parameters  Outcome: Progressing Towards Goal  Goal: *Labs within defined limits  Outcome: Progressing Towards Goal  Goal: *Absence of infection signs and symptoms  Outcome: Progressing Towards Goal  Goal: *Optimal pain control at patient's stated goal  Outcome: Progressing Towards Goal  Goal: *Skin integrity maintained  Outcome: Progressing Towards Goal  Goal: *Optimize nutritional status  Outcome: Progressing Towards Goal  Goal: *Progressive mobility and function (eg: ADL's)  Outcome: Progressing Towards Goal

## 2021-06-29 NOTE — PROGRESS NOTES
Pt is active with [facility]. Spoke with Masood Spring of [address] [facility] [address] orders sent to [facility].         HERB AbbottN RN  Care Management  Pager: [phone]

## 2021-06-29 NOTE — PROGRESS NOTES
[facility]    Patient: Bowen Mane Age: 40 y.o. : 1975 MR#: [id number] SSN: [id number]  Date: 2021     Subjective/24-hour events:     No new complaints overnight. H/H noted - no bleeding issues overnight. Assessment:   Recurrent syncope  Acute on chronic anemia  Diabetic left foot ulcer status post left 4th/5th toe amputation and wound VAC placement  Fungemia  Hypokalemia  DM2 with hyperglycemia, A1c 9.6%  Hypertension  Obesity, BMI 30.2  History of NSTEMI with history of PCI and stent placement      Plan:   Replace potassium and monitor. Repeat H/H, transfuse if necessary. Antimicrobial therapy per ID. Await finalization of treatment regimen. Continue insulin as ordered: Adjust as necessary to optimize glycemic control. Mobilize as tolerated. Wound care/VAC management per podiatry. Plan for discharge home with wound VAC and home health care services. Supportive care otherwise. Follow. Case discussed with:  [x]Patient  []Family  [x]Nursing  [x]Case Management  DVT Prophylaxis:  [x]Lovenox  []Hep SQ  []SCDs  []Coumadin   []On Heparin gtt    Objective:   VS:   Visit Vitals  BP (!) 162/96   Pulse 92   Temp 98.0 °F (36.7 °C)   Resp 16   Ht 5' 7\" (1.702 m)   Wt 87.5 kg (193 lb)   SpO2 100%   Breastfeeding No   BMI 30.23 kg/m²       General:  In NAD. Nontoxic-appearing. Cardiovascular:  RRR. Pulmonary:  Lungs clear bilaterally, no wheezes. GI:  Abdomen soft, NTTP. Extremities:  Warm, no edema or ischemia. Wound vac in place L foot. Neuro:  Awake and alert.   Motor nonfocal.    Labs:    Recent Results (from the past 24 hour(s))   GLUCOSE, POC    Collection Time: 21  9:29 PM   Result Value Ref Range    Glucose (POC) 146 (H) 70 - 110 mg/dL   CBC WITH AUTOMATED DIFF    Collection Time: 21  2:19 AM   Result Value Ref Range    WBC 8.6 4.6 - 13.2 K/uL    RBC 3.12 (L) 4.20 - 5.30 M/uL    HGB 6.9 (L) 12.0 - 16.0 g/dL    HCT 23.3 (L) 35.0 - 45.0 %    MCV 74.7 74.0 - 97.0 FL    MCH 22.1 (L) 24.0 - 34.0 PG    MCHC 29.6 (L) 31.0 - 37.0 g/dL    RDW 18.3 (H) 11.6 - 14.5 %    PLATELET 503 718 - 151 K/uL    MPV 9.4 9.2 - 11.8 FL    NEUTROPHILS 60 40 - 73 %    LYMPHOCYTES 25 21 - 52 %    MONOCYTES 11 (H) 3 - 10 %    EOSINOPHILS 2 0 - 5 %    BASOPHILS 1 0 - 2 %    ABS. NEUTROPHILS 5.1 1.8 - 8.0 K/UL    ABS. LYMPHOCYTES 2.2 0.9 - 3.6 K/UL    ABS. MONOCYTES 0.9 0.05 - 1.2 K/UL    ABS. EOSINOPHILS 0.2 0.0 - 0.4 K/UL    ABS.  BASOPHILS 0.1 0.0 - 0.1 K/UL    DF AUTOMATED     METABOLIC PANEL, BASIC    Collection Time: 06/28/21  2:19 AM   Result Value Ref Range    Sodium 140 136 - 145 mmol/L    Potassium 3.4 (L) 3.5 - 5.5 mmol/L    Chloride 110 100 - 111 mmol/L    CO2 26 21 - 32 mmol/L    Anion gap 4 3.0 - 18 mmol/L    Glucose 133 (H) 74 - 99 mg/dL    BUN 11 7.0 - 18 MG/DL    Creatinine 1.14 0.6 - 1.3 MG/DL    BUN/Creatinine ratio 10 (L) 12 - 20      GFR est AA >60 >60 ml/min/1.73m2    GFR est non-AA 52 (L) >60 ml/min/1.73m2    Calcium 7.9 (L) 8.5 - 10.1 MG/DL   MAGNESIUM    Collection Time: 06/28/21  2:19 AM   Result Value Ref Range    Magnesium 1.8 1.6 - 2.6 mg/dL   GLUCOSE, POC    Collection Time: 06/28/21  8:03 AM   Result Value Ref Range    Glucose (POC) 93 70 - 110 mg/dL   GLUCOSE, POC    Collection Time: 06/28/21 11:55 AM   Result Value Ref Range    Glucose (POC) 200 (H) 70 - 110 mg/dL   HGB & HCT    Collection Time: 06/28/21  3:30 PM   Result Value Ref Range    HGB 7.3 (L) 12.0 - 16.0 g/dL    HCT 24.5 (L) 35.0 - 45.0 %   GLUCOSE, POC    Collection Time: 06/28/21  4:29 PM   Result Value Ref Range    Glucose (POC) 127 (H) 70 - 110 mg/dL       Signed By: Amadou Rodríguez MD     June 28, 2021

## 2021-06-29 NOTE — PROGRESS NOTES
Infectious Disease progress Note        Reason: left foot infection    Current abx Prior abx    vancomycin restarted 6/28  Ceftriaxone restarted 6/28  Fluconazole since 6/28  ceftriaxone 5/17-6/23/2021  Cefepime 6/24-6/  levofloxacin  6/24/2021  eraxis 6/26-6/28       Lines:       Assessment :    40 y.o.  female  With PMH of HTN, uncontrolled type 2 DM-last hemoglobin A1c 9 .6 on 6/25/2021, peripheral Neuropathy came tot he ER on 6/24/2021 with recurrent dizziness     Hospitalization January 2019 for left second toe, left foot cellulitis     Hospitalization April 2021 for left foot infection, bone biopsy negative for osteomyelitis  Wound cultures group B streptococcus     Hospitalization 5/2021 for sepsis -present on admission due to group B bloodstream infection  (positive blood culture 5/17, negative blood culture 5/19/21 ), left foot diabetic ulcer with cellulitis/abscess, possible osteomyelitis of first metatarsal head with septic 1st MTPJ.    Intra-Op cultures 5/18-group B streptococcus     Now with recurrent fainting spells, evidence of osteomyelitis of left fourth/fifth toe on MRI 6/24/2021    Clinical presentation consistent with osteomyelitis left fourth/fifth toe, Candida albicans fungemia (positive blood culture 6/24, negative blood culture 6/27 ) in a patient with uncontrolled type 2 Dm    Status post left fourth/fifth partial toe amputation, bone biopsy on 6/25/2021. Discussed with pathologist.  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 S/p picc removal 6/26/21    Antibiotic management complicated due to history of penicillin allergy. Had rash with penicillin in childhood. Denies life-threatening allergy.   Willing to try ampicillin/sulbactam.Benefits and risks of this approach including rash, hives, swelling of throat, anaphylaxis, death explained to patient in details. She verbalized her understanding and wishes to proceed. Nursing staff was advised to monitor closely for allergy. Give antihistaminics & call me if any allergic reaction noted. Recommendations   - d/c ceftriaxone, vancomycin. Start iv unasyn and monitor for allergic reaction  -continue fluconazole till 7/9/21  -ok to switch to po augmentin till 7/5/2021 if able to tolerate Unasyn  -f/u podiatry recommendations regarding wound care    Discharge planning per primary team       Above plan was discussed in details with patient,,dr. Blair Lund. All questions answered to their full satisfaction. Spent additional 35 minutes in management and evaluation of this patient. >50% time spent in counselling and coordination of care. Please call me if any further questions or concerns. Will continue to participate in the care of this patient. HPI:    Feels better. Denies any further chills. Denies increasing pain in the left foot      Current Discharge Medication List      CONTINUE these medications which have NOT CHANGED    Details   ticagrelor (BRILINTA) 90 mg tablet Take 1 Tablet by mouth every twelve (12) hours every twelve (12) hours. Qty: 180 Tablet, Refills: 1      insulin lispro (HUMALOG) 100 unit/mL kwikpen 10 units before meals  Indications: type 2 diabetes mellitus  Qty: 5 Pen, Refills: 11    Associated Diagnoses: Type 2 diabetes mellitus with complication (HCC)      metFORMIN ER (GLUCOPHAGE XR) 500 mg tablet Take 1 Tab by mouth daily (with dinner). Qty: 90 Tab, Refills: 3    Associated Diagnoses: Type 2 diabetes mellitus with complication (HCC)      rosuvastatin (CRESTOR) 10 mg tablet Take 1 Tab by mouth nightly. Qty: 90 Tab, Refills: 3    Associated Diagnoses: NSTEMI (non-ST elevated myocardial infarction) (HCC)      carvediloL (Coreg) 12.5 mg tablet Take 1 Tab by mouth two (2) times daily (with meals).  Indications: myocardial reinfarction prevention  Qty: 60 Tab, Refills: 3    Associated Diagnoses: [facility] (non-ST elevated [facility]) [facility] Essential hypertension      spironolactone (ALDACTONE) 25 mg tablet Take 25 mg by mouth daily. ferrous sulfate 325 mg (65 mg iron) tablet Take 1 Tab by mouth daily (with breakfast). Qty: 30 Tab, Refills: 0      furosemide (LASIX) 20 mg tablet Take 1 Tab by mouth daily as needed (Edema). Qty: 30 Tab, Refills: 1    Associated Diagnoses: Acute on chronic diastolic congestive heart failure (HCC)      insulin glargine (LANTUS,BASAGLAR) 100 unit/mL (3 mL) inpn Take 45 units daily  Indications: type 2 diabetes mellitus  Qty: 4 Pen, Refills: 5    Associated Diagnoses: Type 2 diabetes mellitus with complication (HCC)      aspirin 81 mg chewable tablet Take 1 Tab by mouth daily. Qty: 30 Tab, Refills: 0      melatonin 5 mg cap capsule Take 1 Cap by mouth nightly.   Qty: 30 Cap, Refills: 1    Associated Diagnoses: Difficulty sleeping      Insulin Needles, Disposable, 31 gauge x 5/16\" ndle Use to check blood glucose BID  Qty: 100 Pen Needle, Refills: 11    Associated Diagnoses: Type 2 diabetes mellitus with complication (HCC)      Insulin Syringe-Needle U-100 (INSULIN SYRINGE) 1 mL 30 gauge x 5/16 syrg As directed for lantus insulin  Qty: 50 Syringe, Refills: 0             Current Facility-Administered Medications   Medication Dose Route Frequency    fluconazole (DIFLUCAN) 400mg/200 mL IVPB (premix)  400 mg IntraVENous Q24H    vancomycin (VANCOCIN) 1500 mg in  ml infusion  1,500 mg IntraVENous Q16H    cefTRIAXone (ROCEPHIN) 2 g in sterile water (preservative free) 20 mL IV syringe  2 g IntraVENous Q24H    clopidogreL (PLAVIX) tablet 75 mg  75 mg Oral DAILY    spironolactone (ALDACTONE) tablet 25 mg  25 mg Oral DAILY    famotidine (PEPCID) tablet 20 mg  20 mg Oral BID    insulin glargine (LANTUS) injection 25 Units  25 Units SubCUTAneous QHS    amLODIPine (NORVASC) tablet 5 mg  5 mg Oral DAILY  calcitRIOL (ROCALTROL) capsule 0.25 mcg  0.25 mcg Oral DAILY    0.9% sodium chloride infusion 250 mL  250 mL IntraVENous PRN    carvediloL (COREG) tablet 12.5 mg  12.5 mg Oral Q12H    insulin lispro (HUMALOG) injection   SubCUTAneous AC&HS    diphenhydrAMINE (BENADRYL) injection 25 mg  25 mg IntraVENous Q6H PRN    aspirin chewable tablet 81 mg  81 mg Oral DAILY    ferrous sulfate tablet 325 mg  325 mg Oral DAILY WITH BREAKFAST    rosuvastatin (CRESTOR) tablet 10 mg  10 mg Oral QHS    sodium chloride (NS) flush 5-40 mL  5-40 mL IntraVENous Q8H    sodium chloride (NS) flush 5-40 mL  5-40 mL IntraVENous PRN    acetaminophen (TYLENOL) tablet 650 mg  650 mg Oral Q6H PRN    Or    acetaminophen (TYLENOL) suppository 650 mg  650 mg Rectal Q6H PRN    polyethylene glycol (MIRALAX) packet 17 g  17 g Oral DAILY PRN    ondansetron (ZOFRAN ODT) tablet 4 mg  4 mg Oral Q8H PRN    Or    ondansetron (ZOFRAN) injection 4 mg  4 mg IntraVENous Q6H PRN    enoxaparin (LOVENOX) injection 40 mg  40 mg SubCUTAneous DAILY    magnesium oxide (MAG-OX) tablet 400 mg  400 mg Oral BID       Allergies: Azithromycin and Pcn [penicillins]    Temp (24hrs), Av.2 °F (36.8 °C), Min:97.5 °F (36.4 °C), Max:98.8 °F (37.1 °C)    Visit Vitals  BP (!) 170/99 (BP Patient Position: At rest)   Pulse 96   Temp 98.1 °F (36.7 °C)   Resp 20   Ht 5' 7\" (1.702 m)   Wt 87.5 kg (193 lb)   SpO2 98%   Breastfeeding No   BMI 30.23 kg/m²       ROS: 12 point ROS obtained in details. Pertinent positives as mentioned in HPI,   otherwise negative    Physical Exam:    Constitutional: She is oriented to person, place, and time. She appears well-developed and well-nourished. No distress. HENT:   Head: Normocephalic and atraumatic. Mouth/Throat: Oropharynx is clear and moist.   Eyes: Conjunctivae are normal. Pupils are equal, round, and reactive to light. No scleral icterus. Neck: Normal range of motion. Neck supple.    Cardiovascular: Normal rate and intact distal pulses. Pulmonary/Chest: Effort normal and breath sounds normal. No respiratory distress. She has no wheezes. Abdominal: Soft. Bowel sounds are normal. She exhibits no distension. There is no tenderness. Musculoskeletal: Normal range of motion. Surgical changes left foot-covered with wound VAC. No increased erythema/warmth/tenderness bilateral lower extremity  Lymphadenopathy:     She has no cervical adenopathy. Neurological: She is alert and oriented to person, place, and time. No cranial nerve deficit.  Decreased sensation to light touch plantar aspect of both feet. Skin: Skin is warm. Left foot surgical changes as mentioned above    Labs: Results:   Chemistry Recent Labs     06/29/21 0237 06/28/21  0219 06/27/21  0222   GLU 97 133* 272*    140 139   K 3.4* 3.4* 3.5   * 110 109   CO2 25 26 25   BUN 10 11 10   CREA 1.04 1.14 1.17   CA 7.9* 7.9* 7.8*   AGAP 6 4 5   BUCR 10* 10* 9*      CBC w/Diff Recent Labs     06/29/21  0237 06/28/21  1530 06/28/21  0219 06/27/21 0222 06/27/21  0222   WBC 7.2  --  8.6  --  8.8   RBC 3.13*  --  3.12*  --  3.32*   HGB 6.9* 7.3* 6.9*   < > 7.3*   HCT 23.1* 24.5* 23.3*   < > 24.5*     --  210  --  202   GRANS 63  --  60  --  68   LYMPH 20*  --  25  --  18*   EOS 4  --  2  --  1    < > = values in this interval not displayed. Microbiology Recent Labs     06/27/21  0630 06/27/21  0615   CULT NO GROWTH 2 DAYS NO GROWTH 2 DAYS          RADIOLOGY:    All available imaging studies/reports in [facility] care for this admission were reviewed           Disclaimer: Sections of this note are dictated utilizing voice recognition software, which may have resulted in some phonetic based errors in grammar and contents. Even though attempts were made to correct all the mistakes, some may have been missed, and remained in the body of the document. If questions arise, please contact our department.     Dr. Reg Hernandez, Infectious Disease Specialist  [phone]  June 29, 2021  9:31 AM

## 2021-06-29 NOTE — PROGRESS NOTES
Problem: Diabetes Self-Management  Goal: *Disease process and treatment process  Description: Define diabetes and identify own type of diabetes; list 3 options for treating diabetes. Outcome: Progressing Towards Goal  Goal: *Incorporating nutritional management into lifestyle  Description: Describe effect of type, amount and timing of food on blood glucose; list 3 methods for planning meals. Outcome: Progressing Towards Goal  Goal: *Incorporating physical activity into lifestyle  Description: State effect of exercise on blood glucose levels. Outcome: Progressing Towards Goal  Goal: *Developing strategies to promote health/change behavior  Description: Define the ABC's of diabetes; identify appropriate screenings, schedule and personal plan for screenings. Outcome: Progressing Towards Goal  Goal: *Using medications safely  Description: State effect of diabetes medications on diabetes; name diabetes medication taking, action and side effects. Outcome: Progressing Towards Goal  Goal: *Monitoring blood glucose, interpreting and using results  Description: Identify recommended blood glucose targets  and personal targets. Outcome: Progressing Towards Goal  Goal: *Prevention, detection, treatment of acute complications  Description: List symptoms of hyper- and hypoglycemia; describe how to treat low blood sugar and actions for lowering  high blood glucose level. Outcome: Progressing Towards Goal  Goal: *Prevention, detection and treatment of chronic complications  Description: Define the natural course of diabetes and describe the relationship of blood glucose levels to long term complications of diabetes.   Outcome: Progressing Towards Goal  Goal: *Developing strategies to address psychosocial issues  Description: Describe feelings about living with diabetes; identify support needed and support network  Outcome: Progressing Towards Goal     Problem: Patient Education: Go to Patient Education Activity  Goal: Patient/Family Education  Outcome: Progressing Towards Goal     Problem: Falls - Risk of  Goal: *Absence of Falls  Description: Document Amari Fall Risk and appropriate interventions in the flowsheet.   Outcome: Progressing Towards Goal  Note: Fall Risk Interventions:            Medication Interventions: Evaluate medications/consider consulting pharmacy, Patient to call before getting OOB, Teach patient to arise slowly                   Problem: Patient Education: Go to Patient Education Activity  Goal: Patient/Family Education  Outcome: Progressing Towards Goal     Problem: Pain  Goal: *Control of Pain  Outcome: Progressing Towards Goal     Problem: Patient Education: Go to Patient Education Activity  Goal: Patient/Family Education  Outcome: Progressing Towards Goal     Problem: General Medical Care Plan  Goal: *Vital signs within specified parameters  Outcome: Progressing Towards Goal  Goal: *Labs within defined limits  Outcome: Progressing Towards Goal  Goal: *Absence of infection signs and symptoms  Outcome: Progressing Towards Goal  Goal: *Optimal pain control at patient's stated goal  Outcome: Progressing Towards Goal  Goal: *Skin integrity maintained  Outcome: Progressing Towards Goal  Goal: *Optimize nutritional status  Outcome: Progressing Towards Goal  Goal: *Progressive mobility and function (eg: ADL's)  Outcome: Progressing Towards Goal

## 2021-06-29 NOTE — ROUTINE PROCESS
Bedside shift change report given to JACOB Barton (oncoming nurse) by Rimma Brewer (offgoing nurse). Report included the following information SBAR, Kardex, MAR, Procedure Verification and Quality Measures.

## 2021-06-29 NOTE — PROGRESS NOTES
[facility] Hospitalists  Progress Note    Patient: Sary Enriquez Age: 40 y.o. : 1975 MR#: [id number] SSN: [id number]  Date: 2021     Subjective/24-hour events:     H/H down again this AM but no bleeding reported. Denies chest pain and SOB. BPs running a bit high overall. Assessment:   Recurrent syncope  Acute on chronic anemia  Diabetic left foot ulcer status post left 4th/5th toe amputation and wound VAC placement  Fungemia  Hypokalemia  DM2 with hyperglycemia, A1c 9.6%  Hypertension  Obesity, BMI 30.2  History of NSTEMI with history of PCI and stent placement    Plan:   Transfuse 1 unit PRBCs and monitor. Repeat H&H this afternoon. Continue antibiotic therapy per ID. Results will follow cultures to determine final treatment plan. Increase amlodipine to 10 mg daily. Monitor blood pressures. Lantus/SSI as ordered. Continue to mobilize in 3 weeks. Wound care plan unchanged. VAC remains in place. Will need VAC device at discharge. Other care primarily supportive. Follow. Case discussed with:  [x]Patient  []Family  [x]Nursing  [x]Case Management  DVT Prophylaxis:  [x]Lovenox  []Hep SQ  []SCDs  []Coumadin   []On Heparin gtt    Objective:     Visit Vitals  BP (!) 166/61 (BP 1 Location: Left upper arm)   Pulse 93   Temp 98.5 °F (36.9 °C)   Resp 18   Ht 5' 7\" (1.702 m)   Wt 95.8 kg (211 lb 3.2 oz)   SpO2 100%   Breastfeeding No   BMI 33.08 kg/m²       General:  In NAD. Nontoxic-appearing. Cardiovascular:  RRR. Pulmonary:  Lungs clear bilaterally, no wheezes. GI:  Abdomen soft, NTTP. Extremities:  Warm, no edema or ischemia. Wound vac in place L foot. Neuro:  Awake and alert.   Motor nonfocal.    Labs:    Recent Results (from the past 24 hour(s))   GLUCOSE, POC    Collection Time: 21 11:55 AM   Result Value Ref Range    Glucose (POC) 200 (H) 70 - 110 mg/dL   HGB & HCT    Collection Time: 21  3:30 PM   Result Value Ref Range    HGB 7.3 (L) 12.0 - 16.0 g/dL    HCT 24.5 (L) 35.0 - 45.0 %   GLUCOSE, POC    Collection Time: 06/28/21  4:29 PM   Result Value Ref Range    Glucose (POC) 127 (H) 70 - 110 mg/dL   GLUCOSE, POC    Collection Time: 06/28/21  9:40 PM   Result Value Ref Range    Glucose (POC) 163 (H) 70 - 110 mg/dL   CBC WITH AUTOMATED DIFF    Collection Time: 06/29/21  2:37 AM   Result Value Ref Range    WBC 7.2 4.6 - 13.2 K/uL    RBC 3.13 (L) 4.20 - 5.30 M/uL    HGB 6.9 (L) 12.0 - 16.0 g/dL    HCT 23.1 (L) 35.0 - 45.0 %    MCV 73.8 (L) 74.0 - 97.0 FL    MCH 22.0 (L) 24.0 - 34.0 PG    MCHC 29.9 (L) 31.0 - 37.0 g/dL    RDW 18.4 (H) 11.6 - 14.5 %    PLATELET 612 702 - 294 K/uL    MPV 8.8 (L) 9.2 - 11.8 FL    NEUTROPHILS 63 40 - 73 %    LYMPHOCYTES 20 (L) 21 - 52 %    MONOCYTES 12 (H) 3 - 10 %    EOSINOPHILS 4 0 - 5 %    BASOPHILS 1 0 - 2 %    ABS. NEUTROPHILS 4.6 1.8 - 8.0 K/UL    ABS. LYMPHOCYTES 1.4 0.9 - 3.6 K/UL    ABS. MONOCYTES 0.9 0.05 - 1.2 K/UL    ABS. EOSINOPHILS 0.3 0.0 - 0.4 K/UL    ABS.  BASOPHILS 0.0 0.0 - 0.1 K/UL    DF AUTOMATED     METABOLIC PANEL, BASIC    Collection Time: 06/29/21  2:37 AM   Result Value Ref Range    Sodium 143 136 - 145 mmol/L    Potassium 3.4 (L) 3.5 - 5.5 mmol/L    Chloride 112 (H) 100 - 111 mmol/L    CO2 25 21 - 32 mmol/L    Anion gap 6 3.0 - 18 mmol/L    Glucose 97 74 - 99 mg/dL    BUN 10 7.0 - 18 MG/DL    Creatinine 1.04 0.6 - 1.3 MG/DL    BUN/Creatinine ratio 10 (L) 12 - 20      GFR est AA >60 >60 ml/min/1.73m2    GFR est non-AA 57 (L) >60 ml/min/1.73m2    Calcium 7.9 (L) 8.5 - 10.1 MG/DL   MAGNESIUM    Collection Time: 06/29/21  2:37 AM   Result Value Ref Range    Magnesium 1.7 1.6 - 2.6 mg/dL   GLUCOSE, POC    Collection Time: 06/29/21  8:09 AM   Result Value Ref Range    Glucose (POC) 70 70 - 110 mg/dL   GLUCOSE, POC    Collection Time: 06/29/21  8:11 AM   Result Value Ref Range    Glucose (POC) 69 (L) 70 - 110 mg/dL   GLUCOSE, POC    Collection Time: 06/29/21  8:34 AM   Result Value Ref Range    Glucose (POC) 91 70 - 110 mg/dL   RBC, ALLOCATE    Collection Time: 06/29/21  9:45 AM   Result Value Ref Range    HISTORY CHECKED?  Historical check performed        Signed By: Dat Price MD     June 29, 2021

## 2021-06-29 NOTE — PROGRESS NOTES
Orders to restart Vancomycin and wait for adverse reactions. Pt does not want to have any other antibiotics at this time b/c she is still experiencing itching and burning all over her body. Pt. Also requested an additional dose of benadryl. Dr. Bj Witt was informed of pt. Concerns and orders were given to wait until the morning to administer antibiotics and give a 25 mg dose of benadryl now.

## 2021-06-29 NOTE — PROGRESS NOTES
Cardiology progress note. 1. Recurrent syncope- in the setting severe anemia  2. Severe anemia- s/p 1 unit PRBC's on 6/25/29. Today Hgb 6.9   3. CAD- s/p Cardiac cath 10/22/20 100% mid RCA occlusion with thrombus. S/p aspiration thrombectomy followed by ptca/stent ( 2 BLANCA stents placed overlapping extending from mid RCA to distal RCA). Mild mid LAD stenosis. 4. Chronic diastolic heart failure- compensated   5. Hx of high degree AV block - 10/20  s/p TPM due to complete heart block during PCI. Post PCI developed juctional tachycardia. TPM was removed.  ekg 4/5/21 sinus tachycardia with nonspecific T wave abnormality   6. Hypertension-elevated now. She was hypotensive on admission   7. Hyperlipidemia-continue statin   8. Diabetes-poorly controlled with A1c 8.5  9. Hypokalemia- on admission with K+ 2.6 improved. 10. Hypomagnesemia- with Mag+ 1.4 on admission- resolved   11. Hypocalcemia  12. Left foot infection- extensive osteomyelitis showed on recent foot MRI - s/p Foot bone biopsy and amputation 4th and 5th toe         6/25/21 Echo   · LV: Estimated LVEF is 55 - 60%. Visually measured ejection fraction. · Normal cavity size and systolic function (ejection fraction normal). Mild concentric hypertrophy. Wall motion: normal.  · Pericardium: Trivial pericardial effusion measuring 7 mm.       Plan:       Cardiac status appears stable. Recent echo shows preserved EF% no wma noted. Hgb 6.9 today. Transfusion orders  per primary team. Recommend to keep Hgb >8.0   Will discontinued ASA. Continue with Plavix only  Continue to replace electrolytes as needed   Mild tachycardia and elevated BP  increased Coreg   Continue HTN medications. Has mild BLE edema please get compression stockings    Continue aldactone     Hemodynamically stable. Hemoglobin trending down and will likely need blood transfusion again. Will use single antiplatelet agent in the form of Plavix only due to recurrent GI bleed. Beta-blockers for mild tachycardia and compression stockings for mild edema. Watch for any CHF. ASSESSMENT:  Hospital Problems  Date Reviewed: 5/26/2021        Codes Class Noted POA    Coronary artery disease of native artery of native heart with stable angina pectoris [facility]) ICD-10-CM: I25.118  ICD-9-CM: 414.01, 413.9  6/29/2021 Unknown        Foot ulcer, left ([facility] ICD-10-CM: R61.885  ICD-9-CM: 707.15  6/24/2021 Unknown        [facility]epsis ([facility] ICD-10-CM: A41.9  ICD-9-CM: 038.9, 995.91  5/17/2021 Unknown                SUBJECTIVE:  No CP  No SOB    OBJECTIVE:    VS:   Visit Vitals  BP (!) 170/99 (BP Patient Position: At rest)   Pulse 96   Temp 98.1 °F (36.7 °C)   Resp 20   Ht 5' 7\" (1.702 m)   Wt 95.8 kg (211 lb 3.2 oz)   SpO2 98%   Breastfeeding No   BMI 33.08 kg/m²         Intake/Output Summary (Last 24 hours) at 6/29/2021 0940  Last data filed at 6/29/2021 9466  Gross per 24 hour   Intake 940 ml   Output 325 ml   Net 615 ml     TELE: normal sinus rhythm/ sinus tachycardia     General: alert, well developed, pleasant and in no apparent distress  HENT: Normocephalic, atraumatic. Normal external eye.   Neck :  no bruit, no JVD  Cardiac:  regular rate and rhythm, S1, S2 normal, no murmur, click, rub or gallop  Lungs: clear to auscultation bilaterally  Abdomen: Soft, nontender, no masses  Extremities: trace edema BLE.  peripheral pulses present      Labs: Results:       Chemistry Recent Labs     06/29/21  0237 06/28/21  0219 06/27/21  0222   GLU 97 133* 272*    140 139   K 3.4* 3.4* 3.5   * 110 109   CO2 25 26 25   BUN 10 11 10   CREA 1.04 1.14 1.17   CA 7.9* 7.9* 7.8*   AGAP 6 4 5   BUCR 10* 10* 9*      CBC w/Diff Recent Labs     06/29/21  0237 06/28/21  1530 06/28/21  0219 06/27/21  0222 06/27/21 0222   WBC 7.2  --  8.6  --  8.8   RBC 3.13*  --  3.12*  --  3.32*   HGB 6.9* 7.3* 6.9*   < > 7.3*   HCT 23.1* 24.5* 23.3*   < > 24.5*     --  210  --  202   GRANS 63  --  60  --  68   LYMPH 20* --  25  --  18*   EOS 4  --  2  --  1    < > = values in this interval not displayed. Cardiac Enzymes No results for input(s): CPK, CKND1, ROBERTA in the last 72 hours. No lab exists for component: CKRMB, TROIP   Coagulation No results for input(s): PTP, INR, APTT, INREXT, INREXT in the last 72 hours. Lipid Panel Lab Results   Component Value Date/Time    Cholesterol, total 218 (H) 09/28/2020 01:00 PM    HDL Cholesterol 49 09/28/2020 01:00 PM    LDL, calculated 121 (H) 09/28/2020 01:00 PM    VLDL, calculated 48 (H) 09/28/2020 01:00 PM    Triglyceride 241 (H) 09/28/2020 01:00 PM      BNP No results for input(s): BNPP in the last 72 hours. Liver Enzymes No results for input(s): TP, ALB, TBIL, AP in the last 72 hours. No lab exists for component: SGOT, GPT, DBIL   Thyroid Studies Lab Results   Component Value Date/Time    TSH 1.12 05/17/2021 05:09 PM              Marilyn HAQUE Genia:[phone]         I have personally and independently evaluated and examined the patient. All relevant labs and testing data are reviewed. I have personally reviewed all the diagnostic labs, available EKG imaging x-rays and other diagnostic data and incorporated them into my care. I am referencing APC's note. I participated in medical decision making. Care plan discussed and updated after review.   Kwasi Soto MD

## 2021-06-30 VITALS
RESPIRATION RATE: 18 BRPM | SYSTOLIC BLOOD PRESSURE: 135 MMHG | BODY MASS INDEX: 33.15 KG/M2 | HEART RATE: 105 BPM | DIASTOLIC BLOOD PRESSURE: 91 MMHG | TEMPERATURE: 98 F | HEIGHT: 67 IN | OXYGEN SATURATION: 100 % | WEIGHT: 211.2 LBS

## 2021-06-30 LAB
ABO + RH BLD: NORMAL
BACTERIA SPEC CULT: NORMAL
BLD PROD TYP BPU: NORMAL
BLOOD GROUP ANTIBODIES SERPL: NORMAL
BPU ID: NORMAL
CALLED TO:,BCALL1: NORMAL
CROSSMATCH RESULT,%XM: NORMAL
GLUCOSE BLD STRIP.AUTO-MCNC: 118 MG/DL (ref 70–110)
GLUCOSE BLD STRIP.AUTO-MCNC: 129 MG/DL (ref 70–110)
GRAM STN SPEC: NORMAL
SERVICE CMNT-IMP: NORMAL
SPECIMEN EXP DATE BLD: NORMAL
STATUS OF UNIT,%ST: NORMAL
UNIT DIVISION, %UDIV: 0

## 2021-06-30 PROCEDURE — 82962 GLUCOSE BLOOD TEST: CPT

## 2021-06-30 PROCEDURE — 74011250636 HC RX REV CODE- 250/636: Performed by: INTERNAL MEDICINE

## 2021-06-30 PROCEDURE — 99232 SBSQ HOSP IP/OBS MODERATE 35: CPT | Performed by: INTERNAL MEDICINE

## 2021-06-30 PROCEDURE — 74011250637 HC RX REV CODE- 250/637: Performed by: INTERNAL MEDICINE

## 2021-06-30 PROCEDURE — 74011250637 HC RX REV CODE- 250/637: Performed by: PHYSICIAN ASSISTANT

## 2021-06-30 PROCEDURE — 74011250637 HC RX REV CODE- 250/637: Performed by: NURSE PRACTITIONER

## 2021-06-30 PROCEDURE — 99239 HOSP IP/OBS DSCHRG MGMT >30: CPT | Performed by: FAMILY MEDICINE

## 2021-06-30 PROCEDURE — 74011250637 HC RX REV CODE- 250/637: Performed by: HOSPITALIST

## 2021-06-30 RX ORDER — AMOXICILLIN AND CLAVULANATE POTASSIUM 875; 125 MG/1; MG/1
1 TABLET, FILM COATED ORAL EVERY 12 HOURS
Qty: 10 TABLET | Refills: 0 | Status: SHIPPED | OUTPATIENT
Start: 2021-06-30 | End: 2021-07-05

## 2021-06-30 RX ORDER — AMLODIPINE BESYLATE 10 MG/1
10 TABLET ORAL DAILY
Qty: 30 TABLET | Refills: 0 | Status: SHIPPED | OUTPATIENT
Start: 2021-07-01 | End: 2021-07-02

## 2021-06-30 RX ORDER — CARVEDILOL 25 MG/1
12.5 TABLET ORAL 2 TIMES DAILY WITH MEALS
Qty: 60 TABLET | Refills: 0 | Status: SHIPPED | OUTPATIENT
Start: 2021-06-30 | End: 2021-10-13 | Stop reason: SDUPTHER

## 2021-06-30 RX ORDER — FUROSEMIDE 20 MG/1
20 TABLET ORAL DAILY
Status: DISCONTINUED | OUTPATIENT
Start: 2021-07-01 | End: 2021-06-30 | Stop reason: HOSPADM

## 2021-06-30 RX ORDER — LANOLIN ALCOHOL/MO/W.PET/CERES
400 CREAM (GRAM) TOPICAL 2 TIMES DAILY
Qty: 28 TABLET | Refills: 0 | Status: SHIPPED | OUTPATIENT
Start: 2021-06-30 | End: 2022-05-17

## 2021-06-30 RX ORDER — FLUCONAZOLE 200 MG/1
400 TABLET ORAL DAILY
Qty: 18 TABLET | Refills: 0 | Status: SHIPPED | OUTPATIENT
Start: 2021-06-30 | End: 2021-07-09

## 2021-06-30 RX ORDER — LANOLIN ALCOHOL/MO/W.PET/CERES
325 CREAM (GRAM) TOPICAL
Qty: 30 TABLET | Refills: 0 | Status: SHIPPED | OUTPATIENT
Start: 2021-06-30 | End: 2021-08-23 | Stop reason: SDUPTHER

## 2021-06-30 RX ORDER — AMLODIPINE BESYLATE 10 MG/1
10 TABLET ORAL DAILY
Status: DISCONTINUED | OUTPATIENT
Start: 2021-07-01 | End: 2021-06-30 | Stop reason: HOSPADM

## 2021-06-30 RX ORDER — CLOPIDOGREL BISULFATE 75 MG/1
75 TABLET ORAL DAILY
Qty: 30 TABLET | Refills: 0 | Status: SHIPPED | OUTPATIENT
Start: 2021-07-01 | End: 2021-08-23 | Stop reason: SDUPTHER

## 2021-06-30 RX ORDER — AMOXICILLIN AND CLAVULANATE POTASSIUM 875; 125 MG/1; MG/1
1 TABLET, FILM COATED ORAL EVERY 12 HOURS
Status: DISCONTINUED | OUTPATIENT
Start: 2021-06-30 | End: 2021-06-30 | Stop reason: HOSPADM

## 2021-06-30 RX ORDER — CALCITRIOL 0.25 UG/1
0.25 CAPSULE ORAL DAILY
Qty: 30 CAPSULE | Refills: 0 | Status: SHIPPED | OUTPATIENT
Start: 2021-07-01 | End: 2021-10-13

## 2021-06-30 RX ORDER — AMLODIPINE BESYLATE 5 MG/1
5 TABLET ORAL ONCE
Status: COMPLETED | OUTPATIENT
Start: 2021-06-30 | End: 2021-06-30

## 2021-06-30 RX ADMIN — AMLODIPINE BESYLATE 5 MG: 5 TABLET ORAL at 13:37

## 2021-06-30 RX ADMIN — FERROUS SULFATE TAB 325 MG (65 MG ELEMENTAL FE) 325 MG: 325 (65 FE) TAB at 08:52

## 2021-06-30 RX ADMIN — CALCITRIOL CAPSULES 0.25 MCG 0.25 MCG: 0.25 CAPSULE ORAL at 08:52

## 2021-06-30 RX ADMIN — ENOXAPARIN SODIUM 40 MG: 40 INJECTION SUBCUTANEOUS at 08:52

## 2021-06-30 RX ADMIN — FAMOTIDINE 20 MG: 20 TABLET, FILM COATED ORAL at 08:52

## 2021-06-30 RX ADMIN — AMOXICILLIN AND CLAVULANATE POTASSIUM 1 TABLET: 875; 125 TABLET, FILM COATED ORAL at 13:37

## 2021-06-30 RX ADMIN — AMLODIPINE BESYLATE 5 MG: 5 TABLET ORAL at 08:52

## 2021-06-30 RX ADMIN — SPIRONOLACTONE 25 MG: 25 TABLET, FILM COATED ORAL at 08:52

## 2021-06-30 RX ADMIN — CARVEDILOL 25 MG: 25 TABLET, FILM COATED ORAL at 08:52

## 2021-06-30 RX ADMIN — CLOPIDOGREL BISULFATE 75 MG: 75 TABLET ORAL at 08:52

## 2021-06-30 RX ADMIN — Medication 10 ML: at 05:47

## 2021-06-30 RX ADMIN — MAGNESIUM OXIDE TAB 400 MG (241.3 MG ELEMENTAL MG) 400 MG: 400 (241.3 MG) TAB at 08:52

## 2021-06-30 NOTE — PROGRESS NOTES
Problem: Diabetes Self-Management  Goal: *Incorporating nutritional management into lifestyle  Description: Describe effect of type, amount and timing of food on blood glucose; list 3 methods for planning meals. 6/30/2021 1331 by Desmond Sosa RN  Outcome: Resolved/Met  6/30/2021 1331 by Desmond Sosa RN  Outcome: Progressing Towards Goal     Problem: Diabetes Self-Management  Goal: *Disease process and treatment process  Description: Define diabetes and identify own type of diabetes; list 3 options for treating diabetes.   6/30/2021 1331 by Desmond Sosa RN  Outcome: Resolved/Met  6/30/2021 1331 by Desmond Sosa RN  Outcome: Progressing Towards Goal

## 2021-06-30 NOTE — PROGRESS NOTES
Problem: Diabetes Self-Management  Goal: *Disease process and treatment process  Description: Define diabetes and identify own type of diabetes; list 3 options for treating diabetes. Outcome: Progressing Towards Goal  Goal: *Incorporating nutritional management into lifestyle  Description: Describe effect of type, amount and timing of food on blood glucose; list 3 methods for planning meals. Outcome: Progressing Towards Goal  Goal: *Incorporating physical activity into lifestyle  Description: State effect of exercise on blood glucose levels. Outcome: Progressing Towards Goal  Goal: *Developing strategies to promote health/change behavior  Description: Define the ABC's of diabetes; identify appropriate screenings, schedule and personal plan for screenings. Outcome: Progressing Towards Goal  Goal: *Using medications safely  Description: State effect of diabetes medications on diabetes; name diabetes medication taking, action and side effects. Outcome: Progressing Towards Goal  Goal: *Monitoring blood glucose, interpreting and using results  Description: Identify recommended blood glucose targets  and personal targets. Outcome: Progressing Towards Goal  Goal: *Prevention, detection, treatment of acute complications  Description: List symptoms of hyper- and hypoglycemia; describe how to treat low blood sugar and actions for lowering  high blood glucose level. Outcome: Progressing Towards Goal  Goal: *Prevention, detection and treatment of chronic complications  Description: Define the natural course of diabetes and describe the relationship of blood glucose levels to long term complications of diabetes.   Outcome: Progressing Towards Goal  Goal: *Developing strategies to address psychosocial issues  Description: Describe feelings about living with diabetes; identify support needed and support network  Outcome: Progressing Towards Goal  Goal: *Insulin pump training  Outcome: Progressing Towards Goal  Goal: *Sick day guidelines  Outcome: Progressing Towards Goal  Goal: *Patient Specific Goal (EDIT GOAL, INSERT TEXT)  Outcome: Progressing Towards Goal     Problem: Falls - Risk of  Goal: *Absence of Falls  Description: Document Davon Fall Risk and appropriate interventions in the flowsheet.   Outcome: Progressing Towards Goal  Note: Fall Risk Interventions:  Mobility Interventions: Assess mobility with egress test         Medication Interventions: Patient to call before getting OOB    Elimination Interventions: Bed/chair exit alarm, Patient to call for help with toileting needs              Problem: Patient Education: Go to Patient Education Activity  Goal: Patient/Family Education  Outcome: Progressing Towards Goal     Problem: Pain  Goal: *Control of Pain  Outcome: Progressing Towards Goal     Problem: General Medical Care Plan  Goal: *Vital signs within specified parameters  Outcome: Progressing Towards Goal  Goal: *Labs within defined limits  Outcome: Progressing Towards Goal  Goal: *Absence of infection signs and symptoms  Outcome: Progressing Towards Goal  Goal: *Optimal pain control at patient's stated goal  Outcome: Progressing Towards Goal  Goal: *Skin integrity maintained  Outcome: Progressing Towards Goal  Goal: *Optimize nutritional status  Outcome: Progressing Towards Goal  Goal: *Progressive mobility and function (eg: ADL's)  Outcome: Progressing Towards Goal

## 2021-06-30 NOTE — PROGRESS NOTES
Infectious Disease progress Note        Reason: left foot infection    Current abx Prior abx   Fluconazole since 6/28     iv unasyn since 6/29  ceftriaxone 5/17-6/23/2021  Cefepime 6/24-6/  levofloxacin  6/24/2021  eraxis 6/26-6/28   vancomycin 6/28-6/29  Ceftriaxone  6/28-6/29       Lines:       Assessment :    40 y.o.  female  With PMH of HTN, uncontrolled type 2 DM-last hemoglobin A1c 9 .6 on 6/25/2021, peripheral Neuropathy came tot he ER on 6/24/2021 with recurrent dizziness     Hospitalization January 2019 for left second toe, left foot cellulitis     Hospitalization April 2021 for left foot infection, bone biopsy negative for osteomyelitis  Wound cultures group B streptococcus     Hospitalization 5/2021 for sepsis -present on admission due to group B bloodstream infection  (positive blood culture 5/17, negative blood culture 5/19/21 ), left foot diabetic ulcer with cellulitis/abscess, possible osteomyelitis of first metatarsal head with septic 1st MTPJ. Intra-Op cultures 5/18-group B streptococcus     Now with recurrent fainting spells, evidence of osteomyelitis of left fourth/fifth toe on MRI 6/24/2021    Clinical presentation consistent with osteomyelitis left fourth/fifth toe, Candida albicans fungemia (positive blood culture 6/24, negative blood culture 6/27 ) in a patient with uncontrolled type 2 Dm    Status post left fourth/fifth partial toe amputation, bone biopsy on 6/25/2021. Discussed with pathologist.  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 S/p picc removal 6/26/21    Antibiotic management complicated due to history of penicillin allergy. Had rash with penicillin in childhood. Denies life-threatening allergy.   Willing to try ampicillin/sulbactam.Benefits and risks of this approach including rash, hives, swelling of throat, anaphylaxis, death explained to patient in details. She verbalized her understanding and wishes to proceed. Nursing staff was advised to monitor closely for allergy. Give antihistaminics & call me if any allergic reaction noted. Recommendations   - d/c  iv unasyn   -continue fluconazole till 7/9/21  -switch to po augmentin till 7/5/2021   -f/u podiatry recommendations regarding wound care    Discharge planning per primary team       Above plan was discussed in details with patient,. Please call me if any further questions or concerns. Will continue to participate in the care of this patient. HPI:    Feels better. Denies any further chills. Denies increasing pain in the left foot      Current Discharge Medication List        CONTINUE these medications which have NOT CHANGED    Details   ticagrelor (BRILINTA) 90 mg tablet Take 1 Tablet by mouth every twelve (12) hours every twelve (12) hours. Qty: 180 Tablet, Refills: 1      insulin lispro (HUMALOG) 100 unit/mL kwikpen 10 units before meals  Indications: type 2 diabetes mellitus  Qty: 5 Pen, Refills: 11    Associated Diagnoses: Type 2 diabetes mellitus with complication (HCC)      metFORMIN ER (GLUCOPHAGE XR) 500 mg tablet Take 1 Tab by mouth daily (with dinner). Qty: 90 Tab, Refills: 3    Associated Diagnoses: Type 2 diabetes mellitus with complication (HCC)      rosuvastatin (CRESTOR) 10 mg tablet Take 1 Tab by mouth nightly. Qty: 90 Tab, Refills: 3    Associated Diagnoses: NSTEMI (non-ST elevated myocardial infarction) ([facility])      carvediloL (Coreg) 12.5 mg tablet Take 1 Tab by mouth two (2) times daily (with meals). Indications: myocardial reinfarction prevention  Qty: 60 Tab, Refills: 3    Associated Diagnoses: [facility]STEM[facility] (non-ST elevated [facility] infarction) ([facility] Essential hypertension      spironolactone (ALDACTONE) 25 mg tablet Take 25 mg by mouth daily.       ferrous sulfate 325 mg (65 mg iron) tablet Take 1 Tab by mouth daily (with breakfast). Qty: 30 Tab, Refills: 0      furosemide (LASIX) 20 mg tablet Take 1 Tab by mouth daily as needed (Edema). Qty: 30 Tab, Refills: 1    Associated Diagnoses: Acute on chronic diastolic congestive heart failure (HCC)      insulin glargine (LANTUS,BASAGLAR) 100 unit/mL (3 mL) inpn Take 45 units daily  Indications: type 2 diabetes mellitus  Qty: 4 Pen, Refills: 5    Associated Diagnoses: Type 2 diabetes mellitus with complication (HCC)      aspirin 81 mg chewable tablet Take 1 Tab by mouth daily. Qty: 30 Tab, Refills: 0      melatonin 5 mg cap capsule Take 1 Cap by mouth nightly.   Qty: 30 Cap, Refills: 1    Associated Diagnoses: Difficulty sleeping      Insulin Needles, Disposable, 31 gauge x 5/16\" ndle Use to check blood glucose BID  Qty: 100 Pen Needle, Refills: 11    Associated Diagnoses: Type 2 diabetes mellitus with complication (HCC)      Insulin Syringe-Needle U-100 (INSULIN SYRINGE) 1 mL 30 gauge x 5/16 syrg As directed for lantus insulin  Qty: 50 Syringe, Refills: 0             Current Facility-Administered Medications   Medication Dose Route Frequency    ampicillin-sulbactam (UNASYN) 3 g in 0.9% sodium chloride (MBP/ADV) 100 mL MBP  3 g IntraVENous Q6H    0.9% sodium chloride infusion 250 mL  250 mL IntraVENous PRN    carvediloL (COREG) tablet 25 mg  25 mg Oral Q12H    fluconazole (DIFLUCAN) 400mg/200 mL IVPB (premix)  400 mg IntraVENous Q24H    clopidogreL (PLAVIX) tablet 75 mg  75 mg Oral DAILY    spironolactone (ALDACTONE) tablet 25 mg  25 mg Oral DAILY    famotidine (PEPCID) tablet 20 mg  20 mg Oral BID    insulin glargine (LANTUS) injection 25 Units  25 Units SubCUTAneous QHS    amLODIPine (NORVASC) tablet 5 mg  5 mg Oral DAILY    calcitRIOL (ROCALTROL) capsule 0.25 mcg  0.25 mcg Oral DAILY    0.9% sodium chloride infusion 250 mL  250 mL IntraVENous PRN    insulin lispro (HUMALOG) injection   SubCUTAneous AC&HS    diphenhydrAMINE (BENADRYL) injection 25 mg  25 mg IntraVENous Q6H PRN    ferrous sulfate tablet 325 mg  325 mg Oral DAILY WITH BREAKFAST    rosuvastatin (CRESTOR) tablet 10 mg  10 mg Oral QHS    sodium chloride (NS) flush 5-40 mL  5-40 mL IntraVENous Q8H    sodium chloride (NS) flush 5-40 mL  5-40 mL IntraVENous PRN    acetaminophen (TYLENOL) tablet 650 mg  650 mg Oral Q6H PRN    Or    acetaminophen (TYLENOL) suppository 650 mg  650 mg Rectal Q6H PRN    polyethylene glycol (MIRALAX) packet 17 g  17 g Oral DAILY PRN    ondansetron (ZOFRAN ODT) tablet 4 mg  4 mg Oral Q8H PRN    Or    ondansetron (ZOFRAN) injection 4 mg  4 mg IntraVENous Q6H PRN    enoxaparin (LOVENOX) injection 40 mg  40 mg SubCUTAneous DAILY    magnesium oxide (MAG-OX) tablet 400 mg  400 mg Oral BID       Allergies: Azithromycin and Pcn [penicillins]    Temp (24hrs), Av.3 °F (36.8 °C), Min:97.6 °F (36.4 °C), Max:98.6 °F (37 °C)    Visit Vitals  BP (!) 165/99 (BP 1 Location: Left upper arm, BP Patient Position: At rest)   Pulse 99   Temp 98.1 °F (36.7 °C)   Resp 18   Ht 5' 7\" (1.702 m)   Wt 95.8 kg (211 lb 3.2 oz)   SpO2 99%   Breastfeeding No   BMI 33.08 kg/m²       ROS: 12 point ROS obtained in details. Pertinent positives as mentioned in HPI,   otherwise negative    Physical Exam:    Constitutional: She is oriented to person, place, and time. She appears well-developed and well-nourished. No distress. HENT:   Head: Normocephalic and atraumatic. Mouth/Throat: Oropharynx is clear and moist.   Eyes: Conjunctivae are normal. Pupils are equal, round, and reactive to light. No scleral icterus. Neck: Normal range of motion. Neck supple. Cardiovascular: Normal rate and intact distal pulses. Pulmonary/Chest: Effort normal and breath sounds normal. No respiratory distress. She has no wheezes. Abdominal: Soft. Bowel sounds are normal. She exhibits no distension. There is no tenderness. Musculoskeletal: Normal range of motion. Surgical changes left foot-covered with wound VAC.   No increased erythema/warmth/tenderness bilateral lower extremity  Lymphadenopathy:     She has no cervical adenopathy. Neurological: She is alert and oriented to person, place, and time. No cranial nerve deficit. Decreased sensation to light touch plantar aspect of both feet. Skin: Skin is warm. Left foot surgical changes as mentioned above    Labs: Results:   Chemistry Recent Labs     06/29/21 0237 06/28/21  0219   GLU 97 133*    140   K 3.4* 3.4*   * 110   CO2 25 26   BUN 10 11   CREA 1.04 1.14   CA 7.9* 7.9*   AGAP 6 4   BUCR 10* 10*      CBC w/Diff Recent Labs     06/29/21 0237 06/28/21  1530 06/28/21  0219   WBC 7.2  --  8.6   RBC 3.13*  --  3.12*   HGB 6.9* 7.3* 6.9*   HCT 23.1* 24.5* 23.3*     --  210   GRANS 63  --  60   LYMPH 20*  --  25   EOS 4  --  2      Microbiology No results for input(s): CULT in the last 72 hours. RADIOLOGY:    All available imaging studies/reports in [facility] for this admission were reviewed           Disclaimer: Sections of this note are dictated utilizing voice recognition software, which may have resulted in some phonetic based errors in grammar and contents. Even though attempts were made to correct all the mistakes, some may have been missed, and remained in the body of the document. If questions arise, please contact our department.     Dr. Billy Carmen, Infectious Disease Specialist  [phone]  June 30, 2021  9:31 AM

## 2021-06-30 NOTE — PROGRESS NOTES
Cardiology progress note. 1. Recurrent syncope- in the setting severe anemia- no recurrence   2. Severe anemia- s/p 1 unit PRBC's on 6/25/29. Today Hgb 6.9   3. CAD- s/p Cardiac cath 10/22/20 100% mid RCA occlusion with thrombus. S/p aspiration thrombectomy followed by ptca/stent ( 2 BLANCA stents placed overlapping extending from mid RCA to distal RCA). Mild mid LAD stenosis. 4. Chronic diastolic heart failure- compensated   5. Hx of high degree AV block - 10/20  s/p TPM due to complete heart block during PCI. Post PCI developed juctional tachycardia. TPM was removed.  ekg 4/5/21 sinus tachycardia with nonspecific T wave abnormality   6. Hypertension-BP elevated. Increased Norvasc    7. Hyperlipidemia-continue statin   8. Diabetes-poorly controlled with A1c 8.5  9. Hypokalemia- on admission with K+ 2.6 improved. 10. Hypomagnesemia- with Mag+ 1.4 on admission- resolved   11. Hypocalcemia  12. Left foot infection- extensive osteomyelitis showed on recent foot MRI - s/p Foot bone biopsy and amputation 4th and 5th toe         6/25/21 Echo   · LV: Estimated LVEF is 55 - 60%. Visually measured ejection fraction. · Normal cavity size and systolic function (ejection fraction normal). Mild concentric hypertrophy. Wall motion: normal.  · Pericardium: Trivial pericardial effusion measuring 7 mm.       Plan:       Cardiac status appears stable. Recent echo shows preserved EF% no wma noted. S/p 1 unit PRBC's. follow CBC today    ASA discontinued. Continue with Plavix only  Continue to replace electrolytes as needed   Elevated BP  Increased Norvasc. Continue coreg   Continue HTN medications. Has mild BLE edema please get compression stockings. Will resume low dose lasix. Continue aldactone         Discharge planning per medical team. Follow with Gene HAQUE 2-3 days.  Then with Dr. Andreas Ojeda 3-4 weeks     Adjust medications for hypertension with increasing Norvasc today and follow closely as outpatient. Okay for discharge from cardiac standpoint. Follow-up as above per APC note. I have personally and independently evaluated and examined the patient. All relevant labs and testing data are reviewed. I have personally reviewed all the diagnostic labs, available EKG imaging x-rays and other diagnostic data and incorporated them into my care. I am referencing APC's note. I participated in medical decision making. Care plan discussed and updated after review. Obdulia Burt MD      ASSESSMENT:  Hospital Problems  Date Reviewed: 5/26/2021        Codes Class Noted POA    Coronary artery disease of native artery of native heart with stable angina pectoris [facility]) ICD-10-CM: I25.118  ICD-9-CM: 414.01, 413.9  6/29/2021 Unknown        Foot ulcer, left ([facility] ICD-10-CM: Z43.569  ICD-9-CM: 707.15  6/24/2021 Unknown        Sepsis [facility] ICD-10-CM: A41.9  ICD-9-CM: 038.9, 995.91  5/17/2021 Unknown                SUBJECTIVE:  No CP  No SOB  Feels better   Wants to go home    OBJECTIVE:    VS:   Visit Vitals  BP (!) 165/99 (BP 1 Location: Left upper arm, BP Patient Position: At rest)   Pulse 99   Temp 98.1 °F (36.7 °C)   Resp 18   Ht 5' 7\" (1.702 m)   Wt 95.8 kg (211 lb 3.2 oz)   SpO2 99%   Breastfeeding No   BMI 33.08 kg/m²         Intake/Output Summary (Last 24 hours) at 6/30/2021 1000  Last data filed at 6/30/2021 0931  Gross per 24 hour   Intake 1215 ml   Output 700 ml   Net 515 ml     TELE: normal sinus rhythm/ sinus tachycardia     General: alert, well developed, pleasant and in no apparent distress  HENT: Normocephalic, atraumatic. Normal external eye.   Neck :  no bruit, no JVD  Cardiac:  regular rate and rhythm, S1, S2 normal, no murmur, click, rub or gallop  Lungs: Clear without rales or rhonchi  Abdomen: Soft, nontender, no masses  Extremities: trace if any edema BLE.  peripheral pulses present      Labs: Results:       Chemistry Recent Labs     06/29/21  0237 06/28/21  0219   GLU 97 133*    140 K 3.4* 3.4*   * 110   CO2 25 26   BUN 10 11   CREA 1.04 1.14   CA 7.9* 7.9*   AGAP 6 4   BUCR 10* 10*      CBC w/Diff Recent Labs     06/29/21  0237 06/28/21  1530 06/28/21  0219   WBC 7.2  --  8.6   RBC 3.13*  --  3.12*   HGB 6.9* 7.3* 6.9*   HCT 23.1* 24.5* 23.3*     --  210   GRANS 63  --  60   LYMPH 20*  --  25   EOS 4  --  2      Cardiac Enzymes No results for input(s): CPK, CKND1, ROBERTA in the last 72 hours. No lab exists for component: CKRMB, TROIP   Coagulation No results for input(s): PTP, INR, APTT, INREXT, INREXT in the last 72 hours. Lipid Panel Lab Results   Component Value Date/Time    Cholesterol, total 218 (H) 09/28/2020 01:00 PM    HDL Cholesterol 49 09/28/2020 01:00 PM    LDL, calculated 121 (H) 09/28/2020 01:00 PM    VLDL, calculated 48 (H) 09/28/2020 01:00 PM    Triglyceride 241 (H) 09/28/2020 01:00 PM      BNP No results for input(s): BNPP in the last 72 hours. Liver Enzymes No results for input(s): TP, ALB, TBIL, AP in the last 72 hours.     No lab exists for component: SGOT, GPT, DBIL   Thyroid Studies Lab Results   Component Value Date/Time    TSH 1.12 05/17/2021 05:09 PM              [address] NP-C [place]:[phone]

## 2021-06-30 NOTE — PROGRESS NOTES
1640 blood infusion held due to IV infiltrating  1645 Nurse attempted to start IV, unsuccessful  1650 Second nurse attempted IV. Patient states \"ya'll only have one more time to stick me because this hurts. It is my body and this hurts. \" Nurse requested PFM to be paged to attempt ultrasound guided IV. Nurse was informed that Dr. Muna Scott will come up to attempt ultrasound guided IV. About 1715 PFM paged again to attempt IV. Per MD Alex Simpson will come up once she completes some paper work. \" Nurse informed MD Washington Ragland about the patient losing IV access and that PFM was coming to attempt IV.  1830 Nurse called MD Washington Ragland back and informed him PFM never came to start the IV. MD Washington Ragland states he will look into changing medications to oral, and he is aware the patient was not able to complete transfusion. Patient was able to receive about 200ml of blood. Nurse called blood bank to inform them of the patient loosing access and not completing transfusion. Nurse informed to discard blood in biohazard.

## 2021-06-30 NOTE — PROGRESS NOTES
Physician Progress Note      PATIENT:               Carmel Banda  CSN #:                  [id number]  :                       1975  ADMIT DATE:       2021 10:15 AM  DISCH DATE:        2021 2:53 PM  RESPONDING  PROVIDER #:        Migdalia Phelan MD        QUERY TEXT:    Stage of Chronic Kidney Disease: Please provide further specificity, if known. Clinical indicators include: ckd, bun, creatinine  Options provided:  -- Chronic kidney disease stage 1  -- Chronic kidney disease stage 2  -- Chronic kidney disease stage 3  -- Chronic kidney disease stage 3a  -- Chronic kidney disease stage 3b  -- Chronic kidney disease stage 4  -- Chronic kidney disease stage 5  -- Chronic kidney disease stage 5, requiring dialysis  -- End stage renal disease  -- Other - I will add my own diagnosis  -- Disagree - Not applicable / Not valid  -- Disagree - Clinically Unable to determine / Unknown        PROVIDER RESPONSE TEXT:    The patient has chronic kidney disease stage 2.       Electronically signed by:  Migdalia Phelan MD 2021 4:58 PM

## 2021-06-30 NOTE — PROGRESS NOTES
Discharge order noted for today. Pt has been accepted to [address] [place] [address]. Met with patient and are agreeable to the transition plan today. Transport has been arranged through her . Patient's discharge summary and home health  orders have been forwarded to [address] [place] [address]. Updated bedside RN, Josselin Perez,  to the transition plan.   Discharge information has been documented on the AVS.           ROLAND Obrien RN  Care Management  Pager: [phone]

## 2021-06-30 NOTE — ROUTINE PROCESS
Bedside shift change report given to Maddie Tsai (oncoming nurse) by Manny Bearden (offgoing nurse). Report included the following information SBAR, Kardex, MAR, Procedure Verification and Quality Measures.

## 2021-06-30 NOTE — ROUTINE PROCESS
Bedside shift change report given to Sara Lockhart RN (oncoming nurse) by Stacia Keller RN (offgoing nurse). Report included the following information SBAR, Kardex, Intake/Output and MAR.

## 2021-07-01 ENCOUNTER — PATIENT OUTREACH (OUTPATIENT)
Dept: CASE MANAGEMENT | Age: 46
End: 2021-07-01

## 2021-07-01 NOTE — PROGRESS NOTES
Care Transitions Initial Call    Call within 2 business days of discharge: Yes     Patient: Jose Christianson Patient : 1975 MRN: [id number]    Last Discharge [address]       Complaint Diagnosis Description Type Department Provider    21 Dizziness; Fatigue Sepsis, unspecified ([facility] [address] . .. ED to Hosp-Admission (Discharged) (ADMIT) Jordan Sargent MD; Jordin Perdomo. .. Was this an external facility discharge? No Discharge Facility: [facility]    Challenges to be reviewed by the provider   Additional needs identified to be addressed with provider: no  none           Call placed to [facility]. Earliest they will be able to get out to see the patient is Saturday 7/3/21. Call placed to patient and let her know  Method of communication with provider : chart routing    Discussed COVID-19 related testing which was available at this time. Test results were negative. Patient informed of results, if available? yes     Advance Care Planning:   Does patient have an Advance Directive: yes, reviewed and current. Inpatient Readmission Risk score: Unplanned Readmit Risk Score: 29    Was this a readmission? no   Patient stated reason for the admission: non healing wound on foot with bone infection    Patients top risk factors for readmission: medical condition-diabetes   Interventions to address risk factors: Scheduled appointment with PCP-Marli, Obtained and reviewed discharge summary and/or continuity of care documents and Assessment and support for treatment adherence and medication management-reviewed medications to include name, dose,and indication    Care Transition Nurse (CTN) contacted the patient by telephone to perform post hospital discharge assessment. Verified name and  with patient as identifiers. Provided introduction to self, and explanation of the CTN role.      CTN reviewed discharge instructions, medical action plan and red flags with patient who verbalized understanding. Were discharge instructions available to patient? yes. Reviewed appropriate site of care based on symptoms and resources available to patient including: Specialist and CTN. Patient given an opportunity to ask questions and does not have any further questions or concerns at this time. The patient agrees to contact the PCP office for questions related to their healthcare. Medication reconciliation was performed with patient, who verbalizes understanding of administration of home medications. Advised obtaining a 90-day supply of all daily and as-needed medications. Referral to Pharm D needed: no     Home Health/Outpatient orders at discharge: home health care, PT and [address]: [facility]  Date of initial visit: resumption of care 7/3/21    Durable Medical Equipment ordered at discharge: None    Covid Risk Education    Educated patient about risk for severe COVID-19 due to risk factors according to CDC guidelines. CTN reviewed discharge instructions, medical action plan and red flag symptoms with the patient who verbalized understanding. Discussed COVID vaccination status: yes. Education provided on COVID-19 vaccination as appropriate. Discussed exposure protocols and quarantine with CDC Guidelines. Patient was given an opportunity to verbalize any questions and concerns and agrees to contact CTN or health care provider for questions related to their healthcare. Was patient discharged with a pulse oximeter? no. Discussed and confirmed pulse oximeter discharge instructions and when to notify provider or seek emergency care. Discussed follow-up appointments. If no appointment was previously scheduled, appointment scheduling offered: yes. Is follow up appointment scheduled within 7 days of discharge? CTN will request follow up with PCP.    [facility] follow up appointment(s):   Future Appointments   Date Time Provider Reginaldo Ceballos   7/2/2021  1:30 PM Vero Crenshaw MD CAP BS [facility]   8/9/2021 10:00 AM Richard Greenberg MD CAP BS [facility]   8/23/2021 11:40 AM Arturo Calles MD ABMA-MO BS [facility]     Non-[facility] follow up appointment(s): tbd    Plan for follow-up call in 7-10 days based on severity of symptoms and risk factors. Plan for next call: symptom management-pain wound drainage? CTN provided contact information for future needs. Goals Addressed                 This Visit's Progress     Prevent complications post hospitalization. 1. CTN will monitor X 4 weeks    2. Ensure provider appt is scheduled within 7 days post-discharge  7/1/2021 CTN will request hospital follow up   3. Confirm patient attended post-discharge provider apt    4. Complete post-visit call to confirm attendance and update care needs      5. Review/educate common or potential \"red flags\" of condition worsening    · tempature greater than 100 or less than 97 degrees  · Swelling   · reddness  · Foul smelling drainage from incision site  · confusion   6. Evaluate adherence to medications and priority barriers to resolve        7. Instruct on adherence to medications as ordered and assess for therapeutic response and side-effects         8. Discuss and evaluate ADL performance. Provide recommendations on energy conservation, particularly related to transition home from an inpatient admission.

## 2021-07-02 ENCOUNTER — HOSPITAL ENCOUNTER (OUTPATIENT)
Dept: LAB | Age: 46
Discharge: HOME | End: 2021-07-02

## 2021-07-02 ENCOUNTER — OFFICE VISIT (OUTPATIENT)
Dept: CARDIOLOGY CLINIC | Age: 46
End: 2021-07-02
Payer: MEDICAID

## 2021-07-02 VITALS
WEIGHT: 198 LBS | DIASTOLIC BLOOD PRESSURE: 75 MMHG | SYSTOLIC BLOOD PRESSURE: 101 MMHG | BODY MASS INDEX: 31.08 KG/M2 | HEIGHT: 67 IN | OXYGEN SATURATION: 100 % | HEART RATE: 96 BPM

## 2021-07-02 DIAGNOSIS — E66.9 OBESITY (BMI 30.0-34.9): ICD-10-CM

## 2021-07-02 DIAGNOSIS — E78.00 HYPERCHOLESTEREMIA: ICD-10-CM

## 2021-07-02 DIAGNOSIS — I31.39 PERICARDIAL EFFUSION: ICD-10-CM

## 2021-07-02 DIAGNOSIS — I50.32 CHRONIC DIASTOLIC CONGESTIVE HEART FAILURE (HCC): ICD-10-CM

## 2021-07-02 DIAGNOSIS — Z09 HOSPITAL DISCHARGE FOLLOW-UP: ICD-10-CM

## 2021-07-02 DIAGNOSIS — I25.10 ATHEROSCLEROSIS OF NATIVE CORONARY ARTERY OF NATIVE HEART WITHOUT ANGINA PECTORIS: Primary | ICD-10-CM

## 2021-07-02 DIAGNOSIS — I10 ESSENTIAL HYPERTENSION: ICD-10-CM

## 2021-07-02 DIAGNOSIS — Z95.5 HISTORY OF CORONARY ARTERY STENT PLACEMENT: ICD-10-CM

## 2021-07-02 DIAGNOSIS — D50.9 IRON DEFICIENCY ANEMIA, UNSPECIFIED IRON DEFICIENCY ANEMIA TYPE: ICD-10-CM

## 2021-07-02 LAB — SENTARA SPECIMEN COL,SENBCF: NORMAL

## 2021-07-02 PROCEDURE — 1111F DSCHRG MED/CURRENT MED MERGE: CPT | Performed by: INTERNAL MEDICINE

## 2021-07-02 PROCEDURE — 99496 TRANSJ CARE MGMT HIGH F2F 7D: CPT | Performed by: INTERNAL MEDICINE

## 2021-07-02 PROCEDURE — 99001 SPECIMEN HANDLING PT-LAB: CPT

## 2021-07-02 RX ORDER — AMLODIPINE BESYLATE 5 MG/1
5 TABLET ORAL DAILY
Qty: 90 TABLET | Refills: 1 | Status: SHIPPED | OUTPATIENT
Start: 2021-07-02 | End: 2021-10-13 | Stop reason: SDUPTHER

## 2021-07-02 RX ORDER — SPIRONOLACTONE 25 MG/1
12.5 TABLET ORAL DAILY
Qty: 45 TABLET | Refills: 0 | Status: SHIPPED | OUTPATIENT
Start: 2021-07-02 | End: 2021-10-13

## 2021-07-02 NOTE — PATIENT INSTRUCTIONS
Medications Discontinued During This Encounter   Medication Reason    spironolactone (ALDACTONE) 25 mg tablet REORDER    amLODIPine (NORVASC) 10 mg tablet           Learning About the Mediterranean Diet  What is the Mediterranean diet? The Mediterranean diet is a style of eating rather than a diet plan. It features foods eaten in [place], [place], [place], and other countries along the [facility]. It emphasizes eating foods like fish, fruits, vegetables, beans, high-fiber breads and whole grains, nuts, and olive oil. This style of eating includes limited red meat, cheese, and sweets. Why choose the Mediterranean diet? A Mediterranean-style diet may improve heart health. It contains more fat than other heart-healthy diets. But the fats are mainly from nuts, unsaturated oils (such as fish oils and olive oil), and certain nut or seed oils (such as canola, soybean, or flaxseed oil). These fats may help protect the heart and blood vessels. How can you get started on the Mediterranean diet? Here are some things you can do to switch to a more Mediterranean way of eating. What to eat  · Eat a variety of fruits and vegetables each day, such as grapes, blueberries, tomatoes, broccoli, peppers, figs, olives, spinach, eggplant, beans, lentils, and chickpeas. · Eat a variety of whole-grain foods each day, such as oats, brown rice, and whole wheat bread, pasta, and couscous. · Eat fish at least 2 times a week. Try tuna, salmon, mackerel, lake trout, herring, or sardines. · Eat moderate amounts of low-fat dairy products, such as milk, cheese, or yogurt. · Eat moderate amounts of poultry and eggs. · Choose healthy (unsaturated) fats, such as nuts, olive oil, and certain nut or seed oils like canola, soybean, and flaxseed. · Limit unhealthy (saturated) fats, such as butter, palm oil, and coconut oil. And limit fats found in animal products, such as meat and dairy products made with whole milk. Try to eat red meat only a few times a month in very small amounts. · Limit sweets and desserts to only a few times a week. This includes sugar-sweetened drinks like soda. The Mediterranean diet may also include red wine with your meal--1 glass each day for women and up to 2 glasses a day for men. Tips for eating at home  · Use herbs, spices, garlic, lemon zest, and citrus juice instead of salt to add flavor to foods. · Add avocado slices to your sandwich instead of ward. · Have fish for lunch or dinner instead of red meat. Brush the fish with olive oil, and broil or grill it. · Sprinkle your salad with seeds or nuts instead of cheese. · Cook with olive or canola oil instead of butter or oils that are high in saturated fat. · Switch from 2% milk or whole milk to 1% or fat-free milk. · Dip raw vegetables in a vinaigrette dressing or hummus instead of dips made from mayonnaise or sour cream.  · Have a piece of fruit for dessert instead of a piece of cake. Try baked apples, or have some dried fruit. Tips for eating out  · Try broiled, grilled, baked, or poached fish instead of having it fried or breaded. · Ask your  to have your meals prepared with olive oil instead of butter. · Order dishes made with marinara sauce or sauces made from olive oil. Avoid sauces made from cream or mayonnaise. · Choose whole-grain breads, whole wheat pasta and pizza crust, brown rice, beans, and lentils. · Cut back on butter or margarine on bread. Instead, you can dip your bread in a small amount of olive oil. · Ask for a side salad or grilled vegetables instead of french fries or chips. Where can you learn more? Go to http://www.[employer].com/  Enter O407 in the search box to learn more about \"Learning About the Mediterranean Diet. \"  Current as of: December 17, 2020               Content Version: 12.8  © 3605-4557 Healthwise, [facility].    Care instructions adapted under license by [employer] (which disclaims liability or warranty for this information). If you have questions about a medical condition or this instruction, always ask your healthcare professional. [address] any warranty or liability for your use of this information.

## 2021-07-02 NOTE — PROGRESS NOTES
HISTORY OF PRESENT ILLNESS  Reymundo Bautista is a 40 y.o. female. 11/20 follow-up of non-STEMI status post PCI with BLANCA in RCA, hypertension, hyperlipidemia, obesity, diabetes        Hypertension  The history is provided by the medical records. This is a chronic problem. Associated symptoms include shortness of breath. Pertinent negatives include no chest pain and no headaches. Shortness of Breath  The history is provided by the patient. This is a new problem. The problem occurs intermittently. The current episode started more than 1 week ago (Early 2020). The problem has not changed since onset. Associated symptoms include leg swelling. Pertinent negatives include no fever, no headaches, no cough, no wheezing, no PND, no orthopnea, no chest pain, no vomiting, no rash and no claudication. The problem's precipitants include exercise (100 ft; 2/21 steps- 4 steps on stairs; ). Leg Swelling  The history is provided by the patient. This is a recurrent problem. The current episode started more than 1 week ago (2/20). The problem occurs every several days. The problem has been resolved (Since hospital discharge in 10/20). Associated symptoms include shortness of breath. Pertinent negatives include no chest pain and no headaches. Hospital Follow Up  The history is provided by the patient and medical records. Associated symptoms include shortness of breath. Pertinent negatives include no chest pain and no headaches.      Allergies   Allergen Reactions    Azithromycin Other (comments)     Rapid response was called for bradycardia and hypotension     Pcn [Penicillins] Hives       Past Medical History:   Diagnosis Date    CAD (coronary artery disease)     Diabetes ([address]     HTN (hypertension)     Hyperlipidemia        Family History   Problem Relation Age of Onset    Lupus Mother     Cancer Neg Hx     Diabetes Neg Hx     Heart Disease Neg Hx     Hypertension Neg Hx     Heart Attack Neg Hx     Stroke Neg Hx Social History     Tobacco Use    Smoking status: Never Smoker    Smokeless tobacco: Never Used   Substance Use Topics    Alcohol use: No    Drug use: No        Current Outpatient Medications   Medication Sig    carvediloL (Coreg) 25 mg tablet Take 0.5 Tablets by mouth two (2) times daily (with meals). Indications: myocardial reinfarction prevention    magnesium oxide (MAG-OX) 400 mg tablet Take 1 Tablet by mouth two (2) times a day.  clopidogreL (PLAVIX) 75 mg tab Take 1 Tablet by mouth daily.  calcitRIOL (ROCALTROL) 0.25 mcg capsule Take 1 Capsule by mouth daily.  ferrous sulfate 325 mg (65 mg iron) tablet Take 1 Tablet by mouth daily (with breakfast).  amLODIPine (NORVASC) 10 mg tablet Take 1 Tablet by mouth daily.  amoxicillin-clavulanate (AUGMENTIN) 875-125 mg per tablet Take 1 Tablet by mouth every twelve (12) hours for 5 days.  fluconazole (DIFLUCAN) 200 mg tablet Take 2 Tablets by mouth daily for 9 days. FDA advises cautious prescribing of oral fluconazole in pregnancy.  insulin lispro (HUMALOG) 100 unit/mL kwikpen 10 units before meals  Indications: type 2 diabetes mellitus    rosuvastatin (CRESTOR) 10 mg tablet Take 1 Tab by mouth nightly.  spironolactone (ALDACTONE) 25 mg tablet Take 25 mg by mouth daily.  furosemide (LASIX) 20 mg tablet Take 1 Tab by mouth daily as needed (Edema).  insulin glargine (LANTUS,BASAGLAR) 100 unit/mL (3 mL) inpn Take 45 units daily  Indications: type 2 diabetes mellitus (Patient taking differently: 50 Units by SubCUTAneous route nightly. Indications: type 2 diabetes mellitus)    melatonin 5 mg cap capsule Take 1 Cap by mouth nightly. (Patient taking differently: Take 5 mg by mouth as needed (difficulty in sleeping).  Reports taking it as needed)    Insulin Needles, Disposable, 31 gauge x 5/16\" ndle Use to check blood glucose BID    Insulin Syringe-Needle U-100 (INSULIN SYRINGE) 1 mL 30 gauge x 5/16 syrg As directed for lantus insulin     No current facility-administered medications for this visit. Past Surgical History:   Procedure Laterality Date    HX CORONARY STENT PLACEMENT      HX GYN             Visit Vitals  /75 (BP 1 Location: Left upper arm, BP Patient Position: Sitting, BP Cuff Size: Large adult)   Pulse 96   Ht 5' 7\" (1.702 m)   Wt 89.8 kg (198 lb)   SpO2 100%   BMI 31.01 kg/m²       Diagnostic Studies:  I have reviewed the relevant tests done on the patient and show as follows  EKG tracings reviewed by me today. EKG Results     None        XR Results (most recent):  Results from Hospital Encounter encounter on 21    XR FOOT LT AP/LAT    Narrative  Left Foot -2 Views    HISTORY: Postop. COMPARISON: 2021. FINDINGS:    2 views obtained. Similar absence of the first distal metatarsal and base of the  proximal phalanx. Interval amputation of the fourth and fifth digits at the mid  proximal phalanx shaft. Bones are osteopenic soft tissue edema at the first  digit resection site, as before with medial ulceration. Soft tissue edema at the  resection site fourth and fifth digits. Marked edema of the distal foot plantar  and dorsal aspect. There is edema at the ankle. Calcaneal bone spurs. Impression  Interval resection of the fourth and fifth digits at the mid shaft of the  proximal phalanx. Overlying soft tissue edema. Resection of the first digit at the MTP with significant soft tissue swelling  and ulceration. Significant edema/inflammation distal foot greater than ankle. 10/19/20   ECHO ADULT FOLLOW-UP OR LIMITED 10/20/2020 10/20/2020    Narrative · LV: Estimated LVEF is 55 - 60%. Visually measured ejection fraction. Normal cavity size, wall thickness and systolic function (ejection   fraction normal).  Wall motion: normal.  · COVID r/o        Signed by: Juan Luis Tian MD          10/19/20   CARDIAC PROCEDURE 10/23/2020 10/23/2020    Narrative 100% mid RCA occlusion with thrombus. S/p aspiration thrombectomy followed by ptca/stent ( 2 BLANCA stents placed   overlapping extending from mid RCA to distal RCA). Mild mid LAD stenosis. Normal LV function by echo. TPM inserted via right femoral vein due to complete heart block. Plan:  Continue DAPT for 1 yr/ high dose statin. Post PCI developed juctional tachycardia. Will discontinue TPM. Monitor   and consider PPM if needed. Recommend intense risk factor modification. Signed by: Michael Kaiser MD           Ms. Giovanna Hines has a reminder for a \"due or due soon\" health maintenance. I have asked that she contact her primary care provider for follow-up on this health maintenance. Review of Systems   Constitutional: Negative for chills, fever, malaise/fatigue and weight loss. HENT: Negative for nosebleeds. Eyes: Negative for discharge. Respiratory: Positive for shortness of breath. Negative for cough and wheezing. Cardiovascular: Positive for leg swelling. Negative for chest pain, palpitations, orthopnea, claudication and PND. Gastrointestinal: Negative for diarrhea, nausea and vomiting. Genitourinary: Negative for dysuria and hematuria. Musculoskeletal: Negative for joint pain. Skin: Negative for rash. Neurological: Negative for dizziness, seizures, loss of consciousness and headaches. Endo/Heme/Allergies: Negative for polydipsia. Does not bruise/bleed easily. Psychiatric/Behavioral: Negative for depression and substance abuse. The patient does not have insomnia. 10/19/20   ECHO ADULT FOLLOW-UP OR LIMITED 10/20/2020 10/20/2020    Narrative · LV: Estimated LVEF is 55 - 60%. Visually measured ejection fraction. Normal cavity size, wall thickness and systolic function (ejection   fraction normal). Wall motion: normal.  · COVID r/o        Signed by: Bethanie Locke MD     10/19/20   CARDIAC PROCEDURE 10/23/2020 10/23/2020    Narrative 100% mid RCA occlusion with thrombus.   S/p aspiration thrombectomy followed by ptca/stent ( 2 BLANCA stents placed   overlapping extending from mid RCA to distal RCA). Mild mid LAD stenosis. Normal LV function by echo. TPM inserted via right femoral vein due to complete heart block. Plan:  Continue DAPT for 1 yr/ high dose statin. Post PCI developed juctional tachycardia. Will discontinue TPM. Monitor   and consider PPM if needed. Recommend intense risk factor modification. Signed by: Padmini Hinds MD   12/20 Holter  Normal sinus rhythm, no symptoms, no arrhythmias    Physical Exam  Constitutional:       General: She is not in acute distress. Appearance: She is well-developed. Comments: sev obese   HENT:      Head: Normocephalic and atraumatic. Mouth/Throat:      Dentition: Normal dentition. Eyes:      General: No scleral icterus. Right eye: No discharge. Left eye: No discharge. Neck:      Thyroid: No thyromegaly. Vascular: No carotid bruit or JVD. Cardiovascular:      Rate and Rhythm: Normal rate and regular rhythm. Pulses: Intact distal pulses. Heart sounds: Normal heart sounds, S1 normal and S2 normal. No murmur heard. No friction rub. No gallop. Pulmonary:      Effort: Pulmonary effort is normal.      Breath sounds: Normal breath sounds. No wheezing or rales. Abdominal:      Palpations: Abdomen is soft. There is no mass. Tenderness: There is no abdominal tenderness. Musculoskeletal:      Cervical back: Neck supple. Right lower leg: No edema. Left lower leg: No edema. Lymphadenopathy:      Cervical:      Right cervical: No superficial cervical adenopathy. Left cervical: No superficial cervical adenopathy. Skin:     General: Skin is warm and dry. Findings: No rash. Neurological:      Mental Status: She is alert and oriented to person, place, and time. Psychiatric:         Behavior: Behavior normal.         ASSESSMENT and PLAN    10/20 CAD stable.   Patient seems to have chronic diastolic CHF. No evidence of fluid overload. Increase losartan and follow home BP chart. Check a Holter monitor before starting beta-blockers. Refer to cardiac rehab  Diet weight and exercise discussed in detail. 2/21 CAD stable. Blood pressure is controlled. Patient is getting recurrent shortness of breath and some edema intermittently. Overall she does feel better. Use Lasix as needed and follow electrolytes. Lipid not available despite the fact she had a blood drawn. May have to redo it if not traceable from  or [facility]. Holter was unremarkable without any arrhythmias. No significant dizziness. Continue present medications. Diet weight and exercise discussed in detail. Diagnoses and all orders for this visit:    1. Atherosclerosis of native coronary artery of native heart without angina pectoris    2. Chronic diastolic congestive heart failure (HCC)  -     HGB & HCT; Future  -     METABOLIC PANEL, BASIC; Future    3. Essential hypertension    4. Hypercholesteremia    5. History of coronary artery stent placement    6. Obesity (BMI 30.0-34.9)    7. Pericardial effusion  -     ECHO ADULT FOLLOW-UP OR LIMITED; Future    8. Iron deficiency anemia, unspecified iron deficiency anemia type    Other orders  -     amLODIPine (NORVASC) 5 mg tablet; Take 1 Tablet by mouth daily. -     spironolactone (ALDACTONE) 25 mg tablet; Take 0.5 Tablets by mouth daily. Pertinent laboratory and test data reviewed and discussed with patient. See patient instructions also for other medical advice given    Medications Discontinued During This Encounter   Medication Reason    spironolactone (ALDACTONE) 25 mg tablet REORDER    amLODIPine (NORVASC) 10 mg tablet        Follow-up and Dispositions    · Return in about 2 months (around 9/2/2021), or if symptoms worsen or fail to improve, for post test.       7/2/2021 CAD stable. CHF is compensated.   Blood pressure low normal. Reduce Norvasc. Reduce Aldactone and follow the BMP as well. Lipids are controlled. Continue statins. Has been losing weight over the months. Recommended that she lose another 10 to 20 pounds. Small pericardial effusion needs to be followed we will order a repeat echo. Severe anemia. Repeat H&H. Warned that she may need a repeat blood transfusion depending on the level.

## 2021-07-02 NOTE — PROGRESS NOTES
Physician Progress Note      PATIENT:               Ximena Armendariz  CSN #:                  [id number]  :                       1975  ADMIT DATE:       2021 10:15 AM  DISCH DATE:        2021 2:53 PM  RESPONDING  PROVIDER #:        Kelsey Traore MD          QUERY TEXT:    Patient admitted with syncope. noted to have anemia, hypokalemia, prolonged QTc and hypotension. If possible, please document in progress notes and discharge summary if you are evaluating and/or treating any of the following: The medical record reflects the following:  Risk Factors: Acute illness  Clinical Indicators:  H&P:  Differential for syncope does include arrhythmia given her hypokalemia and prolonged QTC. Her troponin is within normal limits EKG without evidence of acute coronary syndrome  Cardiology consult:  Recurrent syncope- in the setting severe anemia  Treatment: CBCs; Transfusion; Abx; electrolyte replacement; EKG; ECHO;  Antihypertensives and diuretics held; Cardiology and Podiatry consults    Thank you,  Hedwig Letters RN/CCDS  [phone]  Options provided:  -- Syncope due to anemia  -- Syncope due to arrhythmia  -- Syncope due to hypotension  -- Syncope due to hypokalemia  -- Syncope due to due to left foot diabetic ulcer/osteomyelitis  -- Other - I will add my own diagnosis  -- Disagree - Not applicable / Not valid  -- Disagree - Clinically unable to determine / Unknown  -- Refer to Clinical Documentation Reviewer    PROVIDER RESPONSE TEXT:    Syncope due to anemia and hypotension    Query created by: Kianna Varma on 2021 9:26 AM      Electronically signed by:  Kelsey Traore MD 2021 9:23 AM

## 2021-07-04 LAB
RESULT 1, 997876: NORMAL
SOURCE, RSRC96: NORMAL

## 2021-07-09 DIAGNOSIS — I50.32 CHRONIC DIASTOLIC CONGESTIVE HEART FAILURE (HCC): ICD-10-CM

## 2021-07-26 NOTE — DISCHARGE SUMMARY
Discharge Summary    Patient: Fazal Fabian MRN: [id number]  CSN: [id number]    YOB: 1975  Age: 40 y.o. Sex: female    DOA: 6/24/2021 LOS:  LOS: 6 days   Discharge Date: 6/30/2021     Admission Diagnoses: Sepsis ([facility] [address] [A41.9]  Foot ulcer, left ([facility] [address] [L97.529]    Discharge Diagnoses:    Recurrent syncope  Acute on chronic anemia  Diabetic left foot ulcer status post left 4th/5th toe amputation and wound VAC placement  Fungemia  Hypokalemia  DM2 with hyperglycemia, A1c 9.6%  Hypertension  Obesity, BMI 30.2  History of NSTEMI with history of PCI and stent placement    Discharge Condition: Stable    PHYSICAL EXAM  Visit Vitals  BP (!) 135/91 (BP 1 Location: Left upper arm, BP Patient Position: At rest)   Pulse (!) 105   Temp 98 °F (36.7 °C)   Resp 18   Ht 5' 7\" (1.702 m)   Wt 95.8 kg (211 lb 3.2 oz)   SpO2 100%   Breastfeeding No   BMI 33.08 kg/m²       General:  In NAD. Nontoxic-appearing. Cardiovascular:  RRR. Pulmonary:  Lungs clear bilaterally, no wheezes. GI:  Abdomen soft, NTTP. Extremities:  Warm, no edema or ischemia. Wound vac in place L foot. Neuro:  Awake and alert. Motor nonfocal.    Hospital Course:   See admission H&P for full details of HPI. Patient was referred for hospital admission after presenting to the emergency department for evaluation of a syncopal episode. Etiology of episode was not entirely clear but she did not appear to be septic on admission. IV antibiotic therapy was initiated and an infectious diseases evaluation was obtained. Podiatry evaluation was also obtained and patient was taken to the OR for left fourth and fifth partial toe amputations and bone biopsy. Wound care has been continued per podiatry recommendations and antibiotic therapy has been managed by infectious diseases. IV antibiotic therapy has been transitioned to PO and patient has remained stable no evidence for any ongoing or active infection.   2D echo was ordered and cardiology evaluation was also obtained. Antihypertensive and diuretic therapy was held. Patient has been noted to have recurrent anemia requiring transfusion of PRBCs this hospitalization. It is likely that patient's recurrent syncopal episodes continue to anemia. Antihypertensive/cardiac regimen has been adjusted. H&H has remained relatively stable and patient is not requiring further transfusion at this point. She is felt to be medically stable and have met maximum benefit of hospitalization. Patient is being discharged home with home health care services as arranged and outpatient follow-up as advised. Consults:   Cardiology  Infectious diseases  Nephrology  Podiatry     Significant Diagnostic Studies/Procedures:  2D echo: Interpretation Summary    Result status: Final result   · LV: Estimated LVEF is 55 - 60%. Visually measured ejection fraction. Normal cavity size and systolic function (ejection fraction normal). Mild concentric hypertrophy. Wall motion: normal.  · Pericardium: Trivial pericardial effusion measuring 7 mm. Comparison Study Information    Prior Study    There is a prior study available for comparison. Prior study date: 5/19/2019. MRI of left foot:  IMPRESSION  1. Extensive osteomyelitis of the medial cuneiform, 1st metatarsal, and 1st  proximal phalanx. Adjacent ulcer and extensive soft tissue edema. Septic  arthritis of the 1st TMT joint.      2. Osteomyelitis of the 4th and 5th distal phalanges. Adjacent likely ulcers.      3.  Mild edema in the 3rd and 4th metatarsal heads. The possibility of contusion  versus early osteomyelitis should be considered. Left foot x-ray:  IMPRESSION     Interval resection of the fourth and fifth digits at the mid shaft of the  proximal phalanx. Overlying soft tissue edema. Resection of the first digit at the MTP with significant soft tissue swelling  and ulceration.   Significant edema/inflammation distal foot greater than ankle.      CXR:  IMPRESSION     Interval increase in cardiac silhouette. Correlate for cardiomegaly and/or  pericardial effusion.     Interval placement of PICC line. No pneumothorax.     Mild pulmonary vascular congestion. Discharge Medications:     Discharge Medication List as of 6/30/2021  2:04 PM      START taking these medications    Details   magnesium oxide (MAG-OX) 400 mg tablet Take 1 Tablet by mouth two (2) times a day., Normal, Disp-28 Tablet, R-0      clopidogreL (PLAVIX) 75 mg tab Take 1 Tablet by mouth daily. , Normal, Disp-30 Tablet, R-0      calcitRIOL (ROCALTROL) 0.25 mcg capsule Take 1 Capsule by mouth daily. , Normal, Disp-30 Capsule, R-0      amoxicillin-clavulanate (AUGMENTIN) 875-125 mg per tablet Take 1 Tablet by mouth every twelve (12) hours for 5 days. , Normal, Disp-10 Tablet, R-0      fluconazole (DIFLUCAN) 200 mg tablet Take 2 Tablets by mouth daily for 9 days. FDA advises cautious prescribing of oral fluconazole in pregnancy. , Normal, Disp-18 Tablet, R-0      amLODIPine (NORVASC) 10 mg tablet Take 1 Tablet by mouth daily. , Normal, Disp-30 Tablet, R-0         CONTINUE these medications which have CHANGED    Details   carvediloL (Coreg) 25 mg tablet Take 0.5 Tablets by mouth two (2) times daily (with meals). Indications: myocardial reinfarction prevention, Normal, Disp-60 Tablet, R-0      ferrous sulfate 325 mg (65 mg iron) tablet Take 1 Tablet by mouth daily (with breakfast). , Normal, Disp-30 Tablet, R-0         CONTINUE these medications which have NOT CHANGED    Details   insulin lispro (HUMALOG) 100 unit/mL kwikpen 10 units before meals  Indications: type 2 diabetes mellitus, Normal, Disp-5 Pen, R-11      rosuvastatin (CRESTOR) 10 mg tablet Take 1 Tab by mouth nightly., Normal, Disp-90 Tab, R-3      spironolactone (ALDACTONE) 25 mg tablet Take 25 mg by mouth daily. , Historical Med      furosemide (LASIX) 20 mg tablet Take 1 Tab by mouth daily as needed (Edema). , Normal, Disp-30 Tab, R-1      insulin glargine (LANTUS,BASAGLAR) 100 unit/mL (3 mL) inpn Take 45 units daily  Indications: type 2 diabetes mellitus, No Print, Disp-4 Pen,R-5      melatonin 5 mg cap capsule Take 1 Cap by mouth nightly., Normal, Disp-30 Cap,R-1      Insulin Needles, Disposable, 31 gauge x 5/16\" ndle Use to check blood glucose BID, Normal, Disp-100 Pen Needle,R-11      Insulin Syringe-Needle U-100 (INSULIN SYRINGE) 1 mL 30 gauge x 5/16 syrg As directed for lantus insulin, Print, Disp-50 Syringe, R-0         STOP taking these medications       ticagrelor (BRILINTA) 90 mg tablet Comments:   Reason for Stopping:         metFORMIN ER (GLUCOPHAGE XR) 500 mg tablet Comments:   Reason for Stopping:         aspirin 81 mg chewable tablet Comments:   Reason for Stopping:                   Activity: As tolerated. Diet: Cardiac/diabetic diet    Disposition: Home with home health care services. Follow-up: with PCP in 1 week, cardiology and podiatry as advised. .    Minutes spent on discharge: >30 minutes spent coordinating this discharge. Kenneth Hutchinson MD  [address] [place], [place] [address]ists    Disclaimer: Sections of this note are dictated using utilizing voice recognition software. Minor typographical errors may be present. If questions arise, please do not hesitate to contact me or call our department.

## 2021-08-11 DIAGNOSIS — E11.8 TYPE 2 DIABETES MELLITUS WITH COMPLICATION (HCC): ICD-10-CM

## 2021-08-11 NOTE — PROGRESS NOTES
08/23/21          ICD-10-CM ICD-9-CM    1. Essential hypertension  O08 681.6 METABOLIC PANEL, COMPREHENSIVE   2. Type 2 diabetes mellitus with complication (HCC)  C36.2 250.90 HEMOGLOBIN A1C WITH EAG      MICROALBUMIN, UR, RAND W/ MICROALB/CREAT RATIO   3. Hyperlipidemia, unspecified hyperlipidemia type  E78.5 272.4 LIPID PANEL   4. Severe obesity ([facility] [address]  E66.01 278.01    5. Encounter for screening mammogram for malignant neoplasm of breast  Z12.31 V76.12 [facility] MAMMO BI SCREENING INCL CAD   6. Coronary artery disease of native artery of native heart with stable angina pectoris (HCC)  I25.118 414.01 clopidogreL (PLAVIX) 75 mg tab     413.9    7. Anemia, normocytic normochromic  D64.9 285.9 ferrous sulfate 325 mg (65 mg iron) tablet      CBC WITH AUTOMATED DIFF      METABOLIC PANEL, COMPREHENSIVE   8. Screening for colon cancer  Z12.11 V76.51 REFERRAL TO GASTROENTEROLOGY         Assessment and Plan  Hypertension, borderline control. If elevated on next visit would adjust medications. Diabetes mellitus type 2 improving significantly. Weight is also decreasing. Follow-up labs. Adjust meds as needed. Hyperlipidemia improving, follow-up labs ordered in 1 month. The left foot is healing well. Follow along with the specialist and home health. Screening for mammogram ordered. Last one that I can see was in 2019. Set up for screening for colon cancer. Anemia. Continue treatment. History of coronary artery disease, s/p stenting. No symptoms. Follow along with the specialist.  Lab results and schedule of future lab studies reviewed with patient  Diagnostic and radiologic results and the schedule of future studies were reviewed with the patient  reviewed diet, exercise and weight control  All questions were answered and understood.       Health Maintenance Due   Topic Date Due    Hepatitis C Screening  Never done    DTaP/Tdap/Td series (1 - Tdap) Never done    PAP AKA CERVICAL CYTOLOGY  Never done    Colorectal Cancer Screening Combo  Never done       Subjective:   Bong Rubio is a 40 y.o. female . Chief Complaint   Patient presents with   [facility] Follow Up    Medication Refill   Seen 5/26/2021 post hospitalization for foot ulcer. Diabetes mellitus type 2 adjusting medications. Elevated cholesterol to be rechecked this visit. On iron for anemia. Seen 7/2/2021 by cardiology. They reviewed that her echocardiogram from 10/19/2020 showing an ejection fraction of 55 to 60% with normal cavity size wall thickness and systolic function. The patient had a cardiac procedure showing 100% mid RCA occlusion on 10/23/2020. She was s/p aspiration thrombectomy followed by PTCA/stent. She had a normal LV function by echo. A Holter on 12/28/2020 showed normal sinus rhythm with no dysrhythmia is noted. The patient was felt to be relatively stable from a cardiovascular standpoint. She was to use Lasix as needed and the electrolytes were going to be followed. On 6/30/2021 she was followed by nephrology. Patient was admitted 6/24/2021 and discharged 6/30/2021 for the recurrent syncope acute on chronic anemia diabetic left foot ulcer s/p left fourth and fifth toe amputation and wound VAC placement. She also had fungemia, hypokalemia, A1c of 9.6, hypertension, unhealthy weight with a BMI of 30.2 and the stent placement mentioned. Hypertension   The patient has no headaches, visual changes, chest pain or pressure,dyspnea, orthopnea, or PND. There is no problem with the medication. Diabetes mellitus  The patient denies polyuria, polydipsia, or polyphagia. The patient denies any other aggravating or relieving factors There has been no problem with the medications. Hyperlipidemia   The patient denies any myalgias or weakness. No abdominal discomfort admitted to. The patient denies any difficulty with or side effects from the medication.     BP Readings from Last 3 Encounters:   08/23/21 (!) 143/95   07/02/21 101/75 06/30/21 (!) 135/91         Lab Results   Component Value Date/Time    Hemoglobin A1c 9.6 (H) 06/25/2021 06:45 AM    Hemoglobin A1c (POC) 12.3 03/28/2019 09:49 AM     Lab Results   Component Value Date/Time    Cholesterol, total 218 (H) 09/28/2020 01:00 PM    HDL Cholesterol 49 09/28/2020 01:00 PM    LDL, calculated 121 (H) 09/28/2020 01:00 PM    VLDL, calculated 48 (H) 09/28/2020 01:00 PM    Triglyceride 241 (H) 09/28/2020 01:00 PM       Overweight  The patient states that the weight has has been decreasing. .  Patient's diet is improved and she is counting her carbohydrates better now. .  The patient denies edema of the lower extremities except for trace. .  Obesity comorbid conditions include diabetes, heart disease, high cholesterol and high blood pressure   body mass index is 29.76 kg/m². Wt Readings from Last 3 Encounters:   08/23/21 190 lb (86.2 kg)   07/02/21 198 lb (89.8 kg)   06/29/21 211 lb 3.2 oz (95.8 kg)     Key Obesity Meds             spironolactone (ALDACTONE) 25 mg tablet (Taking) Take 0.5 Tablets by mouth daily. furosemide (LASIX) 20 mg tablet Take 1 Tab by mouth daily as needed (Edema). No results found for this visit on 08/23/21 (from the past 6 hour(s)). Lab Results   Component Value Date/Time    TSH 1.12 05/17/2021 05:09 PM               Current Outpatient Medications   Medication Sig    insulin glargine (Lantus Solostar U-100 Insulin) 100 unit/mL (3 mL) inpn 50 Units by SubCUTAneous route nightly. Indications: type 2 diabetes mellitus    amLODIPine (NORVASC) 5 mg tablet Take 1 Tablet by mouth daily.  spironolactone (ALDACTONE) 25 mg tablet Take 0.5 Tablets by mouth daily.  carvediloL (Coreg) 25 mg tablet Take 0.5 Tablets by mouth two (2) times daily (with meals). Indications: myocardial reinfarction prevention    magnesium oxide (MAG-OX) 400 mg tablet Take 1 Tablet by mouth two (2) times a day.  clopidogreL (PLAVIX) 75 mg tab Take 1 Tablet by mouth daily.     calcitRIOL (ROCALTROL) 0.25 mcg capsule Take 1 Capsule by mouth daily.  ferrous sulfate 325 mg (65 mg iron) tablet Take 1 Tablet by mouth daily (with breakfast).  insulin lispro (HUMALOG) 100 unit/mL kwikpen 10 units before meals  Indications: type 2 diabetes mellitus    rosuvastatin (CRESTOR) 10 mg tablet Take 1 Tab by mouth nightly.  furosemide (LASIX) 20 mg tablet Take 1 Tab by mouth daily as needed (Edema).  melatonin 5 mg cap capsule Take 1 Cap by mouth nightly. (Patient taking differently: Take 5 mg by mouth as needed (difficulty in sleeping). Reports taking it as needed)    Insulin Needles, Disposable, 31 gauge x 5/16\" ndle Use to check blood glucose BID    Insulin Syringe-Needle U-100 (INSULIN SYRINGE) 1 mL 30 gauge x 5/16 syrg As directed for lantus insulin     No current facility-administered medications for this visit. Allergies   Allergen Reactions    Azithromycin Other (comments)     Rapid response was called for bradycardia and hypotension     Pcn [Penicillins] Hives     has Acute bronchitis, Hyperglycemia, Diabetic foot infection ([address] Right foot infection, Acute pain of right foot, Insulin dependent diabetes mellitus, HTN (hypertension), Fever, NSTEMI (non-ST elevated myocardial infarction) ([address] Severe obesity ([address] Osteomyelitis ([address] Diabetic foot ulcer ([address] FRANCISCA (acute kidney injury) ([address] Sepsis ([address] Hyponatremia, Foot ulcer, left ([address] and Coronary artery disease of native artery of native heart with stable angina pectoris ([address] on their problem list.    Past Surgical History:   Procedure Laterality Date    HX CORONARY STENT PLACEMENT      HX GYN            reports that she has never smoked. She has never used smokeless tobacco. She reports that she does not drink alcohol and does not use drugs. family history includes Lupus in her mother. Review of Systems   Constitutional: Negative for chills, fever and malaise/fatigue.    HENT: Negative for congestion and sore throat. Eyes: Negative for blurred vision and redness. Respiratory: Negative for cough, shortness of breath and wheezing. Cardiovascular: Negative for chest pain, palpitations, claudication, leg swelling and PND. Gastrointestinal: Negative for abdominal pain, blood in stool, constipation, diarrhea and heartburn. Genitourinary: Negative for dysuria and urgency. Musculoskeletal: Negative for joint pain and myalgias. S/p distal amputation of the fourth and fifth toes on the left and as well as bone removal  left great toe, healing and bandaged. Skin:        The blood blister is no longer present. The primary problem is a stage II ulcer on the ball of the left foot. There appears to be some surrounding 1+ edema up to the ankles and legs. Neurological: Negative for dizziness, sensory change, speech change and focal weakness. Endo/Heme/Allergies: Does not bruise/bleed easily. Psychiatric/Behavioral: Positive for depression. Negative for suicidal ideas. The patient has insomnia (New problem for 3 weeks. Also has a history of RODNEY not treated. The patient is also depressed. ). The patient is not nervous/anxious. Visit Vitals  BP (!) 143/95 (BP 1 Location: Right arm)   Pulse 96   Temp 97 °F (36.1 °C) (Oral)   Resp 18   Ht 5' 7\" (1.702 m)   Wt 190 lb (86.2 kg)   SpO2 100%   BMI 29.76 kg/m²       Physical Exam  Vitals and nursing note reviewed. Constitutional:       General: She is not in acute distress. HENT:      Head: Normocephalic. Right Ear: Tympanic membrane and external ear normal.      Left Ear: Tympanic membrane and external ear normal.      Nose: Nose normal.      Mouth/Throat:      Mouth: Mucous membranes are moist.      Pharynx: Oropharynx is clear. Eyes:      General: No scleral icterus. Pupils: Pupils are equal, round, and reactive to light. Cardiovascular:      Heart sounds: Normal heart sounds.    Pulmonary:      Effort: Pulmonary effort is normal.   Abdominal: General: Abdomen is flat. There is no distension. Palpations: There is no mass. Tenderness: There is no abdominal tenderness. Musculoskeletal:         General: Signs of injury (Bandages removed left foot. S/p amputation distal left fourth and fifth toe, well-healed. Small ulcer lateral side of the fourth toe healing well. There are some bruising on the plantar surface distally of the left great toe. Trace edema both lower ext) present. No swelling. Cervical back: No rigidity. Right lower leg: Edema present. Left lower leg: Edema present. Skin:     Coloration: Skin is not pale. Findings: No erythema or lesion (Foot bandaged). Neurological:      Mental Status: She is alert and oriented to person, place, and time. Gait: Gait normal.   Psychiatric:         Mood and Affect: Mood normal.         Behavior: Behavior normal.         Thought Content: Thought content normal.          We discussed the expected course, resolution and complications of the diagnosis(es) in detail. Medication risks, benefits, costs, interactions, and alternatives were discussed as indicated. I advised her to contact the office if her condition worsens, changes or fails to improve as anticipated. She expressed understanding with the diagnosis(es) and plan. This note was done with the assistance of dragon speech software.   Some inadvertent errors or omissions may be present

## 2021-08-12 RX ORDER — PEN NEEDLE, DIABETIC 30 GX3/16"
NEEDLE, DISPOSABLE MISCELLANEOUS
Qty: 1 PACKAGE | Refills: 0 | Status: SHIPPED | OUTPATIENT
Start: 2021-08-12 | End: 2021-08-25

## 2021-08-23 ENCOUNTER — OFFICE VISIT (OUTPATIENT)
Dept: FAMILY MEDICINE CLINIC | Age: 46
End: 2021-08-23
Payer: MEDICAID

## 2021-08-23 VITALS
DIASTOLIC BLOOD PRESSURE: 95 MMHG | SYSTOLIC BLOOD PRESSURE: 143 MMHG | OXYGEN SATURATION: 100 % | TEMPERATURE: 97 F | WEIGHT: 190 LBS | HEIGHT: 67 IN | BODY MASS INDEX: 29.82 KG/M2 | HEART RATE: 96 BPM | RESPIRATION RATE: 18 BRPM

## 2021-08-23 DIAGNOSIS — I10 ESSENTIAL HYPERTENSION: Primary | ICD-10-CM

## 2021-08-23 DIAGNOSIS — Z12.31 ENCOUNTER FOR SCREENING MAMMOGRAM FOR MALIGNANT NEOPLASM OF BREAST: ICD-10-CM

## 2021-08-23 DIAGNOSIS — E11.8 TYPE 2 DIABETES MELLITUS WITH COMPLICATION (HCC): ICD-10-CM

## 2021-08-23 DIAGNOSIS — E66.01 SEVERE OBESITY (HCC): ICD-10-CM

## 2021-08-23 DIAGNOSIS — E78.5 HYPERLIPIDEMIA, UNSPECIFIED HYPERLIPIDEMIA TYPE: ICD-10-CM

## 2021-08-23 DIAGNOSIS — Z12.11 SCREENING FOR COLON CANCER: ICD-10-CM

## 2021-08-23 DIAGNOSIS — I25.118 CORONARY ARTERY DISEASE OF NATIVE ARTERY OF NATIVE HEART WITH STABLE ANGINA PECTORIS (HCC): ICD-10-CM

## 2021-08-23 DIAGNOSIS — D64.9 ANEMIA, NORMOCYTIC NORMOCHROMIC: ICD-10-CM

## 2021-08-23 PROCEDURE — 99214 OFFICE O/P EST MOD 30 MIN: CPT | Performed by: EMERGENCY MEDICINE

## 2021-08-23 RX ORDER — LANOLIN ALCOHOL/MO/W.PET/CERES
325 CREAM (GRAM) TOPICAL
Qty: 30 TABLET | Refills: 0 | Status: SHIPPED | OUTPATIENT
Start: 2021-08-23 | End: 2021-10-13 | Stop reason: SDUPTHER

## 2021-08-23 RX ORDER — CLOPIDOGREL BISULFATE 75 MG/1
75 TABLET ORAL DAILY
Qty: 30 TABLET | Refills: 0 | Status: SHIPPED | OUTPATIENT
Start: 2021-08-23 | End: 2021-10-13 | Stop reason: SDUPTHER

## 2021-08-23 NOTE — PATIENT INSTRUCTIONS
Hyperlipidemia: After Your Visit  Your Care Instructions  Hyperlipidemia is too much fat in your blood. The body has several kinds of fat, including cholesterol and triglycerides. Your body needs fat for many things, such as making new cells. But too much fat in your blood increases your chances of having a heart attack or stroke. You may be able to lower your cholesterol and triglycerides with a heart-healthy diet, exercise, and if needed, medicine. Your doctor may want you to try lifestyle changes first to see whether they lower the fat in your blood. You may need to take medicine if lifestyle changes do not lower the fat in your blood enough. Follow-up care is a key part of your treatment and safety. Be sure to make and go to all appointments, and call your doctor if you are having problems. Its also a good idea to know your test results and keep a list of the medicines you take. How can you care for yourself at home? Take your medicines  · Take your medicines exactly as prescribed. Call your doctor if you think you are having a problem with your medicine. · If you take medicine to lower your cholesterol, go to follow-up visits. You will need to have blood tests. · Do not take large doses of niacin, which is a B vitamin, while taking medicine called statins. It may increase the chance of muscle pain and liver problems. · Talk to your doctor about avoiding grapefruit juice if you are taking statins. Grapefruit juice can raise the level of this medicine in your blood. This could increase side effects. Eat more fruits, vegetables, and fiber  · Fruits and vegetables have lots of nutrients that help protect against heart disease, and they have little--if any--fat. Try to eat at least five servings a day. Dark green, deep orange, or yellow fruits and vegetables are healthy choices. · Keep carrots, celery, and other veggies handy for snacks.  Buy fruit that is in season and store it where you can see it so that you will be tempted to eat it. Cook dishes that have a lot of veggies in them, such as stir-fries and soups. · Foods high in fiber may reduce your cholesterol and provide important vitamins and minerals. High-fiber foods include whole-grain cereals and breads, oatmeal, beans, brown rice, citrus fruits, and apples. · Buy whole-grain breads and cereals instead of white bread and pastries. Limit saturated fat  · Read food labels and try to avoid saturated fat and trans fat. They increase your risk of heart disease. · Use olive or canola oil when you cook. Try cholesterol-lowering spreads, such as Benecol or Take Control. · Bake, broil, grill, or steam foods instead of frying them. · Limit the amount of high-fat meats you eat, including hot dogs and sausages. Cut out all visible fat when you prepare meat. · Eat fish, skinless poultry, and soy products such as tofu instead of high-fat meats. Soybeans may be especially good for your heart. Eat at least two servings of fish a week. Certain fish, such as salmon, contain omega-3 fatty acids, which may help reduce your risk of heart attack. · Choose low-fat or fat-free milk and dairy products. Get exercise, limit alcohol, and quit smoking  · Get more exercise. Work with your doctor to set up an exercise program. Even if you can do only a small amount, exercise will help you get stronger, have more energy, and manage your weight and your stress. Walking is an easy way to get exercise. Gradually increase the amount you walk every day. Aim for at least 30 minutes on most days of the week. You also may want to swim, bike, or do other activities. · Limit alcohol to no more than 2 drinks a day for men and 1 drink a day for women. · Do not smoke. If you need help quitting, talk to your doctor about stop-smoking programs and medicines. These can increase your chances of quitting for good. When should you call for help?   Call 911 anytime you think you may need emergency care. For example, call if:  · You have symptoms of a heart attack. These may include:  ¨ Chest pain or pressure, or a strange feeling in the chest.  ¨ Sweating. ¨ Shortness of breath. ¨ Nausea or vomiting. ¨ Pain, pressure, or a strange feeling in the back, neck, jaw, or upper belly or in one or both shoulders or arms. ¨ Lightheadedness or sudden weakness. ¨ A fast or irregular heartbeat. After you call 911, the  may tell you to chew 1 adult-strength or 2 to 4 low-dose aspirin. Wait for an ambulance. Do not try to drive yourself. · You have signs of a stroke. These may include:  ¨ Sudden numbness, paralysis, or weakness in your face, arm, or leg, especially on only one side of your body. ¨ New problems with walking or balance. ¨ Sudden vision changes. ¨ Drooling or slurred speech. ¨ New problems speaking or understanding simple statements, or feeling confused. ¨ A sudden, severe headache that is different from past headaches. · You passed out (lost consciousness). Call your doctor now or seek immediate medical care if:  · You have muscle pain or weakness. Watch closely for changes in your health, and be sure to contact your doctor if:  · You are very tired. · You have an upset stomach, gas, constipation, or belly pain or cramps. Where can you learn more? Go to [employer]  Enter C406 in the search box to learn more about \"Hyperlipidemia: After Your Visit. \"   © 9490-0078 Healthwise, [employer]. Care instructions adapted under license by [facility] (which disclaims liability or warranty for this information). This care instruction is for use with your licensed healthcare professional. If you have questions about a medical condition or this instruction, always ask your healthcare professional. [place] [address] any warranty or liability for your use of this information.   Content Version: 1.1.628951; Last Revised: October 13, 2011                 Type 2 Diabetes: Care Instructions  Your Care Instructions     Type 2 diabetes is a disease that develops when the body's tissues cannot use insulin properly. Over time, the pancreas cannot make enough insulin. Insulin is a hormone that helps the body's cells use sugar (glucose) for energy. It also helps the body store extra sugar in muscle, fat, and liver cells. Without insulin, the sugar cannot get into the cells to do its work. It stays in the blood instead. This can cause high blood sugar levels. A person has diabetes when the blood sugar stays too high too much of the time. Over time, diabetes can lead to diseases of the heart, blood vessels, nerves, kidneys, and eyes. You may be able to control your blood sugar by losing weight, eating a healthy diet, and getting daily exercise. You may also have to take insulin or other diabetes medicine. Follow-up care is a key part of your treatment and safety. Be sure to make and go to all appointments. Call your doctor if you are having problems. It's also a good idea to know your test results and keep a list of the medicines you take. How can you care for yourself at home? · Keep your blood sugar at a target level (which you set with your doctor). ? Carbohydrate--the body's main source of fuel--affects blood sugar more than any other nutrient. Carbohydrate is in fruits, vegetables, milk, and yogurt. It also is in breads, cereals, vegetables such as potatoes and corn, and sugary foods such as candy and cakes. Follow your meal plan to know how much carbohydrate to eat at each meal and snack. ? Aim for 30 minutes of exercise on most, preferably all, days of the week. Walking is a good choice. You also may want to do other activities, such as running, swimming, cycling, or playing tennis or team sports. Try to do muscle-strengthening exercises at least 2 times a week. ? Take your medicines exactly as prescribed.  Call your doctor if you think you are having a problem with your medicine. You will get more details on the specific medicines your doctor prescribes. · Check your blood sugar as often as your doctor recommends. It is important to keep track of any symptoms you have, such as low blood sugar. Also tell your doctor if you have any changes in your activities, diet, or insulin use. · Talk to your doctor before you start taking aspirin every day. Aspirin can help certain people lower their risk of a heart attack or stroke. But taking aspirin isn't right for everyone, because it can cause serious bleeding. · Do not smoke. If you need help quitting, talk to your doctor about stop-smoking programs and medicines. These can increase your chances of quitting for good. · Keep your cholesterol and blood pressure at normal levels. You may need to take one or more medicines to reach your goals. Take them exactly as directed. Do not stop or change a medicine without talking to your doctor first.  When should you call for help? Call 911 anytime you think you may need emergency care. For example, call if:    · You passed out (lost consciousness), or you suddenly become very sleepy or confused. (You may have very low blood sugar.)   Call your doctor now or seek immediate medical care if:    · Your blood sugar is 300 mg/dL or is higher than the level your doctor has set for you. · You have symptoms of low blood sugar, such as:  ? Sweating. ? Feeling nervous, shaky, and weak. ? Extreme hunger and slight nausea. ? Dizziness and headache.  ? Blurred vision. ? Confusion. Watch closely for changes in your health, and be sure to contact your doctor if:    · You often have problems controlling your blood sugar. · You have symptoms of long-term diabetes problems, such as:  ? New vision changes. ? New pain, numbness, or tingling in your hands or feet. ? Skin problems. Where can you learn more?   Go to http://www.gray.com/  Enter C553 in the search box to learn more about \"Type 2 Diabetes: Care Instructions. \"  Current as of: August 31, 2020               Content Version: 12.8  © 2006-2021 [employer]. Care instructions adapted under license by [employer] (which disclaims liability or warranty for this information). If you have questions about a medical condition or this instruction, always ask your healthcare professional. [address] any warranty or liability for your use of this information. High Blood Pressure: Care Instructions  Overview     It's normal for blood pressure to go up and down throughout the day. But if it stays up, you have high blood pressure. Another name for high blood pressure is hypertension. Despite what a lot of people think, high blood pressure usually doesn't cause headaches or make you feel dizzy or lightheaded. It usually has no symptoms. But it does increase your risk of stroke, heart attack, and other problems. You and your doctor will talk about your risks of these problems based on your blood pressure. Your doctor will give you a goal for your blood pressure. Your goal will be based on your health and your age. Lifestyle changes, such as eating healthy and being active, are always important to help lower blood pressure. You might also take medicine to reach your blood pressure goal.  Follow-up care is a key part of your treatment and safety. Be sure to make and go to all appointments, and call your doctor if you are having problems. It's also a good idea to know your test results and keep a list of the medicines you take. How can you care for yourself at home? Medical treatment  · If you stop taking your medicine, your blood pressure will go back up. You may take one or more types of medicine to lower your blood pressure. Be safe with medicines. Take your medicine exactly as prescribed.  Call your doctor if you think you are having a problem with your medicine. · Talk to your doctor before you start taking aspirin every day. Aspirin can help certain people lower their risk of a heart attack or stroke. But taking aspirin isn't right for everyone, because it can cause serious bleeding. · See your doctor regularly. You may need to see the doctor more often at first or until your blood pressure comes down. · If you are taking blood pressure medicine, talk to your doctor before you take decongestants or anti-inflammatory medicine, such as ibuprofen. Some of these medicines can raise blood pressure. · Learn how to check your blood pressure at home. Lifestyle changes  · Stay at a healthy weight. This is especially important if you put on weight around the waist. Losing even 10 pounds can help you lower your blood pressure. · If your doctor recommends it, get more exercise. Walking is a good choice. Bit by bit, increase the amount you walk every day. Try for at least 30 minutes on most days of the week. You also may want to swim, bike, or do other activities. · Avoid or limit alcohol. Talk to your doctor about whether you can drink any alcohol. · Try to limit how much sodium you eat to less than 2,300 milligrams (mg) a day. Your doctor may ask you to try to eat less than 1,500 mg a day. · Eat plenty of fruits (such as bananas and oranges), vegetables, legumes, whole grains, and low-fat dairy products. · Lower the amount of saturated fat in your diet. Saturated fat is found in animal products such as milk, cheese, and meat. Limiting these foods may help you lose weight and also lower your risk for heart disease. · Do not smoke. Smoking increases your risk for heart attack and stroke. If you need help quitting, talk to your doctor about stop-smoking programs and medicines. These can increase your chances of quitting for good. When should you call for help? Call  911 anytime you think you may need emergency care.  This may mean having symptoms that suggest that your blood pressure is causing a serious heart or blood vessel problem. Your blood pressure may be over 180/120. For example, call 911 if:    · You have symptoms of a heart attack. These may include:  ? Chest pain or pressure, or a strange feeling in the chest.  ? Sweating. ? Shortness of breath. ? Nausea or vomiting. ? Pain, pressure, or a strange feeling in the back, neck, jaw, or upper belly or in one or both shoulders or arms. ? Lightheadedness or sudden weakness. ? A fast or irregular heartbeat. · You have symptoms of a stroke. These may include:  ? Sudden numbness, tingling, weakness, or loss of movement in your face, arm, or leg, especially on only one side of your body. ? Sudden vision changes. ? Sudden trouble speaking. ? Sudden confusion or trouble understanding simple statements. ? Sudden problems with walking or balance. ? A sudden, severe headache that is different from past headaches. · You have severe back or belly pain. Do not wait until your blood pressure comes down on its own. Get help right away. Call your doctor now or seek immediate care if:    · Your blood pressure is much higher than normal (such as 180/120 or higher), but you don't have symptoms. · You think high blood pressure is causing symptoms, such as:  ? Severe headache.  ? Blurry vision. Watch closely for changes in your health, and be sure to contact your doctor if:    · Your blood pressure measures higher than your doctor recommends at least 2 times. That means the top number is higher or the bottom number is higher, or both. · You think you may be having side effects from your blood pressure medicine. Where can you learn more? Go to http://www.gray.com/  Enter [id number] in the search box to learn more about \"High Blood Pressure: Care Instructions. \"  Current as of: August 31, 2020               Content Version: 12.8  © 2006-2021 Healthwise, [facility].    Care instructions adapted under license by [employer] (which disclaims liability or warranty for this information). If you have questions about a medical condition or this instruction, always ask your healthcare professional. [address] any warranty or liability for your use of this information.

## 2021-08-23 NOTE — PROGRESS NOTES
Darwin Cooks is a 40 y.o. female that is here for a   Chief Complaint   Patient presents with   [facility] Follow Up         1. Have you been to the ER, urgent care clinic since your last visit? Hospitalized since your last visit? Yes     2. Have you seen or consulted any other health care providers outside of the [address] [place] [address] since your last visit? Include any pap smears or colon screening.  no      Health Maintenance reviewed - yes      Upcoming Appts  no    VORB: No orders of the defined types were placed in this encounter.   Bishop Rosie MD/ Ernestine Recinos MA Yes

## 2021-09-21 ENCOUNTER — HOSPITAL ENCOUNTER (OUTPATIENT)
Dept: LAB | Age: 46
Discharge: HOME | End: 2021-09-21

## 2021-09-21 ENCOUNTER — HOSPITAL ENCOUNTER (EMERGENCY)
Age: 46
Discharge: LEFT BEFORE DISCHARGE | End: 2021-09-21
Payer: MEDICAID

## 2021-09-21 LAB — SENTARA SPECIMEN COL,SENBCF: NORMAL

## 2021-09-21 PROCEDURE — 75810000275 HC EMERGENCY DEPT VISIT NO LEVEL OF CARE

## 2021-09-21 PROCEDURE — 99001 SPECIMEN HANDLING PT-LAB: CPT

## 2021-09-23 ENCOUNTER — TRANSCRIBE ORDER (OUTPATIENT)
Dept: SCHEDULING | Age: 46
End: 2021-09-23

## 2021-09-23 DIAGNOSIS — M14.672 CHARCOT'S JOINT OF ANKLE, LEFT: Primary | ICD-10-CM

## 2021-09-29 ENCOUNTER — TRANSCRIBE ORDER (OUTPATIENT)
Dept: SCHEDULING | Age: 46
End: 2021-09-29

## 2021-09-29 DIAGNOSIS — L03.116 CELLULITIS OF LEFT LOWER LIMB: Primary | ICD-10-CM

## 2021-10-07 ENCOUNTER — HOSPITAL ENCOUNTER (OUTPATIENT)
Dept: MRI IMAGING | Age: 46
Discharge: HOME | End: 2021-10-07
Attending: PODIATRIST
Payer: MEDICAID

## 2021-10-07 DIAGNOSIS — M14.672 CHARCOT'S JOINT OF ANKLE, LEFT: ICD-10-CM

## 2021-10-07 PROCEDURE — 73718 MRI LOWER EXTREMITY W/O DYE: CPT

## 2021-10-08 NOTE — PROGRESS NOTES
Consent: Edilma Galindo, who was evaluated by audio video only technology, and/or her healthcare decision maker, is aware that this patient-initiated, Telehealth encounter on 10/13/2021 is a billable service, with coverage as determined by her insurance carrier. She is aware that she may receive a bill and has provided verbal consent to proceed: Yes. The patient was at home and I was at the offices of the [address] [place] [address] no one else participated in the service. 10/08/21  10:15 AM    ICD-10-CM ICD-9-CM    1. Essential hypertension  I10 401.9 carvediloL (Coreg) 25 mg tablet      CBC WITH AUTOMATED DIFF   2. Type 2 diabetes mellitus with complication ([facility])  R20.8 450.96 METABOLIC PANEL, COMPREHENSIVE      HEMOGLOBIN A1C WITH EAG      MICROALBUMIN, UR, RAND W/ MICROALB/CREAT RATIO   3. Hyperlipidemia, unspecified hyperlipidemia type  E78.5 272.4 LIPID PANEL   4. Coronary artery disease of native artery of native heart with stable angina pectoris ([facility])  I25.118 414.01 clopidogreL (PLAVIX) 75 mg tab     413.9    5. NSTEMI (non-ST elevated myocardial infarction) ([facility])  I21.4 410.70 carvediloL (Coreg) 25 mg tablet   6. Anemia, normocytic normochromic  D64.9 285.9 ferrous sulfate 325 mg (65 mg iron) tablet   7. Difficulty sleeping  G47.9 780.50 melatonin 5 mg cap capsule    Trial of melatonin. Refer back to sleep studies for evaluation   8. Other acute osteomyelitis of left foot ([facility]  M86.172 730.07      Follow-up and Dispositions    · Return in about 1 month (around 11/13/2021) for Labs/diagnostics/referrals ordered this visit, Follow up on illness. Assessment and plan  Essential hypertension question control. Diuretic has been stopped because of possible over diuresis. Follow in person in 30 days. Diabetes mellitus type 2. Presently only on long and short acting insulin, not on short acting insulin, with a view towards starting an insulin pump in 6 days. Follow. Hyperlipidemia. Uncontrolled in 1 year prior. Follow-up labs ordered which she will have done at the hospital.  History of NSTEMI. No symptoms. History of anemia follow-up ordered. Osteomyelitis of the left foot. Followed by podiatry. MRI which showed possible bone involvement reviewed. We will recheck in person. Not presently on an antibiotic. Follow-up along with the podiatrist.  Difficulty sleeping continue the melatonin. Lab results and schedule of future lab studies reviewed with patient  Diagnostic and radiologic results and the schedule of future studies were reviewed with the patient  All questions were answered and understood. Health Maintenance Due   Topic Date Due    Hepatitis C Screening  Never done    DTaP/Tdap/Td series (1 - Tdap) Never done    Cervical cancer screen  Never done    Colorectal Cancer Screening Combo  Never done    Flu Vaccine (1) Never done    A1C test (Diabetic or Prediabetic)  09/25/2021    Lipid Screen  09/28/2021       Subjective:   Robert Rock is a 40 y.o. female . Previously seen for hypertension, borderline control. If elevated on next visit would adjust medications. Diabetes mellitus type 2 improving significantly. Weight was also decreasing. Follow-up labs. Hyperlipidemia improving, follow-up labs ordered in 1 month. The left foot was previously healing well. Follow along with the podiatry specialist and home health at the time. Screening for mammogram ordered. Last one that I can see was in 2019. She was to be set up for screening for colon cancer. Anemia. Continue treatment. History of coronary artery disease, s/p stenting. No symptoms. Follow along with the specialist.  reviewed diet, exercise and weight control  Seen 5/26/2021 post hospitalization for foot ulcer. Seen 7/2/2021 by cardiology.   They reviewed that her echocardiogram from 10/19/2020 showing an ejection fraction of 55 to 60% with normal cavity size wall thickness and systolic function. The patient had a cardiac procedure showing 100% mid RCA occlusion on 10/23/2020. She was s/p aspiration thrombectomy followed by PTCA/stent. She had a normal LV function by echo. A Holter on 12/28/2020 showed normal sinus rhythm with no dysrhythmia is noted. The patient was felt to be relatively stable from a cardiovascular standpoint. She was to use Lasix as needed and the electrolytes were going to be followed. On 6/30/2021 she was followed by nephrology. Patient was admitted 6/24/2021 and discharged 6/30/2021 for the recurrent syncope acute on chronic anemia diabetic left foot ulcer s/p left fourth and fifth toe amputation and wound VAC placement. She also had fungemia, hypokalemia, A1c of 9.6, hypertension, unhealthy weight with a BMI of 30.2 and the stent placement mentioned. Seen in ED 9/21/2021 but left prior to treatment. Office visit nephrology 10/13/2021  The assessment was acute on chronic kidney injury stage IIIa possible overdiuresis versus worsening of diabetic nephropathy. She was also noted to have uncontrolled diabetes, hypertension, proteinuria and history of coronary artery disease s/p NSTEMI in October 2020. The plan was a low-sodium diet and tight control of blood sugar. The patient is working with her endocrinologist for an insulin pump. She was asked to stop her ARB diuretics and SGLT2 inhibitors if she can. Left foot swollen  The patient has recurrence of pain in the left foot and swelling. She is being followed by podiatry. [facility] at [facility]. Report pending. Duplex 9/30/2021 showed no evidence of deep vein thrombosis with normal compressibility. MRI left foot 10/7/2021; the impression was first MTP resection/debridement but extensive marrow edema in the residual metatarsal and proximal phalanx which could at the time have represented active osteomyelitis. Other findings also reviewed.      Hypertension   The patient has no headaches, visual changes, chest pain or pressure,dyspnea, orthopnea, or PND. There is no problem with the medication. Diabetes mellitus  The patient denies polyuria, polydipsia, or polyphagia. The patient denies any other aggravating or relieving factors Only on Insulin now  Last A1C was 8  Done by Lexx Dozier  She is on the Insulin pump Tuesday restarting ing  Hyperlipidemia   The patient denies any myalgias or weakness. No abdominal discomfort admitted to. The patient denies any difficulty with or side effects from the medication. BP Readings from Last 3 Encounters:   08/23/21 (!) 143/95   07/02/21 101/75   06/30/21 (!) 135/91         Lab Results   Component Value Date/Time    Hemoglobin A1c 9.6 (H) 06/25/2021 06:45 AM    Hemoglobin A1c (POC) 12.3 03/28/2019 09:49 AM     Lab Results   Component Value Date/Time    Cholesterol, total 218 (H) 09/28/2020 01:00 PM    HDL Cholesterol 49 09/28/2020 01:00 PM    LDL, calculated 121 (H) 09/28/2020 01:00 PM    VLDL, calculated 48 (H) 09/28/2020 01:00 PM    Triglyceride 241 (H) 09/28/2020 01:00 PM       Overweight  The patient states that the weight has has been decreasing. .  Patient's diet is improved and she is counting her carbohydrates better now. .  The patient denies edema of the lower extremities except for trace. .  Obesity comorbid conditions include diabetes, heart disease, high cholesterol and high blood pressure   body mass index is unknown because there is no height or weight on file. Wt Readings from Last 3 Encounters:   09/29/21 190 lb (86.2 kg)   08/23/21 190 lb (86.2 kg)   07/02/21 198 lb (89.8 kg)         Current Outpatient Medications   Medication Sig    Insulin Needles, Disposable, (BD Ultra-Fine Short Pen Needle) 31 gauge x 5/16\" ndle USE WITH INSULIN TWICE DAILY    clopidogreL (PLAVIX) 75 mg tab Take 1 Tablet by mouth daily.  ferrous sulfate 325 mg (65 mg iron) tablet Take 1 Tablet by mouth daily (with breakfast).     insulin glargine (Lantus Solostar U-100 Insulin) 100 unit/mL (3 mL) inpn 50 Units by SubCUTAneous route nightly. Indications: type 2 diabetes mellitus    amLODIPine (NORVASC) 5 mg tablet Take 1 Tablet by mouth daily.  spironolactone (ALDACTONE) 25 mg tablet Take 0.5 Tablets by mouth daily.  carvediloL (Coreg) 25 mg tablet Take 0.5 Tablets by mouth two (2) times daily (with meals). Indications: myocardial reinfarction prevention    magnesium oxide (MAG-OX) 400 mg tablet Take 1 Tablet by mouth two (2) times a day.  calcitRIOL (ROCALTROL) 0.25 mcg capsule Take 1 Capsule by mouth daily.  insulin lispro (HUMALOG) 100 unit/mL kwikpen 10 units before meals  Indications: type 2 diabetes mellitus    rosuvastatin (CRESTOR) 10 mg tablet Take 1 Tab by mouth nightly. (Patient not taking: Reported on 8/23/2021)    furosemide (LASIX) 20 mg tablet Take 1 Tab by mouth daily as needed (Edema). (Patient not taking: Reported on 8/23/2021)    melatonin 5 mg cap capsule Take 1 Cap by mouth nightly. (Patient taking differently: Take 5 mg by mouth as needed (difficulty in sleeping). Reports taking it as needed)    Insulin Syringe-Needle U-100 (INSULIN SYRINGE) 1 mL 30 gauge x 5/16 syrg As directed for lantus insulin     No current facility-administered medications for this visit.      Allergies   Allergen Reactions    Azithromycin Other (comments)     Rapid response was called for bradycardia and hypotension     Pcn [Penicillins] Hives     has Acute bronchitis, Hyperglycemia, Diabetic foot infection ([address] Right foot infection, Acute pain of right foot, Insulin dependent diabetes mellitus, HTN (hypertension), Fever, NSTEMI (non-ST elevated myocardial infarction) ([address] Severe obesity ([address] Osteomyelitis ([address] Diabetic foot ulcer ([address] FRANCISCA (acute kidney injury) ([address] Sepsis ([address] Hyponatremia, Foot ulcer, left ([address] and Coronary artery disease of native artery of native heart with stable angina pectoris ([address] on their problem list.    Past Surgical History:   Procedure Laterality Date    HX CORONARY STENT PLACEMENT      HX GYN            reports that she has never smoked. She has never used smokeless tobacco. She reports that she does not drink alcohol and does not use drugs. family history includes Lupus in her mother. Review of Systems   Constitutional: Negative for chills, fever and malaise/fatigue. HENT: Negative for congestion and sore throat. Eyes: Negative for blurred vision and redness. Respiratory: Negative for cough, shortness of breath and wheezing. Cardiovascular: Negative for chest pain, palpitations, claudication, leg swelling and PND. Gastrointestinal: Negative for abdominal pain, blood in stool, constipation, diarrhea and heartburn. Genitourinary: Negative for dysuria and urgency. Musculoskeletal: Negative for joint pain and myalgias. S/p distal amputation of the fourth and fifth toes on the left and as well as bone removal  left great toe, healing and bandaged. Skin:        The blood blister is no longer present. The primary problem is a stage II ulcer on the ball of the left foot. There appears to be some surrounding 1+ edema up to the ankles and legs. Neurological: Negative for dizziness, sensory change, speech change and focal weakness. Endo/Heme/Allergies: Does not bruise/bleed easily. Psychiatric/Behavioral: Positive for depression. Negative for suicidal ideas. The patient has insomnia (New problem for 3 weeks. Also has a history of RODNEY not treated. The patient is also depressed. ). The patient is not nervous/anxious. Physical Exam  Vitals and nursing note reviewed. Constitutional:       General: She is not in acute distress. HENT:      Head: Normocephalic.       Right Ear: Tympanic membrane and external ear normal.      Left Ear: Tympanic membrane and external ear normal.      Nose: Nose normal.      Mouth/Throat:      Mouth: Mucous membranes are moist.      Pharynx: Oropharynx is clear.   Eyes:      General: No scleral icterus. Pupils: Pupils are equal, round, and reactive to light. Cardiovascular:      Heart sounds: Normal heart sounds. Pulmonary:      Effort: Pulmonary effort is normal.   Abdominal:      General: Abdomen is flat. There is no distension. Palpations: There is no mass. Tenderness: There is no abdominal tenderness. Musculoskeletal:         General: Signs of injury (Bandages removed left foot. S/p amputation distal left fourth and fifth toe, well-healed. Small ulcer lateral side of the fourth toe healing well. There are some bruising on the plantar surface distally of the left great toe. Trace edema both lower ext) present. No swelling. Cervical back: No rigidity. Right lower leg: Edema present. Left lower leg: Edema present. Skin:     Coloration: Skin is not pale. Findings: No erythema or lesion (Foot bandaged). Neurological:      Mental Status: She is alert and oriented to person, place, and time. Gait: Gait normal.   Psychiatric:         Mood and Affect: Mood normal.         Behavior: Behavior normal.         Thought Content: Thought content normal.          We discussed the expected course, resolution and complications of the diagnosis(es) in detail. Medication risks, benefits, costs, interactions, and alternatives were discussed as indicated. I advised her to contact the office if her condition worsens, changes or fails to improve as anticipated. She expressed understanding with the diagnosis(es) and plan. This note was done with the assistance of dragon speech software.   Some inadvertent errors or omissions may be present

## 2021-10-13 ENCOUNTER — VIRTUAL VISIT (OUTPATIENT)
Dept: FAMILY MEDICINE CLINIC | Age: 46
End: 2021-10-13
Payer: MEDICAID

## 2021-10-13 DIAGNOSIS — I25.118 CORONARY ARTERY DISEASE OF NATIVE ARTERY OF NATIVE HEART WITH STABLE ANGINA PECTORIS (HCC): ICD-10-CM

## 2021-10-13 DIAGNOSIS — I21.4 NSTEMI (NON-ST ELEVATED MYOCARDIAL INFARCTION) (HCC): ICD-10-CM

## 2021-10-13 DIAGNOSIS — E11.8 TYPE 2 DIABETES MELLITUS WITH COMPLICATION (HCC): ICD-10-CM

## 2021-10-13 DIAGNOSIS — D64.9 ANEMIA, NORMOCYTIC NORMOCHROMIC: ICD-10-CM

## 2021-10-13 DIAGNOSIS — G47.9 DIFFICULTY SLEEPING: ICD-10-CM

## 2021-10-13 DIAGNOSIS — E78.5 HYPERLIPIDEMIA, UNSPECIFIED HYPERLIPIDEMIA TYPE: ICD-10-CM

## 2021-10-13 DIAGNOSIS — I10 ESSENTIAL HYPERTENSION: ICD-10-CM

## 2021-10-13 DIAGNOSIS — I10 ESSENTIAL HYPERTENSION: Primary | ICD-10-CM

## 2021-10-13 DIAGNOSIS — M86.172 OTHER ACUTE OSTEOMYELITIS OF LEFT FOOT (HCC): ICD-10-CM

## 2021-10-13 PROCEDURE — 99214 OFFICE O/P EST MOD 30 MIN: CPT | Performed by: EMERGENCY MEDICINE

## 2021-10-13 RX ORDER — MELATONIN 5 MG
5 CAPSULE ORAL AS NEEDED
Qty: 90 CAPSULE | Refills: 3 | Status: SHIPPED | OUTPATIENT
Start: 2021-10-13

## 2021-10-13 RX ORDER — CLOPIDOGREL BISULFATE 75 MG/1
75 TABLET ORAL DAILY
Qty: 30 TABLET | Refills: 0 | Status: SHIPPED | OUTPATIENT
Start: 2021-10-13 | End: 2022-05-23 | Stop reason: SDUPTHER

## 2021-10-13 RX ORDER — LANOLIN ALCOHOL/MO/W.PET/CERES
325 CREAM (GRAM) TOPICAL
Qty: 30 TABLET | Refills: 0 | Status: SHIPPED | OUTPATIENT
Start: 2021-10-13 | End: 2021-11-29 | Stop reason: SDUPTHER

## 2021-10-13 RX ORDER — CARVEDILOL 25 MG/1
12.5 TABLET ORAL 2 TIMES DAILY WITH MEALS
Qty: 60 TABLET | Refills: 0 | Status: SHIPPED | OUTPATIENT
Start: 2021-10-13 | End: 2022-01-27

## 2021-10-13 RX ORDER — AMLODIPINE BESYLATE 5 MG/1
5 TABLET ORAL DAILY
Qty: 90 TABLET | Refills: 1 | Status: SHIPPED | OUTPATIENT
Start: 2021-10-13 | End: 2021-11-29 | Stop reason: SDUPTHER

## 2021-10-22 ENCOUNTER — HOSPITAL ENCOUNTER (OUTPATIENT)
Dept: LAB | Age: 46
Discharge: HOME | End: 2021-10-22

## 2021-10-22 ENCOUNTER — OFFICE VISIT (OUTPATIENT)
Dept: CARDIOLOGY CLINIC | Age: 46
End: 2021-10-22
Payer: MEDICAID

## 2021-10-22 VITALS
OXYGEN SATURATION: 100 % | SYSTOLIC BLOOD PRESSURE: 162 MMHG | HEART RATE: 100 BPM | HEIGHT: 67 IN | BODY MASS INDEX: 32.33 KG/M2 | DIASTOLIC BLOOD PRESSURE: 94 MMHG | WEIGHT: 206 LBS

## 2021-10-22 DIAGNOSIS — I50.32 CHRONIC DIASTOLIC CONGESTIVE HEART FAILURE (HCC): ICD-10-CM

## 2021-10-22 DIAGNOSIS — I10 ESSENTIAL HYPERTENSION: ICD-10-CM

## 2021-10-22 DIAGNOSIS — E78.00 HYPERCHOLESTEREMIA: ICD-10-CM

## 2021-10-22 DIAGNOSIS — I25.10 ATHEROSCLEROSIS OF NATIVE CORONARY ARTERY OF NATIVE HEART WITHOUT ANGINA PECTORIS: Primary | ICD-10-CM

## 2021-10-22 DIAGNOSIS — E66.9 OBESITY (BMI 30.0-34.9): ICD-10-CM

## 2021-10-22 DIAGNOSIS — Z95.5 HISTORY OF CORONARY ARTERY STENT PLACEMENT: ICD-10-CM

## 2021-10-22 LAB — SENTARA SPECIMEN COL,SENBCF: NORMAL

## 2021-10-22 PROCEDURE — 99214 OFFICE O/P EST MOD 30 MIN: CPT | Performed by: INTERNAL MEDICINE

## 2021-10-22 PROCEDURE — 99001 SPECIMEN HANDLING PT-LAB: CPT

## 2021-10-22 RX ORDER — CLONIDINE HYDROCHLORIDE 0.1 MG/1
0.1 TABLET ORAL 2 TIMES DAILY
Qty: 60 TABLET | Refills: 2 | Status: SHIPPED | OUTPATIENT
Start: 2021-10-22 | End: 2022-05-23 | Stop reason: SDUPTHER

## 2021-10-22 NOTE — PROGRESS NOTES
1. Have you been to the ER, urgent care clinic since your last visit? Hospitalized since your last visit? No    2. Have you seen or consulted any other health care providers outside of the [address] [place] [address] since your last visit? Include any pap smears or colon screening. No     3. Since your last visit, have you had any of the following symptoms? shortness of breath, dizziness and swelling in legs/arms. 4.  Have you had any blood work, X-rays or cardiac testing? Yes Where: [facility]     Requested: NO     In [facility]: YES    5. Where do you normally have your labs drawn? [facility]    6. Do you need any refills today?    No

## 2021-10-22 NOTE — PATIENT INSTRUCTIONS
There are no discontinued medications. A Healthy Heart: Care Instructions  Your Care Instructions     Coronary artery disease, also called heart disease, occurs when a substance called plaque builds up in the vessels that supply oxygen-rich blood to your heart muscle. This can narrow the blood vessels and reduce blood flow. A heart attack happens when blood flow is completely blocked. A high-fat diet, smoking, and other factors increase the risk of heart disease. Your doctor has found that you have a chance of having heart disease. You can do lots of things to keep your heart healthy. It may not be easy, but you can change your diet, exercise more, and quit smoking. These steps really work to lower your chance of heart disease. Follow-up care is a key part of your treatment and safety. Be sure to make and go to all appointments, and call your doctor if you are having problems. It's also a good idea to know your test results and keep a list of the medicines you take. How can you care for yourself at home? Diet    · Use less salt when you cook and eat. This helps lower your blood pressure. Taste food before salting. Add only a little salt when you think you need it. With time, your taste buds will adjust to less salt.     · Eat fewer snack items, fast foods, canned soups, and other high-salt, high-fat, processed foods.     · Read food labels and try to avoid saturated and trans fats. They increase your risk of heart disease by raising cholesterol levels.     · Limit the amount of solid fat-butter, margarine, and shortening-you eat. Use olive, peanut, or canola oil when you cook. Bake, broil, and steam foods instead of frying them.     · Eat a variety of fruit and vegetables every day. Dark green, deep orange, red, or yellow fruits and vegetables are especially good for you.  Examples include spinach, carrots, peaches, and berries.     · Foods high in fiber can reduce your cholesterol and provide important vitamins and minerals. High-fiber foods include whole-grain cereals and breads, oatmeal, beans, brown rice, citrus fruits, and apples.     · Eat lean proteins. Heart-healthy proteins include seafood, lean meats and poultry, eggs, beans, peas, nuts, seeds, and soy products.     · Limit drinks and foods with added sugar. These include candy, desserts, and soda pop. Lifestyle changes    · If your doctor recommends it, get more exercise. Walking is a good choice. Bit by bit, increase the amount you walk every day. Try for at least 30 minutes on most days of the week. You also may want to swim, bike, or do other activities.     · Do not smoke. If you need help quitting, talk to your doctor about stop-smoking programs and medicines. These can increase your chances of quitting for good. Quitting smoking may be the most important step you can take to protect your heart. It is never too late to quit.     · Limit alcohol to 2 drinks a day for men and 1 drink a day for women. Too much alcohol can cause health problems.     · Manage other health problems such as diabetes, high blood pressure, and high cholesterol. If you think you may have a problem with alcohol or drug use, talk to your doctor. Medicines    · Take your medicines exactly as prescribed. Call your doctor if you think you are having a problem with your medicine.     · If your doctor recommends aspirin, take the amount directed each day. Make sure you take aspirin and not another kind of pain reliever, such as acetaminophen (Tylenol). When should you call for help? Call 911 if you have symptoms of a heart attack. These may include:    · Chest pain or pressure, or a strange feeling in the chest.     · Sweating.     · Shortness of breath.     · Pain, pressure, or a strange feeling in the back, neck, jaw, or upper belly or in one or both shoulders or arms.     · Lightheadedness or sudden weakness.     · A fast or irregular heartbeat.    After you call 911, the  may tell you to chew 1 adult-strength or 2 to 4 low-dose aspirin. Wait for an ambulance. Do not try to drive yourself. Watch closely for changes in your health, and be sure to contact your doctor if you have any problems. Where can you learn more? Go to http://www.lizama.com/  Enter F075 in the search box to learn more about \"A Healthy Heart: Care Instructions. \"  Current as of: April 29, 2021               Content Version: 13.0  © 2006-2021 [employer]. Care instructions adapted under license by [employer] (which disclaims liability or warranty for this information). If you have questions about a medical condition or this instruction, always ask your healthcare professional. [address] any warranty or liability for your use of this information.

## 2021-10-22 NOTE — PROGRESS NOTES
HISTORY OF PRESENT ILLNESS  Edilma Galindo is a 40 y.o. female. follow-up of non-STEMI status post PCI with BLANCA in RCA, hypertension, hyperlipidemia, obesity, diabetes        Shortness of Breath  The history is provided by the patient. This is a new problem. The problem occurs intermittently. The current episode started more than 1 week ago (Early 2020). The problem has not changed since onset. Pertinent negatives include no fever, no headaches, no cough, no wheezing, no PND, no orthopnea, no chest pain, no vomiting, no rash, no leg swelling and no claudication. The problem's precipitants include exercise (100 ft; 2/21 steps- 4 steps on stairs; ). Follow-up  Associated symptoms include shortness of breath. Pertinent negatives include no chest pain and no headaches. Allergies   Allergen Reactions    Azithromycin Other (comments)     Rapid response was called for bradycardia and hypotension     Pcn [Penicillins] Hives       Past Medical History:   Diagnosis Date    CAD (coronary artery disease)     Diabetes ([facility] [address]     HTN (hypertension)     Hyperlipidemia        Family History   Problem Relation Age of Onset    Lupus Mother     Cancer Neg Hx     Diabetes Neg Hx     Heart Disease Neg Hx     Hypertension Neg Hx     Heart Attack Neg Hx     Stroke Neg Hx        Social History     Tobacco Use    Smoking status: Never Smoker    Smokeless tobacco: Never Used   Substance Use Topics    Alcohol use: No    Drug use: No        Current Outpatient Medications   Medication Sig    clopidogreL (PLAVIX) 75 mg tab Take 1 Tablet by mouth daily.  amLODIPine (NORVASC) 5 mg tablet Take 1 Tablet by mouth daily.  carvediloL (Coreg) 25 mg tablet Take 0.5 Tablets by mouth two (2) times daily (with meals). Indications: myocardial reinfarction prevention    ferrous sulfate 325 mg (65 mg iron) tablet Take 1 Tablet by mouth daily (with breakfast).     melatonin 5 mg cap capsule Take 1 Capsule by mouth as needed (difficulty in sleeping). Reports taking it as needed    Insulin Needles, Disposable, (BD Ultra-Fine Short Pen Needle) 31 gauge x 5/16\" ndle USE WITH INSULIN TWICE DAILY    insulin glargine (Lantus Solostar U-100 Insulin) 100 unit/mL (3 mL) inpn 50 Units by SubCUTAneous route nightly. Indications: type 2 diabetes mellitus    magnesium oxide (MAG-OX) 400 mg tablet Take 1 Tablet by mouth two (2) times a day.  insulin lispro (HUMALOG) 100 unit/mL kwikpen 10 units before meals  Indications: type 2 diabetes mellitus    Insulin Syringe-Needle U-100 (INSULIN SYRINGE) 1 mL 30 gauge x 5/16 syrg As directed for lantus insulin     No current facility-administered medications for this visit. Past Surgical History:   Procedure Laterality Date    HX CORONARY STENT PLACEMENT      HX GYN             Visit Vitals  BP (!) 162/94 (BP 1 Location: Left upper arm, BP Patient Position: Sitting, BP Cuff Size: Adult)   Pulse 100   Ht 5' 7\" (1.702 m)   Wt 93.4 kg (206 lb)   SpO2 100%   BMI 32.26 kg/m²       Diagnostic Studies:  I have reviewed the relevant tests done on the patient and show as follows  EKG tracings reviewed by me today. EKG Results     None        XR Results (most recent):  Results from Hospital Encounter encounter on 21    XR FOOT LT AP/LAT    Narrative  Left Foot -2 Views    HISTORY: Postop. COMPARISON: 2021. FINDINGS:    2 views obtained. Similar absence of the first distal metatarsal and base of the  proximal phalanx. Interval amputation of the fourth and fifth digits at the mid  proximal phalanx shaft. Bones are osteopenic soft tissue edema at the first  digit resection site, as before with medial ulceration. Soft tissue edema at the  resection site fourth and fifth digits. Marked edema of the distal foot plantar  and dorsal aspect. There is edema at the ankle. Calcaneal bone spurs.     Impression  Interval resection of the fourth and fifth digits at the mid shaft of the  proximal phalanx. Overlying soft tissue edema. Resection of the first digit at the MTP with significant soft tissue swelling  and ulceration. Significant edema/inflammation distal foot greater than ankle. 10/19/20   ECHO ADULT FOLLOW-UP OR LIMITED 10/20/2020 10/20/2020    Narrative · LV: Estimated LVEF is 55 - 60%. Visually measured ejection fraction. Normal cavity size, wall thickness and systolic function (ejection   fraction normal). Wall motion: normal.  · COVID r/o        Signed by: Evelyne Solano MD          10/19/20   CARDIAC PROCEDURE 10/23/2020 10/23/2020    Narrative 100% mid RCA occlusion with thrombus. S/p aspiration thrombectomy followed by ptca/stent ( 2 BLANCA stents placed   overlapping extending from mid RCA to distal RCA). Mild mid LAD stenosis. Normal LV function by echo. TPM inserted via right femoral vein due to complete heart block. Plan:  Continue DAPT for 1 yr/ high dose statin. Post PCI developed juctional tachycardia. Will discontinue TPM. Monitor   and consider PPM if needed. Recommend intense risk factor modification. Signed by: Brittany Banerjee MD           Ms. Jorge Alegre has a reminder for a \"due or due soon\" health maintenance. I have asked that she contact her primary care provider for follow-up on this health maintenance. Review of Systems   Constitutional: Negative for chills, fever, malaise/fatigue and weight loss. HENT: Negative for nosebleeds. Eyes: Negative for discharge. Respiratory: Positive for shortness of breath. Negative for cough and wheezing. Cardiovascular: Negative for chest pain, palpitations, orthopnea, claudication, leg swelling and PND. Gastrointestinal: Negative for diarrhea, nausea and vomiting. Genitourinary: Negative for dysuria and hematuria. Musculoskeletal: Negative for joint pain. Skin: Negative for rash. Neurological: Negative for dizziness, seizures, loss of consciousness and headaches. Endo/Heme/Allergies: Negative for polydipsia. Does not bruise/bleed easily. Psychiatric/Behavioral: Negative for depression and substance abuse. The patient does not have insomnia. 10/19/20   ECHO ADULT FOLLOW-UP OR LIMITED 10/20/2020 10/20/2020    Narrative · LV: Estimated LVEF is 55 - 60%. Visually measured ejection fraction. Normal cavity size, wall thickness and systolic function (ejection   fraction normal). Wall motion: normal.  · COVID r/o        Signed by: Aby Barcenas MD     10/19/20   CARDIAC PROCEDURE 10/23/2020 10/23/2020    Narrative 100% mid RCA occlusion with thrombus. S/p aspiration thrombectomy followed by ptca/stent ( 2 BLANCA stents placed   overlapping extending from mid RCA to distal RCA). Mild mid LAD stenosis. Normal LV function by echo. TPM inserted via right femoral vein due to complete heart block. Plan:  Continue DAPT for 1 yr/ high dose statin. Post PCI developed juctional tachycardia. Will discontinue TPM. Monitor   and consider PPM if needed. Recommend intense risk factor modification. Signed by: Simpson Lundborg, MD   12/20 Holter  Normal sinus rhythm, no symptoms, no arrhythmias  9/21 echo  Interpretation Summary    · LV: Estimated LVEF is 55 - 60%. Normal cavity size and systolic function (ejection fraction normal). Mild to moderate hypertrophy. Moderate septal wall hypertrophy. Mild posterior wall hypertrophy. Wall motion: normal. Mild (grade 1) left ventricular diastolic dysfunction. · LA: Moderately dilated left atrium. Left Atrium volume index is 44.84 mL/m2. · Normal pericardium and no evidence of pericardial effusion. Comparison Study Information    Prior Study    When compared to the previous study from 6/25/21, there is no longer evidence of a significant pericardial effusion. Physical Exam  Constitutional:       General: She is not in acute distress. Appearance: She is well-developed. She is obese.       Comments: sev obese HENT:      Head: Normocephalic and atraumatic. Mouth/Throat:      Dentition: Normal dentition. Eyes:      General: No scleral icterus. Right eye: No discharge. Left eye: No discharge. Neck:      Thyroid: No thyromegaly. Vascular: No carotid bruit or JVD. Cardiovascular:      Rate and Rhythm: Normal rate and regular rhythm. Pulses: Intact distal pulses. Heart sounds: Normal heart sounds, S1 normal and S2 normal. No murmur heard. No friction rub. No gallop. Pulmonary:      Effort: Pulmonary effort is normal.      Breath sounds: Normal breath sounds. No wheezing or rales. Abdominal:      Palpations: Abdomen is soft. There is no mass. Tenderness: There is no abdominal tenderness. Musculoskeletal:      Cervical back: Neck supple. Right lower leg: Edema (trace) present. Left lower leg: Edema (1+) present. Lymphadenopathy:      Cervical:      Right cervical: No superficial cervical adenopathy. Left cervical: No superficial cervical adenopathy. Skin:     General: Skin is warm and dry. Findings: No rash. Neurological:      Mental Status: She is alert and oriented to person, place, and time. Psychiatric:         Behavior: Behavior normal.         ASSESSMENT and PLAN    10/20 CAD stable. Patient seems to have chronic diastolic CHF. No evidence of fluid overload. Increase losartan and follow home BP chart. Check a Holter monitor before starting beta-blockers. Refer to cardiac rehab  Diet weight and exercise discussed in detail. 2/21 CAD stable. Blood pressure is controlled. Patient is getting recurrent shortness of breath and some edema intermittently. Overall she does feel better. Use Lasix as needed and follow electrolytes. Lipid not available despite the fact she had a blood drawn. May have to redo it if not traceable from [facility] or [facility]. Holter was unremarkable without any arrhythmias. No significant dizziness. Continue present medications. Diet weight and exercise discussed in detail. 7/2/2021 CAD stable. CHF is compensated. Blood pressure low normal.  Reduce Norvasc. Reduce Aldactone and follow the BMP as well. Lipids are controlled. Continue statins. Has been losing weight over the months. Recommended that she lose another 10 to 20 pounds. Small pericardial effusion needs to be followed we will order a repeat echo. Severe anemia. Repeat H&H. Warned that she may need a repeat blood transfusion depending on the level. Diagnoses and all orders for this visit:    1. Atherosclerosis of native coronary artery of native heart without angina pectoris    2. Chronic diastolic congestive heart failure ([address]    3. Essential hypertension    4. Hypercholesteremia    5. Obesity (BMI 30.0-34.9)    6. History of coronary artery stent placement    Other orders  -     cloNIDine HCL (CATAPRES) 0.1 mg tablet; Take 1 Tablet by mouth two (2) times a day. Pertinent laboratory and test data reviewed and discussed with patient. See patient instructions also for other medical advice given    There are no discontinued medications. Follow-up and Dispositions    · Return in about 3 months (around 1/22/2022), or if symptoms worsen or fail to improve, for BP log x 4-5 days post med changes. 10/22/2021 CAD stable. CHF persists with NYHA class III symptoms. Secondary to uncontrolled hypertension, obesity and LVH. Discussed with patient in detail regarding diet follow-up, trying regular exercise and blood pressure control. Add clonidine and follow home chart as diuretics were discontinued due to renal insufficiency by nephrology. Follow-up closely for CHF. Patient advised to come to ER if she gets significant worsening.

## 2021-11-13 ENCOUNTER — HOSPITAL ENCOUNTER (EMERGENCY)
Age: 46
Discharge: HOME | End: 2021-11-14
Attending: STUDENT IN AN ORGANIZED HEALTH CARE EDUCATION/TRAINING PROGRAM
Payer: MEDICAID

## 2021-11-13 DIAGNOSIS — R42 LIGHTHEADEDNESS: ICD-10-CM

## 2021-11-13 DIAGNOSIS — R73.9 HYPERGLYCEMIA: Primary | ICD-10-CM

## 2021-11-13 PROCEDURE — 99285 EMERGENCY DEPT VISIT HI MDM: CPT

## 2021-11-13 PROCEDURE — 83735 ASSAY OF MAGNESIUM: CPT

## 2021-11-13 PROCEDURE — 96360 HYDRATION IV INFUSION INIT: CPT

## 2021-11-13 PROCEDURE — 84100 ASSAY OF PHOSPHORUS: CPT

## 2021-11-13 PROCEDURE — 85025 COMPLETE CBC W/AUTO DIFF WBC: CPT

## 2021-11-13 PROCEDURE — 84484 ASSAY OF TROPONIN QUANT: CPT

## 2021-11-13 PROCEDURE — 93005 ELECTROCARDIOGRAM TRACING: CPT

## 2021-11-13 PROCEDURE — 96361 HYDRATE IV INFUSION ADD-ON: CPT

## 2021-11-13 PROCEDURE — 80053 COMPREHEN METABOLIC PANEL: CPT

## 2021-11-14 ENCOUNTER — APPOINTMENT (OUTPATIENT)
Dept: GENERAL RADIOLOGY | Age: 46
End: 2021-11-14
Attending: STUDENT IN AN ORGANIZED HEALTH CARE EDUCATION/TRAINING PROGRAM
Payer: MEDICAID

## 2021-11-14 VITALS
RESPIRATION RATE: 15 BRPM | WEIGHT: 202 LBS | HEART RATE: 88 BPM | BODY MASS INDEX: 32.47 KG/M2 | TEMPERATURE: 98.3 F | HEIGHT: 66 IN | DIASTOLIC BLOOD PRESSURE: 77 MMHG | OXYGEN SATURATION: 100 % | SYSTOLIC BLOOD PRESSURE: 142 MMHG

## 2021-11-14 LAB
ALBUMIN SERPL-MCNC: 1.8 G/DL (ref 3.4–5)
ALBUMIN/GLOB SERPL: 0.4 {RATIO} (ref 0.8–1.7)
ALP SERPL-CCNC: 829 U/L (ref 45–117)
ALT SERPL-CCNC: 94 U/L (ref 13–56)
ANION GAP SERPL CALC-SCNC: 5 MMOL/L (ref 3–18)
APPEARANCE UR: CLEAR
AST SERPL-CCNC: 106 U/L (ref 10–38)
ATRIAL RATE: 92 BPM
BACTERIA URNS QL MICRO: ABNORMAL /HPF
BASOPHILS # BLD: 0 K/UL (ref 0–0.1)
BASOPHILS NFR BLD: 0 % (ref 0–2)
BILIRUB SERPL-MCNC: 0.1 MG/DL (ref 0.2–1)
BILIRUB UR QL: NEGATIVE
BUN SERPL-MCNC: 23 MG/DL (ref 7–18)
BUN/CREAT SERPL: 10 (ref 12–20)
CALCIUM SERPL-MCNC: 8.2 MG/DL (ref 8.5–10.1)
CALCULATED P AXIS, ECG09: 52 DEGREES
CALCULATED R AXIS, ECG10: 49 DEGREES
CALCULATED T AXIS, ECG11: 67 DEGREES
CHLORIDE SERPL-SCNC: 108 MMOL/L (ref 100–111)
CO2 SERPL-SCNC: 25 MMOL/L (ref 21–32)
COLOR UR: YELLOW
CREAT SERPL-MCNC: 2.31 MG/DL (ref 0.6–1.3)
DIAGNOSIS, 93000: NORMAL
DIFFERENTIAL METHOD BLD: ABNORMAL
EOSINOPHIL # BLD: 0.1 K/UL (ref 0–0.4)
EOSINOPHIL NFR BLD: 2 % (ref 0–5)
EPITH CASTS URNS QL MICRO: ABNORMAL /LPF (ref 0–5)
ERYTHROCYTE [DISTWIDTH] IN BLOOD BY AUTOMATED COUNT: 14.9 % (ref 11.6–14.5)
GLOBULIN SER CALC-MCNC: 4.1 G/DL (ref 2–4)
GLUCOSE BLD STRIP.AUTO-MCNC: 220 MG/DL (ref 70–110)
GLUCOSE SERPL-MCNC: 303 MG/DL (ref 74–99)
GLUCOSE UR STRIP.AUTO-MCNC: >1000 MG/DL
HCT VFR BLD AUTO: 28.3 % (ref 35–45)
HGB BLD-MCNC: 8.6 G/DL (ref 12–16)
HGB UR QL STRIP: ABNORMAL
IMM GRANULOCYTES # BLD AUTO: 0 K/UL (ref 0–0.04)
IMM GRANULOCYTES NFR BLD AUTO: 0 % (ref 0–0.5)
KETONES UR QL STRIP.AUTO: NEGATIVE MG/DL
LEUKOCYTE ESTERASE UR QL STRIP.AUTO: NEGATIVE
LYMPHOCYTES # BLD: 2.8 K/UL (ref 0.9–3.6)
LYMPHOCYTES NFR BLD: 34 % (ref 21–52)
MAGNESIUM SERPL-MCNC: 1.7 MG/DL (ref 1.6–2.6)
MCH RBC QN AUTO: 22.2 PG (ref 24–34)
MCHC RBC AUTO-ENTMCNC: 30.4 G/DL (ref 31–37)
MCV RBC AUTO: 72.9 FL (ref 78–100)
MONOCYTES # BLD: 0.6 K/UL (ref 0.05–1.2)
MONOCYTES NFR BLD: 7 % (ref 3–10)
NEUTS SEG # BLD: 4.5 K/UL (ref 1.8–8)
NEUTS SEG NFR BLD: 56 % (ref 40–73)
NITRITE UR QL STRIP.AUTO: NEGATIVE
NRBC # BLD: 0 K/UL (ref 0–0.01)
NRBC BLD-RTO: 0 PER 100 WBC
P-R INTERVAL, ECG05: 140 MS
PH UR STRIP: 6 [PH] (ref 5–8)
PHOSPHATE SERPL-MCNC: 4.1 MG/DL (ref 2.5–4.9)
PLATELET # BLD AUTO: 251 K/UL (ref 135–420)
PMV BLD AUTO: 10.4 FL (ref 9.2–11.8)
POTASSIUM SERPL-SCNC: 3.6 MMOL/L (ref 3.5–5.5)
PROT SERPL-MCNC: 5.9 G/DL (ref 6.4–8.2)
PROT UR STRIP-MCNC: >1000 MG/DL
Q-T INTERVAL, ECG07: 360 MS
QRS DURATION, ECG06: 80 MS
QTC CALCULATION (BEZET), ECG08: 445 MS
RBC # BLD AUTO: 3.88 M/UL (ref 4.2–5.3)
RBC #/AREA URNS HPF: ABNORMAL /HPF (ref 0–5)
SODIUM SERPL-SCNC: 138 MMOL/L (ref 136–145)
SP GR UR REFRACTOMETRY: 1.01 (ref 1–1.03)
TROPONIN I SERPL-MCNC: <0.02 NG/ML (ref 0–0.04)
UROBILINOGEN UR QL STRIP.AUTO: 0.2 EU/DL (ref 0.2–1)
VENTRICULAR RATE, ECG03: 92 BPM
WBC # BLD AUTO: 8 K/UL (ref 4.6–13.2)
WBC URNS QL MICRO: ABNORMAL /HPF (ref 0–4)

## 2021-11-14 PROCEDURE — 82962 GLUCOSE BLOOD TEST: CPT

## 2021-11-14 PROCEDURE — 71045 X-RAY EXAM CHEST 1 VIEW: CPT

## 2021-11-14 PROCEDURE — 81001 URINALYSIS AUTO W/SCOPE: CPT

## 2021-11-14 PROCEDURE — 74011250636 HC RX REV CODE- 250/636: Performed by: STUDENT IN AN ORGANIZED HEALTH CARE EDUCATION/TRAINING PROGRAM

## 2021-11-14 RX ADMIN — SODIUM CHLORIDE, SODIUM LACTATE, POTASSIUM CHLORIDE, AND CALCIUM CHLORIDE 1000 ML: 600; 310; 30; 20 INJECTION, SOLUTION INTRAVENOUS at 01:34

## 2021-11-14 NOTE — DISCHARGE INSTRUCTIONS
Please contact your primary care doctor and/or your endocrinologist as soon as possible arrange a follow-up over the next 3 to 7 days for consideration of any adjustments that may be necessary on your insulin pump. If you develop any sudden change in your symptoms including severe nausea/vomiting, sudden/severe pain, passing out or any other sudden/severe change in your condition please return immediately to emergency department further evaluation and treatment.

## 2021-11-14 NOTE — ED PROVIDER NOTES
Patient is a 52-year-old female history of diabetes (recently placed on insulin pump), hypertension, hyperlipidemia, and coronary artery disease with stent placement x1. Patient presents today with primary complaint of persistent lightheadedness and near syncopal episodes over the last 24 to 48hours patient also reports increased urinary frequency with no associated dysuria. Patient denies any recent fever/chills, cough, difficulty breathing, chest pain, abdominal pain, or diarrhea. Past Medical History:   Diagnosis Date    CAD (coronary artery disease)     Diabetes ([address]     HTN (hypertension)     Hyperlipidemia        Past Surgical History:   Procedure Laterality Date    HX CORONARY STENT PLACEMENT      HX GYN               Family History:   Problem Relation Age of Onset    Lupus Mother     Cancer Neg Hx     Diabetes Neg Hx     Heart Disease Neg Hx     Hypertension Neg Hx     Heart Attack Neg Hx     Stroke Neg Hx        Social History     Socioeconomic History    Marital status:      Spouse name: Not on file    Number of children: Not on file    Years of education: Not on file    Highest education level: Not on file   Occupational History    Not on file   Tobacco Use    Smoking status: Never Smoker    Smokeless tobacco: Never Used   Substance and Sexual Activity    Alcohol use: No    Drug use: No    Sexual activity: Yes     Partners: Male   Other Topics Concern    Not on file   Social History Narrative    Not on file     Social Determinants of Health     Financial Resource Strain:     Difficulty of Paying Living Expenses: Not on file   Food Insecurity:     Worried About Running Out of Food in the Last Year: Not on file    Blade of Food in the Last Year: Not on file   Transportation Needs:     Lack of Transportation (Medical): Not on file    Lack of Transportation (Non-Medical):  Not on file   Physical Activity:     Days of Exercise per Week: Not on file  Minutes of Exercise per Session: Not on file   Stress:     Feeling of Stress : Not on file   Social Connections:     Frequency of Communication with Friends and Family: Not on file    Frequency of Social Gatherings with Friends and Family: Not on file    Attends Orthodox Services: Not on file    Active Member of Clubs or Organizations: Not on file    Attends Club or Organization Meetings: Not on file    Marital Status: Not on file   Intimate Partner Violence:     Fear of Current or Ex-Partner: Not on file    Emotionally Abused: Not on file    Physically Abused: Not on file    Sexually Abused: Not on file   Housing Stability:     Unable to Pay for Housing in the Last Year: Not on file    Number of [place] in the Last Year: Not on file    Unstable Housing in the Last Year: Not on file         ALLERGIES: Azithromycin and Pcn [penicillins]    Review of Systems   Constitutional: Negative for chills and fever. HENT: Negative for rhinorrhea and sore throat. Eyes: Negative for discharge and redness. Respiratory: Negative for cough and shortness of breath. Cardiovascular: Negative for chest pain and leg swelling. Gastrointestinal: Negative for abdominal pain, diarrhea, nausea and vomiting. Endocrine: Positive for polyuria. Negative for polydipsia and polyphagia. Genitourinary: Negative for difficulty urinating and dysuria. Musculoskeletal: Negative for back pain and neck pain. Skin: Negative for rash and wound. Neurological: Positive for light-headedness. Negative for syncope and headaches. Vitals:    11/13/21 2349 11/14/21 0254 11/14/21 0335   BP: (!) 163/89  (!) 142/77   Pulse: (!) 101 94 88   Resp: 16 17 15   Temp: 98.3 °F (36.8 °C)     SpO2: 100% 100% 100%   Weight: 91.6 kg (202 lb)     Height: 5' 6\" (1.676 m)              Physical Exam  Constitutional:       General: She is not in acute distress. Appearance: She is not ill-appearing, toxic-appearing or diaphoretic. HENT:      Head: Normocephalic and atraumatic. Right Ear: External ear normal.      Left Ear: External ear normal.      Nose: No congestion or rhinorrhea. Mouth/Throat:      Mouth: Mucous membranes are moist.      Pharynx: No oropharyngeal exudate or posterior oropharyngeal erythema. Eyes:      General:         Right eye: No discharge. Left eye: No discharge. Pupils: Pupils are equal, round, and reactive to light. Neck:      Vascular: No carotid bruit. Cardiovascular:      Rate and Rhythm: Normal rate and regular rhythm. Heart sounds: No murmur heard. No friction rub. No gallop. Pulmonary:      Effort: Pulmonary effort is normal. No respiratory distress. Breath sounds: No stridor. No wheezing, rhonchi or rales. Abdominal:      General: Abdomen is flat. There is no distension. Tenderness: There is no abdominal tenderness. There is no right CVA tenderness, left CVA tenderness, guarding or rebound. Musculoskeletal:         General: No swelling, tenderness, deformity or signs of injury. Cervical back: No rigidity or tenderness. Right lower leg: No edema. Left lower leg: No edema. Lymphadenopathy:      Cervical: No cervical adenopathy. Skin:     General: Skin is warm. Capillary Refill: Capillary refill takes less than 2 seconds. Coloration: Skin is not jaundiced or pale. Findings: No bruising, erythema, lesion or rash. Neurological:      General: No focal deficit present. Mental Status: She is alert and oriented to person, place, and time. Sensory: No sensory deficit. Motor: No weakness.    Psychiatric:         Mood and Affect: Mood normal.          MDM  Number of Diagnoses or Management Options  Hyperglycemia: new and requires workup  Lightheadedness: new and requires workup  Diagnosis management comments: Patient presents with a primary complaint of lightheadedness, subsequently found to have significant hyperglycemia with no evidence of DKA on laboratory studies outpatient self bolused 4.7 units of insulin on her insulin pump and was also 6 hydrated with 2 L of lactated Ringer's, upon reassessment patient reports near resolution of all symptoms and her blood sugar has improved significantly, will discharge patient home with instructions to follow-up with her primary care doctor as well as her endocrinologist given that she is still acclimating to her new insulin pump. Amount and/or Complexity of Data Reviewed  Clinical lab tests: reviewed  Tests in the radiology section of CPT®: reviewed  Tests in the medicine section of CPT®: reviewed  Decide to obtain previous medical records or to obtain history from someone other than the patient: yes      ED Course as of 11/14/21 0512   Sun Nov 14, 2021   0251 On reassessment patient reports significant improvement in her symptoms following self-administered bolus of 4.7 units of insulin and IV hydration.  [JK]      ED Course User Index  [JK] Vik Ferrara MD       Procedures

## 2021-11-22 NOTE — PROGRESS NOTES
11/26/21  6:34 AM      11/29/21  1:24 PM    ICD-10-CM ICD-9-CM    1. Essential hypertension  I10 401.9 amLODIPine (NORVASC) 10 mg tablet      CBC WITH AUTOMATED DIFF      METABOLIC PANEL, BASIC   2. Type 2 diabetes mellitus with complication (HCC)  Z81.5 250.90 flash glucose scanning reader (Free[employer] Daniel 14 Day [place]) misc      flash glucose sensor (FreeStyle Daniel 14 Day Sensor) kit      MICROALBUMIN, UR, RAND W/ MICROALB/CREAT RATIO      AMB POC HEMOGLOBIN A1C   3. Hyperlipidemia, unspecified hyperlipidemia type  E78.5 272.4 LIPID PANEL   4. Severe obesity ([address]  E66.01 278.01    5. Anemia, normocytic normochromic  D64.9 285.9 ferrous sulfate 325 mg (65 mg iron) tablet      CBC WITH AUTOMATED DIFF     Follow-up and Dispositions    · Return in about 6 weeks (around 1/10/2022) for Follow up on illness. Assessment and plan  Essential hypertension, not quite at control. Catapres recently added. We will increase the amlodipine to 10 mg daily and follow. Type 2 diabetes mellitus with complication presently on an insulin pump. Hemoglobin A1c improving down to 7.9 down from 9 range. Continue present regimen. Will write prescription for freestyle daniel. Unhealthy weight actually increasing somewhat. Discussed diet and exercise. Anemia normochromic normocytic. Last TIBC in June 1[birth year] 1945 and iron was within normal range. Saturation was 38 and ferritin was high at 413. B12 and folate were within normal limits. The edema is most consistent with chronic disease and may be from her renal insufficiency and diabetes. Follow. Hyperlipidemia uncontrolled discussed diet and exercise follow-up labs before next visit. Lab results and schedule of future lab studies reviewed with patient  All questions were answered and understood. Lab results and schedule of future lab studies reviewed with patient  All questions were answered and understood.     Health Maintenance Due   Topic Date Due    Hepatitis C Screening  Never done    DTaP/Tdap/Td series (1 - Tdap) Never done    Cervical cancer screen  Never done    Colorectal Cancer Screening Combo  Never done    Flu Vaccine (1) Never done    A1C test (Diabetic or Prediabetic)  09/25/2021    Lipid Screen  09/28/2021       Subjective:   Elsie John is a 40 y.o. female . Office visit 10/22/2021 with cardiology for follow-up of non-ST elevation MI acute coronary syndrome s/p PCI with BLANCA in right coronary artery. Her condition was felt to be stable at the time with persistent New York Heart Association class III symptoms. Diet was discussed and blood pressure control was also discussed. Clonidine was added with instruction to follow the home chart as diuretics were discontinued due to renal insufficiency by nephrology. She is presenting today with a support stocking and boot on the left foot because of edema. The ulcers have healed. The last visit was 10/13/2021  Essential hypertension question control. Diuretic has been stopped because of possible over diuresis. Follow in person in 30 days. Diabetes mellitus type 2. Presently only on long and short acting insulin, not on short acting insulin, with a view towards starting an insulin pump in 6 days. Follow. Hyperlipidemia. Uncontrolled in 1 year prior. Follow-up labs ordered which she will have done at the hospital.  History of NSTEMI. No symptoms. History of anemia follow-up ordered. Osteomyelitis of the left foot. Followed by podiatry. MRI which showed possible bone involvement reviewed. We will recheck in person. Not presently on an antibiotic. Follow-up along with the podiatrist.  Difficulty sleeping continue the melatonin. Previously seen for hypertension, borderline control. If elevated on next visit would adjust medications. Diabetes mellitus type 2 improving significantly. Weight was also decreasing. Follow-up labs.  Hyperlipidemia improving, follow-up labs ordered in 1 month.  The left foot was previously healing well. Follow along with the podiatry specialist and home health at the time. Screening for mammogram ordered. Last one that I can see was in 2019. She was to be set up for screening for colon cancer. Anemia. Continue treatment. History of coronary artery disease, s/p stenting. No symptoms. Follow along with the specialist.  reviewed diet, exercise and weight control  Seen 5/26/2021 post hospitalization for foot ulcer. Seen 7/2/2021 by cardiology. They reviewed that her echocardiogram from 10/19/2020 showing an ejection fraction of 55 to 60% with normal cavity size wall thickness and systolic function. The patient had a cardiac procedure showing 100% mid RCA occlusion on 10/23/2020. She was s/p aspiration thrombectomy followed by PTCA/stent. She had a normal LV function by echo. A Holter on 12/28/2020 showed normal sinus rhythm with no dysrhythmia is noted. The patient was felt to be relatively stable from a cardiovascular standpoint. She was to use Lasix as needed and the electrolytes were going to be followed. On 6/30/2021 she was followed by nephrology. Patient was admitted 6/24/2021 and discharged 6/30/2021 for the recurrent syncope acute on chronic anemia diabetic left foot ulcer s/p left fourth and fifth toe amputation and wound VAC placement. She also had fungemia, hypokalemia, A1c of 9.6, hypertension, unhealthy weight with a BMI of 30.2 and the stent placement mentioned. Seen in ED 9/21/2021 but left prior to treatment. Office visit nephrology 10/13/2021  The assessment was acute on chronic kidney injury stage IIIa possible overdiuresis versus worsening of diabetic nephropathy. She was also noted to have uncontrolled diabetes, hypertension, proteinuria and history of coronary artery disease s/p NSTEMI in October 2020. The plan was a low-sodium diet and tight control of blood sugar.   The patient is working with her endocrinologist for an insulin pump. She was asked to stop her ARB diuretics and SGLT2 inhibitors if she can. Left foot swollen  The patient has recurrence of pain in the left foot and swelling. She is being followed by podiatry. [facility] Report pending. She is now using compression stockings and a boot. This may have become a chronic problem. Duplex 9/30/2021 showed no evidence of deep vein thrombosis with normal compressibility. MRI left foot 10/7/2021; the impression was first MTP resection/debridement but extensive marrow edema in the residual metatarsal and proximal phalanx which could at the time have represented active osteomyelitis. Other findings also reviewed. Hypertension   The patient has no headaches, visual changes, chest pain or pressure,dyspnea, orthopnea, or PND. There is no problem with the medication. Diabetes mellitus  She has been on an Insulin pump for one month. A1c7.9  The patient denies polyuria, polydipsia, or polyphagia. The patient denies any other aggravating or relieving factors Only on Insulin now  Hyperlipidemia   The patient denies any myalgias or weakness. No abdominal discomfort admitted to. The patient denies any difficulty with or side effects from the medication. BP Readings from Last 3 Encounters:   11/29/21 (!) 165/94   11/14/21 (!) 142/77   10/22/21 (!) 162/94         Lab Results   Component Value Date/Time    Hemoglobin A1c 9.6 (H) 06/25/2021 06:45 AM    Hemoglobin A1c (POC) 12.3 03/28/2019 09:49 AM     Lab Results   Component Value Date/Time    Cholesterol, total 218 (H) 09/28/2020 01:00 PM    HDL Cholesterol 49 09/28/2020 01:00 PM    LDL, calculated 121 (H) 09/28/2020 01:00 PM    VLDL, calculated 48 (H) 09/28/2020 01:00 PM    Triglyceride 241 (H) 09/28/2020 01:00 PM       Overweight  The patient states that the weight has has been decreasing. .  Patient's diet is improved and she is counting her carbohydrates better now. .  The patient denies edema of the lower extremities except for trace. .  Obesity comorbid conditions include diabetes, heart disease, high cholesterol and high blood pressure   body mass index is unknown because there is no height or weight on file. Wt Readings from Last 3 Encounters:   11/29/21 212 lb (96.2 kg)   11/13/21 202 lb (91.6 kg)   10/22/21 206 lb (93.4 kg)         Current Outpatient Medications   Medication Sig    cloNIDine HCL (CATAPRES) 0.1 mg tablet Take 1 Tablet by mouth two (2) times a day.  clopidogreL (PLAVIX) 75 mg tab Take 1 Tablet by mouth daily.  amLODIPine (NORVASC) 5 mg tablet Take 1 Tablet by mouth daily.  carvediloL (Coreg) 25 mg tablet Take 0.5 Tablets by mouth two (2) times daily (with meals). Indications: myocardial reinfarction prevention    ferrous sulfate 325 mg (65 mg iron) tablet Take 1 Tablet by mouth daily (with breakfast).  melatonin 5 mg cap capsule Take 1 Capsule by mouth as needed (difficulty in sleeping). Reports taking it as needed    Insulin Needles, Disposable, (BD Ultra-Fine Short Pen Needle) 31 gauge x 5/16\" ndle USE WITH INSULIN TWICE DAILY    insulin glargine (Lantus Solostar U-100 Insulin) 100 unit/mL (3 mL) inpn 50 Units by SubCUTAneous route nightly. Indications: type 2 diabetes mellitus    magnesium oxide (MAG-OX) 400 mg tablet Take 1 Tablet by mouth two (2) times a day.  insulin lispro (HUMALOG) 100 unit/mL kwikpen 10 units before meals  Indications: type 2 diabetes mellitus    Insulin Syringe-Needle U-100 (INSULIN SYRINGE) 1 mL 30 gauge x 5/16 syrg As directed for lantus insulin     No current facility-administered medications for this visit.      Allergies   Allergen Reactions    Azithromycin Other (comments)     Rapid response was called for bradycardia and hypotension     Pcn [Penicillins] Hives     has Acute bronchitis, Hyperglycemia, Diabetic foot infection ([address] Right foot infection, Acute pain of right foot, Insulin dependent diabetes mellitus, HTN (hypertension), Fever, NSTEMI (non-ST elevated myocardial infarction) ([facility] Severe obesity ([facility] Osteomyelitis ([facility] Diabetic foot ulcer ([facility] FRANCISCA (acute kidney injury) ([facility] Sepsis ([facility] Hyponatremia, Foot ulcer, left ([facility] and Coronary artery disease of native artery of native heart with stable angina pectoris ([facility] on their problem list.    Past Surgical History:   Procedure Laterality Date    HX CORONARY STENT PLACEMENT      HX GYN            reports that she has never smoked. She has never used smokeless tobacco. She reports that she does not drink alcohol and does not use drugs. family history includes Lupus in her mother. Review of Systems   Constitutional: Negative for chills, fever and malaise/fatigue. HENT: Negative for congestion and sore throat. Eyes: Negative for blurred vision and redness. Respiratory: Negative for cough, shortness of breath and wheezing. Cardiovascular: Negative for chest pain, palpitations, claudication, leg swelling and PND. Gastrointestinal: Negative for abdominal pain, blood in stool, constipation, diarrhea and heartburn. Genitourinary: Negative for dysuria and urgency. Musculoskeletal: Negative for joint pain and myalgias. S/p distal amputation of the fourth and fifth toes on the left and as well as bone removal  left great toe, healing and bandaged. Skin:        The blood blister is no longer present. The primary problem is a stage II ulcer on the ball of the left foot. There appears to be some surrounding 1+ edema up to the ankles and legs. Neurological: Negative for dizziness, sensory change, speech change and focal weakness. Endo/Heme/Allergies: Does not bruise/bleed easily. Psychiatric/Behavioral: Positive for depression. Negative for suicidal ideas. The patient has insomnia (New problem for 3 weeks. Also has a history of RODNEY not treated. The patient is also depressed. ). The patient is not nervous/anxious.       Visit Vitals  BP (!) 165/94 (BP 1 Location: Right arm, BP Patient Position: Sitting, BP Cuff Size: Large adult)   Pulse 94   Temp 97.5 °F (36.4 °C) (Temporal)   Resp 16   Ht 5' 6\" (1.676 m)   Wt 212 lb (96.2 kg)   SpO2 100%   BMI 34.22 kg/m²         Physical Exam  Vitals and nursing note reviewed. Constitutional:       General: She is not in acute distress. HENT:      Head: Normocephalic. Right Ear: Tympanic membrane and external ear normal.      Left Ear: Tympanic membrane and external ear normal.      Nose: Nose normal.      Mouth/Throat:      Mouth: Mucous membranes are moist.      Pharynx: Oropharynx is clear. Eyes:      General: No scleral icterus. Pupils: Pupils are equal, round, and reactive to light. Cardiovascular:      Heart sounds: Normal heart sounds. Pulmonary:      Effort: Pulmonary effort is normal.   Abdominal:      General: Abdomen is flat. There is no distension. Palpations: There is no mass. Tenderness: There is no abdominal tenderness. Musculoskeletal:         General: Signs of injury (Bandages removed left foot. S/p amputation distal left fourth and fifth toe, well-healed. Small ulcer lateral side of the fourth toe healing well. There are some bruising on the plantar surface distally of the left great toe. Trace edema both lower ext) present. No swelling. Cervical back: No rigidity. Right lower leg: No edema. Left lower leg: Edema present. Skin:     Coloration: Skin is not pale. Findings: No erythema or lesion (file:///C:/PROGRAM%20FILES%[email or web address]/V9.7/EN-US/Images/Skins/_Default/Low/Icons/Comment. pngFoot bandagedHealed ulcer medial MTP left foot. Fourth and fifth toes partially amputated. ). Neurological:      Mental Status: She is alert and oriented to person, place, and time. Gait: Gait normal.   Psychiatric:         Mood and Affect: Mood normal.         Behavior: Behavior normal.         Thought Content:  Thought content normal.        Lab Results Component Value Date/Time    WBC 8.0 11/13/2021 11:31 PM    HGB 8.6 (L) 11/13/2021 11:31 PM    Hemoglobin, POC 14.3 12/27/2014 05:54 AM    HCT 28.3 (L) 11/13/2021 11:31 PM    Hematocrit, POC 42 12/27/2014 05:54 AM    PLATELET 064 39/47/4979 11:31 PM    MCV 72.9 (L) 11/13/2021 11:31 PM     Lab Results   Component Value Date/Time    Hemoglobin A1c 9.6 (H) 06/25/2021 06:45 AM    Hemoglobin A1c 13.6 (H) 05/17/2021 05:09 PM    Hemoglobin A1c 9.5 (H) 04/04/2021 05:09 PM    Glucose 303 (H) 11/13/2021 11:31 PM    Glucose (POC) 220 (H) 11/14/2021 02:58 AM    Glucose,  (H) 12/27/2014 05:54 AM    Microalbumin/Creat ratio (mg/g creat) 2,040 (H) 05/18/2021 12:57 PM    Microalbumin,urine random 408.00 (H) 05/18/2021 12:57 PM    LDL, calculated 121 (H) 09/28/2020 01:00 PM    Creatinine, POC 0.7 12/27/2014 05:54 AM    Creatinine 2.31 (H) 11/13/2021 11:31 PM      Lab Results   Component Value Date/Time    Cholesterol, total 218 (H) 09/28/2020 01:00 PM    HDL Cholesterol 49 09/28/2020 01:00 PM    LDL, calculated 121 (H) 09/28/2020 01:00 PM    Triglyceride 241 (H) 09/28/2020 01:00 PM     Lab Results   Component Value Date/Time    TSH 1.12 05/17/2021 05:09 PM    T4, Free 0.9 10/19/2020 06:30 PM      Lab Results   Component Value Date/Time    NT pro- (H) 04/05/2021 06:15 AM    NT pro- (H) 12/13/2020 01:12 AM    NT pro- (H) 10/22/2020 11:37 AM      Lab Results   Component Value Date/Time    Sodium 138 11/13/2021 11:31 PM    Potassium 3.6 11/13/2021 11:31 PM    Chloride 108 11/13/2021 11:31 PM    CO2 25 11/13/2021 11:31 PM    Anion gap 5 11/13/2021 11:31 PM    Glucose 303 (H) 11/13/2021 11:31 PM    BUN 23 (H) 11/13/2021 11:31 PM    Creatinine 2.31 (H) 11/13/2021 11:31 PM    BUN/Creatinine ratio 10 (L) 11/13/2021 11:31 PM    GFR est AA 28 (L) 11/13/2021 11:31 PM    GFR est non-AA 23 (L) 11/13/2021 11:31 PM    Calcium 8.2 (L) 11/13/2021 11:31 PM    Bilirubin, total 0.1 (L) 11/13/2021 11:31 PM    ALT (SGPT) 94 (H) 11/13/2021 11:31 PM    Alk. phosphatase 829 (H) 11/13/2021 11:31 PM    Protein, total 5.9 (L) 11/13/2021 11:31 PM    Albumin 1.8 (L) 11/13/2021 11:31 PM    Globulin 4.1 (H) 11/13/2021 11:31 PM    A-G Ratio 0.4 (L) 11/13/2021 11:31 PM      Lab Results   Component Value Date/Time    Hemoglobin A1c 9.6 (H) 06/25/2021 06:45 AM    Hemoglobin A1c (POC) 12.3 03/28/2019 09:49 AM      We discussed the expected course, resolution and complications of the diagnosis(es) in detail. Medication risks, benefits, costs, interactions, and alternatives were discussed as indicated. I advised her to contact the office if her condition worsens, changes or fails to improve as anticipated. She expressed understanding with the diagnosis(es) and plan. This note was done with the assistance of dragon speech software.   Some inadvertent errors or omissions may be present

## 2021-11-29 ENCOUNTER — OFFICE VISIT (OUTPATIENT)
Dept: FAMILY MEDICINE CLINIC | Age: 46
End: 2021-11-29
Payer: MEDICAID

## 2021-11-29 VITALS
OXYGEN SATURATION: 100 % | SYSTOLIC BLOOD PRESSURE: 165 MMHG | WEIGHT: 212 LBS | HEART RATE: 94 BPM | DIASTOLIC BLOOD PRESSURE: 94 MMHG | BODY MASS INDEX: 34.07 KG/M2 | TEMPERATURE: 97.5 F | RESPIRATION RATE: 16 BRPM | HEIGHT: 66 IN

## 2021-11-29 DIAGNOSIS — D64.9 ANEMIA, NORMOCYTIC NORMOCHROMIC: ICD-10-CM

## 2021-11-29 DIAGNOSIS — E78.5 HYPERLIPIDEMIA, UNSPECIFIED HYPERLIPIDEMIA TYPE: ICD-10-CM

## 2021-11-29 DIAGNOSIS — I10 ESSENTIAL HYPERTENSION: ICD-10-CM

## 2021-11-29 DIAGNOSIS — I10 ESSENTIAL HYPERTENSION: Primary | ICD-10-CM

## 2021-11-29 DIAGNOSIS — E11.8 TYPE 2 DIABETES MELLITUS WITH COMPLICATION (HCC): ICD-10-CM

## 2021-11-29 DIAGNOSIS — E66.01 SEVERE OBESITY (HCC): ICD-10-CM

## 2021-11-29 LAB — HBA1C MFR BLD HPLC: 7.9 %

## 2021-11-29 PROCEDURE — 99214 OFFICE O/P EST MOD 30 MIN: CPT | Performed by: EMERGENCY MEDICINE

## 2021-11-29 PROCEDURE — 83036 HEMOGLOBIN GLYCOSYLATED A1C: CPT | Performed by: EMERGENCY MEDICINE

## 2021-11-29 PROCEDURE — 3051F HG A1C>EQUAL 7.0%<8.0%: CPT | Performed by: EMERGENCY MEDICINE

## 2021-11-29 RX ORDER — FLASH GLUCOSE SENSOR
KIT MISCELLANEOUS
Qty: 1 KIT | Refills: 0 | Status: SHIPPED | OUTPATIENT
Start: 2021-11-29

## 2021-11-29 RX ORDER — LANOLIN ALCOHOL/MO/W.PET/CERES
325 CREAM (GRAM) TOPICAL
Qty: 30 TABLET | Refills: 0 | Status: SHIPPED | OUTPATIENT
Start: 2021-11-29 | End: 2022-10-06

## 2021-11-29 RX ORDER — AMLODIPINE BESYLATE 10 MG/1
5 TABLET ORAL DAILY
Qty: 90 TABLET | Refills: 3 | Status: ON HOLD | OUTPATIENT
Start: 2021-11-29 | End: 2022-05-17 | Stop reason: SDUPTHER

## 2021-11-29 RX ORDER — FLASH GLUCOSE SCANNING READER
EACH MISCELLANEOUS
Qty: 1 EACH | Refills: 5 | Status: SHIPPED | OUTPATIENT
Start: 2021-11-29

## 2021-11-29 NOTE — PROGRESS NOTES
Winsome Ahmadi is a 40 y.o. female that is here for a   Chief Complaint   Patient presents with    Follow Up Chronic Condition     DM          1. Have you been to the ER, urgent care clinic since your last visit? Hospitalized since your last visit? yes    2. Have you seen or consulted any other health care providers outside of the [address] [place] [address] since your last visit? Include any pap smears or colon screening.  no      Health Maintenance reviewed - yes      Upcoming Appts  no      VORB: No orders of the defined types were placed in this encounter.   Kennieth Boeck, MD/ Apollo Méndez MA

## 2021-11-29 NOTE — PATIENT INSTRUCTIONS
Hyperlipidemia: After Your Visit  Your Care Instructions  Hyperlipidemia is too much fat in your blood. The body has several kinds of fat, including cholesterol and triglycerides. Your body needs fat for many things, such as making new cells. But too much fat in your blood increases your chances of having a heart attack or stroke. You may be able to lower your cholesterol and triglycerides with a heart-healthy diet, exercise, and if needed, medicine. Your doctor may want you to try lifestyle changes first to see whether they lower the fat in your blood. You may need to take medicine if lifestyle changes do not lower the fat in your blood enough. Follow-up care is a key part of your treatment and safety. Be sure to make and go to all appointments, and call your doctor if you are having problems. Its also a good idea to know your test results and keep a list of the medicines you take. How can you care for yourself at home? Take your medicines  · Take your medicines exactly as prescribed. Call your doctor if you think you are having a problem with your medicine. · If you take medicine to lower your cholesterol, go to follow-up visits. You will need to have blood tests. · Do not take large doses of niacin, which is a B vitamin, while taking medicine called statins. It may increase the chance of muscle pain and liver problems. · Talk to your doctor about avoiding grapefruit juice if you are taking statins. Grapefruit juice can raise the level of this medicine in your blood. This could increase side effects. Eat more fruits, vegetables, and fiber  · Fruits and vegetables have lots of nutrients that help protect against heart disease, and they have little--if any--fat. Try to eat at least five servings a day. Dark green, deep orange, or yellow fruits and vegetables are healthy choices. · Keep carrots, celery, and other veggies handy for snacks.  Buy fruit that is in season and store it where you can see it so that you will be tempted to eat it. Cook dishes that have a lot of veggies in them, such as stir-fries and soups. · Foods high in fiber may reduce your cholesterol and provide important vitamins and minerals. High-fiber foods include whole-grain cereals and breads, oatmeal, beans, brown rice, citrus fruits, and apples. · Buy whole-grain breads and cereals instead of white bread and pastries. Limit saturated fat  · Read food labels and try to avoid saturated fat and trans fat. They increase your risk of heart disease. · Use olive or canola oil when you cook. Try cholesterol-lowering spreads, such as Benecol or Take Control. · Bake, broil, grill, or steam foods instead of frying them. · Limit the amount of high-fat meats you eat, including hot dogs and sausages. Cut out all visible fat when you prepare meat. · Eat fish, skinless poultry, and soy products such as tofu instead of high-fat meats. Soybeans may be especially good for your heart. Eat at least two servings of fish a week. Certain fish, such as salmon, contain omega-3 fatty acids, which may help reduce your risk of heart attack. · Choose low-fat or fat-free milk and dairy products. Get exercise, limit alcohol, and quit smoking  · Get more exercise. Work with your doctor to set up an exercise program. Even if you can do only a small amount, exercise will help you get stronger, have more energy, and manage your weight and your stress. Walking is an easy way to get exercise. Gradually increase the amount you walk every day. Aim for at least 30 minutes on most days of the week. You also may want to swim, bike, or do other activities. · Limit alcohol to no more than 2 drinks a day for men and 1 drink a day for women. · Do not smoke. If you need help quitting, talk to your doctor about stop-smoking programs and medicines. These can increase your chances of quitting for good. When should you call for help?   Call 911 anytime you think you may need emergency care. For example, call if:  · You have symptoms of a heart attack. These may include:  ¨ Chest pain or pressure, or a strange feeling in the chest.  ¨ Sweating. ¨ Shortness of breath. ¨ Nausea or vomiting. ¨ Pain, pressure, or a strange feeling in the back, neck, jaw, or upper belly or in one or both shoulders or arms. ¨ Lightheadedness or sudden weakness. ¨ A fast or irregular heartbeat. After you call 911, the  may tell you to chew 1 adult-strength or 2 to 4 low-dose aspirin. Wait for an ambulance. Do not try to drive yourself. · You have signs of a stroke. These may include:  ¨ Sudden numbness, paralysis, or weakness in your face, arm, or leg, especially on only one side of your body. ¨ New problems with walking or balance. ¨ Sudden vision changes. ¨ Drooling or slurred speech. ¨ New problems speaking or understanding simple statements, or feeling confused. ¨ A sudden, severe headache that is different from past headaches. · You passed out (lost consciousness). Call your doctor now or seek immediate medical care if:  · You have muscle pain or weakness. Watch closely for changes in your health, and be sure to contact your doctor if:  · You are very tired. · You have an upset stomach, gas, constipation, or belly pain or cramps. Where can you learn more? Go to [email or web address]  Enter C406 in the search box to learn more about \"Hyperlipidemia: After Your Visit. \"   © 3864-0994 Healthwise, [employer]. Care instructions adapted under license by [facility] (which disclaims liability or warranty for this information). This care instruction is for use with your licensed healthcare professional. If you have questions about a medical condition or this instruction, always ask your healthcare professional. [place] [address] any warranty or liability for your use of this information.   Content Version: 0.1.164603; Last Revised: October 13, 2011                 Type 2 Diabetes: Care Instructions  Your Care Instructions     Type 2 diabetes is a disease that develops when the body's tissues cannot use insulin properly. Over time, the pancreas cannot make enough insulin. Insulin is a hormone that helps the body's cells use sugar (glucose) for energy. It also helps the body store extra sugar in muscle, fat, and liver cells. Without insulin, the sugar cannot get into the cells to do its work. It stays in the blood instead. This can cause high blood sugar levels. A person has diabetes when the blood sugar stays too high too much of the time. Over time, diabetes can lead to diseases of the heart, blood vessels, nerves, kidneys, and eyes. You may be able to control your blood sugar by losing weight, eating a healthy diet, and getting daily exercise. You may also have to take insulin or other diabetes medicine. Follow-up care is a key part of your treatment and safety. Be sure to make and go to all appointments. Call your doctor if you are having problems. It's also a good idea to know your test results and keep a list of the medicines you take. How can you care for yourself at home? · Keep your blood sugar at a target level (which you set with your doctor). ? Carbohydrate--the body's main source of fuel--affects blood sugar more than any other nutrient. Carbohydrate is in fruits, vegetables, milk, and yogurt. It also is in breads, cereals, vegetables such as potatoes and corn, and sugary foods such as candy and cakes. Follow your meal plan to know how much carbohydrate to eat at each meal and snack. ? Aim for 30 minutes of exercise on most, preferably all, days of the week. Walking is a good choice. You also may want to do other activities, such as running, swimming, cycling, or playing tennis or team sports. Try to do muscle-strengthening exercises at least 2 times a week. ? Take your medicines exactly as prescribed.  Call your doctor if you think you are having a problem with your medicine. You will get more details on the specific medicines your doctor prescribes. · Check your blood sugar as often as your doctor recommends. It is important to keep track of any symptoms you have, such as low blood sugar. Also tell your doctor if you have any changes in your activities, diet, or insulin use. · Talk to your doctor before you start taking aspirin every day. Aspirin can help certain people lower their risk of a heart attack or stroke. But taking aspirin isn't right for everyone, because it can cause serious bleeding. · Do not smoke. If you need help quitting, talk to your doctor about stop-smoking programs and medicines. These can increase your chances of quitting for good. · Keep your cholesterol and blood pressure at normal levels. You may need to take one or more medicines to reach your goals. Take them exactly as directed. Do not stop or change a medicine without talking to your doctor first.  When should you call for help? Call 911 anytime you think you may need emergency care. For example, call if:    · You passed out (lost consciousness), or you suddenly become very sleepy or confused. (You may have very low blood sugar.)   Call your doctor now or seek immediate medical care if:    · Your blood sugar is 300 mg/dL or is higher than the level your doctor has set for you. · You have symptoms of low blood sugar, such as:  ? Sweating. ? Feeling nervous, shaky, and weak. ? Extreme hunger and slight nausea. ? Dizziness and headache.  ? Blurred vision. ? Confusion. Watch closely for changes in your health, and be sure to contact your doctor if:    · You often have problems controlling your blood sugar. · You have symptoms of long-term diabetes problems, such as:  ? New vision changes. ? New pain, numbness, or tingling in your hands or feet. ? Skin problems. Where can you learn more?   Go to http://www.gray.com/  Enter C553 in the search box to learn more about \"Type 2 Diabetes: Care Instructions. \"  Current as of: August 31, 2020               Content Version: 12.8  © 2006-2021 [employer]. Care instructions adapted under license by [employer] (which disclaims liability or warranty for this information). If you have questions about a medical condition or this instruction, always ask your healthcare professional. [address] any warranty or liability for your use of this information. High Blood Pressure: Care Instructions  Overview     It's normal for blood pressure to go up and down throughout the day. But if it stays up, you have high blood pressure. Another name for high blood pressure is hypertension. Despite what a lot of people think, high blood pressure usually doesn't cause headaches or make you feel dizzy or lightheaded. It usually has no symptoms. But it does increase your risk of stroke, heart attack, and other problems. You and your doctor will talk about your risks of these problems based on your blood pressure. Your doctor will give you a goal for your blood pressure. Your goal will be based on your health and your age. Lifestyle changes, such as eating healthy and being active, are always important to help lower blood pressure. You might also take medicine to reach your blood pressure goal.  Follow-up care is a key part of your treatment and safety. Be sure to make and go to all appointments, and call your doctor if you are having problems. It's also a good idea to know your test results and keep a list of the medicines you take. How can you care for yourself at home? Medical treatment  · If you stop taking your medicine, your blood pressure will go back up. You may take one or more types of medicine to lower your blood pressure. Be safe with medicines. Take your medicine exactly as prescribed.  Call your doctor if you think you are having a problem with your medicine. · Talk to your doctor before you start taking aspirin every day. Aspirin can help certain people lower their risk of a heart attack or stroke. But taking aspirin isn't right for everyone, because it can cause serious bleeding. · See your doctor regularly. You may need to see the doctor more often at first or until your blood pressure comes down. · If you are taking blood pressure medicine, talk to your doctor before you take decongestants or anti-inflammatory medicine, such as ibuprofen. Some of these medicines can raise blood pressure. · Learn how to check your blood pressure at home. Lifestyle changes  · Stay at a healthy weight. This is especially important if you put on weight around the waist. Losing even 10 pounds can help you lower your blood pressure. · If your doctor recommends it, get more exercise. Walking is a good choice. Bit by bit, increase the amount you walk every day. Try for at least 30 minutes on most days of the week. You also may want to swim, bike, or do other activities. · Avoid or limit alcohol. Talk to your doctor about whether you can drink any alcohol. · Try to limit how much sodium you eat to less than 2,300 milligrams (mg) a day. Your doctor may ask you to try to eat less than 1,500 mg a day. · Eat plenty of fruits (such as bananas and oranges), vegetables, legumes, whole grains, and low-fat dairy products. · Lower the amount of saturated fat in your diet. Saturated fat is found in animal products such as milk, cheese, and meat. Limiting these foods may help you lose weight and also lower your risk for heart disease. · Do not smoke. Smoking increases your risk for heart attack and stroke. If you need help quitting, talk to your doctor about stop-smoking programs and medicines. These can increase your chances of quitting for good. When should you call for help? Call  911 anytime you think you may need emergency care.  This may mean having symptoms that suggest that your blood pressure is causing a serious heart or blood vessel problem. Your blood pressure may be over 180/120. For example, call 911 if:    · You have symptoms of a heart attack. These may include:  ? Chest pain or pressure, or a strange feeling in the chest.  ? Sweating. ? Shortness of breath. ? Nausea or vomiting. ? Pain, pressure, or a strange feeling in the back, neck, jaw, or upper belly or in one or both shoulders or arms. ? Lightheadedness or sudden weakness. ? A fast or irregular heartbeat. · You have symptoms of a stroke. These may include:  ? Sudden numbness, tingling, weakness, or loss of movement in your face, arm, or leg, especially on only one side of your body. ? Sudden vision changes. ? Sudden trouble speaking. ? Sudden confusion or trouble understanding simple statements. ? Sudden problems with walking or balance. ? A sudden, severe headache that is different from past headaches. · You have severe back or belly pain. Do not wait until your blood pressure comes down on its own. Get help right away. Call your doctor now or seek immediate care if:    · Your blood pressure is much higher than normal (such as 180/120 or higher), but you don't have symptoms. · You think high blood pressure is causing symptoms, such as:  ? Severe headache.  ? Blurry vision. Watch closely for changes in your health, and be sure to contact your doctor if:    · Your blood pressure measures higher than your doctor recommends at least 2 times. That means the top number is higher or the bottom number is higher, or both. · You think you may be having side effects from your blood pressure medicine. Where can you learn more? Go to http://www.gray.com/  Enter [id number] in the search box to learn more about \"High Blood Pressure: Care Instructions. \"  Current as of: August 31, 2020               Content Version: 12.8  © 2006-2021 Healthwise, [facility].    Care instructions adapted under license by [employer] (which disclaims liability or warranty for this information). If you have questions about a medical condition or this instruction, always ask your healthcare professional. [address] any warranty or liability for your use of this information. Learning About Carbohydrate (Carb) Counting and Eating Out When You Have Diabetes  Why plan your meals? Meal planning can be a key part of managing diabetes. Planning meals and snacks with the right balance of carbohydrate, protein, and fat can help you keep your blood sugar at the target level you set with your doctor. You don't have to eat special foods. You can eat what your family eats, including sweets once in a while. But you do have to pay attention to how often you eat and how much you eat of certain foods. You may want to work with a dietitian or a certified diabetes educator. He or she can give you tips and meal ideas and can answer your questions about meal planning. This health professional can also help you reach a healthy weight if that is one of your goals. What should you know about eating carbs? Managing the amount of carbohydrate (carbs) you eat is an important part of healthy meals when you have diabetes. Carbohydrate is found in many foods. · Learn which foods have carbs. And learn the amounts of carbs in different foods. ? Bread, cereal, pasta, and rice have about 15 grams of carbs in a serving. A serving is 1 slice of bread (1 ounce), ½ cup of cooked cereal, or 1/3 cup of cooked pasta or rice. ? Fruits have 15 grams of carbs in a serving. A serving is 1 small fresh fruit, such as an apple or orange; ½ of a banana; ½ cup of cooked or canned fruit; ½ cup of fruit juice; 1 cup of melon or raspberries; or 2 tablespoons of dried fruit. ? Milk and no-sugar-added yogurt have 15 grams of carbs in a serving.  A serving is 1 cup of milk or 2/3 cup of no-sugar-added yogurt. ? Starchy vegetables have 15 grams of carbs in a serving. A serving is ½ cup of mashed potatoes or sweet potato; 1 cup winter squash; ½ of a small baked potato; ½ cup of cooked beans; or ½ cup cooked corn or green peas. · Learn how much carbs to eat each day and at each meal. A dietitian or CDE can teach you how to keep track of the amount of carbs you eat. This is called carbohydrate counting. · If you are not sure how to count carbohydrate grams, use the Plate Method to plan meals. It is a good, quick way to make sure that you have a balanced meal. It also helps you spread carbs throughout the day. ? Divide your plate by types of foods. Put non-starchy vegetables on half the plate, meat or other protein food on one-quarter of the plate, and a grain or starchy vegetable in the final quarter of the plate. To this you can add a small piece of fruit and 1 cup of milk or yogurt, depending on how many carbs you are supposed to eat at a meal.  · Try to eat about the same amount of carbs at each meal. Do not \"save up\" your daily allowance of carbs to eat at one meal.  · Proteins have very little or no carbs per serving. Examples of proteins are beef, chicken, turkey, fish, eggs, tofu, cheese, cottage cheese, and peanut butter. A serving size of meat is 3 ounces, which is about the size of a deck of cards. Examples of meat substitute serving sizes (equal to 1 ounce of meat) are 1/4 cup of cottage cheese, 1 egg, 1 tablespoon of peanut butter, and ½ cup of tofu. How can you eat out and still eat healthy? · Learn to estimate the serving sizes of foods that have carbohydrate. If you measure food at home, it will be easier to estimate the amount in a serving of restaurant food. · If the meal you order has too much carbohydrate (such as potatoes, corn, or baked beans), ask to have a low-carbohydrate food instead. Ask for a salad or green vegetables.   · If you use insulin, check your blood sugar before and after eating out to help you plan how much to eat in the future. · If you eat more carbohydrate at a meal than you had planned, take a walk or do other exercise. This will help lower your blood sugar. What are some tips for eating healthy? · Limit saturated fat, such as the fat from meat and dairy products. This is a healthy choice because people who have diabetes are at higher risk of heart disease. So choose lean cuts of meat and nonfat or low-fat dairy products. Use olive or canola oil instead of butter or shortening when cooking. · Don't skip meals. Your blood sugar may drop too low if you skip meals and take insulin or certain medicines for diabetes. · Check with your doctor before you drink alcohol. Alcohol can cause your blood sugar to drop too low. Alcohol can also cause a bad reaction if you take certain diabetes medicines. Follow-up care is a key part of your treatment and safety. Be sure to make and go to all appointments, and call your doctor if you are having problems. It's also a good idea to know your test results and keep a list of the medicines you take. Where can you learn more? Go to http://www.lizama.com/  Enter I147 in the search box to learn more about \"Learning About Carbohydrate (Carb) Counting and Eating Out When You Have Diabetes. \"  Current as of: December 17, 2020               Content Version: 13.0  © 2006-2021 Healthwise, Incorporated. Care instructions adapted under license by [employer] (which disclaims liability or warranty for this information). If you have questions about a medical condition or this instruction, always ask your healthcare professional. [place] [address] any warranty or liability for your use of this information. Learning About Meal Planning for Diabetes  Why plan your meals? Meal planning can be a key part of managing diabetes.  Planning meals and snacks with the right balance of carbohydrate, protein, and fat can help you keep your blood sugar at the target level you set with your doctor. You don't have to eat special foods. You can eat what your family eats, including sweets once in a while. But you do have to pay attention to how often you eat and how much you eat of certain foods. You may want to work with a dietitian or a certified diabetes educator. He or she can give you tips and meal ideas and can answer your questions about meal planning. This health professional can also help you reach a healthy weight if that is one of your goals. What plan is right for you? Your dietitian or diabetes educator may suggest that you start with the plate format or carbohydrate counting. The plate format  The plate format is a simple way to help you manage how you eat. You plan meals by learning how much space each food should take on a plate. Using the plate format helps you spread carbohydrate throughout the day. It can make it easier to keep your blood sugar level within your target range. It also helps you see if you're eating healthy portion sizes. To use the plate format, you put non-starchy vegetables on half your plate. Add meat or meat substitutes on one-quarter of the plate. Put a grain or starchy vegetable (such as brown rice or a potato) on the final quarter of the plate. You can add a small piece of fruit and some low-fat or fat-free milk or yogurt, depending on your carbohydrate goal for each meal.  Here are some tips for using the plate format:  · Make sure that you are not using an oversized plate. A 9-inch plate is best. Many restaurants use larger plates. · Get used to using the plate format at home. Then you can use it when you eat out. · Write down your questions about using the plate format. Talk to your doctor, a dietitian, or a diabetes educator about your concerns.   Carbohydrate counting  With carbohydrate counting, you plan meals based on the amount of carbohydrate in each food. Carbohydrate raises blood sugar higher and more quickly than any other nutrient. It is found in desserts, breads and cereals, and fruit. It's also found in starchy vegetables such as potatoes and corn, grains such as rice and pasta, and milk and yogurt. Spreading carbohydrate throughout the day helps keep your blood sugar levels within your target range. Your daily amount depends on several things, including your weight, how active you are, which diabetes medicines you take, and what your goals are for your blood sugar levels. A registered dietitian or diabetes educator can help you plan how much carbohydrate to include in each meal and snack. A guideline for your daily amount of carbohydrate is:  · 45 to 60 grams at each meal. That's about the same as 3 to 4 carbohydrate servings. · 15 to 20 grams at each snack. That's about the same as 1 carbohydrate serving. The Nutrition Facts label on packaged foods tells you how much carbohydrate is in a serving of the food. First, look at the serving size on the food label. Is that the amount you eat in a serving? All of the nutrition information on a food label is based on that serving size. So if you eat more or less than that, you'll need to adjust the other numbers. Total carbohydrate is the next thing you need to look for on the label. If you count carbohydrate servings, one serving of carbohydrate is 15 grams. For foods that don't come with labels, such as fresh fruits and vegetables, you'll need a guide that lists carbohydrate in these foods. Ask your doctor, dietitian, or diabetes educator about books or other nutrition guides you can use. If you take insulin, you need to know how many grams of carbohydrate are in a meal. This lets you know how much rapid-acting insulin to take before you eat. If you use an insulin pump, you get a constant rate of insulin during the day.  So the pump must be programmed at meals to give you extra insulin to cover the rise in blood sugar after meals. When you know how much carbohydrate you will eat, you can take the right amount of insulin. Or, if you always use the same amount of insulin, you need to make sure that you eat the same amount of carbohydrate at meals. If you need more help to understand carbohydrate counting and food labels, ask your doctor, dietitian, or diabetes educator. How can you plan healthy meals? Here are some tips to get started:  · Plan your meals a week at a time. Don't forget to include snacks too. · Use cookbooks or online recipes to plan several main meals. Plan some quick meals for busy nights. You also can double some recipes that freeze well. Then you can save half for other busy nights when you don't have time to cook. · Make sure you have the ingredients you need for your recipes. If you're running low on basic items, put these items on your shopping list too. · List foods that you use to make breakfasts, lunches, and snacks. List plenty of fruits and vegetables. · Post this list on the refrigerator. Add to it as you think of more things you need. · Take the list to the store to do your weekly shopping. Follow-up care is a key part of your treatment and safety. Be sure to make and go to all appointments, and call your doctor if you are having problems. It's also a good idea to know your test results and keep a list of the medicines you take. Where can you learn more? Go to http://www.gray.com/  Enter Z240 in the search box to learn more about \"Learning About Meal Planning for Diabetes. \"  Current as of: December 17, 2020               Content Version: 13.0  © 0428-2370 Healthwise[employer]. Care instructions adapted under license by [employer] (which disclaims liability or warranty for this information).  If you have questions about a medical condition or this instruction, always ask your healthcare professional. Latesha Gleason disclaims any warranty or liability for your use of this information.

## 2021-12-10 ENCOUNTER — TELEPHONE (OUTPATIENT)
Dept: FAMILY MEDICINE CLINIC | Age: 46
End: 2021-12-10

## 2021-12-10 NOTE — TELEPHONE ENCOUNTER
TC was returned to pt and pt verified name and d. o.b pt was checking to see if forms were complete.

## 2021-12-15 ENCOUNTER — VIRTUAL VISIT (OUTPATIENT)
Dept: FAMILY MEDICINE CLINIC | Age: 46
End: 2021-12-15
Payer: MEDICAID

## 2021-12-15 DIAGNOSIS — E11.8 TYPE 2 DIABETES MELLITUS WITH COMPLICATION (HCC): ICD-10-CM

## 2021-12-15 DIAGNOSIS — M86.9 OSTEOMYELITIS OF LEFT FOOT, UNSPECIFIED TYPE (HCC): Primary | ICD-10-CM

## 2021-12-15 PROCEDURE — 3051F HG A1C>EQUAL 7.0%<8.0%: CPT | Performed by: EMERGENCY MEDICINE

## 2021-12-15 PROCEDURE — 99212 OFFICE O/P EST SF 10 MIN: CPT | Performed by: EMERGENCY MEDICINE

## 2021-12-15 NOTE — PROGRESS NOTES
ICD-10-CM ICD-9-CM    1. Osteomyelitis of left foot, unspecified type ([facility]  M86.9 730.27    2. Jejunostomy tube fell out  T85.528A V55.4          Assessment and plan            Lab results and schedule of future lab studies reviewed with patient  Diagnostic and radiologic results and the schedule of future studies were reviewed with the patient  All questions answered and understood. Subjective:   Elvie Starkey is a 40 y.o. female . The last visit was 11/29/2021  Essential hypertension, not quite at control. Catapres recently added. We will increase the amlodipine to 10 mg daily and follow. Type 2 diabetes mellitus with complications, presently on an insulin pump. Hemoglobin A1c improving down to 7.9 down from 9 range. Continue present regimen. Will write prescription for freestyle regina. Unhealthy weight actually increasing somewhat. Discussed diet and exercise. Anemia normochromic normocytic. Last TIBC in June 1, 1945 and iron was within normal range. Saturation was 38 and ferritin was high at 413. B12 and folate were within normal limits. The edema is most consistent with chronic disease and may be from her renal insufficiency and diabetes. Follow. Hyperlipidemia uncontrolled discussed diet and exercise follow-up labs before next visit. Office visit 10/22/2021 with cardiology for follow-up of non-ST elevation MI acute coronary syndrome s/p PCI with BLANCA in right coronary artery. Her condition was felt to be stable at the time with persistent New York Heart Association class III symptoms. Diet was discussed and blood pressure control was also discussed. Clonidine was added with instruction to follow the home chart as diuretics were discontinued due to renal insufficiency by nephrology. She is presenting today with a support stocking and boot on the left foot because of edema. The ulcers have healed. The last visit was 10/13/2021  Essential hypertension question control. Diuretic has been stopped because of possible over diuresis. Follow in person in 30 days. Diabetes mellitus type 2. Presently only on long and short acting insulin, not on short acting insulin, with a view towards starting an insulin pump in 6 days. Follow. Hyperlipidemia. Uncontrolled in 1 year prior. Follow-up labs ordered which she will have done at the hospital.  History of NSTEMI. No symptoms. History of anemia follow-up ordered. Osteomyelitis of the left foot. Followed by podiatry. MRI which showed possible bone involvement reviewed. We will recheck in person. Not presently on an antibiotic. Follow-up along with the podiatrist.  Difficulty sleeping continue the melatonin. Previously seen for hypertension, borderline control. If elevated on next visit would adjust medications. Diabetes mellitus type 2 improving significantly. Weight was also decreasing. Follow-up labs. Hyperlipidemia improving, follow-up labs ordered in 1 month. The left foot was previously healing well. Follow along with the podiatry specialist and home health at the time. Screening for mammogram ordered. Last one that I can see was in 2019. She was to be set up for screening for colon cancer. Anemia. Continue treatment. History of coronary artery disease, s/p stenting. No symptoms. Follow along with the specialist.  reviewed diet, exercise and weight control  Seen 5/26/2021 post hospitalization for foot ulcer. Seen 7/2/2021 by cardiology. They reviewed that her echocardiogram from 10/19/2020 showing an ejection fraction of 55 to 60% with normal cavity size wall thickness and systolic function. The patient had a cardiac procedure showing 100% mid RCA occlusion on 10/23/2020. She was s/p aspiration thrombectomy followed by PTCA/stent. She had a normal LV function by echo. A Holter on 12/28/2020 showed normal sinus rhythm with no dysrhythmia is noted.   The patient was felt to be relatively stable from a cardiovascular standpoint. She was to use Lasix as needed and the electrolytes were going to be followed. On 6/30/2021 she was followed by nephrology. Patient was admitted 6/24/2021 and discharged 6/30/2021 for the recurrent syncope acute on chronic anemia diabetic left foot ulcer s/p left fourth and fifth toe amputation and wound VAC placement. She also had fungemia, hypokalemia, A1c of 9.6, hypertension, unhealthy weight with a BMI of 30.2 and the stent placement mentioned. Seen in ED 9/21/2021 but left prior to treatment. Office visit nephrology 10/13/2021  The assessment was acute on chronic kidney injury stage IIIa possible overdiuresis versus worsening of diabetic nephropathy. She was also noted to have uncontrolled diabetes, hypertension, proteinuria and history of coronary artery disease s/p NSTEMI in October 2020. The plan was a low-sodium diet and tight control of blood sugar. The patient is working with her endocrinologist for an insulin pump. She was asked to stop her ARB diuretics and SGLT2 inhibitors if she can. Left foot swollen  The patient has recurrence of pain in the left foot and swelling. She is being followed by podiatry. She is still scheduled for follow-up. She is now using compression stockings and a boot. No new swelling redness or drainage is admitted to. Duplex 9/30/2021 showed no evidence of deep vein thrombosis with normal compressibility. MRI left foot 10/7/2021; the impression was first MTP resection/debridement but extensive marrow edema in the residual metatarsal and proximal phalanx which could at the time have represented active osteomyelitis. Other findings also reviewed. Diabetes mellitus  She has been on an Insulin pump for one month. A1c7.9  The patient denies polyuria, polydipsia, or polyphagia. The patient denies any other aggravating or relieving factors Only on Insulin now.   Has not obtained the freestyle regina as yet      Lab Results   Component Value Date/Time    Hemoglobin A1c 9.6 (H) 06/25/2021 06:45 AM    Hemoglobin A1c (POC) 7.9 11/29/2021 12:16 PM     Lab Results   Component Value Date/Time    Cholesterol, total 218 (H) 09/28/2020 01:00 PM    HDL Cholesterol 49 09/28/2020 01:00 PM    LDL, calculated 121 (H) 09/28/2020 01:00 PM    VLDL, calculated 48 (H) 09/28/2020 01:00 PM    Triglyceride 241 (H) 09/28/2020 01:00 PM       Review of Systems   Constitutional: Negative for chills, fever and malaise/fatigue. HENT: Negative for congestion and sore throat. Eyes: Negative for blurred vision and redness. Respiratory: Negative for cough, shortness of breath and wheezing. Cardiovascular: Negative for chest pain, palpitations, claudication, leg swelling and PND. Gastrointestinal: Negative for abdominal pain, blood in stool, constipation, diarrhea and heartburn. Genitourinary: Negative for dysuria and urgency. Musculoskeletal: Negative for joint pain and myalgias. S/p distal amputation of the fourth and fifth toes on the left and as well as bone removal  left great toe, healing and bandaged. Skin:        The blood blister is no longer present. The primary problem is a stage II ulcer on the ball of the left foot. There appears to be some surrounding 1+ edema up to the ankles and legs. Neurological: Negative for dizziness, sensory change, speech change and focal weakness. Endo/Heme/Allergies: Does not bruise/bleed easily. Psychiatric/Behavioral: Positive for depression. Negative for suicidal ideas. The patient has insomnia (New problem for 3 weeks. Also has a history of RODNEY not treated. The patient is also depressed. ). The patient is not nervous/anxious.       Health Maintenance Due   Topic Date Due    Hepatitis C Screening  Never done    DTaP/Tdap/Td series (1 - Tdap) Never done    Cervical cancer screen  Never done    Colorectal Cancer Screening Combo  Never done    Flu Vaccine (1) Never done    Lipid Screen  09/28/2021    COVID-19 Vaccine (3 - Booster for Pfizer series) 10/04/2021       Current Outpatient Medications   Medication Sig    ferrous sulfate 325 mg (65 mg iron) tablet Take 1 Tablet by mouth daily (with breakfast).  amLODIPine (NORVASC) 10 mg tablet Take 0.5 Tablets by mouth daily.  flash glucose scanning reader (FreeStyle Daniel 14 Day [place]) misc Use to check blood glucose 3 times a day  Indications: Diabetes mellitus    flash glucose sensor (FreeStyle Daniel 14 Day Sensor) kit Used to check blood glucose 3 times a day    cloNIDine HCL (CATAPRES) 0.1 mg tablet Take 1 Tablet by mouth two (2) times a day.  clopidogreL (PLAVIX) 75 mg tab Take 1 Tablet by mouth daily.  carvediloL (Coreg) 25 mg tablet Take 0.5 Tablets by mouth two (2) times daily (with meals). Indications: myocardial reinfarction prevention    melatonin 5 mg cap capsule Take 1 Capsule by mouth as needed (difficulty in sleeping). Reports taking it as needed    Insulin Needles, Disposable, (BD Ultra-Fine Short Pen Needle) 31 gauge x 5/16\" ndle USE WITH INSULIN TWICE DAILY    insulin glargine (Lantus Solostar U-100 Insulin) 100 unit/mL (3 mL) inpn 50 Units by SubCUTAneous route nightly. Indications: type 2 diabetes mellitus    magnesium oxide (MAG-OX) 400 mg tablet Take 1 Tablet by mouth two (2) times a day.  insulin lispro (HUMALOG) 100 unit/mL kwikpen 10 units before meals  Indications: type 2 diabetes mellitus    Insulin Syringe-Needle U-100 (INSULIN SYRINGE) 1 mL 30 gauge x 5/16 syrg As directed for lantus insulin     No current facility-administered medications for this visit.      Allergies   Allergen Reactions    Azithromycin Other (comments)     Rapid response was called for bradycardia and hypotension     Pcn [Penicillins] Hives     has Acute bronchitis, Hyperglycemia, Diabetic foot infection ([address] Right foot infection, Acute pain of right foot, Insulin dependent diabetes mellitus, HTN (hypertension), Fever, NSTEMI (non-ST elevated myocardial infarction) ([address] Severe obesity ([address] Osteomyelitis ([address] Diabetic foot ulcer ([address] FRANCISCA (acute kidney injury) ([address] Sepsis ([address] Hyponatremia, Foot ulcer, left ([address] and Coronary artery disease of native artery of native heart with stable angina pectoris ([address] on their problem list.    Past Surgical History:   Procedure Laterality Date    HX CORONARY STENT PLACEMENT      HX GYN            reports that she has never smoked. She has never used smokeless tobacco. She reports that she does not drink alcohol and does not use drugs. family history includes Lupus in her mother. There were no vitals taken for this visit. Physical Exam  Vitals and nursing note reviewed. Constitutional:       General: She is not in acute distress. HENT:      Head: Normocephalic. Right Ear: Tympanic membrane and external ear normal.      Left Ear: Tympanic membrane and external ear normal.      Nose: Nose normal.      Mouth/Throat:      Mouth: Mucous membranes are moist.      Pharynx: Oropharynx is clear. Eyes:      General: No scleral icterus. Pupils: Pupils are equal, round, and reactive to light. Cardiovascular:      Heart sounds: Normal heart sounds. Pulmonary:      Effort: Pulmonary effort is normal.   Abdominal:      General: Abdomen is flat. There is no distension. Palpations: There is no mass. Tenderness: There is no abdominal tenderness. Musculoskeletal:         General: Signs of injury (Bandages removed left foot. S/p amputation distal left fourth and fifth toe, well-healed. Small ulcer lateral side of the fourth toe healing well. There are some bruising on the plantar surface distally of the left great toe. Trace edema both lower ext) present. No swelling. Cervical back: No rigidity. Right lower leg: No edema. Left lower leg: Edema present. Skin:     Coloration: Skin is not pale.       Findings: No erythema or lesion (file:///C:/PROGRAM%20FILES%20([email or web address]/V9.7/EN-US/Images/Skins/_Default/Low/Icons/Comment. pngFoot bandagedHealed ulcer medial MTP left foot. Fourth and fifth toes partially amputated. ). Neurological:      Mental Status: She is alert and oriented to person, place, and time. Gait: Gait normal.   Psychiatric:         Mood and Affect: Mood normal.         Behavior: Behavior normal.         Thought Content:  Thought content normal.        Lab Results   Component Value Date/Time    WBC 8.0 11/13/2021 11:31 PM    HGB 8.6 (L) 11/13/2021 11:31 PM    Hemoglobin, POC 14.3 12/27/2014 05:54 AM    HCT 28.3 (L) 11/13/2021 11:31 PM    Hematocrit, POC 42 12/27/2014 05:54 AM    PLATELET 405 17/56/0887 11:31 PM    MCV 72.9 (L) 11/13/2021 11:31 PM     Lab Results   Component Value Date/Time    Hemoglobin A1c 9.6 (H) 06/25/2021 06:45 AM    Hemoglobin A1c 13.6 (H) 05/17/2021 05:09 PM    Hemoglobin A1c 9.5 (H) 04/04/2021 05:09 PM    Glucose 303 (H) 11/13/2021 11:31 PM    Glucose (POC) 220 (H) 11/14/2021 02:58 AM    Glucose,  (H) 12/27/2014 05:54 AM    Microalbumin/Creat ratio (mg/g creat) 2,040 (H) 05/18/2021 12:57 PM    Microalbumin,urine random 408.00 (H) 05/18/2021 12:57 PM    LDL, calculated 121 (H) 09/28/2020 01:00 PM    Creatinine, POC 0.7 12/27/2014 05:54 AM    Creatinine 2.31 (H) 11/13/2021 11:31 PM      Lab Results   Component Value Date/Time    Cholesterol, total 218 (H) 09/28/2020 01:00 PM    HDL Cholesterol 49 09/28/2020 01:00 PM    LDL, calculated 121 (H) 09/28/2020 01:00 PM    Triglyceride 241 (H) 09/28/2020 01:00 PM     Lab Results   Component Value Date/Time    TSH 1.12 05/17/2021 05:09 PM    T4, Free 0.9 10/19/2020 06:30 PM      Lab Results   Component Value Date/Time    NT pro- (H) 04/05/2021 06:15 AM    NT pro- (H) 12/13/2020 01:12 AM    NT pro- (H) 10/22/2020 11:37 AM      Lab Results   Component Value Date/Time    Sodium 138 11/13/2021 11:31 PM    Potassium 3.6 11/13/2021 11:31 PM Chloride 108 11/13/2021 11:31 PM    CO2 25 11/13/2021 11:31 PM    Anion gap 5 11/13/2021 11:31 PM    Glucose 303 (H) 11/13/2021 11:31 PM    BUN 23 (H) 11/13/2021 11:31 PM    Creatinine 2.31 (H) 11/13/2021 11:31 PM    BUN/Creatinine ratio 10 (L) 11/13/2021 11:31 PM    GFR est AA 28 (L) 11/13/2021 11:31 PM    GFR est non-AA 23 (L) 11/13/2021 11:31 PM    Calcium 8.2 (L) 11/13/2021 11:31 PM    Bilirubin, total 0.1 (L) 11/13/2021 11:31 PM    ALT (SGPT) 94 (H) 11/13/2021 11:31 PM    Alk. phosphatase 829 (H) 11/13/2021 11:31 PM    Protein, total 5.9 (L) 11/13/2021 11:31 PM    Albumin 1.8 (L) 11/13/2021 11:31 PM    Globulin 4.1 (H) 11/13/2021 11:31 PM    A-G Ratio 0.4 (L) 11/13/2021 11:31 PM      Lab Results   Component Value Date/Time    Hemoglobin A1c 9.6 (H) 06/25/2021 06:45 AM    Hemoglobin A1c (POC) 7.9 11/29/2021 12:16 PM      We discussed the expected course, resolution and complications of the diagnosis(es) in detail. Medication risks, benefits, costs, interactions, and alternatives were discussed as indicated. I advised her to contact the office if her condition worsens, changes or fails to improve as anticipated. She expressed understanding with the diagnosis(es) and plan. This note was done with the assistance of dragon speech software.   Some inadvertent errors or omissions may be present

## 2022-01-27 ENCOUNTER — OFFICE VISIT (OUTPATIENT)
Dept: CARDIOLOGY CLINIC | Age: 47
End: 2022-01-27
Payer: MEDICAID

## 2022-01-27 VITALS
HEART RATE: 88 BPM | SYSTOLIC BLOOD PRESSURE: 152 MMHG | BODY MASS INDEX: 34.39 KG/M2 | WEIGHT: 214 LBS | HEIGHT: 66 IN | DIASTOLIC BLOOD PRESSURE: 87 MMHG

## 2022-01-27 DIAGNOSIS — I10 ESSENTIAL HYPERTENSION: ICD-10-CM

## 2022-01-27 DIAGNOSIS — I50.32 CHRONIC DIASTOLIC CONGESTIVE HEART FAILURE (HCC): ICD-10-CM

## 2022-01-27 DIAGNOSIS — Z95.5 HISTORY OF CORONARY ARTERY STENT PLACEMENT: ICD-10-CM

## 2022-01-27 DIAGNOSIS — I21.4 NSTEMI (NON-ST ELEVATED MYOCARDIAL INFARCTION) (HCC): ICD-10-CM

## 2022-01-27 DIAGNOSIS — E78.00 HYPERCHOLESTEREMIA: ICD-10-CM

## 2022-01-27 DIAGNOSIS — F32.89 OTHER DEPRESSION: ICD-10-CM

## 2022-01-27 DIAGNOSIS — I25.10 ATHEROSCLEROSIS OF NATIVE CORONARY ARTERY OF NATIVE HEART WITHOUT ANGINA PECTORIS: Primary | ICD-10-CM

## 2022-01-27 DIAGNOSIS — E66.9 OBESITY (BMI 30.0-34.9): ICD-10-CM

## 2022-01-27 PROCEDURE — 99214 OFFICE O/P EST MOD 30 MIN: CPT | Performed by: INTERNAL MEDICINE

## 2022-01-27 RX ORDER — CARVEDILOL 25 MG/1
25 TABLET ORAL 2 TIMES DAILY WITH MEALS
Qty: 180 TABLET | Refills: 1 | Status: SHIPPED | OUTPATIENT
Start: 2022-01-27

## 2022-01-27 RX ORDER — ESCITALOPRAM OXALATE 10 MG/1
10 TABLET ORAL DAILY
Qty: 30 TABLET | Refills: 1 | Status: SHIPPED | OUTPATIENT
Start: 2022-01-27

## 2022-01-27 NOTE — PROGRESS NOTES
HISTORY OF PRESENT ILLNESS  Herman Em is a 56 y.o. female. follow-up of non-STEMI status post PCI with BLANCA in RCA, hypertension, hyperlipidemia, obesity, diabetes    1/22 is not exercising very regularly. Feels depressed. Has not been able to lose weight. Follow-up  Associated symptoms include shortness of breath. Pertinent negatives include no chest pain and no headaches. Shortness of Breath  The history is provided by the patient. This is a new problem. The problem occurs intermittently. The current episode started more than 1 week ago (Early 2020). The problem has not changed since onset. Pertinent negatives include no fever, no headaches, no cough, no wheezing, no PND, no orthopnea, no chest pain, no vomiting, no rash, no leg swelling and no claudication. The problem's precipitants include exercise (100 ft; 2/21 steps- 4 steps on stairs; ). Allergies   Allergen Reactions    Azithromycin Other (comments)     Rapid response was called for bradycardia and hypotension     Pcn [Penicillins] Hives       Past Medical History:   Diagnosis Date    CAD (coronary artery disease)     Diabetes ([facility] [address]     HTN (hypertension)     Hyperlipidemia        Family History   Problem Relation Age of Onset    Lupus Mother     Cancer Neg Hx     Diabetes Neg Hx     Heart Disease Neg Hx     Hypertension Neg Hx     Heart Attack Neg Hx     Stroke Neg Hx        Social History     Tobacco Use    Smoking status: Never Smoker    Smokeless tobacco: Never Used   Substance Use Topics    Alcohol use: No    Drug use: No        Current Outpatient Medications   Medication Sig    ferrous sulfate 325 mg (65 mg iron) tablet Take 1 Tablet by mouth daily (with breakfast).  amLODIPine (NORVASC) 10 mg tablet Take 0.5 Tablets by mouth daily.     flash glucose scanning reader ([employer] Daniel 14 Day [place]) [facility] Use to check blood glucose 3 times a day  Indications: Diabetes mellitus    flash glucose sensor (FreeStyle Daniel 14 Day Sensor) kit Used to check blood glucose 3 times a day    cloNIDine HCL (CATAPRES) 0.1 mg tablet Take 1 Tablet by mouth two (2) times a day.  clopidogreL (PLAVIX) 75 mg tab Take 1 Tablet by mouth daily.  carvediloL (Coreg) 25 mg tablet Take 0.5 Tablets by mouth two (2) times daily (with meals). Indications: myocardial reinfarction prevention    melatonin 5 mg cap capsule Take 1 Capsule by mouth as needed (difficulty in sleeping). Reports taking it as needed    Insulin Needles, Disposable, (BD Ultra-Fine Short Pen Needle) 31 gauge x 5/16\" ndle USE WITH INSULIN TWICE DAILY    insulin glargine (Lantus Solostar U-100 Insulin) 100 unit/mL (3 mL) inpn 50 Units by SubCUTAneous route nightly. Indications: type 2 diabetes mellitus    magnesium oxide (MAG-OX) 400 mg tablet Take 1 Tablet by mouth two (2) times a day. (Patient taking differently: Take 400 mg by mouth as needed.)    insulin lispro (HUMALOG) 100 unit/mL kwikpen 10 units before meals  Indications: type 2 diabetes mellitus    Insulin Syringe-Needle U-100 (INSULIN SYRINGE) 1 mL 30 gauge x 5/16 syrg As directed for lantus insulin     No current facility-administered medications for this visit. Past Surgical History:   Procedure Laterality Date    HX CORONARY STENT PLACEMENT      HX GYN             Visit Vitals  BP (!) 152/87   Pulse 88   Ht 5' 6\" (1.676 m)   Wt 97.1 kg (214 lb)   BMI 34.54 kg/m²       Diagnostic Studies:  I have reviewed the relevant tests done on the patient and show as follows  EKG tracings reviewed by me today. EKG Results     None        XR Results (most recent):  Results from Hospital Encounter encounter on 21    XR CHEST PORT    Narrative  EXAM: Chest Radiograph    INDICATION:  Syncope    TECHNIQUE: AP view of the chest    COMPARISON: 2021, 2021, 2021    FINDINGS: No pneumothorax identified. The lungs are clear. No infiltrates  appreciated. No effusions identified.   The cardiomediastinal silhouette is  unremarkable. The pulmonary vasculature is unremarkable. The osseous  structures are unremarkable. Impression  1. No acute cardiopulmonary process. 10/19/20   ECHO ADULT FOLLOW-UP OR LIMITED 10/20/2020 10/20/2020    Narrative · LV: Estimated LVEF is 55 - 60%. Visually measured ejection fraction. Normal cavity size, wall thickness and systolic function (ejection   fraction normal). Wall motion: normal.  · COVID r/o        Signed by: Kristin Doan MD          10/19/20   CARDIAC PROCEDURE 10/23/2020 10/23/2020    Narrative 100% mid RCA occlusion with thrombus. S/p aspiration thrombectomy followed by ptca/stent ( 2 BLANCA stents placed   overlapping extending from mid RCA to distal RCA). Mild mid LAD stenosis. Normal LV function by echo. TPM inserted via right femoral vein due to complete heart block. Plan:  Continue DAPT for 1 yr/ high dose statin. Post PCI developed juctional tachycardia. Will discontinue TPM. Monitor   and consider PPM if needed. Recommend intense risk factor modification. Signed by: Lela Harrison MD           Ms. Jesusita Pedroza has a reminder for a \"due or due soon\" health maintenance. I have asked that she contact her primary care provider for follow-up on this health maintenance. Review of Systems   Constitutional: Negative for chills, fever, malaise/fatigue and weight loss. HENT: Negative for nosebleeds. Eyes: Negative for discharge. Respiratory: Positive for shortness of breath. Negative for cough and wheezing. Cardiovascular: Negative for chest pain, palpitations, orthopnea, claudication, leg swelling and PND. Gastrointestinal: Negative for diarrhea, nausea and vomiting. Genitourinary: Negative for dysuria and hematuria. Musculoskeletal: Negative for joint pain. Skin: Negative for rash. Neurological: Negative for dizziness, seizures, loss of consciousness and headaches.    Endo/Heme/Allergies: Negative for polydipsia. Does not bruise/bleed easily. Psychiatric/Behavioral: Negative for depression and substance abuse. The patient does not have insomnia. 10/19/20   ECHO ADULT FOLLOW-UP OR LIMITED 10/20/2020 10/20/2020    Narrative · LV: Estimated LVEF is 55 - 60%. Visually measured ejection fraction. Normal cavity size, wall thickness and systolic function (ejection   fraction normal). Wall motion: normal.  · COVID r/o        Signed by: Trevor Wiseman MD     10/19/20   CARDIAC PROCEDURE 10/23/2020 10/23/2020    Narrative 100% mid RCA occlusion with thrombus. S/p aspiration thrombectomy followed by ptca/stent ( 2 BLANCA stents placed   overlapping extending from mid RCA to distal RCA). Mild mid LAD stenosis. Normal LV function by echo. TPM inserted via right femoral vein due to complete heart block. Plan:  Continue DAPT for 1 yr/ high dose statin. Post PCI developed juctional tachycardia. Will discontinue TPM. Monitor   and consider PPM if needed. Recommend intense risk factor modification. Signed by: Roc Bradford MD   12/20 Holter  Normal sinus rhythm, no symptoms, no arrhythmias  9/21 echo  Interpretation Summary    · LV: Estimated LVEF is 55 - 60%. Normal cavity size and systolic function (ejection fraction normal). Mild to moderate hypertrophy. Moderate septal wall hypertrophy. Mild posterior wall hypertrophy. Wall motion: normal. Mild (grade 1) left ventricular diastolic dysfunction. · LA: Moderately dilated left atrium. Left Atrium volume index is 44.84 mL/m2. · Normal pericardium and no evidence of pericardial effusion. Comparison Study Information    Prior Study    When compared to the previous study from 6/25/21, there is no longer evidence of a significant pericardial effusion. Physical Exam  Constitutional:       General: She is not in acute distress. Appearance: She is well-developed. She is obese.       Comments: sev obese   HENT:      Head: Normocephalic and atraumatic. Mouth/Throat:      Dentition: Normal dentition. Eyes:      General: No scleral icterus. Right eye: No discharge. Left eye: No discharge. Neck:      Thyroid: No thyromegaly. Vascular: No carotid bruit or JVD. Cardiovascular:      Rate and Rhythm: Normal rate and regular rhythm. Pulses: Intact distal pulses. Heart sounds: Normal heart sounds, S1 normal and S2 normal. No murmur heard. No friction rub. No gallop. Pulmonary:      Effort: Pulmonary effort is normal.      Breath sounds: Normal breath sounds. No wheezing or rales. Abdominal:      Palpations: Abdomen is soft. There is no mass. Tenderness: There is no abdominal tenderness. Musculoskeletal:      Cervical back: Neck supple. Right lower leg: Edema (trace) present. Left lower leg: Edema (Trace) present. Lymphadenopathy:      Cervical:      Right cervical: No superficial cervical adenopathy. Left cervical: No superficial cervical adenopathy. Skin:     General: Skin is warm and dry. Findings: No rash. Neurological:      Mental Status: She is alert and oriented to person, place, and time. Psychiatric:         Behavior: Behavior normal.         ASSESSMENT and PLAN    10/20 CAD stable. Patient seems to have chronic diastolic CHF. No evidence of fluid overload. Increase losartan and follow home BP chart. Check a Holter monitor before starting beta-blockers. Refer to cardiac rehab  Diet weight and exercise discussed in detail. 2/21 CAD stable. Blood pressure is controlled. Patient is getting recurrent shortness of breath and some edema intermittently. Overall she does feel better. Use Lasix as needed and follow electrolytes. Lipid not available despite the fact she had a blood drawn. May have to redo it if not traceable from [facility] or [facility]. Holter was unremarkable without any arrhythmias. No significant dizziness. Continue present medications.   Diet weight and exercise discussed in detail. 7/2/2021 CAD stable. CHF is compensated. Blood pressure low normal.  Reduce Norvasc. Reduce Aldactone and follow the BMP as well. Lipids are controlled. Continue statins. Has been losing weight over the months. Recommended that she lose another 10 to 20 pounds. Small pericardial effusion needs to be followed we will order a repeat echo. Severe anemia. Repeat H&H. Warned that she may need a repeat blood transfusion depending on the level. 10/22/2021 CAD stable. CHF persists with NYHA class III symptoms. Secondary to uncontrolled hypertension, obesity and LVH. Discussed with patient in detail regarding diet follow-up, trying regular exercise and blood pressure control. Add clonidine and follow home chart as diuretics were discontinued due to renal insufficiency by nephrology. Follow-up closely for CHF. Patient advised to come to ER if she gets significant worsening. Diagnoses and all orders for this visit:    1. Atherosclerosis of native coronary artery of native heart without angina pectoris    2. Chronic diastolic congestive heart failure ([address]    3. Essential hypertension  -     carvediloL (Coreg) 25 mg tablet; Take 1 Tablet by mouth two (2) times daily (with meals). Indications: myocardial reinfarction prevention  -     METABOLIC PANEL, BASIC; Future  -     LIPID PANEL; Future  -     HEPATIC FUNCTION PANEL; Future    4. Hypercholesteremia  -     LIPID PANEL; Future    5. Obesity (BMI 30.0-34.9)    6. History of coronary artery stent placement    7. NSTEMI (non-ST elevated myocardial infarction) (HCC)  -     carvediloL (Coreg) 25 mg tablet; Take 1 Tablet by mouth two (2) times daily (with meals). Indications: myocardial reinfarction prevention    8. Other depression  -     escitalopram oxalate (LEXAPRO) 10 mg tablet; Take 1 Tablet by mouth daily. Pertinent laboratory and test data reviewed and discussed with patient.   See patient instructions also for other medical advice given    Medications Discontinued During This Encounter   Medication Reason    carvediloL (Coreg) 25 mg tablet        Follow-up and Dispositions    · Return in about 6 months (around 7/27/2022), or if symptoms worsen or fail to improve, for post test, BP log x 4-5 days post med changes. 1/27/2022 CAD is clinically stable. CHF is still present NYHA class II-III. Patient admitted and also looks depressed as the desire to exercise and follow the diet is significantly reduced. Part of the liberty of starting Lexapro and will see how it works. Blood pressure is elevated and increase carvedilol and follow the home chart. Patient not getting any statins due to elevated liver enzymes as well as FRANCISCA in 11/21. Repeat the labs ASAP and restart statins if possible. Discussed with patient and .

## 2022-01-27 NOTE — PROGRESS NOTES
1. Have you been to the ER, urgent care clinic since your last visit? Hospitalized since your last visit?     no2. Have you seen or consulted any other health care providers outside of the [address] [place] [address] since your last visit? Include any pap smears or colon screening. No     3. Since your last visit, have you had any of the following symptoms?      swelling in legs/arms. 4.  Have you had any blood work, X-rays or cardiac testing? No         5. Where do you normally have your labs drawn?   [facility]    6. Do you need any refills today?    no

## 2022-01-27 NOTE — PATIENT INSTRUCTIONS
Medications Discontinued During This Encounter   Medication Reason    carvediloL (Coreg) 25 mg tablet           A Healthy Heart: Care Instructions  Your Care Instructions     Coronary artery disease, also called heart disease, occurs when a substance called plaque builds up in the vessels that supply oxygen-rich blood to your heart muscle. This can narrow the blood vessels and reduce blood flow. A heart attack happens when blood flow is completely blocked. A high-fat diet, smoking, and other factors increase the risk of heart disease. Your doctor has found that you have a chance of having heart disease. You can do lots of things to keep your heart healthy. It may not be easy, but you can change your diet, exercise more, and quit smoking. These steps really work to lower your chance of heart disease. Follow-up care is a key part of your treatment and safety. Be sure to make and go to all appointments, and call your doctor if you are having problems. It's also a good idea to know your test results and keep a list of the medicines you take. How can you care for yourself at home? Diet    · Use less salt when you cook and eat. This helps lower your blood pressure. Taste food before salting. Add only a little salt when you think you need it. With time, your taste buds will adjust to less salt.     · Eat fewer snack items, fast foods, canned soups, and other high-salt, high-fat, processed foods.     · Read food labels and try to avoid saturated and trans fats. They increase your risk of heart disease by raising cholesterol levels.     · Limit the amount of solid fat-butter, margarine, and shortening-you eat. Use olive, peanut, or canola oil when you cook. Bake, broil, and steam foods instead of frying them.     · Eat a variety of fruit and vegetables every day. Dark green, deep orange, red, or yellow fruits and vegetables are especially good for you.  Examples include spinach, carrots, peaches, and berries.     · Foods high in fiber can reduce your cholesterol and provide important vitamins and minerals. High-fiber foods include whole-grain cereals and breads, oatmeal, beans, brown rice, citrus fruits, and apples.     · Eat lean proteins. Heart-healthy proteins include seafood, lean meats and poultry, eggs, beans, peas, nuts, seeds, and soy products.     · Limit drinks and foods with added sugar. These include candy, desserts, and soda pop. Lifestyle changes    · If your doctor recommends it, get more exercise. Walking is a good choice. Bit by bit, increase the amount you walk every day. Try for at least 30 minutes on most days of the week. You also may want to swim, bike, or do other activities.     · Do not smoke. If you need help quitting, talk to your doctor about stop-smoking programs and medicines. These can increase your chances of quitting for good. Quitting smoking may be the most important step you can take to protect your heart. It is never too late to quit.     · Limit alcohol to 2 drinks a day for men and 1 drink a day for women. Too much alcohol can cause health problems.     · Manage other health problems such as diabetes, high blood pressure, and high cholesterol. If you think you may have a problem with alcohol or drug use, talk to your doctor. Medicines    · Take your medicines exactly as prescribed. Call your doctor if you think you are having a problem with your medicine.     · If your doctor recommends aspirin, take the amount directed each day. Make sure you take aspirin and not another kind of pain reliever, such as acetaminophen (Tylenol). When should you call for help? Call 911 if you have symptoms of a heart attack. These may include:    · Chest pain or pressure, or a strange feeling in the chest.     · Sweating.     · Shortness of breath.     · Pain, pressure, or a strange feeling in the back, neck, jaw, or upper belly or in one or both shoulders or arms.     · Lightheadedness or sudden weakness.   · A fast or irregular heartbeat. After you call 911, the  may tell you to chew 1 adult-strength or 2 to 4 low-dose aspirin. Wait for an ambulance. Do not try to drive yourself. Watch closely for changes in your health, and be sure to contact your doctor if you have any problems. Where can you learn more? Go to http://www.lizama.com/  Enter F075 in the search box to learn more about \"A Healthy Heart: Care Instructions. \"  Current as of: April 29, 2021               Content Version: 13.0  © 6733-2820 [facility]. Care instructions adapted under license by [employer] (which disclaims liability or warranty for this information). If you have questions about a medical condition or this instruction, always ask your healthcare professional. [address] any warranty or liability for your use of this information.

## 2022-03-19 PROBLEM — E87.1 HYPONATREMIA: Status: ACTIVE | Noted: 2021-05-17

## 2022-05-06 ENCOUNTER — HOSPITAL ENCOUNTER (OUTPATIENT)
Dept: LAB | Age: 47
Discharge: HOME | End: 2022-05-06

## 2022-05-06 LAB — SENTARA SPECIMEN COL,SENBCF: NORMAL

## 2022-05-06 PROCEDURE — 99001 SPECIMEN HANDLING PT-LAB: CPT

## 2022-05-14 ENCOUNTER — HOSPITAL ENCOUNTER (INPATIENT)
Age: 47
LOS: 3 days | Discharge: HOME | DRG: 113 | End: 2022-05-17
Attending: EMERGENCY MEDICINE | Admitting: INTERNAL MEDICINE
Payer: MEDICAID

## 2022-05-14 ENCOUNTER — APPOINTMENT (OUTPATIENT)
Dept: GENERAL RADIOLOGY | Age: 47
DRG: 113 | End: 2022-05-14
Attending: EMERGENCY MEDICINE
Payer: MEDICAID

## 2022-05-14 DIAGNOSIS — I10 ESSENTIAL HYPERTENSION: ICD-10-CM

## 2022-05-14 DIAGNOSIS — E11.8 TYPE 2 DIABETES MELLITUS WITH COMPLICATION (HCC): ICD-10-CM

## 2022-05-14 DIAGNOSIS — R77.8 ELEVATED TROPONIN: ICD-10-CM

## 2022-05-14 PROBLEM — J10.1 INFLUENZA A: Status: ACTIVE | Noted: 2022-05-14

## 2022-05-14 LAB
ALBUMIN SERPL-MCNC: 1.5 G/DL (ref 3.4–5)
ALBUMIN/GLOB SERPL: 0.4 {RATIO} (ref 0.8–1.7)
ALP SERPL-CCNC: 281 U/L (ref 45–117)
ALT SERPL-CCNC: 24 U/L (ref 13–56)
ANION GAP SERPL CALC-SCNC: 7 MMOL/L (ref 3–18)
AST SERPL-CCNC: 16 U/L (ref 10–38)
ATRIAL RATE: 105 BPM
BASOPHILS # BLD: 0 K/UL (ref 0–0.1)
BASOPHILS NFR BLD: 0 % (ref 0–2)
BILIRUB SERPL-MCNC: 0.2 MG/DL (ref 0.2–1)
BNP SERPL-MCNC: 2220 PG/ML (ref 0–450)
BNP SERPL-MCNC: 2465 PG/ML (ref 0–450)
BUN SERPL-MCNC: 18 MG/DL (ref 7–18)
BUN/CREAT SERPL: 6 (ref 12–20)
CALCIUM SERPL-MCNC: 8 MG/DL (ref 8.5–10.1)
CALCULATED P AXIS, ECG09: 64 DEGREES
CALCULATED R AXIS, ECG10: 47 DEGREES
CALCULATED T AXIS, ECG11: 85 DEGREES
CHLORIDE SERPL-SCNC: 110 MMOL/L (ref 100–111)
CO2 SERPL-SCNC: 24 MMOL/L (ref 21–32)
CREAT SERPL-MCNC: 2.95 MG/DL (ref 0.6–1.3)
D DIMER PPP FEU-MCNC: 1.36 UG/ML(FEU)
DIAGNOSIS, 93000: NORMAL
DIFFERENTIAL METHOD BLD: ABNORMAL
EOSINOPHIL # BLD: 0.1 K/UL (ref 0–0.4)
EOSINOPHIL NFR BLD: 2 % (ref 0–5)
ERYTHROCYTE [DISTWIDTH] IN BLOOD BY AUTOMATED COUNT: 14.1 % (ref 11.6–14.5)
EST. AVERAGE GLUCOSE BLD GHB EST-MCNC: 312 MG/DL
FLUAV RNA SPEC QL NAA+PROBE: DETECTED
FLUBV RNA SPEC QL NAA+PROBE: NOT DETECTED
GLOBULIN SER CALC-MCNC: 3.4 G/DL (ref 2–4)
GLUCOSE SERPL-MCNC: 345 MG/DL (ref 74–99)
HBA1C MFR BLD: 12.5 % (ref 4.2–5.6)
HCT VFR BLD AUTO: 29.2 % (ref 35–45)
HGB BLD-MCNC: 9 G/DL (ref 12–16)
IMM GRANULOCYTES # BLD AUTO: 0 K/UL (ref 0–0.04)
IMM GRANULOCYTES NFR BLD AUTO: 0 % (ref 0–0.5)
LYMPHOCYTES # BLD: 1.1 K/UL (ref 0.9–3.6)
LYMPHOCYTES NFR BLD: 15 % (ref 21–52)
MCH RBC QN AUTO: 22.4 PG (ref 24–34)
MCHC RBC AUTO-ENTMCNC: 30.8 G/DL (ref 31–37)
MCV RBC AUTO: 72.8 FL (ref 78–100)
MONOCYTES # BLD: 0.5 K/UL (ref 0.05–1.2)
MONOCYTES NFR BLD: 7 % (ref 3–10)
NEUTS SEG # BLD: 5.3 K/UL (ref 1.8–8)
NEUTS SEG NFR BLD: 75 % (ref 40–73)
NRBC # BLD: 0 K/UL (ref 0–0.01)
NRBC BLD-RTO: 0 PER 100 WBC
P-R INTERVAL, ECG05: 134 MS
PLATELET # BLD AUTO: 192 K/UL (ref 135–420)
PMV BLD AUTO: 9.6 FL (ref 9.2–11.8)
POTASSIUM SERPL-SCNC: 3.6 MMOL/L (ref 3.5–5.5)
PROT SERPL-MCNC: 4.9 G/DL (ref 6.4–8.2)
Q-T INTERVAL, ECG07: 342 MS
QRS DURATION, ECG06: 74 MS
QTC CALCULATION (BEZET), ECG08: 452 MS
RBC # BLD AUTO: 4.01 M/UL (ref 4.2–5.3)
SARS-COV-2, COV2: NOT DETECTED
SODIUM SERPL-SCNC: 141 MMOL/L (ref 136–145)
TROPONIN-HIGH SENSITIVITY: 326 NG/L (ref 0–54)
TROPONIN-HIGH SENSITIVITY: 365 NG/L (ref 0–54)
TROPONIN-HIGH SENSITIVITY: 393 NG/L (ref 0–54)
VENTRICULAR RATE, ECG03: 105 BPM
WBC # BLD AUTO: 7 K/UL (ref 4.6–13.2)

## 2022-05-14 PROCEDURE — 99285 EMERGENCY DEPT VISIT HI MDM: CPT

## 2022-05-14 PROCEDURE — 71046 X-RAY EXAM CHEST 2 VIEWS: CPT

## 2022-05-14 PROCEDURE — 80053 COMPREHEN METABOLIC PANEL: CPT

## 2022-05-14 PROCEDURE — 83521 IG LIGHT CHAINS FREE EACH: CPT

## 2022-05-14 PROCEDURE — 83880 ASSAY OF NATRIURETIC PEPTIDE: CPT

## 2022-05-14 PROCEDURE — 87636 SARSCOV2 & INF A&B AMP PRB: CPT

## 2022-05-14 PROCEDURE — 83036 HEMOGLOBIN GLYCOSYLATED A1C: CPT

## 2022-05-14 PROCEDURE — 74011250636 HC RX REV CODE- 250/636: Performed by: EMERGENCY MEDICINE

## 2022-05-14 PROCEDURE — 82784 ASSAY IGA/IGD/IGG/IGM EACH: CPT

## 2022-05-14 PROCEDURE — 85379 FIBRIN DEGRADATION QUANT: CPT

## 2022-05-14 PROCEDURE — 85025 COMPLETE CBC W/AUTO DIFF WBC: CPT

## 2022-05-14 PROCEDURE — 74011250636 HC RX REV CODE- 250/636: Performed by: INTERNAL MEDICINE

## 2022-05-14 PROCEDURE — 96374 THER/PROPH/DIAG INJ IV PUSH: CPT

## 2022-05-14 PROCEDURE — 65270000046 HC RM TELEMETRY

## 2022-05-14 PROCEDURE — 74011250637 HC RX REV CODE- 250/637: Performed by: EMERGENCY MEDICINE

## 2022-05-14 PROCEDURE — 84484 ASSAY OF TROPONIN QUANT: CPT

## 2022-05-14 PROCEDURE — 93005 ELECTROCARDIOGRAM TRACING: CPT

## 2022-05-14 PROCEDURE — 86335 IMMUNFIX E-PHORSIS/URINE/CSF: CPT

## 2022-05-14 RX ORDER — LANOLIN ALCOHOL/MO/W.PET/CERES
325 CREAM (GRAM) TOPICAL
Status: DISCONTINUED | OUTPATIENT
Start: 2022-05-15 | End: 2022-05-17 | Stop reason: HOSPADM

## 2022-05-14 RX ORDER — ASPIRIN 81 MG/1
81 TABLET ORAL DAILY
Status: DISCONTINUED | OUTPATIENT
Start: 2022-05-15 | End: 2022-05-17 | Stop reason: HOSPADM

## 2022-05-14 RX ORDER — INSULIN LISPRO 100 [IU]/ML
INJECTION, SOLUTION INTRAVENOUS; SUBCUTANEOUS
Status: DISCONTINUED | OUTPATIENT
Start: 2022-05-15 | End: 2022-05-15

## 2022-05-14 RX ORDER — ACETAMINOPHEN 325 MG/1
650 TABLET ORAL
Status: DISCONTINUED | OUTPATIENT
Start: 2022-05-14 | End: 2022-05-17 | Stop reason: HOSPADM

## 2022-05-14 RX ORDER — INSULIN GLARGINE 100 [IU]/ML
30 INJECTION, SOLUTION SUBCUTANEOUS DAILY
Status: DISCONTINUED | OUTPATIENT
Start: 2022-05-15 | End: 2022-05-17 | Stop reason: HOSPADM

## 2022-05-14 RX ORDER — ONDANSETRON 4 MG/1
4 TABLET, ORALLY DISINTEGRATING ORAL
Status: DISCONTINUED | OUTPATIENT
Start: 2022-05-14 | End: 2022-05-17 | Stop reason: HOSPADM

## 2022-05-14 RX ORDER — INSULIN LISPRO 100 [IU]/ML
10 INJECTION, SOLUTION INTRAVENOUS; SUBCUTANEOUS
Status: DISCONTINUED | OUTPATIENT
Start: 2022-05-15 | End: 2022-05-15

## 2022-05-14 RX ORDER — CLONIDINE HYDROCHLORIDE 0.1 MG/1
0.1 TABLET ORAL 2 TIMES DAILY
Status: DISCONTINUED | OUTPATIENT
Start: 2022-05-14 | End: 2022-05-17 | Stop reason: HOSPADM

## 2022-05-14 RX ORDER — OSELTAMIVIR PHOSPHATE 75 MG/1
75 CAPSULE ORAL
Status: COMPLETED | OUTPATIENT
Start: 2022-05-14 | End: 2022-05-14

## 2022-05-14 RX ORDER — MAGNESIUM SULFATE 100 %
4 CRYSTALS MISCELLANEOUS AS NEEDED
Status: DISCONTINUED | OUTPATIENT
Start: 2022-05-14 | End: 2022-05-15 | Stop reason: SDUPTHER

## 2022-05-14 RX ORDER — HEPARIN SODIUM 5000 [USP'U]/ML
5000 INJECTION, SOLUTION INTRAVENOUS; SUBCUTANEOUS EVERY 12 HOURS
Status: DISCONTINUED | OUTPATIENT
Start: 2022-05-14 | End: 2022-05-17 | Stop reason: HOSPADM

## 2022-05-14 RX ORDER — GUAIFENESIN 100 MG/5ML
324 LIQUID (ML) ORAL
Status: COMPLETED | OUTPATIENT
Start: 2022-05-14 | End: 2022-05-14

## 2022-05-14 RX ORDER — ONDANSETRON 2 MG/ML
4 INJECTION INTRAMUSCULAR; INTRAVENOUS
Status: DISCONTINUED | OUTPATIENT
Start: 2022-05-14 | End: 2022-05-17 | Stop reason: HOSPADM

## 2022-05-14 RX ORDER — CARVEDILOL 25 MG/1
25 TABLET ORAL 2 TIMES DAILY WITH MEALS
Status: DISCONTINUED | OUTPATIENT
Start: 2022-05-15 | End: 2022-05-17 | Stop reason: HOSPADM

## 2022-05-14 RX ORDER — SODIUM CHLORIDE 0.9 % (FLUSH) 0.9 %
5-40 SYRINGE (ML) INJECTION EVERY 8 HOURS
Status: DISCONTINUED | OUTPATIENT
Start: 2022-05-14 | End: 2022-05-17 | Stop reason: HOSPADM

## 2022-05-14 RX ORDER — CLOPIDOGREL BISULFATE 75 MG/1
75 TABLET ORAL DAILY
Status: DISCONTINUED | OUTPATIENT
Start: 2022-05-15 | End: 2022-05-17 | Stop reason: HOSPADM

## 2022-05-14 RX ORDER — FUROSEMIDE 10 MG/ML
40 INJECTION INTRAMUSCULAR; INTRAVENOUS
Status: COMPLETED | OUTPATIENT
Start: 2022-05-14 | End: 2022-05-14

## 2022-05-14 RX ORDER — OSELTAMIVIR PHOSPHATE 30 MG/1
30 CAPSULE ORAL DAILY
Status: DISCONTINUED | OUTPATIENT
Start: 2022-05-15 | End: 2022-05-17 | Stop reason: HOSPADM

## 2022-05-14 RX ORDER — AMLODIPINE BESYLATE 5 MG/1
5 TABLET ORAL DAILY
Status: DISCONTINUED | OUTPATIENT
Start: 2022-05-15 | End: 2022-05-14

## 2022-05-14 RX ORDER — ESCITALOPRAM OXALATE 10 MG/1
10 TABLET ORAL DAILY
Status: DISCONTINUED | OUTPATIENT
Start: 2022-05-15 | End: 2022-05-17 | Stop reason: HOSPADM

## 2022-05-14 RX ORDER — ACETAMINOPHEN 650 MG/1
650 SUPPOSITORY RECTAL
Status: DISCONTINUED | OUTPATIENT
Start: 2022-05-14 | End: 2022-05-17 | Stop reason: HOSPADM

## 2022-05-14 RX ORDER — SODIUM CHLORIDE 0.9 % (FLUSH) 0.9 %
5-40 SYRINGE (ML) INJECTION AS NEEDED
Status: DISCONTINUED | OUTPATIENT
Start: 2022-05-14 | End: 2022-05-17 | Stop reason: HOSPADM

## 2022-05-14 RX ORDER — AMLODIPINE BESYLATE 5 MG/1
5 TABLET ORAL DAILY
Status: DISCONTINUED | OUTPATIENT
Start: 2022-05-14 | End: 2022-05-17 | Stop reason: HOSPADM

## 2022-05-14 RX ORDER — POLYETHYLENE GLYCOL 3350 17 G/17G
17 POWDER, FOR SOLUTION ORAL DAILY PRN
Status: DISCONTINUED | OUTPATIENT
Start: 2022-05-14 | End: 2022-05-17 | Stop reason: HOSPADM

## 2022-05-14 RX ORDER — DEXTROSE MONOHYDRATE 100 MG/ML
0-250 INJECTION, SOLUTION INTRAVENOUS AS NEEDED
Status: DISCONTINUED | OUTPATIENT
Start: 2022-05-14 | End: 2022-05-15 | Stop reason: SDUPTHER

## 2022-05-14 RX ORDER — CLONIDINE HYDROCHLORIDE 0.1 MG/1
0.1 TABLET ORAL 2 TIMES DAILY
Status: DISCONTINUED | OUTPATIENT
Start: 2022-05-14 | End: 2022-05-14

## 2022-05-14 RX ADMIN — ASPIRIN 81 MG 324 MG: 81 TABLET ORAL at 17:33

## 2022-05-14 RX ADMIN — OSELTAMIVIR PHOSPHATE 75 MG: 75 CAPSULE ORAL at 17:33

## 2022-05-14 RX ADMIN — SODIUM CHLORIDE, SODIUM LACTATE, POTASSIUM CHLORIDE, AND CALCIUM CHLORIDE 250 ML: 600; 310; 30; 20 INJECTION, SOLUTION INTRAVENOUS at 20:36

## 2022-05-14 RX ADMIN — HEPARIN SODIUM 5000 UNITS: 5000 INJECTION INTRAVENOUS; SUBCUTANEOUS at 20:36

## 2022-05-14 RX ADMIN — AMLODIPINE BESYLATE 5 MG: 5 TABLET ORAL at 17:47

## 2022-05-14 RX ADMIN — FUROSEMIDE 40 MG: 10 INJECTION, SOLUTION INTRAMUSCULAR; INTRAVENOUS at 17:33

## 2022-05-14 RX ADMIN — CLONIDINE HYDROCHLORIDE 0.1 MG: 0.1 TABLET ORAL at 17:47

## 2022-05-14 NOTE — Clinical Note
Status[de-identified] INPATIENT [101]   Type of Bed: Telemetry [19]   Cardiac Monitoring Required?: Yes   Inpatient Hospitalization Certified Necessary for the Following Reasons: 3.  Patient receiving treatment that can only be provided in an inpatient setting (further clarification in H&P documentation)   Admitting Diagnosis: FRANCISCA (acute kidney injury) ([facility] [address] [[id number]]   Admitting Diagnosis: Elevated troponin [2440793]   Admitting Diagnosis: Influenza A [154232]   Admitting Physician: Nelida Seen [[id number]]   Attending Physician: Nelida Seen [[id number]]   Estimated Length of Stay: 2 Midnights   Discharge Plan[de-identified] Home with Office Follow-up

## 2022-05-14 NOTE — H&P
GENERAL GENERIC H&P/CONSULT    Subjective:  Patient is a 25-year-old female with history of type 2 diabetes, coronary artery disease status post MI in , chronic kidney disease who presents with several day history of nonproductive cough fatigue and generalized feeling of being unwell. She said the cough started about 2 weeks ago she went to urgent care where she was COVID-negative was treated supportively. Few days ago symptoms worsened and she started to feel fatigued decreased appetite and nausea without vomiting. When symptoms persisted she presented today. Denies any fevers chills night sweats chest pain nausea vomiting diarrhea abdominal pain urinary symptoms. Denies any lower extremity edema, orthopnea or PND  Past Medical History:   Diagnosis Date    CAD (coronary artery disease)     Diabetes ([facility] [address]     HTN (hypertension)     Hyperlipidemia       Past Surgical History:   Procedure Laterality Date    HX CORONARY STENT PLACEMENT      HX GYN            Prior to Admission medications    Medication Sig Start Date End Date Taking? Authorizing Provider   escitalopram oxalate (LEXAPRO) 10 mg tablet Take 1 Tablet by mouth daily. 22  Yes Vashti Spicer MD   carvediloL (Coreg) 25 mg tablet Take 1 Tablet by mouth two (2) times daily (with meals). Indications: myocardial reinfarction prevention 22  Yes Vashti Spicer MD   ferrous sulfate 325 mg (65 mg iron) tablet Take 1 Tablet by mouth daily (with breakfast). 21  Yes Crista Lezama MD   amLODIPine (NORVASC) 10 mg tablet Take 0.5 Tablets by mouth daily.  21  Yes Crista Lezama MD   flash glucose scanning reader [facility] [address] [place] Use to check blood glucose 3 times a day  Indications: Diabetes mellitus 21  Yes Crista Lezama MD   flash glucose sensor (FreeStyle Daniel 14 Day Sensor) kit Used to check blood glucose 3 times a day 21  Yes Crista Lezama MD   cloNIDine HCL (CATAPRES) 0.1 mg tablet Take 1 Tablet by mouth two (2) times a day. 10/22/21  Yes Aurelio Magaña MD   clopidogreL (PLAVIX) 75 mg tab Take 1 Tablet by mouth daily. 10/13/21  Yes Nicole Rivera MD   melatonin 5 mg cap capsule Take 1 Capsule by mouth as needed (difficulty in sleeping). Reports taking it as needed 10/13/21  Yes Nicole Rivera MD   Insulin Needles, Disposable, (BD Ultra-Fine Short Pen Needle) 31 gauge x 5/16\" ndle USE WITH INSULIN TWICE DAILY 8/25/21  Yes Nicole Rivera MD   insulin glargine (Lantus Solostar U-100 Insulin) 100 unit/mL (3 mL) inpn 50 Units by SubCUTAneous route nightly. Indications: type 2 diabetes mellitus 7/15/21  Yes Nicole Rivera MD   magnesium oxide (MAG-OX) 400 mg tablet Take 1 Tablet by mouth two (2) times a day. Patient taking differently: Take 400 mg by mouth as needed. 6/30/21  Yes Aleksey Duarte MD   insulin lispro (HUMALOG) 100 unit/mL kwikpen 10 units before meals  Indications: type 2 diabetes mellitus 5/26/21  Yes Nicole Rivera MD   Insulin Syringe-Needle U-100 (INSULIN SYRINGE) 1 mL 30 gauge x 5/16 syrg As directed for lantus insulin 1/2/19  Yes Vinicius Walker MD     Allergies   Allergen Reactions    Azithromycin Other (comments)     Rapid response was called for bradycardia and hypotension     Pcn [Penicillins] Hives      Social History     Tobacco Use    Smoking status: Never Smoker    Smokeless tobacco: Never Used   Substance Use Topics    Alcohol use: No      Family History   Problem Relation Age of Onset    Lupus Mother     Cancer Neg Hx     Diabetes Neg Hx     Heart Disease Neg Hx     Hypertension Neg Hx     Heart Attack Neg Hx     Stroke Neg Hx       Review of Systems   Constitutional: Positive for fatigue. Negative for activity change, appetite change, chills, diaphoresis, fever and unexpected weight change. HENT: Positive for congestion and sore throat.  Negative for facial swelling, postnasal drip, rhinorrhea, sinus pressure, sinus pain, sneezing and trouble swallowing. Respiratory: Positive for cough. Negative for apnea, choking, chest tightness, shortness of breath, wheezing and stridor. Cardiovascular: Negative for chest pain, palpitations and leg swelling. Gastrointestinal: Negative for abdominal distention, abdominal pain, blood in stool, constipation, diarrhea, nausea and vomiting. Genitourinary: Negative for difficulty urinating, dysuria, frequency, hematuria and urgency. Musculoskeletal: Negative for arthralgias, back pain, gait problem, joint swelling and myalgias. Neurological: Negative for dizziness, facial asymmetry, weakness, light-headedness, numbness and headaches. Objective:    No intake/output data recorded. No intake/output data recorded. Patient Vitals for the past 8 hrs:   BP Temp Pulse Resp SpO2   05/14/22 1858 139/83 -- 94 15 100 %   05/14/22 1843 -- -- 94 15 100 %   05/14/22 1828 (!) 184/94 -- 94 23 100 %   05/14/22 1813 -- -- (!) 103 21 100 %   05/14/22 1758 (!) 175/104 -- (!) 105 21 100 %   05/14/22 1743 -- -- (!) 103 21 100 %   05/14/22 1728 (!) 193/107 -- (!) 107 30 100 %   05/14/22 1328 (!) 169/113 98.4 °F (36.9 °C) (!) 114 20 98 %     Physical Exam  Constitutional:       General: She is not in acute distress. Appearance: Normal appearance. She is not ill-appearing, toxic-appearing or diaphoretic. HENT:      Head: Normocephalic and atraumatic. Cardiovascular:      Rate and Rhythm: Normal rate and regular rhythm. Pulses: Normal pulses. Heart sounds: Normal heart sounds. Pulmonary:      Effort: Pulmonary effort is normal. No respiratory distress. Breath sounds: Normal breath sounds. No stridor. No wheezing, rhonchi or rales. Chest:      Chest wall: No tenderness. Abdominal:      General: Abdomen is flat. Bowel sounds are normal.      Palpations: Abdomen is soft. There is no mass. Tenderness: There is no abdominal tenderness.  There is no guarding or rebound. Musculoskeletal:         General: No swelling. Right lower leg: No edema. Skin:     General: Skin is warm. Coloration: Skin is pale. Skin is not jaundiced. Neurological:      Mental Status: She is alert and oriented to person, place, and time. Labs:    Recent Results (from the past 24 hour(s))   EKG, 12 LEAD, INITIAL    Collection Time: 05/14/22  1:31 PM   Result Value Ref Range    Ventricular Rate 105 BPM    Atrial Rate 105 BPM    P-R Interval 134 ms    QRS Duration 74 ms    Q-T Interval 342 ms    QTC Calculation (Bezet) 452 ms    Calculated P Axis 64 degrees    Calculated R Axis 47 degrees    Calculated T Axis 85 degrees    Diagnosis       Sinus tachycardia  Nonspecific T wave abnormality  Abnormal ECG  When compared with ECG of 13-NOV-2021 23:45,  No significant change was found  Confirmed by Yulisa Duggan MD, [facility] ([id number]) on 5/14/2022 2:36:41 PM     CBC WITH AUTOMATED DIFF    Collection Time: 05/14/22  2:19 PM   Result Value Ref Range    WBC 7.0 4.6 - 13.2 K/uL    RBC 4.01 (L) 4.20 - 5.30 M/uL    HGB 9.0 (L) 12.0 - 16.0 g/dL    HCT 29.2 (L) 35.0 - 45.0 %    MCV 72.8 (L) 78.0 - 100.0 FL    MCH 22.4 (L) 24.0 - 34.0 PG    MCHC 30.8 (L) 31.0 - 37.0 g/dL    RDW 14.1 11.6 - 14.5 %    PLATELET 686 879 - 965 K/uL    MPV 9.6 9.2 - 11.8 FL    NRBC 0.0 0  WBC    ABSOLUTE NRBC 0.00 0.00 - 0.01 K/uL    NEUTROPHILS 75 (H) 40 - 73 %    LYMPHOCYTES 15 (L) 21 - 52 %    MONOCYTES 7 3 - 10 %    EOSINOPHILS 2 0 - 5 %    BASOPHILS 0 0 - 2 %    IMMATURE GRANULOCYTES 0 0.0 - 0.5 %    ABS. NEUTROPHILS 5.3 1.8 - 8.0 K/UL    ABS. LYMPHOCYTES 1.1 0.9 - 3.6 K/UL    ABS. MONOCYTES 0.5 0.05 - 1.2 K/UL    ABS. EOSINOPHILS 0.1 0.0 - 0.4 K/UL    ABS. BASOPHILS 0.0 0.0 - 0.1 K/UL    ABS. IMM.  GRANS. 0.0 0.00 - 0.04 K/UL    DF AUTOMATED     METABOLIC PANEL, COMPREHENSIVE    Collection Time: 05/14/22  2:19 PM   Result Value Ref Range    Sodium 141 136 - 145 mmol/L    Potassium 3.6 3.5 - 5.5 mmol/L Chloride 110 100 - 111 mmol/L    CO2 24 21 - 32 mmol/L    Anion gap 7 3.0 - 18 mmol/L    Glucose 345 (H) 74 - 99 mg/dL    BUN 18 7.0 - 18 MG/DL    Creatinine 2.95 (H) 0.6 - 1.3 MG/DL    BUN/Creatinine ratio 6 (L) 12 - 20      GFR est AA 21 (L) >60 ml/min/1.73m2    GFR est non-AA 17 (L) >60 ml/min/1.73m2    Calcium 8.0 (L) 8.5 - 10.1 MG/DL    Bilirubin, total 0.2 0.2 - 1.0 MG/DL    ALT (SGPT) 24 13 - 56 U/L    AST (SGOT) 16 10 - 38 U/L    Alk. phosphatase 281 (H) 45 - 117 U/L    Protein, total 4.9 (L) 6.4 - 8.2 g/dL    Albumin 1.5 (L) 3.4 - 5.0 g/dL    Globulin 3.4 2.0 - 4.0 g/dL    A-G Ratio 0.4 (L) 0.8 - 1.7     NT-PRO BNP    Collection Time: 05/14/22  2:19 PM   Result Value Ref Range    NT pro-BNP 2,465 (H) 0 - 450 PG/ML   TROPONIN-HIGH SENSITIVITY    Collection Time: 05/14/22  2:19 PM   Result Value Ref Range    Troponin-High Sensitivity 326 (HH) 0 - 54 ng/L   COVID-19 WITH INFLUENZA A/B    Collection Time: 05/14/22  2:19 PM   Result Value Ref Range    SARS-CoV-2 by PCR Not detected NOTD      Influenza A by PCR Detected (A) NOTD      Influenza B by PCR Not detected NOTD     D DIMER    Collection Time: 05/14/22  2:19 PM   Result Value Ref Range    D DIMER 1.36 (H) <0.46 ug/ml(FEU)   TROPONIN-HIGH SENSITIVITY    Collection Time: 05/14/22  5:40 PM   Result Value Ref Range    Troponin-High Sensitivity 393 (HH) 0 - 54 ng/L       XR CHEST PA LAT    Result Date: 5/14/2022  1. No acute infiltrate or effusion. Assessment:  Problem List:  1. Influenza  2. Acute on chronic renal disease  3. Anemia  4. H/o of CAD    A/P: Patient is a 71-year-old female with history of type 2 diabetes, hypertension, chronic kidney disease, coronary artery disease status post MI and PCI in 2020 who presents with several day history of cough fatigue and intermittent chills. #Influenza: Positive for flu negative for COVID.   Given her high risk features will treat with Tamiflu dosed renally  -Tamiflu 30 mg p.o. twice daily for 5 days  -Supportive care    #Acute on chronic renal disease: History of nephrotic range proteinuria in the past attributed to combination of diabetes and hypertension, now with acute kidney injury.   Likely prerenal however intrinsic renal disease is also on the differential  -LR to 250x1 then repeat BMP  -Urine electrolytes  -Given concomitant anemia, hypercalcemia test for multiple myeloma amyloid with SPEP UPEP and free light chains  -Spot protein creatinine  -Consider starting ACE or ARB for renal protection    #Anemia: Improved from prior    #h.o coronary artery disease: Elevated troponin but delta Trope negative likely due to type II NSTEMI and poor renal clearance as she has no chest pain and is without dynamic EKG changes.  -Restart aspirin  -Have discussion about restarting statin    #Type 2 diabetes: Complicated by peripheral neuropathy  -Goal glucose 140 180  -Reduce home Lantus from 50 units to 30 units daily  -NovoLog 10 units before every meal plus correction factor  -Diabetic diet    #Hypertension: Continue home blood pressure medications with goal less than 150/90    #Elevated D-dimer: Low to intermediate pretest probability but with elevated D-dimer  -VQ scan    Diet: diabetic, cardiac, renal  DVT: heparin  Full Code  Admit to medicine with tele       Signed:  Zulema Peterson MD 5/14/2022

## 2022-05-14 NOTE — ED PROVIDER NOTES
EMERGENCY DEPARTMENT HISTORY AND PHYSICAL EXAM    2:07 PM      Date: 5/14/2022  Patient Name: Karon Dowell    History of Presenting Illness     Chief Complaint   Patient presents with    Cough    Chest Pain         History Provided By: Patient  Location/Duration/Severity/Modifying factors   Patient is a 52-year-old female with a history of hypertension, diabetes, hyperlipidemia, coronary disease, possible amputation of her digits on the left foot due to diabetes per patient, the presents emergency department with complaint of 2 weeks of cough, shortness of breath, and vomiting with diarrhea. The patient said for last week she been having a progressive cough with rhinorrhea however for the last week she has developed vomiting especially if she coughs a lot and is now developed diarrhea. Patient denies any sick contacts and has not done any traveling. Patient is vaccinated for COVID-19. Patient is compliant with her medications and notes that her lower extremities are getting mildly swollen. Patient follows closely with her primary doctor denies any other aggravating or alleviating factors. Patient tried to take cough medication however said when she takes it she ends up coughing and then vomits it up. Patient is able to keep her small pills down. Patient says she is able to eat grits this morning and keep them down however if she has anything larger than that she has a hard time swallowing it. Patient denies any weight loss or weight gain. 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           PCP: Vesna Gross MD    Current Facility-Administered Medications   Medication Dose Route Frequency Provider Last Rate Last Admin    amLODIPine (NORVASC) tablet 5 mg  5 mg Oral DAILY Rock Kevin MD   5 mg at 05/14/22 1747    cloNIDine HCL (CATAPRES) tablet 0.1 mg  0.1 mg Oral BID Annie TINSLEY MD   0.1 mg at 05/14/22 1747 Current Outpatient Medications   Medication Sig Dispense Refill    escitalopram oxalate (LEXAPRO) 10 mg tablet Take 1 Tablet by mouth daily. 30 Tablet 1    carvediloL (Coreg) 25 mg tablet Take 1 Tablet by mouth two (2) times daily (with meals). Indications: myocardial reinfarction prevention 180 Tablet 1    ferrous sulfate 325 mg (65 mg iron) tablet Take 1 Tablet by mouth daily (with breakfast). 30 Tablet 0    amLODIPine (NORVASC) 10 mg tablet Take 0.5 Tablets by mouth daily. 90 Tablet 3    flash glucose scanning reader (FreeStyle Daniel 14 Day [place]) misc Use to check blood glucose 3 times a day  Indications: Diabetes mellitus 1 Each 5    flash glucose sensor (FreeStyle Daniel 14 Day Sensor) kit Used to check blood glucose 3 times a day 1 Kit 0    cloNIDine HCL (CATAPRES) 0.1 mg tablet Take 1 Tablet by mouth two (2) times a day. 60 Tablet 2    clopidogreL (PLAVIX) 75 mg tab Take 1 Tablet by mouth daily. 30 Tablet 0    melatonin 5 mg cap capsule Take 1 Capsule by mouth as needed (difficulty in sleeping). Reports taking it as needed 90 Capsule 3    Insulin Needles, Disposable, (BD Ultra-Fine Short Pen Needle) 31 gauge x 5/16\" ndle USE WITH INSULIN TWICE DAILY 1 Package 10    insulin glargine (Lantus Solostar U-100 Insulin) 100 unit/mL (3 mL) inpn 50 Units by SubCUTAneous route nightly. Indications: type 2 diabetes mellitus 60 Pen 10    magnesium oxide (MAG-OX) 400 mg tablet Take 1 Tablet by mouth two (2) times a day.  (Patient taking differently: Take 400 mg by mouth as needed.) 28 Tablet 0    insulin lispro (HUMALOG) 100 unit/mL kwikpen 10 units before meals  Indications: type 2 diabetes mellitus 5 Pen 11    Insulin Syringe-Needle U-100 (INSULIN SYRINGE) 1 mL 30 gauge x 5/16 syrg As directed for lantus insulin 50 Syringe 0       Past History     Past Medical History:  Past Medical History:   Diagnosis Date    CAD (coronary artery disease)     Diabetes ([address]     HTN (hypertension)     Hyperlipidemia        Past Surgical History:  Past Surgical History:   Procedure Laterality Date    HX CORONARY STENT PLACEMENT      HX GYN             Family History:  Family History   Problem Relation Age of Onset    Lupus Mother     Cancer Neg Hx     Diabetes Neg Hx     Heart Disease Neg Hx     Hypertension Neg Hx     Heart Attack Neg Hx     Stroke Neg Hx        Social History:  Social History     Tobacco Use    Smoking status: Never Smoker    Smokeless tobacco: Never Used   Substance Use Topics    Alcohol use: No    Drug use: No       Allergies: Allergies   Allergen Reactions    Azithromycin Other (comments)     Rapid response was called for bradycardia and hypotension     Pcn [Penicillins] Hives         Review of Systems       Review of Systems   Constitutional: Positive for fatigue. Negative for activity change and fever. HENT: Positive for congestion. Negative for rhinorrhea. Eyes: Negative for visual disturbance. Respiratory: Positive for cough, chest tightness and shortness of breath. Cardiovascular: Negative for chest pain and palpitations. Gastrointestinal: Positive for diarrhea, nausea and vomiting. Negative for abdominal pain. Genitourinary: Negative for dysuria and hematuria. Musculoskeletal: Negative for back pain. Skin: Negative for rash. Neurological: Negative for dizziness, weakness and light-headedness. All other systems reviewed and are negative. Physical Exam     Visit Vitals  BP (!) 193/107   Pulse (!) 103   Temp 98.4 °F (36.9 °C)   Resp 21   SpO2 100%         Physical Exam  Vitals and nursing note reviewed. Constitutional:       General: She is not in acute distress. Appearance: She is well-developed. HENT:      Head: Normocephalic and atraumatic. Right Ear: External ear normal.      Left Ear: External ear normal.      Nose: Nose normal.   Eyes:      General: No scleral icterus.      Conjunctiva/sclera: Conjunctivae normal. Pupils: Pupils are equal, round, and reactive to light. Neck:      Thyroid: No thyromegaly. Vascular: No JVD. Trachea: No tracheal deviation. Cardiovascular:      Rate and Rhythm: Regular rhythm. Tachycardia present. Heart sounds: Normal heart sounds. No murmur heard. No friction rub. No gallop. Pulmonary:      Effort: Pulmonary effort is normal.      Comments: Coarse breath sounds in the bilateral bases  Chest:      Chest wall: No tenderness or edema. Abdominal:      General: Bowel sounds are normal. There is no distension. Palpations: Abdomen is soft. Tenderness: There is no abdominal tenderness. There is no guarding or rebound. Musculoskeletal:         General: No tenderness. Normal range of motion. Cervical back: Normal range of motion and neck supple. Right lower leg: Edema present. Left lower leg: Edema present. Comments: Asymmetric swelling of the lower extremities, right greater than left   Lymphadenopathy:      Cervical: No cervical adenopathy. Skin:     General: Skin is warm and dry. Neurological:      Mental Status: She is alert and oriented to person, place, and time. Cranial Nerves: No cranial nerve deficit. Coordination: Coordination normal.      Comments: No sensory loss, Gait normal, Motor 5/5   Psychiatric:         Behavior: Behavior normal.         Thought Content:  Thought content normal.         Judgment: Judgment normal.           Diagnostic Study Results     Labs -  Recent Results (from the past 12 hour(s))   EKG, 12 LEAD, INITIAL    Collection Time: 05/14/22  1:31 PM   Result Value Ref Range    Ventricular Rate 105 BPM    Atrial Rate 105 BPM    P-R Interval 134 ms    QRS Duration 74 ms    Q-T Interval 342 ms    QTC Calculation (Bezet) 452 ms    Calculated P Axis 64 degrees    Calculated R Axis 47 degrees    Calculated T Axis 85 degrees    Diagnosis       Sinus tachycardia  Nonspecific T wave abnormality  Abnormal ECG  When compared with ECG of 13-NOV-2021 23:45,  No significant change was found  Confirmed by Lawernce Hodgkin, MD, [facility] [place] ([id number]) on 5/14/2022 2:36:41 PM     CBC WITH AUTOMATED DIFF    Collection Time: 05/14/22  2:19 PM   Result Value Ref Range    WBC 7.0 4.6 - 13.2 K/uL    RBC 4.01 (L) 4.20 - 5.30 M/uL    HGB 9.0 (L) 12.0 - 16.0 g/dL    HCT 29.2 (L) 35.0 - 45.0 %    MCV 72.8 (L) 78.0 - 100.0 FL    MCH 22.4 (L) 24.0 - 34.0 PG    MCHC 30.8 (L) 31.0 - 37.0 g/dL    RDW 14.1 11.6 - 14.5 %    PLATELET 964 449 - 684 K/uL    MPV 9.6 9.2 - 11.8 FL    NRBC 0.0 0  WBC    ABSOLUTE NRBC 0.00 0.00 - 0.01 K/uL    NEUTROPHILS 75 (H) 40 - 73 %    LYMPHOCYTES 15 (L) 21 - 52 %    MONOCYTES 7 3 - 10 %    EOSINOPHILS 2 0 - 5 %    BASOPHILS 0 0 - 2 %    IMMATURE GRANULOCYTES 0 0.0 - 0.5 %    ABS. NEUTROPHILS 5.3 1.8 - 8.0 K/UL    ABS. LYMPHOCYTES 1.1 0.9 - 3.6 K/UL    ABS. MONOCYTES 0.5 0.05 - 1.2 K/UL    ABS. EOSINOPHILS 0.1 0.0 - 0.4 K/UL    ABS. BASOPHILS 0.0 0.0 - 0.1 K/UL    ABS. IMM. GRANS. 0.0 0.00 - 0.04 K/UL    DF AUTOMATED     METABOLIC PANEL, COMPREHENSIVE    Collection Time: 05/14/22  2:19 PM   Result Value Ref Range    Sodium 141 136 - 145 mmol/L    Potassium 3.6 3.5 - 5.5 mmol/L    Chloride 110 100 - 111 mmol/L    CO2 24 21 - 32 mmol/L    Anion gap 7 3.0 - 18 mmol/L    Glucose 345 (H) 74 - 99 mg/dL    BUN 18 7.0 - 18 MG/DL    Creatinine 2.95 (H) 0.6 - 1.3 MG/DL    BUN/Creatinine ratio 6 (L) 12 - 20      GFR est AA 21 (L) >60 ml/min/1.73m2    GFR est non-AA 17 (L) >60 ml/min/1.73m2    Calcium 8.0 (L) 8.5 - 10.1 MG/DL    Bilirubin, total 0.2 0.2 - 1.0 MG/DL    ALT (SGPT) 24 13 - 56 U/L    AST (SGOT) 16 10 - 38 U/L    Alk.  phosphatase 281 (H) 45 - 117 U/L    Protein, total 4.9 (L) 6.4 - 8.2 g/dL    Albumin 1.5 (L) 3.4 - 5.0 g/dL    Globulin 3.4 2.0 - 4.0 g/dL    A-G Ratio 0.4 (L) 0.8 - 1.7     NT-PRO BNP    Collection Time: 05/14/22  2:19 PM   Result Value Ref Range    NT pro-BNP 2,465 (H) 0 - 450 PG/ML   TROPONIN-HIGH SENSITIVITY Collection Time: 05/14/22  2:19 PM   Result Value Ref Range    Troponin-High Sensitivity 326 (HH) 0 - 54 ng/L   COVID-19 WITH INFLUENZA A/B    Collection Time: 05/14/22  2:19 PM   Result Value Ref Range    SARS-CoV-2 by PCR Not detected NOTD      Influenza A by PCR Detected (A) NOTD      Influenza B by PCR Not detected NOTD     D DIMER    Collection Time: 05/14/22  2:19 PM   Result Value Ref Range    D DIMER 1.36 (H) <0.46 ug/ml(FEU)       Radiologic Studies -   XR CHEST PA LAT   Final Result   1. No acute infiltrate or effusion. Medical Decision Making   I am the first provider for this patient. I reviewed the vital signs, available nursing notes, past medical history, past surgical history, family history and social history. Vital Signs-Reviewed the patient's vital signs. EKG: Sinus tachycardia at 105, no STEMI, interpreted by me    Records Reviewed: Nursing Notes, Old Medical Records, Previous Radiology Studies and Previous Laboratory Studies (Time of Review: 2:07 PM)    ED Course: Progress Notes, Reevaluation, and Consults: The patient has an elevated proBNP, elevated blood pressure, positive influenza PCR, elevated creatinine which is trending upward over time, and the patient has not had her blood pressure medications today. We will give a dose of Lasix, Tamiflu, but given the elevated blood pressure and elevated troponin have concerns for hypertensive crisis and will trend her troponin and reevaluate. Aline Gomez, DO 6:05 PM    The patient's troponins are elevated and the patient has an elevated glucose with rising creatinine. With multiple organ systems involved decision was made to admit the patient and discussed the case with the hospitalist and will admit the patient for further care.       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 Patient is tachycardic and has elevated blood pressure. The patient has some coarse breath sounds on her lung fields however chest x-ray looks reassuring. We will follow patient's cardiac labs, COVID and flu PCR, D-dimer given her history of surgery and asymmetric swelling of her lower extremities, blood glucose, and reevaluate. Niranjan Marie DO 2:12 PM        Procedures        Diagnosis     Clinical Impression:   1. Essential hypertension    2. Type 2 diabetes mellitus with complication (HCC)    3. Elevated troponin        Disposition: Admit    Follow-up Information    None          Patient's Medications   Start Taking    No medications on file   Continue Taking    AMLODIPINE (NORVASC) 10 MG TABLET    Take 0.5 Tablets by mouth daily. CARVEDILOL (COREG) 25 MG TABLET    Take 1 Tablet by mouth two (2) times daily (with meals). Indications: myocardial reinfarction prevention    CLONIDINE HCL (CATAPRES) 0.1 MG TABLET    Take 1 Tablet by mouth two (2) times a day. CLOPIDOGREL (PLAVIX) 75 MG TAB    Take 1 Tablet by mouth daily. ESCITALOPRAM OXALATE (LEXAPRO) 10 MG TABLET    Take 1 Tablet by mouth daily. FERROUS SULFATE 325 MG (65 MG IRON) TABLET    Take 1 Tablet by mouth daily (with breakfast). FLASH GLUCOSE SCANNING READER (FREESTYLE [place]ATRIZ 14 DAY [place]ER[place] [facility]    Use to check blood glucose 3 times a day  Indications: Diabetes mellitus    FLASH GLUCOSE SENSOR (FREESTYLE BEATRIZ 14 DAY SENSOR) KIT    Used to check blood glucose 3 times a day    INSULIN GLARGINE (LANTUS SOLOSTAR U-100 INSULIN) 100 UNIT/ML (3 ML) INPN    50 Units by SubCUTAneous route nightly.  Indications: type 2 diabetes mellitus    INSULIN LISPRO (HUMALOG) 100 UNIT/ML KWIKPEN    10 units before meals Indications: type 2 diabetes mellitus    INSULIN NEEDLES, DISPOSABLE, (BD ULTRA-FINE SHORT PEN NEEDLE) 31 GAUGE X 5/16\" NDLE    USE WITH INSULIN TWICE DAILY    INSULIN SYRINGE-NEEDLE U-100 (INSULIN SYRINGE) 1 ML 30 GAUGE X 5/16 SYRG    As directed for lantus insulin    MAGNESIUM OXIDE (MAG-OX) 400 MG TABLET    Take 1 Tablet by mouth two (2) times a day. MELATONIN 5 MG CAP CAPSULE    Take 1 Capsule by mouth as needed (difficulty in sleeping). Reports taking it as needed   These Medications have changed    No medications on file   Stop Taking    No medications on file     Disclaimer: Sections of this note are dictated using utilizing voice recognition software. Minor typographical errors may be present. If questions arise, please do not hesitate to contact me or call our department.

## 2022-05-15 LAB
ALBUMIN SERPL-MCNC: 1.4 G/DL (ref 3.4–5)
ALBUMIN/GLOB SERPL: 0.5 {RATIO} (ref 0.8–1.7)
ALP SERPL-CCNC: 261 U/L (ref 45–117)
ALT SERPL-CCNC: 22 U/L (ref 13–56)
ANION GAP SERPL CALC-SCNC: 8 MMOL/L (ref 3–18)
AST SERPL-CCNC: 22 U/L (ref 10–38)
BILIRUB SERPL-MCNC: 0.2 MG/DL (ref 0.2–1)
BUN SERPL-MCNC: 21 MG/DL (ref 7–18)
BUN/CREAT SERPL: 7 (ref 12–20)
CALCIUM SERPL-MCNC: 7.8 MG/DL (ref 8.5–10.1)
CHLORIDE SERPL-SCNC: 111 MMOL/L (ref 100–111)
CO2 SERPL-SCNC: 22 MMOL/L (ref 21–32)
CREAT SERPL-MCNC: 3.08 MG/DL (ref 0.6–1.3)
ERYTHROCYTE [DISTWIDTH] IN BLOOD BY AUTOMATED COUNT: 14.2 % (ref 11.6–14.5)
GLOBULIN SER CALC-MCNC: 3 G/DL (ref 2–4)
GLUCOSE BLD STRIP.AUTO-MCNC: 128 MG/DL (ref 70–110)
GLUCOSE BLD STRIP.AUTO-MCNC: 141 MG/DL (ref 70–110)
GLUCOSE BLD STRIP.AUTO-MCNC: 149 MG/DL (ref 70–110)
GLUCOSE BLD STRIP.AUTO-MCNC: 381 MG/DL (ref 70–110)
GLUCOSE BLD STRIP.AUTO-MCNC: 74 MG/DL (ref 70–110)
GLUCOSE SERPL-MCNC: 378 MG/DL (ref 74–99)
HCT VFR BLD AUTO: 26.8 % (ref 35–45)
HGB BLD-MCNC: 8.2 G/DL (ref 12–16)
MCH RBC QN AUTO: 22.2 PG (ref 24–34)
MCHC RBC AUTO-ENTMCNC: 30.6 G/DL (ref 31–37)
MCV RBC AUTO: 72.6 FL (ref 78–100)
NRBC # BLD: 0 K/UL (ref 0–0.01)
NRBC BLD-RTO: 0 PER 100 WBC
PLATELET # BLD AUTO: 150 K/UL (ref 135–420)
PMV BLD AUTO: 10.1 FL (ref 9.2–11.8)
POTASSIUM SERPL-SCNC: 4 MMOL/L (ref 3.5–5.5)
PROT SERPL-MCNC: 4.4 G/DL (ref 6.4–8.2)
RBC # BLD AUTO: 3.69 M/UL (ref 4.2–5.3)
SODIUM SERPL-SCNC: 141 MMOL/L (ref 136–145)
WBC # BLD AUTO: 4.8 K/UL (ref 4.6–13.2)

## 2022-05-15 PROCEDURE — 74011000250 HC RX REV CODE- 250: Performed by: INTERNAL MEDICINE

## 2022-05-15 PROCEDURE — 99232 SBSQ HOSP IP/OBS MODERATE 35: CPT | Performed by: EMERGENCY MEDICINE

## 2022-05-15 PROCEDURE — 74011250636 HC RX REV CODE- 250/636: Performed by: INTERNAL MEDICINE

## 2022-05-15 PROCEDURE — 74011250637 HC RX REV CODE- 250/637: Performed by: INTERNAL MEDICINE

## 2022-05-15 PROCEDURE — 80053 COMPREHEN METABOLIC PANEL: CPT

## 2022-05-15 PROCEDURE — 74011250637 HC RX REV CODE- 250/637: Performed by: EMERGENCY MEDICINE

## 2022-05-15 PROCEDURE — 65270000046 HC RM TELEMETRY

## 2022-05-15 PROCEDURE — 85027 COMPLETE CBC AUTOMATED: CPT

## 2022-05-15 PROCEDURE — 74011636637 HC RX REV CODE- 636/637: Performed by: INTERNAL MEDICINE

## 2022-05-15 PROCEDURE — 82962 GLUCOSE BLOOD TEST: CPT

## 2022-05-15 RX ORDER — INSULIN LISPRO 100 [IU]/ML
INJECTION, SOLUTION INTRAVENOUS; SUBCUTANEOUS
Status: DISCONTINUED | OUTPATIENT
Start: 2022-05-15 | End: 2022-05-17 | Stop reason: HOSPADM

## 2022-05-15 RX ORDER — DEXTROSE MONOHYDRATE 100 MG/ML
0-250 INJECTION, SOLUTION INTRAVENOUS AS NEEDED
Status: DISCONTINUED | OUTPATIENT
Start: 2022-05-15 | End: 2022-05-17 | Stop reason: HOSPADM

## 2022-05-15 RX ORDER — MAGNESIUM SULFATE 100 %
4 CRYSTALS MISCELLANEOUS AS NEEDED
Status: DISCONTINUED | OUTPATIENT
Start: 2022-05-15 | End: 2022-05-17 | Stop reason: HOSPADM

## 2022-05-15 RX ORDER — IPRATROPIUM BROMIDE AND ALBUTEROL SULFATE 2.5; .5 MG/3ML; MG/3ML
3 SOLUTION RESPIRATORY (INHALATION)
Status: DISCONTINUED | OUTPATIENT
Start: 2022-05-15 | End: 2022-05-17 | Stop reason: HOSPADM

## 2022-05-15 RX ORDER — CODEINE PHOSPHATE AND GUAIFENESIN 10; 100 MG/5ML; MG/5ML
10 SOLUTION ORAL
Status: DISCONTINUED | OUTPATIENT
Start: 2022-05-15 | End: 2022-05-17 | Stop reason: HOSPADM

## 2022-05-15 RX ADMIN — CARVEDILOL 25 MG: 25 TABLET, FILM COATED ORAL at 09:00

## 2022-05-15 RX ADMIN — CLOPIDOGREL BISULFATE 75 MG: 75 TABLET ORAL at 08:59

## 2022-05-15 RX ADMIN — Medication 10 UNITS: at 08:58

## 2022-05-15 RX ADMIN — OSELTAMIVIR PHOSPHATE 30 MG: 30 CAPSULE ORAL at 18:06

## 2022-05-15 RX ADMIN — Medication 15 UNITS: at 08:59

## 2022-05-15 RX ADMIN — CARVEDILOL 25 MG: 25 TABLET, FILM COATED ORAL at 18:07

## 2022-05-15 RX ADMIN — SODIUM CHLORIDE, PRESERVATIVE FREE 10 ML: 5 INJECTION INTRAVENOUS at 06:21

## 2022-05-15 RX ADMIN — CLONIDINE HYDROCHLORIDE 0.1 MG: 0.1 TABLET ORAL at 18:00

## 2022-05-15 RX ADMIN — HEPARIN SODIUM 5000 UNITS: 5000 INJECTION INTRAVENOUS; SUBCUTANEOUS at 23:38

## 2022-05-15 RX ADMIN — GUAIFENESIN AND CODEINE PHOSPHATE 10 ML: 100; 10 SOLUTION ORAL at 08:06

## 2022-05-15 RX ADMIN — Medication 30 UNITS: at 08:59

## 2022-05-15 RX ADMIN — GUAIFENESIN AND CODEINE PHOSPHATE 10 ML: 100; 10 SOLUTION ORAL at 18:06

## 2022-05-15 RX ADMIN — SODIUM CHLORIDE, PRESERVATIVE FREE 10 ML: 5 INJECTION INTRAVENOUS at 14:00

## 2022-05-15 RX ADMIN — SODIUM CHLORIDE, PRESERVATIVE FREE 10 ML: 5 INJECTION INTRAVENOUS at 23:39

## 2022-05-15 RX ADMIN — FERROUS SULFATE TAB 325 MG (65 MG ELEMENTAL FE) 325 MG: 325 (65 FE) TAB at 08:59

## 2022-05-15 RX ADMIN — HEPARIN SODIUM 5000 UNITS: 5000 INJECTION INTRAVENOUS; SUBCUTANEOUS at 09:00

## 2022-05-15 RX ADMIN — GUAIFENESIN AND CODEINE PHOSPHATE 10 ML: 100; 10 SOLUTION ORAL at 03:36

## 2022-05-15 RX ADMIN — ASPIRIN 81 MG: 81 TABLET, COATED ORAL at 08:59

## 2022-05-15 RX ADMIN — ESCITALOPRAM OXALATE 10 MG: 10 TABLET ORAL at 09:45

## 2022-05-15 RX ADMIN — AMLODIPINE BESYLATE 5 MG: 5 TABLET ORAL at 09:00

## 2022-05-15 RX ADMIN — CLONIDINE HYDROCHLORIDE 0.1 MG: 0.1 TABLET ORAL at 08:59

## 2022-05-15 RX ADMIN — ACETAMINOPHEN 650 MG: 325 TABLET ORAL at 07:10

## 2022-05-15 NOTE — PROGRESS NOTES
[facility] Hospitalist Group  Progress Note    Patient: Brit Yoder Age: 56 y.o. : 1975 MR#: [id number] SSN: [id number]  Date/Time: 5/15/2022    Subjective:     Patient is sitting in bed in no apparent distress, complains of intermittent cough.   Overall feels better    Assessment/Plan:     Influenza A  Chronic kidney disease stage III which appears worse  Type 2 diabetes poorly controlled  Hypertension  Coronary artery disease  Elevated troponin    Plan  Continue Tamiflu-renal dosing  Monitor renal function, hold off diuretics  Nephrology is following  Continue aspirin and beta-blocker and Plavix  On amlodipine and clonidine, monitor blood pressure  Lantus, sliding scale insulin, diabetic educator consult  Await VQ scan  Discussed with patient      Case discussed with:  [x]Patient  []Family  []Nursing  []Case Management  DVT Prophylaxis:  []Lovenox  [x]Hep SQ  []SCDs  []Coumadin   []On Heparin gtt    Objective:   VS:   Visit Vitals  BP (!) 143/89   Pulse 91   Temp 98.4 °F (36.9 °C)   Resp 19   SpO2 100%      Tmax/24hrs: Temp (24hrs), Av.3 °F (36.8 °C), Min:98.1 °F (36.7 °C), Max:98.4 °F (36.9 °C)    Input/Output: No intake or output data in the 24 hours ending 05/15/22 1934    General:  Awake, alert  Cardiovascular:  S1S2+, RRR  Pulmonary: Coarse breath sounds bilaterally  GI:  Soft, BS+, NT, ND  Extremities:  No edema      Labs:    Recent Results (from the past 24 hour(s))   TROPONIN-HIGH SENSITIVITY    Collection Time: 22 10:17 PM   Result Value Ref Range    Troponin-High Sensitivity 365 (HH) 0 - 54 ng/L   NT-PRO BNP    Collection Time: 22 10:17 PM   Result Value Ref Range    NT pro-BNP 2,220 (H) 0 - 103 PG/ML   METABOLIC PANEL, COMPREHENSIVE    Collection Time: 05/15/22  4:45 AM   Result Value Ref Range    Sodium 141 136 - 145 mmol/L    Potassium 4.0 3.5 - 5.5 mmol/L    Chloride 111 100 - 111 mmol/L    CO2 22 21 - 32 mmol/L    Anion gap 8 3.0 - 18 mmol/L Glucose 378 (H) 74 - 99 mg/dL    BUN 21 (H) 7.0 - 18 MG/DL    Creatinine 3.08 (H) 0.6 - 1.3 MG/DL    BUN/Creatinine ratio 7 (L) 12 - 20      GFR est AA 20 (L) >60 ml/min/1.73m2    GFR est non-AA 16 (L) >60 ml/min/1.73m2    Calcium 7.8 (L) 8.5 - 10.1 MG/DL    Bilirubin, total 0.2 0.2 - 1.0 MG/DL    ALT (SGPT) 22 13 - 56 U/L    AST (SGOT) 22 10 - 38 U/L    Alk.  phosphatase 261 (H) 45 - 117 U/L    Protein, total 4.4 (L) 6.4 - 8.2 g/dL    Albumin 1.4 (L) 3.4 - 5.0 g/dL    Globulin 3.0 2.0 - 4.0 g/dL    A-G Ratio 0.5 (L) 0.8 - 1.7     CBC W/O DIFF    Collection Time: 05/15/22  4:45 AM   Result Value Ref Range    WBC 4.8 4.6 - 13.2 K/uL    RBC 3.69 (L) 4.20 - 5.30 M/uL    HGB 8.2 (L) 12.0 - 16.0 g/dL    HCT 26.8 (L) 35.0 - 45.0 %    MCV 72.6 (L) 78.0 - 100.0 FL    MCH 22.2 (L) 24.0 - 34.0 PG    MCHC 30.6 (L) 31.0 - 37.0 g/dL    RDW 14.2 11.6 - 14.5 %    PLATELET 585 350 - 703 K/uL    MPV 10.1 9.2 - 11.8 FL    NRBC 0.0 0  WBC    ABSOLUTE NRBC 0.00 0.00 - 0.01 K/uL   GLUCOSE, POC    Collection Time: 05/15/22  8:37 AM   Result Value Ref Range    Glucose (POC) 381 (H) 70 - 110 mg/dL   GLUCOSE, POC    Collection Time: 05/15/22 12:57 PM   Result Value Ref Range    Glucose (POC) 149 (H) 70 - 110 mg/dL   GLUCOSE, POC    Collection Time: 05/15/22  5:13 PM   Result Value Ref Range    Glucose (POC) 74 70 - 110 mg/dL   GLUCOSE, POC    Collection Time: 05/15/22  7:01 PM   Result Value Ref Range    Glucose (POC) 141 (H) 70 - 110 mg/dL     Additional Data Reviewed:      Signed By: Linda Hicks MD     May 15, 2022

## 2022-05-15 NOTE — ED NOTES
Purposeful rounding completed:     Side rails up x 1:  YES  Bed in low position and wheels locked: YES  Call bell within reach: YES  Comfort addressed: YES    Toileting needs addressed: YES  Plan of care reviewed/updated with patient and or family members: YES  IV site assessed: YES  Pain assessed and addressed: YES, 0  Patient sleeping

## 2022-05-15 NOTE — ED NOTES
TRANSFER - OUT REPORT:    Verbal report given to Pura Walker RN(name) on Argentina Palmer  being transferred to [address] [place] [address](unit) for routine progression of care       Report consisted of patients Situation, Background, Assessment and   Recommendations(SBAR). Information from the following report(s) ED Summary, Procedure Summary, Intake/Output and MAR was reviewed with the receiving nurse. Lines:   Peripheral IV 05/14/22 Left Antecubital (Active)   Site Assessment Clean, dry, & intact 05/14/22 1423   Phlebitis Assessment 0 05/14/22 1423   Infiltration Assessment 0 05/14/22 1423   Dressing Status Clean, dry, & intact 05/14/22 1423   Dressing Type Transparent;Tape 05/14/22 1423   Hub Color/Line Status Pink;Patent; Flushed 05/14/22 1423   Action Taken Blood drawn 05/14/22 1423   Alcohol Cap Used No 05/14/22 1423        Opportunity for questions and clarification was provided.       Patient transported with:   Transport

## 2022-05-15 NOTE — CONSULTS
Consult Note  Consult requested by: Dr. Adames Mila is a 56 y.o. female BLACK/ who is being seen on consult for renal  Failure   Chief Complaint   Patient presents with    Cough    Chest Pain     Admission diagnosis:cought, chest pain   44-year-old female with past medical history of diabetes, coronary artery disease, hypertension presented with cough short of breath, positive for influenza a following for renal failure  Impression & Plan:   IMPRESSION:   Acute on chronic kidney injury, likely prerenal ATN in setting of poor p.o. intake severe hyperglycemia  CKD stage III with proteinuria Baseline creatinine around 1.6 mg per DL  Cough, positive for influenza A,  unvaccinated host  Hypertension  Heart failure with preserved ejection fraction  Diabetes, uncontrolled  Elevated troponin   PLAN:   She received diuretics in the emergency room. At present no difficulty in oxygenation patient is on room air. I would recommend to hold any further aggressive diuresis. Need better blood sugar control. Avoid IV contrast as possible  Adjust medication per current functional status. Thank you very much for allowing me to participate in the care of this patient. We will continue to monitor with you and make recommendations as needed.       HPI: 44-year-old female with past medical history of diabetes, hypertension, congestive heart failure came to the ER with cough and chest pain. Her work-up in the ER shows no leukocytosis no fever no hypoxia but positive for influenza type A. She is unvaccinated for flu. In the emergency room, she has elevated BNP and troponin. She has severe hyperglycemia and elevated creatinine. She has a known CKD with proteinuria nephrology service consulted for further assistant. During my visit she denies for any discomfort, denies for any change in her urine output. She denies for any use of NSAIDs. I reviewed her chart from CKD clinic.   Past Medical History:   Diagnosis Date    CAD (coronary artery disease)     Diabetes ([facility] [address]     HTN (hypertension)     Hyperlipidemia       Past Surgical History:   Procedure Laterality Date    HX CORONARY STENT PLACEMENT      HX GYN             Social History     Socioeconomic History    Marital status:      Spouse name: Not on file    Number of children: Not on file    Years of education: Not on file    Highest education level: Not on file   Occupational History    Not on file   Tobacco Use    Smoking status: Never Smoker    Smokeless tobacco: Never Used   Substance and Sexual Activity    Alcohol use: No    Drug use: No    Sexual activity: Yes     Partners: Male   Other Topics Concern    Not on file   Social History Narrative    Not on file     Social Determinants of Health     Financial Resource Strain:     Difficulty of Paying Living Expenses: Not on file   Food Insecurity:     Worried About Running Out of Food in the Last Year: Not on file    Blade of Food in the Last Year: Not on file   Transportation Needs:     Lack of Transportation (Medical): Not on file    Lack of Transportation (Non-Medical):  Not on file   Physical Activity:     Days of Exercise per Week: Not on file    Minutes of Exercise per Session: Not on file   Stress:     Feeling of Stress : Not on file   Social Connections:     Frequency of Communication with Friends and Family: Not on file    Frequency of Social Gatherings with Friends and Family: Not on file    Attends Congregation Services: Not on file    Active Member of Clubs or Organizations: Not on file    Attends Club or Organization Meetings: Not on file    Marital Status: Not on file   Intimate Partner Violence:     Fear of Current or Ex-Partner: Not on file    Emotionally Abused: Not on file    Physically Abused: Not on file    Sexually Abused: Not on file   Housing Stability:     Unable to Pay for Housing in the Last Year: Not on file    Number of Places Lived in the Last Year: Not on file    Unstable Housing in the Last Year: Not on file       Family History   Problem Relation Age of Onset    Lupus Mother     Cancer Neg Hx     Diabetes Neg Hx     Heart Disease Neg Hx     Hypertension Neg Hx     Heart Attack Neg Hx     Stroke Neg Hx      Allergies   Allergen Reactions    Azithromycin Other (comments)     Rapid response was called for bradycardia and hypotension     Pcn [Penicillins] Hives        Home Medications:     Prior to Admission Medications   Prescriptions Last Dose Informant Patient Reported? Taking? Insulin Needles, Disposable, (BD Ultra-Fine Short Pen Needle) 31 gauge x \" ndle 2022  No Yes   Sig: USE WITH INSULIN TWICE DAILY   Insulin Syringe-Needle U-100 (INSULIN SYRINGE) 1 mL 30 gauge x  syrg 2022  No Yes   Sig: As directed for lantus insulin   amLODIPine (NORVASC) 10 mg tablet 2022  No Yes   Sig: Take 0.5 Tablets by mouth daily. carvediloL (Coreg) 25 mg tablet 2022  No Yes   Sig: Take 1 Tablet by mouth two (2) times daily (with meals). Indications: myocardial reinfarction prevention   cloNIDine HCL (CATAPRES) 0.1 mg tablet 2022  No Yes   Sig: Take 1 Tablet by mouth two (2) times a day. clopidogreL (PLAVIX) 75 mg tab 2022  No Yes   Sig: Take 1 Tablet by mouth daily. escitalopram oxalate (LEXAPRO) 10 mg tablet 2022  No Yes   Sig: Take 1 Tablet by mouth daily. ferrous sulfate 325 mg (65 mg iron) tablet 2022  No Yes   Sig: Take 1 Tablet by mouth daily (with breakfast).    flash glucose scanning reader (FreeStyle Daniel 14 Day [place]) misc 2022  No Yes   Sig: Use to check blood glucose 3 times a day  Indications: Diabetes mellitus   flash glucose sensor (FreeStyle Daniel 14 Day Sensor) kit 2022  No Yes   Sig: Used to check blood glucose 3 times a day   insulin glargine (Lantus Solostar U-100 Insulin) 100 unit/mL (3 mL) inpn 2022  No Yes   Si Units by SubCUTAneous route nightly. Indications: type 2 diabetes mellitus   insulin lispro (HUMALOG) 100 unit/mL kwikpen 5/14/2022  No Yes   Sig: 10 units before meals  Indications: type 2 diabetes mellitus   magnesium oxide (MAG-OX) 400 mg tablet 5/14/2022  No Yes   Sig: Take 1 Tablet by mouth two (2) times a day. Patient taking differently: Take 400 mg by mouth as needed. melatonin 5 mg cap capsule 5/14/2022  No Yes   Sig: Take 1 Capsule by mouth as needed (difficulty in sleeping).  Reports taking it as needed      Facility-Administered Medications: None       Current Facility-Administered Medications   Medication Dose Route Frequency    guaiFENesin-codeine (ROBITUSSIN AC) 100-10 mg/5 mL solution 10 mL  10 mL Oral Q4H PRN    amLODIPine (NORVASC) tablet 5 mg  5 mg Oral DAILY    cloNIDine HCL (CATAPRES) tablet 0.1 mg  0.1 mg Oral BID    carvediloL (COREG) tablet 25 mg  25 mg Oral BID WITH MEALS    clopidogreL (PLAVIX) tablet 75 mg  75 mg Oral DAILY    escitalopram oxalate (LEXAPRO) tablet 10 mg  10 mg Oral DAILY    ferrous sulfate tablet 325 mg  325 mg Oral DAILY WITH BREAKFAST    sodium chloride (NS) flush 5-40 mL  5-40 mL IntraVENous Q8H    sodium chloride (NS) flush 5-40 mL  5-40 mL IntraVENous PRN    acetaminophen (TYLENOL) tablet 650 mg  650 mg Oral Q6H PRN    Or    acetaminophen (TYLENOL) suppository 650 mg  650 mg Rectal Q6H PRN    polyethylene glycol (MIRALAX) packet 17 g  17 g Oral DAILY PRN    ondansetron (ZOFRAN ODT) tablet 4 mg  4 mg Oral Q8H PRN    Or    ondansetron (ZOFRAN) injection 4 mg  4 mg IntraVENous Q6H PRN    insulin glargine (LANTUS) injection 30 Units  30 Units SubCUTAneous DAILY    insulin lispro (HUMALOG) injection 10 Units  10 Units SubCUTAneous TIDAC    insulin lispro (HUMALOG) injection   SubCUTAneous TIDAC    glucose chewable tablet 16 g  4 Tablet Oral PRN    glucagon (GLUCAGEN) injection 1 mg  1 mg IntraMUSCular PRN    dextrose 10% infusion 0-250 mL  0-250 mL IntraVENous PRN    oseltamivir (TAMIFLU) capsule 30 mg  30 mg Oral DAILY    aspirin delayed-release tablet 81 mg  81 mg Oral DAILY    heparin (porcine) injection 5,000 Units  5,000 Units SubCUTAneous Q12H     Current Outpatient Medications   Medication Sig    escitalopram oxalate (LEXAPRO) 10 mg tablet Take 1 Tablet by mouth daily.  carvediloL (Coreg) 25 mg tablet Take 1 Tablet by mouth two (2) times daily (with meals). Indications: myocardial reinfarction prevention    ferrous sulfate 325 mg (65 mg iron) tablet Take 1 Tablet by mouth daily (with breakfast).  amLODIPine (NORVASC) 10 mg tablet Take 0.5 Tablets by mouth daily.  flash glucose scanning reader (FreeStyle Daniel 14 Day [place]) misc Use to check blood glucose 3 times a day  Indications: Diabetes mellitus    flash glucose sensor (FreeStyle Daniel 14 Day Sensor) kit Used to check blood glucose 3 times a day    cloNIDine HCL (CATAPRES) 0.1 mg tablet Take 1 Tablet by mouth two (2) times a day.  clopidogreL (PLAVIX) 75 mg tab Take 1 Tablet by mouth daily.  melatonin 5 mg cap capsule Take 1 Capsule by mouth as needed (difficulty in sleeping). Reports taking it as needed    Insulin Needles, Disposable, (BD Ultra-Fine Short Pen Needle) 31 gauge x 5/16\" ndle USE WITH INSULIN TWICE DAILY    insulin glargine (Lantus Solostar U-100 Insulin) 100 unit/mL (3 mL) inpn 50 Units by SubCUTAneous route nightly. Indications: type 2 diabetes mellitus    magnesium oxide (MAG-OX) 400 mg tablet Take 1 Tablet by mouth two (2) times a day. (Patient taking differently: Take 400 mg by mouth as needed.)    insulin lispro (HUMALOG) 100 unit/mL kwikpen 10 units before meals  Indications: type 2 diabetes mellitus    Insulin Syringe-Needle U-100 (INSULIN SYRINGE) 1 mL 30 gauge x 5/16 syrg As directed for lantus insulin       Review of Systems:     Complete 10-point review of systems were obtained and discussed in length  with the patient.  Complete review of systems was negative/unremarkable  except as mentioned in HPI section. Data Review:    Labs: Results:       Chemistry Recent Labs     05/15/22  0445 05/14/22  1419   * 345*    141   K 4.0 3.6    110   CO2 22 24   BUN 21* 18   CREA 3.08* 2.95*   CA 7.8* 8.0*   AGAP 8 7   BUCR 7* 6*   * 281*   TP 4.4* 4.9*   ALB 1.4* 1.5*   GLOB 3.0 3.4   AGRAT 0.5* 0.4*      CBC w/Diff Recent Labs     05/15/22  0445 05/14/22  1419   WBC 4.8 7.0   RBC 3.69* 4.01*   HGB 8.2* 9.0*   HCT 26.8* 29.2*    192   GRANS  --  75*   LYMPH  --  15*   EOS  --  2      Coagulation No results for input(s): PTP, INR, APTT, INREXT in the last 72 hours. Iron/Ferritin No results for input(s): IRON in the last 72 hours. No lab exists for component: TIBCCALC   BNP No results for input(s): BNPP in the last 72 hours. Cardiac Enzymes No results for input(s): CPK, CKND1, ROBERTA in the last 72 hours.     No lab exists for component: CKRMB, TROIP   Liver Enzymes Recent Labs     05/15/22  0445   TP 4.4*   ALB 1.4*   *      Thyroid Studies Lab Results   Component Value Date/Time    TSH 1.12 05/17/2021 05:09 PM         EKG:sinus     Physical Assessment:     Visit Vitals  BP (!) 151/77   Pulse (!) 104   Temp 98.1 °F (36.7 °C)   Resp 15   SpO2 100%     Weight change:   No intake or output data in the 24 hours ending 05/15/22 1441  Physical Exam:   General: comfortable, no acute distress   HEENT sclera anicteric, supple neck, no thyromegaly  CVS: S1S2 heard,  no rub  RS: + air entry b/l,   Abd: Soft, Non tender, Not distended, Positive bowel sounds, no organomegaly, no CVA / supra pubic tenderness  Neuro: non focal, awake, alert , CN II-XII are grossly intact  Extrm: +edema, no cyanosis, clubbing   Skin: no visible  Rash  Musculoskeletal: No gross joints or bone deformities     Procedures/imaging: see electronic medical records for all procedures, Xrays and details which were not copied into this note but were reviewed prior to creation of Gloria Brown MD  May 15, 2022  [facility]  Office [phone]

## 2022-05-15 NOTE — ED NOTES
Purposeful rounding completed:     Side rails up x 1:  YES  Bed in low position and wheels locked: YES  Call bell within reach: YES  Comfort addressed: YES    Toileting needs addressed: YES  Plan of care reviewed/updated with patient and or family members: YES  IV site assessed: YES  Pain assessed and addressed: YES, 0  Patient talking with family

## 2022-05-15 NOTE — ED NOTES
Assume care of patient,patient alert and oriented x 4, skin warm and dry. Patient on monitor, SR noted at this time, VSS, patient C/O headache and stated that she just got tylenol, and cough, will check chart to see prn medications for cough, patient waiting for admission bed, patient on droplet precautions for flu.   Purposeful rounding completed:    Side rails up x 1:  YES  Bed in low position and wheels locked: YES  Call bell within reach: YES  Comfort addressed: YES    Toileting needs addressed: YES  Plan of care reviewed/updated with patient and or family members: YES  IV site assessed: YES  Pain assessed and addressed: YES, 0

## 2022-05-16 ENCOUNTER — APPOINTMENT (OUTPATIENT)
Dept: NUCLEAR MEDICINE | Age: 47
DRG: 113 | End: 2022-05-16
Attending: INTERNAL MEDICINE
Payer: MEDICAID

## 2022-05-16 ENCOUNTER — APPOINTMENT (OUTPATIENT)
Dept: NON INVASIVE DIAGNOSTICS | Age: 47
DRG: 113 | End: 2022-05-16
Attending: EMERGENCY MEDICINE
Payer: MEDICAID

## 2022-05-16 ENCOUNTER — APPOINTMENT (OUTPATIENT)
Dept: GENERAL RADIOLOGY | Age: 47
DRG: 113 | End: 2022-05-16
Attending: INTERNAL MEDICINE
Payer: MEDICAID

## 2022-05-16 LAB
ANION GAP SERPL CALC-SCNC: 6 MMOL/L (ref 3–18)
BASOPHILS # BLD: 0 K/UL (ref 0–0.1)
BASOPHILS NFR BLD: 1 % (ref 0–2)
BUN SERPL-MCNC: 23 MG/DL (ref 7–18)
BUN/CREAT SERPL: 7 (ref 12–20)
CALCIUM SERPL-MCNC: 8 MG/DL (ref 8.5–10.1)
CHLORIDE SERPL-SCNC: 111 MMOL/L (ref 100–111)
CO2 SERPL-SCNC: 24 MMOL/L (ref 21–32)
CREAT SERPL-MCNC: 3.11 MG/DL (ref 0.6–1.3)
DIFFERENTIAL METHOD BLD: ABNORMAL
EOSINOPHIL # BLD: 0.1 K/UL (ref 0–0.4)
EOSINOPHIL NFR BLD: 2 % (ref 0–5)
ERYTHROCYTE [DISTWIDTH] IN BLOOD BY AUTOMATED COUNT: 14.3 % (ref 11.6–14.5)
GLUCOSE BLD STRIP.AUTO-MCNC: 101 MG/DL (ref 70–110)
GLUCOSE BLD STRIP.AUTO-MCNC: 117 MG/DL (ref 70–110)
GLUCOSE BLD STRIP.AUTO-MCNC: 165 MG/DL (ref 70–110)
GLUCOSE BLD STRIP.AUTO-MCNC: 220 MG/DL (ref 70–110)
GLUCOSE SERPL-MCNC: 125 MG/DL (ref 74–99)
HCT VFR BLD AUTO: 27.6 % (ref 35–45)
HGB BLD-MCNC: 8.4 G/DL (ref 12–16)
IMM GRANULOCYTES # BLD AUTO: 0 K/UL (ref 0–0.04)
IMM GRANULOCYTES NFR BLD AUTO: 0 % (ref 0–0.5)
LYMPHOCYTES # BLD: 1.3 K/UL (ref 0.9–3.6)
LYMPHOCYTES NFR BLD: 28 % (ref 21–52)
MAGNESIUM SERPL-MCNC: 1.7 MG/DL (ref 1.6–2.6)
MCH RBC QN AUTO: 22.4 PG (ref 24–34)
MCHC RBC AUTO-ENTMCNC: 30.4 G/DL (ref 31–37)
MCV RBC AUTO: 73.6 FL (ref 78–100)
MONOCYTES # BLD: 0.7 K/UL (ref 0.05–1.2)
MONOCYTES NFR BLD: 16 % (ref 3–10)
NEUTS SEG # BLD: 2.5 K/UL (ref 1.8–8)
NEUTS SEG NFR BLD: 53 % (ref 40–73)
NRBC # BLD: 0 K/UL (ref 0–0.01)
NRBC BLD-RTO: 0 PER 100 WBC
PLATELET # BLD AUTO: 168 K/UL (ref 135–420)
PMV BLD AUTO: 11.8 FL (ref 9.2–11.8)
POTASSIUM SERPL-SCNC: 3.6 MMOL/L (ref 3.5–5.5)
RBC # BLD AUTO: 3.75 M/UL (ref 4.2–5.3)
SODIUM SERPL-SCNC: 141 MMOL/L (ref 136–145)
WBC # BLD AUTO: 4.6 K/UL (ref 4.6–13.2)

## 2022-05-16 PROCEDURE — 74011636637 HC RX REV CODE- 636/637: Performed by: EMERGENCY MEDICINE

## 2022-05-16 PROCEDURE — 80048 BASIC METABOLIC PNL TOTAL CA: CPT

## 2022-05-16 PROCEDURE — A9540 TC99M MAA: HCPCS

## 2022-05-16 PROCEDURE — 84156 ASSAY OF PROTEIN URINE: CPT

## 2022-05-16 PROCEDURE — 83735 ASSAY OF MAGNESIUM: CPT

## 2022-05-16 PROCEDURE — 85025 COMPLETE CBC W/AUTO DIFF WBC: CPT

## 2022-05-16 PROCEDURE — 97165 OT EVAL LOW COMPLEX 30 MIN: CPT

## 2022-05-16 PROCEDURE — 71046 X-RAY EXAM CHEST 2 VIEWS: CPT

## 2022-05-16 PROCEDURE — 74011250636 HC RX REV CODE- 250/636: Performed by: INTERNAL MEDICINE

## 2022-05-16 PROCEDURE — 2709999900 HC NON-CHARGEABLE SUPPLY

## 2022-05-16 PROCEDURE — 74011636637 HC RX REV CODE- 636/637: Performed by: INTERNAL MEDICINE

## 2022-05-16 PROCEDURE — 36415 COLL VENOUS BLD VENIPUNCTURE: CPT

## 2022-05-16 PROCEDURE — 74011000250 HC RX REV CODE- 250: Performed by: INTERNAL MEDICINE

## 2022-05-16 PROCEDURE — 84300 ASSAY OF URINE SODIUM: CPT

## 2022-05-16 PROCEDURE — 74011250637 HC RX REV CODE- 250/637: Performed by: EMERGENCY MEDICINE

## 2022-05-16 PROCEDURE — 74011250637 HC RX REV CODE- 250/637: Performed by: INTERNAL MEDICINE

## 2022-05-16 PROCEDURE — 82570 ASSAY OF URINE CREATININE: CPT

## 2022-05-16 PROCEDURE — 82962 GLUCOSE BLOOD TEST: CPT

## 2022-05-16 PROCEDURE — 99232 SBSQ HOSP IP/OBS MODERATE 35: CPT | Performed by: EMERGENCY MEDICINE

## 2022-05-16 PROCEDURE — 65270000046 HC RM TELEMETRY

## 2022-05-16 RX ORDER — HYDRALAZINE HYDROCHLORIDE 20 MG/ML
10 INJECTION INTRAMUSCULAR; INTRAVENOUS
Status: DISCONTINUED | OUTPATIENT
Start: 2022-05-16 | End: 2022-05-17 | Stop reason: HOSPADM

## 2022-05-16 RX ADMIN — AMLODIPINE BESYLATE 5 MG: 5 TABLET ORAL at 10:05

## 2022-05-16 RX ADMIN — CARVEDILOL 25 MG: 25 TABLET, FILM COATED ORAL at 10:04

## 2022-05-16 RX ADMIN — OSELTAMIVIR PHOSPHATE 30 MG: 30 CAPSULE ORAL at 18:33

## 2022-05-16 RX ADMIN — CLONIDINE HYDROCHLORIDE 0.1 MG: 0.1 TABLET ORAL at 18:33

## 2022-05-16 RX ADMIN — INSULIN LISPRO 4 UNITS: 100 INJECTION, SOLUTION INTRAVENOUS; SUBCUTANEOUS at 12:41

## 2022-05-16 RX ADMIN — CARVEDILOL 25 MG: 25 TABLET, FILM COATED ORAL at 18:33

## 2022-05-16 RX ADMIN — CLONIDINE HYDROCHLORIDE 0.1 MG: 0.1 TABLET ORAL at 10:04

## 2022-05-16 RX ADMIN — CLOPIDOGREL BISULFATE 75 MG: 75 TABLET ORAL at 10:04

## 2022-05-16 RX ADMIN — INSULIN LISPRO 2 UNITS: 100 INJECTION, SOLUTION INTRAVENOUS; SUBCUTANEOUS at 17:42

## 2022-05-16 RX ADMIN — ASPIRIN 81 MG: 81 TABLET, COATED ORAL at 10:04

## 2022-05-16 RX ADMIN — HEPARIN SODIUM 5000 UNITS: 5000 INJECTION INTRAVENOUS; SUBCUTANEOUS at 10:05

## 2022-05-16 RX ADMIN — FERROUS SULFATE TAB 325 MG (65 MG ELEMENTAL FE) 325 MG: 325 (65 FE) TAB at 10:04

## 2022-05-16 RX ADMIN — SODIUM CHLORIDE, PRESERVATIVE FREE 10 ML: 5 INJECTION INTRAVENOUS at 16:37

## 2022-05-16 RX ADMIN — HEPARIN SODIUM 5000 UNITS: 5000 INJECTION INTRAVENOUS; SUBCUTANEOUS at 21:22

## 2022-05-16 RX ADMIN — ESCITALOPRAM OXALATE 10 MG: 10 TABLET ORAL at 10:04

## 2022-05-16 RX ADMIN — Medication 30 UNITS: at 10:05

## 2022-05-16 NOTE — PROGRESS NOTES
PT orders received and chart reviewed. Educated patient on role of PT and benefits of mobility. Denies mobility concerns with discharge to home. Reports amb in room; RN Alyssa Estefania confirmed. Formal PT evaluation not indicated. Discontinuing PT orders.

## 2022-05-16 NOTE — PROGRESS NOTES
5/15/2022 20:00- Report received from [facility], [facility], [facility] [place]- patient was just admitted to [address] [place] [address], Room 206.

## 2022-05-16 NOTE — CONSULTS
Ricky Ragland is a 56 y.o. female BLACK/ who is being seen on consult for renal  Failure   Chief Complaint   Patient presents with    Cough    Chest Pain     Admission diagnosis:cought, chest pain   52-year-old female with past medical history of diabetes, coronary artery disease, hypertension presented with cough short of breath, positive for influenza a following for renal failure  Impression & Plan:   IMPRESSION:   Acute on chronic kidney injury, likely prerenal , in setting of poor p.o. intake severe hyperglycemia  CKD stage III with proteinuria Baseline creatinine around 1.6 mg per DL  Cough, positive for influenza A,  unvaccinated host  Hypertension  Heart failure with preserved ejection fraction  Diabetes, uncontrolled  Elevated troponin   PLAN:   She received diuretics in the emergency room. At present no difficulty in oxygenation patient is on room air. I would recommend to hold any further aggressive diuresis. Check for urinary retention  Need better blood sugar control. Avoid IV contrast as possible  Adjust medication per current functional status. Thank you very much for allowing me to participate in the care of this patient. We will continue to monitor with you and make recommendations as needed.       HPI: 52-year-old female with past medical history of diabetes, hypertension, congestive heart failure came to the ER with cough and chest pain. Her work-up in the ER shows no leukocytosis no fever no hypoxia but positive for influenza type A. She is unvaccinated for flu. In the emergency room, she has elevated BNP and troponin. She has severe hyperglycemia and elevated creatinine. She has a known CKD with proteinuria nephrology service consulted for further assistant. During my visit she denies for any discomfort, denies for any change in her urine output. She denies for any use of NSAIDs. I reviewed her chart from CKD clinic.   Past Medical History: Diagnosis Date    CAD (coronary artery disease)     Diabetes ([facility] [address]     HTN (hypertension)     Hyperlipidemia       Past Surgical History:   Procedure Laterality Date    HX CORONARY STENT PLACEMENT      HX GYN             Social History     Socioeconomic History    Marital status:      Spouse name: Not on file    Number of children: Not on file    Years of education: Not on file    Highest education level: Not on file   Occupational History    Not on file   Tobacco Use    Smoking status: Never Smoker    Smokeless tobacco: Never Used   Substance and Sexual Activity    Alcohol use: No    Drug use: No    Sexual activity: Yes     Partners: Male   Other Topics Concern    Not on file   Social History Narrative    Not on file     Social Determinants of Health     Financial Resource Strain:     Difficulty of Paying Living Expenses: Not on file   Food Insecurity:     Worried About Running Out of Food in the Last Year: Not on file    Blade of Food in the Last Year: Not on file   Transportation Needs:     Lack of Transportation (Medical): Not on file    Lack of Transportation (Non-Medical):  Not on file   Physical Activity:     Days of Exercise per Week: Not on file    Minutes of Exercise per Session: Not on file   Stress:     Feeling of Stress : Not on file   Social Connections:     Frequency of Communication with Friends and Family: Not on file    Frequency of Social Gatherings with Friends and Family: Not on file    Attends Congregational Services: Not on file    Active Member of Clubs or Organizations: Not on file    Attends Club or Organization Meetings: Not on file    Marital Status: Not on file   Intimate Partner Violence:     Fear of Current or Ex-Partner: Not on file    Emotionally Abused: Not on file    Physically Abused: Not on file    Sexually Abused: Not on file   Housing Stability:     Unable to Pay for Housing in the Last Year: Not on file    Number of Places Lived in the Last Year: Not on file    Unstable Housing in the Last Year: Not on file       Family History   Problem Relation Age of Onset    Lupus Mother     Cancer Neg Hx     Diabetes Neg Hx     Heart Disease Neg Hx     Hypertension Neg Hx     Heart Attack Neg Hx     Stroke Neg Hx      Allergies   Allergen Reactions    Azithromycin Other (comments)     Rapid response was called for bradycardia and hypotension     Pcn [Penicillins] Hives        Home Medications:     Prior to Admission Medications   Prescriptions Last Dose Informant Patient Reported? Taking? Insulin Needles, Disposable, (BD Ultra-Fine Short Pen Needle) 31 gauge x \" ndle 2022  No Yes   Sig: USE WITH INSULIN TWICE DAILY   Insulin Syringe-Needle U-100 (INSULIN SYRINGE) 1 mL 30 gauge x  syrg 2022  No Yes   Sig: As directed for lantus insulin   amLODIPine (NORVASC) 10 mg tablet 2022  No Yes   Sig: Take 0.5 Tablets by mouth daily. carvediloL (Coreg) 25 mg tablet 2022  No Yes   Sig: Take 1 Tablet by mouth two (2) times daily (with meals). Indications: myocardial reinfarction prevention   cloNIDine HCL (CATAPRES) 0.1 mg tablet 2022  No Yes   Sig: Take 1 Tablet by mouth two (2) times a day. clopidogreL (PLAVIX) 75 mg tab 2022  No Yes   Sig: Take 1 Tablet by mouth daily. escitalopram oxalate (LEXAPRO) 10 mg tablet 2022  No Yes   Sig: Take 1 Tablet by mouth daily. ferrous sulfate 325 mg (65 mg iron) tablet 2022  No Yes   Sig: Take 1 Tablet by mouth daily (with breakfast).    flash glucose scanning reader (FreeStyle Daniel 14 Day [place]) misc 2022  No Yes   Sig: Use to check blood glucose 3 times a day  Indications: Diabetes mellitus   flash glucose sensor (FreeStyle Daniel 14 Day Sensor) kit 2022  No Yes   Sig: Used to check blood glucose 3 times a day   insulin glargine (Lantus Solostar U-100 Insulin) 100 unit/mL (3 mL) inpn 2022  No Yes   Si Units by SubCUTAneous route nightly. Indications: type 2 diabetes mellitus   insulin lispro (HUMALOG) 100 unit/mL kwikpen 5/14/2022  No Yes   Sig: 10 units before meals  Indications: type 2 diabetes mellitus   magnesium oxide (MAG-OX) 400 mg tablet 5/14/2022  No Yes   Sig: Take 1 Tablet by mouth two (2) times a day. Patient taking differently: Take 400 mg by mouth as needed. melatonin 5 mg cap capsule 5/14/2022  No Yes   Sig: Take 1 Capsule by mouth as needed (difficulty in sleeping).  Reports taking it as needed      Facility-Administered Medications: None       Current Facility-Administered Medications   Medication Dose Route Frequency    hydrALAZINE (APRESOLINE) 20 mg/mL injection 10 mg  10 mg IntraVENous Q6H PRN    guaiFENesin-codeine (ROBITUSSIN AC) 100-10 mg/5 mL solution 10 mL  10 mL Oral Q4H PRN    insulin lispro (HUMALOG) injection   SubCUTAneous AC&HS    glucose chewable tablet 16 g  4 Tablet Oral PRN    glucagon (GLUCAGEN) injection 1 mg  1 mg IntraMUSCular PRN    dextrose 10% infusion 0-250 mL  0-250 mL IntraVENous PRN    albuterol-ipratropium (DUO-NEB) 2.5 MG-0.5 MG/3 ML  3 mL Nebulization Q4H PRN    amLODIPine (NORVASC) tablet 5 mg  5 mg Oral DAILY    cloNIDine HCL (CATAPRES) tablet 0.1 mg  0.1 mg Oral BID    carvediloL (COREG) tablet 25 mg  25 mg Oral BID WITH MEALS    clopidogreL (PLAVIX) tablet 75 mg  75 mg Oral DAILY    escitalopram oxalate (LEXAPRO) tablet 10 mg  10 mg Oral DAILY    ferrous sulfate tablet 325 mg  325 mg Oral DAILY WITH BREAKFAST    sodium chloride (NS) flush 5-40 mL  5-40 mL IntraVENous Q8H    sodium chloride (NS) flush 5-40 mL  5-40 mL IntraVENous PRN    acetaminophen (TYLENOL) tablet 650 mg  650 mg Oral Q6H PRN    Or    acetaminophen (TYLENOL) suppository 650 mg  650 mg Rectal Q6H PRN    polyethylene glycol (MIRALAX) packet 17 g  17 g Oral DAILY PRN    ondansetron (ZOFRAN ODT) tablet 4 mg  4 mg Oral Q8H PRN    Or    ondansetron (ZOFRAN) injection 4 mg  4 mg IntraVENous Q6H PRN    insulin glargine (LANTUS) injection 30 Units  30 Units SubCUTAneous DAILY    oseltamivir (TAMIFLU) capsule 30 mg  30 mg Oral DAILY    aspirin delayed-release tablet 81 mg  81 mg Oral DAILY    heparin (porcine) injection 5,000 Units  5,000 Units SubCUTAneous Q12H       Review of Systems:     Complete 10-point review of systems were obtained and discussed in length  with the patient. Complete review of systems was negative/unremarkable  except as mentioned in HPI section. Data Review:    Labs: Results:       Chemistry Recent Labs     05/16/22  0051 05/15/22  0445 05/14/22  1419   * 378* 345*    141 141   K 3.6 4.0 3.6    111 110   CO2 24 22 24   BUN 23* 21* 18   CREA 3.11* 3.08* 2.95*   CA 8.0* 7.8* 8.0*   AGAP 6 8 7   BUCR 7* 7* 6*   AP  --  261* 281*   TP  --  4.4* 4.9*   ALB  --  1.4* 1.5*   GLOB  --  3.0 3.4   AGRAT  --  0.5* 0.4*      CBC w/Diff Recent Labs     05/16/22  0051 05/15/22  0445 05/14/22  1419   WBC 4.6 4.8 7.0   RBC 3.75* 3.69* 4.01*   HGB 8.4* 8.2* 9.0*   HCT 27.6* 26.8* 29.2*    150 192   GRANS 53  --  75*   LYMPH 28  --  15*   EOS 2  --  2      Coagulation No results for input(s): PTP, INR, APTT, INREXT, INREXT in the last 72 hours. Iron/Ferritin No results for input(s): IRON in the last 72 hours. No lab exists for component: TIBCCALC   BNP No results for input(s): BNPP in the last 72 hours. Cardiac Enzymes No results for input(s): CPK, CKND1, ROBERTA in the last 72 hours.     No lab exists for component: CKRMB, TROIP   Liver Enzymes Recent Labs     05/15/22  0445   TP 4.4*   ALB 1.4*   *      Thyroid Studies Lab Results   Component Value Date/Time    TSH 1.12 05/17/2021 05:09 PM         EKG:sinus     Physical Assessment:     Visit Vitals  /89 (BP 1 Location: Right arm, BP Patient Position: At rest)   Pulse 84   Temp 99 °F (37.2 °C)   Resp 18   SpO2 99%     Weight change:     Intake/Output Summary (Last 24 hours) at 5/16/2022 1307  Last data filed at 5/15/2022 2200  Gross per 24 hour   Intake 210 ml   Output --   Net 210 ml     Physical Exam:   General: comfortable, no acute distress   HEENT sclera anicteric, supple neck, no thyromegaly  CVS: S1S2 heard,  no rub  RS: + air entry b/l,   Abd: Soft, Non tender, Not distended, Positive bowel sounds, no organomegaly, no CVA / supra pubic tenderness  Neuro: non focal, awake, alert , CN II-XII are grossly intact  Extrm: +edema, no cyanosis, clubbing   Skin: no visible  Rash  Musculoskeletal: No gross joints or bone deformities     Procedures/imaging: see electronic medical records for all procedures, Xrays and details which were not copied into this note but were reviewed prior to creation of Plan          Ysah Cartagena MD  May 16, 2022  [facility]  Office [phone]

## 2022-05-16 NOTE — DIABETES MGMT
Diabetes/ Glycemic Control Plan of Care    Assessment:   positive for influenza, with h/o uncontrolled DM, CKD stage lll, HTN, HF, CAD, HLD. Consult received - A1c 12.5%  She states compliance with her lantus,but does not always take humalog - \" I just nibble throughout the day\"  Reports FBG in the 120's - has a prescription for CGM - provided education to patient and encouraged her to use this and follow up with PCP. Reviewed her A1c and target of less than 7%. Provided printed materials for BG targets, healthy meal planning and more frequent BG monitoring -   Invited her to attend the Outpatient DM class   Will continue to monitor    Fasting/ Morning blood glucose:   Lab Results   Component Value Date/Time    Glucose 125 (H) 05/16/2022 12:51 AM    Glucose (POC) 220 (H) 05/16/2022 12:18 PM    Glucose,  (H) 12/27/2014 05:54 AM     IV Fluids containing dextrose: none  Steroids: none      Blood glucose values: Within target range (70-180mg/dL): no    Current insulin orders:   lantus 30 units daily  Corrective humalog, normal insulin resistant, 4 times daily    Total Daily Dose previous 24 hours = 55 units   30 units lantus  10 units mealtime humalog  15 units corrective humalog    Current A1c:   Lab Results   Component Value Date/Time    Hemoglobin A1c 12.5 (H) 05/14/2022 02:19 PM    Hemoglobin A1c (POC) 7.9 11/29/2021 12:16 PM      equivalent  to ave Blood Glucose of 312 mg/dl for 2-3 months prior to admission  Adequate glycemic control PTA: no    Nutrition/Diet:   Active Orders   Diet    ADULT DIET Regular; 3 carb choices (45 gm/meal); Low Sodium (2 gm); Low Potassium (Less than 3000 mg/day)      Meal Intake:  No data found. Supplement Intake:  No data found. Home diabetes medications:   Key Antihyperglycemic Medications             insulin glargine (Lantus Solostar U-100 Insulin) 100 unit/mL (3 mL) inpn (Taking) 50 Units by SubCUTAneous route nightly.  Indications: type 2 diabetes mellitus insulin lispro (HUMALOG) 100 unit/mL kwikpen (Taking) 10 units before meals  Indications: type 2 diabetes mellitus        Plan/Goals:   Blood glucose will be within target of 70 - 180 mg/dl within 72 hours  Reinforce dietary and medication compliance at home.        Education:  [x] Refer to Diabetes Education Record                       [] Education not indicated at this time     Scott Payan MPH RN 1 [facility]  Pager [phone]  Office [phone]

## 2022-05-16 NOTE — PROGRESS NOTES
conducted an initial consultation and Spiritual Assessment for Kyaw Kenney, who is a 56 y.o.,female. Patients Primary Language is: [place]. According to the patients EMR Christianity Affiliation is: [facility].     The reason the Patient came to the hospital is:   Patient Active Problem List    Diagnosis Date Noted    Influenza A 05/14/2022    Elevated troponin 05/14/2022    Coronary artery disease of native artery of native heart with stable angina pectoris ([address] 06/29/2021    Foot ulcer, left ([address] 06/24/2021    Osteomyelitis ([address] 05/17/2021    Diabetic foot ulcer ([address] 05/17/2021    FRANCISCA (acute kidney injury) ([address] 05/17/2021    Sepsis ([address] 05/17/2021    Hyponatremia 05/17/2021    Severe obesity ([address] 11/05/2020    Fever 10/20/2020    NSTEMI (non-ST elevated myocardial infarction) ([address] 10/20/2020    Insulin dependent diabetes mellitus 01/11/2019    HTN (hypertension) 01/11/2019    Diabetic foot infection ([address] 12/30/2018    Right foot infection 12/30/2018    Acute pain of right foot 12/30/2018    Acute bronchitis 04/07/2017    Hyperglycemia 04/07/2017        The  provided the following Interventions:  Initiated a relationship of care and support. Chart reviewed. The following outcomes where achieved:  Patient already has a scanned Advance Medical Directive in the chart. See Adv Care Plan. Assessment:  Patient does not have any Synagogue/cultural needs that will affect patients preferences in health care. There are no spiritual or Synagogue issues which require intervention at this time. Plan:  Chaplains will continue to follow and will provide pastoral care on an as needed/requested basis.  recommends bedside caregivers page  on duty if patient shows signs of acute spiritual or emotional distress.     [address]  [phone])

## 2022-05-16 NOTE — PROGRESS NOTES
[facility] Hospitalist Group  Progress Note    Patient: Raine Medel Age: 56 y.o. : 1975 MR#: [id number] SSN: [id number]  Date/Time: 2022    Subjective:     Patient is sitting in bed in no apparent distress, awake and alert. Still has cough and some dyspnea on exertion. Overall feels a little better. Assessment/Plan:     Influenza A  Acute kidney injury on top of chronic kidney disease stage III   Type 2 diabetes poorly controlled  Hypertension  Coronary artery disease  Elevated troponin, follow echo    Plan  Continue Tamiflu-renal dosing  Renal function appears to be a little worse, diuretics on hold  Nephrology is following  Continue aspirin and beta-blocker and Plavix  On amlodipine and clonidine, monitor blood pressure  Lantus, sliding scale insulin  VQ scan is low probability for PE  Discussed with patient  With patient's permission I called her mother at phone #[phone] and updated her.     Disposition-Home with home health care  Anticipated date of discharge is May 17  Discussed with  during IDR    Case discussed with:  [x]Patient  []Family  []Nursing  [x]Case Management  DVT Prophylaxis:  []Lovenox  [x]Hep SQ  []SCDs  []Coumadin   []On Heparin gtt    Objective:   VS:   Visit Vitals  BP (!) 175/103 (BP 1 Location: Right upper arm, BP Patient Position: At rest)   Pulse 86   Temp 98.3 °F (36.8 °C)   Resp 18   SpO2 98%      Tmax/24hrs: Temp (24hrs), Av.5 °F (36.9 °C), Min:98 °F (36.7 °C), Max:99.2 °F (37.3 °C)    Input/Output:     Intake/Output Summary (Last 24 hours) at 2022 1116  Last data filed at 5/15/2022 2200  Gross per 24 hour   Intake 210 ml   Output --   Net 210 ml       General:  Awake, alert  Cardiovascular:  S1S2+, RRR  Pulmonary: Coarse breath sounds bilaterally  GI:  Soft, BS+, NT, ND  Extremities:  trace edema        Labs:    Recent Results (from the past 24 hour(s))   GLUCOSE, POC    Collection Time: 05/15/22 12:57 PM   Result Value Ref Range    Glucose (POC) 149 (H) 70 - 110 mg/dL   GLUCOSE, POC    Collection Time: 05/15/22  5:13 PM   Result Value Ref Range    Glucose (POC) 74 70 - 110 mg/dL   GLUCOSE, POC    Collection Time: 05/15/22  7:01 PM   Result Value Ref Range    Glucose (POC) 141 (H) 70 - 110 mg/dL   GLUCOSE, POC    Collection Time: 05/15/22  9:37 PM   Result Value Ref Range    Glucose (POC) 128 (H) 70 - 110 mg/dL   CBC WITH AUTOMATED DIFF    Collection Time: 05/16/22 12:51 AM   Result Value Ref Range    WBC 4.6 4.6 - 13.2 K/uL    RBC 3.75 (L) 4.20 - 5.30 M/uL    HGB 8.4 (L) 12.0 - 16.0 g/dL    HCT 27.6 (L) 35.0 - 45.0 %    MCV 73.6 (L) 78.0 - 100.0 FL    MCH 22.4 (L) 24.0 - 34.0 PG    MCHC 30.4 (L) 31.0 - 37.0 g/dL    RDW 14.3 11.6 - 14.5 %    PLATELET 552 111 - 305 K/uL    MPV 11.8 9.2 - 11.8 FL    NRBC 0.0 0  WBC    ABSOLUTE NRBC 0.00 0.00 - 0.01 K/uL    NEUTROPHILS 53 40 - 73 %    LYMPHOCYTES 28 21 - 52 %    MONOCYTES 16 (H) 3 - 10 %    EOSINOPHILS 2 0 - 5 %    BASOPHILS 1 0 - 2 %    IMMATURE GRANULOCYTES 0 0.0 - 0.5 %    ABS. NEUTROPHILS 2.5 1.8 - 8.0 K/UL    ABS. LYMPHOCYTES 1.3 0.9 - 3.6 K/UL    ABS. MONOCYTES 0.7 0.05 - 1.2 K/UL    ABS. EOSINOPHILS 0.1 0.0 - 0.4 K/UL    ABS. BASOPHILS 0.0 0.0 - 0.1 K/UL    ABS. IMM.  GRANS. 0.0 0.00 - 0.04 K/UL    DF AUTOMATED     METABOLIC PANEL, BASIC    Collection Time: 05/16/22 12:51 AM   Result Value Ref Range    Sodium 141 136 - 145 mmol/L    Potassium 3.6 3.5 - 5.5 mmol/L    Chloride 111 100 - 111 mmol/L    CO2 24 21 - 32 mmol/L    Anion gap 6 3.0 - 18 mmol/L    Glucose 125 (H) 74 - 99 mg/dL    BUN 23 (H) 7.0 - 18 MG/DL    Creatinine 3.11 (H) 0.6 - 1.3 MG/DL    BUN/Creatinine ratio 7 (L) 12 - 20      GFR est AA 20 (L) >60 ml/min/1.73m2    GFR est non-AA 16 (L) >60 ml/min/1.73m2    Calcium 8.0 (L) 8.5 - 10.1 MG/DL   MAGNESIUM    Collection Time: 05/16/22 12:51 AM   Result Value Ref Range    Magnesium 1.7 1.6 - 2.6 mg/dL   GLUCOSE, POC    Collection Time: 05/16/22  8:05 AM   Result Value Ref Range    Glucose (POC) 117 (H) 70 - 110 mg/dL     Additional Data Reviewed:      Signed By: Ashlie Stevens MD     May 16, 2022

## 2022-05-16 NOTE — PROGRESS NOTES
Problem: Self Care Deficits Care Plan (Adult)  Goal: *Acute Goals and Plan of Care (Insert Text)  Outcome: Resolved/Met       OCCUPATIONAL THERAPY EVALUATION/DISCHARGE    Patient: Josué Mccormick (89 y.o. female)  Date: 2022  Primary Diagnosis: FRANCISCA (acute kidney injury) ([facility] [N17.9]  Elevated troponin [R77.8]  Influenza A [J10.1]  Precautions: Fall,Aspiration  PLOF: Patient was independent with self-care and uses a cane for functional mobility PTA. ASSESSMENT AND RECOMMENDATIONS:  Based on the objective data described below, the patient presents with no deficits that impede pt function with ADLs, functional transfers, and functional mobility in preparation for selfcare tasks. OT to d/c from caseload. Skilled occupational therapy is not indicated at this time.   Discharge Recommendations: Home  Further Equipment Recommendations for Discharge: N/A     SUBJECTIVE:   Patient stated I wear a boot on my Left leg because I needed an amputation from my diabetes    OBJECTIVE DATA SUMMARY:     Past Medical History:   Diagnosis Date    CAD (coronary artery disease)     Diabetes ([facility]     HTN (hypertension)     Hyperlipidemia      Past Surgical History:   Procedure Laterality Date    HX CORONARY STENT PLACEMENT      HX GYN           Barriers to Learning/Limitations: None  Compensate with: visual, verbal, tactile, kinesthetic cues/model    Home Situation:   Home Situation  Home Environment: Private residence  # Steps to Enter: 0  One/Two Story Residence: Two story  # of Interior Steps: 14  Living Alone: No  Support Systems: Spouse/Significant Other  Patient Expects to be Discharged to[de-identified] Home  Current DME Used/Available at Home: Cane, straight  Tub or Shower Type: Tub/Shower combination  []     Right hand dominant   []     Left hand dominant    Cognitive/Behavioral Status:  Neurologic State: Alert  Orientation Level: Oriented X4  Cognition: Follows commands  Safety/Judgement: Awareness of environment    Skin: Intact  Edema: None noted    Vision/Perceptual:    Acuity: Within Defined Limits      Coordination: BUE  Fine Motor Skills-Upper: Left Intact; Right Intact    Gross Motor Skills-Upper: Left Intact; Right Intact    Balance:  Sitting: Intact  Standing: Intact    Strength: BUE  Strength: Within functional limits     Tone & Sensation: BUE  Tone: Normal  Sensation: Intact     Range of Motion: BUE  AROM: Within functional limits      Functional Mobility and Transfers for ADLs:  Bed Mobility:  Supine to Sit: Independent  Sit to Supine: Independent  Scooting: Independent    Transfers:  Sit to Stand: Independent  Stand to Sit: Independent    ADL Assessment:  Feeding: Independent    Oral Facial Hygiene/Grooming: Independent    Bathing: Independent    Upper Body Dressing: Independent    Lower Body Dressing: Independent    Toileting: Independent    ADL Intervention:  Lower Body Dressing Assistance  Dressing Assistance: Independent  Socks: Independent  Leg Crossed Method Used: Yes    Cognitive Retraining  Safety/Judgement: Awareness of environment    Pain:  Pain level pre-treatment: 0/10   Pain level post-treatment: 0/10   Pain Intervention(s): Medication (see MAR); Rest, Ice, Repositioning   Response to intervention: Nurse notified, See doc flow    Activity Tolerance:   Good    Please refer to the flowsheet for vital signs taken during this treatment. After treatment:   []  Patient left in no apparent distress sitting up in chair  [x]  Patient left in no apparent distress in bed  [x]  Call bell left within reach  [x]  Nursing notified  []  Caregiver present  []  Bed alarm activated    COMMUNICATION/EDUCATION:   [x]      Role of Occupational Therapy in the acute care setting  [x]      Home safety education was provided and the patient/caregiver indicated understanding. [x]      Patient/family have participated as able and agree with findings and recommendations.   []      Patient is unable to participate in plan of care at this time. Thank you for this referral.  Anuja Elizabeth, OTR/L  Time Calculation: 8 mins      Eval Complexity: History: MEDIUM Complexity : Expanded review of history including physical, cognitive and psychosocial  history ; Examination: LOW Complexity : 1-3 performance deficits relating to physical, cognitive , or psychosocial skils that result in activity limitations and / or participation restrictions ;    Decision Making:LOW Complexity : No comorbidities that affect functional and no verbal or physical assistance needed to complete eval tasks

## 2022-05-16 NOTE — PROGRESS NOTES
Physician Progress Note      PATIENT:               Tony Lima  CSN #:                  [id number]  :                       1975  ADMIT DATE:       2022 1:33 PM  [address] [place] DATE:  RESPONDING  PROVIDER #:        Ruth Ann Hassan MD          QUERY TEXT:    Dear Hospitalist  Pt admitted with Influenza A . Noted documentation of  Acute on chronic kidney injury, likely prerenal ATN in setting of poor p.o. intake severe hyperglycemia by   renal  consultant. If possible, please document in progress notes and discharge summary:      The medical record reflects the following:  Risk Factors: poorly controlled  diabetes;  CKD  stage 3  Clinical Indicators: creat  on  admit 2.95;  per renal consult - CKD stage III with proteinuria Baseline creatinine around 1.6 mg per DL  creat   3.08  and  3.11  since admit  Treatment: hold any further aggressive diuresis. ; Needs  better blood sugar control. Thank you,   [email or web address]  Options provided:  -- FRANCISCA likely   ATN  confirmed present on admission  -- FRANCISCA  likely   ATN  ruled out  -- FRANCISCA  without ATN  -- Defer to renal  consultant documentation regarding FRANCISCA likely  ATN  -- Other - I will add my own diagnosis  -- Disagree - Not applicable / Not valid  -- Disagree - Clinically unable to determine / Unknown  -- Refer to Clinical Documentation Reviewer    PROVIDER RESPONSE TEXT:    The diagnosis of FRANCISCA likely ATN was confirmed as present on admission.     Query created by: Hali Romano on 2022 10:35 AM      Electronically signed by:  Ruth Ann Hassan MD 2022 4:31 PM

## 2022-05-16 NOTE — PROGRESS NOTES
8375 - Received beside shift report  From Pau Pagan RN Report included the following information SBAR, Kardex, Intake/Output, MAR and Recent Results.        4599 - patient off unit for chest xray

## 2022-05-16 NOTE — DIABETES MGMT
Diabetes Patient/Family Education Record    Factors That May Influence Patients Ability to Learn or Comply with Recommendations   []   Language barrier    []   Cultural needs   [x]   Motivation    []   Cognitive limitation    []   Physical   [x]   Education    []   Physiological factors   []   Hearing/vision/speaking impairment   []   Zoroastrianism beliefs    []   Financial factors   []  Other:   []  No factors identified at this time. Person Instructed:   [x]   Patient   []   Family   []  Other     Preference for Learning:   [x]   Verbal   [x]   Written - healthy meal planning guide, A1c and BG targets    []  Demonstration     Level of Comprehension & Competence:    []  Good                                      [x] Fair                                     []  Poor                             []  Needs Reinforcement   [x]  Teach back completed    Education Component: See DM note. [x]  Medication management - She states compliance with lantus nightly. Takes mealtime humalog sporadically. [x]  Nutritional management - [x] Obtained usual meal pattern   []   Basic carbohydrate counting  [x]  Plate method  []  Limit concentrated sweets and avoid sweetened beverages  []  Portion control  []    Avoid skipping meals  Does not consume proper meals - states she 'nibbles' throughout the day and does not take humalog as prescribed. Provided healthy meal planning guide and encouraged her to consume 2 meals daily and avoid snacks. []  Exercise   [x]  Signs, symptoms, and treatment of hyperglycemia and hypoglycemia   [x] Prevention, recognition and treatment of hyperglycemia and hypoglycemia   [x]  Importance of blood glucose monitoring  [x] Blood Glucose targets   []   Provided patient with blood glucose meter  [x]  Has glucometer and supplies at home  she reports FBG in the 120's.    She has not been monitoring post-prandial BG - encouraged her to obtain the CGM that has been prescribed and monitor more frequently. Provided printed materials for targets    []  Instruction on use of the blood glucose meter and recommended monitoring schedule   [x]  Discuss the importance of HbA1C monitoring. Patients A1c is 12.5 %.  This is equivalent to average glucose of   312 mg/dl for the past 2-3 months.   []  Sick day guidelines   []  Proper use and disposal of lancets, needles, syringes or insulin pens (if appropriate)   [x]  Potential long-term complications (retinopathy, kidney disease, neuropathy, foot care)   [x] Information about whom to contact in case of emergency or for more information  - follows with Dr. Brant Chavis   []  Goal:  Patient/family will demonstrate understanding of Diabetes Self- Management Skills by: (date) _______  Plan for post-discharge education or self-management support:    [x] Outpatient class schedule provided            [] Patient Declined    [] Scheduled for outpatient classes (date) _______    [x] Written information provided  Verify: [x] Prior to admission Diabetes medications    Does patient understand how diabetes medications work? ___reviewed with patient ________________________  Does patient have difficulty obtaining diabetes medications or testing supplies? ____has prescription for CGM - discussed with patient  ____    Coty Romero MPH RN 1 [facility]  Pager [phone]  Office [phone]

## 2022-05-17 ENCOUNTER — APPOINTMENT (OUTPATIENT)
Dept: ULTRASOUND IMAGING | Age: 47
DRG: 113 | End: 2022-05-17
Attending: INTERNAL MEDICINE
Payer: MEDICAID

## 2022-05-17 ENCOUNTER — APPOINTMENT (OUTPATIENT)
Dept: NON INVASIVE DIAGNOSTICS | Age: 47
DRG: 113 | End: 2022-05-17
Attending: EMERGENCY MEDICINE
Payer: MEDICAID

## 2022-05-17 VITALS
OXYGEN SATURATION: 100 % | WEIGHT: 209 LBS | DIASTOLIC BLOOD PRESSURE: 93 MMHG | HEART RATE: 80 BPM | SYSTOLIC BLOOD PRESSURE: 137 MMHG | BODY MASS INDEX: 33.59 KG/M2 | HEIGHT: 66 IN | RESPIRATION RATE: 18 BRPM | TEMPERATURE: 97.3 F

## 2022-05-17 LAB
ANION GAP SERPL CALC-SCNC: 5 MMOL/L (ref 3–18)
BASOPHILS # BLD: 0 K/UL (ref 0–0.1)
BASOPHILS NFR BLD: 0 % (ref 0–2)
BUN SERPL-MCNC: 23 MG/DL (ref 7–18)
BUN/CREAT SERPL: 8 (ref 12–20)
CALCIUM SERPL-MCNC: 8.2 MG/DL (ref 8.5–10.1)
CHLORIDE SERPL-SCNC: 110 MMOL/L (ref 100–111)
CO2 SERPL-SCNC: 25 MMOL/L (ref 21–32)
CREAT SERPL-MCNC: 2.98 MG/DL (ref 0.6–1.3)
CREAT UR-MCNC: 147 MG/DL (ref 30–125)
DIFFERENTIAL METHOD BLD: ABNORMAL
ECHO AO ROOT DIAM: 2.9 CM
ECHO AO ROOT INDEX: 1.42 CM/M2
ECHO LA VOL 2C: 44 ML (ref 22–52)
ECHO LA VOL 4C: 39 ML (ref 22–52)
ECHO LA VOLUME AREA LENGTH: 45 ML
ECHO LA VOLUME INDEX A2C: 22 ML/M2 (ref 16–34)
ECHO LA VOLUME INDEX A4C: 19 ML/M2 (ref 16–34)
ECHO LA VOLUME INDEX AREA LENGTH: 22 ML/M2 (ref 16–34)
ECHO LV E' LATERAL VELOCITY: 4 CM/S
ECHO LV E' SEPTAL VELOCITY: 5 CM/S
ECHO LV FRACTIONAL SHORTENING: 41 % (ref 28–44)
ECHO LV INTERNAL DIMENSION DIASTOLE INDEX: 2.4 CM/M2
ECHO LV INTERNAL DIMENSION DIASTOLIC: 4.9 CM (ref 3.9–5.3)
ECHO LV INTERNAL DIMENSION SYSTOLIC INDEX: 1.42 CM/M2
ECHO LV INTERNAL DIMENSION SYSTOLIC: 2.9 CM
ECHO LV IVSD: 1.2 CM (ref 0.6–0.9)
ECHO LV MASS 2D: 239.9 G (ref 67–162)
ECHO LV MASS INDEX 2D: 117.6 G/M2 (ref 43–95)
ECHO LV POSTERIOR WALL DIASTOLIC: 1.3 CM (ref 0.6–0.9)
ECHO LV RELATIVE WALL THICKNESS RATIO: 0.53
ECHO LVOT AREA: 3.8 CM2
ECHO LVOT DIAM: 2.2 CM
ECHO LVOT MEAN GRADIENT: 2 MMHG
ECHO LVOT PEAK GRADIENT: 4 MMHG
ECHO LVOT PEAK VELOCITY: 1 M/S
ECHO LVOT STROKE VOLUME INDEX: 30.7 ML/M2
ECHO LVOT SV: 62.7 ML
ECHO LVOT VTI: 16.5 CM
ECHO MV A VELOCITY: 0.75 M/S
ECHO MV E DECELERATION TIME (DT): 156 MS
ECHO MV E VELOCITY: 0.69 M/S
ECHO MV E/A RATIO: 0.92
ECHO MV E/E' LATERAL: 17.25
ECHO MV E/E' RATIO (AVERAGED): 15.53
ECHO MV E/E' SEPTAL: 13.8
ECHO RV FREE WALL PEAK S': 11 CM/S
ECHO RV TAPSE: 2.2 CM (ref 1.7–?)
EOSINOPHIL # BLD: 0.1 K/UL (ref 0–0.4)
EOSINOPHIL NFR BLD: 2 % (ref 0–5)
ERYTHROCYTE [DISTWIDTH] IN BLOOD BY AUTOMATED COUNT: 14.2 % (ref 11.6–14.5)
GLUCOSE BLD STRIP.AUTO-MCNC: 72 MG/DL (ref 70–110)
GLUCOSE BLD STRIP.AUTO-MCNC: 94 MG/DL (ref 70–110)
GLUCOSE SERPL-MCNC: 74 MG/DL (ref 74–99)
HCT VFR BLD AUTO: 27.8 % (ref 35–45)
HGB BLD-MCNC: 8.4 G/DL (ref 12–16)
IMM GRANULOCYTES # BLD AUTO: 0 K/UL (ref 0–0.04)
IMM GRANULOCYTES NFR BLD AUTO: 0 % (ref 0–0.5)
KAPPA LC FREE SER-MCNC: 105.5 MG/L (ref 3.3–19.4)
KAPPA LC FREE/LAMBDA FREE SER: 2.14 {RATIO} (ref 0.26–1.65)
LAMBDA LC FREE SERPL-MCNC: 49.2 MG/L (ref 5.7–26.3)
LYMPHOCYTES # BLD: 2.2 K/UL (ref 0.9–3.6)
LYMPHOCYTES NFR BLD: 45 % (ref 21–52)
MAGNESIUM SERPL-MCNC: 1.8 MG/DL (ref 1.6–2.6)
MCH RBC QN AUTO: 22.3 PG (ref 24–34)
MCHC RBC AUTO-ENTMCNC: 30.2 G/DL (ref 31–37)
MCV RBC AUTO: 73.9 FL (ref 78–100)
MONOCYTES # BLD: 0.5 K/UL (ref 0.05–1.2)
MONOCYTES NFR BLD: 10 % (ref 3–10)
NEUTS SEG # BLD: 2.1 K/UL (ref 1.8–8)
NEUTS SEG NFR BLD: 42 % (ref 40–73)
NRBC # BLD: 0 K/UL (ref 0–0.01)
NRBC BLD-RTO: 0 PER 100 WBC
PLATELET # BLD AUTO: 170 K/UL (ref 135–420)
PMV BLD AUTO: 9.9 FL (ref 9.2–11.8)
POTASSIUM SERPL-SCNC: 3.4 MMOL/L (ref 3.5–5.5)
PROT UR-MCNC: 1050 MG/DL
RBC # BLD AUTO: 3.76 M/UL (ref 4.2–5.3)
SODIUM SERPL-SCNC: 140 MMOL/L (ref 136–145)
SODIUM UR-SCNC: 34 MMOL/L (ref 20–110)
WBC # BLD AUTO: 4.9 K/UL (ref 4.6–13.2)

## 2022-05-17 PROCEDURE — 74011250636 HC RX REV CODE- 250/636: Performed by: INTERNAL MEDICINE

## 2022-05-17 PROCEDURE — 74011000250 HC RX REV CODE- 250: Performed by: INTERNAL MEDICINE

## 2022-05-17 PROCEDURE — 74011250637 HC RX REV CODE- 250/637: Performed by: INTERNAL MEDICINE

## 2022-05-17 PROCEDURE — 76770 US EXAM ABDO BACK WALL COMP: CPT

## 2022-05-17 PROCEDURE — 82962 GLUCOSE BLOOD TEST: CPT

## 2022-05-17 PROCEDURE — 83735 ASSAY OF MAGNESIUM: CPT

## 2022-05-17 PROCEDURE — 93306 TTE W/DOPPLER COMPLETE: CPT

## 2022-05-17 PROCEDURE — 85025 COMPLETE CBC W/AUTO DIFF WBC: CPT

## 2022-05-17 PROCEDURE — 80048 BASIC METABOLIC PNL TOTAL CA: CPT

## 2022-05-17 PROCEDURE — 74011636637 HC RX REV CODE- 636/637: Performed by: INTERNAL MEDICINE

## 2022-05-17 PROCEDURE — 99239 HOSP IP/OBS DSCHRG MGMT >30: CPT | Performed by: HOSPITALIST

## 2022-05-17 PROCEDURE — 36415 COLL VENOUS BLD VENIPUNCTURE: CPT

## 2022-05-17 PROCEDURE — 74011250637 HC RX REV CODE- 250/637: Performed by: EMERGENCY MEDICINE

## 2022-05-17 PROCEDURE — 93306 TTE W/DOPPLER COMPLETE: CPT | Performed by: INTERNAL MEDICINE

## 2022-05-17 PROCEDURE — 2709999900 HC NON-CHARGEABLE SUPPLY

## 2022-05-17 PROCEDURE — 74011250637 HC RX REV CODE- 250/637: Performed by: HOSPITALIST

## 2022-05-17 RX ORDER — INSULIN GLARGINE 100 [IU]/ML
30 INJECTION, SOLUTION SUBCUTANEOUS DAILY
Qty: 5 PEN | Refills: 0 | Status: SHIPPED | OUTPATIENT
Start: 2022-05-17

## 2022-05-17 RX ORDER — POTASSIUM CHLORIDE 20 MEQ/1
20 TABLET, EXTENDED RELEASE ORAL
Status: COMPLETED | OUTPATIENT
Start: 2022-05-17 | End: 2022-05-17

## 2022-05-17 RX ORDER — AMLODIPINE BESYLATE 10 MG/1
10 TABLET ORAL DAILY
Qty: 30 TABLET | Refills: 0 | Status: SHIPPED | OUTPATIENT
Start: 2022-05-17 | End: 2022-07-28 | Stop reason: ALTCHOICE

## 2022-05-17 RX ADMIN — SODIUM CHLORIDE, PRESERVATIVE FREE 10 ML: 5 INJECTION INTRAVENOUS at 06:09

## 2022-05-17 RX ADMIN — Medication 30 UNITS: at 07:55

## 2022-05-17 RX ADMIN — CLOPIDOGREL BISULFATE 75 MG: 75 TABLET ORAL at 08:46

## 2022-05-17 RX ADMIN — POTASSIUM CHLORIDE 20 MEQ: 1500 TABLET, EXTENDED RELEASE ORAL at 13:16

## 2022-05-17 RX ADMIN — ASPIRIN 81 MG: 81 TABLET, COATED ORAL at 08:46

## 2022-05-17 RX ADMIN — SODIUM CHLORIDE, PRESERVATIVE FREE 10 ML: 5 INJECTION INTRAVENOUS at 12:16

## 2022-05-17 RX ADMIN — HEPARIN SODIUM 5000 UNITS: 5000 INJECTION INTRAVENOUS; SUBCUTANEOUS at 08:46

## 2022-05-17 RX ADMIN — ESCITALOPRAM OXALATE 10 MG: 10 TABLET ORAL at 08:46

## 2022-05-17 RX ADMIN — FERROUS SULFATE TAB 325 MG (65 MG ELEMENTAL FE) 325 MG: 325 (65 FE) TAB at 08:46

## 2022-05-17 RX ADMIN — AMLODIPINE BESYLATE 5 MG: 5 TABLET ORAL at 08:46

## 2022-05-17 RX ADMIN — CLONIDINE HYDROCHLORIDE 0.1 MG: 0.1 TABLET ORAL at 08:46

## 2022-05-17 RX ADMIN — CARVEDILOL 25 MG: 25 TABLET, FILM COATED ORAL at 08:46

## 2022-05-17 NOTE — ROUTINE PROCESS
0600 Patient refuse am labs. Bedside and Verbal shift change report given to [address] (oncoming nurse) by Jennifer Fox (offgoing nurse). Report included the following information SBAR, Kardex, MAR, Recent Results and Cardiac Rhythm SR. Patient quietly resting, call light in reach.

## 2022-05-17 NOTE — PROGRESS NOTES
Discharge/Transition Planning    Patient currently does not qualify for home health. PT/OT signed off as has no skilled need.    Patient must be home bound for Home Health

## 2022-05-17 NOTE — DISCHARGE INSTRUCTIONS
Patient Education        Home Blood Pressure Test: About This Test  What is it? A home blood pressure test allows you to keep track of your blood pressure at home. Blood pressure is a measure of the force of blood against the walls of your arteries. Blood pressure readings include two numbers, such as 130/80 (say \"130 over 80\"). The first number is the systolic pressure. The second number is the diastolic pressure. Why is this test done? You may do this test at home to:  · Find out if you have high blood pressure. · Track your blood pressure if you have high blood pressure. · Track how well medicine is working to reduce high blood pressure. · Check how lifestyle changes, such as weight loss and exercise, are affecting blood pressure. How do you prepare for the test?  For at least 30 minutes before you take your blood pressure, don't exercise, drink caffeine, or smoke. Empty your bladder before the test. Sit quietly with your back straight and both feet on the floor for at least 5 minutes. This helps you take your blood pressure while you feel comfortable and relaxed. How is the test done? · If your doctor recommends it, take your blood pressure twice a day. Take it in the morning and evening. · Sit with your arm slightly bent and resting on a table so that your upper arm is at the same level as your heart. · Use the same arm each time you take your blood pressure. · Place the blood pressure cuff on the bare skin of your upper arm. You may have to roll up your sleeve, remove your arm from the sleeve, or take your shirt off. · Wrap the blood pressure cuff around your upper arm so that the lower edge of the cuff is about 1 inch above the bend of your elbow. · Do not move, talk, or text while you take your blood pressure. Follow the instructions that came with your blood pressure monitor. They might be different from the following. · Press the on/off button on the automatic monitor.  Then you may need to wait until the screen says the monitor is ready. · Press the start button. The cuff will inflate and deflate by itself. · Your blood pressure numbers will appear on the screen. · Wait one minute and take your blood pressure again. · If your monitor does not automatically save your numbers, write them in your log book, along with the date and time. Follow-up care is a key part of your treatment and safety. Be sure to make and go to all appointments, and call your doctor if you are having problems. It's also a good idea to keep a list of the medicines you take. Where can you learn more? Go to http://www.[employer]iz[employer].com/  Enter C427 in the search box to learn more about \"Home Blood Pressure Test: About This Test.\"  Current as of: January 10, 2022               Content Version: 13.2  © 8698-8511 [employer]. Care instructions adapted under license by [employer] (which disclaims liability or warranty for this information). If you have questions about a medical condition or this instruction, always ask your healthcare professional. [place] [address] any warranty or liability for your use of this information. DISCHARGE SUMMARY from Nurse    PATIENT INSTRUCTIONS:    After general anesthesia or intravenous sedation, for 24 hours or while taking prescription Narcotics:  · Limit your activities  · Do not drive and operate hazardous machinery  · Do not make important personal or business decisions  · Do  not drink alcoholic beverages  · If you have not urinated within 8 hours after discharge, please contact your surgeon on call.     Report the following to your surgeon:  · Excessive pain, swelling, redness or odor of or around the surgical area  · Temperature over 100.5  · Nausea and vomiting lasting longer than 4 hours or if unable to take medications  · Any signs of decreased circulation or nerve impairment to extremity: change in color, persistent  numbness, tingling, coldness or increase pain  · Any questions    What to do at Home:  Recommended activity: Activity as tolerated, rest as needed     If you experience any of the following symptoms Dizziness, Fainting, Lots of Sweating, Chest pain, shortness of breath  please follow up with Primary Care Provider or go to the nearest Emergency Room. *  Please give a list of your current medications to your Primary Care Provider. *  Please update this list whenever your medications are discontinued, doses are      changed, or new medications (including over-the-counter products) are added. *  Please carry medication information at all times in case of emergency situations. These are general instructions for a healthy lifestyle:    No smoking/ No tobacco products/ Avoid exposure to second hand smoke  Surgeon General's Warning:  Quitting smoking now greatly reduces serious risk to your health. Obesity, smoking, and sedentary lifestyle greatly increases your risk for illness    A healthy diet, regular physical exercise & weight monitoring are important for maintaining a healthy lifestyle    You may be retaining fluid if you have a history of heart failure or if you experience any of the following symptoms:  Weight gain of 3 pounds or more overnight or 5 pounds in a week, increased swelling in our hands or feet or shortness of breath while lying flat in bed. Please call your doctor as soon as you notice any of these symptoms; do not wait until your next office visit. The discharge information has been reviewed with the patient. The patient verbalized understanding. Discharge medications reviewed with the patient and appropriate educational materials and side effects teaching were provided.   ___Patient armband removed and shredded  ________________________________________________________________________________________________________________________________

## 2022-05-19 LAB
ALBUMIN SERPL ELPH-MCNC: 1.8 G/DL (ref 2.9–4.4)
ALBUMIN/GLOB SERPL: 0.7 {RATIO} (ref 0.7–1.7)
ALPHA1 GLOB SERPL ELPH-MCNC: 0.2 G/DL (ref 0–0.4)
ALPHA2 GLOB SERPL ELPH-MCNC: 1 G/DL (ref 0.4–1)
B-GLOBULIN SERPL ELPH-MCNC: 0.9 G/DL (ref 0.7–1.3)
GAMMA GLOB SERPL ELPH-MCNC: 0.5 G/DL (ref 0.4–1.8)
GLOBULIN SER-MCNC: 2.6 G/DL (ref 2.2–3.9)
IGA SERPL-MCNC: 372 MG/DL (ref 87–352)
IGG SERPL-MCNC: 567 MG/DL (ref 586–1602)
IGM SERPL-MCNC: 96 MG/DL (ref 26–217)
INTERPRETATION SERPL IEP-IMP: ABNORMAL
M PROTEIN SERPL ELPH-MCNC: ABNORMAL G/DL
PROT SERPL-MCNC: 4.4 G/DL (ref 6–8.5)

## 2022-05-20 NOTE — PROGRESS NOTES
ICD-10-CM ICD-9-CM    1. Type 2 diabetes mellitus with complication (HCC)  O47.4 250.90    2. Essential hypertension  I10 401.9    3. Hyperlipidemia, unspecified hyperlipidemia type  E78.5 272.4    4. Encounter for immunization  Z23 V03.89    5. Severe obesity ([facility]  E66.01 278.01    6. Screening for colon cancer  Z12.11 V76.51    7. Coronary artery disease of native artery of native heart with stable angina pectoris ([facility]  I25.118 414.01      413.9    8. Left sided numbness  R20.0 782.0          Assessment and plan          Hypertension not controlled. Lisinopril added  Left sided numbness, CT ordered present for 3 days. CT scan of the head ordered. Eval for possible thalamic CVA. Diabetes mellitus, insulin-dependent dependent. Not controlled. Plan try to add Rybelsus if covered. Recheck labs before next visit. We will also try to get the freestyle regina to meter. Elevated cholesterol, not controlled. Recheck labs before next visit and then add medication. Elevated cholesterol, follow up labs,consider med adjustments. Reschedule for colonoscopy. Reschedule for mammography. Lab results and schedule of future lab studies reviewed with patient  Diagnostic and radiologic results and the schedule of future studies were reviewed with the patient  All questions answered and understood. Health Maintenance Due   Topic Date Due    Hepatitis C Screening  Never done    Pneumococcal 0-64 years (1 - PCV) Never done    DTaP/Tdap/Td series (1 - Tdap) Never done    Cervical cancer screen  Never done    Colorectal Cancer Screening Combo  Never done    COVID-19 Vaccine (3 - Booster for Pfizer series) 09/04/2021    Lipid Screen  09/28/2021    MICROALBUMIN Q1  05/18/2022     Referred to GI 8/2021    Subjective:   Mert Collins is a 56 y.o. female . Cardiology follow-up 1/27/2022. Follow-up for non-STEMI, s/p PCI with BLANCA in the right coronary artery. Noted to be depressed and not exercising regularly.   On Lexapro now 10 mg daily. [place] to be [place] Heart Association class II-III. Blood pressure was elevated and Coreg was increased. Plans to restart statins. Previously had elevated liver function tests. Admission 5 4/3 to 5/17/2022. Presented with cough, fatigue. COVID-negative. Assessment influenza. Acute on chronic renal disease and anemia. Started on Tamiflu 30 mg twice a day. Seen previously by our practice on 12/15/2021  White count 4900. Hemoglobin 8.4. MCV 73.9. A chronic anemia. Sodium 140, potassium 3.4, creatinine 2.98 GFR estimated 21  Hemoglobin A1c 12.5. Echocardiogram normal left ventricular systolic function with a visually estimated ejection fraction of 55 to 60%. Increased wall thickness findings consistent with mild concentric hypertrophy. Diastolic dysfunction trivial pericardial effusion. The last visit was 11/29/2021  Essential hypertension, not quite at control. Catapres recently added. We will increase the amlodipine to 10 mg daily and follow. Type 2 diabetes mellitus with complications, presently on an insulin pump. Hemoglobin A1c improving down to 7.9 down from 9 range. Continue present regimen. Will write prescription for freestyle regina. Unhealthy weight actually increasing somewhat. Discussed diet and exercise. Anemia normochromic normocytic. Last TIBC in June 1, 1945 and iron was within normal range. Saturation was 38 and ferritin was high at 413. B12 and folate were within normal limits. The edema is most consistent with chronic disease and may be from her renal insufficiency and diabetes. Follow. Hyperlipidemia uncontrolled discussed diet and exercise follow-up labs before next visit. Office visit 10/22/2021 with cardiology for follow-up of non-ST elevation MI acute coronary syndrome s/p PCI with BLANCA in right coronary artery. Her condition was felt to be stable at the time with persistent New [place] Heart Association class III symptoms.   Diet was discussed and blood pressure control was also discussed. Clonidine was added with instruction to follow the home chart as diuretics were discontinued due to renal insufficiency by nephrology. She is presenting today with a support stocking and boot on the left foot because of edema. The ulcers have healed. The last visit was 10/13/2021  Essential hypertension question control. Diuretic has been stopped because of possible over diuresis. Follow in person in 30 days. Diabetes mellitus type 2. Presently only on long and short acting insulin, not on short acting insulin, with a view towards starting an insulin pump in 6 days. Follow. Hyperlipidemia. Uncontrolled in 1 year prior. Follow-up labs ordered which she will have done at the hospital.  History of NSTEMI. No symptoms. History of anemia follow-up ordered. Osteomyelitis of the left foot. Followed by podiatry. MRI which showed possible bone involvement reviewed. We will recheck in person. Not presently on an antibiotic. Follow-up along with the podiatrist.  Difficulty sleeping continue the melatonin. Previously seen for hypertension, borderline control. If elevated on next visit would adjust medications. Diabetes mellitus type 2 improving significantly. Weight was also decreasing. Follow-up labs. Hyperlipidemia improving, follow-up labs ordered in 1 month. The left foot was previously healing well. Follow along with the podiatry specialist and home health at the time. Screening for mammogram ordered. Last one that I can see was in 2019. She was to be set up for screening for colon cancer. Anemia. Continue treatment. History of coronary artery disease, s/p stenting. No symptoms. Follow along with the specialist.  reviewed diet, exercise and weight control  Seen 5/26/2021 post hospitalization for foot ulcer. Seen 7/2/2021 by cardiology.   They reviewed that her echocardiogram from 10/19/2020 showing an ejection fraction of 55 to 60% with normal cavity size wall thickness and systolic function. The patient had a cardiac procedure showing 100% mid RCA occlusion on 10/23/2020. She was s/p aspiration thrombectomy followed by PTCA/stent. She had a normal LV function by echo. A Holter on 12/28/2020 showed normal sinus rhythm with no dysrhythmia is noted. The patient was felt to be relatively stable from a cardiovascular standpoint. She was to use Lasix as needed and the electrolytes were going to be followed. On 6/30/2021 she was followed by nephrology. Patient was admitted 6/24/2021 and discharged 6/30/2021 for the recurrent syncope acute on chronic anemia diabetic left foot ulcer s/p left fourth and fifth toe amputation and wound VAC placement. She also had fungemia, hypokalemia, A1c of 9.6, hypertension, unhealthy weight with a BMI of 30.2 and the stent placement mentioned. Seen in ED 9/21/2021 but left prior to treatment. Office visit nephrology 10/13/2021  The assessment was acute on chronic kidney injury stage IIIa possible overdiuresis versus worsening of diabetic nephropathy. She was also noted to have uncontrolled diabetes, hypertension, proteinuria and history of coronary artery disease s/p NSTEMI in October 2020. The plan was a low-sodium diet and tight control of blood sugar. The patient is working with her endocrinologist for an insulin pump. She was asked to stop her ARB diuretics and SGLT2 inhibitors if she can. Left numbness  Three day ago. During the morning. Tried to step and was numb and fell  NO change since , no change in meds. Hypertension  The patient has had no problem with the medication. The patient has no headaches, visual changes, chest pain or pressure,dyspnea, orthopnea, abdominal pain, dysuria, weakness, or paresthesias.   BP Readings from Last 3 Encounters:   05/23/22 (!) 153/94   05/17/22 (!) 137/93   01/27/22 (!) 152/87       Lab Results   Component Value Date/Time    Sodium 140 05/17/2022 10:19 AM    Potassium 3.4 (L) 05/17/2022 10:19 AM    Chloride 110 05/17/2022 10:19 AM    CO2 25 05/17/2022 10:19 AM    Anion gap 5 05/17/2022 10:19 AM    Glucose 74 05/17/2022 10:19 AM    BUN 23 (H) 05/17/2022 10:19 AM    Creatinine 2.98 (H) 05/17/2022 10:19 AM    BUN/Creatinine ratio 8 (L) 05/17/2022 10:19 AM    GFR est AA 21 (L) 05/17/2022 10:19 AM    GFR est non-AA 17 (L) 05/17/2022 10:19 AM    Calcium 8.2 (L) 05/17/2022 10:19 AM         ECHO ADULT COMPLETE 05/17/2022 5/17/2022    Left Ventricle: Normal left ventricular systolic function with a visually estimated EF of 55 - 60%. Left ventricle size is normal. Increased wall thickness. Findings consistent with mild concentric hypertrophy. Normal wall motion. Diastolic dysfunction present with increased LAP with normal LV EF.   Pericardium: Trivial pericardial effusion present. No indication of cardiac tamponade. Key CAD CHF Meds             amLODIPine (NORVASC) 10 mg tablet (Taking) Take 1 Tablet by mouth daily. carvediloL (Coreg) 25 mg tablet (Taking) Take 1 Tablet by mouth two (2) times daily (with meals). Indications: myocardial reinfarction prevention    cloNIDine HCL (CATAPRES) 0.1 mg tablet (Taking) Take 1 Tablet by mouth two (2) times a day. clopidogreL (PLAVIX) 75 mg tab (Taking) Take 1 Tablet by mouth daily. Left foot swollen  The patient has recurrence of pain in the left foot and swelling. She is being followed by podiatry. She is still scheduled for follow-up. She is now using compression stockings and a boot. No new swelling redness or drainage is admitted to. Duplex 9/30/2021 showed no evidence of deep vein thrombosis with normal compressibility. MRI left foot 10/7/2021; the impression was first MTP resection/debridement but extensive marrow edema in the residual metatarsal and proximal phalanx which could at the time have represented active osteomyelitis. Other findings also reviewed.   Diabetes mellitus  No longer on the Insulin pump  The patient denies polyuria, polydipsia, or polyphagia. The patient denies any other aggravating or relieving factors Only on Insulin now. Has not obtained the freestyle regina as yet  Associated peripheral neuropathy. Lantus 30  Humlog 10 AC    Lab Results   Component Value Date/Time    Hemoglobin A1c 12.5 (H) 05/14/2022 02:19 PM    Hemoglobin A1c (POC) 7.9 11/29/2021 12:16 PM     Lab Results   Component Value Date/Time    Cholesterol, total 218 (H) 09/28/2020 01:00 PM    HDL Cholesterol 49 09/28/2020 01:00 PM    LDL, calculated 121 (H) 09/28/2020 01:00 PM    VLDL, calculated 48 (H) 09/28/2020 01:00 PM    Triglyceride 241 (H) 09/28/2020 01:00 PM   Follow-up cholesterol pending. Chronic kidney disease stage 4  The patient has no difficulty with urination. The patient denies dysuria,decreased urination, polyuria, nocturia, hematuria or cloudy urine. Followed by nephrology. Key CKD Meds             ferrous sulfate 325 mg (65 mg iron) tablet (Taking) Take 1 Tablet by mouth daily (with breakfast). Lab Results   Component Value Date/Time    Sodium 140 05/17/2022 10:19 AM    Potassium 3.4 (L) 05/17/2022 10:19 AM    Chloride 110 05/17/2022 10:19 AM    CO2 25 05/17/2022 10:19 AM    Anion gap 5 05/17/2022 10:19 AM    Glucose 74 05/17/2022 10:19 AM    BUN 23 (H) 05/17/2022 10:19 AM    Creatinine 2.98 (H) 05/17/2022 10:19 AM    BUN/Creatinine ratio 8 (L) 05/17/2022 10:19 AM    GFR est AA 21 (L) 05/17/2022 10:19 AM    GFR est non-AA 17 (L) 05/17/2022 10:19 AM    Calcium 8.2 (L) 05/17/2022 10:19 AM         CAD    The patient denies chest pain or pressure, dyspnea on exertion,diaphoresis, nausea, palpitations, orthopnea, PND, or edema  Followed by cardiology  ECHO ADULT COMPLETE 05/17/2022 5/17/2022  Interpretation Summary    Left Ventricle: Normal left ventricular systolic function with a visually estimated EF of 55 - 60%. Left ventricle size is normal. Increased wall thickness.  Findings consistent with mild concentric hypertrophy. Normal wall motion. Diastolic dysfunction present with increased LAP with normal LV EF.   Pericardium: Trivial pericardial effusion present. No indication of cardiac tamponade. CARDIAC PROCEDURE 10/23/2020 10/23/2020  INVASIVE VASCULAR PROCEDURE 10/23/2020 10/23/2020    Conclusion  100% mid RCA occlusion with thrombus. S/p aspiration thrombectomy followed by ptca/stent ( 2 BLANCA stents placed overlapping extending from mid RCA to distal RCA). Mild mid LAD stenosis. Normal LV function by echo. TPM inserted via right femoral vein due to complete heart block. Plan:  Continue DAPT for 1 yr/ high dose statin. Post PCI developed juctional tachycardia. Will discontinue TPM. Monitor and consider PPM if needed. Recommend intense risk factor modification. Lab Results   Component Value Date/Time     06/24/2021 10:39 AM    CK - MB <1.0 06/24/2021 10:39 AM    CK-MB Index  06/24/2021 10:39 AM     CALCULATION NOT PERFORMED WHEN RESULT IS BELOW LINEAR LIMIT    Troponin-I, QT <0.02 11/13/2021 11:31 PM     Key CAD CHF Meds             amLODIPine (NORVASC) 10 mg tablet (Taking) Take 1 Tablet by mouth daily. carvediloL (Coreg) 25 mg tablet (Taking) Take 1 Tablet by mouth two (2) times daily (with meals). Indications: myocardial reinfarction prevention    cloNIDine HCL (CATAPRES) 0.1 mg tablet (Taking) Take 1 Tablet by mouth two (2) times a day. clopidogreL (PLAVIX) 75 mg tab (Taking) Take 1 Tablet by mouth daily. Review of Systems   Constitutional: Negative for chills, fever and malaise/fatigue. HENT: Negative for congestion and sore throat. Eyes: Negative for blurred vision and redness. Respiratory: Negative for cough, shortness of breath and wheezing. Cardiovascular: Negative for chest pain, palpitations, claudication, leg swelling and PND. Gastrointestinal: Negative for abdominal pain, blood in stool, constipation, diarrhea and heartburn. Genitourinary: Negative for dysuria and urgency. Musculoskeletal: Negative for joint pain and myalgias. S/p distal amputation of the fourth and fifth toes on the left and as well as bone removal  left great toe, healing and bandaged. Skin:        The blood blister is no longer present. The primary problem is a stage II ulcer on the ball of the left foot. There appears to be some surrounding 1+ edema up to the ankles and legs. Neurological: Negative for dizziness, sensory change, speech change and focal weakness. Endo/Heme/Allergies: Does not bruise/bleed easily. Psychiatric/Behavioral: Positive for depression. Negative for suicidal ideas. The patient has insomnia (New problem for 3 weeks. Also has a history of RODNEY not treated. The patient is also depressed. ). The patient is not nervous/anxious. Current Outpatient Medications   Medication Sig    amLODIPine (NORVASC) 10 mg tablet Take 1 Tablet by mouth daily.  insulin glargine (Lantus Solostar U-100 Insulin) 100 unit/mL (3 mL) inpn 30 Units by SubCUTAneous route daily. Indications: type 2 diabetes mellitus    escitalopram oxalate (LEXAPRO) 10 mg tablet Take 1 Tablet by mouth daily.  carvediloL (Coreg) 25 mg tablet Take 1 Tablet by mouth two (2) times daily (with meals). Indications: myocardial reinfarction prevention    ferrous sulfate 325 mg (65 mg iron) tablet Take 1 Tablet by mouth daily (with breakfast).  cloNIDine HCL (CATAPRES) 0.1 mg tablet Take 1 Tablet by mouth two (2) times a day.  clopidogreL (PLAVIX) 75 mg tab Take 1 Tablet by mouth daily.  melatonin 5 mg cap capsule Take 1 Capsule by mouth as needed (difficulty in sleeping).  Reports taking it as needed    Insulin Needles, Disposable, (BD Ultra-Fine Short Pen Needle) 31 gauge x 5/16\" ndle USE WITH INSULIN TWICE DAILY    insulin lispro (HUMALOG) 100 unit/mL kwikpen 10 units before meals  Indications: type 2 diabetes mellitus    Insulin Syringe-Needle U-100 (INSULIN SYRINGE) 1 mL 30 gauge x 5/16 syrg As directed for lantus insulin    flash glucose scanning reader (FreeStyle Daniel 14 Day [place]) [employer] [facility] [employer] Use to check blood glucose 3 times a day  Indications: Diabetes mellitus (Patient not taking: Reported on 2022)    flash glucose sensor (FreeStyle Daniel 14 Day Sensor) kit Used to check blood glucose 3 times a day (Patient not taking: Reported on 2022)     No current facility-administered medications for this visit. Allergies   Allergen Reactions    Azithromycin Other (comments)     Rapid response was called for bradycardia and hypotension     Pcn [Penicillins] Hives     has Acute bronchitis, Hyperglycemia, Diabetic foot infection ([address] Right foot infection, Acute pain of right foot, Insulin dependent diabetes mellitus, HTN (hypertension), Fever, NSTEMI (non-ST elevated myocardial infarction) ([address] Severe obesity ([address] Osteomyelitis ([address] Diabetic foot ulcer ([address] FRANCISCA (acute kidney injury) ([address] Sepsis ([address] Hyponatremia, Foot ulcer, left ([address] Coronary artery disease of native artery of native heart with stable angina pectoris ([address] Influenza A, Elevated troponin, Hyperglycemia due to type 2 diabetes mellitus ([address] Hyperlipidemia, Hypertensive chronic kidney disease with stage 1 through stage 4 chronic kidney disease, or unspecified chronic kidney disease, Nephrotic syndrome, Stage 3a chronic kidney disease ([address] Essential hypertension, Type 2 diabetes mellitus with chronic kidney disease ([address] Persistent proteinuria, Acute kidney injury ([address] and Coronary atherosclerosis on their problem list.    Past Surgical History:   Procedure Laterality Date    HX CORONARY STENT PLACEMENT      HX GYN            reports that she has never smoked. She has never used smokeless tobacco. She reports that she does not drink alcohol and does not use drugs. family history includes Lupus in her mother.     Visit Vitals  BP (!) 153/94 (BP 1 Location: Right arm, BP Patient Position: Sitting, BP Cuff Size: Large adult long)   Pulse 90   Temp 97.7 °F (36.5 °C) (Temporal)   Resp 16   Ht 5' 6\" (1.676 m)   Wt 206 lb (93.4 kg)   SpO2 100%   BMI 33.25 kg/m²         Physical Exam  Vitals and nursing note reviewed. Constitutional:       General: She is not in acute distress. HENT:      Head: Normocephalic. Right Ear: Tympanic membrane and external ear normal.      Left Ear: Tympanic membrane and external ear normal.      Nose: Nose normal.      Mouth/Throat:      Mouth: Mucous membranes are moist.      Pharynx: Oropharynx is clear. Eyes:      General: No scleral icterus. Pupils: Pupils are equal, round, and reactive to light. Cardiovascular:      Heart sounds: Normal heart sounds. Pulmonary:      Effort: Pulmonary effort is normal.   Abdominal:      General: Abdomen is flat. There is no distension. Palpations: There is no mass. Tenderness: There is no abdominal tenderness. Musculoskeletal:         General: Signs of injury (Bandages removed left foot. S/p amputation distal left fourth and fifth toe, well-healed. Small ulcer lateral side of the fourth toe healing well. There are some bruising on the plantar surface distally of the left great toe. Trace edema both lower ext) present. No swelling. Cervical back: No rigidity. Right lower leg: No edema. Left lower leg: Edema present. Skin:     Coloration: Skin is not pale. Findings: No erythema or lesion (file://[email or web address]:/[email or web address]-[email or web address]/Images/Skins/_Default/Low/Icons/Comment. pngFoot bandagedHealed ulcer medial MTP left foot. Fourth and fifth toes partially amputated. ). Neurological:      Mental Status: She is alert and oriented to person, place, and time. Gait: Gait normal.   Psychiatric:         Mood and Affect: Mood normal.         Behavior: Behavior normal.         Thought Content:  Thought content normal.       Diabetic foot exam: Left Foot:   Visual Exam: Missing the tips of the left fourth and fifth digit. There is missing bone of the right great toe and scar medial side. Pulse DP: 2+ (normal)   Filament test: absent sensation Can feel mid shin region. Vibratory sensation: diminished normal feeling mid foot.       Right Foot:   Visual Exam: normal    Pulse DP: 2+ (normal)   Filament test: 4/6   Vibratory sensation: normal            Lab Results   Component Value Date/Time    WBC 4.9 05/17/2022 10:19 AM    HGB 8.4 (L) 05/17/2022 10:19 AM    Hemoglobin, POC 14.3 12/27/2014 05:54 AM    HCT 27.8 (L) 05/17/2022 10:19 AM    Hematocrit, POC 42 12/27/2014 05:54 AM    PLATELET 250 77/97/2835 10:19 AM    MCV 73.9 (L) 05/17/2022 10:19 AM     Lab Results   Component Value Date/Time    Hemoglobin A1c 12.5 (H) 05/14/2022 02:19 PM    Hemoglobin A1c 9.6 (H) 06/25/2021 06:45 AM    Hemoglobin A1c 13.6 (H) 05/17/2021 05:09 PM    Glucose 74 05/17/2022 10:19 AM    Glucose (POC) 72 05/17/2022 12:17 PM    Glucose,  (H) 12/27/2014 05:54 AM    Microalbumin/Creat ratio (mg/g creat) 2,040 (H) 05/18/2021 12:57 PM    Microalbumin,urine random 408.00 (H) 05/18/2021 12:57 PM    LDL, calculated 121 (H) 09/28/2020 01:00 PM    Creatinine, POC 0.7 12/27/2014 05:54 AM    Creatinine 2.98 (H) 05/17/2022 10:19 AM      Lab Results   Component Value Date/Time    Cholesterol, total 218 (H) 09/28/2020 01:00 PM    HDL Cholesterol 49 09/28/2020 01:00 PM    LDL, calculated 121 (H) 09/28/2020 01:00 PM    Triglyceride 241 (H) 09/28/2020 01:00 PM     Lab Results   Component Value Date/Time    TSH 1.12 05/17/2021 05:09 PM    T4, Free 0.9 10/19/2020 06:30 PM      Lab Results   Component Value Date/Time    NT pro-BNP 2,220 (H) 05/14/2022 10:17 PM    NT pro-BNP 2,465 (H) 05/14/2022 02:19 PM    NT pro- (H) 04/05/2021 06:15 AM    NT pro- (H) 12/13/2020 01:12 AM    NT pro- (H) 10/22/2020 11:37 AM      Lab Results   Component Value Date/Time    Sodium 140 05/17/2022 10:19 AM    Potassium 3.4 (L) 05/17/2022 10:19 AM    Chloride 110 05/17/2022 10:19 AM    CO2 25 05/17/2022 10:19 AM    Anion gap 5 05/17/2022 10:19 AM    Glucose 74 05/17/2022 10:19 AM    BUN 23 (H) 05/17/2022 10:19 AM    Creatinine 2.98 (H) 05/17/2022 10:19 AM    BUN/Creatinine ratio 8 (L) 05/17/2022 10:19 AM    GFR est AA 21 (L) 05/17/2022 10:19 AM    GFR est non-AA 17 (L) 05/17/2022 10:19 AM    Calcium 8.2 (L) 05/17/2022 10:19 AM    Bilirubin, total 0.2 05/15/2022 04:45 AM    ALT (SGPT) 22 05/15/2022 04:45 AM    Alk. phosphatase 261 (H) 05/15/2022 04:45 AM    Protein, total 4.4 (L) 05/15/2022 04:45 AM    Albumin 1.4 (L) 05/15/2022 04:45 AM    Globulin 3.0 05/15/2022 04:45 AM    A-G Ratio 0.5 (L) 05/15/2022 04:45 AM      Lab Results   Component Value Date/Time    Hemoglobin A1c 12.5 (H) 05/14/2022 02:19 PM    Hemoglobin A1c (POC) 7.9 11/29/2021 12:16 PM      We discussed the expected course, resolution and complications of the diagnosis(es) in detail. Medication risks, benefits, costs, interactions, and alternatives were discussed as indicated. I advised her to contact the office if her condition worsens, changes or fails to improve as anticipated. She expressed understanding with the diagnosis(es) and plan. This note was done with the assistance of dragon speech software.   Some inadvertent errors or omissions may be present

## 2022-05-23 ENCOUNTER — OFFICE VISIT (OUTPATIENT)
Dept: FAMILY MEDICINE CLINIC | Age: 47
End: 2022-05-23
Payer: MEDICAID

## 2022-05-23 VITALS
SYSTOLIC BLOOD PRESSURE: 150 MMHG | TEMPERATURE: 97.7 F | RESPIRATION RATE: 16 BRPM | HEIGHT: 66 IN | BODY MASS INDEX: 33.11 KG/M2 | DIASTOLIC BLOOD PRESSURE: 90 MMHG | OXYGEN SATURATION: 100 % | HEART RATE: 90 BPM | WEIGHT: 206 LBS

## 2022-05-23 DIAGNOSIS — I10 ESSENTIAL HYPERTENSION: ICD-10-CM

## 2022-05-23 DIAGNOSIS — E11.8 TYPE 2 DIABETES MELLITUS WITH COMPLICATION (HCC): ICD-10-CM

## 2022-05-23 DIAGNOSIS — E78.5 HYPERLIPIDEMIA, UNSPECIFIED HYPERLIPIDEMIA TYPE: ICD-10-CM

## 2022-05-23 DIAGNOSIS — E11.8 TYPE 2 DIABETES MELLITUS WITH COMPLICATION (HCC): Primary | ICD-10-CM

## 2022-05-23 DIAGNOSIS — Z23 ENCOUNTER FOR IMMUNIZATION: ICD-10-CM

## 2022-05-23 DIAGNOSIS — R20.0 LEFT SIDED NUMBNESS: ICD-10-CM

## 2022-05-23 DIAGNOSIS — I25.118 CORONARY ARTERY DISEASE OF NATIVE ARTERY OF NATIVE HEART WITH STABLE ANGINA PECTORIS (HCC): ICD-10-CM

## 2022-05-23 DIAGNOSIS — Z11.59 ENCOUNTER FOR HEPATITIS C SCREENING TEST FOR LOW RISK PATIENT: ICD-10-CM

## 2022-05-23 DIAGNOSIS — E66.01 SEVERE OBESITY (HCC): ICD-10-CM

## 2022-05-23 DIAGNOSIS — Z12.11 SCREENING FOR COLON CANCER: ICD-10-CM

## 2022-05-23 PROBLEM — R80.1 PERSISTENT PROTEINURIA: Status: ACTIVE | Noted: 2020-12-16

## 2022-05-23 PROBLEM — E11.65 HYPERGLYCEMIA DUE TO TYPE 2 DIABETES MELLITUS (HCC): Status: ACTIVE | Noted: 2022-05-23

## 2022-05-23 PROBLEM — N04.9 NEPHROTIC SYNDROME: Status: ACTIVE | Noted: 2021-10-13

## 2022-05-23 PROBLEM — N18.31 STAGE 3A CHRONIC KIDNEY DISEASE (HCC): Status: ACTIVE | Noted: 2021-10-13

## 2022-05-23 PROBLEM — E11.22 TYPE 2 DIABETES MELLITUS WITH CHRONIC KIDNEY DISEASE (HCC): Status: ACTIVE | Noted: 2020-12-16

## 2022-05-23 PROBLEM — I12.9 HYPERTENSIVE CHRONIC KIDNEY DISEASE WITH STAGE 1 THROUGH STAGE 4 CHRONIC KIDNEY DISEASE, OR UNSPECIFIED CHRONIC KIDNEY DISEASE: Status: ACTIVE | Noted: 2021-10-13

## 2022-05-23 PROBLEM — N17.9 ACUTE KIDNEY INJURY (HCC): Status: ACTIVE | Noted: 2021-10-13

## 2022-05-23 PROBLEM — I25.10 CORONARY ATHEROSCLEROSIS: Status: ACTIVE | Noted: 2021-06-29

## 2022-05-23 PROCEDURE — 99214 OFFICE O/P EST MOD 30 MIN: CPT | Performed by: EMERGENCY MEDICINE

## 2022-05-23 PROCEDURE — 3046F HEMOGLOBIN A1C LEVEL >9.0%: CPT | Performed by: EMERGENCY MEDICINE

## 2022-05-23 RX ORDER — FLASH GLUCOSE SCANNING READER
EACH MISCELLANEOUS
Qty: 1 EACH | Refills: 0 | Status: SHIPPED | OUTPATIENT
Start: 2022-05-23

## 2022-05-23 RX ORDER — FLASH GLUCOSE SCANNING READER
EACH MISCELLANEOUS
Qty: 1 EACH | Refills: 5 | Status: CANCELLED | OUTPATIENT
Start: 2022-05-23

## 2022-05-23 RX ORDER — CLONIDINE HYDROCHLORIDE 0.1 MG/1
0.1 TABLET ORAL 2 TIMES DAILY
Qty: 180 TABLET | Refills: 3 | Status: SHIPPED | OUTPATIENT
Start: 2022-05-23

## 2022-05-23 RX ORDER — FLASH GLUCOSE SENSOR
KIT MISCELLANEOUS
Qty: 2 KIT | Refills: 5 | Status: SHIPPED | OUTPATIENT
Start: 2022-05-23

## 2022-05-23 RX ORDER — CLOPIDOGREL BISULFATE 75 MG/1
75 TABLET ORAL DAILY
Qty: 30 TABLET | Refills: 0 | Status: SHIPPED | OUTPATIENT
Start: 2022-05-23 | End: 2022-10-06

## 2022-05-23 RX ORDER — FLASH GLUCOSE SENSOR
KIT MISCELLANEOUS
Qty: 1 KIT | Refills: 0 | Status: CANCELLED | OUTPATIENT
Start: 2022-05-23

## 2022-05-23 RX ORDER — LISINOPRIL 10 MG/1
10 TABLET ORAL DAILY
Qty: 90 TABLET | Refills: 3 | Status: SHIPPED | OUTPATIENT
Start: 2022-05-23 | End: 2022-10-06

## 2022-05-23 NOTE — PATIENT INSTRUCTIONS
Learning About High Blood Pressure  What is high blood pressure? Blood pressure is a measure of how hard the blood pushes against the walls of your arteries. It's normal for blood pressure to go up and down throughout the day. But if it stays up, you have high blood pressure. Another name for high blood pressure is hypertension. Two numbers tell you your blood pressure. The first number is the systolic pressure (top number). It shows how hard the blood pushes when your heart is pumping. The second number is the diastolic pressure (bottom number). It shows how hard the blood pushes between heartbeats, when your heart is relaxed and filling with blood. Your doctor will give you a goal for your blood pressure based on your health and your age. High blood pressure (hypertension) means that the top number stays high, or the bottom number stays high, or both. High blood pressure increases the risk of stroke, heart attack, and other problems. What happens when you have high blood pressure? · Blood flows through your arteries with too much force. Over time, this can damage the heart and the walls of your arteries. But you can't feel it. High blood pressure usually doesn't cause symptoms. · High blood pressure makes your heart work harder. And that can lead to heart failure, which means your heart doesn't pump as much blood as your body needs. · Fat and calcium start to build up in your arteries. This buildup is called hardening of the arteries. It can cause many problems including a heart attack and stroke. · Arteries also carry blood and oxygen to organs like your eyes, kidneys, and brain. If high blood pressure damages those arteries, it can lead to vision loss, kidney disease, stroke, and a higher risk of dementia. How can you prevent high blood pressure? · Stay at a healthy weight. · Try to limit how much sodium you eat to less than 2,300 milligrams (mg) a day.  If you limit your sodium to 1,500 mg a day, you can lower your blood pressure even more. ? Buy foods that are labeled \"unsalted,\" \"sodium-free,\" or \"low-sodium. \" Foods labeled \"reduced-sodium\" and \"light sodium\" may still have too much sodium. ? Flavor your food with garlic, lemon juice, onion, vinegar, herbs, and spices instead of salt. Do not use soy sauce, steak sauce, onion salt, garlic salt, mustard, or ketchup on your food. ? Use less salt (or none) when recipes call for it. You can often use half the salt a recipe calls for without losing flavor. · Be physically active. Get at least 30 minutes of exercise on most days of the week. Walking is a good choice. You also may want to do other activities, such as running, swimming, cycling, or playing tennis or team sports. · Limit alcohol to 2 drinks a day for men and 1 drink a day for women. · Eat plenty of fruits, vegetables, and low-fat dairy products. Eat less saturated and total fats. How is high blood pressure treated? · Your doctor will suggest making lifestyle changes to help your heart. For example, your doctor may ask you to eat healthy foods, quit smoking, lose extra weight, and be more active. · If lifestyle changes don't help enough, your doctor may recommend that you take medicine. · When blood pressure is very high, medicines are needed to lower it. Follow-up care is a key part of your treatment and safety. Be sure to make and go to all appointments, and call your doctor if you are having problems. It's also a good idea to know your test results and keep a list of the medicines you take. Where can you learn more? Go to http://www.[employer] C[employer].com/  Enter P501 in the search box to learn more about \"Learning About High Blood Pressure. \"  Current as of: January 10, 2022               Content Version: 13.2  © 1539-5647 Healthwise, Incorporated.    Care instructions adapted under license by [employer] (which disclaims liability or warranty for this information). If you have questions about a medical condition or this instruction, always ask your healthcare professional. [address] any warranty or liability for your use of this information. Type 2 Diabetes: Care Instructions  Your Care Instructions     Type 2 diabetes is a disease that develops when the body's tissues cannot use insulin properly. Over time, the pancreas cannot make enough insulin. Insulin is a hormone that helps the body's cells use sugar (glucose) for energy. It also helps the body store extra sugar in muscle, fat, and liver cells. Without insulin, the sugar cannot get into the cells to do its work. It stays in the blood instead. This can cause high blood sugar levels. A person has diabetes when the blood sugar stays too high too much of the time. Over time, diabetes can lead to diseases of the heart, blood vessels, nerves, kidneys, and eyes. You may be able to control your blood sugar by losing weight, eating a healthy diet, and getting daily exercise. You may also have to take insulin or other diabetes medicine. Follow-up care is a key part of your treatment and safety. Be sure to make and go to all appointments. Call your doctor if you are having problems. It's also a good idea to know your test results and keep a list of the medicines you take. How can you care for yourself at home? 1. Keep your blood sugar at a target level (which you set with your doctor). ? Carbohydrate--the body's main source of fuel--affects blood sugar more than any other nutrient. Carbohydrate is in fruits, vegetables, milk, and yogurt. It also is in breads, cereals, vegetables such as potatoes and corn, and sugary foods such as candy and cakes. Follow your meal plan to know how much carbohydrate to eat at each meal and snack. ? Aim for 30 minutes of exercise on most, preferably all, days of the week. Walking is a good choice.  You also may want to do other activities, such as running, swimming, cycling, or playing tennis or team sports. Try to do muscle-strengthening exercises at least 2 times a week. ? Take your medicines exactly as prescribed. Call your doctor if you think you are having a problem with your medicine. You will get more details on the specific medicines your doctor prescribes. 2. Check your blood sugar as often as your doctor recommends. It is important to keep track of any symptoms you have, such as low blood sugar. Also tell your doctor if you have any changes in your activities, diet, or insulin use. 3. Talk to your doctor before you start taking aspirin every day. Aspirin can help certain people lower their risk of a heart attack or stroke. But taking aspirin isn't right for everyone, because it can cause serious bleeding. 4. Do not smoke. If you need help quitting, talk to your doctor about stop-smoking programs and medicines. These can increase your chances of quitting for good. 5. Keep your cholesterol and blood pressure at normal levels. You may need to take one or more medicines to reach your goals. Take them exactly as directed. Do not stop or change a medicine without talking to your doctor first.  When should you call for help? Call 911 anytime you think you may need emergency care. For example, call if:    · You passed out (lost consciousness), or you suddenly become very sleepy or confused. (You may have very low blood sugar.)   Call your doctor now or seek immediate medical care if:    · Your blood sugar is 300 mg/dL or is higher than the level your doctor has set for you. 1. You have symptoms of low blood sugar, such as:  ? Sweating. ? Feeling nervous, shaky, and weak. ? Extreme hunger and slight nausea. ? Dizziness and headache.  ? Blurred vision. ? Confusion. Watch closely for changes in your health, and be sure to contact your doctor if:    · You often have problems controlling your blood sugar.      1. You have symptoms of long-term diabetes problems, such as:  ? New vision changes. ? New pain, numbness, or tingling in your hands or feet. ? Skin problems. Where can you learn more? Go to http://www.gray.com/  Enter C553 in the search box to learn more about \"Type 2 Diabetes: Care Instructions. \"  Current as of: August 31, 2020               Content Version: 13.0  © 2006-2021 [employer]. Care instructions adapted under license by [employer] (which disclaims liability or warranty for this information). If you have questions about a medical condition or this instruction, always ask your healthcare professional. [place] [address] any warranty or liability for your use of this information. Learning About Carbohydrate (Carb) Counting and Eating Out When You Have Diabetes  Why plan your meals? Meal planning can be a key part of managing diabetes. Planning meals and snacks with the right balance of carbohydrate, protein, and fat can help you keep your blood sugar at the target level you set with your doctor. You don't have to eat special foods. You can eat what your family eats, including sweets once in a while. But you do have to pay attention to how often you eat and how much you eat of certain foods. You may want to work with a dietitian or a certified diabetes educator. He or she can give you tips and meal ideas and can answer your questions about meal planning. This health professional can also help you reach a healthy weight if that is one of your goals. What should you know about eating carbs? Managing the amount of carbohydrate (carbs) you eat is an important part of healthy meals when you have diabetes. Carbohydrate is found in many foods. 6. Learn which foods have carbs. And learn the amounts of carbs in different foods. ? Bread, cereal, pasta, and rice have about 15 grams of carbs in a serving.  A serving is 1 slice of bread (1 ounce), ½ cup of cooked cereal, or 1/3 cup of cooked pasta or rice. ? Fruits have 15 grams of carbs in a serving. A serving is 1 small fresh fruit, such as an apple or orange; ½ of a banana; ½ cup of cooked or canned fruit; ½ cup of fruit juice; 1 cup of melon or raspberries; or 2 tablespoons of dried fruit. ? Milk and no-sugar-added yogurt have 15 grams of carbs in a serving. A serving is 1 cup of milk or 2/3 cup of no-sugar-added yogurt. ? Starchy vegetables have 15 grams of carbs in a serving. A serving is ½ cup of mashed potatoes or sweet potato; 1 cup winter squash; ½ of a small baked potato; ½ cup of cooked beans; or ½ cup cooked corn or green peas. 7. Learn how much carbs to eat each day and at each meal. A dietitian or CDE can teach you how to keep track of the amount of carbs you eat. This is called carbohydrate counting. 8. If you are not sure how to count carbohydrate grams, use the Plate Method to plan meals. It is a good, quick way to make sure that you have a balanced meal. It also helps you spread carbs throughout the day. ? Divide your plate by types of foods. Put non-starchy vegetables on half the plate, meat or other protein food on one-quarter of the plate, and a grain or starchy vegetable in the final quarter of the plate. To this you can add a small piece of fruit and 1 cup of milk or yogurt, depending on how many carbs you are supposed to eat at a meal.  9. Try to eat about the same amount of carbs at each meal. Do not \"save up\" your daily allowance of carbs to eat at one meal.  10. Proteins have very little or no carbs per serving. Examples of proteins are beef, chicken, turkey, fish, eggs, tofu, cheese, cottage cheese, and peanut butter. A serving size of meat is 3 ounces, which is about the size of a deck of cards. Examples of meat substitute serving sizes (equal to 1 ounce of meat) are 1/4 cup of cottage cheese, 1 egg, 1 tablespoon of peanut butter, and ½ cup of tofu.   How can you eat out and still eat healthy? · Learn to estimate the serving sizes of foods that have carbohydrate. If you measure food at home, it will be easier to estimate the amount in a serving of restaurant food. · If the meal you order has too much carbohydrate (such as potatoes, corn, or baked beans), ask to have a low-carbohydrate food instead. Ask for a salad or green vegetables. · If you use insulin, check your blood sugar before and after eating out to help you plan how much to eat in the future. · If you eat more carbohydrate at a meal than you had planned, take a walk or do other exercise. This will help lower your blood sugar. What are some tips for eating healthy? · Limit saturated fat, such as the fat from meat and dairy products. This is a healthy choice because people who have diabetes are at higher risk of heart disease. So choose lean cuts of meat and nonfat or low-fat dairy products. Use olive or canola oil instead of butter or shortening when cooking. · Don't skip meals. Your blood sugar may drop too low if you skip meals and take insulin or certain medicines for diabetes. · Check with your doctor before you drink alcohol. Alcohol can cause your blood sugar to drop too low. Alcohol can also cause a bad reaction if you take certain diabetes medicines. Follow-up care is a key part of your treatment and safety. Be sure to make and go to all appointments, and call your doctor if you are having problems. It's also a good idea to know your test results and keep a list of the medicines you take. Where can you learn more? Go to http://www.Yesmail.com/  Enter I147 in the search box to learn more about \"Learning About Carbohydrate (Carb) Counting and Eating Out When You Have Diabetes. \"  Current as of: December 17, 2020               Content Version: 13.0  © 7202-0065 Healthwise, Incorporated.    Care instructions adapted under license by [employer] (which disclaims liability or warranty for this information). If you have questions about a medical condition or this instruction, always ask your healthcare professional. [address] any warranty or liability for your use of this information. Diabetes Blood Sugar Emergencies: Your Action Plan  How can you prevent a blood sugar emergency? An important part of living with diabetes is keeping your blood sugar in your target range. You'll need to know what to do if it's too high or too low. Managing your blood sugar levels helps you avoid emergencies. This care sheet will teach you about the signs of high and low blood sugar. It will help you make an action plan with your doctor for when these signs occur. Low blood sugar is more likely to happen if you take certain medicines for diabetes. It can also happen if you skip a meal, drink alcohol, or exercise more than usual.  You may get high blood sugar if you eat differently than you normally do. One example is eating more carbohydrate than usual. Having a cold, the flu, or other sudden illness can also cause high blood sugar levels. Levels can also rise if you miss a dose of medicine. Any change in how you take your medicine may affect your blood sugar level. So it's important to work with your doctor before you make any changes. Track your blood sugar  Work with your doctor to fill in the blank spaces below that apply to you. Track your levels, know your target range, and write down ways you can get your blood sugar back in your target range. A log book can help you track your levels. Take the book to all of your medical appointments. 11. Check your blood sugar _____ times a day, at these times:________________________________________________. (For example: Before meals, at bedtime, before exercise, during exercise, other.)  12. Your blood sugar target range before a meal is ___________________. Your blood sugar target range after a meal is _______________________.   13. Do this--___________________________________________________--to get your blood sugar back within your safe range if your blood sugar results are _________________________________________. (For example: Less than 70 or above 250 mg/dL.)  Call your doctor when your blood sugar results are ___________________________________. (For example: Less than 70 or above 250 mg/dL.)  What are the symptoms of low and high blood sugar? Common symptoms of low blood sugar are sweating and feeling shaky, weak, hungry, or confused. Symptoms can start quickly. Common symptoms of high blood sugar are feeling very thirsty or very hungry. You may also pass urine more often than usual. You may have blurry vision and may lose weight without trying. But some people may have high or low blood sugar without having any symptoms. That's a good reason to check your blood sugar on a regular schedule. What should you do if you have symptoms? Work with your doctor to fill in the blank spaces below that apply to you. Low blood sugar and \"the rule of 15\"  If you have symptoms of low blood sugar, check your blood sugar. If it's below _____ ( for example, below 70), eat or drink a quick-sugar food that has about 15 grams of carbohydrate. Your goal is to get your level back to your safe range. Check your blood sugar again 15 minutes later. If it's still not in your target range, take another 15 grams of carbohydrate and check your blood sugar again in 15 minutes. Repeat this until you reach your target. Then go back to your regular testing schedule. Children usually need less than 15 grams of carbohydrate. Check with your doctor or diabetes educator for the amount that is right for your child. When you have low blood sugar, it's best to stop or reduce any physical activity until your blood sugar is back in your target range and is stable. If you must stay active, eat or drink 30 grams of carbohydrate.  Then check your blood sugar again in 15 minutes. If it's not in your target range, take another 30 grams of carbohydrates. Check your blood sugar again in 15 minutes. Keep doing this until you reach your target. You can then go back to your regular testing schedule. If your symptoms or blood sugar levels are getting worse or have not improved after 15 minutes, seek medical care right away. If you take insulin, always carry a glucagon emergency kit. Be sure your family, friends, and coworkers know how to give glucagon. Here are some examples of quick-sugar foods with 15 grams of carbohydrate:  · 3 or 4 glucose tablets  · 1 tablespoon (3 teaspoons) table sugar  · ½ cup to ¾ cup (4 to 6 ounces) of fruit juice or regular (not diet) soda  · Hard candy (such as 6 Life Savers)  High blood sugar  If you have symptoms of high blood sugar, check your blood sugar. Your goal is to get your level back to your target range. If it's above ______ ( for example, above 250), follow these steps:  · If you missed a dose of your diabetes medicine, take it now. Take only the amount of medicine that you have been prescribed. Do not take more or less medicine. · Give yourself insulin if your doctor has prescribed it for high blood sugar. · Test for ketones, if the doctor told you to do so. If the results of the ketone test show a moderate-to-large amount of ketones, call the doctor for advice. · Wait 30 minutes after you take the extra insulin or the missed medicine. Check your blood sugar again. If your symptoms or blood sugar levels are getting worse or have not improved after taking these steps, seek medical care right away. Follow-up care is a key part of your treatment and safety. Be sure to make and go to all appointments, and call your doctor if you are having problems. It's also a good idea to know your test results and keep a list of the medicines you take. Where can you learn more?   Go to http://www.gray.com/  Enter B423 in the search box to learn more about \"Diabetes Blood Sugar Emergencies: Your Action Plan. \"  Current as of: August 31, 2020               Content Version: 13.0  © 2006-2021 Healthwise, [employer]. Care instructions adapted under license by [employer] (which disclaims liability or warranty for this information). If you have questions about a medical condition or this instruction, always ask your healthcare professional. [address] any warranty or liability for your use of this information. Learning About High Cholesterol  What is high cholesterol? High cholesterol means that you have too much cholesterol in your blood. Cholesterol is a type of fat. It's needed for many body functions, such as making new cells. Cholesterol is made by your body. It also comes from food you eat. Having high cholesterol can lead to the buildup of plaque in artery walls. This can increase your risk of heart attack and stroke. When your doctor talks about high cholesterol levels, your doctor is talking about your total cholesterol and LDL cholesterol (the \"bad\" cholesterol) levels. Your doctor may also speak about HDL (the \"good\" cholesterol) levels. High HDL is linked with a lower risk for coronary artery disease, heart attack, and stroke. Your cholesterol levels help your doctor find out your risk for having a heart attack or stroke. How can you help prevent high cholesterol? A heart-healthy lifestyle can help you prevent high cholesterol and lower your risk for a heart attack and stroke. · Eat heart-healthy foods. ? Eat fruits, vegetables, whole grains, beans, and other high-fiber foods. ? Eat lean proteins, such as seafood, lean meats, beans, nuts, and soy products. ? Eat healthy fats, such as canola and olive oil. ? Choose foods that are low in saturated fat. ? Limit sodium and alcohol. ? Limit drinks and foods with added sugar. · Be active.  Try to do moderate activity at least 2½ hours a week. Or try vigorous activity at least 1¼ hours a week. You may want to walk or try other activities, such as running, swimming, cycling, or playing tennis or team sports. · Stay at a healthy weight. Lose weight if you need to. · Don't smoke. If you need help quitting, talk to your doctor about stop-smoking programs and medicines. These can increase your chances of quitting for good. How is high cholesterol treated? The goal of treatment is to reduce your chances of having a heart attack or stroke. The goal is not to lower your cholesterol numbers only. · Have a heart-healthy lifestyle. This includes eating healthy foods, not smoking, losing weight, and being more active. · You may choose to take medicine. Follow-up care is a key part of your treatment and safety. Be sure to make and go to all appointments, and call your doctor if you are having problems. It's also a good idea to know your test results and keep a list of the medicines you take. Where can you learn more? Go to http://www.lizama.com/  Enter Q621 in the search box to learn more about \"Learning About High Cholesterol. \"  Current as of: January 10, 2022               Content Version: 13.2  © 2006-2022 [employer]. Care instructions adapted under license by [employer] (which disclaims liability or warranty for this information). If you have questions about a medical condition or this instruction, always ask your healthcare professional. [place] [address] any warranty or liability for your use of this information. Diabetic Neuropathy: Care Instructions  Overview     When you have diabetes, your blood sugar level may get too high. Over time, high blood sugar levels can damage nerves. This is called diabetic neuropathy. Nerve damage can cause pain, burning, tingling, and numbness and may leave you feeling weak. The feet are often affected.  When you have nerve damage in your feet, you cannot feel your feet and toes as well as normal and may not notice cuts or sores. Even a small injury can lead to a serious infection. It is very important that you follow your doctor's advice on foot care. Sometimes diabetes damages nerves that help the body function. If this happens, your blood pressure, sweating, digestion, and urination might be affected. Your doctor may give you a target range for your blood sugar that is higher or lower than you are used to. Try to keep your blood sugar very close to this target range to prevent more damage. Follow-up care is a key part of your treatment and safety. Be sure to make and go to all appointments, and call your doctor if you are having problems. It's also a good idea to know your test results and keep a list of the medicines you take. How can you care for yourself at home? · Take your medicines exactly as prescribed. Call your doctor if you think you are having a problem with your medicine. · Try to keep blood sugar in your target range. ? Follow your meal plan to know how much carbohydrate you need for meals and snacks. A registered dietitian or diabetes educator can help you plan meals. ? Try to get at least 30 minutes of exercise on most days. ? Check your blood sugar as many times each day as your doctor recommends. · Take and record your blood pressure at home if your doctor tells you to. To take your blood pressure at home:  ? Ask your doctor to check your blood pressure monitor to be sure it is accurate and the cuff fits you. Also ask your doctor to watch you to make sure that you are using it right. ? Do not use medicine known to raise blood pressure (such as some nasal decongestant sprays) before taking your blood pressure. ? Avoid taking your blood pressure if you have just exercised or are nervous or upset. Rest at least 15 minutes before you take a reading. · Do not smoke.  Smoking can increase your chance for a heart attack or stroke. If you need help quitting, talk to your doctor about stop-smoking programs and medicines. These can increase your chances of quitting for good. · Limit alcohol to 2 drinks a day for men and 1 drink a day for women. Too much alcohol can cause health problems. · Eat small meals often, rather than 2 or 3 large meals a day. To care for your feet  · Prevent injury by wearing shoes at all times, even when you are indoors. · Do foot care as part of your daily routine. Wash your feet and then rub lotion on your feet, but not between your toes. Use a handheld mirror or magnifying mirror to inspect your feet for blisters, cuts, cracks, or sores. · Have your toenails trimmed and filed straight across. · Wear shoes and socks that fit well. Soft shoes that have good support and that fit well (such as tennis shoes) are best for your feet. · Check your shoes for any loose objects or rough edges before you put them on. · Ask your doctor to check your feet during each visit. Your doctor may notice a foot problem you have missed. · Get early treatment for any foot problem, even a minor one. When should you call for help? Call your doctor now or seek immediate medical care if:    · You have symptoms of infection, such as:  ? Increased pain, swelling, warmth, or redness. ? Red streaks leading from the area. ? Pus draining from the area. ? A fever.     · You have new or worse numbness, pain, or tingling in any part of your body. Watch closely for changes in your health, and be sure to contact your doctor if:    · You have a new problem with your feet, such as:  ? A new sore or ulcer. ? A break in the skin that is not healing after several days. ? Bleeding corns or calluses. ? An ingrown toenail.     · You do not get better as expected. Where can you learn more?   Go to http://www.gray.com/  Enter V828 in the search box to learn more about \"Diabetic Neuropathy: Care Instructions. \"  Current as of: July 28, 2021               Content Version: 13.2  © 1463-0936 [employer]. Care instructions adapted under license by "[employer] (which disclaims liability or warranty for this information). If you have questions about a medical condition or this instruction, always ask your healthcare professional. [address] any warranty or liability for your use of this information. Lisinopril (By mouth)   Lisinopril (lye-SIN-oh-pril)  Treats high blood pressure and heart failure. Also given to reduce the risk of death after a heart attack. This medicine is an ACE inhibitor. Brand Name(s): Prinivil, Qbrelis, Zestril   There may be other brand names for this medicine. When This Medicine Should Not Be Used: This medicine is not right for everyone. Do not use it if you had an allergic reaction to lisinopril or another ACE inhibitor, or if you are pregnant. How to Use This Medicine:   Liquid, Tablet  · Take your medicine as directed. Your dose may need to be changed several times to find what works best for you. · Oral liquid: Measure the oral liquid medicine with a marked measuring spoon, oral syringe, or medicine cup. · Missed dose: Take a dose as soon as you remember. If it is almost time for your next dose, wait until then and take a regular dose. Do not take extra medicine to make up for a missed dose. · Store the medicine in a closed container at room temperature, away from heat, moisture, and direct light. Drugs and Foods to Avoid:   Ask your doctor or pharmacist before using any other medicine, including over-the-counter medicines, vitamins, and herbal products. · Do not use this medicine together with aliskiren if you have diabetes. · Some foods and medicines may affect how lisinopril works.  Tell your doctor if you are using any of the following:   ¨ Aliskiren, everolimus, lithium, sirolimus, temsirolimus  ¨ Another blood pressure medicine, including an angiotensin receptor blocker (ARB)  ¨ Diuretic (water pill, including amiloride, spironolactone, triamterene)  ¨ Insulin or diabetes medicine  ¨ NSAID pain or arthritis medicine (including aspirin, celecoxib, diclofenac, ibuprofen, naproxen)  · Ask your doctor before you use any medicine, supplement, or salt substitute that contains potassium. Warnings While Using This Medicine:   · It is not safe to take this medicine during pregnancy. It could harm an unborn baby. Tell your doctor right away if you become pregnant. · Tell your doctor if you are breastfeeding, or if you have kidney disease, liver disease, diabetes, or heart or blood vessel disease. · This medicine may cause the following problems:  ¨ Angioedema (severe swelling)  ¨ Kidney problems  ¨ Serious liver problems  · This medicine could lower your blood pressure too much, especially when you first use it or if you are dehydrated. Stand or sit up slowly if you feel lightheaded or dizzy. · Do not stop using this medicine without asking your doctor, even if you feel well. This medicine will not cure your high blood pressure, but it will help keep it in a normal range. You may have to take blood pressure medicine for the rest of your life. · Tell any doctor or dentist who treats you that you are using this medicine. · Your doctor will do lab tests at regular visits to check on the effects of this medicine. Keep all appointments. · Keep all medicine out of the reach of children. Never share your medicine with anyone.   Possible Side Effects While Using This Medicine:   Call your doctor right away if you notice any of these side effects:  · Allergic reaction: Itching or hives, swelling in your face or hands, swelling or tingling in your mouth or throat, chest tightness, trouble breathing  · Blistering, peeling, or red skin rash  · Change in how much or how often you urinate  · Confusion, weakness, uneven heartbeat, trouble breathing, numbness or tingling in your hands, feet, or lips  · Dark urine or pale stools, nausea, vomiting, loss of appetite, stomach pain, yellow skin or eyes  · Fever, chills, sore throat, body aches  · Lightheadedness, dizziness, fainting  · Severe stomach pain (with or without nausea or vomiting)  If you notice these less serious side effects, talk with your doctor:   · Dry cough  If you notice other side effects that you think are caused by this medicine, tell your doctor. Call your doctor for medical advice about side effects. You may report side effects to FDA at 0-136-FDA-7571  © 2017 [facility] [place] [employer] Information is for End User's use only and may not be sold, redistributed or otherwise used for commercial purposes. The above information is an  only. It is not intended as medical advice for individual conditions or treatments. Talk to your doctor, nurse or pharmacist before following any medical regimen to see if it is safe and effective for you.

## 2022-05-23 NOTE — PROGRESS NOTES
Megan Murray is a 56 y.o. female that is here for a   Chief Complaint   Patient presents with   [facility] Follow Up    Medication Refill         1. Have you been to the ER, urgent care clinic since your last visit? Hospitalized since your last visit? Yes     2. Have you seen or consulted any other health care providers outside of the [address] [place] [address] since your last visit? Include any pap smears or colon screening.  no      Health Maintenance reviewed - yes      Upcoming Appts  no      VORB: No orders of the defined types were placed in this encounter.   Manoj Wynn MD/ Antonio Fernandez MA

## 2022-06-05 NOTE — DISCHARGE SUMMARY
Discharge Summary    Patient: Brionna Land MRN: [id number]  CSN: [id number]    YOB: 1975  Age: 56 y.o.   Sex: female    DOA: 5/14/2022 LOS:  LOS: 3 days   Discharge Date: 5/17/2022     Admission Diagnoses: FRANCISCA (acute kidney injury) ([facility] [N17.9]  Elevated troponin [R77.8]  Influenza A [J10.1]    Discharge Diagnoses:    Problem List as of 5/17/2022 Date Reviewed: 1/27/2022          Codes Class Noted - Resolved    Influenza A ICD-10-CM: J10.1  ICD-9-CM: 487.1  5/14/2022 - Present        Elevated troponin ICD-10-CM: R77.8  ICD-9-CM: 790.6  5/14/2022 - Present        Coronary artery disease of native artery of native heart with stable angina pectoris ([place] [address].) ICD-10-CM: I25.118  ICD-9-CM: 414.01, 413.9  6/29/2021 - Present        Foot ulcer, left ([facility] ICD-10-CM: L97.529  ICD-9-CM: 707.15  6/24/2021 - Present        Osteomyelitis ([facility] ICD-10-CM: M86.9  ICD-9-CM: 730.20  5/17/2021 - Present        Diabetic foot ulcer ([facility] ICD-10-CM: E11.621, L97.509  ICD-9-CM: 250.80, 707.15  5/17/2021 - Present        FRANCISCA (acute kidney injury) ([facility] ICD-10-CM: N17.9  ICD-9-CM: 584.9  5/17/2021 - Present        Sepsis ([facility] ICD-10-CM: A41.9  ICD-9-CM: 038.9, 995.91  5/17/2021 - Present        Hyponatremia ICD-10-CM: E87.1  ICD-9-CM: 276.1  5/17/2021 - Present        Severe obesity ([facility] ICD-10-CM: E66.01  ICD-9-CM: 278.01  11/5/2020 - Present        Fever ICD-10-CM: R50.9  ICD-9-CM: 780.60  10/20/2020 - Present        NSTEMI (non-ST elevated myocardial infarction) ([facility] ICD-10-CM: I21.4  ICD-9-CM: 410.70  10/20/2020 - Present        Insulin dependent diabetes mellitus ICD-10-CM: TQR3239  ICD-9-CM: 250.00, V58.67  1/11/2019 - Present        HTN (hypertension) ICD-10-CM: I10  ICD-9-CM: 401.9  1/11/2019 - Present        Diabetic foot infection ([facility] ICD-10-CM: E11.628, L08.9  ICD-9-CM: 250.80, 686.9  12/30/2018 - Present        Right foot infection ICD-10-CM: L08.9  ICD-9-CM: 686.9  12/30/2018 - Present        Acute pain of right foot ICD-10-CM: M79.671  ICD-9-CM: 729.5  12/30/2018 - Present        Acute bronchitis ICD-10-CM: J20.9  ICD-9-CM: 466.0  4/7/2017 - Present        Hyperglycemia ICD-10-CM: R73.9  ICD-9-CM: 790.29  4/7/2017 - Present              Reason for Admission  78-year-old female with history of type 2 diabetes, coronary artery disease status post MI in 2020, chronic kidney disease who presented with several day history of nonproductive cough fatigue and generalized feeling of being unwell. She said the cough started about 2 weeks ago she went to urgent care where she was COVID-negative was treated supportively. Few days ago symptoms worsened and she started to feel fatigued decreased appetite and nausea without vomiting. When symptoms persisted she presented to the ED. Discharge Condition: good    PHYSICAL EXAM   Visit Vitals  BP (!) 137/93 (BP 1 Location: Right upper arm, BP Patient Position: At rest)   Pulse 80   Temp 97.3 °F (36.3 °C)   Resp 18   Ht 5' 6\" (1.676 m)   Wt 94.8 kg (209 lb)   SpO2 100%   BMI 33.73 kg/m²       General:  Awake, alert  Cardiovascular:  S1S2+, RRR  Pulmonary: Coarse breath sounds bilaterally  GI:  Soft, BS+, NT, ND  Extremities:  trace edema    Hospital Course:   Influenza A  Acute kidney injury on top of chronic kidney disease stage III   Type 2 diabetes poorly controlled  Hypertension  Coronary artery disease  Elevated troponin, echo reassuring. Continue Tamiflu-renal dosing  Renal function appears to be a little worse, diuretics on hold  Nephrology is following  Continue aspirin and beta-blocker and Plavix  On amlodipine and clonidine, monitor blood pressure  Lantus, sliding scale insulin  VQ scan is low probability for PE  Patient is not homebound and no home health care needs were identified. Discharge to home. Patient agrees, all questions answered to the best my ability.     Consults: nephrology Dr. Reagan Bravo    Significant Diagnostic Studies:   Lab Results   Component Value Date/Time    WBC 4.9 05/17/2022 10:19 AM    Hemoglobin, POC 14.3 12/27/2014 05:54 AM    HGB 8.4 (L) 05/17/2022 10:19 AM    Hematocrit, POC 42 12/27/2014 05:54 AM    HCT 27.8 (L) 05/17/2022 10:19 AM    PLATELET 104 98/30/4273 10:19 AM    MCV 73.9 (L) 05/17/2022 10:19 AM     Lab Results   Component Value Date/Time    Sodium 140 05/17/2022 10:19 AM    Potassium 3.4 (L) 05/17/2022 10:19 AM    Chloride 110 05/17/2022 10:19 AM    CO2 25 05/17/2022 10:19 AM    Anion gap 5 05/17/2022 10:19 AM    Glucose 74 05/17/2022 10:19 AM    BUN 23 (H) 05/17/2022 10:19 AM    Creatinine 2.98 (H) 05/17/2022 10:19 AM    BUN/Creatinine ratio 8 (L) 05/17/2022 10:19 AM    GFR est AA 21 (L) 05/17/2022 10:19 AM    GFR est non-AA 17 (L) 05/17/2022 10:19 AM    Calcium 8.2 (L) 05/17/2022 10:19 AM       IMAGING  XR Results (most recent):  Results from Hospital Encounter encounter on 05/14/22    XR CHEST PA LAT    Narrative  EXAM: Chest Radiographs    INDICATION:  Dyspnea    TECHNIQUE: PA and lateral views of the chest    COMPARISON: None. FINDINGS: No pneumothorax identified. The lungs are clear. No infiltrates  identified. No effusions appreciated. The cardiomediastinal silhouette is  unremarkable. The pulmonary vascularity is unremarkable. The osseous structures  are unremarkable. Impression  1. No acute cardiopulmonary process.     EKG Results     Procedure 720 Value Units Date/Time    EKG, 12 LEAD, INITIAL [[id number]] Collected: 05/14/22 1331    Order Status: Completed Updated: 05/14/22 1436     Ventricular Rate 105 BPM      Atrial Rate 105 BPM      P-R Interval 134 ms      QRS Duration 74 ms      Q-T Interval 342 ms      QTC Calculation (Bezet) 452 ms      Calculated P Axis 64 degrees      Calculated R Axis 47 degrees      Calculated T Axis 85 degrees      Diagnosis --     Sinus tachycardia  Nonspecific T wave abnormality  Abnormal ECG  When compared with ECG of 13-NOV-2021 23:45,  No significant change was found  Confirmed by Yulisa Duggan MD, [employer]ivers ([id number]) on 5/14/2022 2:36:41 PM          05/14/22    ECHO ADULT COMPLETE 05/17/2022 5/17/2022    Interpretation Summary    Left Ventricle: Normal left ventricular systolic function with a visually estimated EF of 55 - 60%. Left ventricle size is normal. Increased wall thickness. Findings consistent with mild concentric hypertrophy. Normal wall motion. Diastolic dysfunction present with increased LAP with normal LV EF.   Pericardium: Trivial pericardial effusion present. No indication of cardiac tamponade. Signed by: Silvia Bustamante MD on 5/17/2022  3:06 PM    Discharge Medications:    Discharge Medication List as of 5/17/2022  1:24 PM      CONTINUE these medications which have CHANGED    Details   amLODIPine (NORVASC) 10 mg tablet Take 1 Tablet by mouth daily. , Normal, Disp-30 Tablet, R-0      insulin glargine (Lantus Solostar U-100 Insulin) 100 unit/mL (3 mL) inpn 30 Units by SubCUTAneous route daily. Indications: type 2 diabetes mellitus, Normal, Disp-5 Pen, R-0         CONTINUE these medications which have NOT CHANGED    Details   escitalopram oxalate (LEXAPRO) 10 mg tablet Take 1 Tablet by mouth daily. , Normal, Disp-30 Tablet, R-1      carvediloL (Coreg) 25 mg tablet Take 1 Tablet by mouth two (2) times daily (with meals). Indications: myocardial reinfarction prevention, Normal, Disp-180 Tablet, R-1      ferrous sulfate 325 mg (65 mg iron) tablet Take 1 Tablet by mouth daily (with breakfast). , Normal, Disp-30 Tablet, R-0      flash glucose scanning reader (FreeStyle Daniel 14 Day [place]) [facility] [place] Use to check blood glucose 3 times a day  Indications: Diabetes mellitus, Normal, Disp-1 Each, R-5      flash glucose sensor (FreeStyle Daniel 14 Day Sensor) kit Used to check blood glucose 3 times a day, Normal, Disp-1 Kit, R-0      cloNIDine HCL (CATAPRES) 0.1 mg tablet Take 1 Tablet by mouth two (2) times a day., Normal, Disp-60 Tablet, R-2      melatonin 5 mg cap capsule Take 1 Capsule by mouth as needed (difficulty in sleeping). Reports taking it as needed, Normal, Disp-90 Capsule, R-3      clopidogreL (PLAVIX) 75 mg tab Take 1 Tablet by mouth daily. , Normal, Disp-30 Tablet, R-0      Insulin Needles, Disposable, (BD Ultra-Fine Short Pen Needle) 31 gauge x 5/16\" ndle USE WITH INSULIN TWICE DAILY, Normal, Disp-1 Package, R-10      insulin lispro (HUMALOG) 100 unit/mL kwikpen 10 units before meals  Indications: type 2 diabetes mellitus, Normal, Disp-5 Pen, R-11      Insulin Syringe-Needle U-100 (INSULIN SYRINGE) 1 mL 30 gauge x 5/16 syrg As directed for lantus insulin, Print, Disp-50 Syringe, R-0         STOP taking these medications       magnesium oxide (MAG-OX) 400 mg tablet Comments:   Reason for Stopping:               Activity: as tolerated    Diet: regular    Wound Care:none needed    Follow-up:   Follow-up Appointments   Procedures    FOLLOW UP VISIT Appointment in: Other (Specify) 1. Carmine Diaz MD 3 - 5 days 2. Dr. Darryl Santana, nephrology next week. 1. Carmine Diaz MD 3 - 5 days  2. Dr. Darryl Santana, nephrology next week. Standing Status:   Standing     Number of Occurrences:   1     Order Specific Question:   Appointment in     Answer:    Other (Specify)         Minutes spent on discharge: >30

## 2022-06-09 ENCOUNTER — TELEPHONE (OUTPATIENT)
Dept: FAMILY MEDICINE CLINIC | Age: 47
End: 2022-06-09

## 2022-06-13 DIAGNOSIS — E11.8 TYPE 2 DIABETES MELLITUS WITH COMPLICATION (HCC): Primary | ICD-10-CM

## 2022-06-13 RX ORDER — SEMAGLUTIDE 1.34 MG/ML
0.25 INJECTION, SOLUTION SUBCUTANEOUS
Qty: 1 BOX | Refills: 3 | Status: SHIPPED | OUTPATIENT
Start: 2022-06-13 | End: 2022-06-15 | Stop reason: ALTCHOICE

## 2022-06-15 DIAGNOSIS — E11.8 TYPE 2 DIABETES MELLITUS WITH COMPLICATION (HCC): Primary | ICD-10-CM

## 2022-06-15 RX ORDER — DULAGLUTIDE 0.75 MG/.5ML
0.75 INJECTION, SOLUTION SUBCUTANEOUS
Qty: 12 PEN | Refills: 3 | Status: SHIPPED | OUTPATIENT
Start: 2022-06-15

## 2022-07-18 ENCOUNTER — HOSPITAL ENCOUNTER (OUTPATIENT)
Dept: LAB | Age: 47
Discharge: HOME | End: 2022-07-18

## 2022-07-18 LAB — SENTARA SPECIMEN COL,SENBCF: NORMAL

## 2022-07-18 PROCEDURE — 99001 SPECIMEN HANDLING PT-LAB: CPT

## 2022-07-28 ENCOUNTER — OFFICE VISIT (OUTPATIENT)
Dept: CARDIOLOGY CLINIC | Age: 47
End: 2022-07-28
Payer: MEDICAID

## 2022-07-28 VITALS
OXYGEN SATURATION: 100 % | HEIGHT: 66 IN | BODY MASS INDEX: 33.27 KG/M2 | DIASTOLIC BLOOD PRESSURE: 82 MMHG | SYSTOLIC BLOOD PRESSURE: 129 MMHG | HEART RATE: 102 BPM | WEIGHT: 207 LBS

## 2022-07-28 DIAGNOSIS — E66.9 OBESITY (BMI 30.0-34.9): ICD-10-CM

## 2022-07-28 DIAGNOSIS — R00.0 SINUS TACHYCARDIA: ICD-10-CM

## 2022-07-28 DIAGNOSIS — E78.00 HYPERCHOLESTEREMIA: ICD-10-CM

## 2022-07-28 DIAGNOSIS — I25.10 ATHEROSCLEROSIS OF NATIVE CORONARY ARTERY OF NATIVE HEART WITHOUT ANGINA PECTORIS: Primary | ICD-10-CM

## 2022-07-28 DIAGNOSIS — Z95.5 HISTORY OF CORONARY ARTERY STENT PLACEMENT: ICD-10-CM

## 2022-07-28 DIAGNOSIS — I50.32 CHRONIC DIASTOLIC CONGESTIVE HEART FAILURE (HCC): ICD-10-CM

## 2022-07-28 DIAGNOSIS — I10 ESSENTIAL HYPERTENSION: ICD-10-CM

## 2022-07-28 PROCEDURE — 99214 OFFICE O/P EST MOD 30 MIN: CPT | Performed by: INTERNAL MEDICINE

## 2022-07-28 RX ORDER — DILTIAZEM HYDROCHLORIDE 240 MG/1
240 CAPSULE, COATED, EXTENDED RELEASE ORAL DAILY
Qty: 90 CAPSULE | Refills: 1 | Status: SHIPPED | OUTPATIENT
Start: 2022-07-28 | End: 2022-10-06

## 2022-07-28 NOTE — PROGRESS NOTES
Candi Self presents today for   Chief Complaint   Patient presents with    Follow-up     6 month follow up    Hospital Follow Up     Admission on 05-14-22 to 05-17-22 at [facility] for FRANCISCA, Influenza A, and Elevated Troponin    Leg Swelling     Both legs, feet, and ankles       Candi Aramis preferred language for health care discussion is english/other. Is someone accompanying this pt? yes    Is the patient using any DME equipment during [address]? no    Depression Screening:  3 most recent PHQ Screens 7/28/2022   Little interest or pleasure in doing things Not at all   Feeling down, depressed, irritable, or hopeless Not at all   Total Score PHQ 2 0   Trouble falling or staying asleep, or sleeping too much -   Feeling tired or having little energy -   Poor appetite, weight loss, or overeating -   Feeling bad about yourself - or that you are a failure or have let yourself or your family down -   Trouble concentrating on things such as school, work, reading, or watching TV -   Moving or speaking so slowly that other people could have noticed; or the opposite being so fidgety that others notice -   Thoughts of being better off dead, or hurting yourself in some way -   PHQ 9 Score -       Learning Assessment:  Learning Assessment 7/28/2022   PRIMARY LEARNER Patient   HIGHEST LEVEL OF EDUCATION - PRIMARY LEARNER  -   BARRIERS PRIMARY LEARNER -   CO-LEARNER CAREGIVER -   PRIMARY LANGUAGE ENGLISH   LEARNER PREFERENCE PRIMARY DEMONSTRATION   ANSWERED BY patient   RELATIONSHIP SELF       Abuse Screening:  Abuse Screening Questionnaire 7/28/2022   Do you ever feel afraid of your partner? N   Are you in a relationship with someone who physically or mentally threatens you? N   Is it safe for you to go home? Y             Pt currently taking Anticoagulant therapy? no    Pt currently taking Antiplatelet therapy ? Plavix 75 mg once a day      Coordination of Care:  1. Have you been to the ER, urgent care clinic since your last visit? Hospitalized since your last visit? yes    2. Have you seen or consulted any other health care providers outside of the [address] [place] [address] since your last visit? Include any pap smears or colon screening.  no

## 2022-07-28 NOTE — PROGRESS NOTES
HISTORY OF PRESENT ILLNESS  Gaurav Ibarra is a 56 y.o. female. follow-up of non-STEMI status post PCI with BLANCA in RCA, hypertension, hyperlipidemia, obesity, diabetes    1/22 is not exercising very regularly. Feels depressed. Has not been able to lose weight.  7/22 was admitted with FRANCISCA in 5/22    Follow-up  Associated symptoms include shortness of breath. Pertinent negatives include no chest pain and no headaches. Shortness of Breath  The history is provided by the Patient. This is a new problem. The average episode lasts 5 minutes. The problem occurs intermittently. The current episode started more than 1 week ago (Early 2020). The problem has not changed since onset. Associated symptoms include leg swelling. Pertinent negatives include no fever, no headaches, no cough, no wheezing, no PND, no orthopnea, no chest pain, no vomiting, no rash and no claudication. The problem's precipitants include exercise (100 ft; 2/21 steps- 4 steps on stairs;). Hospital Follow Up  The history is provided by the Medical records (FRANCISCA? etiology). Associated symptoms include shortness of breath. Pertinent negatives include no chest pain and no headaches. Leg Swelling  The history is provided by the Patient. This is a new problem. The current episode started more than 1 week ago (1/22). The problem occurs daily. The problem has not changed since onset. Associated symptoms include shortness of breath. Pertinent negatives include no chest pain and no headaches. Nothing aggravates the symptoms.    Allergies   Allergen Reactions    Azithromycin Other (comments)     Rapid response was called for bradycardia and hypotension     Pcn [Penicillins] Hives       Past Medical History:   Diagnosis Date    CAD (coronary artery disease)     Diabetes ([address]     HTN (hypertension)     Hyperlipidemia        Family History   Problem Relation Age of Onset    Lupus Mother     Cancer Neg Hx     Diabetes Neg Hx     Heart Disease Neg Hx     Hypertension Neg Hx     Heart Attack Neg Hx     Stroke Neg Hx        Social History     Tobacco Use    Smoking status: Never    Smokeless tobacco: Never   Vaping Use    Vaping Use: Never used   Substance Use Topics    Alcohol use: No    Drug use: No        Current Outpatient Medications   Medication Sig    dulaglutide (Trulicity) 5.08 FN/8.0 mL sub-q pen 0.5 mL by SubCUTAneous route every seven (7) days.  cloNIDine HCL (CATAPRES) 0.1 mg tablet Take 1 Tablet by mouth two (2) times a day.  clopidogreL (PLAVIX) 75 mg tab Take 1 Tablet by mouth daily.  lisinopriL (PRINIVIL, ZESTRIL) 10 mg tablet Take 1 Tablet by mouth daily.  flash glucose scanning reader (FreeStyle Daniel 2 [place]) misc Used to check blood glucose before meals and at bedtime    flash glucose sensor (FreeStyle Daniel 2 Sensor) kit Used to check blood glucose before meals and at bedtime    amLODIPine (NORVASC) 10 mg tablet Take 1 Tablet by mouth daily.  insulin glargine (Lantus Solostar U-100 Insulin) 100 unit/mL (3 mL) inpn 30 Units by SubCUTAneous route daily. Indications: type 2 diabetes mellitus    escitalopram oxalate (LEXAPRO) 10 mg tablet Take 1 Tablet by mouth daily.  carvediloL (Coreg) 25 mg tablet Take 1 Tablet by mouth two (2) times daily (with meals). Indications: myocardial reinfarction prevention    ferrous sulfate 325 mg (65 mg iron) tablet Take 1 Tablet by mouth daily (with breakfast).  flash glucose scanning reader (FreeStyle Daniel 14 Day [place]) misc Use to check blood glucose 3 times a day  Indications: Diabetes mellitus    flash glucose sensor (FreeStyle Daniel 14 Day Sensor) kit Used to check blood glucose 3 times a day    melatonin 5 mg cap capsule Take 1 Capsule by mouth as needed (difficulty in sleeping).  Reports taking it as needed    Insulin Needles, Disposable, (BD Ultra-Fine Short Pen Needle) 31 gauge x 5/16\" ndle USE WITH INSULIN TWICE DAILY    insulin lispro (HUMALOG) 100 unit/mL kwikpen 10 units before meals  Indications: type 2 diabetes mellitus    Insulin Syringe-Needle U-100 (INSULIN SYRINGE) 1 mL 30 gauge x 5/16 syrg As directed for lantus insulin     No current facility-administered medications for this visit. Past Surgical History:   Procedure Laterality Date    HX CORONARY STENT PLACEMENT      HX GYN             Visit Vitals  /82 (BP 1 Location: Left upper arm, BP Patient Position: Sitting, BP Cuff Size: Large adult)   Pulse (!) 102   Ht 5' 6\" (1.676 m)   Wt 93.9 kg (207 lb)   SpO2 100%   BMI 33.41 kg/m²       Diagnostic Studies:  I have reviewed the relevant tests done on the patient and show as follows  EKG tracings reviewed by me today. EKG Results       None          XR Results (most recent):  Results from Hospital Encounter encounter on 22    XR CHEST PA LAT    Narrative  EXAM: Chest Radiographs    INDICATION:  Dyspnea    TECHNIQUE: PA and lateral views of the chest    COMPARISON: None. FINDINGS: No pneumothorax identified. The lungs are clear. No infiltrates  identified. No effusions appreciated. The cardiomediastinal silhouette is  unremarkable. The pulmonary vascularity is unremarkable. The osseous structures  are unremarkable. Impression  1. No acute cardiopulmonary process. 10/19/20   ECHO ADULT FOLLOW-UP OR LIMITED 10/20/2020 10/20/2020    Narrative · LV: Estimated LVEF is 55 - 60%. Visually measured ejection fraction. Normal cavity size, wall thickness and systolic function (ejection   fraction normal). Wall motion: normal.  · COVID r/o        Signed by: Brayan Phillips MD          10/19/20   CARDIAC PROCEDURE 10/23/2020 10/23/2020    Narrative 100% mid RCA occlusion with thrombus. S/p aspiration thrombectomy followed by ptca/stent ( 2 BLANCA stents placed   overlapping extending from mid RCA to distal RCA). Mild mid LAD stenosis. Normal LV function by echo.   TPM inserted via right femoral vein due to complete heart block.    Plan:  Continue DAPT for 1 yr/ high dose statin. Post PCI developed juctional tachycardia. Will discontinue TPM. Monitor   and consider PPM if needed. Recommend intense risk factor modification. Signed by: Pradeep Knight MD           Ms. Kirill Martin has a reminder for a \"due or due soon\" health maintenance. I have asked that she contact her primary care provider for follow-up on this health maintenance. Review of Systems   Constitutional:  Negative for chills, fever, malaise/fatigue and weight loss. HENT:  Negative for nosebleeds. Eyes:  Negative for discharge. Respiratory:  Positive for shortness of breath. Negative for cough and wheezing. Cardiovascular:  Positive for leg swelling. Negative for chest pain, palpitations, orthopnea, claudication and PND. Gastrointestinal:  Negative for diarrhea, nausea and vomiting. Genitourinary:  Negative for dysuria and hematuria. Musculoskeletal:  Negative for joint pain. Skin:  Negative for rash. Neurological:  Negative for dizziness, seizures, loss of consciousness and headaches. Endo/Heme/Allergies:  Negative for polydipsia. Does not bruise/bleed easily. Psychiatric/Behavioral:  Negative for depression and substance abuse. The patient does not have insomnia. 10/19/20   ECHO ADULT FOLLOW-UP OR LIMITED 10/20/2020 10/20/2020    Narrative · LV: Estimated LVEF is 55 - 60%. Visually measured ejection fraction. Normal cavity size, wall thickness and systolic function (ejection   fraction normal). Wall motion: normal.  · COVID r/o        Signed by: Esperanza Lino MD     10/19/20   CARDIAC PROCEDURE 10/23/2020 10/23/2020    Narrative 100% mid RCA occlusion with thrombus. S/p aspiration thrombectomy followed by ptca/stent ( 2 BLANCA stents placed   overlapping extending from mid RCA to distal RCA). Mild mid LAD stenosis. Normal LV function by echo. TPM inserted via right femoral vein due to complete heart block.     Plan:  Continue DAPT for 1 yr/ high dose statin. Post PCI developed juctional tachycardia. Will discontinue TPM. Monitor   and consider PPM if needed. Recommend intense risk factor modification. Signed by: Flori Cade MD   12/20 Holter  Normal sinus rhythm, no symptoms, no arrhythmias  9/21 echo  Interpretation Summary    LV: Estimated LVEF is 55 - 60%. Normal cavity size and systolic function (ejection fraction normal). Mild to moderate hypertrophy. Moderate septal wall hypertrophy. Mild posterior wall hypertrophy. Wall motion: normal. Mild (grade 1) left ventricular diastolic dysfunction. LA: Moderately dilated left atrium. Left Atrium volume index is 44.84 mL/m2. Normal pericardium and no evidence of pericardial effusion. Comparison Study Information    Prior Study    When compared to the previous study from 6/25/21, there is no longer evidence of a significant pericardial effusion. 05/14/22    ECHO ADULT COMPLETE 05/17/2022 5/17/2022    Interpretation Summary  Formatting of this result is different from the original.      Left Ventricle: Normal left ventricular systolic function with a visually estimated EF of 55 - 60%. Left ventricle size is normal. Increased wall thickness. Findings consistent with mild concentric hypertrophy. Normal wall motion. Diastolic dysfunction present with increased LAP with normal LV EF.   Pericardium: Trivial pericardial effusion present. No indication of cardiac tamponade. Signed by: Argentina Wright MD on 5/17/2022  3:06 PM        Physical Exam  Constitutional:       General: She is not in acute distress. Appearance: She is well-developed. She is obese. Comments: sev obese   HENT:      Head: Normocephalic and atraumatic. Mouth/Throat:      Dentition: Normal dentition. Eyes:      General: No scleral icterus. Right eye: No discharge. Left eye: No discharge. Neck:      Thyroid: No thyromegaly. Vascular: No carotid bruit or JVD. Cardiovascular:      Rate and Rhythm: Normal rate and regular rhythm. Pulses: Intact distal pulses. Heart sounds: Normal heart sounds, S1 normal and S2 normal. No murmur heard. No friction rub. No gallop. Pulmonary:      Effort: Pulmonary effort is normal.      Breath sounds: Normal breath sounds. No wheezing or rales. Abdominal:      Palpations: Abdomen is soft. There is no mass. Tenderness: There is no abdominal tenderness. Musculoskeletal:      Cervical back: Neck supple. Right lower leg: Edema (1) present. Left lower leg: Edema (1) present. Lymphadenopathy:      Cervical:      Right cervical: No superficial cervical adenopathy. Left cervical: No superficial cervical adenopathy. Skin:     General: Skin is warm and dry. Findings: No rash. Neurological:      Mental Status: She is alert and oriented to person, place, and time. Psychiatric:         Behavior: Behavior normal.       ASSESSMENT and PLAN    10/20 CAD stable. Patient seems to have chronic diastolic CHF. No evidence of fluid overload. Increase losartan and follow home BP chart. Check a Holter monitor before starting beta-blockers. Refer to cardiac rehab  Diet weight and exercise discussed in detail. 2/21 CAD stable. Blood pressure is controlled. Patient is getting recurrent shortness of breath and some edema intermittently. Overall she does feel better. Use Lasix as needed and follow electrolytes. Lipid not available despite the fact she had a blood drawn. May have to redo it if not traceable from [facility] or [facility]. Holter was unremarkable without any arrhythmias. No significant dizziness. Continue present medications. Diet weight and exercise discussed in detail. 7/2/2021 CAD stable. CHF is compensated. Blood pressure low normal.  Reduce Norvasc. Reduce Aldactone and follow the [facility] as well. Lipids are controlled. Continue statins. Has been losing weight over the months. Recommended that she lose another 10 to 20 pounds. Small pericardial effusion needs to be followed we will order a repeat echo. Severe anemia. Repeat H&H. Warned that she may need a repeat blood transfusion depending on the level. 10/22/2021 CAD stable. CHF persists with NYHA class III symptoms. Secondary to uncontrolled hypertension, obesity and LVH. Discussed with patient in detail regarding diet follow-up, trying regular exercise and blood pressure control. Add clonidine and follow home chart as diuretics were discontinued due to renal insufficiency by nephrology. Follow-up closely for CHF. Patient advised to come to ER if she gets significant worsening. 1/27/2022 CAD is clinically stable. CHF is still present NYHA class II-III. Patient admitted and also looks depressed as the desire to exercise and follow the diet is significantly reduced. Part of the liberty of starting Lexapro and will see how it works. Blood pressure is elevated and increase carvedilol and follow the home chart. Patient not getting any statins due to elevated liver enzymes as well as FRANCISCA in 11/21. Repeat the labs ASAP and restart statins if possible. Discussed with patient and . Diagnoses and all orders for this visit:    1. Atherosclerosis of native coronary artery of native heart without angina pectoris  -     dilTIAZem ER (CARDIZEM CD) 240 mg capsule; Take 1 Capsule by mouth in the morning. 2. Chronic diastolic congestive heart failure ([address]    3. Essential hypertension    4. Hypercholesteremia  -     LIPID PANEL; Future  -     HEPATIC FUNCTION PANEL; Future    5. Obesity (BMI 30.0-34.9)    6. History of coronary artery stent placement    7. Sinus tachycardia  -     dilTIAZem ER (CARDIZEM CD) 240 mg capsule; Take 1 Capsule by mouth in the morning. Pertinent laboratory and test data reviewed and discussed with patient.   See patient instructions also for other medical advice given    Medications Discontinued During This Encounter   Medication Reason    amLODIPine (NORVASC) 10 mg tablet Alternate Therapy       Follow-up and Dispositions    Return in about 6 months (around 1/28/2023), or if symptoms worsen or fail to improve, for BP log x 4-5 days post med changes, post test.       7/28/2022 CAD is stable. Patient was admitted with FRANCISCA in 5/22 and has shown worsening kidney function since discharge and is aware that she may require dialysis. CHF is compensated NYHA class III. Not getting any diuretics due to advancing renal insufficiency. Blood pressure is controlled. Lipids are not available and were ordered. Diet weight and exercise discussed. Due to sinus tachycardia, will change amlodipine to diltiazem and follow the home BP chart.

## 2022-08-06 ENCOUNTER — HOSPITAL ENCOUNTER (EMERGENCY)
Age: 47
Discharge: HOME | End: 2022-08-06
Attending: STUDENT IN AN ORGANIZED HEALTH CARE EDUCATION/TRAINING PROGRAM
Payer: MEDICAID

## 2022-08-06 DIAGNOSIS — B34.9 VIRAL SYNDROME: ICD-10-CM

## 2022-08-06 DIAGNOSIS — Z20.822 SUSPECTED COVID-19 VIRUS INFECTION: Primary | ICD-10-CM

## 2022-08-06 LAB — SARS-COV-2, COV2: NORMAL

## 2022-08-06 PROCEDURE — 99284 EMERGENCY DEPT VISIT MOD MDM: CPT

## 2022-08-06 PROCEDURE — 96372 THER/PROPH/DIAG INJ SC/IM: CPT

## 2022-08-06 PROCEDURE — U0003 INFECTIOUS AGENT DETECTION BY NUCLEIC ACID (DNA OR RNA); SEVERE ACUTE RESPIRATORY SYNDROME CORONAVIRUS 2 (SARS-COV-2) (CORONAVIRUS DISEASE [COVID-19]), AMPLIFIED PROBE TECHNIQUE, MAKING USE OF HIGH THROUGHPUT TECHNOLOGIES AS DESCRIBED BY CMS-2020-01-R: HCPCS

## 2022-08-06 PROCEDURE — 74011250636 HC RX REV CODE- 250/636: Performed by: STUDENT IN AN ORGANIZED HEALTH CARE EDUCATION/TRAINING PROGRAM

## 2022-08-06 PROCEDURE — 96374 THER/PROPH/DIAG INJ IV PUSH: CPT

## 2022-08-06 PROCEDURE — 74011250637 HC RX REV CODE- 250/637: Performed by: STUDENT IN AN ORGANIZED HEALTH CARE EDUCATION/TRAINING PROGRAM

## 2022-08-06 RX ORDER — DICYCLOMINE HYDROCHLORIDE 10 MG/ML
20 INJECTION INTRAMUSCULAR
Status: COMPLETED | OUTPATIENT
Start: 2022-08-06 | End: 2022-08-06

## 2022-08-06 RX ORDER — ACETAMINOPHEN 500 MG
1000 TABLET ORAL ONCE
Status: COMPLETED | OUTPATIENT
Start: 2022-08-06 | End: 2022-08-06

## 2022-08-06 RX ORDER — ONDANSETRON 4 MG/1
4 TABLET, FILM COATED ORAL
Qty: 14 TABLET | Refills: 0 | Status: SHIPPED | OUTPATIENT
Start: 2022-08-06 | End: 2022-10-06

## 2022-08-06 RX ORDER — ONDANSETRON 2 MG/ML
4 INJECTION INTRAMUSCULAR; INTRAVENOUS ONCE
Status: COMPLETED | OUTPATIENT
Start: 2022-08-06 | End: 2022-08-06

## 2022-08-06 RX ORDER — DICYCLOMINE HYDROCHLORIDE 10 MG/1
10 CAPSULE ORAL 2 TIMES DAILY
Qty: 14 CAPSULE | Refills: 0 | Status: SHIPPED | OUTPATIENT
Start: 2022-08-06 | End: 2022-10-06

## 2022-08-06 RX ORDER — ACETAMINOPHEN 500 MG
1000 TABLET ORAL
Qty: 24 TABLET | Refills: 0 | Status: SHIPPED | OUTPATIENT
Start: 2022-08-06

## 2022-08-06 RX ADMIN — ACETAMINOPHEN 1000 MG: 500 TABLET ORAL at 19:22

## 2022-08-06 RX ADMIN — DICYCLOMINE HYDROCHLORIDE 20 MG: 10 INJECTION, SOLUTION INTRAMUSCULAR at 19:24

## 2022-08-06 RX ADMIN — ONDANSETRON 4 MG: 2 INJECTION INTRAMUSCULAR; INTRAVENOUS at 19:22

## 2022-08-06 NOTE — ED PROVIDER NOTES
EMERGENCY DEPARTMENT HISTORY AND PHYSICAL EXAM      Date: (Not on file)  Patient Name: Tirso Anderson    History of Presenting Illness     No chief complaint on file. History (Context): Tirso Anderson is a 56 y.o. female with a past medical history significant for CAD, hypertension, diabetes, hyperlipidemia comes into the ED today due to viral syndrome symptoms. Patient states she woke up this morning with generalized myalgias, nausea, diarrhea, and sore throat. Patient states symptoms have worsened since onset. Admits to taking Tylenol for treatment of her symptoms prior to arrival.  Denies any fever, chills, dyspnea, or cough. Denies any alleviating or exacerbating factors. Describes her symptoms as severe      PCP: Neha Campblel MD    Current Facility-Administered Medications   Medication Dose Route Frequency Provider Last Rate Last Admin    ondansetron (ZOFRAN) injection 4 mg  4 mg IntraVENous ONCE Dominic Khan, DO        acetaminophen (TYLENOL) tablet 1,000 mg  1,000 mg Oral ONCE Dominic Khan,         dicyclomine (BENTYL) 10 mg/mL injection 20 mg  20 mg IntraMUSCular NOW [place] Needle, DO         Current Outpatient Medications   Medication Sig Dispense Refill    dicyclomine (BENTYL) 10 mg capsule Take 1 Capsule by mouth two (2) times a day. 14 Capsule 0    ondansetron hcl (Zofran) 4 mg tablet Take 1 Tablet by mouth every eight (8) hours as needed for Nausea. 14 Tablet 0    acetaminophen (TYLENOL) 500 mg tablet Take 2 Tablets by mouth every six (6) hours as needed for Pain. 24 Tablet 0    dilTIAZem ER (CARDIZEM CD) 240 mg capsule Take 1 Capsule by mouth in the morning. 90 Capsule 1    dulaglutide (Trulicity) 2.31 UL/2.0 mL sub-q pen 0.5 mL by SubCUTAneous route every seven (7) days. 12 Pen 3    cloNIDine HCL (CATAPRES) 0.1 mg tablet Take 1 Tablet by mouth two (2) times a day. 180 Tablet 3    clopidogreL (PLAVIX) 75 mg tab Take 1 Tablet by mouth daily.  30 Tablet 0    lisinopriL (PRINIVIL, ZESTRIL) 10 mg tablet Take 1 Tablet by mouth daily. 90 Tablet 3    flash glucose scanning reader (FreeStyle Daniel 2 [place]) misc Used to check blood glucose before meals and at bedtime 1 Each 0    flash glucose sensor (FreeStyle Daniel 2 Sensor) kit Used to check blood glucose before meals and at bedtime 2 Kit 5    insulin glargine (Lantus Solostar U-100 Insulin) 100 unit/mL (3 mL) inpn 30 Units by SubCUTAneous route daily. Indications: type 2 diabetes mellitus 5 Pen 0    escitalopram oxalate (LEXAPRO) 10 mg tablet Take 1 Tablet by mouth daily. 30 Tablet 1    carvediloL (Coreg) 25 mg tablet Take 1 Tablet by mouth two (2) times daily (with meals). Indications: myocardial reinfarction prevention 180 Tablet 1    ferrous sulfate 325 mg (65 mg iron) tablet Take 1 Tablet by mouth daily (with breakfast). 30 Tablet 0    flash glucose scanning reader (FreeStyle Daniel 14 Day [place]) misc Use to check blood glucose 3 times a day  Indications: Diabetes mellitus 1 Each 5    flash glucose sensor (FreeStyle Daniel 14 Day Sensor) kit Used to check blood glucose 3 times a day 1 Kit 0    melatonin 5 mg cap capsule Take 1 Capsule by mouth as needed (difficulty in sleeping).  Reports taking it as needed 90 Capsule 3    Insulin Needles, Disposable, (BD Ultra-Fine Short Pen Needle) 31 gauge x 5/16\" ndle USE WITH INSULIN TWICE DAILY 1 Package 10    insulin lispro (HUMALOG) 100 unit/mL kwikpen 10 units before meals  Indications: type 2 diabetes mellitus 5 Pen 11    Insulin Syringe-Needle U-100 (INSULIN SYRINGE) 1 mL 30 gauge x 5/16 syrg As directed for lantus insulin 50 Syringe 0       Past History     Past Medical History:   Past Medical History:   Diagnosis Date    CAD (coronary artery disease)     Diabetes ([address]     HTN (hypertension)     Hyperlipidemia        Past Surgical History:  Past Surgical History:   Procedure Laterality Date    HX CORONARY STENT PLACEMENT      HX GYN             Family History:  Family History Problem Relation Age of Onset    Lupus Mother     Cancer Neg Hx     Diabetes Neg Hx     Heart Disease Neg Hx     Hypertension Neg Hx     Heart Attack Neg Hx     Stroke Neg Hx        Social History:   Social History     Tobacco Use    Smoking status: Never    Smokeless tobacco: Never   Vaping Use    Vaping Use: Never used   Substance Use Topics    Alcohol use: No    Drug use: No       Allergies: Allergies   Allergen Reactions    Azithromycin Other (comments)     Rapid response was called for bradycardia and hypotension     Pcn [Penicillins] Hives       PMH, PSH, family history, social history, allergies reviewed with the patient with significant items noted above. Review of Systems   Review of Systems   Constitutional:  Positive for activity change, appetite change and fatigue. Negative for chills and fever. HENT:  Positive for sore throat. Eyes:  Negative for visual disturbance. Respiratory:  Negative for shortness of breath. Cardiovascular:  Negative for chest pain. Gastrointestinal:  Positive for nausea and vomiting. Negative for abdominal pain. Genitourinary:  Negative for difficulty urinating. Musculoskeletal:  Positive for myalgias. Skin:  Negative for rash. Neurological:  Negative for headaches. Physical Exam   There were no vitals filed for this visit. Physical Exam  Vitals and nursing note reviewed. Constitutional:       General: She is not in acute distress. Appearance: Normal appearance. She is not ill-appearing or toxic-appearing. HENT:      Head: Normocephalic and atraumatic. Mouth/Throat:      Mouth: Mucous membranes are moist.   Eyes:      General: No scleral icterus. Conjunctiva/sclera: Conjunctivae normal.   Cardiovascular:      Rate and Rhythm: Normal rate and regular rhythm. Comments: Normal peripheral perfusion  Pulmonary:      Effort: Pulmonary effort is normal. No respiratory distress. Abdominal:      General: There is no distension. Palpations: Abdomen is soft. Tenderness: There is no abdominal tenderness. Musculoskeletal:         General: No deformity. Normal range of motion. Cervical back: Normal range of motion and neck supple. Skin:     General: Skin is warm and dry. Findings: No rash. Neurological:      General: No focal deficit present. Mental Status: She is alert and oriented to person, place, and time. Mental status is at baseline. Psychiatric:         Mood and Affect: Mood normal.         Thought Content: Thought content normal.       Diagnostic Study Results     Labs -   No results found for this or any previous visit (from the past 12 hour(s)). Labs Reviewed   SARS-COV-2       Radiologic Studies -   No orders to display     CT Results  (Last 48 hours)      None          CXR Results  (Last 48 hours)      None            The laboratory results, imaging results, and other diagnostic exams were reviewed in the EMR. Medical Decision Making   I am the first provider for this patient. I reviewed the vital signs, available nursing notes, past medical history, past surgical history, family history and social history. Vital Signs-Reviewed the patient's vital signs. Records Reviewed: Personally, on initial evaluation    MDM:   Nathan Moseley presents with complaint of viral syndrome symptoms  DDX includes but is not limited to: COVID, viral syndrome, acute URI    Patient overall well-appearing, no acute distress, vital signs grossly within normal limits. Do not feel patient would benefit from lab work or imaging at this time. Will discharge patient home with strict return precautions and follow-up recommendations. Patient verbalized understanding is without any further questions.         Orders as below:  Orders Placed This Encounter    SARS-COV-2    ondansetron (ZOFRAN) injection 4 mg    acetaminophen (TYLENOL) tablet 1,000 mg    dicyclomine (BENTYL) 10 mg/mL injection 20 mg    dicyclomine (BENTYL) 10 mg capsule    ondansetron hcl (Zofran) 4 mg tablet    acetaminophen (TYLENOL) 500 mg tablet        ED Course:            Procedures:  Procedures        Diagnosis and Disposition     CLINICAL IMPRESSION:  1. Suspected COVID-19 virus infection    2. Viral syndrome      Current Discharge Medication List        START taking these medications    Details   dicyclomine (BENTYL) 10 mg capsule Take 1 Capsule by mouth two (2) times a day. Qty: 14 Capsule, Refills: 0  Start date: 8/6/2022      ondansetron hcl (Zofran) 4 mg tablet Take 1 Tablet by mouth every eight (8) hours as needed for Nausea. Qty: 14 Tablet, Refills: 0  Start date: 8/6/2022      acetaminophen (TYLENOL) 500 mg tablet Take 2 Tablets by mouth every six (6) hours as needed for Pain. Qty: 24 Tablet, Refills: 0  Start date: 8/6/2022             Disposition: Home    Patient condition at time of disposition: Stable    DISCHARGE NOTE:   Pt has been reexamined. Patient has no new complaints, changes, or physical findings. Care plan outlined and precautions discussed. Results were reviewed with the patient. All medications were reviewed with the patient. All of pt's questions and concerns were addressed. Alarm symptoms and return precautions associated with chief complaint and evaluation were reviewed with the patient in detail. The patient demonstrated adequate understanding. Patient was instructed to follow up with PCP, as well as strict return precautions to the ED upon further deterioration. Patient is ready to go home. The patient is happy with this plan      Dragon Disclaimer     Please note that this dictation was completed with [employer], the computer voice recognition software. Quite often unanticipated grammatical, syntax, homophones, and other interpretive errors are inadvertently transcribed by the computer software. Please disregard these errors. Please excuse any errors that have escaped final proofreading. Conor ALDANA

## 2022-08-06 NOTE — ED NOTES
Discharge medications reviewed with patient and appropriate educational materials and side effects teaching were provided. I have reviewed discharge instructions with the patient. The patient verbalized understanding. Patient left ED ambulatory.

## 2022-08-08 ENCOUNTER — PATIENT OUTREACH (OUTPATIENT)
Dept: CASE MANAGEMENT | Age: 47
End: 2022-08-08

## 2022-08-08 LAB — SARS-COV-2, NAA: DETECTED

## 2022-08-08 NOTE — PROGRESS NOTES
Date/Time:  8/8/2022 10:27 AM   Call within 2 business days of discharge: Yes   Attempted to reach patient by telephone. Left HIPPA compliant message requesting a return call. Will attempt to reach patient again. Covid test pending.

## 2022-08-09 ENCOUNTER — PATIENT OUTREACH (OUTPATIENT)
Dept: CASE MANAGEMENT | Age: 47
End: 2022-08-09

## 2022-08-09 NOTE — PROGRESS NOTES
Date/Time:  8/9/2022 11:26 AM   Call within 2 business days of discharge: Yes   2nd attempt to reach patient by telephone. Left HIPPA compliant message requesting a return call. His episode is resolved. Patrick Kearney

## 2022-08-09 NOTE — PROGRESS NOTES
Ambulatory Care Coordination ED COVID Follow up Call    Challenges to be reviewed by the provider   Additional needs identified to be addressed with provider no  none           Encounter was not routed to provider for escalation. Method of communication with provider : phone    Discussed Joanne Matta related testing which was available at this time. Test results were positive. Patient informed of results, if available? yes. Current Symptoms: no new symptoms and no worsening symptoms    Reviewed New or Changed Meds: yes    Do you have what you need at home? Durable Medical Equipment ordered at discharge: None  Home Health/Outpatient orders at discharge: none   Pulse oximeter? no Discussed and confirmed pulse oximeter discharge instructions and when to notify provider or seek emergency care. Patient education provided: Reviewed appropriate site of care based on symptoms and resources available to patient including:  when to seek medical attention . Follow up appointment recommended: no. If no appointment scheduled, scheduling offered: no.  Future Appointments   Date Time Provider Reginaldo Lin   8/22/2022 10:40 AM MD ARJUN Loredo-JULIUS ESTEVEZ AMB   2/6/2023 12:00 PM Ori Johnson MD [facility] AMB       Interventions:  LPN CC available if assistance is needed. Reviewed discharge instructions, medical action plan and red flags with patient who verbalized understanding. Provided contact information for future needs. Plan for follow-up call in 5-7 days based on severity of symptoms and risk factors.   Plan for next call:  follow up      Wisam White LPN

## 2022-08-16 ENCOUNTER — PATIENT OUTREACH (OUTPATIENT)
Dept: CASE MANAGEMENT | Age: 47
End: 2022-08-16

## 2022-08-16 NOTE — PROGRESS NOTES
Date/Time:  8/16/2022 11:49 AM   Call within 2 business days of discharge: Yes   Attempted to reach patient by telephone. Left HIPPA compliant message requesting a return call. This episode is resolved.

## 2022-08-23 NOTE — DIABETES MGMT
Diabetes Patient/Family Education Record  Factors That  May Influence Patients Ability  to Learn or  Comply with Recommendations   []   Language barrier    []   Cultural needs   []   Motivation    []   Cognitive limitation    []   Physical   []   Education    []   Physiological factors   []   Hearing/vision/speaking impairment   []   Sabianist beliefs    []   Financial factors   []  Other:   [x]  No factors identified at this time. Person Instructed:   [x]   Patient   []   Family   []  Other     Preference for Learning:   [x]   Verbal   []   Written   []  Demonstration     Level of Comprehension & Competence:    [x]  Good                                      [] Fair                                     []  Poor                             []  Needs Reinforcement   [x]  Teachback completed    Education Component:   [x]  Medication management, including how to administer insulin (if appropriate) and potential medication interactions   · Pt states she has had diabetes for approximately 19 years, ever since she was pregnant with her son. · She takes Lantus insulin 40 units daily via insulin pen and Metformin 500 mg daily. · Has an appointment with an endocrinologist this Thursday, she is unsure if she will still be in the hospital.  · She received an insulin pump at home with supplies, stating she is supposed to start on an insulin pump for better blood glucose control. · Reviewed hospital insulin protocol with pt and pt states understanding. []  Nutritional management -obtain usual meal pattern   []  Exercise   [x]  Signs, symptoms, and treatment of hyperglycemia and hypoglycemia   [x] Prevention, recognition and treatment of hyperglycemia and hypoglycemia   [x]  Importance of blood glucose monitoring and how to obtain a blood glucose meter   · Has a meter and supplies at home  · States she checks her blood glucose 2-3 times daily and it runs in the 200-300 range.    []  Instruction on None use of the blood glucose meter   [x]  Discuss the importance of HbA1C monitoring   · >14% equivalent to an average blood glucose of 355 mg/dl over the past 2-3 months. · Improved from last admission. 15.1%   [x]  Sick day guidelines   []  Proper use and disposal of lancets, needles, syringes or insulin pens (if appropriate)   [x]  Potential long-term complications (retinopathy, kidney disease, neuropathy, foot care)   [x] Information about whom to contact in case of emergency or for more information    [x]  Goal:  Patient/family will demonstrate understanding of Diabetes Self Management Skills by: (date) _10/25/2020______  Plan for post-discharge education or self-management support:    [] Outpatient class schedule provided            [] Patient Declined    [] Scheduled for outpatient classes (date) _______  Verify:  Does patient understand how diabetes medications work? Yes  Does patient know what their most recent A1c is? Yes  Does patient monitor glucose at home? Yes  Does patient have difficulty obtaining diabetes medications or testing supplies?   No issues voiced

## 2022-08-24 DIAGNOSIS — E11.8 TYPE 2 DIABETES MELLITUS WITH COMPLICATION (HCC): Primary | ICD-10-CM

## 2022-08-24 RX ORDER — IBUPROFEN 200 MG
CAPSULE ORAL
Qty: 100 STRIP | Refills: 10 | Status: SHIPPED | OUTPATIENT
Start: 2022-08-24

## 2022-09-26 ENCOUNTER — HOSPITAL ENCOUNTER (OUTPATIENT)
Dept: LAB | Age: 47
Discharge: HOME | End: 2022-09-26

## 2022-09-26 LAB — SENTARA SPECIMEN COL,SENBCF: NORMAL

## 2022-09-26 PROCEDURE — 99001 SPECIMEN HANDLING PT-LAB: CPT

## 2022-10-06 NOTE — PERIOP NOTES
PRE-SURGICAL INSTRUCTIONS        Patient's Name:  Bnuny Espana      Today's Date:  10/6/2022             Surgery Date:  10/13/2022                Do NOT eat or drink anything, including candy, gum, or ice chips after midnight on 10/13/22, unless you have specific instructions from your surgeon or anesthesia provider to do so. You may brush your teeth before coming to the hospital.  No smoking 24 hours prior to the day of surgery. No alcohol 24 hours prior to the day of surgery. No recreational drugs for one week prior to the day of surgery. Leave all valuables, including money/purse, at home. Remove all jewelry, nail polish, acrylic nails, and makeup (including mascara); no lotions powders, deodorant, or perfume/cologne/after shave on the skin. Follow instruction for Hibiclens washes and CHG wipes from surgeon's office. Glasses/contact lenses and dentures may be worn to the hospital.  They will be removed prior to surgery. Call your doctor if symptoms of a cold or illness develop within 24-48 hours prior to your surgery. 11.  If you are having an outpatient procedure, please make arrangements for a responsible ADULT TO [address] [place] [address] and stay with you for 24 hours after your surgery. 12. ONE VISITOR in the hospital at this time for outpatient procedures. Exceptions may be made for surgical admissions, per nursing unit guidelines      Special Instructions:      Bring list of CURRENT medications. Bring any pertinent legal medical records. Take these medications the morning of surgery with a sip of water: as instructed by surgeon. Follow physician instructions about insulin. On the day of surgery, come in the main entrance of [facility]. Let the  at the desk know you are there for surgery. A staff member will come escort you to the surgical area on the second floor.     If you have any questions or concerns, please do not hesitate to call:     (Prior to the day of surgery) [facility] department:  [phone]   (Day of surgery) Pre-Op department:  [phone]    These surgical instructions were reviewed with Brock Sung during the [facility] phone call.

## 2022-10-07 ENCOUNTER — TRANSCRIBE ORDER (OUTPATIENT)
Dept: REGISTRATION | Age: 47
End: 2022-10-07

## 2022-10-07 ENCOUNTER — HOSPITAL ENCOUNTER (OUTPATIENT)
Dept: GENERAL RADIOLOGY | Age: 47
Discharge: HOME | End: 2022-10-07
Payer: MEDICAID

## 2022-10-07 ENCOUNTER — HOSPITAL ENCOUNTER (OUTPATIENT)
Dept: PREADMISSION TESTING | Age: 47
Discharge: HOME | End: 2022-10-07
Payer: MEDICAID

## 2022-10-07 DIAGNOSIS — N18.6 END STAGE RENAL DISEASE (HCC): ICD-10-CM

## 2022-10-07 DIAGNOSIS — N18.6 END STAGE RENAL DISEASE (HCC): Primary | ICD-10-CM

## 2022-10-07 LAB
ANION GAP SERPL CALC-SCNC: 9 MMOL/L (ref 3–18)
ATRIAL RATE: 95 BPM
BASOPHILS # BLD: 0.1 K/UL (ref 0–0.1)
BASOPHILS NFR BLD: 1 % (ref 0–2)
BUN SERPL-MCNC: 41 MG/DL (ref 7–18)
BUN/CREAT SERPL: 9 (ref 12–20)
CALCIUM SERPL-MCNC: 8.2 MG/DL (ref 8.5–10.1)
CALCULATED P AXIS, ECG09: 49 DEGREES
CALCULATED R AXIS, ECG10: 42 DEGREES
CALCULATED T AXIS, ECG11: 84 DEGREES
CHLORIDE SERPL-SCNC: 114 MMOL/L (ref 100–111)
CO2 SERPL-SCNC: 20 MMOL/L (ref 21–32)
CREAT SERPL-MCNC: 4.64 MG/DL (ref 0.6–1.3)
DIAGNOSIS, 93000: NORMAL
DIFFERENTIAL METHOD BLD: ABNORMAL
EOSINOPHIL # BLD: 0.1 K/UL (ref 0–0.4)
EOSINOPHIL NFR BLD: 1 % (ref 0–5)
ERYTHROCYTE [DISTWIDTH] IN BLOOD BY AUTOMATED COUNT: 16 % (ref 11.6–14.5)
GLUCOSE SERPL-MCNC: 161 MG/DL (ref 74–99)
HCT VFR BLD AUTO: 27.2 % (ref 35–45)
HGB BLD-MCNC: 8.2 G/DL (ref 12–16)
IMM GRANULOCYTES # BLD AUTO: 0 K/UL (ref 0–0.04)
IMM GRANULOCYTES NFR BLD AUTO: 0 % (ref 0–0.5)
LYMPHOCYTES # BLD: 2.4 K/UL (ref 0.9–3.6)
LYMPHOCYTES NFR BLD: 31 % (ref 21–52)
MCH RBC QN AUTO: 22 PG (ref 24–34)
MCHC RBC AUTO-ENTMCNC: 30.1 G/DL (ref 31–37)
MCV RBC AUTO: 72.9 FL (ref 78–100)
MONOCYTES # BLD: 0.6 K/UL (ref 0.05–1.2)
MONOCYTES NFR BLD: 7 % (ref 3–10)
NEUTS SEG # BLD: 4.5 K/UL (ref 1.8–8)
NEUTS SEG NFR BLD: 59 % (ref 40–73)
NRBC # BLD: 0 K/UL (ref 0–0.01)
NRBC BLD-RTO: 0 PER 100 WBC
P-R INTERVAL, ECG05: 144 MS
PLATELET # BLD AUTO: 244 K/UL (ref 135–420)
PMV BLD AUTO: 10.8 FL (ref 9.2–11.8)
POTASSIUM SERPL-SCNC: 4.4 MMOL/L (ref 3.5–5.5)
Q-T INTERVAL, ECG07: 358 MS
QRS DURATION, ECG06: 74 MS
QTC CALCULATION (BEZET), ECG08: 449 MS
RBC # BLD AUTO: 3.73 M/UL (ref 4.2–5.3)
SODIUM SERPL-SCNC: 143 MMOL/L (ref 136–145)
VENTRICULAR RATE, ECG03: 95 BPM
WBC # BLD AUTO: 7.7 K/UL (ref 4.6–13.2)

## 2022-10-07 PROCEDURE — 80048 BASIC METABOLIC PNL TOTAL CA: CPT

## 2022-10-07 PROCEDURE — 71046 X-RAY EXAM CHEST 2 VIEWS: CPT

## 2022-10-07 PROCEDURE — 93005 ELECTROCARDIOGRAM TRACING: CPT

## 2022-10-07 PROCEDURE — 85025 COMPLETE CBC W/AUTO DIFF WBC: CPT

## 2022-10-07 PROCEDURE — 36415 COLL VENOUS BLD VENIPUNCTURE: CPT

## 2022-10-12 ENCOUNTER — ANESTHESIA EVENT (OUTPATIENT)
Dept: CARDIOTHORACIC SURGERY | Age: 47
End: 2022-10-12
Payer: MEDICAID

## 2022-10-13 ENCOUNTER — ANESTHESIA (OUTPATIENT)
Dept: CARDIOTHORACIC SURGERY | Age: 47
End: 2022-10-13
Payer: MEDICAID

## 2022-10-13 ENCOUNTER — HOSPITAL ENCOUNTER (OUTPATIENT)
Age: 47
Setting detail: OUTPATIENT SURGERY
Discharge: HOME | End: 2022-10-13
Attending: SURGERY | Admitting: SURGERY
Payer: MEDICAID

## 2022-10-13 VITALS
HEART RATE: 87 BPM | RESPIRATION RATE: 20 BRPM | TEMPERATURE: 97 F | SYSTOLIC BLOOD PRESSURE: 128 MMHG | HEIGHT: 67 IN | BODY MASS INDEX: 32.8 KG/M2 | WEIGHT: 209 LBS | OXYGEN SATURATION: 100 % | DIASTOLIC BLOOD PRESSURE: 81 MMHG

## 2022-10-13 DIAGNOSIS — N18.6 ESRD (END STAGE RENAL DISEASE) (HCC): Primary | ICD-10-CM

## 2022-10-13 LAB
ANION GAP SERPL CALC-SCNC: 8 MMOL/L (ref 3–18)
BUN SERPL-MCNC: 42 MG/DL (ref 7–18)
BUN/CREAT SERPL: 8 (ref 12–20)
CALCIUM SERPL-MCNC: 7.9 MG/DL (ref 8.5–10.1)
CHLORIDE SERPL-SCNC: 116 MMOL/L (ref 100–111)
CO2 SERPL-SCNC: 20 MMOL/L (ref 21–32)
CREAT SERPL-MCNC: 5.06 MG/DL (ref 0.6–1.3)
ERYTHROCYTE [DISTWIDTH] IN BLOOD BY AUTOMATED COUNT: 15.9 % (ref 11.6–14.5)
GLUCOSE BLD STRIP.AUTO-MCNC: 157 MG/DL (ref 70–110)
GLUCOSE BLD STRIP.AUTO-MCNC: 69 MG/DL (ref 70–110)
GLUCOSE BLD STRIP.AUTO-MCNC: 95 MG/DL (ref 70–110)
GLUCOSE SERPL-MCNC: 62 MG/DL (ref 74–99)
HCG UR QL: NEGATIVE
HCT VFR BLD AUTO: 25 % (ref 35–45)
HGB BLD-MCNC: 7.7 G/DL (ref 12–16)
INR PPP: 1 (ref 0.8–1.2)
MCH RBC QN AUTO: 22.4 PG (ref 24–34)
MCHC RBC AUTO-ENTMCNC: 30.8 G/DL (ref 31–37)
MCV RBC AUTO: 72.9 FL (ref 78–100)
NRBC # BLD: 0 K/UL (ref 0–0.01)
NRBC BLD-RTO: 0 PER 100 WBC
PLATELET # BLD AUTO: 246 K/UL (ref 135–420)
PMV BLD AUTO: 10.6 FL (ref 9.2–11.8)
POTASSIUM SERPL-SCNC: 4.9 MMOL/L (ref 3.5–5.5)
PROTHROMBIN TIME: 13.7 SEC (ref 11.5–15.2)
RBC # BLD AUTO: 3.43 M/UL (ref 4.2–5.3)
SODIUM SERPL-SCNC: 144 MMOL/L (ref 136–145)
WBC # BLD AUTO: 7.9 K/UL (ref 4.6–13.2)

## 2022-10-13 PROCEDURE — 81025 URINE PREGNANCY TEST: CPT

## 2022-10-13 PROCEDURE — 76210000020 HC REC RM PH II FIRST 0.5 HR: Performed by: SURGERY

## 2022-10-13 PROCEDURE — 77030040922 HC BLNKT HYPOTHRM STRY -A: Performed by: SURGERY

## 2022-10-13 PROCEDURE — 77030002986 HC SUT PROL J&J -A: Performed by: SURGERY

## 2022-10-13 PROCEDURE — 74011250636 HC RX REV CODE- 250/636: Performed by: SURGERY

## 2022-10-13 PROCEDURE — 80048 BASIC METABOLIC PNL TOTAL CA: CPT

## 2022-10-13 PROCEDURE — 2709999900 HC NON-CHARGEABLE SUPPLY: Performed by: SURGERY

## 2022-10-13 PROCEDURE — 77030002933 HC SUT MCRYL J&J -A: Performed by: SURGERY

## 2022-10-13 PROCEDURE — 76210000017 HC OR PH I REC 1.5 TO 2 HR: Performed by: SURGERY

## 2022-10-13 PROCEDURE — 74011000250 HC RX REV CODE- 250: Performed by: SURGERY

## 2022-10-13 PROCEDURE — 85610 PROTHROMBIN TIME: CPT

## 2022-10-13 PROCEDURE — 74011250637 HC RX REV CODE- 250/637: Performed by: NURSE ANESTHETIST, CERTIFIED REGISTERED

## 2022-10-13 PROCEDURE — 76060000034 HC ANESTHESIA 1.5 TO 2 HR: Performed by: SURGERY

## 2022-10-13 PROCEDURE — 77030016441 HC APPL CLP LIG1 J&J -B: Performed by: SURGERY

## 2022-10-13 PROCEDURE — 01844 ANES VASC SHUNT/SHUNT REVJ: CPT | Performed by: ANESTHESIOLOGY

## 2022-10-13 PROCEDURE — 82962 GLUCOSE BLOOD TEST: CPT

## 2022-10-13 PROCEDURE — 77030002987 HC SUT PROL J&J -B: Performed by: SURGERY

## 2022-10-13 PROCEDURE — 77030010507 HC ADH SKN DERMBND J&J -B: Performed by: SURGERY

## 2022-10-13 PROCEDURE — 74011250636 HC RX REV CODE- 250/636: Performed by: ANESTHESIOLOGY

## 2022-10-13 PROCEDURE — 76010000129 HC CV SURG 1.5 TO 2 HR: Performed by: SURGERY

## 2022-10-13 PROCEDURE — 85027 COMPLETE CBC AUTOMATED: CPT

## 2022-10-13 PROCEDURE — 74011250636 HC RX REV CODE- 250/636: Performed by: NURSE ANESTHETIST, CERTIFIED REGISTERED

## 2022-10-13 RX ORDER — FAMOTIDINE 20 MG/1
20 TABLET, FILM COATED ORAL ONCE
Status: COMPLETED | OUTPATIENT
Start: 2022-10-13 | End: 2022-10-13

## 2022-10-13 RX ORDER — DEXTROSE MONOHYDRATE 100 MG/ML
0-250 INJECTION, SOLUTION INTRAVENOUS AS NEEDED
Status: DISCONTINUED | OUTPATIENT
Start: 2022-10-13 | End: 2022-10-13 | Stop reason: HOSPADM

## 2022-10-13 RX ORDER — FENTANYL CITRATE 50 UG/ML
25 INJECTION, SOLUTION INTRAMUSCULAR; INTRAVENOUS AS NEEDED
Status: DISCONTINUED | OUTPATIENT
Start: 2022-10-13 | End: 2022-10-13 | Stop reason: HOSPADM

## 2022-10-13 RX ORDER — FENTANYL CITRATE 50 UG/ML
50 INJECTION, SOLUTION INTRAMUSCULAR; INTRAVENOUS
Status: DISCONTINUED | OUTPATIENT
Start: 2022-10-13 | End: 2022-10-13 | Stop reason: HOSPADM

## 2022-10-13 RX ORDER — SODIUM CHLORIDE 0.9 % (FLUSH) 0.9 %
5-40 SYRINGE (ML) INJECTION AS NEEDED
Status: DISCONTINUED | OUTPATIENT
Start: 2022-10-13 | End: 2022-10-13 | Stop reason: HOSPADM

## 2022-10-13 RX ORDER — ONDANSETRON 2 MG/ML
4 INJECTION INTRAMUSCULAR; INTRAVENOUS ONCE
Status: DISCONTINUED | OUTPATIENT
Start: 2022-10-13 | End: 2022-10-13 | Stop reason: HOSPADM

## 2022-10-13 RX ORDER — INSULIN LISPRO 100 [IU]/ML
INJECTION, SOLUTION INTRAVENOUS; SUBCUTANEOUS ONCE
Status: DISCONTINUED | OUTPATIENT
Start: 2022-10-13 | End: 2022-10-13 | Stop reason: HOSPADM

## 2022-10-13 RX ORDER — MAGNESIUM SULFATE 100 %
4 CRYSTALS MISCELLANEOUS AS NEEDED
Status: DISCONTINUED | OUTPATIENT
Start: 2022-10-13 | End: 2022-10-13 | Stop reason: HOSPADM

## 2022-10-13 RX ORDER — PROPOFOL 10 MG/ML
VIAL (ML) INTRAVENOUS
Status: DISCONTINUED | OUTPATIENT
Start: 2022-10-13 | End: 2022-10-13 | Stop reason: HOSPADM

## 2022-10-13 RX ORDER — LIDOCAINE HYDROCHLORIDE 10 MG/ML
INJECTION, SOLUTION EPIDURAL; INFILTRATION; INTRACAUDAL; PERINEURAL
Status: DISCONTINUED
Start: 2022-10-13 | End: 2022-10-13 | Stop reason: HOSPADM

## 2022-10-13 RX ORDER — SODIUM CHLORIDE 0.9 % (FLUSH) 0.9 %
5-40 SYRINGE (ML) INJECTION EVERY 8 HOURS
Status: DISCONTINUED | OUTPATIENT
Start: 2022-10-13 | End: 2022-10-13 | Stop reason: HOSPADM

## 2022-10-13 RX ORDER — HEPARIN SODIUM 200 [USP'U]/100ML
INJECTION, SOLUTION INTRAVENOUS
Status: COMPLETED | OUTPATIENT
Start: 2022-10-13 | End: 2022-10-13

## 2022-10-13 RX ORDER — HYDROCODONE BITARTRATE AND ACETAMINOPHEN 5; 325 MG/1; MG/1
1 TABLET ORAL
Qty: 40 TABLET | Refills: 0 | Status: SHIPPED | OUTPATIENT
Start: 2022-10-13 | End: 2022-10-20

## 2022-10-13 RX ORDER — HYDROCODONE BITARTRATE AND ACETAMINOPHEN 5; 325 MG/1; MG/1
1 TABLET ORAL AS NEEDED
Status: DISCONTINUED | OUTPATIENT
Start: 2022-10-13 | End: 2022-10-13 | Stop reason: HOSPADM

## 2022-10-13 RX ORDER — VANCOMYCIN HYDROCHLORIDE 1 G/20ML
INJECTION, POWDER, LYOPHILIZED, FOR SOLUTION INTRAVENOUS
Status: DISCONTINUED
Start: 2022-10-13 | End: 2022-10-13 | Stop reason: HOSPADM

## 2022-10-13 RX ORDER — SODIUM CHLORIDE 9 MG/ML
25 INJECTION, SOLUTION INTRAVENOUS CONTINUOUS
Status: DISCONTINUED | OUTPATIENT
Start: 2022-10-13 | End: 2022-10-13 | Stop reason: HOSPADM

## 2022-10-13 RX ORDER — HEPARIN SODIUM 1000 [USP'U]/ML
INJECTION, SOLUTION INTRAVENOUS; SUBCUTANEOUS AS NEEDED
Status: DISCONTINUED | OUTPATIENT
Start: 2022-10-13 | End: 2022-10-13 | Stop reason: HOSPADM

## 2022-10-13 RX ORDER — LIDOCAINE HYDROCHLORIDE 10 MG/ML
INJECTION, SOLUTION EPIDURAL; INFILTRATION; INTRACAUDAL; PERINEURAL AS NEEDED
Status: DISCONTINUED | OUTPATIENT
Start: 2022-10-13 | End: 2022-10-13 | Stop reason: HOSPADM

## 2022-10-13 RX ORDER — LIDOCAINE HYDROCHLORIDE 10 MG/ML
0.1 INJECTION, SOLUTION EPIDURAL; INFILTRATION; INTRACAUDAL; PERINEURAL AS NEEDED
Status: DISCONTINUED | OUTPATIENT
Start: 2022-10-13 | End: 2022-10-13 | Stop reason: HOSPADM

## 2022-10-13 RX ORDER — HEPARIN SODIUM 200 [USP'U]/100ML
INJECTION, SOLUTION INTRAVENOUS
Status: DISCONTINUED
Start: 2022-10-13 | End: 2022-10-13 | Stop reason: HOSPADM

## 2022-10-13 RX ORDER — MIDAZOLAM HYDROCHLORIDE 1 MG/ML
INJECTION, SOLUTION INTRAMUSCULAR; INTRAVENOUS AS NEEDED
Status: DISCONTINUED | OUTPATIENT
Start: 2022-10-13 | End: 2022-10-13 | Stop reason: HOSPADM

## 2022-10-13 RX ORDER — DEXTROSE MONOHYDRATE 100 MG/ML
250 INJECTION, SOLUTION INTRAVENOUS ONCE
Status: COMPLETED | OUTPATIENT
Start: 2022-10-13 | End: 2022-10-13

## 2022-10-13 RX ORDER — FENTANYL CITRATE 50 UG/ML
INJECTION, SOLUTION INTRAMUSCULAR; INTRAVENOUS AS NEEDED
Status: DISCONTINUED | OUTPATIENT
Start: 2022-10-13 | End: 2022-10-13 | Stop reason: HOSPADM

## 2022-10-13 RX ADMIN — SODIUM CHLORIDE 1 G: 900 INJECTION, SOLUTION INTRAVENOUS at 11:14

## 2022-10-13 RX ADMIN — FENTANYL CITRATE 25 MCG: 50 INJECTION INTRAMUSCULAR; INTRAVENOUS at 10:48

## 2022-10-13 RX ADMIN — SODIUM CHLORIDE 25 ML/HR: 9 INJECTION, SOLUTION INTRAVENOUS at 09:00

## 2022-10-13 RX ADMIN — MIDAZOLAM HYDROCHLORIDE 1 MG: 2 INJECTION, SOLUTION INTRAMUSCULAR; INTRAVENOUS at 11:14

## 2022-10-13 RX ADMIN — MIDAZOLAM HYDROCHLORIDE 1 MG: 2 INJECTION, SOLUTION INTRAMUSCULAR; INTRAVENOUS at 10:43

## 2022-10-13 RX ADMIN — PROPOFOL 50 MCG/KG/MIN: 10 INJECTION, EMULSION INTRAVENOUS at 10:48

## 2022-10-13 RX ADMIN — HEPARIN SODIUM 3000 UNITS: 1000 INJECTION, SOLUTION INTRAVENOUS; SUBCUTANEOUS at 11:42

## 2022-10-13 RX ADMIN — FENTANYL CITRATE 50 MCG: 50 INJECTION INTRAMUSCULAR; INTRAVENOUS at 10:43

## 2022-10-13 RX ADMIN — FENTANYL CITRATE 25 MCG: 50 INJECTION INTRAMUSCULAR; INTRAVENOUS at 11:14

## 2022-10-13 RX ADMIN — FAMOTIDINE 20 MG: 20 TABLET ORAL at 09:11

## 2022-10-13 RX ADMIN — DEXTROSE MONOHYDRATE 250 ML: 100 INJECTION, SOLUTION INTRAVENOUS at 09:09

## 2022-10-13 NOTE — DISCHARGE INSTRUCTIONS
Patient may be discharged home with doppler pulses at left wrist and at surgical site. Make follow up appointment for two weeks. DISCHARGE SUMMARY from Nurse      PATIENT INSTRUCTIONS:    After general anesthesia or intravenous sedation, for 24 hours or while taking prescription Narcotics:  Limit your activities  Do not drive and operate hazardous machinery  Do not make important personal or business decisions  Do  not drink alcoholic beverages  If you have not urinated within 8 hours after discharge, please contact your surgeon on call. Report the following to your surgeon:  Excessive pain, swelling, redness or odor of or around the surgical area  Temperature over 100.5  Nausea and vomiting lasting longer than 4 hours or if unable to take medications  Any signs of decreased circulation or nerve impairment to extremity: change in color, persistent  numbness, tingling, coldness or increase pain  Any questions        These are general instructions for a healthy lifestyle:    No smoking/ No tobacco products/ Avoid exposure to second hand smoke  Surgeon General's Warning:  Quitting smoking now greatly reduces serious risk to your health. Obesity, smoking, and sedentary lifestyle greatly increases your risk for illness    A healthy diet, regular physical exercise & weight monitoring are important for maintaining a healthy lifestyle    You may be retaining fluid if you have a history of heart failure or if you experience any of the following symptoms:  Weight gain of 3 pounds or more overnight or 5 pounds in a week, increased swelling in our hands or feet or shortness of breath while lying flat in bed. Please call your doctor as soon as you notice any of these symptoms; do not wait until your next office visit. The discharge information has been reviewed with the patient and Mother. The patient and Mother verbalized understanding.   Discharge medications reviewed with the patient and Mother and appropriate educational materials and side effects teaching were provided.   ___________________________________________________________________________________________________________________________________

## 2022-10-13 NOTE — PERIOP NOTES
DR. Chana King informed pt's B/S 69, D10 given per protocol with B/S 157. Per Dr. Chana King no additional treatment  needed.

## 2022-10-13 NOTE — ANESTHESIA POSTPROCEDURE EVALUATION
Procedure(s):  LEFT ARM ARTERIO VENOUS FISTULA CREATION. MAC    Anesthesia Post Evaluation      Multimodal analgesia: multimodal analgesia used between 6 hours prior to anesthesia start to PACU discharge  Patient location during evaluation: PACU  Patient participation: complete - patient participated  Level of consciousness: awake and alert  Pain management: adequate  Airway patency: patent  Anesthetic complications: no  Cardiovascular status: acceptable and hemodynamically stable  Respiratory status: acceptable  Hydration status: acceptable  Post anesthesia nausea and vomiting:  controlled      INITIAL Post-op Vital signs:   Vitals Value Taken Time   /79 10/13/22 1304   Temp 36.6 °C (97.8 °F) 10/13/22 1230   Pulse 81 10/13/22 1305   Resp 9 10/13/22 1305   SpO2 100 % 10/13/22 1305   Vitals shown include unvalidated device data.

## 2022-10-13 NOTE — ANESTHESIA PREPROCEDURE EVALUATION
Anesthetic History   No history of anesthetic complications            Review of Systems / Medical History  Patient summary reviewed, nursing notes reviewed and pertinent labs reviewed    Pulmonary  Within defined limits                 Neuro/Psych   Within defined limits           Cardiovascular    Hypertension      CHF    CAD and cardiac stents      Comments: 05/2022 ECHO  estimated EF of 55 - 60%   GI/Hepatic/Renal         Renal disease: CRI       Endo/Other    Diabetes: using insulin    Obesity and anemia     Other Findings            Physical Exam    Airway  Mallampati: III  TM Distance: 4 - 6 cm  Neck ROM: normal range of motion   Mouth opening: Normal     Cardiovascular    Rhythm: regular           Dental    Dentition: Poor dentition     Pulmonary  Breath sounds clear to auscultation               Abdominal  GI exam deferred       Other Findings            Anesthetic Plan    ASA: 4  Anesthesia type: MAC            Anesthetic plan and risks discussed with: Patient

## 2022-10-13 NOTE — OP NOTES
Operative Note    Patient: Kelsey Rahman  YOB: 1975  MRN: [id number]    Date of Procedure: 10/13/2022     Pre-Op Diagnosis: ESRD (end stage renal disease) [facility] [N18.6]    Post-Op Diagnosis: Same as preoperative diagnosis. Procedure(s):  LEFT ARM ARTERIO VENOUS FISTULA CREATION, left brachial artery to cephalic vein fistula    Surgeon(s):  Ben Mora MD    Surgical Assistant: Surg Asst-1: Jerry Hess    Anesthesia: MAC     Estimated Blood Loss (mL):  Minimal    Complications: None    Specimens: * No specimens in log *     Implants: * No implants in log *    Drains: * No LDAs found *    Findings: Small brachial artery, lower limits of acceptable for vein. Creation of brachial cephalic fistula with good thrill. Detailed Description of Procedure:   Prep antibiotics were given she had moderate sedation. Left arm was prepped draped usual standard fashion. There is obesity in the left arm. Unable to ultrasound look at the cephalic vein identified and marked. Did a cutdown after localizing the antecubital fossa exposed the brachial artery is quite deep and quite small but did have pulse. Placed 2 Vesseloops here then identified the cephalic vein and freed it up proximally and distally clipped it distally allowing enough length for transposition. After transected and flushed it dilated it and spatulated it. Anticoagulated the patient with heparin. Gained arterial controls made arteriotomy 11 blade scalpel Roche scissors and anastomose the vein graft to the artery end-to-side with 7-0 Prolene suture circular standard fashion. Careful attention to see the lumen on each bite. Once this was complete I released all the controls there was a thrill in the fistula immediately. Irrigated and closed the flap there is no tension on the fistula with interrupted 3-0 Monocryl for Monocryl and Dermabond glue for the skin.   She is transferred to recovery no pain in her hand    Electronically Signed by Torrie Stafford MD on 10/13/2022 at 12:08 PM

## 2022-10-13 NOTE — H&P
Surgery History and Physical    Subjective:      Saw Hairston is a 56 y.o.  female who presents with need for creation of permanent dialysis access in anticipation of dialysis. She is right-hand dominant.   Is treated for hypertension and diabetes has had toe loss secondary to diabetic infection    Patient Active Problem List    Diagnosis Date Noted    ESRD (end stage renal disease) ([address] 10/13/2022    Chronic diastolic congestive heart failure ([address] 07/28/2022    History of coronary artery stent placement 07/28/2022    Obesity (BMI 30.0-34.9) 07/28/2022    Sinus tachycardia 07/28/2022    Hyperglycemia due to type 2 diabetes mellitus ([address] 05/23/2022    Hypercholesteremia 05/23/2022    Influenza A 05/14/2022    Elevated troponin 05/14/2022    Hypertensive chronic kidney disease with stage 1 through stage 4 chronic kidney disease, or unspecified chronic kidney disease 10/13/2021    Nephrotic syndrome 10/13/2021    Stage 3a chronic kidney disease ([address] 10/13/2021    Acute kidney injury ([address] 10/13/2021    Coronary artery disease of native artery of native heart with stable angina pectoris ([address] 06/29/2021    Coronary atherosclerosis 06/29/2021    Foot ulcer, left ([address] 06/24/2021    Osteomyelitis ([address] 05/17/2021    Diabetic foot ulcer ([address] 05/17/2021    FRANCISCA (acute kidney injury) ([address] 05/17/2021    Sepsis ([address] 05/17/2021    Hyponatremia 05/17/2021    Essential hypertension 12/16/2020    Type 2 diabetes mellitus with chronic kidney disease ([address] 12/16/2020    Persistent proteinuria 12/16/2020    Severe obesity ([address] 11/05/2020    Fever 10/20/2020    NSTEMI (non-ST elevated myocardial infarction) ([address] 10/20/2020    Insulin dependent diabetes mellitus 01/11/2019    HTN (hypertension) 01/11/2019    Diabetic foot infection ([address] 12/30/2018    Right foot infection 12/30/2018    Acute pain of right foot 12/30/2018    Acute bronchitis 04/07/2017    Hyperglycemia 04/07/2017     Past Medical History: Diagnosis Date    CAD (coronary artery disease)     Chronic kidney disease     CKD 5    Diabetes (HCC)     HTN (hypertension)     Hyperlipidemia       Past Surgical History:   Procedure Laterality Date    HX CORONARY STENT PLACEMENT      HX GYN          HX ORTHOPAEDIC Left     two toes amputated      Social History     Tobacco Use    Smoking status: Never    Smokeless tobacco: Never   Substance Use Topics    Alcohol use: No      Family History   Problem Relation Age of Onset    Lupus Mother     Cancer Neg Hx     Diabetes Neg Hx     Heart Disease Neg Hx     Hypertension Neg Hx     Heart Attack Neg Hx     Stroke Neg Hx       Prior to Admission medications    Medication Sig Start Date End Date Taking? Authorizing Provider   acetaminophen (TYLENOL) 500 mg tablet Take 2 Tablets by mouth every six (6) hours as needed for Pain. 22  Yes Augustine Collins DO   dulaglutide (Trulicity) 8.33 AF/7.9 mL sub-q pen 0.5 mL by SubCUTAneous route every seven (7) days. 6/15/22  Yes Kya Howard MD   cloNIDine HCL (CATAPRES) 0.1 mg tablet Take 1 Tablet by mouth two (2) times a day. 22  Yes Kya Howard MD   insulin glargine (Lantus Solostar U-100 Insulin) 100 unit/mL (3 mL) inpn 30 Units by SubCUTAneous route daily. Indications: type 2 diabetes mellitus 22  Yes Jonathan Linares MD   escitalopram oxalate (LEXAPRO) 10 mg tablet Take 1 Tablet by mouth daily. 22  Yes Dolores Nur MD   carvediloL (Coreg) 25 mg tablet Take 1 Tablet by mouth two (2) times daily (with meals). Indications: myocardial reinfarction prevention 22  Yes Dolores Nur MD   melatonin 5 mg cap capsule Take 1 Capsule by mouth as needed (difficulty in sleeping).  Reports taking it as needed 10/13/21  Yes Kya Howard MD   insulin lispro (HUMALOG) 100 unit/mL kwikpen 10 units before meals  Indications: type 2 diabetes mellitus 21  Yes Kya Howard MD   Insulin Needles, Disposable, (BD Ultra-Fine Short Pen Needle) 31 gauge x 5/16\" ndle USE WITH INSULIN TWICE DAILY 9/12/22   Tavon Genao MD   glucose blood VI test strips (blood glucose test) strip Use to test blood glucose BID 8/24/22   Kane Calles MD   flash glucose scanning reader (FreeStyle Daniel 2 Gaffney) misc Used to check blood glucose before meals and at bedtime 5/23/22   Tavon Genao MD   flash glucose sensor (FreeStyle Felicia Kendall 2 Sensor) kit Used to check blood glucose before meals and at bedtime 5/23/22   Tavon Genao MD   flash glucose scanning reader [facility] 14 Day Gaffney) misc Use to check blood glucose 3 times a day  Indications: Diabetes mellitus 11/29/21   Tavon Genao MD   flash glucose sensor (FreeStyle Daniel 14 Day Sensor) kit Used to check blood glucose 3 times a day 11/29/21   Tavon Genao MD   Insulin Syringe-Needle U-100 (INSULIN SYRINGE) 1 mL 30 gauge x 5/16 syrg As directed for lantus insulin 1/2/19   Nandini Berg MD     Allergies   Allergen Reactions    Azithromycin Other (comments)     Rapid response was called for bradycardia and hypotension     Pcn [Penicillins] Hives         Review of Systems:    A comprehensive review of systems was negative except for that written in the History of Present Illness. Objective:     No data found. No data recorded. Physical Exam:  LUNG: clear to auscultation bilaterally, HEART: regular rate and rhythm, S1, S2 normal, no murmur, click, rub or gallop, ABDOMEN: soft, non-tender.  Bowel sounds normal. No masses,  no organomegaly    Labs:   Recent Results (from the past 24 hour(s))   GLUCOSE, POC    Collection Time: 10/13/22  8:31 AM   Result Value Ref Range    Glucose (POC) 69 (L) 70 - 110 mg/dL       Data Review:  BMP:   Lab Results   Component Value Date/Time    Glucose 161 (H) 10/07/2022 11:23 AM    Sodium 143 10/07/2022 11:23 AM    Potassium 4.4 10/07/2022 11:23 AM    Chloride 114 (H) 10/07/2022 11:23 AM    CO2 20 (L) 10/07/2022 11:23 AM    BUN 41 (H) 10/07/2022 11:23 AM    Creatinine 4.64 (H) 10/07/2022 11:23 AM    Calcium 8.2 (L) 10/07/2022 11:23 AM     Vein mapping    Assessment:     Principal Problem:    ES[facility] (10/13/2022)      Plan:     Left arm fistula possible graft    Signed By: Soco Alexander MD     October 13, 2022

## 2022-10-27 ENCOUNTER — HOSPITAL ENCOUNTER (OUTPATIENT)
Dept: LAB | Age: 47
Discharge: HOME | End: 2022-10-27

## 2022-10-27 LAB — SENTARA SPECIMEN COL,SENBCF: NORMAL

## 2022-10-27 PROCEDURE — 99001 SPECIMEN HANDLING PT-LAB: CPT

## 2022-12-23 ENCOUNTER — TELEPHONE (OUTPATIENT)
Dept: FAMILY MEDICINE CLINIC | Age: 47
End: 2022-12-23

## 2022-12-23 NOTE — TELEPHONE ENCOUNTER
----- Message from Olamide Muse sent at 12/12/2022  2:27 PM EST -----  Subject: Message to Provider    QUESTIONS  Information for Provider? pt has appt on 3/14/23 with Dr Brady Dunlap for   diabetes f/u and would like to be put on cancellation list if an appt   comes up sooner. ---------------------------------------------------------------------------  --------------  Maria D BACH  [phone]; OK to leave message on voicemail  ---------------------------------------------------------------------------  --------------  SCRIPT ANSWERS  Relationship to Patient?  Self

## 2022-12-29 NOTE — PERIOP NOTES
PRE-SURGICAL INSTRUCTIONS        Patient's Name:  Colt Moreno      Today's Date:  12/29/2022            Covid Testing Date and Time:    Surgery Date:  1/12/2023                Do NOT eat or drink anything, including candy, gum, or ice chips after midnight on 1/11/2023, unless you have specific instructions from your surgeon or anesthesia provider to do so. You may brush your teeth before coming to the hospital.  No smoking 24 hours prior to the day of surgery. No alcohol 24 hours prior to the day of surgery. No recreational drugs for one week prior to the day of surgery. Leave all valuables, including money/purse, at home. Remove all jewelry, nail polish, acrylic nails, and makeup (including mascara); no lotions powders, deodorant, or perfume/cologne/after shave on the skin. Follow instruction for Hibiclens washes and CHG wipes from surgeon's office. Glasses/contact lenses and dentures may be worn to the hospital.  They will be removed prior to surgery. Call your doctor if symptoms of a cold or illness develop within 24-48 hours prior to your surgery. 11.  If you are having an outpatient procedure, please make arrangements for a responsible ADULT TO [address] [address] and stay with you for 24 hours after your surgery. 12. ONE VISITOR in the hospital at this time for outpatient procedures. Exceptions may be made for surgical admissions, per nursing unit guidelines      Special Instructions:      Bring list of CURRENT medications. Bring inhaler. Bring CPAP machine. Bring any pertinent legal medical records. Take these medications the morning of surgery with a sip of water:  bp meds  Follow physician instructions about insulin. Follow physician instructions about stopping anticoagulants. Complete bowel prep per MD instructions. On the day of surgery, come in the main entrance of [facility]. Let the  at the desk know you are there for surgery.   A staff member will come escort you to the surgical area on the second floor. If you have any questions or concerns, please do not hesitate to call:     (Prior to the day of surgery) PAT department:  [phone]   (Day of surgery) Pre-Op department:  [phone]    These surgical instructions were reviewed with patient during the PAT phone call.

## 2023-01-06 ENCOUNTER — ANESTHESIA EVENT (OUTPATIENT)
Dept: CARDIOTHORACIC SURGERY | Age: 48
End: 2023-01-06
Payer: MEDICAID

## 2023-01-09 ENCOUNTER — HOSPITAL ENCOUNTER (OUTPATIENT)
Dept: LAB | Age: 48
Discharge: HOME | End: 2023-01-09

## 2023-01-09 LAB — SENTARA SPECIMEN COL,SENBCF: NORMAL

## 2023-01-09 PROCEDURE — 99001 SPECIMEN HANDLING PT-LAB: CPT

## 2023-01-12 ENCOUNTER — HOSPITAL ENCOUNTER (OUTPATIENT)
Age: 48
Setting detail: OUTPATIENT SURGERY
Discharge: HOME | End: 2023-01-12
Attending: SURGERY | Admitting: SURGERY
Payer: MEDICAID

## 2023-01-12 ENCOUNTER — ANESTHESIA (OUTPATIENT)
Dept: CARDIOTHORACIC SURGERY | Age: 48
End: 2023-01-12
Payer: MEDICAID

## 2023-01-12 ENCOUNTER — APPOINTMENT (OUTPATIENT)
Dept: GENERAL RADIOLOGY | Age: 48
End: 2023-01-12
Attending: SURGERY
Payer: MEDICAID

## 2023-01-12 ENCOUNTER — ANESTHESIA EVENT (OUTPATIENT)
Dept: CARDIOTHORACIC SURGERY | Age: 48
End: 2023-01-12
Payer: MEDICAID

## 2023-01-12 VITALS
RESPIRATION RATE: 10 BRPM | SYSTOLIC BLOOD PRESSURE: 154 MMHG | DIASTOLIC BLOOD PRESSURE: 92 MMHG | HEIGHT: 67 IN | WEIGHT: 208.6 LBS | HEART RATE: 98 BPM | OXYGEN SATURATION: 97 % | BODY MASS INDEX: 32.74 KG/M2 | TEMPERATURE: 97.8 F

## 2023-01-12 DIAGNOSIS — N18.6 ESRD (END STAGE RENAL DISEASE) (HCC): Primary | ICD-10-CM

## 2023-01-12 DIAGNOSIS — N17.9 ACUTE KIDNEY INJURY (HCC): ICD-10-CM

## 2023-01-12 LAB
ANION GAP SERPL CALC-SCNC: 7 MMOL/L (ref 3–18)
BUN SERPL-MCNC: 49 MG/DL (ref 7–18)
BUN/CREAT SERPL: 7 (ref 12–20)
CALCIUM SERPL-MCNC: 8.2 MG/DL (ref 8.5–10.1)
CHLORIDE SERPL-SCNC: 121 MMOL/L (ref 100–111)
CO2 SERPL-SCNC: 20 MMOL/L (ref 21–32)
CREAT SERPL-MCNC: 6.68 MG/DL (ref 0.6–1.3)
GLUCOSE BLD STRIP.AUTO-MCNC: 88 MG/DL (ref 70–110)
GLUCOSE BLD STRIP.AUTO-MCNC: 88 MG/DL (ref 70–110)
GLUCOSE SERPL-MCNC: 86 MG/DL (ref 74–99)
HBA1C MFR BLD: 6.3 % (ref 4.2–5.6)
HCG UR QL: NEGATIVE
POTASSIUM SERPL-SCNC: 4.6 MMOL/L (ref 3.5–5.5)
SODIUM SERPL-SCNC: 148 MMOL/L (ref 136–145)

## 2023-01-12 PROCEDURE — 77030003390 HC NDL ANGI MRTM -A: Performed by: SURGERY

## 2023-01-12 PROCEDURE — 74011250637 HC RX REV CODE- 250/637: Performed by: NURSE ANESTHETIST, CERTIFIED REGISTERED

## 2023-01-12 PROCEDURE — 74011250636 HC RX REV CODE- 250/636: Performed by: STUDENT IN AN ORGANIZED HEALTH CARE EDUCATION/TRAINING PROGRAM

## 2023-01-12 PROCEDURE — C1750 CATH, HEMODIALYSIS,LONG-TERM: HCPCS | Performed by: SURGERY

## 2023-01-12 PROCEDURE — 76060000035 HC ANESTHESIA 2 TO 2.5 HR: Performed by: SURGERY

## 2023-01-12 PROCEDURE — 76210000001 HC OR PH I REC 2.5 TO 3 HR: Performed by: SURGERY

## 2023-01-12 PROCEDURE — 80048 BASIC METABOLIC PNL TOTAL CA: CPT

## 2023-01-12 PROCEDURE — 77030002933 HC SUT MCRYL J&J -A: Performed by: SURGERY

## 2023-01-12 PROCEDURE — 77030040922 HC BLNKT HYPOTHRM STRY -A: Performed by: SURGERY

## 2023-01-12 PROCEDURE — 2709999900 HC NON-CHARGEABLE SUPPLY: Performed by: SURGERY

## 2023-01-12 PROCEDURE — 74011000250 HC RX REV CODE- 250: Performed by: STUDENT IN AN ORGANIZED HEALTH CARE EDUCATION/TRAINING PROGRAM

## 2023-01-12 PROCEDURE — 76210000023 HC REC RM PH II 2 TO 2.5 HR: Performed by: SURGERY

## 2023-01-12 PROCEDURE — 76060000032 HC ANESTHESIA 0.5 TO 1 HR: Performed by: SURGERY

## 2023-01-12 PROCEDURE — C1894 INTRO/SHEATH, NON-LASER: HCPCS | Performed by: SURGERY

## 2023-01-12 PROCEDURE — 36415 COLL VENOUS BLD VENIPUNCTURE: CPT

## 2023-01-12 PROCEDURE — 71045 X-RAY EXAM CHEST 1 VIEW: CPT

## 2023-01-12 PROCEDURE — 76010000108 HC CV SURG 2 TO 2.5 HR: Performed by: SURGERY

## 2023-01-12 PROCEDURE — 77030010507 HC ADH SKN DERMBND J&J -B: Performed by: SURGERY

## 2023-01-12 PROCEDURE — 74011250636 HC RX REV CODE- 250/636: Performed by: SURGERY

## 2023-01-12 PROCEDURE — 77030002916 HC SUT ETHLN J&J -A: Performed by: SURGERY

## 2023-01-12 PROCEDURE — 81025 URINE PREGNANCY TEST: CPT

## 2023-01-12 PROCEDURE — 77030021887 HC CATH ANGI DIAG PROFLO MEDT -B: Performed by: SURGERY

## 2023-01-12 PROCEDURE — 77030002986 HC SUT PROL J&J -A: Performed by: SURGERY

## 2023-01-12 PROCEDURE — 76210000003 HC OR PH I REC 3.5 TO 4 HR: Performed by: SURGERY

## 2023-01-12 PROCEDURE — 77030016441 HC APPL CLP LIG1 J&J -B: Performed by: SURGERY

## 2023-01-12 PROCEDURE — 82962 GLUCOSE BLOOD TEST: CPT

## 2023-01-12 PROCEDURE — 74011000250 HC RX REV CODE- 250: Performed by: SURGERY

## 2023-01-12 PROCEDURE — C1768 GRAFT, VASCULAR: HCPCS | Performed by: SURGERY

## 2023-01-12 PROCEDURE — 83036 HEMOGLOBIN GLYCOSYLATED A1C: CPT

## 2023-01-12 PROCEDURE — 77030002924 HC SUT GORTX WLGO -B: Performed by: SURGERY

## 2023-01-12 PROCEDURE — 77030002996 HC SUT SLK J&J -A: Performed by: SURGERY

## 2023-01-12 PROCEDURE — 74011250636 HC RX REV CODE- 250/636: Performed by: ANESTHESIOLOGY

## 2023-01-12 PROCEDURE — 76010000112 HC CV SURG 0.5 TO 1 HR: Performed by: SURGERY

## 2023-01-12 DEVICE — GRAFT VASC L45CM DIA4-7MM PTFE CBAS HEP SURF STD WALLED: Type: IMPLANTABLE DEVICE | Site: ARM | Status: FUNCTIONAL

## 2023-01-12 RX ORDER — SODIUM CHLORIDE 9 MG/ML
25 INJECTION, SOLUTION INTRAVENOUS CONTINUOUS
Status: DISCONTINUED | OUTPATIENT
Start: 2023-01-12 | End: 2023-01-13 | Stop reason: HOSPADM

## 2023-01-12 RX ORDER — HEPARIN SODIUM 5000 [USP'U]/ML
INJECTION, SOLUTION INTRAVENOUS; SUBCUTANEOUS AS NEEDED
Status: DISCONTINUED | OUTPATIENT
Start: 2023-01-12 | End: 2023-01-12 | Stop reason: HOSPADM

## 2023-01-12 RX ORDER — DEXTROSE MONOHYDRATE AND SODIUM CHLORIDE 5; .225 G/100ML; G/100ML
25 INJECTION, SOLUTION INTRAVENOUS CONTINUOUS
Status: DISCONTINUED | OUTPATIENT
Start: 2023-01-12 | End: 2023-01-12

## 2023-01-12 RX ORDER — LIDOCAINE HYDROCHLORIDE 10 MG/ML
INJECTION, SOLUTION EPIDURAL; INFILTRATION; INTRACAUDAL; PERINEURAL AS NEEDED
Status: DISCONTINUED | OUTPATIENT
Start: 2023-01-12 | End: 2023-01-12 | Stop reason: HOSPADM

## 2023-01-12 RX ORDER — INSULIN LISPRO 100 [IU]/ML
INJECTION, SOLUTION INTRAVENOUS; SUBCUTANEOUS ONCE
Status: DISCONTINUED | OUTPATIENT
Start: 2023-01-12 | End: 2023-01-12 | Stop reason: HOSPADM

## 2023-01-12 RX ORDER — HEPARIN SODIUM 200 [USP'U]/100ML
INJECTION, SOLUTION INTRAVENOUS
Status: COMPLETED | OUTPATIENT
Start: 2023-01-12 | End: 2023-01-12

## 2023-01-12 RX ORDER — LIDOCAINE HYDROCHLORIDE 10 MG/ML
INJECTION, SOLUTION EPIDURAL; INFILTRATION; INTRACAUDAL; PERINEURAL
Status: DISCONTINUED
Start: 2023-01-12 | End: 2023-01-13 | Stop reason: HOSPADM

## 2023-01-12 RX ORDER — LABETALOL HCL 20 MG/4 ML
10 SYRINGE (ML) INTRAVENOUS AS NEEDED
Status: DISCONTINUED | OUTPATIENT
Start: 2023-01-12 | End: 2023-01-13 | Stop reason: HOSPADM

## 2023-01-12 RX ORDER — VANCOMYCIN HYDROCHLORIDE 1 G/20ML
INJECTION, POWDER, LYOPHILIZED, FOR SOLUTION INTRAVENOUS
Status: DISCONTINUED
Start: 2023-01-12 | End: 2023-01-12 | Stop reason: HOSPADM

## 2023-01-12 RX ORDER — LORAZEPAM 2 MG/ML
0.5 INJECTION INTRAMUSCULAR AS NEEDED
Status: COMPLETED | OUTPATIENT
Start: 2023-01-12 | End: 2023-01-12

## 2023-01-12 RX ORDER — HEPARIN SODIUM 5000 [USP'U]/ML
INJECTION, SOLUTION INTRAVENOUS; SUBCUTANEOUS
Status: DISPENSED
Start: 2023-01-12 | End: 2023-01-12

## 2023-01-12 RX ORDER — LIDOCAINE HYDROCHLORIDE 20 MG/ML
INJECTION, SOLUTION EPIDURAL; INFILTRATION; INTRACAUDAL; PERINEURAL AS NEEDED
Status: DISCONTINUED | OUTPATIENT
Start: 2023-01-12 | End: 2023-01-12 | Stop reason: HOSPADM

## 2023-01-12 RX ORDER — HYDROMORPHONE HYDROCHLORIDE 1 MG/ML
0.2 INJECTION, SOLUTION INTRAMUSCULAR; INTRAVENOUS; SUBCUTANEOUS AS NEEDED
Status: DISCONTINUED | OUTPATIENT
Start: 2023-01-12 | End: 2023-01-12 | Stop reason: HOSPADM

## 2023-01-12 RX ORDER — HYDROCODONE BITARTRATE AND ACETAMINOPHEN 5; 325 MG/1; MG/1
1-2 TABLET ORAL
Qty: 40 TABLET | Refills: 0 | Status: SHIPPED | OUTPATIENT
Start: 2023-01-12 | End: 2023-01-19

## 2023-01-12 RX ORDER — PROPOFOL 10 MG/ML
INJECTION, EMULSION INTRAVENOUS AS NEEDED
Status: DISCONTINUED | OUTPATIENT
Start: 2023-01-12 | End: 2023-01-12 | Stop reason: HOSPADM

## 2023-01-12 RX ORDER — IODIXANOL 320 MG/ML
INJECTION, SOLUTION INTRAVASCULAR
Status: DISCONTINUED
Start: 2023-01-12 | End: 2023-01-13 | Stop reason: HOSPADM

## 2023-01-12 RX ORDER — HYDROCODONE BITARTRATE AND ACETAMINOPHEN 5; 325 MG/1; MG/1
1 TABLET ORAL
Qty: 40 TABLET | Refills: 0 | Status: SHIPPED | OUTPATIENT
Start: 2023-01-12 | End: 2023-01-19

## 2023-01-12 RX ORDER — ONDANSETRON 2 MG/ML
4 INJECTION INTRAMUSCULAR; INTRAVENOUS ONCE
Status: DISCONTINUED | OUTPATIENT
Start: 2023-01-12 | End: 2023-01-12 | Stop reason: HOSPADM

## 2023-01-12 RX ORDER — IBUPROFEN 200 MG
4 TABLET ORAL AS NEEDED
Status: DISCONTINUED | OUTPATIENT
Start: 2023-01-12 | End: 2023-01-12 | Stop reason: HOSPADM

## 2023-01-12 RX ORDER — SODIUM CHLORIDE 0.9 % (FLUSH) 0.9 %
5-40 SYRINGE (ML) INJECTION AS NEEDED
Status: DISCONTINUED | OUTPATIENT
Start: 2023-01-12 | End: 2023-01-12 | Stop reason: HOSPADM

## 2023-01-12 RX ORDER — HEPARIN SODIUM 200 [USP'U]/100ML
INJECTION, SOLUTION INTRAVENOUS
Status: DISPENSED
Start: 2023-01-12 | End: 2023-01-12

## 2023-01-12 RX ORDER — HEPARIN SODIUM 1000 [USP'U]/ML
INJECTION, SOLUTION INTRAVENOUS; SUBCUTANEOUS AS NEEDED
Status: DISCONTINUED | OUTPATIENT
Start: 2023-01-12 | End: 2023-01-12 | Stop reason: HOSPADM

## 2023-01-12 RX ORDER — FENTANYL CITRATE 50 UG/ML
INJECTION, SOLUTION INTRAMUSCULAR; INTRAVENOUS AS NEEDED
Status: DISCONTINUED | OUTPATIENT
Start: 2023-01-12 | End: 2023-01-12 | Stop reason: HOSPADM

## 2023-01-12 RX ORDER — FAMOTIDINE 20 MG/1
20 TABLET, FILM COATED ORAL ONCE
Status: COMPLETED | OUTPATIENT
Start: 2023-01-12 | End: 2023-01-12

## 2023-01-12 RX ORDER — HEPARIN SODIUM 200 [USP'U]/100ML
INJECTION, SOLUTION INTRAVENOUS
Status: DISCONTINUED
Start: 2023-01-12 | End: 2023-01-13 | Stop reason: HOSPADM

## 2023-01-12 RX ORDER — LIDOCAINE HYDROCHLORIDE 10 MG/ML
INJECTION, SOLUTION EPIDURAL; INFILTRATION; INTRACAUDAL; PERINEURAL
Status: DISPENSED
Start: 2023-01-12 | End: 2023-01-12

## 2023-01-12 RX ORDER — SODIUM CHLORIDE 9 MG/ML
INJECTION, SOLUTION INTRAVENOUS
Status: COMPLETED
Start: 2023-01-12 | End: 2023-01-12

## 2023-01-12 RX ORDER — HYDROMORPHONE HYDROCHLORIDE 1 MG/ML
0.5 INJECTION, SOLUTION INTRAMUSCULAR; INTRAVENOUS; SUBCUTANEOUS
Status: DISCONTINUED | OUTPATIENT
Start: 2023-01-12 | End: 2023-01-12 | Stop reason: HOSPADM

## 2023-01-12 RX ORDER — SODIUM CHLORIDE 0.9 % (FLUSH) 0.9 %
5-40 SYRINGE (ML) INJECTION EVERY 8 HOURS
Status: DISCONTINUED | OUTPATIENT
Start: 2023-01-12 | End: 2023-01-12 | Stop reason: HOSPADM

## 2023-01-12 RX ORDER — MIDAZOLAM HYDROCHLORIDE 1 MG/ML
INJECTION, SOLUTION INTRAMUSCULAR; INTRAVENOUS AS NEEDED
Status: DISCONTINUED | OUTPATIENT
Start: 2023-01-12 | End: 2023-01-12 | Stop reason: HOSPADM

## 2023-01-12 RX ADMIN — FAMOTIDINE 20 MG: 20 TABLET, FILM COATED ORAL at 10:02

## 2023-01-12 RX ADMIN — PROPOFOL 40 MG: 10 INJECTION, EMULSION INTRAVENOUS at 17:53

## 2023-01-12 RX ADMIN — MIDAZOLAM HYDROCHLORIDE 2 MG: 2 INJECTION, SOLUTION INTRAMUSCULAR; INTRAVENOUS at 11:33

## 2023-01-12 RX ADMIN — PROPOFOL 100 MCG/KG/MIN: 10 INJECTION, EMULSION INTRAVENOUS at 11:35

## 2023-01-12 RX ADMIN — PROPOFOL 30 MG: 10 INJECTION, EMULSION INTRAVENOUS at 18:06

## 2023-01-12 RX ADMIN — FENTANYL CITRATE 50 MCG: 50 INJECTION INTRAMUSCULAR; INTRAVENOUS at 12:24

## 2023-01-12 RX ADMIN — HYDROMORPHONE HYDROCHLORIDE 0.5 MG: 1 INJECTION, SOLUTION INTRAMUSCULAR; INTRAVENOUS; SUBCUTANEOUS at 15:14

## 2023-01-12 RX ADMIN — LIDOCAINE HYDROCHLORIDE 80 MG: 20 INJECTION, SOLUTION EPIDURAL; INFILTRATION; INTRACAUDAL; PERINEURAL at 11:33

## 2023-01-12 RX ADMIN — SODIUM CHLORIDE 1 G: 900 INJECTION, SOLUTION INTRAVENOUS at 11:43

## 2023-01-12 RX ADMIN — LIDOCAINE HYDROCHLORIDE 100 MG: 20 INJECTION, SOLUTION EPIDURAL; INFILTRATION; INTRACAUDAL; PERINEURAL at 17:35

## 2023-01-12 RX ADMIN — HEPARIN SODIUM 3000 UNITS: 1000 INJECTION, SOLUTION INTRAVENOUS; SUBCUTANEOUS at 12:40

## 2023-01-12 RX ADMIN — PROPOFOL 100 MG: 10 INJECTION, EMULSION INTRAVENOUS at 17:35

## 2023-01-12 RX ADMIN — FENTANYL CITRATE 50 MCG: 50 INJECTION INTRAMUSCULAR; INTRAVENOUS at 11:33

## 2023-01-12 RX ADMIN — LABETALOL HYDROCHLORIDE 10 MG: 5 INJECTION, SOLUTION INTRAVENOUS at 21:01

## 2023-01-12 RX ADMIN — LORAZEPAM 0.5 MG: 2 INJECTION INTRAMUSCULAR; INTRAVENOUS at 16:01

## 2023-01-12 RX ADMIN — PROPOFOL 60 MG: 10 INJECTION, EMULSION INTRAVENOUS at 17:46

## 2023-01-12 RX ADMIN — PROPOFOL 50 MG: 10 INJECTION, EMULSION INTRAVENOUS at 11:33

## 2023-01-12 RX ADMIN — LORAZEPAM 0.5 MG: 2 INJECTION INTRAMUSCULAR; INTRAVENOUS at 16:13

## 2023-01-12 RX ADMIN — SODIUM CHLORIDE 25 ML/HR: 9 INJECTION, SOLUTION INTRAVENOUS at 10:02

## 2023-01-12 NOTE — PERIOP NOTES
1407: Oozing noted at patients permcath site. Dressing taken down, pressure held, surgicell placed around permcath site and redressed. 1425: Pt reports hand feeling numb and uncomfortable. Pts hand cool to touch and pale. Radial pulse cannot be appreciated by palpation or doppler. Dr. Mayda Wiseman notified in [facility] OR and will come see patient when he can leave the OR.     1500: Dr. Mayda Wiseman at bedside and will take patient back to OR when he is done with his current case.

## 2023-01-12 NOTE — PERIOP NOTES
Assumed care of pt from [facility] . Pt lying quietly with soft snoring noted. Awakes spontaneously and c/o numbness and tingling in her left hand. Stated \"it feels like it's dead\". Pt reassured that Dr. Dav No aware of left hand/arm condition and he plans to take her back to surgery today after the current case he is in at this time. Pt drifts off to sleep before this nurse can complete her assessment. Left hand cool to touch and pale. No radial pulse appreciated. Pt with doppler ulna pulse. Will cont to monitor. 1714  Pt returned to surgery via stretcher accompanied by Letty Pruett CRNA and Marie Nolasco OR JACOB.

## 2023-01-12 NOTE — OP NOTES
Operative Note    Patient: Diane Pina  YOB: 1975  MRN: [id number]    Date of Procedure: 1/12/2023     Pre-Op Diagnosis: Steal syndrome [Q87.89, Q65.2, Q79.8, Q68.8, R62.52]    Post-Op Diagnosis: Steal syndrome    Procedure(s):  LEFT ARTERIO VENOUS FISTULA GRAFT LIGATION    Surgeon(s):  Tenzin Feng MD    Surgical Assistant: Surg Asst-1: Leotis Alert    Anesthesia: MAC     Estimated Blood Loss (mL):  Minimal    Complications: None    Specimens: * No specimens in log *     Implants: * No implants in log *    Drains: * No LDAs found *    Findings: Pulses returned with ligation of graft at brachial radial and ulnar locations    Detailed Description of Procedure:   Left arm was prepped draped usual standard fashion followed by CC, guidance for aseptic technique. I localized and cutdown at the arterial incision and easily isolated the graft there was no hematoma no abnormal arm appearance. Using hand-held Doppler the radial and ulnar pulses were flat to almost absent. The brachial pulse beyond the graft was patent. I ligated the graft and the brachial pulse returned to normal.  The radial and ulnar pulses returned normal as well. The hand is viable and there is return to normal circulation. Irrigated and closed 3-0 Monocryl for subcu 4 Monocryl and Dermabond glue for skin she was transferred to recovery in stable condition. Discussed with her family findings of above.   Also I expect her to hand to have some prolonged symptoms but hopefully will return to normal.    Electronically Signed by Magnolia Barr MD on 1/12/2023 at 6:07 PM

## 2023-01-12 NOTE — ANESTHESIA POSTPROCEDURE EVALUATION
Procedure(s):  LEFT ARTERIO VENOUS GRAFT CREATION  PERM-CATH PLACEMENT.     MAC    Anesthesia Post Evaluation      Multimodal analgesia: multimodal analgesia used between 6 hours prior to anesthesia start to PACU discharge  Patient location during evaluation: PACU  Patient participation: complete - patient participated  Level of consciousness: sleepy but conscious  Pain management: adequate  Airway patency: patent  Anesthetic complications: no  Cardiovascular status: acceptable  Respiratory status: acceptable  Hydration status: acceptable  Post anesthesia nausea and vomiting:  controlled  Final Post Anesthesia Temperature Assessment:  Normothermia (36.0-37.5 degrees C)      INITIAL Post-op Vital signs:   Vitals Value Taken Time   /60 01/12/23 1347   Temp 36.5 °C (97.7 °F) 01/12/23 1347   Pulse 95 01/12/23 1347   Resp 15 01/12/23 1347   SpO2 98 % 01/12/23 1347

## 2023-01-12 NOTE — ANESTHESIA PREPROCEDURE EVALUATION
Relevant Problems   No relevant active problems       Anesthetic History   No history of anesthetic complications            Review of Systems / Medical History  Patient summary reviewed, nursing notes reviewed and pertinent labs reviewed    Pulmonary        Sleep apnea: No treatment           Neuro/Psych   Within defined limits           Cardiovascular    Hypertension: well controlled          CAD         GI/Hepatic/Renal         Renal disease: CRI       Endo/Other    Diabetes: well controlled, type 2    Obesity     Other Findings              Physical Exam    Airway  Mallampati: II  TM Distance: 4 - 6 cm  Neck ROM: normal range of motion   Mouth opening: Normal     Cardiovascular  Regular rate and rhythm,  S1 and S2 normal,  no murmur, click, rub, or gallop  Rhythm: regular  Rate: normal         Dental    Dentition: Poor dentition     Pulmonary  Breath sounds clear to auscultation               Abdominal  GI exam deferred       Other Findings            Anesthetic Plan    ASA: 3  Anesthesia type: MAC          Induction: Intravenous  Anesthetic plan and risks discussed with: Patient

## 2023-01-12 NOTE — PROGRESS NOTES
Notified of hand pain and numbness while operating another patient  Came to evaluate.   Doppler signals are present mostly ulnar graft is widely patent  Can feel me touching but has numbness  Clinically significant steal syndrome will take the OR now for ligation of fistula

## 2023-01-12 NOTE — OP NOTES
Operative Note    Patient: Isreal Tamayo  YOB: 1975  MRN: [id number]    Date of Procedure: 1/12/2023     Pre-Op Diagnosis: ESRD (end stage renal disease) ([facility] [N18.6]    Post-Op Diagnosis: Same as preoperative diagnosis. Procedure(s):  LEFT ARTERIO VENOUS GRAFT CREATION  PERM-CATH PLACEMENT    Surgeon(s):  Salvador Ivan MD    Surgical Assistant: Surg Asst-1: Serge Palacio    Anesthesia: MAC     Estimated Blood Loss (mL):  Minimal    Complications: None    Specimens: * No specimens in log *     Implants:   Implant Name Type Inv. Item Serial No.  Lot No. LRB No. Used Action   GRAFT VASC L45CM DIA4-7MM PTFE CBAS HEP SURF STD WALLED - [id number]  GRAFT VASC L45CM DIA4-7MM PTFE CBAS HEP SURF STD WALLED [id number] [id number]  Left 1 Implanted       Drains: * No LDAs found *    Findings: Right internal jugular vein is compressible no DVT. Fluoroscopy confirms catheter tip placement to cavoatrial junction. Patent left arm AV graft with 4 to 7 mm tapered propaten    Detailed Description of Procedure:   Patient had preop antibiotics brought to the room had moderate sedation. First I prepped the neck and chest wall draped in usual standard fashion and used ultrasound to identify the right internal jugular vein. It was compressible no signs of thrombus. Localized and gain percutaneous access quite easily placed a wire into the central system seen on fluoroscopy. Made incision here and a point below the clavicle and tunneled a 19 cm palindrome from the lower incision to the guidewire inserted the catheter into the central system by a split sheath. In good position and flushes and aspirates well. Catheter flushes and aspirates easily as locked concentrated heparin solution caps were applied sterile dressing was applied skin was closed with 4 Monocryl and Dermabond glue. It was turned to the left arm.   Left arm was prepped draped usual standard fashion followed by CC, guidance visit technique. Made an incision in the antecubital fossa and at the axilla. Exposed the brachial artery is small in size it is soft and patent. Expose the axillary artery which is more normal in size. Made a curvilinear tunnel between the 2 sites and placed the graft 4 mm end and at the arterial location. Patient was heparinized and again controls on the artery made a careful arteriotomy cut the graft to the 4 mm end on a bevel and sewed end-to-side to the artery using CV 6 Bethel-Carson suture circular in standard fashion. Clamped the graft with controls off the artery there was a pulse here confirmed with Doppler. Again the vein controls next venotomy is made limb with scalpel Roche scissors cut the graft on a bevel and sewed end-to-side to the vein using CV 6 Bethel-Carson suture circular standard fashion. Upon completion of this I backbled the graft and released all the controls there was a nice thrill in the graft. Irrigated and closed in layers interrupted 3-0 Monocryl for the fascia for Monocryl and Dermabond glue for the skin she was transferred to recovery in stable condition without pain in her hand.   We will check a chest x-ray and she can use her catheter right away for dialysis    Electronically Signed by Sheryl Crenshaw MD on 1/12/2023 at 1:26 PM

## 2023-01-12 NOTE — H&P
Surgery History and Physical    Subjective:      Christy Nash is a 56 y.o.  female who presents with need for dialysis access. Discussed with her nephrologist.  She needs to start within the week so the plan today is for placement of a tunneled dialysis catheter and creation of an AV graft. .    Patient Active Problem List    Diagnosis Date Noted    ESRD (end stage renal disease) ([address] 10/13/2022    Chronic diastolic congestive heart failure ([address] 07/28/2022    History of coronary artery stent placement 07/28/2022    Obesity (BMI 30.0-34.9) 07/28/2022    Sinus tachycardia 07/28/2022    Hyperglycemia due to type 2 diabetes mellitus ([address] 05/23/2022    Hypercholesteremia 05/23/2022    Influenza A 05/14/2022    Elevated troponin 05/14/2022    Hypertensive chronic kidney disease with stage 1 through stage 4 chronic kidney disease, or unspecified chronic kidney disease 10/13/2021    Nephrotic syndrome 10/13/2021    Stage 3a chronic kidney disease ([address] 10/13/2021    Acute kidney injury ([address] 10/13/2021    Coronary artery disease of native artery of native heart with stable angina pectoris ([address] 06/29/2021    Coronary atherosclerosis 06/29/2021    Foot ulcer, left ([address] 06/24/2021    Osteomyelitis ([address] 05/17/2021    Diabetic foot ulcer ([address] 05/17/2021    FRANCISCA (acute kidney injury) ([address] 05/17/2021    Sepsis ([address] 05/17/2021    Hyponatremia 05/17/2021    Essential hypertension 12/16/2020    Type 2 diabetes mellitus with chronic kidney disease ([address] 12/16/2020    Persistent proteinuria 12/16/2020    Severe obesity ([address] 11/05/2020    Fever 10/20/2020    NSTEMI (non-ST elevated myocardial infarction) ([address] 10/20/2020    Insulin dependent diabetes mellitus 01/11/2019    HTN (hypertension) 01/11/2019    Diabetic foot infection ([address] 12/30/2018    Right foot infection 12/30/2018    Acute pain of right foot 12/30/2018    Acute bronchitis 04/07/2017    Hyperglycemia 04/07/2017     Past Medical History: Diagnosis Date    CAD (coronary artery disease)     Coronary Stent    Chronic kidney disease     CKD 5    Diabetes (HCC)     HTN (hypertension)     Hyperlipidemia     Sleep apnea     not using      Past Surgical History:   Procedure Laterality Date    HX CORONARY STENT PLACEMENT      HX GYN          HX ORTHOPAEDIC Left     two toes amputated    WI UNLISTED PROCEDURE VASCULAR SURGERY Left 10/2022    left arm AV fistula creation      Social History     Tobacco Use    Smoking status: Never    Smokeless tobacco: Never   Substance Use Topics    Alcohol use: No      Family History   Problem Relation Age of Onset    Lupus Mother     Cancer Neg Hx     Diabetes Neg Hx     Heart Disease Neg Hx     Hypertension Neg Hx     Heart Attack Neg Hx     Stroke Neg Hx       Prior to Admission medications    Medication Sig Start Date End Date Taking? Authorizing Provider   acetaminophen (TYLENOL) 500 mg tablet Take 2 Tablets by mouth every six (6) hours as needed for Pain. 22  Yes Leighton Dupree,    dulaglutide (Trulicity) 8.38 FU/4.3 mL sub-q pen 0.5 mL by SubCUTAneous route every seven (7) days. 6/15/22  Yes Kym Velasquez MD   cloNIDine HCL (CATAPRES) 0.1 mg tablet Take 1 Tablet by mouth two (2) times a day. 22  Yes Kym Velasquez MD   insulin glargine (Lantus Solostar U-100 Insulin) 100 unit/mL (3 mL) inpn 30 Units by SubCUTAneous route daily. Indications: type 2 diabetes mellitus 22  Yes Yuri Linares MD   escitalopram oxalate (LEXAPRO) 10 mg tablet Take 1 Tablet by mouth daily. 22  Yes Lio Cee MD   carvediloL (Coreg) 25 mg tablet Take 1 Tablet by mouth two (2) times daily (with meals). Indications: myocardial reinfarction prevention 22  Yes Lio Cee MD   melatonin 5 mg cap capsule Take 1 Capsule by mouth as needed (difficulty in sleeping).  Reports taking it as needed 10/13/21  Yes Kym Velasquez MD   insulin lispro (HUMALOG) 100 unit/mL kwikpen 10 units before meals  Indications: type 2 diabetes mellitus 21  Aster Zheng MD   Insulin Needles, Disposable, (BD Ultra-Fine Short Pen Needle) 31 gauge x 5/16\" ndle USE WITH INSULIN TWICE DAILY 22   Gaye Lu MD   glucose blood VI test strips (blood glucose test) strip Use to test blood glucose BID 22   Gaye Lu MD   flash glucose scanning reader (FreeStyle Daniel 2 [place]) misc Used to check blood glucose before meals and at bedtime 22   Gaye Lu MD   flash glucose sensor (FreeStyle Castillo 2 Sensor) kit Used to check blood glucose before meals and at bedtime 22   Gaye Lu MD   flash glucose scanning reader [facility] 14 Day [place]) misc Use to check blood glucose 3 times a day  Indications: Diabetes mellitus 21   Gaye Lu MD   flash glucose sensor (FreeStyle Daniel 14 Day Sensor) kit Used to check blood glucose 3 times a day 21   Gaye Lu MD   Insulin Syringe-Needle U-100 (INSULIN SYRINGE) 1 mL 30 gauge x 5/16 syrg As directed for lantus insulin 19   Miguel Quiroga MD     Allergies   Allergen Reactions    Azithromycin Other (comments)     Rapid response was called for bradycardia and hypotension     Pcn [Penicillins] Hives         Review of Systems:    A comprehensive review of systems was negative except for that written in the History of Present Illness. Objective:     Patient Vitals for the past 8 hrs:   BP Temp Pulse Resp SpO2 Weight   23 0954 135/87 97.8 °F (36.6 °C) 96 20 100 % 94.6 kg (208 lb 9.6 oz)       Temp (24hrs), Av.8 °F (36.6 °C), Min:97.8 °F (36.6 °C), Max:97.8 °F (36.6 °C)      Physical Exam:  LUNG: clear to auscultation bilaterally, HEART: S1, S2 normal, ABDOMEN: soft, non-tender.  Bowel sounds normal. No masses,  no organomegaly    Labs:   Recent Results (from the past 24 hour(s))   GLUCOSE, POC    Collection Time: 23  9:50 AM   Result Value Ref Range    Glucose (POC) 88 70 - 110 mg/dL   HCG URINE, QL. - POC    Collection Time: 01/12/23 10:13 AM   Result Value Ref Range    Pregnancy test,urine (POC) Negative NEG         Data Review:  BMP:   Lab Results   Component Value Date/Time    Glucose 62 (L) 10/13/2022 08:30 AM    Sodium 144 10/13/2022 08:30 AM    Potassium 4.9 10/13/2022 08:30 AM    Chloride 116 (H) 10/13/2022 08:30 AM    CO2 20 (L) 10/13/2022 08:30 AM    BUN 42 (H) 10/13/2022 08:30 AM    Creatinine 5.06 (H) 10/13/2022 08:30 AM    Calcium 7.9 (L) 10/13/2022 08:30 AM       Assessment:     Active Problems:    * No active hospital problems.  *      Plan:     Placement of tunneled dialysis catheter  Creation of AV graft left arm    Signed By: Magnolia Barr MD     January 12, 2023

## 2023-01-13 NOTE — PERIOP NOTES
2000 Dr. Stone Lima notified that pharmacy can not fill script as ordered. He will follow up. Informed that perm cath site is still oozing blood. He spoke to [place]om, [place] and she will address site. 2030 Dr. Ajay Olvera sent a new prescription electronically. Dressing removed by ENMA Mcneil SA and  site not oozing.  New dressing applied with surgicell placed underneath site, 2 biopatches and transparent film applied

## 2023-01-23 ENCOUNTER — HOSPITAL ENCOUNTER (OUTPATIENT)
Dept: LAB | Age: 48
Discharge: HOME | End: 2023-01-23

## 2023-01-23 LAB — SENTARA SPECIMEN COL,SENBCF: NORMAL

## 2023-01-23 PROCEDURE — 99001 SPECIMEN HANDLING PT-LAB: CPT

## 2023-01-25 PROBLEM — I16.1 HYPERTENSIVE EMERGENCY: Status: ACTIVE | Noted: 2023-01-25

## 2023-01-25 PROBLEM — R07.9 ACUTE CHEST PAIN: Status: ACTIVE | Noted: 2023-01-25

## 2023-01-25 PROBLEM — N18.5 CKD (CHRONIC KIDNEY DISEASE) STAGE 5, GFR LESS THAN 15 ML/MIN (HCC): Status: ACTIVE | Noted: 2023-01-25

## 2023-01-25 PROBLEM — R09.89 PULMONARY VASCULAR CONGESTION: Status: ACTIVE | Noted: 2023-01-25

## 2023-01-25 PROBLEM — I25.110 CORONARY ARTERY DISEASE INVOLVING NATIVE CORONARY ARTERY OF NATIVE HEART WITH UNSTABLE ANGINA PECTORIS (HCC): Status: ACTIVE | Noted: 2021-06-29

## 2023-01-25 PROBLEM — D63.1 ANEMIA DUE TO STAGE 5 CHRONIC KIDNEY DISEASE, NOT ON CHRONIC DIALYSIS (HCC): Status: ACTIVE | Noted: 2023-01-25

## 2023-01-25 PROBLEM — N18.5 ANEMIA DUE TO STAGE 5 CHRONIC KIDNEY DISEASE, NOT ON CHRONIC DIALYSIS (HCC): Status: ACTIVE | Noted: 2023-01-25

## 2023-04-18 ENCOUNTER — OFFICE VISIT (OUTPATIENT)
Age: 48
End: 2023-04-18
Payer: MEDICAID

## 2023-04-18 VITALS
BODY MASS INDEX: 31.02 KG/M2 | OXYGEN SATURATION: 98 % | SYSTOLIC BLOOD PRESSURE: 195 MMHG | WEIGHT: 193 LBS | DIASTOLIC BLOOD PRESSURE: 116 MMHG | HEART RATE: 103 BPM | HEIGHT: 66 IN

## 2023-04-18 DIAGNOSIS — E66.9 OBESITY (BMI 30.0-34.9): ICD-10-CM

## 2023-04-18 DIAGNOSIS — I50.32 CHRONIC DIASTOLIC CONGESTIVE HEART FAILURE (HCC): ICD-10-CM

## 2023-04-18 DIAGNOSIS — I25.118 CORONARY ARTERY DISEASE OF NATIVE ARTERY OF NATIVE HEART WITH STABLE ANGINA PECTORIS (HCC): Primary | ICD-10-CM

## 2023-04-18 DIAGNOSIS — E78.00 HYPERCHOLESTEREMIA: ICD-10-CM

## 2023-04-18 DIAGNOSIS — I10 ESSENTIAL HYPERTENSION: ICD-10-CM

## 2023-04-18 DIAGNOSIS — Z95.5 HISTORY OF CORONARY ARTERY STENT PLACEMENT: ICD-10-CM

## 2023-04-18 PROCEDURE — 3080F DIAST BP >= 90 MM HG: CPT | Performed by: INTERNAL MEDICINE

## 2023-04-18 PROCEDURE — 3077F SYST BP >= 140 MM HG: CPT | Performed by: INTERNAL MEDICINE

## 2023-04-18 PROCEDURE — 99214 OFFICE O/P EST MOD 30 MIN: CPT | Performed by: INTERNAL MEDICINE

## 2023-04-18 RX ORDER — HYDRALAZINE HYDROCHLORIDE 50 MG/1
50 TABLET, FILM COATED ORAL 3 TIMES DAILY
Qty: 270 TABLET | Refills: 1 | Status: SHIPPED | OUTPATIENT
Start: 2023-04-18

## 2023-04-18 RX ORDER — ROSUVASTATIN CALCIUM 20 MG/1
20 TABLET, COATED ORAL DAILY
Qty: 90 TABLET | Refills: 1 | Status: SHIPPED | OUTPATIENT
Start: 2023-04-18

## 2023-04-18 ASSESSMENT — ENCOUNTER SYMPTOMS
SHORTNESS OF BREATH: 1
GASTROINTESTINAL NEGATIVE: 1
EYES NEGATIVE: 1

## 2023-04-18 NOTE — PROGRESS NOTES
1. Have you been to the ER, urgent care clinic since your last visit? Hospitalized since your last visit? Yes Where: [facility]     2. Have you seen or consulted any other health care providers outside of the [address] [place] [address] since your last visit? Include any pap smears or colon screening. No    3. Since your last visit, have you had any of the following symptoms? shortness of breath. 4.  Have you had any blood work, X-rays or cardiac testing? No         5. Where do you normally have your labs drawn? [facility]    6. Do you need any refills today?    no
hypotension     Penicillins Hives       Past Medical History:   Diagnosis Date    CAD (coronary artery disease)     Coronary Stent    Chronic kidney disease     CKD 5    Diabetes ([facility]     Hemodialysis patient ([facility]     dialysis M/W/F at [facility] [address] [phone]    HTN (hypertension)     Hyperlipidemia     Sleep apnea     not using       Family History   Problem Relation Age of Onset    Heart Disease Neg Hx     Hypertension Neg Hx     Heart Attack Neg Hx     Lupus Mother     Stroke Neg Hx     Cancer Neg Hx     Diabetes Neg Hx        Social History     Tobacco Use    Smoking status: Never    Smokeless tobacco: Never   Vaping Use    Vaping Use: Never used   Substance Use Topics    Alcohol use: No    Drug use: Never        Current Outpatient Medications   Medication Sig Dispense Refill    acetaminophen (TYLENOL) 500 MG tablet Take 2 tablets by mouth every 6 hours as needed      amLODIPine (NORVASC) 10 MG tablet Take 1 tablet by mouth daily      carvedilol (COREG) 25 MG tablet Take 1 tablet by mouth 2 times daily (with meals)      cloNIDine (CATAPRES) 0.1 MG tablet Take 1 tablet by mouth 3 times daily      Dulaglutide (TRULICITY) 9.64 MQ/4.6EZ SOPN Inject 0.75 mg into the skin every 7 days      escitalopram (LEXAPRO) 10 MG tablet Take 1 tablet by mouth daily      hydrALAZINE (APRESOLINE) 50 MG tablet Take 0.5 tablets by mouth 3 times daily      insulin glargine (LANTUS SOLOSTAR) 100 UNIT/ML injection pen Inject 30 Units into the skin nightly      insulin lispro, 1 Unit Dial, (HUMALOG/ADMELOG) 100 UNIT/ML SOPN 10 units before meals  Indications: type 2 diabetes mellitus      Melatonin 5 MG CAPS Take 5 mg by mouth as needed       No current facility-administered medications for this visit.         Past Surgical History:   Procedure Laterality Date    CORONARY ANGIOPLASTY WITH STENT PLACEMENT      GYN          ORTHOPEDIC SURGERY Left     two toes amputated    VASCULAR SURGERY Left 10/2022

## 2023-06-07 NOTE — ED NOTES
Group Topic: BH DEVORA Activity Group    Date: 6/7/2023  Start Time: 11:00 AM  End Time: 11:45 AM  Facilitators: Sabrina Beth    Focus: Holistic Symptom Management in Early Recovery   Number in attendance:11      Basic Mindfulness is an experiential group focused on exposing patients to select adaptive symptom management behaviors. Patients are provided with education on the select skill, followed by instruction and reflection. Skills are meant to not only be utilized for recovery symptom management but for all distressing experiences that could impact quality life.      Goals: Replacing Unhealthy activity with Healthy Outlets (Guided Meditation )   Handouts: None  Method: Group   Attendance: Present   Mood/Affect: Appropriate   Behavior/Socialization: Appropriate to group   Participation: Moderate  Overall Patient Response to Group: Appropriate to topic   Individual Group Response: Quiet, independent activities  Ability to Apply Content to Group: 5            Primary Group facilitator: Sabrina Beth              I have reviewed discharge instructions with patient. The patient verbalized understanding. Patient armband removed and shredded. Pt is ambulatory with no acute distress noted at this time, pt alert and oriented. Stable at time of discharge. Vital signs stable. Pt is being discharged with 3 prescriptions at this time.

## 2023-07-11 NOTE — ED NOTES
Purposeful rounding completed:    Side rails up x 1:  YES  Bed in low position and wheels locked: YES  Call bell within reach: YES  Comfort addressed: YES    Toileting needs addressed: YES  Plan of care reviewed/updated with patient and or family members: YES  IV site assessed: YES  Pain assessed and addressed: YES, 0  Patient eating dinner Good

## 2023-08-21 NOTE — PROGRESS NOTES
Pt returned to ED for re-evaluation, noted cultures. Initiate Treatment: Silvadene Detail Level: Zone Render In Strict Bullet Format?: No

## 2024-10-23 NOTE — LETTER
NOTIFICATION RETURN TO WORK / SCHOOL 
 
2/4/2020 1:50 AM 
 
Ms. Kiersten Stephenson [address] [place] [id number] To Whom It May Concern: 
 
Kiersten Stephenson is currently under the care of [id number] EMERGENCY DEPT. She will return to work/school on: 2/6/20 If there are questions or concerns please have the patient contact our office.  
 
 
 
Sincerely, 
 
 
Christopher Botello MD 
                                
 
 Pt did not attend any groups when prompted or offered.   Problem: Alteration in Thoughts and Perception  Goal: Attend and participate in unit activities, including therapeutic, recreational, and educational groups  Description: Interventions:  -Encourage Visitation and family involvement in care  Outcome: Not Progressing

## (undated) DEVICE — STERILE POLYISOPRENE POWDER-FREE SURGICAL GLOVES: Brand: PROTEXIS

## (undated) DEVICE — BLANKET WRM W40.2XL55.9IN IORT LO BODY + MISTRAL AIR

## (undated) DEVICE — PACK PROCEDURE SURG MAJ W/ BASIN LF

## (undated) DEVICE — GUIDEWIRE VASC L260CM DIA0035IN TIP L3MM PTFE J STD TAPR FIX

## (undated) DEVICE — SUTURE VCRL SZ 3-0 L27IN ABSRB UD L26MM SH 1/2 CIR J416H

## (undated) DEVICE — PADDING CAST W4INXL4YD NONSTERILE COT COHESIVE HND TEARABLE

## (undated) DEVICE — SUTURE PERMA-HAND SZ 2-0 L24IN NONABSORBABLE BLK W/O NDL SA75H

## (undated) DEVICE — GAUZE,SPONGE,4"X4",16PLY,STRL,LF,10/TRAY: Brand: MEDLINE

## (undated) DEVICE — BANDAGE COBAN 4 IN COMPR W4INXL5YD FOAM COHESIVE QUIK STK SELF ADH SFT

## (undated) DEVICE — GEL ULT/PHONIC CPL MED STRL --

## (undated) DEVICE — GLIDESHEATH SLENDER STAINLESS STEEL KIT: Brand: GLIDESHEATH SLENDER

## (undated) DEVICE — SUTURE PROL SZ 2-0 L36IN NONABSORBABLE BLU V-7 L26MM 1/2 8977H

## (undated) DEVICE — SUTURE VCRL SZ 2-0 L27IN ABSRB UD L26MM SH 1/2 CIR J417H

## (undated) DEVICE — APPLIER CLP L9.38IN M LIG TI DISP STR RNG HNDL LIGACLP

## (undated) DEVICE — CATHETER ANGIO 7FR L100CM GWIRE 0.038IN CRV A HI FLO WVN SON

## (undated) DEVICE — CURITY NON-ADHERENT STRIPS: Brand: CURITY

## (undated) DEVICE — CATHETER THROMCTMY 6FR WRK L138CM EXTR FOR 2MM VES PRONTO

## (undated) DEVICE — NEEDLE ANGIO 1 WALL ULT SMOOTH 19GAX7CM MERIT AVANCE

## (undated) DEVICE — APPLICATOR MEDICATED 26 CC SOLUTION HI LT ORNG CHLORAPREP

## (undated) DEVICE — THREE-QUARTER SHEET: Brand: CONVERTORS

## (undated) DEVICE — REM POLYHESIVE ADULT PATIENT RETURN ELECTRODE: Brand: VALLEYLAB

## (undated) DEVICE — GLOVE SURG SZ 7 L11.33IN FNGR THK9.8MIL STRW LTX POLYMER

## (undated) DEVICE — MINI TREK CORONARY DILATATION CATHETER 2.0 MM X 12 MM / RAPID-EXCHANGE: Brand: MINI TREK

## (undated) DEVICE — INTRODUCER SHTH 5FR CANN L11CM DIL TIP 25MM GRY TUNGSTEN

## (undated) DEVICE — DISPOSABLE TOURNIQUET CUFF SINGLE BLADDER, DUAL PORT AND QUICK CONNECT CONNECTOR: Brand: COLOR CUFF

## (undated) DEVICE — SUTURE MCRYL SZ 2-0 L36IN ABSRB UD L36MM CT-1 1/2 CIR Y945H

## (undated) DEVICE — SUTURE MCRYL SZ 4-0 L18IN ABSRB UD L19MM PS-2 3/8 CIR PRIM Y496G

## (undated) DEVICE — SUTURE PROL SZ 7-0 L30IN NONABSORBABLE BLU L9.3MM BV-1 1/2 8703H

## (undated) DEVICE — PREP SKN CHLRAPRP APL 26ML STR --

## (undated) DEVICE — DRAPE,U/SHT,SPLIT,FILM,60X84,STERILE: Brand: MEDLINE

## (undated) DEVICE — DRAPE,EXTREMITY,89X128,STERILE: Brand: MEDLINE

## (undated) DEVICE — PROBE VASC 8MHZ WTRPRF

## (undated) DEVICE — UNDERCAST PADDING: Brand: DEROYAL

## (undated) DEVICE — DRAPE,REIN 53X77,STERILE: Brand: MEDLINE

## (undated) DEVICE — GUIDEWIRE VASC STR 0.035 INX180 CM 15 CM BENT PTFE COAT STRT

## (undated) DEVICE — WRAP COMPR W4INXL5YD NONSTERILE TAN SELF ADH COBAN

## (undated) DEVICE — TAPE,CLOTH/SILK,CURAD,3"X10YD,LF,40/CS: Brand: CURAD

## (undated) DEVICE — SUTURE NONABSORBABLE MONOFILAMENT 6-0 C-1 1X30 IN PROLENE 8706H

## (undated) DEVICE — COVER US PRB W15XL120CM W/ GEL RUBBERBAND TAPE STRP FLD GEN

## (undated) DEVICE — SUTURE PROL SZ 5-0 L36IN NONABSORBABLE BLU L13MM C-1 3/8 8720H

## (undated) DEVICE — SUTURE MCRYL SZ 3-0 L27IN ABSRB UD L26MM SH 1/2 CIR Y416H

## (undated) DEVICE — BLADE SAW W5.5XL18MM THK0.38MM CUT THK0.43MM REPL S STL SAG

## (undated) DEVICE — TRAY PREP DRY W/ PREM GLV 2 APPL 6 SPNG 2 UNDPD 1 OVERWRAP

## (undated) DEVICE — SUT PROL 2-0 30IN CT1 BLU --

## (undated) DEVICE — DRAPE TWL SURG 16X26IN BLU ORB04] ALLCARE INC]

## (undated) DEVICE — NEEDLE HYPO 25GA L1.5IN BVL ORIENTED ECLIPSE

## (undated) DEVICE — SWAN-GANZ BIPOLAR PACING CATHETER: Brand: SWAN-GANZ

## (undated) DEVICE — KIT CLN UP BON SECOURS MARYV

## (undated) DEVICE — SUT PROL 6-0 30IN C1 DA BLU --

## (undated) DEVICE — INTENDED FOR TISSUE SEPARATION, AND OTHER PROCEDURES THAT REQUIRE A SHARP SURGICAL BLADE TO PUNCTURE OR CUT.: Brand: BARD-PARKER ® STAINLESS STEEL BLADES

## (undated) DEVICE — SUTURE VCRL SZ 4-0 L18IN ABSRB UD L19MM PC-5 3/8 CIR J823G

## (undated) DEVICE — ELECTRODE PT RET AD L9FT HI MOIST COND ADH HYDRGEL CORDED

## (undated) DEVICE — SUTURE GOR TX SZ 5-0 L30IN NONABSORBABLE L12MM TTC-12 3/8 6M10A

## (undated) DEVICE — CULTURETTE SGL EVAC TUBE PALL -- 100/CA

## (undated) DEVICE — HANDPIECE SET WITH HIGH FLOW TIP AND SUCTION TUBE: Brand: INTERPULSE

## (undated) DEVICE — BLANKET WRM AD W50XL85.8IN PACU FULL BODY FORC AIR

## (undated) DEVICE — BLADE SAW OSC COARSE 25X9MM --

## (undated) DEVICE — BANDAGE,GAUZE,BULKEE II,4.5"X4.1YD,STRL: Brand: MEDLINE

## (undated) DEVICE — SOLUTION IV 1000ML 0.9% SOD CHL

## (undated) DEVICE — DECANTER BAG 9": Brand: MEDLINE INDUSTRIES, INC.

## (undated) DEVICE — DRAPE,ANGIO,BRACH,STERILE,38X44: Brand: MEDLINE

## (undated) DEVICE — PROCEDURE KIT FLUID MGMT 10 FR CUST MAINFOLD

## (undated) DEVICE — 7 FRENCH DRAIN SYSTEM, STERILE: Brand: TLS

## (undated) DEVICE — SYRINGE MED 10ML LUERLOCK TIP W/O SFTY DISP

## (undated) DEVICE — INTENDED FOR TISSUE SEPARATION, AND OTHER PROCEDURES THAT REQUIRE A SHARP SURGICAL BLADE TO PUNCTURE OR CUT.: Brand: BARD-PARKER SAFETY BLADES SIZE 10, STERILE

## (undated) DEVICE — FLEX ADVANTAGE 3000CC: Brand: FLEX ADVANTAGE

## (undated) DEVICE — DRSG GZ OIL EMUL CURAD 3X3 --

## (undated) DEVICE — SYR 10ML LUER LOK 1/5ML GRAD --

## (undated) DEVICE — STOCKINETTE SYN 4INX25YD -- PROTOUCH

## (undated) DEVICE — SWAB CULT LIQ STUART AGR AERB MOD IN BRK SGL RAYON TIP PLAS 220099] BECTON DICKINSON MICRO]

## (undated) DEVICE — RADIFOCUS OPTITORQUE ANGIOGRAPHIC CATHETER: Brand: OPTITORQUE

## (undated) DEVICE — BANDAGE,GAUZE,BULKEE LITE,3"X4.1YD,STRL: Brand: MEDLINE

## (undated) DEVICE — SYSTEM INF CTRL

## (undated) DEVICE — TR BAND RADIAL ARTERY COMPRESSION DEVICE: Brand: TR BAND

## (undated) DEVICE — HEX-LOCKING BLADE ELECTRODE: Brand: EDGE

## (undated) DEVICE — SUT ETHLN 3-0 18IN PS1 BLK --

## (undated) DEVICE — NEEDLE BX 8 GAX101 MM BNE MAR TAPR DSTL TIP RAZOR SHRP JAMSH

## (undated) DEVICE — ADHESIVE SKIN CLSR 0.7ML TOP DERMBND ADV

## (undated) DEVICE — CATHETER DIAG AD 5FR L100CM COR GRY HYDRPHLC NYL MPA 2 W/ 2

## (undated) DEVICE — SHOE POSTOP M MAN 9-11 UNIV FOAM TRICOT SEMI FLX SKID

## (undated) DEVICE — GLOVE SURG SZ 7.5 L11.73IN FNGR THK9.8MIL STRW LTX POLYMER

## (undated) DEVICE — INTENDED FOR TISSUE SEPARATION, AND OTHER PROCEDURES THAT REQUIRE A SHARP SURGICAL BLADE TO PUNCTURE OR CUT.: Brand: BARD-PARKER SAFETY BLADES SIZE 15, STERILE

## (undated) DEVICE — PAD,NON-ADHERENT,3X8,STERILE,LF,1/PK: Brand: MEDLINE

## (undated) DEVICE — SHOE POSTOP M WOMAN 6-8 UNIV FOAM TRICOT SEMI FLX SKID

## (undated) DEVICE — TABLE COVER: Brand: CONVERTORS

## (undated) DEVICE — ADHESIVE SKN CLSR HI VISC 2-O --

## (undated) DEVICE — CATHETER GUID 6FR L100CM GRN PTFE JR4 TRUELUMEN HYBRID

## (undated) DEVICE — STOCKINETTE,IMPERVIOUS,12X48,STERILE: Brand: MEDLINE

## (undated) DEVICE — PAD,ABDOMINAL,8"X10",ST,LF: Brand: MEDLINE

## (undated) DEVICE — DRAPE C ARM UNIV W41XL74IN CLR PLAS XR VELC CLSR POLY STRP

## (undated) DEVICE — Device

## (undated) DEVICE — BNDG ELAS HK LOOP 4X5YD NS -- MATRIX

## (undated) DEVICE — BLADE ASSEMB CLP HAIR FINE --

## (undated) DEVICE — MAYO STAND COVER: Brand: CONVERTORS

## (undated) DEVICE — GOWN,REINFORCED,POLY,AURORA,XLARGE,STRL: Brand: MEDLINE

## (undated) DEVICE — RUNTHROUGH NS EXTRA FLOPPY PTCA GUIDEWIRE: Brand: RUNTHROUGH

## (undated) DEVICE — DRESSING FOAM DISK DIA1IN H 7MM HYDRPHLC CHG IMPREG IN SL

## (undated) DEVICE — NEEDLE HYPO 18GA L1.5IN PNK S STL HUB POLYPR SHLD REG BVL

## (undated) DEVICE — CONVERTORS STOCKINETTE: Brand: CONVERTORS

## (undated) DEVICE — PACK PROCEDURE SURG VASC CATH 161 MMC LF

## (undated) DEVICE — GAUZE,SPONGE,2"X2",8PLY,STERILE,LF,2'S: Brand: MEDLINE

## (undated) DEVICE — 48" PROBE COVER W/GEL, ULTRASOUND, STERILE: Brand: SITE-RITE

## (undated) DEVICE — PRESSURE MONITORING SET: Brand: TRUWAVE

## (undated) DEVICE — INTRODUCER SHTH 4FR CANN L11CM DIL TIP 25MM RED TUNGSTEN

## (undated) DEVICE — STRIP SKIN CLSR 1/4INX1.5IN REINF WND NYL CURAD

## (undated) DEVICE — 3M™ STERI-STRIP™ COMPOUND BENZOIN TINCTURE 40 BAGS/CARTON 4 CARTONS/CASE C1544: Brand: 3M™ STERI-STRIP™

## (undated) DEVICE — SUTURE COAT VCRL PC 5 PRECIS COSM CONVENTIONAL CUT PRIM 3 8 J823H

## (undated) DEVICE — NC TREK CORONARY DILATATION CATHETER 3.0 MM X 15 MM / RAPID-EXCHANGE: Brand: NC TREK

## (undated) DEVICE — GARMENT,MEDLINE,DVT,INT,CALF,MED, GEN2: Brand: MEDLINE

## (undated) DEVICE — PREP SKN PREVAIL 40ML APPL --

## (undated) DEVICE — SUTURE MCRYL SZ 4-0 L18IN ABSRB UD P-3 L13MM 3/8 CIR PRIM Y494G

## (undated) DEVICE — COPILOT KIT INCLUDES BLEEDBACK CONTROL VALVE 20/30 INDEFLATOR INFLATION DEVICE 30 ATM 20 CC / GUIDE WIRE INTRODUCER / TORQUE DEVICE: Brand: INDEFLATOR

## (undated) DEVICE — CATHETER HAD L36CM INSRT L19CM PALINDROME

## (undated) DEVICE — SOLUTION SCRB 4OZ 10% PVP I POVIDONE IOD TOP PAINT EXIDINE